# Patient Record
Sex: MALE | Race: WHITE | Employment: OTHER | URBAN - METROPOLITAN AREA
[De-identification: names, ages, dates, MRNs, and addresses within clinical notes are randomized per-mention and may not be internally consistent; named-entity substitution may affect disease eponyms.]

---

## 2017-05-26 ENCOUNTER — OFFICE VISIT (OUTPATIENT)
Dept: CARDIOLOGY CLINIC | Age: 77
End: 2017-05-26

## 2017-05-26 VITALS
HEART RATE: 68 BPM | DIASTOLIC BLOOD PRESSURE: 70 MMHG | BODY MASS INDEX: 28.35 KG/M2 | HEIGHT: 70 IN | WEIGHT: 198 LBS | OXYGEN SATURATION: 94 % | SYSTOLIC BLOOD PRESSURE: 142 MMHG

## 2017-05-26 DIAGNOSIS — I48.20 CHRONIC ATRIAL FIBRILLATION (HCC): Primary | ICD-10-CM

## 2017-05-26 DIAGNOSIS — I10 ESSENTIAL HYPERTENSION: ICD-10-CM

## 2017-05-26 DIAGNOSIS — Z95.2 S/P AVR: ICD-10-CM

## 2017-05-26 RX ORDER — LISINOPRIL 40 MG/1
20 TABLET ORAL
COMMUNITY
End: 2019-03-18 | Stop reason: DRUGHIGH

## 2017-05-26 RX ORDER — POLYVINYL ALCOHOL 14 MG/ML
1 SOLUTION/ DROPS OPHTHALMIC AS NEEDED
COMMUNITY

## 2017-05-26 NOTE — PROGRESS NOTES
Fatigue      Visit Vitals    /70 (BP 1 Location: Left arm, BP Patient Position: Sitting)    Pulse 68    Ht 5' 10\" (1.778 m)    Wt 198 lb (89.8 kg)    SpO2 94%    BMI 28.41 kg/m2     NO REFILLS

## 2017-05-26 NOTE — MR AVS SNAPSHOT
Visit Information Date & Time Provider Department Dept. Phone Encounter #  
 5/26/2017 10:40 AM Araceli Painting MD [facility]ks[facility] Serra [phone] [id number] Follow-up Instructions Return in about 6 months (around 11/26/2017). Your Appointments 6/19/2017  9:00 AM  
ECHO CARDIOGRAMS 2D with ECHO, Celestine Marinas [facility] ([facility] SCHEDULING) Appt Note: echo dr [address] [address]  
[phone]  
  
   
 [address] [address] [address] Upcoming Health Maintenance Date Due DTaP/Tdap/Td series (1 - Tdap) 9/14/1960 ZOSTER VACCINE AGE 60> 9/14/1999 GLAUCOMA SCREENING Q2Y 9/14/2004 Pneumococcal 65+ Low/Medium Risk (1 of 2 - PCV13) 9/14/2004 MEDICARE YEARLY EXAM 9/14/2004 INFLUENZA AGE 9 TO ADULT 8/1/2017 Allergies as of 5/26/2017  Review Complete On: 5/26/2017 By: Araceli Painting MD  
 No Known Allergies Current Immunizations  Reviewed on 12/28/2014 Name Date Influenza Vaccine 10/27/2014 Not reviewed this visit You Were Diagnosed With   
  
 Codes Comments Chronic atrial fibrillation (HCC)    -  Primary ICD-10-CM: P51.0 ICD-9-CM: 427.31 Essential hypertension     ICD-10-CM: I10 
ICD-9-CM: 401.9 S/P AVR     ICD-10-CM: Z95.2 ICD-9-CM: V43.3 Vitals BP Pulse Height(growth percentile) Weight(growth percentile) SpO2 BMI  
 142/70 (BP 1 Location: Left arm, BP Patient Position: Sitting) 68 5' 10\" (1.778 m) 198 lb (89.8 kg) 94% 28.41 kg/m2 Smoking Status Never Smoker Vitals History BMI and BSA Data Body Mass Index Body Surface Area  
 28.41 kg/m 2 2.11 m 2 Preferred Pharmacy Pharmacy Name Phone [facility] [address] [phone] Your Updated Medication List  
  
   
 This list is accurate as of: 5/26/17 11:37 AM.  Always use your most recent med list.  
  
  
  
  
 ARTIFICIAL TEARS (POLYVIN ALC) 1.4 % ophthalmic solution Generic drug:  polyvinyl alcohol Administer 1 Drop to both eyes as needed. cholecalciferol 1,000 unit tablet Commonly known as:  VITAMIN D3 Take 2,000 Units by mouth two (2) times a day. docusate sodium 100 mg capsule Commonly known as:  Zehra Scarce Take 100 mg by mouth two (2) times daily as needed. ferrous sulfate 325 mg (65 mg iron) Cper Take  by mouth. finasteride 5 mg tablet Commonly known as:  PROSCAR Take 5 mg by mouth nightly. glucosamine-chondroitin 750-600 mg Tab Take  by mouth. ipratropium 17 mcg/actuation inhaler Commonly known as:  ATROVENT HFA Take 2 Puffs by inhalation. lisinopril 40 mg tablet Commonly known as:  Bob Mash Take 20 mg by mouth daily. multivitamin tablet Commonly known as:  ONE A DAY Take 1 Tab by mouth daily. omega-3 fatty acids-vitamin e 1,000 mg Cap Take 1 Cap by mouth every other day. pravastatin 40 mg tablet Commonly known as:  PRAVACHOL Take 40 mg by mouth nightly. Pravastatin Na  
  
 senna 8.6 mg tablet Commonly known as:  Peter Joseel Brian Take 1 Tab by mouth daily. tamsulosin 0.4 mg capsule Commonly known as:  FLOMAX Take 0.4 mg by mouth nightly. TYLENOL 325 mg tablet Generic drug:  acetaminophen Take  by mouth every four (4) hours as needed for Pain.  
  
 warfarin 5 mg tablet Commonly known as:  COUMADIN Take 5 mg by mouth daily. We Performed the Following HEPATIC FUNCTION PANEL [36195 CPT(R)] LIPID PANEL [08140 CPT(R)] Follow-up Instructions Return in about 6 months (around 11/26/2017). To-Do List   
 05/26/2017 ECHO:  2D ECHO COMPLETE ADULT (TTE) W OR WO CONTR Introducing [facility]! Ricardo Jiang introduces [employer] patient portal. Now you can access parts of your medical record, email your doctor's office, and request medication refills online. 1. In your internet browser, go to [email or web address] 2. Click on the First Time User? Click Here link in the Sign In box. You will see the New Member Sign Up page. 3. Enter your [employer] Access Code exactly as it appears below. You will not need to use this code after youve completed the sign-up process. If you do not sign up before the expiration date, you must request a new code. · [employer] Access Code: [email or web address] Expires: 8/24/2017 11:37 AM 
 
4. Enter the last four digits of your Social Security Number (xxxx) and Date of Birth (mm/dd/yyyy) as indicated and click Submit. You will be taken to the next sign-up page. 5. Create a [employer] ID. This will be your [employer] login ID and cannot be changed, so think of one that is secure and easy to remember. 6. Create a [employer] password. You can change your password at any time. 7. Enter your Password Reset Question and Answer. This can be used at a later time if you forget your password. 8. Enter your e-mail address. You will receive e-mail notification when new information is available in [address] 9. Click Sign Up. You can now view and download portions of your medical record. 10. Click the Download Summary menu link to download a portable copy of your medical information. If you have questions, please visit the Frequently Asked Questions section of the [employer] website. Remember, [employer] is NOT to be used for urgent needs. For medical emergencies, dial 911. Now available from your iPhone and Android! Please provide this summary of care documentation to your next provider. Your primary care clinician is listed as NOT ON FILE. If you have any questions after today's visit, please call [phone].

## 2017-05-26 NOTE — PROGRESS NOTES
LAST OFFICE VISIT : 7/1/2016        ICD-10-CM ICD-9-CM   1. Chronic atrial fibrillation (HCC) I48.2 427.31   2. Essential hypertension I10 401.9   3. S/P AVR Z95.2 V43. [address] is a 67 y.o. male with  referred for      . I have personally obtained the history from the patient. HISTORY OF PRESENTING ILLNESS        Mr. Mya Soto is doing well without any interval issues. No recurrent AF spells. He remains active with daily walking (2 miles a day in 1 hour) without any exertional symptoms. He also lifts weights (65lbs, 10 reps) and uses the push mower. He has DEL and is intolerant of CPAP machine. He reports increased fatigue during the day. Wonders if this is from being too physically active with lack of CPAP therapy. No bleeding issues on Coumadin. The patient denies chest pain/ shortness of breath, orthopnea, PND, LE edema, palpitations, syncope or presyncope.          ACTIVE PROBLEM LIST     Patient Active Problem List    Diagnosis Date Noted    Hypertension 10/26/2015    Hyperlipemia 10/26/2015    Anemia due to acute blood loss 12/26/2014    Aortic stenosis 12/23/2014    S/P AVR (aortic valve replacement) 12/23/2014    S/P MVR (mitral valve repair) 12/23/2014    S/P Maze operation for atrial fibrillation 12/23/2014    Aortic insufficiency 12/08/2014    Mitral regurgitation 12/08/2014    A-fib ([facility] 06/17/2013    Arthritis of knee 07/09/2012           PAST MEDICAL HISTORY     Past Medical History:   Diagnosis Date    Arthritis     Atrial fibrillation ([address]     CAD (coronary artery disease)     Cancer ([address] 2012    melanoma head    Chronic pain     legs/knee    Contact dermatitis     Enlarged prostate     GERD (gastroesophageal reflux disease)     Hyperlipidemia     Hypertension     Insomnia     Unspecified sleep apnea     unable tolerated CPAP machine    Vitamin D deficiency            PAST SURGICAL HISTORY     Past Surgical History:   Procedure Laterality Date    HX AORTIC VALVE REPLACEMENT  2015    and mitral valve repair, left atrial cryo maze    HX HEENT      melanoma removed head    HX HERNIA REPAIR  2009    Right    HX HERNIA REPAIR      left inguinal hernia repair    HX HERNIA REPAIR Left 12/01/2016    lap left inguinal hernia repair with mesh    HX KNEE REPLACEMENT      Bilateral    HX ORTHOPAEDIC      BILATERAL KNEE REPLACEMENT    HX ORTHOPAEDIC      CARPEL TUNNEL REPAIR-RIGHT    HX OTHER SURGICAL  2015    closure of patent foramen ovale          ALLERGIES     No Known Allergies       FAMILY HISTORY     Family History   Problem Relation Age of Onset    Cancer Mother     Cancer Father      prostrate    Cancer Brother      prostrate ca    Anesth Problems Neg Hx     negative for cardiac disease       SOCIAL HISTORY     Social History     Social History    Marital status:      Spouse name: N/A    Number of children: N/A    Years of education: N/A     Social History Main Topics    Smoking status: Never Smoker    Smokeless tobacco: Never Used    Alcohol use 1.8 oz/week     3 Cans of beer per week    Drug use: No    Sexual activity: Not Asked     Other Topics Concern    None     Social History Narrative         MEDICATIONS     Current Outpatient Prescriptions   Medication Sig    lisinopril (PRINIVIL, ZESTRIL) 40 mg tablet Take 20 mg by mouth daily.  polyvinyl alcohol (ARTIFICIAL TEARS, POLYVIN ALC,) 1.4 % ophthalmic solution Administer 1 Drop to both eyes as needed.  ipratropium (ATROVENT HFA) 17 mcg/actuation inhaler Take 2 Puffs by inhalation.  ferrous sulfate 325 mg (65 mg iron) cpER Take  by mouth.  senna (SENOKOT) 8.6 mg tablet Take 1 Tab by mouth daily.  tamsulosin (FLOMAX) 0.4 mg capsule Take 0.4 mg by mouth nightly.  cholecalciferol (VITAMIN D3) 1,000 unit tablet Take 2,000 Units by mouth two (2) times a day.  GLUCOSAMINE HCL/CHONDR CHING A NA (GLUCOSAMINE-CHONDROITIN) 750-600 mg tab Take  by mouth.     omega-3 fatty acids-vitamin e 1,000 mg cap Take 1 Cap by mouth every other day.  acetaminophen (TYLENOL) 325 mg tablet Take  by mouth every four (4) hours as needed for Pain.  multivitamin (ONE A DAY) tablet Take 1 Tab by mouth daily.  warfarin (COUMADIN) 5 mg tablet Take 5 mg by mouth daily.  docusate sodium (COLACE) 100 mg capsule Take 100 mg by mouth two (2) times daily as needed.  finasteride (PROSCAR) 5 mg tablet Take 5 mg by mouth nightly.  pravastatin (PRAVACHOL) 40 mg tablet Take 40 mg by mouth nightly. Pravastatin Na     No current facility-administered medications for this visit. I have reviewed the nurses notes, vitals, problem list, allergy list, medical history, family, social history and medications. REVIEW OF SYMPTOMS      General: Pt denies excessive weight gain or loss. Pt is able to conduct ADL's. Positive for fatigue. HEENT: Denies blurred vision, headaches, hearing loss, epistaxis and difficulty swallowing. Respiratory: Denies cough, congestion, shortness of breath, ABDUL, wheezing or stridor. Cardiovascular: Denies precordial pain, palpitations, edema or PND  Gastrointestinal: Denies poor appetite, indigestion, abdominal pain or blood in stool  Genitourinary: Denies hematuria, dysuria, increased urinary frequency  Musculoskeletal: Denies joint pain or swelling from muscles or joints  Neurologic: Denies tremor, paresthesias, headache, or sensory motor disturbance  Psychiatric: Denies confusion, insomnia, depression  Integumentray: Denies rash, itching or ulcers. Hematologic: Denies easy bruising, bleeding     PHYSICAL EXAMINATION      Vitals:    05/26/17 1102   BP: 142/70   Pulse: 68   SpO2: 94%   Weight: 198 lb (89.8 kg)   Height: 5' 10\" (1.778 m)     General: Well developed, in no acute distress.   HEENT: No jaundice, oral mucosa moist, no oral ulcers  Neck: Supple, no stiffness, no lymphadenopathy, supple  Heart: Irregularly irregular rhythm   Respiratory: Clear bilaterally x 4, no wheezing or rales  Extremities: trace pedal edema   Musculoskeletal: No clubbing, no deformities  Neuro: A&Ox3, speech clear, gait stable, cooperative, no focal neurologic deficits  Skin: Skin color is normal. No rashes or lesions. Non diaphoretic, moist.  Vascular: 2+ pulses symmetric in all extremities       DIAGNOSTIC DATA       1.  Echocardiogram                                     Echo 9/5/14    SUMMARY:  Left ventricle: Systolic function was normal. Ejection fraction was  estimated in the range of 55 % to 60 %. There were no regional wall motion  abnormalities. Left atrium: The atrium was mildly dilated. 4/20/15- EF 48%, SHANTANU, atrial septum- poss PFO, MR mild, TR mild/mod, Pulm HTN mod, AV bioprosthesis normal function      2.  Myocardial perfusion study                      (9/5/14)  Normal EF ;Normal perfusion    3. Cholesterol profile                            (10/6/15): , HDL 35, ,           (4/19/16): , HDL 72, LDL 65.6,   (05/01/17)- , HDL 85, LDL 65 , TG 55  4. LE venous duplex                                           (10/10/14)  Right leg mod.  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 distally  No DVT     LABORATORY DATA            Lab Results   Component Value Date/Time    WBC 4.3 11/30/2016 04:13 PM    HGB 12.9 11/30/2016 04:13 PM    HCT 38.6 11/30/2016 04:13 PM    PLATELET 043 53/62/5019 04:13 PM    MCV 94.6 11/30/2016 04:13 PM      Lab Results   Component Value Date/Time    Sodium 141 11/30/2016 04:13 PM    Potassium 4.1 11/30/2016 04:13 PM    Chloride 104 11/30/2016 04:13 PM    CO2 30 11/30/2016 04:13 PM    Anion gap 7 11/30/2016 04:13 PM    Glucose 86 11/30/2016 04:13 PM    BUN 14 11/30/2016 04:13 PM    Creatinine 0.73 11/30/2016 04:13 PM    BUN/Creatinine ratio 19 11/30/2016 04:13 PM    GFR est AA >60 11/30/2016 04:13 PM    GFR est non-AA >60 11/30/2016 04:13 PM    Calcium 9.1 11/30/2016 04:13 PM    Bilirubin, total 0.7 10/13/2015 08:07 AM    AST (SGOT) 26 10/13/2015 08:07 AM    Alk. phosphatase 54 10/13/2015 08:07 AM    Protein, total 6.8 10/13/2015 08:07 AM    Albumin 4.3 10/13/2015 08:07 AM    Globulin 3.3 12/29/2014 04:46 AM    A-G Ratio 0.8 12/29/2014 04:46 AM    ALT (SGPT) 19 10/13/2015 08:07 AM           ASSESSMENT/RECOMMENDATIONS:.      1. AF  -No recurrent atrial fibrillation spells.   -No bleeding issues on Coumadin.   -Will obtain an echo in 6 months. 2. LDL  -LDL is at goal on current medical regimen. He leads a very active lifestyle. 3. Fatigue   -maybe related to the fact that he is not wearing his CPAP at home.   -I believe his PCP is following his TSH and hemoglobin. 4. Moderate PFO with left to right flow and CHRISTOPHER  -Currently anticoagulated. 5. Follow up in 6 months, sooner PRN. Orders Placed This Encounter    HEPATIC FUNCTION PANEL    LIPID PANEL    2D ECHO COMPLETE ADULT (TTE) W OR WO CONTR     Standing Status:   Future     Standing Expiration Date:   6/7/2017     Order Specific Question:   Contrast Enhancement (Bubble Study, Definity, Optison) may be used if criteria listed in established evidence-based protocol has been identified. Answer:   Yes     Order Specific Question:   Reason for Exam:     Answer:   chest pain,shortness of breath, AVR,MVR,AI, MR , AS    lisinopril (PRINIVIL, ZESTRIL) 40 mg tablet     Sig: Take 20 mg by mouth daily.  polyvinyl alcohol (ARTIFICIAL TEARS, POLYVIN ALC,) 1.4 % ophthalmic solution     Sig: Administer 1 Drop to both eyes as needed.  ipratropium (ATROVENT HFA) 17 mcg/actuation inhaler     Sig: Take 2 Puffs by inhalation. Follow-up Disposition:  Return in about 6 months (around 11/26/2017). I have discussed the diagnosis with  Jessie Gross and the intended plan as seen in the above orders. Questions were answered concerning future plans. I have discussed medication side effects and warnings with the patient as well.     Thank you, [facility] for involving me in the care of  Henny Meraz. Please do not hesitate to contact me for further questions/concerns. Written by Annabella Hoyt, as dictated by Sherine Rosas MD.    Eliseo Fine MD, [address]      [facility], [address] [place] [address] [facility]      [phone] / [phone] Fax

## 2017-06-19 ENCOUNTER — CLINICAL SUPPORT (OUTPATIENT)
Dept: CARDIOLOGY CLINIC | Age: 77
End: 2017-06-19

## 2017-06-19 DIAGNOSIS — Z95.2 S/P AVR (AORTIC VALVE REPLACEMENT): Primary | ICD-10-CM

## 2017-06-19 DIAGNOSIS — I10 ESSENTIAL HYPERTENSION: ICD-10-CM

## 2017-06-19 DIAGNOSIS — I48.20 CHRONIC ATRIAL FIBRILLATION (HCC): ICD-10-CM

## 2017-06-19 DIAGNOSIS — Z98.890 S/P MVR (MITRAL VALVE REPAIR): ICD-10-CM

## 2017-06-20 ENCOUNTER — DOCUMENTATION ONLY (OUTPATIENT)
Dept: CARDIOLOGY CLINIC | Age: 77
End: 2017-06-20

## 2018-01-22 ENCOUNTER — OFFICE VISIT (OUTPATIENT)
Dept: CARDIOLOGY CLINIC | Age: 78
End: 2018-01-22

## 2018-01-22 VITALS
BODY MASS INDEX: 28.49 KG/M2 | HEIGHT: 70 IN | HEART RATE: 72 BPM | SYSTOLIC BLOOD PRESSURE: 139 MMHG | DIASTOLIC BLOOD PRESSURE: 70 MMHG | WEIGHT: 199 LBS

## 2018-01-22 DIAGNOSIS — Z98.890 S/P MVR (MITRAL VALVE REPAIR): ICD-10-CM

## 2018-01-22 DIAGNOSIS — I48.91 ATRIAL FIBRILLATION, UNSPECIFIED TYPE (HCC): Primary | ICD-10-CM

## 2018-01-22 DIAGNOSIS — Z95.2 S/P AVR (AORTIC VALVE REPLACEMENT): ICD-10-CM

## 2018-01-22 NOTE — MR AVS SNAPSHOT
[address] [address] 
[phone] Patient: Nj Lara MRN: [id number] MRX:[id number] Visit Information Date & Time Provider Department Dept. Phone Encounter #  
 1/22/2018  9:00 AM Esther Schlatter, MD [facility] [phone] [id number] Follow-up Instructions Return in about 6 months (around 7/22/2018). Upcoming Health Maintenance Date Due DTaP/Tdap/Td series (1 - Tdap) 9/14/1960 ZOSTER VACCINE AGE 60> 7/14/1999 GLAUCOMA SCREENING Q2Y 9/14/2004 Pneumococcal 65+ Low/Medium Risk (1 of 2 - PCV13) 9/14/2004 MEDICARE YEARLY EXAM 9/14/2004 Influenza Age 5 to Adult 8/1/2017 Allergies as of 1/22/2018  Review Complete On: 1/22/2018 By: Esther Schlatter, MD  
 No Known Allergies Current Immunizations  Reviewed on 12/28/2014 Name Date Influenza Vaccine 10/27/2014 Not reviewed this visit You Were Diagnosed With   
  
 Codes Comments [facility] unspecified [facility]    -  Primary ICD-10-CM: I48.91 
ICD-9-CM: 427.31 S/P MVR (mitral valve repair)     ICD-10-CM: I42.396 ICD-9-CM: V45.89 S/P AVR (aortic valve replacement)     ICD-10-CM: L82.0 ICD-9-CM: V43.3 Vitals BP Pulse Height(growth percentile) Weight(growth percentile) BMI Smoking Status 139/70 72 5' 10\" (1.778 m) 199 lb (90.3 kg) 28.55 kg/m2 Never Smoker Vitals History BMI and BSA Data Body Mass Index Body Surface Area 28.55 kg/m 2 2.11 m 2 Preferred Pharmacy Pharmacy Name Phone [facility] [address] [phone] Your Updated Medication List  
  
   
This list is accurate as of: 1/22/18  9:43 AM.  Always use your most recent med list.  
  
  
  
  
 ARTIFICIAL TEARS (POLYVIN ALC) 1.4 % ophthalmic solution Generic drug:  polyvinyl alcohol Administer 1 Drop to both eyes as needed. cholecalciferol 1,000 unit tablet Commonly known as:  VITAMIN D3 Take 2,000 Units by mouth two (2) times a day. docusate sodium 100 mg capsule Commonly known as:  Brena Slay Take 100 mg by mouth two (2) times daily as needed. ferrous sulfate 325 mg (65 mg iron) Cper Take  by mouth. finasteride 5 mg tablet Commonly known as:  PROSCAR Take 5 mg by mouth nightly. glucosamine-chondroitin 750-600 mg Tab Take  by mouth. ipratropium 17 mcg/actuation inhaler Commonly known as:  ATROVENT HFA Take 2 Puffs by inhalation. lisinopril 40 mg tablet Commonly known as:  Rheta Jacek Take 20 mg by mouth daily. multivitamin tablet Commonly known as:  ONE A DAY Take 1 Tab by mouth daily. omega-3 fatty acids-vitamin e 1,000 mg Cap Take 1 Cap by mouth every other day. pravastatin 40 mg tablet Commonly known as:  PRAVACHOL Take 40 mg by mouth nightly. Pravastatin Na  
  
 senna 8.6 mg tablet Commonly known as:  Peter Kiewit Brian Take 1 Tab by mouth daily. tamsulosin 0.4 mg capsule Commonly known as:  FLOMAX Take 0.4 mg by mouth nightly. TYLENOL 325 mg tablet Generic drug:  acetaminophen Take  by mouth every four (4) hours as needed for Pain.  
  
 warfarin 5 mg tablet Commonly known as:  COUMADIN Take 5 mg by mouth daily. We Performed the Following AMB POC EKG ROUTINE W/ 12 LEADS, INTER & REP [38858 CPT(R)] Follow-up Instructions Return in about 6 months (around 7/22/2018). Introducing [facility]! Arnol Arana introduces [employer] patient portal. Now you can access parts of your medical record, email your doctor's office, and request medication refills online. 1. In your internet browser, go to [email or web address] 2. Click on the First Time User? Click Here link in the Sign In box. You will see the New Member Sign Up page. 3. Enter your [employer] Access Code exactly as it appears below. You will not need to use this code after youve completed the sign-up process. If you do not sign up before the expiration date, you must request a new code. · [employer] Access Code: [email or web address] Expires: 4/22/2018  9:17 AM 
 
4. Enter the last four digits of your Social Security Number (xxxx) and Date of Birth (mm/dd/yyyy) as indicated and click Submit. You will be taken to the next sign-up page. 5. Create a [employer] ID. This will be your [employer] login ID and cannot be changed, so think of one that is secure and easy to remember. 6. Create a [employer] password. You can change your password at any time. 7. Enter your Password Reset Question and Answer. This can be used at a later time if you forget your password. 8. Enter your e-mail address. You will receive e-mail notification when new information is available in [address] 9. Click Sign Up. You can now view and download portions of your medical record. 10. Click the Download Summary menu link to download a portable copy of your medical information. If you have questions, please visit the Frequently Asked Questions section of the [employer] website. Remember, [employer] is NOT to be used for urgent needs. For medical emergencies, dial 911. Now available from your iPhone and Android! Please provide this summary of care documentation to your next provider. Your primary care clinician is listed as NOT ON FILE. If you have any questions after today's visit, please call [phone].

## 2018-01-22 NOTE — PROGRESS NOTES
Visit Vitals    /70    Pulse 72    Ht 5' 10\" (1.778 m)    Wt 199 lb (90.3 kg)    BMI 28.55 kg/m2

## 2018-01-22 NOTE — PROGRESS NOTES
LAST OFFICE VISIT : 6/19/2017        ICD-10-CM ICD-9-CM   1. Atrial fibrillation, unspecified type ([facility] [address] I48.91 427.31   2. S/P MVR (mitral valve repair) Z98.890 V45.89   3. S/P AVR (aortic valve replacement) Z95.2 V43. [address]ro is a 65 y.o. male with hypertension, dyslipidemia and atrial fibrillation referred for 6 month follow up. Cardiac risk factors: dyslipidemia, male gender, hypertension  I have personally obtained the history from the patient. HISTORY OF PRESENTING ILLNESS     Overall the pt states he is doing well. The pt states that he is getting his INR checked regularly. He states that he is very fatigued. The pt is walking 2 miles on the treadmill regularly. He is not wearing his CPAP. The pt's wife states that she has noticed swelling in his ankles more in the last few months. The patient denies chest pain/ shortness of breath, orthopnea, PND, palpitations, syncope, presyncope or.          ACTIVE PROBLEM LIST     Patient Active Problem List    Diagnosis Date Noted    Hypertension 10/26/2015    Hyperlipemia 10/26/2015    Anemia due to acute blood loss 12/26/2014    Aortic stenosis 12/23/2014    S/P AVR (aortic valve replacement) 12/23/2014    S/P MVR (mitral valve repair) 12/23/2014    S/P Maze operation for atrial fibrillation 12/23/2014    Aortic insufficiency 12/08/2014    Mitral regurgitation 12/08/2014    A-fib ([facility] [address] 06/17/2013    Arthritis of knee 07/09/2012           PAST MEDICAL HISTORY     Past Medical History:   Diagnosis Date    Arthritis     Atrial fibrillation ([facility] [address]     CAD (coronary artery disease)     Cancer ([facility] [address] 2012    melanoma head    Chronic pain     legs/knee    Contact dermatitis     Enlarged prostate     GERD (gastroesophageal reflux disease)     Hyperlipidemia     Hypertension     Insomnia     Unspecified sleep apnea     unable tolerated CPAP machine    Vitamin D deficiency            PAST SURGICAL HISTORY     Past Surgical History:   Procedure Laterality Date    HX AORTIC VALVE REPLACEMENT  2015    and mitral valve repair, left atrial cryo maze    HX HEENT      melanoma removed head    HX HERNIA REPAIR  2009    Right    HX HERNIA REPAIR      left inguinal hernia repair    HX HERNIA REPAIR Left 12/01/2016    lap left inguinal hernia repair with mesh    HX KNEE REPLACEMENT      Bilateral    HX ORTHOPAEDIC      BILATERAL KNEE REPLACEMENT    HX ORTHOPAEDIC      CARPEL TUNNEL REPAIR-RIGHT    HX OTHER SURGICAL  2015    closure of patent foramen ovale          ALLERGIES     No Known Allergies       FAMILY HISTORY     Family History   Problem Relation Age of Onset    Cancer Mother     Cancer Father      prostrate    Cancer Brother      prostrate ca    Anesth Problems Neg Hx     negative for cardiac disease       SOCIAL HISTORY     Social History     Social History    Marital status:      Spouse name: N/A    Number of children: N/A    Years of education: N/A     Social History Main Topics    Smoking status: Never Smoker    Smokeless tobacco: Never Used    Alcohol use 1.8 oz/week     3 Cans of beer per week    Drug use: No    Sexual activity: Not Asked     Other Topics Concern    None     Social History Narrative         MEDICATIONS     Current Outpatient Prescriptions   Medication Sig    lisinopril (PRINIVIL, ZESTRIL) 40 mg tablet Take 20 mg by mouth daily.  polyvinyl alcohol (ARTIFICIAL TEARS, POLYVIN ALC,) 1.4 % ophthalmic solution Administer 1 Drop to both eyes as needed.  ipratropium (ATROVENT HFA) 17 mcg/actuation inhaler Take 2 Puffs by inhalation.  ferrous sulfate 325 mg (65 mg iron) cpER Take  by mouth.  senna (SENOKOT) 8.6 mg tablet Take 1 Tab by mouth daily.  tamsulosin (FLOMAX) 0.4 mg capsule Take 0.4 mg by mouth nightly.  cholecalciferol (VITAMIN D3) 1,000 unit tablet Take 2,000 Units by mouth two (2) times a day.     GLUCOSAMINE HCL/CHONDR CHING A NA (GLUCOSAMINE-CHONDROITIN) 750-600 mg tab Take  by mouth.  omega-3 fatty acids-vitamin e 1,000 mg cap Take 1 Cap by mouth every other day.  acetaminophen (TYLENOL) 325 mg tablet Take  by mouth every four (4) hours as needed for Pain.  multivitamin (ONE A DAY) tablet Take 1 Tab by mouth daily.  warfarin (COUMADIN) 5 mg tablet Take 5 mg by mouth daily.  docusate sodium (COLACE) 100 mg capsule Take 100 mg by mouth two (2) times daily as needed.  finasteride (PROSCAR) 5 mg tablet Take 5 mg by mouth nightly.  pravastatin (PRAVACHOL) 40 mg tablet Take 40 mg by mouth nightly. Pravastatin Na     No current facility-administered medications for this visit. I have reviewed the nurses notes, vitals, problem list, allergy list, medical history, family, social history and medications. REVIEW OF SYMPTOMS      General: Pt denies excessive weight gain or loss. Pt is able to conduct ADL's  HEENT: Denies blurred vision, headaches, hearing loss, epistaxis and difficulty swallowing. Respiratory: Denies cough, congestion, shortness of breath, ABDUL, wheezing or stridor. Cardiovascular: Denies precordial pain, palpitations, edema or PND  Gastrointestinal: Denies poor appetite, indigestion, abdominal pain or blood in stool  Genitourinary: Denies hematuria, dysuria, increased urinary frequency  Musculoskeletal: Denies joint pain or swelling from muscles or joints  Neurologic: Denies tremor, paresthesias, headache, or sensory motor disturbance  Psychiatric: Denies confusion, insomnia, depression  Integumentray: Denies rash, itching or ulcers. Hematologic: Denies easy bruising, bleeding     PHYSICAL EXAMINATION      Vitals:    01/22/18 0917   BP: 139/70   Pulse: 72   Weight: 199 lb (90.3 kg)   Height: 5' 10\" (1.778 m)     General: Well developed, in no acute distress.   HEENT: No jaundice, oral mucosa moist, no oral ulcers  Neck: Supple, no stiffness, no lymphadenopathy, supple  Heart: Irregularly irregular   Respiratory: Clear bilaterally x 4, no wheezing or rales  Extremities: Trace ankle edema  Musculoskeletal: No clubbing, no deformities  Neuro: A&Ox3, speech clear, gait stable, cooperative, no focal neurologic deficits  Skin: Skin color is normal. No rashes or lesions. Non diaphoretic, moist.      EKG: Atrial fibrillation      DIAGNOSTIC DATA     1.  Echocardiogram                                     Echo 9/5/14    SUMMARY:  Left ventricle: Systolic function was normal. Ejection fraction was  estimated in the range of 55 % to 60 %. There were no regional wall motion  abnormalities. Left atrium: The atrium was mildly dilated. 4/20/15- EF 48%, SHANTANU, atrial septum- poss PFO, MR mild, TR mild/mod, Pulm HTN mod, AV bioprosthesis normal function  6/19/17- EF 45-50%, RVE, SHANTANU, MR/TR mild/mod, AV bioprosthesis exhibits normal function, severe Pulm HTN, MN mild  Atrial septum: There was a secundum septal defect. Color Doppler  evaluation was performed. There was a mild left-to-right shunt. 2.  Myocardial perfusion study                      (9/5/14)  Normal EF ;Normal perfusion    3. Cholesterol profile                            (10/6/15): , HDL 35, ,           (4/19/16): , HDL 72, LDL 65.6,   (05/01/17)- , HDL 85, LDL 65 , TG 55    4. LE venous duplex                                           (10/10/14)  Right leg mod.  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 distally  No DVT         LABORATORY DATA            Lab Results   Component Value Date/Time    WBC 4.3 11/30/2016 04:13 PM    HGB 12.9 11/30/2016 04:13 PM    HCT 38.6 11/30/2016 04:13 PM    PLATELET 319 75/91/1610 04:13 PM    MCV 94.6 11/30/2016 04:13 PM      Lab Results   Component Value Date/Time    Sodium 141 11/30/2016 04:13 PM    Potassium 4.1 11/30/2016 04:13 PM    Chloride 104 11/30/2016 04:13 PM    CO2 30 11/30/2016 04:13 PM    Anion gap 7 11/30/2016 04:13 PM    Glucose 86 11/30/2016 04:13 PM    BUN 14 11/30/2016 04:13 PM Creatinine 0.73 11/30/2016 04:13 PM    BUN/Creatinine ratio 19 11/30/2016 04:13 PM    GFR est AA >60 11/30/2016 04:13 PM    GFR est non-AA >60 11/30/2016 04:13 PM    Calcium 9.1 11/30/2016 04:13 PM    Bilirubin, total 0.7 10/13/2015 08:07 AM    AST (SGOT) 26 10/13/2015 08:07 AM    Alk. phosphatase 54 10/13/2015 08:07 AM    Protein, total 6.8 10/13/2015 08:07 AM    Albumin 4.3 10/13/2015 08:07 AM    Globulin 3.3 12/29/2014 04:46 AM    A-G Ratio 0.8 12/29/2014 04:46 AM    ALT (SGPT) 19 10/13/2015 08:07 AM           ASSESSMENT/RECOMMENDATIONS:.      1. Atrial fibrillation  - Rate is under good control, continue anticoagulation. He has not had any episodes of bleeding on coumadin. 2. Hypertension  - Blood pressure is under good control on current medical regimen. I encouraged him to continue to exercise and eat a clean healthy diet. 3. Dyslipidemia  - Lipids are at goal based on last lipid panel, these are being followed at the [place]. 4. Moderate PFO with left to right flow and CHRISTOPHER  - This was apparently closed at time of surgery. 5. AS and MR s/p AVR and MVR on 1/8/15  - He had an aortic valve replacement with a #23 Deep santo flow and he had a mitral valve repair. He additionally had a cryo maze procedure done. 6. DEL  - The pt is not wearing his CPAP and I informed him this is what could be causing his fatigue. - I believe his LE edema could also be from him not wearing his CPAP but I will check LE venous duplex scans to be sure. 7. Return in 6 months or PRN. Orders Placed This Encounter    AMB POC EKG ROUTINE W/ 12 LEADS, INTER & REP     Order Specific Question:   Reason for Exam:     Answer:   afib        Follow-up Disposition:  Return in about 6 months (around 7/22/2018). I have discussed the diagnosis with  Mehul Martin and the intended plan as seen in the above orders. Questions were answered concerning future plans.   I have discussed medication side effects and warnings with the patient as well. Thank you,  Not On File [facility] for involving me in the care of  Nj Lara. Please do not hesitate to contact me for further questions/concerns. Written by Marlyn Bethea, as dictated by Esther Schlatter, MD.     Lloyd Fine MD, [facility] [address]      [facility], [address] [place] [address] [address]      [phone] / [phone] Fax

## 2018-01-24 ENCOUNTER — CLINICAL SUPPORT (OUTPATIENT)
Dept: CARDIOLOGY CLINIC | Age: 78
End: 2018-01-24

## 2018-01-24 DIAGNOSIS — R60.0 BILATERAL LEG EDEMA: Primary | ICD-10-CM

## 2018-01-24 NOTE — PROCEDURES
[facility]  *** FINAL REPORT ***    Name: Luis Garner  MRN: [id number]       Outpatient  : 14 Sep 1940  HIS Order #: [id number]  [address] [facility] Visit #: [id number]  Date: 2018    TYPE OF TEST: Peripheral Venous Testing    REASON FOR TEST  Limb swelling    Right Leg:-  Deep venous thrombosis:           No  Superficial venous thrombosis:    No  Deep venous insufficiency:        No  Superficial venous insufficiency: No    Left Leg:-  Deep venous thrombosis:           No  Superficial venous thrombosis:    No  Deep venous insufficiency:        Yes  Superficial venous insufficiency: No      INTERPRETATION/FINDINGS  PROCEDURE:  BILATERAL LE VENOUS DUPLEX. Evaluation of lower extremity veins with ultrasound (B-mode imaging,  pulsed Doppler, color Doppler). Includes the common femoral, deep  femoral, femoral, popliteal, posterior tibial, peroneal, and great  saphenous veins. FINDINGS:  Maebelle Osmani scale and color flow duplex images of the veins in  both lower extremities demonstrate normal compressibility, augmented  flow profiles, and absence of filling defects throughout the deep and  superficial veins in both lower extremities. Pulsatile flow is noted  bilaterally. As an incidental finding, there is evidence of valve incompetence  (reflux) involving the left popliteal vein. CONCLUSION:  Bilateral lower extremity venous duplex negative for deep   venous thrombosis or thrombophlebitis. Pulsatile venous flow is  observed, consistent with increased central venous pressure. ADDITIONAL COMMENTS    I have personally reviewed the data relevant to the interpretation of  this  study.     TECHNOLOGIST: Dimas Riedel, RVS  Signed: 2018 11:27 AM    PHYSICIAN: Ed Alston MD, [facility]  Signed: 2018 06:39 PM

## 2018-01-31 ENCOUNTER — TELEPHONE (OUTPATIENT)
Dept: CARDIOLOGY CLINIC | Age: 78
End: 2018-01-31

## 2018-01-31 NOTE — TELEPHONE ENCOUNTER
Pt called stating that he was returning your phone call. Please give him a call back @ [phone]. Thanks!   Faida Jefferson

## 2018-02-07 ENCOUNTER — DOCUMENTATION ONLY (OUTPATIENT)
Dept: CARDIOLOGY CLINIC | Age: 78
End: 2018-02-07

## 2018-02-07 DIAGNOSIS — I27.20 PULMONARY HTN (HCC): Primary | ICD-10-CM

## 2018-03-15 ENCOUNTER — TELEPHONE (OUTPATIENT)
Dept: CARDIOLOGY CLINIC | Age: 78
End: 2018-03-15

## 2018-03-16 NOTE — TELEPHONE ENCOUNTER
Patients wife is calling again to try and get in touch with you regarding the patients test   Phone: [phone]

## 2018-03-21 ENCOUNTER — TELEPHONE (OUTPATIENT)
Dept: CARDIOLOGY CLINIC | Age: 78
End: 2018-03-21

## 2018-03-21 NOTE — TELEPHONE ENCOUNTER
Pt's wife is calling to ask a question about a doctor that Dr. Americo Calvo suggested for this pt to have a procedure done by. Pt's wife asked that you call her today. She can be reached @ [phone]. Thanks!   Clara Perez

## 2018-03-22 NOTE — TELEPHONE ENCOUNTER
Patients wife is calling again she said they are a little confused and need clarification ASAP   Phone: [phone]

## 2018-06-29 ENCOUNTER — OFFICE VISIT (OUTPATIENT)
Dept: CARDIOLOGY CLINIC | Age: 78
End: 2018-06-29

## 2018-06-29 VITALS
HEIGHT: 70 IN | BODY MASS INDEX: 28.06 KG/M2 | SYSTOLIC BLOOD PRESSURE: 132 MMHG | HEART RATE: 68 BPM | WEIGHT: 196 LBS | OXYGEN SATURATION: 96 % | DIASTOLIC BLOOD PRESSURE: 90 MMHG

## 2018-06-29 DIAGNOSIS — I34.0 MITRAL VALVE INSUFFICIENCY, UNSPECIFIED ETIOLOGY: ICD-10-CM

## 2018-06-29 DIAGNOSIS — E78.5 HYPERLIPIDEMIA, UNSPECIFIED HYPERLIPIDEMIA TYPE: ICD-10-CM

## 2018-06-29 DIAGNOSIS — I06.0 RHEUMATIC AORTIC STENOSIS: ICD-10-CM

## 2018-06-29 DIAGNOSIS — I10 ESSENTIAL HYPERTENSION: Primary | ICD-10-CM

## 2018-06-29 RX ORDER — ASPIRIN 81 MG/1
81 TABLET ORAL 2 TIMES DAILY
COMMUNITY

## 2018-06-29 NOTE — MR AVS SNAPSHOT
[address] [address] [phone] Patient: Kenia Sky MRN: [id number] TKF:[id number] Visit Information Date & Time Provider Department Dept. Phone Encounter #  
 6/29/2018 11:00 AM Ayo Villagran MD [facility] [phone] [id number] Your Appointments 1/21/2019 10:00 AM  
ESTABLISHED PATIENT with Ayo Villagran MD  
[facility] ([facility] SCHEDULING) Appt Note: 6 mo fu appt [address] [id number] [phone]  
  
   
 [address] [address] [address] Upcoming Health Maintenance Date Due DTaP/Tdap/Td series (1 - Tdap) 9/14/1960 ZOSTER VACCINE AGE 60> 7/14/1999 GLAUCOMA SCREENING Q2Y 9/14/2004 Pneumococcal 65+ Low/Medium Risk (1 of 2 - PCV13) 9/14/2004 MEDICARE YEARLY EXAM 3/14/2018 Influenza Age 5 to Adult 8/1/2018 Allergies as of 6/29/2018  Review Complete On: 6/29/2018 By: Ayo Villagran MD  
 No Known Allergies Current Immunizations  Reviewed on 12/28/2014 Name Date Influenza Vaccine 10/27/2014 Not reviewed this visit You Were Diagnosed With   
  
 Codes Comments Essential hypertension    -  Primary ICD-10-CM: I10 
ICD-9-CM: 401.9 Hyperlipidemia, unspecified hyperlipidemia type     ICD-10-CM: E78.5 ICD-9-CM: 272.4 Rheumatic aortic stenosis     ICD-10-CM: I06.0 ICD-9-CM: 395.0 Mitral valve insufficiency, unspecified etiology     ICD-10-CM: I34.0 ICD-9-CM: 424.0 Vitals BP Pulse Height(growth percentile) Weight(growth percentile) SpO2 BMI  
 132/90 (BP 1 Location: Left arm, BP Patient Position: Sitting) 68 5' 10\" (1.778 m) 196 lb (88.9 kg) 96% 28.12 kg/m2 Smoking Status Never Smoker Vitals History BMI and BSA Data Body Mass Index Body Surface Area  
 28.12 kg/m 2 2.1 m 2 Preferred Pharmacy Pharmacy Name Phone [address] [phone] Your Updated Medication List  
  
   
This list is accurate as of 6/29/18 11:16 AM.  Always use your most recent med list.  
  
  
  
  
 ARTIFICIAL TEARS (POLYVIN ALC) 1.4 % ophthalmic solution Generic drug:  polyvinyl alcohol Administer 1 Drop to both eyes as needed. aspirin delayed-release 81 mg tablet Take  by mouth daily. cholecalciferol 1,000 unit tablet Commonly known as:  VITAMIN D3 Take 2,000 Units by mouth two (2) times a day. docusate sodium 100 mg capsule Commonly known as:  Adeline Silk Take 100 mg by mouth two (2) times daily as needed. ferrous sulfate 325 mg (65 mg iron) Cper Take  by mouth. finasteride 5 mg tablet Commonly known as:  PROSCAR Take 5 mg by mouth nightly. glucosamine-chondroitin 750-600 mg Tab Take  by mouth. ipratropium 17 mcg/actuation inhaler Commonly known as:  ATROVENT HFA Take 2 Puffs by inhalation. lisinopril 40 mg tablet Commonly known as:  Pecola Cypher Take 20 mg by mouth. TAKE 1 AND 1/2 TABLETS DAILY  
  
 multivitamin tablet Commonly known as:  ONE A DAY Take 1 Tab by mouth daily. omega-3 fatty acids-vitamin e 1,000 mg Cap Take 1 Cap by mouth every other day. pravastatin 40 mg tablet Commonly known as:  PRAVACHOL Take 40 mg by mouth nightly. Pravastatin Na  
  
 senna 8.6 mg tablet Commonly known as:  Peter Joseel Brian Take 1 Tab by mouth daily. tamsulosin 0.4 mg capsule Commonly known as:  FLOMAX Take 0.4 mg by mouth nightly. TYLENOL 325 mg tablet Generic drug:  acetaminophen Take  by mouth every four (4) hours as needed for Pain.  
  
 warfarin 5 mg tablet Commonly known as:  COUMADIN Take 5 mg by mouth daily. We Performed the Following HEPATIC FUNCTION PANEL [39229 CPT(R)] LIPID PANEL [32016 CPT(R)] Introducing [facility]! Edgaryinka Beatriz introduces [employer] patient portal. Now you can access parts of your medical record, email your doctor's office, and request medication refills online. 1. In your internet browser, go to [email or web address] 2. Click on the First Time User? Click Here link in the Sign In box. You will see the New Member Sign Up page. 3. Enter your [employer] Access Code exactly as it appears below. You will not need to use this code after youve completed the sign-up process. If you do not sign up before the expiration date, you must request a new code. · [employer] Access Code: [email or web address] Expires: 9/27/2018 11:16 AM 
 
4. Enter the last four digits of your Social Security Number (xxxx) and Date of Birth (mm/dd/yyyy) as indicated and click Submit. You will be taken to the next sign-up page. 5. Create a [employer] ID. This will be your [employer] login ID and cannot be changed, so think of one that is secure and easy to remember. 6. Create a [employer] password. You can change your password at any time. 7. Enter your Password Reset Question and Answer. This can be used at a later time if you forget your password. 8. Enter your e-mail address. You will receive e-mail notification when new information is available in [address] 9. Click Sign Up. You can now view and download portions of your medical record. 10. Click the Download Summary menu link to download a portable copy of your medical information. If you have questions, please visit the Frequently Asked Questions section of the [employer] website. Remember, [employer] is NOT to be used for urgent needs. For medical emergencies, dial 911. Now available from your iPhone and Android! Please provide this summary of care documentation to your next provider. Your primary care clinician is listed as NOT ON FILE. If you have any questions after today's visit, please call [phone].

## 2018-06-29 NOTE — PROGRESS NOTES
LAST OFFICE VISIT : 1/22/2018        ICD-10-CM ICD-9-CM   1. Essential hypertension I10 401.9   2. Hyperlipidemia, unspecified hyperlipidemia type E78.5 272.4   3. 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 Mitral valve insufficiency, unspecified etiology I34.0 424.0            Raj Bhakta is a 65 y.o. male with hypertension, dyslipidemia and atrial fibrillation referred for 6 month follow up. Cardiac risk factors: dyslipidemia, male gender, hypertension  I have personally obtained the history from the patient. HISTORY OF PRESENTING ILLNESS     Overall the pt states he is doing well. The pt reports that he is still trying to exercise and stay active. His wife reports that he has had a catheter in place since 6/18. The pt has not been wearing his CPAP for the last couple weeks. He has noticed a small amount of blood in his urine since the catheter was placed. The pt reports that most of this blood was in the beginning but his wife states he has had more blood in his urine lately. The patient denies chest pain/ shortness of breath, orthopnea, PND, LE edema, palpitations, syncope, presyncope or fatigue.          ACTIVE PROBLEM LIST     Patient Active Problem List    Diagnosis Date Noted    Hypertension 10/26/2015    Hyperlipemia 10/26/2015    Anemia due to acute blood loss 12/26/2014    Aortic stenosis 12/23/2014    S/P AVR (aortic valve replacement) 12/23/2014    S/P MVR (mitral valve repair) 12/23/2014    S/P Maze operation for atrial fibrillation 12/23/2014    Aortic insufficiency 12/08/2014    Mitral regurgitation 12/08/2014    A-fib ([facility] [address] 06/17/2013    Arthritis of knee 07/09/2012           PAST MEDICAL HISTORY     Past Medical History:   Diagnosis Date    Arthritis     Atrial fibrillation ([address]     CAD (coronary artery disease)     Cancer ([facility] [address] 2012    melanoma head    Chronic pain     legs/knee    Contact dermatitis     Enlarged prostate     GERD (gastroesophageal reflux disease)     Hyperlipidemia     Hypertension     Insomnia     Sleep apnea 03/2018    Unspecified sleep apnea     unable tolerated CPAP machine    Vitamin D deficiency            PAST SURGICAL HISTORY     Past Surgical History:   Procedure Laterality Date    HX AORTIC VALVE REPLACEMENT  2015    and mitral valve repair, left atrial cryo maze    HX HEENT      melanoma removed head    HX HERNIA REPAIR  2009    Right    HX HERNIA REPAIR      left inguinal hernia repair    HX HERNIA REPAIR Left 12/01/2016    lap left inguinal hernia repair with mesh    HX KNEE REPLACEMENT      Bilateral    HX ORTHOPAEDIC      BILATERAL KNEE REPLACEMENT    HX ORTHOPAEDIC      CARPEL TUNNEL REPAIR-RIGHT    HX OTHER SURGICAL  2015    closure of patent foramen ovale          ALLERGIES     No Known Allergies       FAMILY HISTORY     Family History   Problem Relation Age of Onset    Cancer Mother     Cancer Father      prostrate    Cancer Brother      prostrate ca    Anesth Problems Neg Hx     negative for cardiac disease       SOCIAL HISTORY     Social History     Social History    Marital status:      Spouse name: N/A    Number of children: N/A    Years of education: N/A     Social History Main Topics    Smoking status: Never Smoker    Smokeless tobacco: Never Used    Alcohol use 1.8 oz/week     3 Cans of beer per week    Drug use: No    Sexual activity: Not Asked     Other Topics Concern    None     Social History Narrative         MEDICATIONS     Current Outpatient Prescriptions   Medication Sig    aspirin delayed-release 81 mg tablet Take  by mouth daily.  lisinopril (PRINIVIL, ZESTRIL) 40 mg tablet Take 20 mg by mouth. TAKE 1 AND 1/2 TABLETS DAILY    polyvinyl alcohol (ARTIFICIAL TEARS, POLYVIN ALC,) 1.4 % ophthalmic solution Administer 1 Drop to both eyes as needed.  ferrous sulfate 325 mg (65 mg iron) cpER Take  by mouth.  senna (SENOKOT) 8.6 mg tablet Take 1 Tab by mouth daily.     tamsulosin (FLOMAX) 0.4 mg capsule Take 0.4 mg by mouth nightly.  cholecalciferol (VITAMIN D3) 1,000 unit tablet Take 2,000 Units by mouth two (2) times a day.  GLUCOSAMINE HCL/CHONDR CHING A NA (GLUCOSAMINE-CHONDROITIN) 750-600 mg tab Take  by mouth.  omega-3 fatty acids-vitamin e 1,000 mg cap Take 1 Cap by mouth every other day.  acetaminophen (TYLENOL) 325 mg tablet Take  by mouth every four (4) hours as needed for Pain.  multivitamin (ONE A DAY) tablet Take 1 Tab by mouth daily.  warfarin (COUMADIN) 5 mg tablet Take 5 mg by mouth daily.  docusate sodium (COLACE) 100 mg capsule Take 100 mg by mouth two (2) times daily as needed.  finasteride (PROSCAR) 5 mg tablet Take 5 mg by mouth nightly.  pravastatin (PRAVACHOL) 40 mg tablet Take 40 mg by mouth nightly. Pravastatin Na    ipratropium (ATROVENT HFA) 17 mcg/actuation inhaler Take 2 Puffs by inhalation. No current facility-administered medications for this visit. I have reviewed the nurses notes, vitals, problem list, allergy list, medical history, family, social history and medications. REVIEW OF SYMPTOMS      General: Pt denies excessive weight gain or loss. Pt is able to conduct ADL's  HEENT: Denies blurred vision, headaches, hearing loss, epistaxis and difficulty swallowing. Respiratory: Denies cough, congestion, shortness of breath, ABDUL, wheezing or stridor. Cardiovascular: Denies precordial pain, palpitations, edema or PND  Gastrointestinal: Denies poor appetite, indigestion, abdominal pain or blood in stool  Genitourinary: Denies hematuria, dysuria, increased urinary frequency  Musculoskeletal: Denies joint pain or swelling from muscles or joints  Neurologic: Denies tremor, paresthesias, headache, or sensory motor disturbance  Psychiatric: Denies confusion, insomnia, depression  Integumentray: Denies rash, itching or ulcers.   Hematologic: Denies easy bruising, bleeding     PHYSICAL EXAMINATION      Vitals: 06/29/18 1058   BP: 132/90   Pulse: 68   SpO2: 96%   Weight: 196 lb (88.9 kg)   Height: 5' 10\" (1.778 m)     General: Well developed, in no acute distress. HEENT: No jaundice, oral mucosa moist, no oral ulcers  Neck: Supple, no stiffness, no lymphadenopathy, supple  Heart: Irregularly irregular  Respiratory: Clear bilaterally x 4, no wheezing or rales  e.   Extremities: trace pedal edema  Musculoskeletal: No clubbing, no deformities  Neuro: A&Ox3, speech clear, gait stable, cooperative, no focal neurologic deficits  Skin: Skin color is normal. No rashes or lesions. Non diaphoretic, moist.       DIAGNOSTIC DATA     1.  Echocardiogram                                     Echo 9/5/14    SUMMARY:  Left ventricle: Systolic function was normal. Ejection fraction was  estimated in the range of 55 % to 60 %. There were no regional wall motion  abnormalities. Left atrium: The atrium was mildly dilated. 4/20/15- EF 48%, SHANTANU, atrial septum- poss PFO, MR mild, TR mild/mod, Pulm HTN mod, AV bioprosthesis normal function  6/19/17- EF 45-50%, RVE, SHANTANU, MR/TR mild/mod, AV bioprosthesis exhibits normal function, severe Pulm HTN, NJ mild  Atrial septum: There was a secundum septal defect. Color Doppler  evaluation was performed. There was a mild left-to-right shunt. 2.  Myocardial perfusion study                      (9/5/14)  Normal EF ;Normal perfusion    3. Cholesterol profile                            (10/6/15): , HDL 35, ,           (4/19/16): , HDL 72, LDL 65.6,   (05/01/17)- , HDL 85, LDL 65 , TG 55    4. LE venous duplex                                           (10/10/14)  Right leg mod. 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 distally  No DVT  1/24/18 - No DVT, As an incidental finding, there is evidence of valve incompetence  (reflux) involving the left popliteal vein.          LABORATORY DATA            Lab Results   Component Value Date/Time    WBC 4.3 11/30/2016 04:13 PM    HGB 12.9 11/30/2016 04:13 PM    HCT 38.6 11/30/2016 04:13 PM    PLATELET 252 39/25/1755 04:13 PM    MCV 94.6 11/30/2016 04:13 PM      Lab Results   Component Value Date/Time    Sodium 141 11/30/2016 04:13 PM    Potassium 4.1 11/30/2016 04:13 PM    Chloride 104 11/30/2016 04:13 PM    CO2 30 11/30/2016 04:13 PM    Anion gap 7 11/30/2016 04:13 PM    Glucose 86 11/30/2016 04:13 PM    BUN 14 11/30/2016 04:13 PM    Creatinine 0.73 11/30/2016 04:13 PM    BUN/Creatinine ratio 19 11/30/2016 04:13 PM    GFR est AA >60 11/30/2016 04:13 PM    GFR est non-AA >60 11/30/2016 04:13 PM    Calcium 9.1 11/30/2016 04:13 PM    Bilirubin, total 0.7 10/13/2015 08:07 AM    AST (SGOT) 26 10/13/2015 08:07 AM    Alk. phosphatase 54 10/13/2015 08:07 AM    Protein, total 6.8 10/13/2015 08:07 AM    Albumin 4.3 10/13/2015 08:07 AM    Globulin 3.3 12/29/2014 04:46 AM    A-G Ratio 0.8 (L) 12/29/2014 04:46 AM    ALT (SGPT) 19 10/13/2015 08:07 AM           ASSESSMENT/RECOMMENDATIONS:.      1. Atrial fibrillation  - Rate is under good control, no bleeding issues except for some hematuria after placement of his finnegan catheter, this has since resolved. 2. Hypertension  - Blood pressure is borderline elevated today, this may be because he is not wearing his CPAP. I would not make any adjustments in antihypertensives at this time. 3. Dyslipidemia   - [facility] will obtain most recent labs. 4. Moderate PFO with left to right flow and CHRISTOPHER  - will again order an echo to be done 6 months from now, he is not having any issues or shortness of breath. 5. AS and MR s/p AVR and MVR on 1/8/15  - Will check an echo in the next 6 months to look at his bioprosthetic valve. 6. DEL  - He is not wearing his CPAP machine. I discussed with him the risk of not wearing his CPAP such as worsening atrial fibrillation, edema and hypertension  7. Return in 6 months or PRN.      Orders Placed This Encounter    HEPATIC FUNCTION PANEL    LIPID PANEL    aspirin delayed-release 81 mg tablet     Sig: Take  by mouth daily. Follow-up Disposition: Not on File      I have discussed the diagnosis with  Merlinda Billings and the intended plan as seen in the above orders. Questions were answered concerning future plans. I have discussed medication side effects and warnings with the patient as well. Thank you,  [facility] for involving me in the care of  Merlinda Billings. Please do not hesitate to contact me for further questions/concerns. Written by Rachel Lopez, as dictated by Veronica Polo MD.     Christina Qureshi MD, [facility] - [place], [place]    Patient Care Team:  Not On File [facility] as PCP - Rafiq Farrell MD (Orthopedic Surgery)  Veronica Polo MD as Referring Provider (Cardiology)  Nate Holliday MD (General Surgery)    [facility] [address] [place] [address], [address] [facility]     [facility]      [phone] / [phone] Fax

## 2018-06-29 NOTE — PROGRESS NOTES
Patient has swelling in legs.        Visit Vitals    /90 (BP 1 Location: Left arm, BP Patient Position: Sitting)    Pulse 68    Ht 5' 10\" (1.778 m)    Wt 196 lb (88.9 kg)    SpO2 96%    BMI 28.12 kg/m2

## 2018-12-11 ENCOUNTER — OFFICE VISIT (OUTPATIENT)
Dept: CARDIOLOGY CLINIC | Age: 78
End: 2018-12-11

## 2018-12-11 ENCOUNTER — CLINICAL SUPPORT (OUTPATIENT)
Dept: CARDIOLOGY CLINIC | Age: 78
End: 2018-12-11

## 2018-12-11 VITALS
BODY MASS INDEX: 29.2 KG/M2 | DIASTOLIC BLOOD PRESSURE: 80 MMHG | OXYGEN SATURATION: 98 % | HEIGHT: 70 IN | WEIGHT: 204 LBS | SYSTOLIC BLOOD PRESSURE: 180 MMHG | RESPIRATION RATE: 24 BRPM | HEART RATE: 68 BPM

## 2018-12-11 DIAGNOSIS — Z95.2 S/P AVR (AORTIC VALVE REPLACEMENT): ICD-10-CM

## 2018-12-11 DIAGNOSIS — Z98.890 S/P MVR (MITRAL VALVE REPAIR): ICD-10-CM

## 2018-12-11 DIAGNOSIS — E78.5 DYSLIPIDEMIA: ICD-10-CM

## 2018-12-11 DIAGNOSIS — R60.0 LOWER EXTREMITY EDEMA: ICD-10-CM

## 2018-12-11 DIAGNOSIS — I48.20 CHRONIC ATRIAL FIBRILLATION (HCC): Primary | ICD-10-CM

## 2018-12-11 DIAGNOSIS — I10 ESSENTIAL HYPERTENSION: ICD-10-CM

## 2018-12-11 RX ORDER — CHLORTHALIDONE 25 MG/1
25 TABLET ORAL DAILY
Qty: 30 TAB | Refills: 0 | Status: SHIPPED | OUTPATIENT
Start: 2018-12-11 | End: 2018-12-18 | Stop reason: DRUGHIGH

## 2018-12-11 NOTE — PROGRESS NOTES
Visit Vitals  /80   Pulse 68   Resp 24   Ht 5' 10\" (1.778 m)   Wt 204 lb (92.5 kg)   SpO2 98%   BMI 29.27 kg/m²     Pt complains of swelling of legs in 2 weeks. Stopped exercising due to swelling. Denies CP,SOB or dizziness.     MRI for prostate

## 2018-12-11 NOTE — PATIENT INSTRUCTIONS
Start chlorthalidone 25mg daily    Have ultrasound of your heart completed today as well as labs on the 3rd floor. I will call you tomorrow with results.       Follow-up end of next week with MD or NP.

## 2018-12-11 NOTE — PROGRESS NOTES
Nadira Ortiz, [facility]  [address]  [place], [address] [facility]    Office [phone]  Fax [phone]        Rohith Daniel is a 77 y.o. male who is followed outpatient by Dr. Jacqui Maxwell and was last seen 6/2018. Patient is here today for routine follow-up. Assessment  Encounter Diagnoses   Name Primary?  Chronic atrial fibrillation (HCC) Yes    Essential hypertension     Dyslipidemia     S/P AVR (aortic valve replacement)     S/P MVR (mitral valve repair)     Lower extremity edema        Recommendations:  Lower Extremity Edema  - 2+ edema bilaterally x3 wks;  - denies any other symptoms of HF; breathing stable; LVEF 45-50% 6/2017  - check updated echo and labs including pBNP today; hx of AVR and MVR; moderate PFO  - vol removal with chlorthalidone 25mg daily; pending review of labs may consider up titration  - phone follow-up tmw with testing results  - low risk for DVT given anticoag with warfarin; INR therapeutic per pt. Hypertension  - Blood pressure has been consistently elevated. Likely multifactorial with vol overload and non-compliance with CPAP. [facility]. - add chlorthalidone 25mg daily    Atrial fibrillation  - Rate is under good control, no bleeding issues; pt is asymptomatic     Dyslipidemia   - [facility]ids [facility]; ordered last visit by Dr. Jacqui Maxwell.  Will obtain with today's labs. Moderate PFO with left to right flow and CHRISTOPHER  - echo today, he is not having any issues with shortness of breath  - exercises daily - walking 2-3 miles     AS and MR s/p AVR and MVR on 1/8/15  - echo per above to look at his bioprosthetic valve. DEL  - He is not wearing his CPAP machine d/t \"dryness\". He will schedule follow-up to have device adjusted. Follow-up Disposition:  Return in about 1 week (around 12/18/2018), or if symptoms worsen or fail to improve.      Subjective:  Pt today states he is doing poorly. Has significant lower extremity swelling x3 weeks. Denies changes in diet / fluid consumption. Denies associated symptoms including SOB, ABDUL, abd swelling, n/v, orthopnea, and PND. Denies CP, palpitations, racing heart beat, dizziness, and syncope. Has large bruise to left back leg. May have hit something but he is unsure. Admits to easy bruising. Denies leg pain. States recent INR 2.5; states has been at goal for at least the last month. Denies abnormal bleeding. Home pressures also high since onset of swelling. Denies headache or visual changes. Cardiac testing  1.  Echocardiogram                                     Echo 9/5/14    SUMMARY:  Left ventricle: Systolic function was normal. Ejection fraction was  estimated in the range of 55 % to 60 %. There were no regional wall motion  abnormalities. Left atrium: The atrium was mildly dilated. 4/20/15- EF 48%, SHANTANU, atrial septum- poss PFO, MR mild, TR mild/mod, Pulm HTN mod, AV bioprosthesis normal function  6/19/17- EF 45-50%, RVE, SHANTANU, MR/TR mild/mod, AV bioprosthesis exhibits normal function, severe Pulm HTN, IA mild  Atrial septum: There was a secundum septal defect. Color Doppler  evaluation was performed. There was a mild left-to-right shunt. 2.  Myocardial perfusion study                      (9/5/14)  Normal EF ;Normal perfusion    3. Cholesterol profile                            (10/6/15): , HDL 35, ,           (4/19/16): , HDL 72, LDL 65.6,   (05/01/17)- , HDL 85, LDL 65 , TG 55  11/2/17- , HDL 73, TG 75    4. LE venous duplex                                           (10/10/14)  Right leg mod. 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 distally  No DVT  1/24/18 - No DVT, As an incidental finding, there is evidence of valve incompetence  (reflux) involving the left popliteal vein.     Past Medical History:   Diagnosis Date    Arthritis     [facility]     CAD (coronary artery disease)     [facility] 2012    melanoma head    Chronic pain     legs/knee    Contact dermatitis     Enlarged prostate     GERD (gastroesophageal reflux disease)     Hyperlipidemia     Hypertension     Insomnia     Sleep apnea 03/2018    Unspecified sleep apnea     unable tolerated CPAP machine    Vitamin D deficiency         Current Outpatient Medications   Medication Sig Dispense Refill    chlorthalidone (HYGROTEN) 25 mg tablet Take 1 Tab by mouth daily. 30 Tab 0    aspirin delayed-release 81 mg tablet Take  by mouth daily.  lisinopril (PRINIVIL, ZESTRIL) 40 mg tablet Take 20 mg by mouth. TAKE 1 AND 1/2 TABLETS DAILY      polyvinyl alcohol (ARTIFICIAL TEARS, POLYVIN ALC,) 1.4 % ophthalmic solution Administer 1 Drop to both eyes as needed.  ipratropium (ATROVENT HFA) 17 mcg/actuation inhaler Take 2 Puffs by inhalation.  ferrous sulfate 325 mg (65 mg iron) cpER Take  by mouth.  senna (SENOKOT) 8.6 mg tablet Take 1 Tab by mouth daily.  tamsulosin (FLOMAX) 0.4 mg capsule Take 0.4 mg by mouth two (2) times a day.  cholecalciferol (VITAMIN D3) 1,000 unit tablet Take 2,000 Units by mouth two (2) times a day.  GLUCOSAMINE HCL/CHONDR CHING A NA (GLUCOSAMINE-CHONDROITIN) 750-600 mg tab Take  by mouth.  omega-3 fatty acids-vitamin e 1,000 mg cap Take 1 Cap by mouth every other day.  acetaminophen (TYLENOL) 325 mg tablet Take  by mouth every four (4) hours as needed for Pain.  multivitamin (ONE A DAY) tablet Take 1 Tab by mouth daily.  warfarin (COUMADIN) 5 mg tablet Take 5 mg by mouth daily.  docusate sodium (COLACE) 100 mg capsule Take 100 mg by mouth two (2) times daily as needed.  finasteride (PROSCAR) 5 mg tablet Take 5 mg by mouth nightly.  pravastatin (PRAVACHOL) 40 mg tablet Take 40 mg by mouth nightly.  Pravastatin Na         No Known Allergies     Review of Systems  General:  Pt is able to conduct ADL's  HEENT: Denies blurred vision, headaches, hearing loss, epistaxis and difficulty swallowing. Respiratory: Denies cough, congestion, shortness of breath, ABDUL, wheezing or stridor. Cardiovascular: Denies precordial pain, palpitations, or PND. +lower extremity edema  Gastrointestinal: Denies poor appetite, indigestion, abdominal pain or blood in stool  Genitourinary: Denies hematuria, dysuria, increased urinary frequency  Musculoskeletal: Denies joint pain or swelling from muscles or joints  Neurologic: Denies tremor, paresthesias, headache, or sensory motor disturbance  Psychiatric: Denies confusion, insomnia, depression  Integumentray: Denies rash, itching or ulcers. Hematologic: Denies abnormal bleeding. +bruising, hematoma left leg    Physical Exam    Visit Vitals  /80   Pulse 68   Resp 24   Ht 5' 10\" (1.778 m)   Wt 204 lb (92.5 kg)   SpO2 98%   BMI 29.27 kg/m²     Wt Readings from Last 3 Encounters:   12/11/18 204 lb (92.5 kg)   06/29/18 196 lb (88.9 kg)   01/22/18 199 lb (90.3 kg)      General: Well developed, in no acute distress. HEENT: No jaundice, oral mucosa moist, no oral ulcers  Neck: Supple, no stiffness, no lymphadenopathy, supple  Heart: Irregularly irregular, soft systolic murmer  Respiratory: Clear bilaterally x 4, no wheezing or rales  Extremities: 2+ lower extremity edema (R>L), warm, large hematoma left back leg behind the knee, soft  Musculoskeletal: No clubbing, no deformities  Neuro: A&Ox3, speech clear, gait stable, cooperative, no focal neurologic deficits  Skin: Skin color is normal. No rashes or lesions.  Non diaphoretic, moist.      Rai Decker, ANP

## 2018-12-12 LAB
ALBUMIN SERPL-MCNC: 4.1 G/DL (ref 3.5–4.8)
ALBUMIN/GLOB SERPL: 1.9 {RATIO} (ref 1.2–2.2)
ALP SERPL-CCNC: 69 IU/L (ref 39–117)
ALT SERPL-CCNC: 18 IU/L (ref 0–44)
AST SERPL-CCNC: 26 IU/L (ref 0–40)
BILIRUB SERPL-MCNC: 0.7 MG/DL (ref 0–1.2)
BUN SERPL-MCNC: 13 MG/DL (ref 8–27)
BUN/CREAT SERPL: 17 (ref 10–24)
CALCIUM SERPL-MCNC: 8.8 MG/DL (ref 8.6–10.2)
CHLORIDE SERPL-SCNC: 104 MMOL/L (ref 96–106)
CHOLEST SERPL-MCNC: 105 MG/DL (ref 100–199)
CO2 SERPL-SCNC: 24 MMOL/L (ref 20–29)
CREAT SERPL-MCNC: 0.77 MG/DL (ref 0.76–1.27)
GLOBULIN SER CALC-MCNC: 2.2 G/DL (ref 1.5–4.5)
GLUCOSE SERPL-MCNC: 93 MG/DL (ref 65–99)
HDLC SERPL-MCNC: 61 MG/DL
INTERPRETATION, 910389: NORMAL
LDLC SERPL CALC-MCNC: 32 MG/DL (ref 0–99)
MAGNESIUM SERPL-MCNC: 2.1 MG/DL (ref 1.6–2.3)
NT-PROBNP SERPL-MCNC: 1494 PG/ML (ref 0–486)
POTASSIUM SERPL-SCNC: 4.4 MMOL/L (ref 3.5–5.2)
PROT SERPL-MCNC: 6.3 G/DL (ref 6–8.5)
SODIUM SERPL-SCNC: 142 MMOL/L (ref 134–144)
TRIGL SERPL-MCNC: 60 MG/DL (ref 0–149)
VLDLC SERPL CALC-MCNC: 12 MG/DL (ref 5–40)

## 2018-12-12 NOTE — PROGRESS NOTES
Addendum:  12/11/18 TTE: LVEF 50%, no WMAs, wall thickness mod incr. RV mild-mod dilated, LA mod-sev dilated, RA mod dilated, mod MR, mod TR, mod pulm HTN, mild PV regurg. AV w/ bioprosthesis, no AS / no AR. Discussed echo as well as recent labs with pt/wife over the phone. Testing consistent with acute CHF. Pt reports good UOP with chlorthalidone. Will follow-up next week.

## 2018-12-18 ENCOUNTER — OFFICE VISIT (OUTPATIENT)
Dept: CARDIOLOGY CLINIC | Age: 78
End: 2018-12-18

## 2018-12-18 ENCOUNTER — HOSPITAL ENCOUNTER (OUTPATIENT)
Dept: LAB | Age: 78
Discharge: HOME | End: 2018-12-18
Payer: MEDICARE

## 2018-12-18 VITALS
SYSTOLIC BLOOD PRESSURE: 132 MMHG | OXYGEN SATURATION: 95 % | DIASTOLIC BLOOD PRESSURE: 72 MMHG | RESPIRATION RATE: 18 BRPM | WEIGHT: 192.8 LBS | BODY MASS INDEX: 27.6 KG/M2 | HEART RATE: 80 BPM | HEIGHT: 70 IN

## 2018-12-18 DIAGNOSIS — R60.0 LOWER EXTREMITY EDEMA: ICD-10-CM

## 2018-12-18 DIAGNOSIS — Z98.890 S/P MVR (MITRAL VALVE REPAIR): ICD-10-CM

## 2018-12-18 DIAGNOSIS — I10 ESSENTIAL HYPERTENSION: ICD-10-CM

## 2018-12-18 DIAGNOSIS — Z95.2 S/P AVR (AORTIC VALVE REPLACEMENT): ICD-10-CM

## 2018-12-18 DIAGNOSIS — E78.5 DYSLIPIDEMIA: ICD-10-CM

## 2018-12-18 DIAGNOSIS — Z01.810 PRE-OPERATIVE CARDIOVASCULAR EXAMINATION: Primary | ICD-10-CM

## 2018-12-18 PROCEDURE — 36415 COLL VENOUS BLD VENIPUNCTURE: CPT

## 2018-12-18 PROCEDURE — 80048 BASIC METABOLIC PNL TOTAL CA: CPT

## 2018-12-18 RX ORDER — CHLORTHALIDONE 25 MG/1
TABLET ORAL EVERY OTHER DAY
COMMUNITY
End: 2018-12-18 | Stop reason: SDUPTHER

## 2018-12-18 RX ORDER — CHLORTHALIDONE 25 MG/1
25 TABLET ORAL EVERY OTHER DAY
Qty: 90 TAB | Refills: 1 | Status: SHIPPED | OUTPATIENT
Start: 2018-12-18 | End: 2020-06-25 | Stop reason: SDUPTHER

## 2018-12-18 NOTE — PATIENT INSTRUCTIONS
1) Stop coumadin 5-7 days before the surgery  2) resume the coumadin day of the procedure  3) INR checked with [place] [facility] 3 days after restarting the coumadin

## 2018-12-18 NOTE — PROGRESS NOTES
LAST OFFICE VISIT : 12/11/2018        ICD-10-CM ICD-9-CM   1. Pre-operative cardiovascular examination Z01.810 V72.81            Tiffanie Cross is a 77 y.o. male with HTN, dyslipidemia and atrial fibrillation referred for follow up. Cardiac risk factors: dyslipidemia, male gender, hypertension  I have personally obtained the history from the patient. HISTORY OF PRESENTING ILLNESS     Overall the pt states he is doing well. Pt is accompanied by his wife. Pt's wife states that pt has lost 12 lbs of fluids. Pt does not use his CPAP regularly. Pt does not believe he had any further blood work since being on Chlorthalidone. The patient denies chest pain/ shortness of breath, orthopnea, PND, palpitations, syncope, presyncope or fatigue.          ACTIVE PROBLEM LIST     Patient Active Problem List    Diagnosis Date Noted    Hypertension 10/26/2015    Hyperlipemia 10/26/2015    Anemia due to acute blood loss 12/26/2014    Aortic stenosis 12/23/2014    S/P AVR (aortic valve replacement) 12/23/2014    S/P MVR (mitral valve repair) 12/23/2014    S/P Maze operation for atrial fibrillation 12/23/2014    Aortic insufficiency 12/08/2014    Mitral regurgitation 12/08/2014    A-fib ([address] 06/17/2013    Arthritis of knee 07/09/2012           PAST MEDICAL HISTORY     Past Medical History:   Diagnosis Date    Arthritis     Atrial fibrillation ([address]     CAD (coronary artery disease)     [facility] 2012    melanoma head    Chronic pain     legs/knee    Contact dermatitis     Enlarged prostate     GERD (gastroesophageal reflux disease)     Hyperlipidemia     Hypertension     Insomnia     Sleep apnea 03/2018    Unspecified sleep apnea     unable tolerated CPAP machine    Vitamin D deficiency            PAST SURGICAL HISTORY     Past Surgical History:   Procedure Laterality Date    HX AORTIC VALVE REPLACEMENT  2015    and mitral valve repair, left atrial cryo maze    HX HEENT      melanoma removed head    HX HERNIA REPAIR  2009    Right    HX HERNIA REPAIR      left inguinal hernia repair    HX HERNIA REPAIR Left 12/01/2016    lap left inguinal hernia repair with mesh    HX KNEE REPLACEMENT      Bilateral    HX ORTHOPAEDIC      BILATERAL KNEE REPLACEMENT    HX ORTHOPAEDIC      CARPEL TUNNEL REPAIR-RIGHT    HX OTHER SURGICAL  2015    closure of patent foramen ovale          ALLERGIES     No Known Allergies       FAMILY HISTORY     Family History   Problem Relation Age of Onset    Cancer Mother     Cancer Father         prostrate    Cancer Brother         prostrate ca    Anesth Problems Neg Hx     negative for cardiac disease       SOCIAL HISTORY     Social History     Socioeconomic History    Marital status:      Spouse name: Not on file    Number of children: Not on file    Years of education: Not on file    Highest education level: Not on file   Tobacco Use    Smoking status: Never Smoker    Smokeless tobacco: Never Used   Substance and Sexual Activity    Alcohol use: Yes     Alcohol/week: 1.8 oz     Types: 3 Cans of beer per week    Drug use: No         MEDICATIONS     Current Outpatient Medications   Medication Sig    aspirin delayed-release 81 mg tablet Take  by mouth daily.  lisinopril (PRINIVIL, ZESTRIL) 40 mg tablet Take 20 mg by mouth. TAKE 1 AND 1/2 TABLETS DAILY    polyvinyl alcohol (ARTIFICIAL TEARS, POLYVIN ALC,) 1.4 % ophthalmic solution Administer 1 Drop to both eyes as needed.  ipratropium (ATROVENT HFA) 17 mcg/actuation inhaler Take 2 Puffs by inhalation.  ferrous sulfate 325 mg (65 mg iron) cpER Take  by mouth.  senna (SENOKOT) 8.6 mg tablet Take 1 Tab by mouth daily.  tamsulosin (FLOMAX) 0.4 mg capsule Take 0.4 mg by mouth two (2) times a day.  cholecalciferol (VITAMIN D3) 1,000 unit tablet Take 2,000 Units by mouth two (2) times a day.  GLUCOSAMINE HCL/CHONDR CHING A NA (GLUCOSAMINE-CHONDROITIN) 750-600 mg tab Take  by mouth.     omega-3 fatty acids-vitamin e 1,000 mg cap Take 1 Cap by mouth every other day.  acetaminophen (TYLENOL) 325 mg tablet Take  by mouth every four (4) hours as needed for Pain.  multivitamin (ONE A DAY) tablet Take 1 Tab by mouth daily.  warfarin (COUMADIN) 5 mg tablet Take 5 mg by mouth daily.  docusate sodium (COLACE) 100 mg capsule Take 100 mg by mouth two (2) times daily as needed.  finasteride (PROSCAR) 5 mg tablet Take 5 mg by mouth nightly.  pravastatin (PRAVACHOL) 40 mg tablet Take 40 mg by mouth nightly.  chlorthalidone (HYGROTEN) 25 mg tablet Take 1 Tab by mouth every other day. And as needed     No current facility-administered medications for this visit. I have reviewed the nurses notes, vitals, problem list, allergy list, medical history, family, social history and medications. REVIEW OF SYMPTOMS      General: Pt denies excessive weight gain or loss. Pt is able to conduct ADL's  HEENT: Denies blurred vision, headaches, hearing loss, epistaxis and difficulty swallowing. Respiratory: Denies cough, congestion, shortness of breath, ABDUL, wheezing or stridor. Cardiovascular: Denies precordial pain, palpitations, edema or PND  Gastrointestinal: Denies poor appetite, indigestion, abdominal pain or blood in stool  Genitourinary: Denies hematuria, dysuria, increased urinary frequency  Musculoskeletal: Denies joint pain or swelling from muscles or joints  Neurologic: Denies tremor, paresthesias, headache, or sensory motor disturbance  Psychiatric: Denies confusion, insomnia, depression  Integumentray: Denies rash, itching or ulcers. Hematologic: Denies easy bruising, bleeding     PHYSICAL EXAMINATION      Vitals:    12/18/18 0954   BP: 132/72   Pulse: 80   Resp: 18   SpO2: 95%   Weight: 192 lb 12.8 oz (87.5 kg)   Height: 5' 10\" (1.778 m)     General: Well developed, in no acute distress.   HEENT: No jaundice, oral mucosa moist, no oral ulcers  Neck: Supple, no stiffness, no lymphadenopathy, supple  Heart:  IRIR 2/6 DANNIE. Respiratory: Clear bilaterally x 4, no wheezing or rales  Abdomen:   Soft, non-tender, bowel sounds are active.   Extremities: Trace edema on right leg. Musculoskeletal: No clubbing, no deformities  Neuro: A&Ox3, speech clear, gait stable, cooperative, no focal neurologic deficits  Skin: Skin color is normal. No rashes or lesions. Non diaphoretic, moist.  Vascular: 2+ pulses symmetric in all extremities        EKG: Afib     DIAGNOSTIC DATA     1.  Echocardiogram                                     Echo 9/5/14    SUMMARY:  Left ventricle: Systolic function was normal. Ejection fraction was  estimated in the range of 55 % to 60 %. There were no regional wall motion  abnormalities. Left atrium: The atrium was mildly dilated. 4/20/15- EF 48%, SHANTANU, atrial septum- poss PFO, MR mild, TR mild/mod, Pulm HTN mod, AV bioprosthesis normal function  6/19/17- EF 45-50%, RVE, SHANTANU, MR/TR mild/mod, AV bioprosthesis exhibits normal function, severe Pulm HTN, IN mild  Atrial septum: There was a secundum septal defect. Color Doppler  evaluation was performed. There was a mild left-to-right shunt. 12/11/18 TTE: LVEF 50%, no WMAs, wall thickness mod incr. RV mild-mod dilated, LA mod-sev dilated, RA mod dilated, mod MR, mod TR, mod pulm HTN, mild PV regurg. AV w/ bioprosthesis, no AS / no AR    2.  Myocardial perfusion study                      (9/5/14)  Normal EF ;Normal perfusion    3. Cholesterol profile                            (10/6/15): , HDL 35, ,           (4/19/16): , HDL 72, LDL 65.6,   (05/01/17)- , HDL 85, LDL 65 , TG 55  11/2/17- , HDL 73, TG 75  12/11/18- , HDL 61, LDL 32, TG 60    4. LE venous duplex                                           (10/10/14)  Right leg mod.  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 distally  No DVT  1/24/18 - No DVT, As an incidental finding, there is evidence of valve incompetence  (reflux) involving the left popliteal vein. LABORATORY DATA            Lab Results   Component Value Date/Time    WBC 4.3 11/30/2016 04:13 PM    HGB 12.9 11/30/2016 04:13 PM    HCT 38.6 11/30/2016 04:13 PM    PLATELET 144 56/59/8271 04:13 PM    MCV 94.6 11/30/2016 04:13 PM      Lab Results   Component Value Date/Time    Sodium 142 12/11/2018 11:03 AM    Potassium 4.4 12/11/2018 11:03 AM    Chloride 104 12/11/2018 11:03 AM    CO2 24 12/11/2018 11:03 AM    Anion gap 7 11/30/2016 04:13 PM    Glucose 93 12/11/2018 11:03 AM    BUN 13 12/11/2018 11:03 AM    Creatinine 0.77 12/11/2018 11:03 AM    BUN/Creatinine ratio 17 12/11/2018 11:03 AM    GFR est  12/11/2018 11:03 AM    GFR est non-AA 86 12/11/2018 11:03 AM    Calcium 8.8 12/11/2018 11:03 AM    Bilirubin, total 0.7 12/11/2018 11:03 AM    AST (SGOT) 26 12/11/2018 11:03 AM    Alk. phosphatase 69 12/11/2018 11:03 AM    Protein, total 6.3 12/11/2018 11:03 AM    Albumin 4.1 12/11/2018 11:03 AM    Globulin 3.3 12/29/2014 04:46 AM    A-G Ratio 1.9 12/11/2018 11:03 AM    ALT (SGPT) 18 12/11/2018 11:03 AM           ASSESSMENT/RECOMMENDATIONS:.      1. Afib  - Rate is under good control and he has no bleeding issues and is on Coumadin. 2. LE edema  - Currently taking chlorthalidone 25 mg daily and has been experiencing significant weight loss. EF is 50%. His ProBMP is only 1494. Will check on his blood work again to monitor his kidney function. I talked him to change his chlorthalidone to every other day and PRN. 3. Cardiac Pre-Op risk stratification  - I believe he's at low risk for Fusion biopsy. Will need to hold his anticoagulants 5 - 7 days prior to the operation and may resume the day of the surgery. Will check INR 3 days after the operation. Continue Aspirin for the time being. If he has any questions, he can call me. 4. HTN  - Blood pressure is under good control, no adjustments in antihypertensives.    5. Dyslipidemia  - Lipids are at goal on current medical regimen. Lipids followed at the [place]. 6. Moderate PFO with left to right flow and CHRISTOPHER  - Recent echo showed an EF of 50%. Stable. 7. AS and MR s/p AVR and MVR on 1/8/15  - Stable based on last echo. 8. DEL  - Pt is still not wearing CPAP regularly and I went over the potential risks of not wearing CPAP, including atrial fibrillation, worsening heart disease, and potential right sided heart failure. 9. Return in 6 months or PRN. Orders Placed This Encounter    METABOLIC PANEL, BASIC    AMB POC EKG ROUTINE W/ 12 LEADS, INTER & REP     Order Specific Question:   Reason for Exam:     Answer:   pre op        Follow-up Disposition:  Return in about 3 months (around 3/18/2019). I have discussed the diagnosis with  Mark Ward and the intended plan as seen in the above orders. Questions were answered concerning future plans. I have discussed medication side effects and warnings with the patient as well. Thank you,  Clari Alfred DO for involving me in the care of  Mark Ward. Please do not hesitate to contact me for further questions/concerns. Written by Saeed Masterson, as dictated by Jennifer Khoury MD.     Velton Lesches Beckie Kroner, MD, [facility] [place]    Patient Care Team:  Clari Alfred DO as PCP - General (Family Practice)  Arun Coyle MD (Orthopedic Surgery)  Christina Wood MD as Referring Provider (Cardiology)  Anaya Solis MD (General Surgery)    [place] [address], [address] [facility]     [place] [address] [facility] [place] [address]      [phone] / [phone] Fax

## 2018-12-18 NOTE — PROGRESS NOTES
Visit Vitals  /72 (BP 1 Location: Left arm)   Pulse 80   Resp 18   Ht 5' 10\" (1.778 m)   Wt 192 lb 12.8 oz (87.5 kg)   SpO2 95%   BMI 27.66 kg/m²       Patient is her for a one week follow up. Doing much better. He also needs pre-op clearance.

## 2018-12-19 LAB
BUN SERPL-MCNC: 17 MG/DL (ref 8–27)
BUN/CREAT SERPL: 24 (ref 10–24)
CALCIUM SERPL-MCNC: 9.6 MG/DL (ref 8.6–10.2)
CHLORIDE SERPL-SCNC: 100 MMOL/L (ref 96–106)
CO2 SERPL-SCNC: 29 MMOL/L (ref 20–29)
CREAT SERPL-MCNC: 0.71 MG/DL (ref 0.76–1.27)
GLUCOSE SERPL-MCNC: 92 MG/DL (ref 65–99)
POTASSIUM SERPL-SCNC: 4.2 MMOL/L (ref 3.5–5.2)
SODIUM SERPL-SCNC: 142 MMOL/L (ref 134–144)

## 2019-01-08 ENCOUNTER — TELEPHONE (OUTPATIENT)
Dept: CARDIOLOGY CLINIC | Age: 79
End: 2019-01-08

## 2019-01-08 NOTE — TELEPHONE ENCOUNTER
Isamar from [facility] called following up on a cardiac clearance she faxed dec 13th and then again this morning. [facility] is requesting this clearance be returned back today.   Fax # [phone]  Phone # [phone]  Thanks

## 2019-02-07 ENCOUNTER — HOSPITAL ENCOUNTER (OUTPATIENT)
Dept: CT IMAGING | Age: 79
Discharge: HOME | End: 2019-02-07
Attending: UROLOGY
Payer: MEDICARE

## 2019-02-07 ENCOUNTER — HOSPITAL ENCOUNTER (OUTPATIENT)
Dept: NUCLEAR MEDICINE | Age: 79
Discharge: HOME | End: 2019-02-07
Attending: UROLOGY
Payer: MEDICARE

## 2019-02-07 DIAGNOSIS — C61 PROSTATE CANCER (HCC): ICD-10-CM

## 2019-02-07 PROCEDURE — 74177 CT ABD & PELVIS W/CONTRAST: CPT

## 2019-02-07 PROCEDURE — 74011636320 HC RX REV CODE- 636/320: Performed by: RADIOLOGY

## 2019-02-07 PROCEDURE — 78306 BONE IMAGING WHOLE BODY: CPT

## 2019-02-07 RX ADMIN — IOPAMIDOL 99 ML: 755 INJECTION, SOLUTION INTRAVENOUS at 13:00

## 2019-02-11 ENCOUNTER — HOSPITAL ENCOUNTER (OUTPATIENT)
Dept: RADIATION THERAPY | Age: 79
Discharge: HOME | End: 2019-02-11

## 2019-03-11 ENCOUNTER — OFFICE VISIT (OUTPATIENT)
Dept: CARDIOLOGY CLINIC | Age: 79
End: 2019-03-11

## 2019-03-11 VITALS
SYSTOLIC BLOOD PRESSURE: 154 MMHG | DIASTOLIC BLOOD PRESSURE: 80 MMHG | WEIGHT: 203 LBS | HEART RATE: 80 BPM | RESPIRATION RATE: 16 BRPM | HEIGHT: 70 IN | BODY MASS INDEX: 29.06 KG/M2

## 2019-03-11 DIAGNOSIS — E78.5 HYPERLIPIDEMIA, UNSPECIFIED HYPERLIPIDEMIA TYPE: ICD-10-CM

## 2019-03-11 DIAGNOSIS — I10 ESSENTIAL HYPERTENSION: Primary | ICD-10-CM

## 2019-03-11 NOTE — PROGRESS NOTES
LAST OFFICE VISIT : 6/29/2018      No diagnosis found. Mario Zamora is a 77 y.o. male with HTN, dyslipidemia and afib referred for follow up. Cardiac risk factors: dyslipidemia, male gender, hypertension  I have personally obtained the history from the patient. HISTORY OF PRESENTING ILLNESS     Overall the pt states he is doing well. Pt was getting markers for his prostate CA screening. Pt had a cup of coffee and waffle prior to coming in. Pt takes his diuretic as needed and he still has some LE edema, but pt states that his edema has improved significantly. Pt is present with his wife. The patient denies chest pain/ shortness of breath, orthopnea, PND, palpitations, syncope, presyncope or fatigue.          ACTIVE PROBLEM LIST     Patient Active Problem List    Diagnosis Date Noted    Hypertension 10/26/2015    Hyperlipemia 10/26/2015    Anemia due to acute blood loss 12/26/2014    Aortic stenosis 12/23/2014    S/P AVR (aortic valve replacement) 12/23/2014    S/P MVR (mitral valve repair) 12/23/2014    S/P Maze operation for atrial fibrillation 12/23/2014    Aortic insufficiency 12/08/2014    Mitral regurgitation 12/08/2014    A-fib ([address] 06/17/2013    Arthritis of knee 07/09/2012           PAST MEDICAL HISTORY     Past Medical History:   Diagnosis Date    Arthritis     Atrial fibrillation ([address]     CAD (coronary artery disease)     Cancer ([address] 2012    melanoma head    Chronic pain     legs/knee    Contact dermatitis     Enlarged prostate     GERD (gastroesophageal reflux disease)     Hyperlipidemia     Hypertension     Insomnia     Sleep apnea 03/2018    Unspecified sleep apnea     unable tolerated CPAP machine    Vitamin D deficiency            PAST SURGICAL HISTORY     Past Surgical History:   Procedure Laterality Date    HX AORTIC VALVE REPLACEMENT  2015    and mitral valve repair, left atrial cryo maze    HX HEENT      melanoma removed head    HX HERNIA REPAIR  2009    Right    HX HERNIA REPAIR      left inguinal hernia repair    HX HERNIA REPAIR Left 12/01/2016    lap left inguinal hernia repair with mesh    HX KNEE REPLACEMENT      Bilateral    HX ORTHOPAEDIC      BILATERAL KNEE REPLACEMENT    HX ORTHOPAEDIC      CARPEL TUNNEL REPAIR-RIGHT    HX OTHER SURGICAL  2015    closure of patent foramen ovale          ALLERGIES     No Known Allergies       FAMILY HISTORY     Family History   Problem Relation Age of Onset    Cancer Mother     Cancer Father         prostrate    Cancer Brother         prostrate ca    Anesth Problems Neg Hx     negative for cardiac disease       SOCIAL HISTORY     Social History     Socioeconomic History    Marital status:      Spouse name: Not on file    Number of children: Not on file    Years of education: Not on file    Highest education level: Not on file   Tobacco Use    Smoking status: Never Smoker    Smokeless tobacco: Never Used   Substance and Sexual Activity    Alcohol use: Yes     Alcohol/week: 1.8 oz     Types: 3 Cans of beer per week    Drug use: No         MEDICATIONS     Current Outpatient Medications   Medication Sig    chlorthalidone (HYGROTEN) 25 mg tablet Take 1 Tab by mouth every other day. And as needed    aspirin delayed-release 81 mg tablet Take  by mouth daily.  lisinopril (PRINIVIL, ZESTRIL) 40 mg tablet Take 20 mg by mouth. TAKE 1 AND 1/2 TABLETS DAILY    polyvinyl alcohol (ARTIFICIAL TEARS, POLYVIN ALC,) 1.4 % ophthalmic solution Administer 1 Drop to both eyes as needed.  ipratropium (ATROVENT HFA) 17 mcg/actuation inhaler Take 2 Puffs by inhalation as needed.  ferrous sulfate 325 mg (65 mg iron) cpER Take  by mouth.  senna (SENOKOT) 8.6 mg tablet Take 1 Tab by mouth daily.  tamsulosin (FLOMAX) 0.4 mg capsule Take 0.4 mg by mouth two (2) times a day.  cholecalciferol (VITAMIN D3) 1,000 unit tablet Take 2,000 Units by mouth two (2) times a day.     GLUCOSAMINE HCL/CHONDR CHING A NA (GLUCOSAMINE-CHONDROITIN) 750-600 mg tab Take  by mouth.  omega-3 fatty acids-vitamin e 1,000 mg cap Take 1 Cap by mouth every other day.  acetaminophen (TYLENOL) 325 mg tablet Take  by mouth every four (4) hours as needed for Pain.  multivitamin (ONE A DAY) tablet Take 1 Tab by mouth daily.  warfarin (COUMADIN) 5 mg tablet Take 5 mg by mouth daily.  docusate sodium (COLACE) 100 mg capsule Take 100 mg by mouth two (2) times daily as needed.  finasteride (PROSCAR) 5 mg tablet Take 5 mg by mouth nightly.  pravastatin (PRAVACHOL) 40 mg tablet Take 40 mg by mouth nightly. No current facility-administered medications for this visit. I have reviewed the nurses notes, vitals, problem list, allergy list, medical history, family, social history and medications. REVIEW OF SYMPTOMS      General: Pt denies excessive weight gain or loss. Pt is able to conduct ADL's  HEENT: Denies blurred vision, headaches, hearing loss, epistaxis and difficulty swallowing. Respiratory: Denies cough, congestion, shortness of breath, ABDUL, wheezing or stridor. Cardiovascular: Denies precordial pain, palpitations, or PND. Positive for edema. Gastrointestinal: Denies poor appetite, indigestion, abdominal pain or blood in stool  Genitourinary: Denies hematuria, dysuria, increased urinary frequency  Musculoskeletal: Denies joint pain or swelling from muscles or joints  Neurologic: Denies tremor, paresthesias, headache, or sensory motor disturbance  Psychiatric: Denies confusion, insomnia, depression  Integumentray: Denies rash, itching or ulcers. Hematologic: Denies easy bruising, bleeding     PHYSICAL EXAMINATION      Vitals:    03/11/19 0934   BP: 154/80   Pulse: 80   Resp: 16   Weight: 203 lb (92.1 kg)   Height: 5' 10\" (1.778 m)   BP recheck: 170/82  General: Well developed, in no acute distress.   HEENT: No jaundice, oral mucosa moist, no oral ulcers  Neck: Supple, no stiffness, no lymphadenopathy, supple  Heart:  IRIR  Respiratory: Clear bilaterally x 4, no wheezing or rales  Abdomen:   Soft, non-tender, bowel sounds are active.   Extremities:  1+ pitting edema. Musculoskeletal: No clubbing, no deformities  Neuro: A&Ox3, speech clear, gait stable, cooperative, no focal neurologic deficits  Skin: Skin color is normal. No rashes or lesions. Non diaphoretic, moist.  Vascular: 2+ pulses symmetric in all extremities     DIAGNOSTIC DATA     1.  Echocardiogram                                      9/5/14 Left ventricle: Systolic function was normal. Ejection fraction was  estimated in the range of 55 % to 60 %. There were no regional wall motion  abnormalities. Left atrium: The atrium was mildly dilated. 4/20/15- EF 48%, SHANTANU, atrial septum- poss PFO, MR mild, TR mild/mod, Pulm HTN mod, AV bioprosthesis normal function  6/19/17- EF 45-50%, RVE, SHANTANU, MR/TR mild/mod, AV bioprosthesis exhibits normal function, severe Pulm HTN, TN mild  Atrial septum: There was a secundum septal defect. Color Doppler  evaluation was performed. There was a mild left-to-right shunt. 12/11/18 TTE: LVEF 50%, no WMAs, wall thickness mod incr. RV mild-mod dilated, LA mod-sev dilated, RA mod dilated, mod MR, mod TR, mod pulm HTN, mild PV regurg. AV w/ bioprosthesis, no AS / no AR    2.  Myocardial perfusion study                      (9/5/14)Normal EF ;Normal perfusion    3. Cholesterol profile                            (10/6/15): , HDL 35, ,           (4/19/16): , HDL 72, LDL 65.6,   (05/01/17)- , HDL 85, LDL 65 , TG 55  11/2/17- , HDL 73, TG 75  12/11/18- , HDL 61, LDL 32, TG 60    4. LE venous duplex                                           (10/10/14)  Right leg mod.  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 distally  No DVT  1/24/18 - No DVT, As an incidental finding, there is evidence of valve incompetence  (reflux) involving the left popliteal vein.         LABORATORY DATA            Lab Results   Component Value Date/Time    WBC 4.3 11/30/2016 04:13 PM    HGB 12.9 11/30/2016 04:13 PM    HCT 38.6 11/30/2016 04:13 PM    PLATELET 963 10/24/6389 04:13 PM    MCV 94.6 11/30/2016 04:13 PM      Lab Results   Component Value Date/Time    Sodium 142 12/18/2018 10:33 AM    Potassium 4.2 12/18/2018 10:33 AM    Chloride 100 12/18/2018 10:33 AM    CO2 29 12/18/2018 10:33 AM    Anion gap 7 11/30/2016 04:13 PM    Glucose 92 12/18/2018 10:33 AM    BUN 17 12/18/2018 10:33 AM    Creatinine 0.71 (L) 12/18/2018 10:33 AM    BUN/Creatinine ratio 24 12/18/2018 10:33 AM    GFR est  12/18/2018 10:33 AM    GFR est non-AA 89 12/18/2018 10:33 AM    Calcium 9.6 12/18/2018 10:33 AM    Bilirubin, total 0.7 12/11/2018 11:03 AM    AST (SGOT) 26 12/11/2018 11:03 AM    Alk. phosphatase 69 12/11/2018 11:03 AM    Protein, total 6.3 12/11/2018 11:03 AM    Albumin 4.1 12/11/2018 11:03 AM    Globulin 3.3 12/29/2014 04:46 AM    A-G Ratio 1.9 12/11/2018 11:03 AM    ALT (SGPT) 18 12/11/2018 11:03 AM           ASSESSMENT/RECOMMENDATIONS:.      1. Afib  - Rate is under good control and he's currently off Coumadin for his operation. 2. LE edema  - He is having some pitting edema today and he's not been taking his chlorthalidone so I've instructed him to take chlorthalidone one tab daily. 3. Cardiac pre-op risk stratification  - He's at low risk and may proceed his urology procedure. 4. HTN  - Blood pressure is elevated. Blood pressure recheck: 170/82  - I've instructed him to take his chlorthalidone daily and have him come back in 7 days for BP recheck. If BP remains elevated then, will increase his lisinopril to 40 mg.   5. Dyslipidemia  - Lipids are at goal on current medical regimen. Followed by Ramandeep Corbin DO. 6. Moderate PFO with left to right flow and CHRISTOPHER  - Recent echo showed an EF of 50%. Stable.     7. AS and MR s/p AVR and MVR on 1/8/15   - Stable based on last echo. 8. DEL  - Did not discuss this with him today. 9. Return in 6 months or PRN. No orders of the defined types were placed in this encounter. Follow-up Disposition: Not on File      I have discussed the diagnosis with  Gracy Pratt and the intended plan as seen in the above orders. Questions were answered concerning future plans. I have discussed medication side effects and warnings with the patient as well. Thank you,  Wendie Garcia DO for involving me in the care of  Gracy Pratt. Please do not hesitate to contact me for further questions/concerns. Written by Denver Shi, as dictated by Fercho Nugent MD.     Yannick Appiah MD, [facility] [place]    Patient Care Team:  Wendie Garcia DO as PCP - General (Family Practice)  Meghan Rosado MD (Orthopedic Surgery)  Echo Patel MD as Referring Provider (Cardiology)  Florencia Benson MD (General Surgery)    [address], [address] [place] [address]      [phone] / [phone] Fax

## 2019-03-11 NOTE — PROGRESS NOTES
Chief Complaint   Patient presents with    Cholesterol Problem    Hypertension    Irregular Heart Beat     AFIB    Other     Hx AVR/MVR     Visit Vitals  /80 (BP 1 Location: Right arm, BP Patient Position: Sitting)   Pulse 80   Resp 16   Ht 5' 10\" (1.778 m)   Wt 203 lb (92.1 kg)   BMI 29.13 kg/m²

## 2019-03-18 DIAGNOSIS — I10 ESSENTIAL HYPERTENSION: Primary | ICD-10-CM

## 2019-03-18 RX ORDER — LISINOPRIL 40 MG/1
40 TABLET ORAL DAILY
Status: ON HOLD | COMMUNITY
End: 2020-10-14 | Stop reason: SDUPTHER

## 2019-04-02 LAB
BUN SERPL-MCNC: 19 MG/DL (ref 8–27)
BUN/CREAT SERPL: 23 (ref 10–24)
CALCIUM SERPL-MCNC: 9.4 MG/DL (ref 8.6–10.2)
CHLORIDE SERPL-SCNC: 101 MMOL/L (ref 96–106)
CO2 SERPL-SCNC: 27 MMOL/L (ref 20–29)
CREAT SERPL-MCNC: 0.83 MG/DL (ref 0.76–1.27)
GLUCOSE SERPL-MCNC: 117 MG/DL (ref 65–99)
POTASSIUM SERPL-SCNC: 4.3 MMOL/L (ref 3.5–5.2)
SODIUM SERPL-SCNC: 144 MMOL/L (ref 134–144)

## 2019-09-09 ENCOUNTER — HOSPITAL ENCOUNTER (OUTPATIENT)
Dept: RADIATION THERAPY | Age: 79
Discharge: HOME | End: 2019-09-09

## 2019-09-30 ENCOUNTER — TELEPHONE (OUTPATIENT)
Dept: CARDIOLOGY CLINIC | Age: 79
End: 2019-09-30

## 2019-09-30 ENCOUNTER — OFFICE VISIT (OUTPATIENT)
Dept: CARDIOLOGY CLINIC | Age: 79
End: 2019-09-30

## 2019-09-30 VITALS
OXYGEN SATURATION: 97 % | WEIGHT: 202 LBS | BODY MASS INDEX: 28.92 KG/M2 | HEIGHT: 70 IN | DIASTOLIC BLOOD PRESSURE: 62 MMHG | HEART RATE: 78 BPM | RESPIRATION RATE: 18 BRPM | SYSTOLIC BLOOD PRESSURE: 108 MMHG

## 2019-09-30 DIAGNOSIS — I48.20 CHRONIC ATRIAL FIBRILLATION (HCC): Primary | ICD-10-CM

## 2019-09-30 DIAGNOSIS — I34.0 MITRAL VALVE INSUFFICIENCY, UNSPECIFIED ETIOLOGY: ICD-10-CM

## 2019-09-30 NOTE — PROGRESS NOTES
LAST OFFICE VISIT : Visit date not found      No diagnosis found. Michael Teixeira is a [de-identified] y.o. male with HTN, dyslipidemia, and AF referred for follow up. Cardiac risk factors: dyslipidemia, male gender, hypertension  I have personally obtained the history from the patient. HISTORY OF PRESENTING ILLNESS     Pt is present with his wife. From a heart standpoint, pt states he is doing well. Pt states his PCP changed his blood thinner from coumadin to Eliquis and asks whether he should be on it. Pt states that his PCP also put him on another blood pressure medication but that he cannot remember the name. The patient denies chest pain/ shortness of breath, orthopnea, PND, LE edema, palpitations, syncope, presyncope or fatigue.          ACTIVE PROBLEM LIST     Patient Active Problem List    Diagnosis Date Noted    Hypertension 10/26/2015    Hyperlipemia 10/26/2015    Anemia due to acute blood loss 12/26/2014    Aortic stenosis 12/23/2014    S/P AVR (aortic valve replacement) 12/23/2014    S/P MVR (mitral valve repair) 12/23/2014    S/P Maze operation for atrial fibrillation 12/23/2014    Aortic insufficiency 12/08/2014    Mitral regurgitation 12/08/2014    A-fib ([address] 06/17/2013    Arthritis of knee 07/09/2012           PAST MEDICAL HISTORY     Past Medical History:   Diagnosis Date    Arthritis     Atrial fibrillation ([address]     CAD (coronary artery disease)     Cancer ([address] 2012    melanoma head    Chronic pain     legs/knee    Contact dermatitis     Enlarged prostate     GERD (gastroesophageal reflux disease)     Hyperlipidemia     Hypertension     Insomnia     Sleep apnea 03/2018    Unspecified sleep apnea     unable tolerated CPAP machine    Vitamin D deficiency            PAST SURGICAL HISTORY     Past Surgical History:   Procedure Laterality Date    HX AORTIC VALVE REPLACEMENT  2015    and mitral valve repair, left atrial cryo maze    HX HEENT      melanoma removed head    HX HERNIA REPAIR  2009    Right    HX HERNIA REPAIR      left inguinal hernia repair    HX HERNIA REPAIR Left 12/01/2016    lap left inguinal hernia repair with mesh    HX KNEE REPLACEMENT      Bilateral    HX ORTHOPAEDIC      BILATERAL KNEE REPLACEMENT    HX ORTHOPAEDIC      CARPEL TUNNEL REPAIR-RIGHT    HX OTHER SURGICAL  2015    closure of patent foramen ovale          ALLERGIES     No Known Allergies       FAMILY HISTORY     Family History   Problem Relation Age of Onset    Cancer Mother     Cancer Father         prostrate    Cancer Brother         prostrate ca    Anesth Problems Neg Hx     negative for cardiac disease       SOCIAL HISTORY     Social History     Socioeconomic History    Marital status:      Spouse name: Not on file    Number of children: Not on file    Years of education: Not on file    Highest education level: Not on file   Tobacco Use    Smoking status: Never Smoker    Smokeless tobacco: Never Used   Substance and Sexual Activity    Alcohol use: Yes     Alcohol/week: 3.0 standard drinks     Types: 3 Cans of beer per week    Drug use: No         MEDICATIONS     Current Outpatient Medications   Medication Sig    apixaban (ELIQUIS) 5 mg tablet Take 5 mg by mouth two (2) times a day.  lisinopril (PRINIVIL, ZESTRIL) 40 mg tablet Take  by mouth two (2) times a day. Patient to use 20 mg in am and 40 mg in pm    chlorthalidone (HYGROTEN) 25 mg tablet Take 1 Tab by mouth every other day. And as needed    aspirin delayed-release 81 mg tablet Take  by mouth daily.  polyvinyl alcohol (ARTIFICIAL TEARS, POLYVIN ALC,) 1.4 % ophthalmic solution Administer 1 Drop to both eyes as needed.  ipratropium (ATROVENT HFA) 17 mcg/actuation inhaler Take 2 Puffs by inhalation as needed.  ferrous sulfate 325 mg (65 mg iron) cpER Take  by mouth.  senna (SENOKOT) 8.6 mg tablet Take 1 Tab by mouth daily.     tamsulosin (FLOMAX) 0.4 mg capsule Take 0.4 mg by mouth two (2) times a day.  cholecalciferol (VITAMIN D3) 1,000 unit tablet Take 2,000 Units by mouth two (2) times a day.  GLUCOSAMINE HCL/CHONDR CHING A NA (GLUCOSAMINE-CHONDROITIN) 750-600 mg tab Take  by mouth.  omega-3 fatty acids-vitamin e 1,000 mg cap Take 1 Cap by mouth every other day.  acetaminophen (TYLENOL) 325 mg tablet Take  by mouth every four (4) hours as needed for Pain.  multivitamin (ONE A DAY) tablet Take 1 Tab by mouth daily.  docusate sodium (COLACE) 100 mg capsule Take 100 mg by mouth two (2) times daily as needed.  finasteride (PROSCAR) 5 mg tablet Take 5 mg by mouth nightly.  pravastatin (PRAVACHOL) 40 mg tablet Take 40 mg by mouth nightly.  warfarin (COUMADIN) 5 mg tablet Take 5 mg by mouth daily. No current facility-administered medications for this visit. I have reviewed the nurses notes, vitals, problem list, allergy list, medical history, family, social history and medications. REVIEW OF SYMPTOMS      General: Pt denies excessive weight gain or loss. Pt is able to conduct ADL's  HEENT: Denies blurred vision, headaches, hearing loss, epistaxis and difficulty swallowing. Respiratory: Denies cough, congestion, shortness of breath, ABDUL, wheezing or stridor. Cardiovascular: Denies precordial pain, palpitations, edema or PND  Gastrointestinal: Denies poor appetite, indigestion, abdominal pain or blood in stool  Genitourinary: Denies hematuria, dysuria, increased urinary frequency  Musculoskeletal: Denies joint pain or swelling from muscles or joints  Neurologic: Denies tremor, paresthesias, headache, or sensory motor disturbance  Psychiatric: Denies confusion, insomnia, depression  Integumentray: Denies rash, itching or ulcers. Hematologic: Denies easy bruising, bleeding     PHYSICAL EXAMINATION      Vitals:    09/30/19 1004   BP: 140/82   Pulse: 78   Resp: 18   SpO2: 97%   Weight: 202 lb (91.6 kg)   Height: 5' 10\" (1.778 m)    Blood pressure recheck 1: 102/52.  BP recheck 2: 108/62. General: Well developed, in no acute distress. HEENT: No jaundice, oral mucosa moist, no oral ulcers  Neck: Supple, no stiffness, no lymphadenopathy, supple  Heart:  IRIR with a distinct S2  Respiratory: Clear bilaterally x 4, no wheezing or rales  Abdomen:   Soft, non-tender, bowel sounds are active. Extremities:  Bruising on his upper extremities. Musculoskeletal: No clubbing, no deformities  Neuro: A&Ox3, speech clear, gait stable, cooperative, no focal neurologic deficits  Skin: Skin color is normal. No rashes or lesions. Non diaphoretic, moist.  Vascular: 2+ pulses symmetric in all extremities         DIAGNOSTIC DATA     1.  Echocardiogram                                      9/5/14 Left ventricle: Systolic function was normal. Ejection fraction was  estimated in the range of 55 % to 60 %. There were no regional wall motion  abnormalities. Left atrium: The atrium was mildly dilated. 4/20/15- EF 48%, SHANTANU, atrial septum- poss PFO, MR mild, TR mild/mod, Pulm HTN mod, AV bioprosthesis normal function  6/19/17- EF 45-50%, RVE, SHANTANU, MR/TR mild/mod, AV bioprosthesis exhibits normal function, severe Pulm HTN, GA mild  Atrial septum: There was a secundum septal defect. Color Doppler  evaluation was performed. There was a mild left-to-right shunt. 12/11/18 TTE: LVEF 50%, no WMAs, wall thickness mod incr. RV mild-mod dilated, LA mod-sev dilated, RA mod dilated, mod MR, mod TR, mod pulm HTN, mild PV regurg. AV w/ bioprosthesis, no AS / no AR    2.  Myocardial perfusion study                      (9/5/14)Normal EF ;Normal perfusion    3. Cholesterol profile                            (10/6/15): , HDL 35, ,           (4/19/16): , HDL 72, LDL 65.6,   (05/01/17)- , HDL 85, LDL 65 , TG 55  11/2/17- , HDL 73, TG 75  12/11/18- , HDL 61, LDL 32, TG 60    4. LE venous duplex                                           (10/10/14)  Right leg mod.  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 distally  No DVT  1/24/18 - No DVT, As an incidental finding, there is evidence of valve incompetence  (reflux) involving the left popliteal vein. LABORATORY DATA            Lab Results   Component Value Date/Time    WBC 4.3 11/30/2016 04:13 PM    HGB 12.9 11/30/2016 04:13 PM    HCT 38.6 11/30/2016 04:13 PM    PLATELET 832 16/25/8607 04:13 PM    MCV 94.6 11/30/2016 04:13 PM      Lab Results   Component Value Date/Time    Sodium 144 04/01/2019 09:51 AM    Potassium 4.3 04/01/2019 09:51 AM    Chloride 101 04/01/2019 09:51 AM    CO2 27 04/01/2019 09:51 AM    Anion gap 7 11/30/2016 04:13 PM    Glucose 117 (H) 04/01/2019 09:51 AM    BUN 19 04/01/2019 09:51 AM    Creatinine 0.83 04/01/2019 09:51 AM    BUN/Creatinine ratio 23 04/01/2019 09:51 AM    GFR est AA 97 04/01/2019 09:51 AM    GFR est non-AA 84 04/01/2019 09:51 AM    Calcium 9.4 04/01/2019 09:51 AM    Bilirubin, total 0.7 12/11/2018 11:03 AM    AST (SGOT) 26 12/11/2018 11:03 AM    Alk. phosphatase 69 12/11/2018 11:03 AM    Protein, total 6.3 12/11/2018 11:03 AM    Albumin 4.1 12/11/2018 11:03 AM    Globulin 3.3 12/29/2014 04:46 AM    A-G Ratio 1.9 12/11/2018 11:03 AM    ALT (SGPT) 18 12/11/2018 11:03 AM           ASSESSMENT/RECOMMENDATIONS:.      1. AF  - Rate is under good control. He is now on Eliquis. 2. LE edema  - No pitting edema today on exam. On chlorthalidone. 3. HTN  - Blood pressure is elevated. Blood pressure recheck 1: 102/52. BP recheck 2: 108/62. He was placed on additional medicine, but is unclear what that medication is. 4. Dyslipidemia  - Being followed by Kin , DO   - Lipids are at goal on current medical regimen. 5. Moderate PFO with left to right flow and CHRISTOPHER  - Last echo showed an EF of 50%. Stable. 6. AS and MR s/p AVR and MVR on 1/8/15  - He has moderate MR and moderate TR and bioprosthetic valves. Will recheck an echo to look for any worsening regurgitant lesions. 7. DEL  8. Return in 6 months or PRN. Orders Placed This Encounter    apixaban (ELIQUIS) 5 mg tablet     Sig: Take 5 mg by mouth two (2) times a day. Follow-up and Dispositions  ·   Return in about 6 months (around 3/30/2020). I have discussed the diagnosis with  Silvestre Kanner and the intended plan as seen in the above orders. Questions were answered concerning future plans. I have discussed medication side effects and warnings with the patient as well. Thank you,  Justo Richmond DO for involving me in the care of  Silvestre Kanner. Please do not hesitate to contact me for further questions/concerns. Written by Mary Wisdom, as dictated by Aidan Sen MD.     [facility] Tricia uCmmings MD, [facility] [place]    Patient Care Team:  Justo Richmond DO as PCP - General (Family Practice)  Phyllis Gusman MD (Orthopedic Surgery)  Rosmery Mello MD as Referring Provider (Cardiology)  Bing Collier MD (General Surgery)    [address]      [phone] / [phone] Fax

## 2019-09-30 NOTE — PROGRESS NOTES
Ana Che is a [de-identified] y.o. male    Chief Complaint   Patient presents with    Follow-up     6 mo f/u    Hypertension    Irregular Heart Beat     Afib    Other     MVR, AVR       Visit Vitals  /82 (BP 1 Location: Left arm, BP Patient Position: Sitting)   Pulse 78   Resp 18   Ht 5' 10\" (1.778 m)   Wt 202 lb (91.6 kg)   SpO2 97%   BMI 28.98 kg/m²       1. Have you been to the ER, urgent care clinic since your last visit? Hospitalized since your last visit? NO    2. Have you seen or consulted any other health care providers outside of the [address] [place] [address] since your last visit? Include any pap smears or colon screening.   NO

## 2019-09-30 NOTE — TELEPHONE ENCOUNTER
Patient stated that you advised him to call you. He did not wish to provide me with any further details/info. He can be reached at [phone].  Thanks

## 2019-09-30 NOTE — LETTER
9/30/19 Patient: Robert Borrego YOB: 1940 Date of Visit: 9/30/2019 Caren Mercer DO 
[address] [place] [address] [address]una [facility] [address] VIA Facsimile: [phone] Dear Caren Mercer DO, Thank you for referring Mr. Flavio Quintero to [address] for evaluation. My notes for this consultation are attached. If you have questions, please do not hesitate to call me. I look forward to following your patient along with you.  
 
 
Sincerely, 
 
Sondra Jay MD

## 2019-12-04 ENCOUNTER — OFFICE VISIT (OUTPATIENT)
Dept: SURGERY | Age: 79
End: 2019-12-04

## 2019-12-04 VITALS
OXYGEN SATURATION: 95 % | WEIGHT: 204 LBS | HEIGHT: 70 IN | BODY MASS INDEX: 29.2 KG/M2 | DIASTOLIC BLOOD PRESSURE: 79 MMHG | TEMPERATURE: 98.3 F | RESPIRATION RATE: 18 BRPM | SYSTOLIC BLOOD PRESSURE: 162 MMHG | HEART RATE: 68 BPM

## 2019-12-04 DIAGNOSIS — M62.08 RECTUS DIASTASIS: Primary | ICD-10-CM

## 2019-12-04 NOTE — PROGRESS NOTES
1. Have you been to the ER, urgent care clinic since your last visit? Hospitalized since your last visit? No    2. Have you seen or consulted any other health care providers outside of the [address] [place] [address] since your last visit? Include any pap smears or colon screening.  No

## 2019-12-05 NOTE — PROGRESS NOTES
[facility] Surgery History and Physical    History of Present Illness:      Betzaida Ayon is a [de-identified] y.o. male who is s/p laparoscopic left inguinal hernia by me in 2016. He is doing well from that but now has a bulge in the upper midline abdomen. He only sees the bulge when he goes to lay down or get up. He does not have much pain from the bulge. He has been having normal BM and flatus. Past Medical History:   Diagnosis Date    Arthritis     Atrial fibrillation ([address]     CAD (coronary artery disease)     Cancer ([address] 2012    melanoma head    Chronic pain     legs/knee    Contact dermatitis     Coughing     Enlarged prostate     GERD (gastroesophageal reflux disease)     Hyperlipidemia     Hypertension     Insomnia     Sleep apnea 03/2018    Unspecified sleep apnea     unable tolerated CPAP machine    Vitamin D deficiency        Past Surgical History:   Procedure Laterality Date    HX AORTIC VALVE REPLACEMENT  2015    and mitral valve repair, left atrial cryo maze    HX HEENT      melanoma removed head    HX HERNIA REPAIR  2009    Right    HX HERNIA REPAIR      left inguinal hernia repair    HX HERNIA REPAIR Left 12/01/2016    lap left inguinal hernia repair with mesh    HX KNEE REPLACEMENT      Bilateral    HX ORTHOPAEDIC      BILATERAL KNEE REPLACEMENT    HX ORTHOPAEDIC      CARPEL TUNNEL REPAIR-RIGHT    HX OTHER SURGICAL  2015    closure of patent foramen ovale         Current Outpatient Medications:     apixaban (ELIQUIS) 5 mg tablet, Take 5 mg by mouth two (2) times a day., Disp: , Rfl:     lisinopril (PRINIVIL, ZESTRIL) 40 mg tablet, Take  by mouth two (2) times a day. Patient to use 20 mg in am and 40 mg in pm, Disp: , Rfl:     chlorthalidone (HYGROTEN) 25 mg tablet, Take 1 Tab by mouth every other day. And as needed, Disp: 90 Tab, Rfl: 1    aspirin delayed-release 81 mg tablet, Take  by mouth daily. , Disp: , Rfl:     ipratropium (ATROVENT HFA) 17 mcg/actuation inhaler, Take 2 Puffs by inhalation as needed. , Disp: , Rfl:     ferrous sulfate 325 mg (65 mg iron) cpER, Take  by mouth., Disp: , Rfl:     senna (SENOKOT) 8.6 mg tablet, Take 1 Tab by mouth daily. , Disp: , Rfl:     tamsulosin (FLOMAX) 0.4 mg capsule, Take 0.4 mg by mouth two (2) times a day., Disp: , Rfl:     cholecalciferol (VITAMIN D3) 1,000 unit tablet, Take 2,000 Units by mouth two (2) times a day., Disp: , Rfl:     GLUCOSAMINE HCL/CHONDR CHING A NA (GLUCOSAMINE-CHONDROITIN) 750-600 mg tab, Take  by mouth., Disp: , Rfl:     omega-3 fatty acids-vitamin e 1,000 mg cap, Take 1 Cap by mouth every other day., Disp: , Rfl:     acetaminophen (TYLENOL) 325 mg tablet, Take  by mouth every four (4) hours as needed for Pain., Disp: , Rfl:     multivitamin (ONE A DAY) tablet, Take 1 Tab by mouth daily. , Disp: , Rfl:     docusate sodium (COLACE) 100 mg capsule, Take 100 mg by mouth two (2) times daily as needed. , Disp: , Rfl:     finasteride (PROSCAR) 5 mg tablet, Take 5 mg by mouth nightly., Disp: , Rfl:     pravastatin (PRAVACHOL) 40 mg tablet, Take 40 mg by mouth nightly., Disp: , Rfl:     polyvinyl alcohol (ARTIFICIAL TEARS, POLYVIN ALC,) 1.4 % ophthalmic solution, Administer 1 Drop to both eyes as needed. , Disp: , Rfl:     warfarin (COUMADIN) 5 mg tablet, Take 5 mg by mouth daily. , Disp: , Rfl:     No Known Allergies    Social History     Socioeconomic History    Marital status:      Spouse name: Not on file    Number of children: Not on file    Years of education: Not on file    Highest education level: Not on file   Occupational History    Not on file   Social Needs    Financial resource strain: Not on file    Food insecurity:     Worry: Not on file     Inability: Not on file    Transportation needs:     Medical: Not on file     Non-medical: Not on file   Tobacco Use    Smoking status: Never Smoker    Smokeless tobacco: Never Used   Substance and Sexual Activity    Alcohol use:  Yes     Alcohol/week: 3.0 standard drinks     Types: 3 Cans of beer per week    Drug use: No    Sexual activity: Not Currently   Lifestyle    Physical activity:     Days per week: Not on file     Minutes per session: Not on file    Stress: Not on file   Relationships    Social connections:     Talks on phone: Not on file     Gets together: Not on file     Attends [employer] service: Not on file     Active member of club or organization: Not on file     Attends meetings of clubs or organizations: Not on file     Relationship status: Not on file    Intimate partner violence:     Fear of current or ex partner: Not on file     Emotionally abused: Not on file     Physically abused: Not on file     Forced sexual activity: Not on file   Other Topics Concern    Not on file   Social History Narrative    Not on file       Family History   Problem Relation Age of Onset    Cancer Mother     Cancer Father         prostrate    Cancer Brother         prostrate ca    Anesth Problems Neg Hx        ROS   Constitutional: negative  Ears, Nose, Mouth, Throat, and Face: negative  Respiratory: negative  Cardiovascular: negative  Gastrointestinal: negative  Genitourinary:negative  Integument/Breast: negative  Hematologic/Lymphatic: negative  Behavioral/Psychiatric: negative  Allergic/Immunologic: negative      Physical Exam:     Visit Vitals  /79 (BP 1 Location: Left arm, BP Patient Position: Sitting)   Pulse 68   Temp 98.3 °F (36.8 °C) (Oral)   Resp 18   Ht 5' 10\" (1.778 m)   Wt 204 lb (92.5 kg)   SpO2 95%   BMI 29.27 kg/m²       General - alert and oriented, no apparent distress  HEENT - no jaundice, no hearing imparement  Pulm - CTAB, no C/W/R  CV - RRR, no M/R/G  Abd - soft, ND, BS Present, rectus diastasis when he lays down is present, no hernia present  Ext - pulses intact in UE and LE bilaterally, no edema  Skin - supple, no rashes  Psychiatric - normal affect, good mood    Labs  none    Imaging  none  I have reviewed and agree with all of the pertinent images    Assessment:     Ceci Wood is a [de-identified] y.o. male with rectus diastasis    Recommendations:     1. He does not have a hernia but has a rectus diastasis which is a widening of the rectus muscles. It is not a true hernia but a bulge is present between the rectus muscles but the fascia is intact. He does not need any surgery for this. This is commen with age and central obesity. He will follow PRN. Yue Kline MD    Mr. Zak Moran has a reminder for a \"due or due soon\" health maintenance. I have asked that he contact his primary care provider for follow-up on this health maintenance.

## 2019-12-17 ENCOUNTER — HOSPITAL ENCOUNTER (OUTPATIENT)
Dept: RADIATION THERAPY | Age: 79
Discharge: HOME | End: 2019-12-17

## 2020-03-16 ENCOUNTER — OFFICE VISIT (OUTPATIENT)
Dept: CARDIOLOGY CLINIC | Age: 80
End: 2020-03-16

## 2020-03-16 VITALS
SYSTOLIC BLOOD PRESSURE: 142 MMHG | HEART RATE: 76 BPM | HEIGHT: 70 IN | DIASTOLIC BLOOD PRESSURE: 70 MMHG | BODY MASS INDEX: 29.2 KG/M2 | WEIGHT: 204 LBS

## 2020-03-16 DIAGNOSIS — I06.0 RHEUMATIC AORTIC STENOSIS: ICD-10-CM

## 2020-03-16 DIAGNOSIS — I10 ESSENTIAL HYPERTENSION: Primary | ICD-10-CM

## 2020-03-16 DIAGNOSIS — I34.0 NONRHEUMATIC MITRAL VALVE REGURGITATION: ICD-10-CM

## 2020-03-16 DIAGNOSIS — I35.1 NONRHEUMATIC AORTIC VALVE INSUFFICIENCY: ICD-10-CM

## 2020-03-16 NOTE — LETTER
3/18/20 Patient: Chad Mcdowell YOB: 1940 Date of Visit: 3/16/2020 Ned Ferrer DO 
[address] [place] [address] [address] [facility] [address] VIA Facsimile: [phone] Dear Ned Ferrer DO, Thank you for referring Mr. Carley Roe to [address]  for evaluation. My notes for this consultation are attached. If you have questions, please do not hesitate to call me. I look forward to following your patient along with you.  
 
 
Sincerely, 
 
Al Melo MD

## 2020-03-16 NOTE — PROGRESS NOTES
Visit Vitals  /70   Pulse 76   Ht 5' 10\" (1.778 m)   Wt 204 lb (92.5 kg)   BMI 29.27 kg/m²     Medication changes for this OV VO Dr Ariana Berry

## 2020-03-16 NOTE — PROGRESS NOTES
LAST OFFICE VISIT : Visit date not found        ICD-10-CM ICD-9-CM   1. Essential hypertension I10 401.9   2. 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 Nonrheumatic aortic valve insufficiency I35.1 424.1   4. Nonrheumatic mitral valve regurgitation I34.0 424.0            Janice Saxena is a [de-identified] y.o. male with HTN, dyslipidemia, and AF referred for follow up. Cardiac risk factors: dyslipidemia, male gender, hypertension  I have personally obtained the history from the patient. HISTORY OF PRESENTING ILLNESS     Pt is present with his family member. Overall the pt states he is doing well. Pt states that his LE edema has improved. He does note that he is not exercising as much as he used to. Pt is not wearing his CPAP. The patient denies chest pain/ shortness of breath, orthopnea, PND, LE edema, palpitations, syncope, presyncope or fatigue.          ACTIVE PROBLEM LIST     Patient Active Problem List    Diagnosis Date Noted    Hypertension 10/26/2015    Hyperlipemia 10/26/2015    Anemia due to acute blood loss 12/26/2014    Aortic stenosis 12/23/2014    S/P AVR (aortic valve replacement) 12/23/2014    S/P MVR (mitral valve repair) 12/23/2014    S/P Maze operation for atrial fibrillation 12/23/2014    Aortic insufficiency 12/08/2014    Mitral regurgitation 12/08/2014    A-fib ([address] 06/17/2013    Arthritis of knee 07/09/2012           PAST MEDICAL HISTORY     Past Medical History:   Diagnosis Date    Arthritis     Atrial fibrillation ([address]     CAD (coronary artery disease)     Cancer ([address] 2012    melanoma head    Chronic pain     legs/knee    Contact dermatitis     Coughing     Enlarged prostate     GERD (gastroesophageal reflux disease)     Hyperlipidemia     Hypertension     Insomnia     Sleep apnea 03/2018    Unspecified sleep apnea     unable tolerated CPAP machine    Vitamin D deficiency            PAST SURGICAL HISTORY     Past Surgical History:   Procedure Laterality Date  HX AORTIC VALVE REPLACEMENT  2015    and mitral valve repair, left atrial cryo maze    HX HEENT      melanoma removed head    HX HERNIA REPAIR  2009    Right    HX HERNIA REPAIR      left inguinal hernia repair    HX HERNIA REPAIR Left 12/01/2016    lap left inguinal hernia repair with mesh    HX KNEE REPLACEMENT      Bilateral    HX ORTHOPAEDIC      BILATERAL KNEE REPLACEMENT    HX ORTHOPAEDIC      CARPEL TUNNEL REPAIR-RIGHT    HX OTHER SURGICAL  2015    closure of patent foramen ovale          ALLERGIES     No Known Allergies       FAMILY HISTORY     Family History   Problem Relation Age of Onset    Cancer Mother     Cancer Father         prostrate    Cancer Brother         prostrate ca    Anesth Problems Neg Hx     negative for cardiac disease       SOCIAL HISTORY     Social History     Socioeconomic History    Marital status:      Spouse name: Not on file    Number of children: Not on file    Years of education: Not on file    Highest education level: Not on file   Tobacco Use    Smoking status: Never Smoker    Smokeless tobacco: Never Used   Substance and Sexual Activity    Alcohol use: Yes     Alcohol/week: 3.0 standard drinks     Types: 3 Cans of beer per week    Drug use: No    Sexual activity: Not Currently         MEDICATIONS     Current Outpatient Medications   Medication Sig    apixaban (Eliquis) 5 mg tablet Take 5 mg by mouth two (2) times a day.  apixaban (ELIQUIS) 5 mg tablet Take 5 mg by mouth two (2) times a day.  lisinopril (PRINIVIL, ZESTRIL) 40 mg tablet Take  by mouth two (2) times a day. Patient to use 20 mg in am and 40 mg in pm    chlorthalidone (HYGROTEN) 25 mg tablet Take 1 Tab by mouth every other day. And as needed    aspirin delayed-release 81 mg tablet Take  by mouth daily.  polyvinyl alcohol (ARTIFICIAL TEARS, POLYVIN ALC,) 1.4 % ophthalmic solution Administer 1 Drop to both eyes as needed.     ipratropium (ATROVENT HFA) 17 mcg/actuation inhaler Take 2 Puffs by inhalation as needed.  ferrous sulfate 325 mg (65 mg iron) cpER Take  by mouth.  senna (SENOKOT) 8.6 mg tablet Take 1 Tab by mouth daily.  tamsulosin (FLOMAX) 0.4 mg capsule Take 0.4 mg by mouth two (2) times a day.  cholecalciferol (VITAMIN D3) 1,000 unit tablet Take 2,000 Units by mouth two (2) times a day.  GLUCOSAMINE HCL/CHONDR CHING A NA (GLUCOSAMINE-CHONDROITIN) 750-600 mg tab Take  by mouth.  omega-3 fatty acids-vitamin e 1,000 mg cap Take 1 Cap by mouth every other day.  acetaminophen (TYLENOL) 325 mg tablet Take  by mouth every four (4) hours as needed for Pain.  multivitamin (ONE A DAY) tablet Take 1 Tab by mouth daily.  docusate sodium (COLACE) 100 mg capsule Take 100 mg by mouth two (2) times daily as needed.  finasteride (PROSCAR) 5 mg tablet Take 5 mg by mouth nightly.  pravastatin (PRAVACHOL) 40 mg tablet Take 40 mg by mouth nightly. No current facility-administered medications for this visit. I have reviewed the nurses notes, vitals, problem list, allergy list, medical history, family, social history and medications. REVIEW OF SYMPTOMS      General: Pt denies excessive weight gain or loss. Pt is able to conduct ADL's  HEENT: Denies blurred vision, headaches, hearing loss, epistaxis and difficulty swallowing. Respiratory: Denies cough, congestion, shortness of breath, ABDUL, wheezing or stridor. Cardiovascular: Denies precordial pain, palpitations, edema or PND  Gastrointestinal: Denies poor appetite, indigestion, abdominal pain or blood in stool  Genitourinary: Denies hematuria, dysuria, increased urinary frequency  Musculoskeletal: Denies joint pain or swelling from muscles or joints  Neurologic: Denies tremor, paresthesias, headache, or sensory motor disturbance  Psychiatric: Denies confusion, insomnia, depression  Integumentray: Denies rash, itching or ulcers.   Hematologic: Denies easy bruising, bleeding     PHYSICAL EXAMINATION      Vitals:    03/16/20 0957   BP: 142/70   Pulse: 76   Weight: 204 lb (92.5 kg)   Height: 5' 10\" (1.778 m)     General: Well developed, in no acute distress. HEENT: No jaundice, oral mucosa moist, no oral ulcers  Neck: bruit on the left  Heart:  IRIR with a 2/6 systolic murmur  Respiratory: Clear bilaterally x 4, no wheezing or rales  Abdomen:   Soft, non-tender, bowel sounds are active. Extremities:  No edema, normal cap refill, no cyanosis. Musculoskeletal: No clubbing, no deformities  Neuro: A&Ox3, speech clear, gait stable, cooperative, no focal neurologic deficits  Skin: Skin color is normal. No rashes or lesions. Non diaphoretic, moist.  Vascular: 2+ pulses symmetric in all extremities        EKG: AF, controlled rate     DIAGNOSTIC DATA     1.  Echocardiogram                                      9/5/14 Left ventricle: Systolic function was normal. Ejection fraction was  estimated in the range of 55 % to 60 %. There were no regional wall motion  abnormalities. Left atrium: The atrium was mildly dilated. 4/20/15- EF 48%, SHANTANU, atrial septum- poss PFO, MR mild, TR mild/mod, Pulm HTN mod, AV bioprosthesis normal function  6/19/17- EF 45-50%, RVE, SHANTANU, MR/TR mild/mod, AV bioprosthesis exhibits normal function, severe Pulm HTN, LA mild  Atrial septum: There was a secundum septal defect. Color Doppler  evaluation was performed. There was a mild left-to-right shunt. 12/11/18 TTE: LVEF 50%, no WMAs, wall thickness mod incr. RV mild-mod dilated, LA mod-sev dilated, RA mod dilated, mod MR, mod TR, mod pulm HTN, mild PV regurg. AV w/ bioprosthesis, no AS / no AR    2.  Myocardial perfusion study                      (9/5/14)Normal EF ;Normal perfusion    3.  Cholesterol profile                            (10/6/15): , HDL 35, ,           (4/19/16): , HDL 72, LDL 65.6,   (05/01/17)- , HDL 85, LDL 65 , TG 55  11/2/17- , HDL 73, TG 75  12/11/18- , HDL 61, LDL 32, TG 60    4. LE venous duplex                                           (10/10/14)  Right leg mod. 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 distally  No DVT  1/24/18 - No DVT, As an incidental finding, there is evidence of valve incompetence  (reflux) involving the left popliteal vein. LABORATORY DATA            Lab Results   Component Value Date/Time    WBC 4.3 11/30/2016 04:13 PM    HGB 12.9 11/30/2016 04:13 PM    HCT 38.6 11/30/2016 04:13 PM    PLATELET 315 92/02/5346 04:13 PM    MCV 94.6 11/30/2016 04:13 PM      Lab Results   Component Value Date/Time    Sodium 144 04/01/2019 09:51 AM    Potassium 4.3 04/01/2019 09:51 AM    Chloride 101 04/01/2019 09:51 AM    CO2 27 04/01/2019 09:51 AM    Anion gap 7 11/30/2016 04:13 PM    Glucose 117 (H) 04/01/2019 09:51 AM    BUN 19 04/01/2019 09:51 AM    Creatinine 0.83 04/01/2019 09:51 AM    BUN/Creatinine ratio 23 04/01/2019 09:51 AM    GFR est AA 97 04/01/2019 09:51 AM    GFR est non-AA 84 04/01/2019 09:51 AM    Calcium 9.4 04/01/2019 09:51 AM    Bilirubin, total 0.7 12/11/2018 11:03 AM    AST (SGOT) 26 12/11/2018 11:03 AM    Alk. phosphatase 69 12/11/2018 11:03 AM    Protein, total 6.3 12/11/2018 11:03 AM    Albumin 4.1 12/11/2018 11:03 AM    Globulin 3.3 12/29/2014 04:46 AM    A-G Ratio 1.9 12/11/2018 11:03 AM    ALT (SGPT) 18 12/11/2018 11:03 AM           ASSESSMENT/RECOMMENDATIONS:.      1. Permanent AF  - On Eliquis with no bleeding issues. 2. LE edema  -has improved. 3. HTN  - Blood pressure is borderline. Continue low sodium diet. 4. Dyslipidemia  - Followed by Bryce Edgar MD   - Last lipids in 2018 were at goal.   5. Moderate PFO with left to right flow and CHRISTOPHER  - Hx of PFO repaired in the past.   6. AS and MR s/p AVR and MVR  - He has had AVR with a #23 Deep mitroflow PRT bioprosthesis, MVR closure of left atrial appendage and closure of PFO all done on 2/23/14  7.  DEL  - Is not wearing his CPAP and he understands the consequences  8. Left carotid bruit  - Will check carotid ultrasound  9. Return in 6 months or PRN. ADDENDUM:  (5/5/20):   Mr. Anastasiia Mcdowell is a low operative risk for a low risk procedure and may proceed. He may hold his Xarelto for 2 days prior and begin the day of the procedure. Corie Edwards MD      Orders Placed This Encounter    AMB POC EKG ROUTINE W/ 12 LEADS, INTER & REP     Order Specific Question:   Reason for Exam:     Answer:   afib    apixaban (Eliquis) 5 mg tablet     Sig: Take 5 mg by mouth two (2) times a day. Follow-up and Dispositions  ·   Return in about 6 months (around 9/16/2020). I have discussed the diagnosis with  Colten Hernandes and the intended plan as seen in the above orders. Questions were answered concerning future plans. I have discussed medication side effects and warnings with the patient as well. Thank you,  Dejuan Suggs MD for involving me in the care of  Colten Hernandes. Please do not hesitate to contact me for further questions/concerns. Written by [place] Benja, as dictated by Corie Edwards MD.     Renee Thompson MD, [facility]    Patient Care Team:  Dejuan Suggs MD as PCP - General (Internal Medicine)  Angela Grigsby MD (Orthopedic Surgery)  Alma Rosa Magallanes MD as Referring Provider (Cardiology)  Miguel Payne MD (General Surgery)    [address], [address] [place] [address]     Brianne MIRIAMveroari [address]      [phone] / [phone] Fax

## 2020-05-08 ENCOUNTER — HOSPITAL ENCOUNTER (OUTPATIENT)
Age: 80
Setting detail: OUTPATIENT SURGERY
Discharge: HOME | End: 2020-05-08
Attending: INTERNAL MEDICINE | Admitting: INTERNAL MEDICINE
Payer: MEDICARE

## 2020-05-08 ENCOUNTER — ANESTHESIA EVENT (OUTPATIENT)
Dept: ENDOSCOPY | Age: 80
End: 2020-05-08
Payer: MEDICARE

## 2020-05-08 ENCOUNTER — ANESTHESIA (OUTPATIENT)
Dept: ENDOSCOPY | Age: 80
End: 2020-05-08
Payer: MEDICARE

## 2020-05-08 VITALS
RESPIRATION RATE: 17 BRPM | WEIGHT: 202.82 LBS | DIASTOLIC BLOOD PRESSURE: 73 MMHG | HEART RATE: 66 BPM | HEIGHT: 70 IN | BODY MASS INDEX: 29.04 KG/M2 | OXYGEN SATURATION: 97 % | SYSTOLIC BLOOD PRESSURE: 124 MMHG | TEMPERATURE: 97.8 F

## 2020-05-08 PROCEDURE — 74011250636 HC RX REV CODE- 250/636: Performed by: NURSE ANESTHETIST, CERTIFIED REGISTERED

## 2020-05-08 PROCEDURE — 76060000031 HC ANESTHESIA FIRST 0.5 HR: Performed by: INTERNAL MEDICINE

## 2020-05-08 PROCEDURE — 77030022875 HC PRB AR PLSM COAG ERBE -C: Performed by: INTERNAL MEDICINE

## 2020-05-08 PROCEDURE — 76040000019: Performed by: INTERNAL MEDICINE

## 2020-05-08 PROCEDURE — 74011250636 HC RX REV CODE- 250/636: Performed by: INTERNAL MEDICINE

## 2020-05-08 RX ORDER — SODIUM CHLORIDE 9 MG/ML
50 INJECTION, SOLUTION INTRAVENOUS CONTINUOUS
Status: DISCONTINUED | OUTPATIENT
Start: 2020-05-08 | End: 2020-05-08 | Stop reason: HOSPADM

## 2020-05-08 RX ORDER — DEXTROMETHORPHAN/PSEUDOEPHED 2.5-7.5/.8
1.2 DROPS ORAL
Status: DISCONTINUED | OUTPATIENT
Start: 2020-05-08 | End: 2020-05-08 | Stop reason: HOSPADM

## 2020-05-08 RX ORDER — PROPOFOL 10 MG/ML
INJECTION, EMULSION INTRAVENOUS
Status: DISCONTINUED | OUTPATIENT
Start: 2020-05-08 | End: 2020-05-08 | Stop reason: HOSPADM

## 2020-05-08 RX ORDER — ATROPINE SULFATE 0.1 MG/ML
0.4 INJECTION INTRAVENOUS
Status: DISCONTINUED | OUTPATIENT
Start: 2020-05-08 | End: 2020-05-08 | Stop reason: HOSPADM

## 2020-05-08 RX ORDER — NALOXONE HYDROCHLORIDE 0.4 MG/ML
0.4 INJECTION, SOLUTION INTRAMUSCULAR; INTRAVENOUS; SUBCUTANEOUS
Status: DISCONTINUED | OUTPATIENT
Start: 2020-05-08 | End: 2020-05-08 | Stop reason: HOSPADM

## 2020-05-08 RX ORDER — FLUMAZENIL 0.1 MG/ML
0.2 INJECTION INTRAVENOUS
Status: DISCONTINUED | OUTPATIENT
Start: 2020-05-08 | End: 2020-05-08 | Stop reason: HOSPADM

## 2020-05-08 RX ORDER — MIDAZOLAM HYDROCHLORIDE 1 MG/ML
.25-5 INJECTION, SOLUTION INTRAMUSCULAR; INTRAVENOUS
Status: DISCONTINUED | OUTPATIENT
Start: 2020-05-08 | End: 2020-05-08 | Stop reason: HOSPADM

## 2020-05-08 RX ORDER — PROPOFOL 10 MG/ML
INJECTION, EMULSION INTRAVENOUS AS NEEDED
Status: DISCONTINUED | OUTPATIENT
Start: 2020-05-08 | End: 2020-05-08 | Stop reason: HOSPADM

## 2020-05-08 RX ORDER — EPINEPHRINE 0.1 MG/ML
1 INJECTION INTRACARDIAC; INTRAVENOUS
Status: DISCONTINUED | OUTPATIENT
Start: 2020-05-08 | End: 2020-05-08 | Stop reason: HOSPADM

## 2020-05-08 RX ADMIN — PROPOFOL 40 MG: 10 INJECTION, EMULSION INTRAVENOUS at 10:09

## 2020-05-08 RX ADMIN — PROPOFOL 120 MCG/KG/MIN: 10 INJECTION, EMULSION INTRAVENOUS at 10:03

## 2020-05-08 RX ADMIN — SODIUM CHLORIDE 50 ML/HR: 900 INJECTION, SOLUTION INTRAVENOUS at 08:54

## 2020-05-08 RX ADMIN — PROPOFOL 100 MG: 10 INJECTION, EMULSION INTRAVENOUS at 10:04

## 2020-05-08 NOTE — ROUTINE PROCESS
Marsha Brunson  1940  [id number]    Situation:  Verbal report received from: Derek Rueda  Procedure: Procedure(s):  COLONOSCOPY  ENDOSCOPIC ARGON PLASMA COAGULATION    Background:    Preoperative diagnosis: BLOOD IN STOOL  Postoperative diagnosis: Colon- diverticulosis proctitis/rectal    :  Dr. Tatum Sloan  Assistant(s): Endoscopy Technician-1: Mirtha Romano  Endoscopy RN-1: Houston Chaudhari RN    Specimens: * No specimens in log *  H. Pylori  no    Assessment:  Intra-procedure medications       Anesthesia gave intra-procedure sedation and medications, see anesthesia flow sheet yes    Intravenous fluids: NS@ KVO     Vital signs stable     Abdominal assessment: round and soft     Recommendation:  Discharge patient per MD order.     Family or Friend   Permission to share finding with family or friend yes

## 2020-05-08 NOTE — H&P
The patient is a [de-identified]year old male who presents with a complaint of blood in stool. Pt presents via telemedicine phone call for blood in the stool.  This has been going on for several months. [place] states that he had started noticing this as blood on the toilet tissue but has progressed to blood streakes stool. [place] states this has been intermittent. [place] reports abdominal pain which improves after bowel movements.  He is anticoagulated with eliquis for A fib.  His last colonsocopy was 5 years ago and was notable for a adenomatous polyp and internal hemorrhoids.  He is due for a screening colonoscopy. Problem List/Past Medical (Jeana Horn; 5/1/2020 12:44 PM)  Prostate Cancer    Hypercholesterolemia    History of colonic polyps (V12.72  Z86.010)    Ischemic heart disease, chronic (414.9  I25.9)    Tubular adenoma (229.9  D36.9)    Fibrillation, atrial (427.31  I48.91)      Past Surgical History (Jeana Horn; 5/1/2020 12:44 PM)  Open Heart Surgery    Knee Replacement, Total   Bilateral.  Aortocoronary bypass status (V45.81  Z95.1)      Allergies (Cinthia Horn; 5/1/2020 12:14 PM)  Bandage Adhesive [place] Bel 3/4\" *MEDICAL DEVICES AND SUPPLIES*    No Known Allergies  [05/01/2020]: (Marked as Inactive)  No Known Drug Allergies  [05/01/2020]: (Marked as Inactive)    Medication History (Cinthia Horn; 5/1/2020 2:31 PM)  Adacel  (5-2-15. 5LF-MCG/0.5 Suspension, Intramuscular as directed) Active. Prevnar 13  (Intramuscular AS directed) Active. Acetaminophen  (325MG Capsule, Oral 1 PO QID PRN) Active. Glucosamine Chondroitin Complx  (Oral 1 PO QD) Active. Pravastatin Sodium  (40MG Tablet, Oral 1 PO QD) Active. Sennosides  (8.6MG Tablet, Oral 1 PO QD) Active. Terazosin HCl  (10MG Tablet, Oral 1 PO QD) Active. Warfarin Sodium  (5MG Tablet, Oral 1 PO QD AS DIRECTED) Active. Eliquis  (5MG Tablet, Oral bid) Active. Lisinopril  (40MG Tablet, Oral at bedtime) Active.  (20mg in am and 40mg bedtime)  Chlorthalidone  (25MG Tablet, Oral daily) Active. Aspirin  (81MG Tablet DR, Oral daily) Active. FerrouSul  (325 (65 Fe)MG Tablet, Oral daily) Active. Senna  (Oral daily) Specific strength unknown - Active. Flomax  (0.4MG Capsule, Oral two times daily) Active. Vitamin D  (1000UNIT Tablet, 2 Oral daily) Active. Multivitamin  (Oral daily) Active. Fish Oil + D3  (1000-1000MG-UNIT Capsule, Oral daily) Active. Proscar  (5MG Tablet, Oral at bedtime) Active. Medications Reconciled     Family History (Ileana Lizarraga; 5/1/2020 12:44 PM)  Colon Cancer   First Degree Relatives. Social History (Ileana Lizarraga; 5/1/2020 12:44 PM)  Blood Transfusion   Yes. Alcohol Use   Occasional alcohol use. Tobacco Use   Never smoker. Marital status   . Employment status   Retired. Diagnostic Studies History (Ileana Lizarraga; 5/1/2020 12:44 PM)  Colonoscopy      Health Maintenance History (Ileana Lizarraga; 5/1/2020 12:17 PM)  Flu Vaccine  [09/2015]: 8-2019  Pneumovax  [10/2015]: yes    Vitals (Ileana Lizarraga; 5/1/2020 12:12 PM)  5/1/2020 12:12 PM  Weight: 210 lb   Height: 69 in   Body Surface Area: 2.11 m²   Body Mass Index: 31.01 kg/m²                Assessment & Plan (Hussein Burton AGACN; 5/1/2020 2:59 PM)  Rectal bleeding (569.3  K62.5)  Story: Pt presents via phone call visit for complaints of rectal bleeding. He reports intermittend blood streaked stools. His is antociagulated with Eliquis for A Fib. He is due for a screening colonoscopy. Impression: Likely related to hemorrhoids. Recommended he start a fiber supplement. Will proceed with colonoscopy as he is due. Discussed he would need to hold eliquis x 2 ays prior to proceure and will also notiy Dr. Oconnor Camera of this. He will call if the bleeding increases in amount of frequency. The duration of our call was 11 minutes.   Current Plans  Colonoscopy (90135) (Discussed risks and benefits with the patient to include:; perforation, post polypectomy, or post biopsy bleeding, missed lesions, and sedation reactions.)  Restarted Suprep Bowel Prep Kit 17.5-3.13-1.6GM/177ML, 1 Milliliter Take as directed before Colonoscopy, 1 Milliliter, 1 day starting 05/01/2020, No Refill. Due to this being a TeleHealth encounter ([facility]), evaluation of the following organ systems was limited: Vitals/Constitutional/EENT/Resp/CV/GI//MS/Neuro/Skin/Heme-Lymph-Imm. Pursuant to the emergency declaration under the [address] [place] [address], [address] [place] [address] authority and the [employer] and [employer] Act, this Virtual Visit was conducted with patient's (and/or legal guardian's) consent, to reduce the risk of exposure to COVID-19 and provide necessary medical care. Services were provided through a video synchronous discussion virtually to substitute for in-person encounter.     Signed electronically by BRIJESH Westfall (5/1/2020 3:01 PM)

## 2020-05-08 NOTE — ANESTHESIA PREPROCEDURE EVALUATION
Anesthetic History   No history of anesthetic complications            Review of Systems / Medical History  Patient summary reviewed, nursing notes reviewed and pertinent labs reviewed    Pulmonary  Within defined limits                 Neuro/Psych   Within defined limits           Cardiovascular  Within defined limits  Hypertension        Dysrhythmias   CAD         GI/Hepatic/Renal  Within defined limits              Endo/Other  Within defined limits           Other Findings              Physical Exam    Airway  Mallampati: II  TM Distance: > 6 cm  Neck ROM: normal range of motion   Mouth opening: Normal     Cardiovascular  Regular rate and rhythm,  S1 and S2 normal,  no murmur, click, rub, or gallop             Dental  No notable dental hx       Pulmonary  Breath sounds clear to auscultation               Abdominal  GI exam deferred       Other Findings            Anesthetic Plan    ASA: 3  Anesthesia type: MAC          Induction: Intravenous  Anesthetic plan and risks discussed with: Patient

## 2020-05-08 NOTE — DISCHARGE INSTRUCTIONS
[facility]. Hannah Cosby M.D.  [phone]            COLON DISCHARGE INSTRUCTIONS       2020    Rafael Steen  :  1940  [facility] Medical Record Number:  [id number]      COLONOSCOPY FINDINGS:  Your colonoscopy showed mild diverticulosis and small internal hemorrhoids, bleeding most likely coming from mild radiation proctitis which is irritation to the rectum wall that can develop over time after you undergo radiation to the prostate. This was treated with cautery and complete ablation performed. DISCOMFORT:  Redness at IV site- apply warm compress to area; if redness or soreness persist- contact your physician  There may be a slight amount of blood passed from the rectum  Gaseous discomfort- walking, belching will help relieve any discomfort  You may not operate a vehicle for 12 hours  You may not engage in an occupation involving machinery or appliances for rest of today  You may not drink alcoholic beverages for at least 12 hours  Avoid making any critical decisions for at least 24 hour  DIET:   High fiber diet. - however -  remember your colon is empty and a heavy meal will produce gas. Avoid these foods:  vegetables, fried / greasy foods, carbonated drinks for today     ACTIVITY:  You may resume your normal daily activities it is recommended that you spend the remainder of the day resting -  avoid any strenuous activity. CALL M.D. ANY SIGN OF:   Increasing pain, nausea, vomiting  Abdominal distension (swelling)  New increased bleeding (oral or rectal)  Fever (chills)  Pain in chest area  Bloody discharge from nose or mouth   Shortness of breath    Follow-up Instructions:   Call Dr. Leland Hightower if any questions or problems. Telephone # [phone]  Continue taking stool softeners daily, resume Eliquis on Monday. Call us if bleeding is recurrent.

## 2020-05-08 NOTE — ANESTHESIA POSTPROCEDURE EVALUATION
Procedure(s):  COLONOSCOPY  ENDOSCOPIC ARGON PLASMA COAGULATION. MAC    Anesthesia Post Evaluation        Patient location during evaluation: bedside  Level of consciousness: awake  Pain management: satisfactory to patient  Airway patency: patent  Anesthetic complications: no  Cardiovascular status: acceptable  Respiratory status: acceptable  Hydration status: acceptable        Vitals Value Taken Time   /76 5/8/2020 10:55 AM   Temp 36.6 °C (97.8 °F) 5/8/2020 10:34 AM   Pulse 65 5/8/2020 10:59 AM   Resp 20 5/8/2020 10:58 AM   SpO2 99 % 5/8/2020 10:56 AM   Vitals shown include unvalidated device data.

## 2020-06-08 ENCOUNTER — APPOINTMENT (OUTPATIENT)
Dept: CT IMAGING | Age: 80
DRG: 394 | End: 2020-06-08
Attending: EMERGENCY MEDICINE
Payer: MEDICARE

## 2020-06-08 ENCOUNTER — HOSPITAL ENCOUNTER (INPATIENT)
Age: 80
LOS: 2 days | Discharge: HOME | DRG: 394 | End: 2020-06-10
Attending: EMERGENCY MEDICINE | Admitting: INTERNAL MEDICINE
Payer: MEDICARE

## 2020-06-08 DIAGNOSIS — K62.5 RECTAL BLEEDING: ICD-10-CM

## 2020-06-08 DIAGNOSIS — K92.2 ACUTE LOWER GI BLEEDING: Primary | ICD-10-CM

## 2020-06-08 LAB
ALBUMIN SERPL-MCNC: 3.9 G/DL (ref 3.5–5)
ALBUMIN/GLOB SERPL: 1 {RATIO} (ref 1.1–2.2)
ALP SERPL-CCNC: 65 U/L (ref 45–117)
ALT SERPL-CCNC: 16 U/L (ref 12–78)
ANION GAP SERPL CALC-SCNC: 6 MMOL/L (ref 5–15)
APPEARANCE UR: CLEAR
AST SERPL-CCNC: 18 U/L (ref 15–37)
BASOPHILS # BLD: 0 K/UL (ref 0–0.1)
BASOPHILS NFR BLD: 0 % (ref 0–1)
BILIRUB SERPL-MCNC: 0.5 MG/DL (ref 0.2–1)
BILIRUB UR QL: NEGATIVE
BUN SERPL-MCNC: 21 MG/DL (ref 6–20)
BUN/CREAT SERPL: 21 (ref 12–20)
CALCIUM SERPL-MCNC: 9.1 MG/DL (ref 8.5–10.1)
CHLORIDE SERPL-SCNC: 104 MMOL/L (ref 97–108)
CO2 SERPL-SCNC: 27 MMOL/L (ref 21–32)
COLOR UR: ABNORMAL
COMMENT, HOLDF: NORMAL
COMMENT, HOLDF: NORMAL
CREAT SERPL-MCNC: 1.01 MG/DL (ref 0.7–1.3)
DIFFERENTIAL METHOD BLD: ABNORMAL
EOSINOPHIL # BLD: 0.1 K/UL (ref 0–0.4)
EOSINOPHIL NFR BLD: 3 % (ref 0–7)
ERYTHROCYTE [DISTWIDTH] IN BLOOD BY AUTOMATED COUNT: 13.2 % (ref 11.5–14.5)
FERRITIN SERPL-MCNC: 222 NG/ML (ref 26–388)
FOLATE SERPL-MCNC: 41.9 NG/ML (ref 5–21)
GLOBULIN SER CALC-MCNC: 3.9 G/DL (ref 2–4)
GLUCOSE SERPL-MCNC: 103 MG/DL (ref 65–100)
GLUCOSE UR STRIP.AUTO-MCNC: NEGATIVE MG/DL
HAPTOGLOB SERPL-MCNC: 39 MG/DL (ref 30–200)
HCT VFR BLD AUTO: 32.6 % (ref 36.6–50.3)
HEMOCCULT STL QL: POSITIVE
HGB BLD-MCNC: 10.8 G/DL (ref 12.1–17)
HGB UR QL STRIP: NEGATIVE
IMM GRANULOCYTES # BLD AUTO: 0 K/UL
IMM GRANULOCYTES NFR BLD AUTO: 0 %
INR PPP: 1.1 (ref 0.9–1.1)
IRON SATN MFR SERPL: 14 % (ref 20–50)
IRON SERPL-MCNC: 41 UG/DL (ref 35–150)
KETONES UR QL STRIP.AUTO: 15 MG/DL
LDH SERPL L TO P-CCNC: 249 U/L (ref 85–241)
LEUKOCYTE ESTERASE UR QL STRIP.AUTO: NEGATIVE
LIPASE SERPL-CCNC: 79 U/L (ref 73–393)
LYMPHOCYTES # BLD: 1 K/UL (ref 0.8–3.5)
LYMPHOCYTES NFR BLD: 25 % (ref 12–49)
MCH RBC QN AUTO: 32.4 PG (ref 26–34)
MCHC RBC AUTO-ENTMCNC: 33.1 G/DL (ref 30–36.5)
MCV RBC AUTO: 97.9 FL (ref 80–99)
MONOCYTES # BLD: 0.5 K/UL (ref 0–1)
MONOCYTES NFR BLD: 14 % (ref 5–13)
MYELOCYTES NFR BLD MANUAL: 1 %
NEUTS SEG # BLD: 2.2 K/UL (ref 1.8–8)
NEUTS SEG NFR BLD: 57 % (ref 32–75)
NITRITE UR QL STRIP.AUTO: NEGATIVE
NRBC # BLD: 0 K/UL (ref 0–0.01)
NRBC BLD-RTO: 0 PER 100 WBC
PH UR STRIP: 6.5 [PH] (ref 5–8)
PLATELET # BLD AUTO: 161 K/UL (ref 150–400)
PMV BLD AUTO: 8.7 FL (ref 8.9–12.9)
POTASSIUM SERPL-SCNC: 3.7 MMOL/L (ref 3.5–5.1)
PROT SERPL-MCNC: 7.8 G/DL (ref 6.4–8.2)
PROT UR STRIP-MCNC: NEGATIVE MG/DL
PROTHROMBIN TIME: 11.6 SEC (ref 9–11.1)
RBC # BLD AUTO: 3.33 M/UL (ref 4.1–5.7)
RBC MORPH BLD: ABNORMAL
RETICS # AUTO: 0.06 M/UL (ref 0.03–0.1)
RETICS/RBC NFR AUTO: 2 % (ref 0.7–2.1)
SAMPLES BEING HELD,HOLD: NORMAL
SAMPLES BEING HELD,HOLD: NORMAL
SODIUM SERPL-SCNC: 137 MMOL/L (ref 136–145)
SP GR UR REFRACTOMETRY: 1.02 (ref 1–1.03)
TIBC SERPL-MCNC: 294 UG/DL (ref 250–450)
TROPONIN I SERPL-MCNC: <0.05 NG/ML
UROBILINOGEN UR QL STRIP.AUTO: 0.2 EU/DL (ref 0.2–1)
VIT B12 SERPL-MCNC: 565 PG/ML (ref 193–986)
WBC # BLD AUTO: 3.9 K/UL (ref 4.1–11.1)

## 2020-06-08 PROCEDURE — 83010 ASSAY OF HAPTOGLOBIN QUANT: CPT

## 2020-06-08 PROCEDURE — 96374 THER/PROPH/DIAG INJ IV PUSH: CPT

## 2020-06-08 PROCEDURE — 76040000007: Performed by: INTERNAL MEDICINE

## 2020-06-08 PROCEDURE — 36415 COLL VENOUS BLD VENIPUNCTURE: CPT

## 2020-06-08 PROCEDURE — G0500 MOD SEDAT ENDO SERVICE >5YRS: HCPCS

## 2020-06-08 PROCEDURE — 85610 PROTHROMBIN TIME: CPT

## 2020-06-08 PROCEDURE — 65660000000 HC RM CCU STEPDOWN

## 2020-06-08 PROCEDURE — C9113 INJ PANTOPRAZOLE SODIUM, VIA: HCPCS | Performed by: EMERGENCY MEDICINE

## 2020-06-08 PROCEDURE — 74178 CT ABD&PLV WO CNTR FLWD CNTR: CPT

## 2020-06-08 PROCEDURE — 82272 OCCULT BLD FECES 1-3 TESTS: CPT

## 2020-06-08 PROCEDURE — 85025 COMPLETE CBC W/AUTO DIFF WBC: CPT

## 2020-06-08 PROCEDURE — 99284 EMERGENCY DEPT VISIT MOD MDM: CPT

## 2020-06-08 PROCEDURE — 83615 LACTATE (LD) (LDH) ENZYME: CPT

## 2020-06-08 PROCEDURE — 82607 VITAMIN B-12: CPT

## 2020-06-08 PROCEDURE — 77030010936 HC CLP LIG BSC -C: Performed by: INTERNAL MEDICINE

## 2020-06-08 PROCEDURE — 84484 ASSAY OF TROPONIN QUANT: CPT

## 2020-06-08 PROCEDURE — 74011250636 HC RX REV CODE- 250/636: Performed by: EMERGENCY MEDICINE

## 2020-06-08 PROCEDURE — 74011636320 HC RX REV CODE- 636/320: Performed by: RADIOLOGY

## 2020-06-08 PROCEDURE — 83690 ASSAY OF LIPASE: CPT

## 2020-06-08 PROCEDURE — 80053 COMPREHEN METABOLIC PANEL: CPT

## 2020-06-08 PROCEDURE — 74011250636 HC RX REV CODE- 250/636: Performed by: INTERNAL MEDICINE

## 2020-06-08 PROCEDURE — 96375 TX/PRO/DX INJ NEW DRUG ADDON: CPT

## 2020-06-08 PROCEDURE — 85045 AUTOMATED RETICULOCYTE COUNT: CPT

## 2020-06-08 PROCEDURE — 81003 URINALYSIS AUTO W/O SCOPE: CPT

## 2020-06-08 PROCEDURE — 93005 ELECTROCARDIOGRAM TRACING: CPT

## 2020-06-08 PROCEDURE — 0DJD8ZZ INSPECTION OF LOWER INTESTINAL TRACT, VIA NATURAL OR ARTIFICIAL OPENING ENDOSCOPIC: ICD-10-PCS | Performed by: INTERNAL MEDICINE

## 2020-06-08 PROCEDURE — 82746 ASSAY OF FOLIC ACID SERUM: CPT

## 2020-06-08 PROCEDURE — 82728 ASSAY OF FERRITIN: CPT

## 2020-06-08 PROCEDURE — 83540 ASSAY OF IRON: CPT

## 2020-06-08 RX ORDER — DEXTROSE, SODIUM CHLORIDE, AND POTASSIUM CHLORIDE 5; .45; .15 G/100ML; G/100ML; G/100ML
150 INJECTION INTRAVENOUS CONTINUOUS
Status: DISCONTINUED | OUTPATIENT
Start: 2020-06-08 | End: 2020-06-09

## 2020-06-08 RX ORDER — EPINEPHRINE 0.1 MG/ML
1 INJECTION INTRACARDIAC; INTRAVENOUS
Status: COMPLETED | OUTPATIENT
Start: 2020-06-08 | End: 2020-06-08

## 2020-06-08 RX ORDER — MIDAZOLAM HYDROCHLORIDE 1 MG/ML
10 INJECTION, SOLUTION INTRAMUSCULAR; INTRAVENOUS ONCE
Status: COMPLETED | OUTPATIENT
Start: 2020-06-08 | End: 2020-06-08

## 2020-06-08 RX ORDER — HYDROCODONE BITARTRATE AND ACETAMINOPHEN 5; 325 MG/1; MG/1
1 TABLET ORAL
Status: DISCONTINUED | OUTPATIENT
Start: 2020-06-08 | End: 2020-06-10 | Stop reason: HOSPADM

## 2020-06-08 RX ORDER — SODIUM CHLORIDE 0.9 % (FLUSH) 0.9 %
5-40 SYRINGE (ML) INJECTION AS NEEDED
Status: DISCONTINUED | OUTPATIENT
Start: 2020-06-08 | End: 2020-06-10 | Stop reason: HOSPADM

## 2020-06-08 RX ORDER — DEXTROMETHORPHAN/PSEUDOEPHED 2.5-7.5/.8
1.2 DROPS ORAL
Status: DISCONTINUED | OUTPATIENT
Start: 2020-06-08 | End: 2020-06-09

## 2020-06-08 RX ORDER — TAMSULOSIN HYDROCHLORIDE 0.4 MG/1
0.4 CAPSULE ORAL 2 TIMES DAILY
Status: DISCONTINUED | OUTPATIENT
Start: 2020-06-08 | End: 2020-06-10 | Stop reason: HOSPADM

## 2020-06-08 RX ORDER — SODIUM CHLORIDE 0.9 % (FLUSH) 0.9 %
5-40 SYRINGE (ML) INJECTION EVERY 8 HOURS
Status: DISCONTINUED | OUTPATIENT
Start: 2020-06-08 | End: 2020-06-10 | Stop reason: HOSPADM

## 2020-06-08 RX ORDER — FLUMAZENIL 0.1 MG/ML
0.2 INJECTION INTRAVENOUS
Status: DISPENSED | OUTPATIENT
Start: 2020-06-08 | End: 2020-06-09

## 2020-06-08 RX ORDER — PANTOPRAZOLE SODIUM 40 MG/10ML
40 INJECTION, POWDER, LYOPHILIZED, FOR SOLUTION INTRAVENOUS
Status: COMPLETED | OUTPATIENT
Start: 2020-06-08 | End: 2020-06-08

## 2020-06-08 RX ORDER — DIPHENHYDRAMINE HCL 25 MG
25 CAPSULE ORAL
Status: DISCONTINUED | OUTPATIENT
Start: 2020-06-08 | End: 2020-06-10 | Stop reason: HOSPADM

## 2020-06-08 RX ORDER — FINASTERIDE 5 MG/1
5 TABLET, FILM COATED ORAL
Status: DISCONTINUED | OUTPATIENT
Start: 2020-06-08 | End: 2020-06-10 | Stop reason: HOSPADM

## 2020-06-08 RX ORDER — ACETAMINOPHEN 325 MG/1
650 TABLET ORAL
Status: DISCONTINUED | OUTPATIENT
Start: 2020-06-08 | End: 2020-06-10 | Stop reason: HOSPADM

## 2020-06-08 RX ORDER — FENTANYL CITRATE 50 UG/ML
200 INJECTION, SOLUTION INTRAMUSCULAR; INTRAVENOUS ONCE
Status: COMPLETED | OUTPATIENT
Start: 2020-06-08 | End: 2020-06-08

## 2020-06-08 RX ORDER — ATROPINE SULFATE 0.1 MG/ML
0.5 INJECTION INTRAVENOUS
Status: DISCONTINUED | OUTPATIENT
Start: 2020-06-08 | End: 2020-06-09

## 2020-06-08 RX ORDER — DOCUSATE SODIUM 100 MG/1
100 CAPSULE, LIQUID FILLED ORAL DAILY
Status: DISCONTINUED | OUTPATIENT
Start: 2020-06-09 | End: 2020-06-10 | Stop reason: HOSPADM

## 2020-06-08 RX ORDER — ONDANSETRON 2 MG/ML
4 INJECTION INTRAMUSCULAR; INTRAVENOUS
Status: DISCONTINUED | OUTPATIENT
Start: 2020-06-08 | End: 2020-06-10 | Stop reason: HOSPADM

## 2020-06-08 RX ORDER — NALOXONE HYDROCHLORIDE 0.4 MG/ML
0.4 INJECTION, SOLUTION INTRAMUSCULAR; INTRAVENOUS; SUBCUTANEOUS
Status: DISPENSED | OUTPATIENT
Start: 2020-06-08 | End: 2020-06-09

## 2020-06-08 RX ADMIN — SODIUM CHLORIDE 1000 ML: 900 INJECTION, SOLUTION INTRAVENOUS at 17:24

## 2020-06-08 RX ADMIN — IOPAMIDOL 100 ML: 755 INJECTION, SOLUTION INTRAVENOUS at 18:09

## 2020-06-08 RX ADMIN — FENTANYL CITRATE 25 MCG: 50 INJECTION, SOLUTION INTRAMUSCULAR; INTRAVENOUS at 22:30

## 2020-06-08 RX ADMIN — EPINEPHRINE 1 MG: 0.1 INJECTION INTRACARDIAC; INTRAVENOUS at 22:40

## 2020-06-08 RX ADMIN — MIDAZOLAM 2 MG: 1 INJECTION INTRAMUSCULAR; INTRAVENOUS at 22:30

## 2020-06-08 RX ADMIN — PANTOPRAZOLE SODIUM 40 MG: 40 INJECTION, POWDER, FOR SOLUTION INTRAVENOUS at 17:25

## 2020-06-08 NOTE — ED PROVIDER NOTES
79-year-old male with a history of atrial fibrillation, on Eliquis, CAD, prostate cancer, status post radiation with resultant prior radiation proctitis, HTN, HLD, presents to the emergency department after he noted the onset of bright red blood per rectum this morning. He states that his bleeding is not necessarily associated with stool but is also just agustin blood outside of bowel movements. He has also noted some dark maroonish colored stools as well. He denies any abdominal pain, dysuria, hematuria, nausea, vomiting, chest pain, shortness of breath, lightheadedness, syncope.              Past Medical History:   Diagnosis Date    Arthritis     Atrial fibrillation ([facility] [address] [facility]     CAD (coronary artery disease)     Cancer ([facility] [address] 2012    melanoma head    Cancer (HCC)     Prostate CA    Chronic pain     legs/knee    Contact dermatitis     Coughing     Enlarged prostate     GERD (gastroesophageal reflux disease)     Hyperlipidemia     Hypertension     Insomnia     Sleep apnea 03/2018    Unspecified sleep apnea     unable tolerated CPAP machine    Vitamin D deficiency        Past Surgical History:   Procedure Laterality Date    COLONOSCOPY N/A 5/8/2020    COLONOSCOPY performed by Christiana Troy MD at [address] [facility] HX AORTIC VALVE REPLACEMENT  2015    and mitral valve repair, left atrial cryo maze    HX HEENT      melanoma removed head    HX HERNIA REPAIR  2009    Right    HX HERNIA REPAIR      left inguinal hernia repair    HX HERNIA REPAIR Left 12/01/2016    lap left inguinal hernia repair with mesh    HX KNEE REPLACEMENT      Bilateral    HX ORTHOPAEDIC      BILATERAL KNEE REPLACEMENT    HX ORTHOPAEDIC      CARPEL TUNNEL REPAIR-RIGHT    HX OTHER SURGICAL  2015    closure of patent foramen ovale    HX PROSTATE SURGERY      42 radiation treatments         Family History:   Problem Relation Age of Onset    Cancer Mother     Cancer Father         prostrate    Cancer Brother         prostrate ca  Anesth Problems Neg Hx        Social History     Socioeconomic History    Marital status:      Spouse name: Not on file    Number of children: Not on file    Years of education: Not on file    Highest education level: Not on file   Occupational History    Not on file   Social Needs    Financial resource strain: Not on file    Food insecurity     Worry: Not on file     Inability: Not on file    Transportation needs     Medical: Not on file     Non-medical: Not on file   Tobacco Use    Smoking status: Never Smoker    Smokeless tobacco: Never Used   Substance and Sexual Activity    Alcohol use: Yes     Alcohol/week: 3.0 standard drinks     Types: 3 Cans of beer per week    Drug use: No    Sexual activity: Not Currently   Lifestyle    Physical activity     Days per week: Not on file     Minutes per session: Not on file    Stress: Not on file   Relationships    Social connections     Talks on phone: Not on file     Gets together: Not on file     Attends Taoism service: Not on file     Active member of club or organization: Not on file     Attends meetings of clubs or organizations: Not on file     Relationship status: Not on file    Intimate partner violence     Fear of current or ex partner: Not on file     Emotionally abused: Not on file     Physically abused: Not on file     Forced sexual activity: Not on file   Other Topics Concern    Not on file   Social History Narrative    Not on file         ALLERGIES: Patient has no known allergies. Review of Systems   Constitutional: Negative for activity change, appetite change, chills and fever. HENT: Negative for congestion, rhinorrhea, sinus pain, sneezing and sore throat. Eyes: Negative for photophobia and visual disturbance. Respiratory: Negative for cough and shortness of breath. Cardiovascular: Negative for chest pain. Gastrointestinal: Positive for anal bleeding and blood in stool.  Negative for abdominal pain, constipation, diarrhea, nausea, rectal pain and vomiting. Genitourinary: Negative for difficulty urinating, dysuria, flank pain, hematuria, penile pain and testicular pain. Musculoskeletal: Negative for arthralgias, back pain, myalgias and neck pain. Skin: Negative for rash and wound. Neurological: Negative for syncope, weakness, light-headedness, numbness and headaches. Psychiatric/Behavioral: Negative for self-injury and suicidal ideas. All other systems reviewed and are negative. Vitals:    06/08/20 1542   BP: 141/48   Pulse: 79   Resp: 18   Temp: 98.1 °F (36.7 °C)   SpO2: 96%   Weight: 95.3 kg (210 lb)   Height: 5' 10\" (1.778 m)            Physical Exam  Vitals signs and nursing note reviewed. Constitutional:       General: He is not in acute distress. Appearance: Normal appearance. He is well-developed. He is not diaphoretic. HENT:      Head: Normocephalic and atraumatic. Nose: Nose normal.   Eyes:      Extraocular Movements: Extraocular movements intact. Conjunctiva/sclera: Conjunctivae normal.      Pupils: Pupils are equal, round, and reactive to light. Neck:      Musculoskeletal: Neck supple. Cardiovascular:      Rate and Rhythm: Normal rate. Rhythm irregular. Heart sounds: Normal heart sounds. Pulmonary:      Effort: Pulmonary effort is normal.      Breath sounds: Normal breath sounds. Abdominal:      General: There is no distension. Palpations: Abdomen is soft. Tenderness: There is no abdominal tenderness. Genitourinary:     Rectum: Guaiac result positive Hannah Pain bright red blood per rectum). Comments: No noted hemorrhoids  Musculoskeletal:         General: No tenderness. Skin:     General: Skin is warm and dry. Neurological:      General: No focal deficit present. Mental Status: He is alert and oriented to person, place, and time. Cranial Nerves: No cranial nerve deficit. Sensory: No sensory deficit.       Motor: No weakness. Coordination: Coordination normal.          MDM   48-year-old male presents with likely lower GI bleed, on Eliquis. Patient is afebrile with vital signs stable in no acute distress. Benign abdomen. Procedures    1715 EKG shows atrial fibrillation with a rate of 75 bpm with nonspecific T wave flattening inferiorly with nonspecific mild ST depression inferiorly but no ST elevation     7 PM discussed case with Dr. Franny Moseley who agreed to see the patient at the bedside. UA negative    Labs returned showing Hg 10.8, WBC 3.9, normal PLT, normal INR, BUN 21, creatinine 1.01, negative troponin. CT abdomen pelvis was discussed with Dr. Marylen Foy, radiologist, showing active extrav in the rectum. He was given a dose of [place]    Gastroenterology was consulted, Dr. Franny Moseley, and saw the patient at the bedside. He will take him for scope tonight    Total critical care time spent exclusive of procedures:  50 minutes    Hospitalist Perfect Serve for Admission  7:26 PM    ED Room Number: ER09/09  Patient Name and age: Eh Day [de-identified] y.o.  male  Working Diagnosis: No diagnosis found. COVID-19 Suspicion:  no    Code Status:  Full Code  Readmission: no  Isolation Requirements:  no  Recommended Level of Care:  ICU  Department:[facility] ED - [phone]  Other: Active extrav from rectum. GI consulted and agreed to see the patient at the bedside. Currently hemodynamically stable, Hg 10. On Eliquis, given dose of [place]. Will likely go to endoscopy tonight.

## 2020-06-09 PROBLEM — K62.6 ULCER OF RECTUM: Status: ACTIVE | Noted: 2020-06-09

## 2020-06-09 LAB
ALBUMIN SERPL-MCNC: 3.2 G/DL (ref 3.5–5)
ALBUMIN/GLOB SERPL: 1 {RATIO} (ref 1.1–2.2)
ALP SERPL-CCNC: 54 U/L (ref 45–117)
ALT SERPL-CCNC: 15 U/L (ref 12–78)
ANION GAP SERPL CALC-SCNC: 4 MMOL/L (ref 5–15)
APPEARANCE UR: CLEAR
AST SERPL-CCNC: 15 U/L (ref 15–37)
ATRIAL RATE: 82 BPM
BACTERIA URNS QL MICRO: NEGATIVE /HPF
BILIRUB SERPL-MCNC: 0.5 MG/DL (ref 0.2–1)
BILIRUB UR QL: NEGATIVE
BUN SERPL-MCNC: 16 MG/DL (ref 6–20)
BUN/CREAT SERPL: 19 (ref 12–20)
CALCIUM SERPL-MCNC: 8.2 MG/DL (ref 8.5–10.1)
CALCULATED R AXIS, ECG10: 9 DEGREES
CALCULATED T AXIS, ECG11: 81 DEGREES
CHLORIDE SERPL-SCNC: 108 MMOL/L (ref 97–108)
CO2 SERPL-SCNC: 28 MMOL/L (ref 21–32)
COLOR UR: NORMAL
CREAT SERPL-MCNC: 0.86 MG/DL (ref 0.7–1.3)
DIAGNOSIS, 93000: NORMAL
EPITH CASTS URNS QL MICRO: NORMAL /LPF
ERYTHROCYTE [DISTWIDTH] IN BLOOD BY AUTOMATED COUNT: 13.2 % (ref 11.5–14.5)
GLOBULIN SER CALC-MCNC: 3.2 G/DL (ref 2–4)
GLUCOSE SERPL-MCNC: 122 MG/DL (ref 65–100)
GLUCOSE UR STRIP.AUTO-MCNC: NEGATIVE MG/DL
HCT VFR BLD AUTO: 25.9 % (ref 36.6–50.3)
HCT VFR BLD AUTO: 27.8 % (ref 36.6–50.3)
HGB BLD-MCNC: 8.4 G/DL (ref 12.1–17)
HGB BLD-MCNC: 9.3 G/DL (ref 12.1–17)
HGB UR QL STRIP: NEGATIVE
HYALINE CASTS URNS QL MICRO: NORMAL /LPF (ref 0–5)
KETONES UR QL STRIP.AUTO: NEGATIVE MG/DL
LEUKOCYTE ESTERASE UR QL STRIP.AUTO: NEGATIVE
MAGNESIUM SERPL-MCNC: 1.8 MG/DL (ref 1.6–2.4)
MCH RBC QN AUTO: 31.9 PG (ref 26–34)
MCHC RBC AUTO-ENTMCNC: 32.4 G/DL (ref 30–36.5)
MCV RBC AUTO: 98.5 FL (ref 80–99)
NITRITE UR QL STRIP.AUTO: NEGATIVE
NRBC # BLD: 0 K/UL (ref 0–0.01)
NRBC BLD-RTO: 0 PER 100 WBC
PH UR STRIP: 6 [PH] (ref 5–8)
PHOSPHATE SERPL-MCNC: 3.7 MG/DL (ref 2.6–4.7)
PLATELET # BLD AUTO: 132 K/UL (ref 150–400)
PMV BLD AUTO: 8.5 FL (ref 8.9–12.9)
POTASSIUM SERPL-SCNC: 3.7 MMOL/L (ref 3.5–5.1)
PROT SERPL-MCNC: 6.4 G/DL (ref 6.4–8.2)
PROT UR STRIP-MCNC: NEGATIVE MG/DL
Q-T INTERVAL, ECG07: 386 MS
QRS DURATION, ECG06: 98 MS
QTC CALCULATION (BEZET), ECG08: 431 MS
RBC # BLD AUTO: 2.63 M/UL (ref 4.1–5.7)
RBC #/AREA URNS HPF: NORMAL /HPF (ref 0–5)
SODIUM SERPL-SCNC: 140 MMOL/L (ref 136–145)
SP GR UR REFRACTOMETRY: 1.01 (ref 1–1.03)
UA: UC IF INDICATED,UAUC: NORMAL
UROBILINOGEN UR QL STRIP.AUTO: 0.2 EU/DL (ref 0.2–1)
VENTRICULAR RATE, ECG03: 75 BPM
WBC # BLD AUTO: 4.2 K/UL (ref 4.1–11.1)
WBC URNS QL MICRO: NORMAL /HPF (ref 0–4)

## 2020-06-09 PROCEDURE — 74011250636 HC RX REV CODE- 250/636: Performed by: INTERNAL MEDICINE

## 2020-06-09 PROCEDURE — 36415 COLL VENOUS BLD VENIPUNCTURE: CPT

## 2020-06-09 PROCEDURE — 80053 COMPREHEN METABOLIC PANEL: CPT

## 2020-06-09 PROCEDURE — 84100 ASSAY OF PHOSPHORUS: CPT

## 2020-06-09 PROCEDURE — 65660000000 HC RM CCU STEPDOWN

## 2020-06-09 PROCEDURE — 85018 HEMOGLOBIN: CPT

## 2020-06-09 PROCEDURE — 85027 COMPLETE CBC AUTOMATED: CPT

## 2020-06-09 PROCEDURE — 74011250637 HC RX REV CODE- 250/637: Performed by: INTERNAL MEDICINE

## 2020-06-09 PROCEDURE — 83735 ASSAY OF MAGNESIUM: CPT

## 2020-06-09 PROCEDURE — 81001 URINALYSIS AUTO W/SCOPE: CPT

## 2020-06-09 RX ADMIN — DEXTROSE MONOHYDRATE, SODIUM CHLORIDE, AND POTASSIUM CHLORIDE 150 ML/HR: 50; 4.5; 1.49 INJECTION, SOLUTION INTRAVENOUS at 09:11

## 2020-06-09 RX ADMIN — Medication 10 ML: at 22:04

## 2020-06-09 RX ADMIN — Medication 10 ML: at 13:32

## 2020-06-09 RX ADMIN — FINASTERIDE 5 MG: 5 TABLET, FILM COATED ORAL at 01:06

## 2020-06-09 RX ADMIN — DOCUSATE SODIUM 100 MG: 100 CAPSULE, LIQUID FILLED ORAL at 09:11

## 2020-06-09 RX ADMIN — FINASTERIDE 5 MG: 5 TABLET, FILM COATED ORAL at 22:04

## 2020-06-09 RX ADMIN — TAMSULOSIN HYDROCHLORIDE 0.4 MG: 0.4 CAPSULE ORAL at 09:11

## 2020-06-09 RX ADMIN — TAMSULOSIN HYDROCHLORIDE 0.4 MG: 0.4 CAPSULE ORAL at 22:04

## 2020-06-09 RX ADMIN — Medication 10 ML: at 01:08

## 2020-06-09 RX ADMIN — Medication 10 ML: at 05:06

## 2020-06-09 RX ADMIN — TAMSULOSIN HYDROCHLORIDE 0.4 MG: 0.4 CAPSULE ORAL at 01:05

## 2020-06-09 RX ADMIN — DEXTROSE MONOHYDRATE, SODIUM CHLORIDE, AND POTASSIUM CHLORIDE 150 ML/HR: 50; 4.5; 1.49 INJECTION, SOLUTION INTRAVENOUS at 01:03

## 2020-06-09 NOTE — PROGRESS NOTES
[facility]: MED RECONCILIATION    Comments/Recommendations:     Patient able to confirm name, , allergies and preferred pharmacy  Of note - pt claims he takes lisinopril 20 mg in the AM and 40 mg in the PM, I am skeptical this is a legitimate dose for this drug, unfortunately his pharmacy is closed, recommend follow-up with [facility] in AM Ph [phone]        Information obtained from: Patient    Allergies: Patient has no known allergies. Prior to Admission Medications:     Prior to Admission Medications   Prescriptions Last Dose Informant Patient Reported? Taking? GLUCOSAMINE HCL/CHONDR CHING A NA (GLUCOSAMINE-CHONDROITIN) 750-600 mg tab 2020 Self Yes Yes   Sig: Take 1 Tab by mouth daily. acetaminophen (TYLENOL) 325 mg tablet 2020 at Unknown time Self Yes Yes   Sig: Take  by mouth every four (4) hours as needed for Pain. apixaban (ELIQUIS) 5 mg tablet 2020 Self Yes Yes   Sig: Take 5 mg by mouth two (2) times a day. aspirin delayed-release 81 mg tablet 2020 Self Yes Yes   Sig: Take 81 mg by mouth daily. chlorthalidone (HYGROTEN) 25 mg tablet 2020 Self No Yes   Sig: Take 1 Tab by mouth every other day. And as needed   cholecalciferol (VITAMIN D3) 1,000 unit tablet 2020 Self Yes Yes   Sig: Take 2,000 Units by mouth two (2) times a day. docusate sodium (COLACE) 100 mg capsule 2020 Self Yes Yes   Sig: Take 100 mg by mouth two (2) times daily as needed. ferrous sulfate 325 mg (65 mg iron) cpER 2020 Self Yes Yes   Sig: Take 1 Tab by mouth every other day. finasteride (PROSCAR) 5 mg tablet 2020 Self Yes Yes   Sig: Take 5 mg by mouth nightly. ipratropium (ATROVENT HFA) 17 mcg/actuation inhaler 2020 Self Yes Yes   Sig: Take 2 Puffs by inhalation as needed. lisinopril (PRINIVIL, ZESTRIL) 40 mg tablet 2020 Self Yes Yes   Sig: Take  by mouth two (2) times a day.  Patient to use 20 mg in am and 40 mg in pm   multivitamin (ONE A DAY) tablet 2020 Self Yes Yes Sig: Take 1 Tab by mouth daily. omega-3 fatty acids-vitamin e 1,000 mg cap 6/7/2020 Self Yes Yes   Sig: Take 1 Cap by mouth every other day. polyvinyl alcohol (ARTIFICIAL TEARS, POLYVIN ALC,) 1.4 % ophthalmic solution 6/7/2020 Self Yes Yes   Sig: Administer 1 Drop to both eyes as needed. pravastatin (PRAVACHOL) 40 mg tablet 6/7/2020 Self Yes Yes   Sig: Take 40 mg by mouth nightly. tamsulosin (FLOMAX) 0.4 mg capsule 6/7/2020 Self Yes Yes   Sig: Take 0.4 mg by mouth two (2) times a day. Facility-Administered Medications: None           [employer]. HARSHA    Clinical Staff Pharmacist  [address] [place] [address]  [phone]

## 2020-06-09 NOTE — PROGRESS NOTES
Spiritual Care Assessment/Progress Note  [address]      NAME: Hayley Meza      MRN: [id number]  AGE: [de-identified] y.o. SEX: male  Druze Affiliation: Taoist   Language: English     6/9/2020     Total Time (in minutes): 5     Spiritual Assessment begun in [facility] 5M1 MED SURG 1 through conversation with:         [x]Patient        [] Family    [] Friend(s)        Reason for Consult: [facility]     Spiritual beliefs: (Please include comment if needed)     [x] Identifies with a anita tradition:  Taoist       [x] Supported by a anita community:            [] Claims no spiritual orientation:           [] Seeking spiritual identity:                [] Adheres to an individual form of spirituality:           [] Not able to assess:                           Identified resources for coping:      [x] Prayer                               [x] Music                  [] Guided Imagery     [x] Family/friends                 [] Pet visits     [] Devotional reading                         [] Unknown     [] Other:                                              Interventions offered during this visit: (See comments for more details)    Patient Interventions: Affirmation of anita, Communion (Taoist), Initial/Spiritual assessment, patient floor, Prayer (actual), Prayer (assurance of)           Plan of Care:     [x] Support spiritual and/or cultural needs    [] Support AMD and/or advance care planning process      [] Support grieving process   [] Coordinate Rites and/or Rituals    [] Coordination with community clergy   [] No spiritual needs identified at this time   [] Detailed Polan of Care below (See Comments)  [] Make referral to Music Therapy  [] Make referral to Pet Therapy     [] Make referral to Addiction services  [] Make referral to [facility]  [] Make referral to Spiritual Care Partner  [] No future visits requested        [] Follow up visits as needed     Comments: Mr. Saira Garibay declined communion today. He hopes to go home today or tomorrow.   Prayer offered.]    HELEN Haskins, RN, ACSW, LCSW   Page:  [phone]

## 2020-06-09 NOTE — CONSULTS
Gastroenterology Consultation Note      Admit Date: 6/8/2020  Consult Date: 6/8/2020   I greatly appreciate your asking me to see Saul Trivedi, thank you very much for the opportunity to participate in his care. 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 CTA here with bleeding in rectum. Small red blood and clot in diaper that he has been wearing since 1500 without massive ongoing bleeding. Suspect his bleeding from radiation proctitis versus ulcer related to prior thermal therapy. - type and screen, ensure adequate access (PIV x 2)  - trend CBC and transfuse to goal hgb > 7  - would hold antihypertensives and eliquis  - agree with reversal of coagulopathy (getting k centra now)  - will need flex sig for further evaluation and possible therapy. Given his stability at present we will reverse coagulopathy now and follow clinically with plans for procedure tomorrow unless he continues to have significant ongoing bleeding tonight despite reversal of coagulopathy that warrants urgent flex sig tonight    Has has quick reaccumulation of new blood in diaper since initial evaluation and discussed with patient and daughter and will proceed with flex sig tonight. GI will follow. Please call for any questions or concerns or changes in clinical status tonight. Subjective:     Chief Complaint: Hematochezia    History of Present Illness:     [de-identified] YOM with Afib on eliquis and prostate cancer with radiation therapy earlier this year who presents for hematochezia. Has been having hematochezia for 2-3 months and underwent colonoscopy 5/8/20 with Dr. Jairo Gurrola.  His colon was normal save for mild sigmoid diverticulosis with the exception of non-bleeding radiation proctitis that was treated with APC at that time. Was continuing to have some intermittent rectal bleed after the procedure but then had more significant bleeding today. Has had 2-3 BMs with blood and put a diaper on at 1500 and has some small red blood and clot in diaper currently. Denies abdominal pain, nausea, vomiting. He took his eliquis this AM.     PCP:  Yadira Kirk MD    Past Medical History:   Diagnosis Date    Arthritis     Atrial fibrillation [facility])     CAD (coronary artery disease)     Cancer ([facility] [address] 2012    melanoma head    Cancer ([facility] [address]     Prostate CA    Chronic pain     legs/knee    Contact dermatitis     Coughing     Enlarged prostate     GERD (gastroesophageal reflux disease)     Hyperlipidemia     Hypertension     Insomnia     Sleep apnea 03/2018    Unspecified sleep apnea     unable tolerated CPAP machine    Vitamin D deficiency         Past Surgical History:   Procedure Laterality Date    COLONOSCOPY N/A 5/8/2020    COLONOSCOPY performed by Bk Fontaine MD at [facility] HX AORTIC VALVE REPLACEMENT  2015    and mitral valve repair, left atrial cryo maze    HX HEENT      melanoma removed head    HX HERNIA REPAIR  2009    Right    HX HERNIA REPAIR      left inguinal hernia repair    HX HERNIA REPAIR Left 12/01/2016    lap left inguinal hernia repair with mesh    HX KNEE REPLACEMENT      Bilateral    HX ORTHOPAEDIC      BILATERAL KNEE REPLACEMENT    HX ORTHOPAEDIC      CARPEL TUNNEL REPAIR-RIGHT    HX OTHER SURGICAL  2015    closure of patent foramen ovale    HX PROSTATE SURGERY      42 radiation treatments       Social History     Tobacco Use    Smoking status: Never Smoker    Smokeless tobacco: Never Used   Substance Use Topics    Alcohol use:  Yes     Alcohol/week: 3.0 standard drinks     Types: 3 Cans of beer per week        Family History   Problem Relation Age of Onset    Cancer Mother     Cancer Father         prostrate    Cancer Brother         prostrate ca    Anesth Problems Neg Hx         No Known Allergies         Home Medications:  Prior to Admission Medications   Prescriptions Last Dose Informant Patient Reported? Taking? GLUCOSAMINE HCL/CHONDR CHING A NA (GLUCOSAMINE-CHONDROITIN) 750-600 mg tab   Yes No   Sig: Take  by mouth. acetaminophen (TYLENOL) 325 mg tablet   Yes No   Sig: Take  by mouth every four (4) hours as needed for Pain. apixaban (ELIQUIS) 5 mg tablet   Yes No   Sig: Take 5 mg by mouth two (2) times a day. aspirin delayed-release 81 mg tablet   Yes No   Sig: Take  by mouth daily. chlorthalidone (HYGROTEN) 25 mg tablet   No No   Sig: Take 1 Tab by mouth every other day. And as needed   cholecalciferol (VITAMIN D3) 1,000 unit tablet   Yes No   Sig: Take 2,000 Units by mouth two (2) times a day. docusate sodium (COLACE) 100 mg capsule   Yes No   Sig: Take 100 mg by mouth two (2) times daily as needed. ferrous sulfate 325 mg (65 mg iron) cpER   Yes No   Sig: Take  by mouth. finasteride (PROSCAR) 5 mg tablet   Yes No   Sig: Take 5 mg by mouth nightly. ipratropium (ATROVENT HFA) 17 mcg/actuation inhaler   Yes No   Sig: Take 2 Puffs by inhalation as needed. lisinopril (PRINIVIL, ZESTRIL) 40 mg tablet   Yes No   Sig: Take  by mouth two (2) times a day. Patient to use 20 mg in am and 40 mg in pm   multivitamin (ONE A DAY) tablet   Yes No   Sig: Take 1 Tab by mouth daily. omega-3 fatty acids-vitamin e 1,000 mg cap   Yes No   Sig: Take 1 Cap by mouth every other day. polyvinyl alcohol (ARTIFICIAL TEARS, POLYVIN ALC,) 1.4 % ophthalmic solution   Yes No   Sig: Administer 1 Drop to both eyes as needed. pravastatin (PRAVACHOL) 40 mg tablet   Yes No   Sig: Take 40 mg by mouth nightly. senna (SENOKOT) 8.6 mg tablet   Yes No   Sig: Take 1 Tab by mouth daily.    tamsulosin (FLOMAX) 0.4 mg capsule   Yes No   Sig: Take 0.4 mg by mouth two (2) times a day. Facility-Administered Medications: None       Hospital Medications:  No current facility-administered medications for this encounter. Current Outpatient Medications   Medication Sig    apixaban (ELIQUIS) 5 mg tablet Take 5 mg by mouth two (2) times a day.  lisinopril (PRINIVIL, ZESTRIL) 40 mg tablet Take  by mouth two (2) times a day. Patient to use 20 mg in am and 40 mg in pm    chlorthalidone (HYGROTEN) 25 mg tablet Take 1 Tab by mouth every other day. And as needed    aspirin delayed-release 81 mg tablet Take  by mouth daily.  polyvinyl alcohol (ARTIFICIAL TEARS, POLYVIN ALC,) 1.4 % ophthalmic solution Administer 1 Drop to both eyes as needed.  ipratropium (ATROVENT HFA) 17 mcg/actuation inhaler Take 2 Puffs by inhalation as needed.  ferrous sulfate 325 mg (65 mg iron) cpER Take  by mouth.  senna (SENOKOT) 8.6 mg tablet Take 1 Tab by mouth daily.  tamsulosin (FLOMAX) 0.4 mg capsule Take 0.4 mg by mouth two (2) times a day.  cholecalciferol (VITAMIN D3) 1,000 unit tablet Take 2,000 Units by mouth two (2) times a day.  GLUCOSAMINE HCL/CHONDR CHING A NA (GLUCOSAMINE-CHONDROITIN) 750-600 mg tab Take  by mouth.  omega-3 fatty acids-vitamin e 1,000 mg cap Take 1 Cap by mouth every other day.  acetaminophen (TYLENOL) 325 mg tablet Take  by mouth every four (4) hours as needed for Pain.  multivitamin (ONE A DAY) tablet Take 1 Tab by mouth daily.  docusate sodium (COLACE) 100 mg capsule Take 100 mg by mouth two (2) times daily as needed.  finasteride (PROSCAR) 5 mg tablet Take 5 mg by mouth nightly.  pravastatin (PRAVACHOL) 40 mg tablet Take 40 mg by mouth nightly. Review of Systems: Admission ROS by No admitting provider for patient encounter. from 6/8/2020 were reviewed with the patient and changes (other than per HPI) include: Negative except per HPI.     Objective:     Physical Exam:  Visit Vitals  /48 (BP 1 Location: Right arm, BP Patient Position: At rest)   Pulse 79   Temp 98.1 °F (36.7 °C)   Resp 18   Ht 5' 10\" (1.778 m)   Wt 95.3 kg (210 lb)   SpO2 96%   BMI 30.13 kg/m²     SpO2 Readings from Last 6 Encounters:   06/08/20 96%   05/08/20 97%   12/04/19 95%   09/30/19 97%   12/18/18 95%   12/11/18 98%        No intake or output data in the 24 hours ending 06/08/20 2003     General: no distress, comfortable  Skin:  No rash or palpable dermatologic mass lesions  HEENT: Pupils equal, sclera anicteric, oropharynx with no gross lesions  Cardiovascular: No abnormal audible heart sounds, well perfused, no edema  Respiratory:  No abnormal audible breath sounds, normal respiratory effort, no throacic deformity  GI:  Soft, obese, abdomen nondistended, nontender, no mass, no free fluid, no rebound or guarding. Rectal with diaper with small hematochezia and clot  Musculoskeletal:  No skeletal deformity nor acute arthritis noted. Neurological:  Motor and sensory function intact in upper extremeties  Psychiatric:  Normal affect, memory intact, appears to have insight into current illness  Lymphatic:  No cervical, supraclavicular, or periumbilic lymphadenopathy    Laboratory:    Recent Results (from the past 24 hour(s))   SAMPLES BEING HELD    Collection Time: 06/08/20  4:05 PM   Result Value Ref Range    SAMPLES BEING HELD 1RED,1SST     COMMENT        Add-on orders for these samples will be processed based on acceptable specimen integrity and analyte stability, which may vary by analyte.    CBC WITH AUTOMATED DIFF    Collection Time: 06/08/20  4:05 PM   Result Value Ref Range    WBC 3.9 (L) 4.1 - 11.1 K/uL    RBC 3.33 (L) 4.10 - 5.70 M/uL    HGB 10.8 (L) 12.1 - 17.0 g/dL    HCT 32.6 (L) 36.6 - 50.3 %    MCV 97.9 80.0 - 99.0 FL    MCH 32.4 26.0 - 34.0 PG    MCHC 33.1 30.0 - 36.5 g/dL    RDW 13.2 11.5 - 14.5 %    PLATELET 820 890 - 334 K/uL    MPV 8.7 (L) 8.9 - 12.9 FL    NRBC 0.0 0  WBC    ABSOLUTE NRBC 0.00 0.00 - 0.01 K/uL    NEUTROPHILS 57 32 - 75 % LYMPHOCYTES 25 12 - 49 %    MONOCYTES 14 (H) 5 - 13 %    EOSINOPHILS 3 0 - 7 %    BASOPHILS 0 0 - 1 %    MYELOCYTES 1 (H) 0 %    IMMATURE GRANULOCYTES 0 %    ABS. NEUTROPHILS 2.2 1.8 - 8.0 K/UL    ABS. LYMPHOCYTES 1.0 0.8 - 3.5 K/UL    ABS. MONOCYTES 0.5 0.0 - 1.0 K/UL    ABS. EOSINOPHILS 0.1 0.0 - 0.4 K/UL    ABS. BASOPHILS 0.0 0.0 - 0.1 K/UL    ABS. IMM. GRANS. 0.0 K/UL    DF MANUAL      RBC COMMENTS NORMOCYTIC, NORMOCHROMIC     METABOLIC PANEL, COMPREHENSIVE    Collection Time: 06/08/20  4:05 PM   Result Value Ref Range    Sodium 137 136 - 145 mmol/L    Potassium 3.7 3.5 - 5.1 mmol/L    Chloride 104 97 - 108 mmol/L    CO2 27 21 - 32 mmol/L    Anion gap 6 5 - 15 mmol/L    Glucose 103 (H) 65 - 100 mg/dL    BUN 21 (H) 6 - 20 MG/DL    Creatinine 1.01 0.70 - 1.30 MG/DL    BUN/Creatinine ratio 21 (H) 12 - 20      GFR est AA >60 >60 ml/min/1.73m2    GFR est non-AA >60 >60 ml/min/1.73m2    Calcium 9.1 8.5 - 10.1 MG/DL    Bilirubin, total 0.5 0.2 - 1.0 MG/DL    ALT (SGPT) 16 12 - 78 U/L    AST (SGOT) 18 15 - 37 U/L    Alk.  phosphatase 65 45 - 117 U/L    Protein, total 7.8 6.4 - 8.2 g/dL    Albumin 3.9 3.5 - 5.0 g/dL    Globulin 3.9 2.0 - 4.0 g/dL    A-G Ratio 1.0 (L) 1.1 - 2.2     PROTHROMBIN TIME + INR    Collection Time: 06/08/20  4:05 PM   Result Value Ref Range    INR 1.1 0.9 - 1.1      Prothrombin time 11.6 (H) 9.0 - 11.1 sec   TROPONIN I    Collection Time: 06/08/20  4:05 PM   Result Value Ref Range    Troponin-I, Qt. <0.05 <0.05 ng/mL   LIPASE    Collection Time: 06/08/20  4:05 PM   Result Value Ref Range    Lipase 79 73 - 393 U/L   OCCULT BLOOD, STOOL    Collection Time: 06/08/20  5:27 PM   Result Value Ref Range    Occult blood, stool Positive (A) NEG     URINALYSIS W/ RFLX MICROSCOPIC    Collection Time: 06/08/20  6:51 PM   Result Value Ref Range    Color YELLOW/STRAW      Appearance CLEAR CLEAR      Specific gravity 1.020 1.003 - 1.030      pH (UA) 6.5 5.0 - 8.0      Protein Negative NEG mg/dL    Glucose Negative NEG mg/dL    Ketone 15 (A) NEG mg/dL    Bilirubin Negative NEG      Blood Negative NEG      Urobilinogen 0.2 0.2 - 1.0 EU/dL    Nitrites Negative NEG      Leukocyte Esterase Negative NEG     SAMPLES BEING HELD    Collection Time: 06/08/20  6:51 PM   Result Value Ref Range    SAMPLES BEING HELD 1GREY TOP URINE     COMMENT        Add-on orders for these samples will be processed based on acceptable specimen integrity and analyte stability, which may vary by analyte. Assessment/Plan:     1. Hematochezia  2. Radiation proctitis     See above narrative for full detail.

## 2020-06-09 NOTE — PROGRESS NOTES
[address] NP  [phone]           GI PROGRESS NOTE        NAME: Kristine Lawson   :  1940   MRN:  [id number]       Subjective:   Complains of pain when urinating. Reports bowel movement with blood clots, reports amt of blood has decreased. Objective:   NAD      VITALS:   Last 24hrs VS reviewed since prior progress note. Most recent are:  Visit Vitals  /74 (BP 1 Location: Left arm, BP Patient Position: At rest)   Pulse 66   Temp 98 °F (36.7 °C)   Resp 18   Ht 5' 10\" (1.778 m)   Wt 95.3 kg (210 lb)   SpO2 97%   BMI 30.13 kg/m²       Intake/Output Summary (Last 24 hours) at 2020 1527  Last data filed at 2020 1504  Gross per 24 hour   Intake --   Output 1950 ml   Net -1950 ml       PHYSICAL EXAM:  General: Alert, in no acute distress    HEENT: Anicteric sclerae. Lungs:            CTA Bilaterally. Heart:  Regular  rhythm,    Abdomen: Soft, Non distended, Non tender.  (+)Bowel sounds, no HSM  Extremities: No c/c/e  Neurologic:  CN 2-12 gi, Alert and oriented X 3. No acute neurological distress   Psych:   Good insight. Not anxious nor agitated.     Lab Data Reviewed:   Recent Labs     20  1200 20  02220  1605   WBC  --  4.2 3.9*   HGB 9.3* 8.4* 10.8*   HCT 27.8* 25.9* 32.6*   PLT  --  132* 161     Recent Labs     20  0224 20  1605    137   K 3.7 3.7    104   CO2 28 27   BUN 16 21*   CREA 0.86 1.01   * 103*   PHOS 3.7  --    CA 8.2* 9.1     Recent Labs     20  0224 20  1605   AP 54 65   TP 6.4 7.8   ALB 3.2* 3.9   GLOB 3.2 3.9   LPSE  --  79       ________________________________________________________________________  Patient Active Problem List   Diagnosis Code    Arthritis of knee M17.10    A-fib (HCC) I48.91    Aortic insufficiency I35.1    Aortic stenosis I35.0    S/P AVR (aortic valve replacement) Z95.2    S/P MVR (mitral valve repair) Z98.890    S/P Maze operation for atrial fibrillation Z98.890, Z86.79    Anemia due to acute blood loss D62    Hypertension I10    Hyperlipemia E78.5    Radiation proctitis K62.7    DEL (obstructive sleep apnea) G47.33    Insomnia G47.00    Atrial fibrillation (HCC) I48.91    Hyperlipidemia E78.5    Hx of carcinoma in situ of prostate Z86.002    GERD (gastroesophageal reflux disease) K21.9    Chronic pain G89.29    CAD (coronary artery disease) I25.10    Arthritis M19.90    GI bleed K92.2    Ulcer of rectum K62.6         Assessment and Plan:  Rectal Bleeding:  Pt reports bowel movement with clots and less blood than before. His hemoglobin has been stable. - Monitor Hemoglobin  - Ok to resume ASA from GI perspective  - Continue to hold Xarelto  - GI lite diet. Will see again on request.  Please call with questions or concerns.         Signed By: Judith Muller NP     6/9/2020  3:27 PM

## 2020-06-09 NOTE — PROGRESS NOTES
2310  Bedside report given to [facility], pt alert and denies pain, belly rounded (no change, from prior to procedure) VSS.

## 2020-06-09 NOTE — H&P
[facility]    Hospitalist Admission Note      NAME:  Marsha Brunson   :   1940   MRN:  [id number]     PCP:  Casey Tejeda MD     Date/Time of service:  2020 8:15 PM          Subjective:     CHIEF COMPLAINT: GI bleed     HISTORY OF PRESENT ILLNESS:     Mr. Melissa Hernandez is a [de-identified] y.o.  male who presented to the Emergency Department complaining of GI bleed. This is recurrent. He just had endoscopy 1 month ago by Dr Tatum Sloan, with a major finding of radiation proctitis. He is on Eliquis for Afib, but his cardiologist doesn't know about the bleeding. He has mild anemia in ER. ER gave Kcentra. We will admit him for management. Past Medical History:   Diagnosis Date    Arthritis     Atrial fibrillation ([address]     CAD (coronary artery disease)     Chronic pain     legs/knee    GERD (gastroesophageal reflux disease)     Hx of carcinoma in situ of prostate     Hyperlipidemia     Hypertension     Insomnia     DEL (obstructive sleep apnea)     Radiation proctitis     Vitamin D deficiency         Past Surgical History:   Procedure Laterality Date    COLONOSCOPY N/A 2020    COLONOSCOPY performed by Kiya Sahni MD at [facility] ENDOSCOPY    HX AORTIC VALVE REPLACEMENT      and mitral valve repair, left atrial cryo maze    HX HEENT      melanoma removed head    HX HERNIA REPAIR  2009    Right    HX HERNIA REPAIR      left inguinal hernia repair    HX HERNIA REPAIR Left 2016    lap left inguinal hernia repair with mesh    HX KNEE REPLACEMENT      Bilateral    HX ORTHOPAEDIC      BILATERAL KNEE REPLACEMENT    HX ORTHOPAEDIC      CARPEL TUNNEL REPAIR-RIGHT    HX OTHER SURGICAL      closure of patent foramen ovale    HX PROSTATE SURGERY      42 radiation treatments       Social History     Tobacco Use    Smoking status: Never Smoker    Smokeless tobacco: Never Used   Substance Use Topics    Alcohol use:  Yes     Alcohol/week: 3.0 standard drinks     Types: 3 Cans of beer per week        Family History   Problem Relation Age of Onset    Cancer Mother     Cancer Father         prostrate    Cancer Brother         prostrate ca    Anesth Problems Neg Hx       No Known Allergies     Prior to Admission medications    Medication Sig Start Date End Date Taking? Authorizing Provider   apixaban (ELIQUIS) 5 mg tablet Take 5 mg by mouth two (2) times a day. Yes Provider, Historical   lisinopril (PRINIVIL, ZESTRIL) 40 mg tablet Take  by mouth two (2) times a day. Patient to use 20 mg in am and 40 mg in pm   Yes Provider, Historical   chlorthalidone (HYGROTEN) 25 mg tablet Take 1 Tab by mouth every other day. And as needed 12/18/18  Yes Steph Fine MD   aspirin delayed-release 81 mg tablet Take 81 mg by mouth daily. Yes Provider, Historical   polyvinyl alcohol (ARTIFICIAL TEARS, POLYVIN ALC,) 1.4 % ophthalmic solution Administer 1 Drop to both eyes as needed. Yes Provider, Historical   ipratropium (ATROVENT HFA) 17 mcg/actuation inhaler Take 2 Puffs by inhalation as needed. Yes Provider, Historical   ferrous sulfate 325 mg (65 mg iron) cpER Take 1 Tab by mouth every other day. Yes Provider, Historical   tamsulosin (FLOMAX) 0.4 mg capsule Take 0.4 mg by mouth two (2) times a day. Yes Provider, Historical   cholecalciferol (VITAMIN D3) 1,000 unit tablet Take 2,000 Units by mouth two (2) times a day. Yes Provider, Historical   GLUCOSAMINE HCL/CHONDR CHING A NA (GLUCOSAMINE-CHONDROITIN) 750-600 mg tab Take 1 Tab by mouth daily. Yes Provider, Historical   omega-3 fatty acids-vitamin e 1,000 mg cap Take 1 Cap by mouth every other day. Yes Provider, Historical   acetaminophen (TYLENOL) 325 mg tablet Take  by mouth every four (4) hours as needed for Pain. Yes Provider, Historical   multivitamin (ONE A DAY) tablet Take 1 Tab by mouth daily. Yes Provider, Historical   docusate sodium (COLACE) 100 mg capsule Take 100 mg by mouth two (2) times daily as needed.    Yes Provider, Historical   finasteride (PROSCAR) 5 mg tablet Take 5 mg by mouth nightly. Yes Provider, Historical   pravastatin (PRAVACHOL) 40 mg tablet Take 40 mg by mouth nightly.    Yes Provider, Historical       Review of Systems:  (bold if positive, if negative)    Gen:  Eyes:  ENT:  CVS:  Pulm:  GI:  melenaGU:  MS:  Skin:  Psych:  Endo:  Hem:  Renal:  Neuro:        Objective:      VITALS:    Vital signs reviewed; most recent are:    Visit Vitals  /48 (BP 1 Location: Right arm, BP Patient Position: At rest)   Pulse 79   Temp 98.1 °F (36.7 °C)   Resp 18   Ht 5' 10\" (1.778 m)   Wt 95.3 kg (210 lb)   SpO2 96%   BMI 30.13 kg/m²     SpO2 Readings from Last 6 Encounters:   06/08/20 96%   05/08/20 97%   12/04/19 95%   09/30/19 97%   12/18/18 95%   12/11/18 98%        No intake or output data in the 24 hours ending 06/08/20 2017     Exam:     Physical Exam:    Gen:  Obese, in no acute distress  HEENT:  Pink conjunctivae, PERRL, hearing intact to voice, moist mucous membranes  Neck:  Supple, without masses, thyroid non-tender  Resp:  No accessory muscle use, clear breath sounds without wheezes rales or rhonchi  Card:  No murmurs, normal S1, S2 without thrills, bruits or peripheral edema  Abd:  Soft, non-tender, non-distended, normoactive bowel sounds are present, no mass  Lymph:  No cervical or inguinal adenopathy  Musc:  No cyanosis or clubbing  Skin:  No rashes or ulcers, skin turgor is good  Neuro:  Cranial nerves are grossly intact, no focal motor weakness, follows commands   Psych:  Good insight, oriented to person, place and time, alert     Labs:    Recent Labs     06/08/20  1605   WBC 3.9*   HGB 10.8*   HCT 32.6*        Recent Labs     06/08/20  1605      K 3.7      CO2 27   *   BUN 21*   CREA 1.01   CA 9.1   ALB 3.9   TBILI 0.5   ALT 16     Lab Results   Component Value Date/Time    Glucose (POC) 113 (H) 12/27/2014 05:14 PM    Glucose (POC) 119 (H) 12/26/2014 04:41 PM     No results for input(s): PH, PCO2, PO2, HCO3, FIO2 in the last 72 hours. Recent Labs     06/08/20  1605   INR 1.1     All Micro Results     None          I have reviewed previous records       Assessment and Plan:      GI bleed / Radiation proctitis / GERD (gastroesophageal reflux disease) - POA, recurrent. Exacerbated by Eliquis and ASA. Already scoped by Dr Nilsa Piedra recently. NPO. GI consulted already,a dn plan urgent flex sig in ER. Anemia due to acute blood loss - POA and monitor. Usually on Fe. Check all serologies. Transfuse to keep Hb >7    Atrial fibrillation S/P Maze operation for atrial fibrillation - Monitor tele. Consult Cardiology. Hold Eliquis. High risk for continued persistent bleeding. CAD (coronary artery disease) / Hypertension - Appears stable. Consult cardiology. Hold lisinopril and chlorthalidone as he is NPO and mildly dehydrated. Hold ASA due to bleeding. Aortic insufficiency / Aortic stenosis S/P AVR (aortic valve replacement) 23mm Deep Mitroflow Bioprosthesis /S/P MVR (mitral valve repair) Medtronic 25mm Adjustable Ring         DEL (obstructive sleep apnea) - May use home CPAP if available. Hx of carcinoma in situ of prostate - continue flomax and proscar. Outpatient follow up. Arthritis of knee / Chronic pain - Tylenol prn. No NSAIDS    Hyperlipemia - Usually on Pravachol, recent LDL was 32. Not sure statin is useful in overall picture. Insomnia - Not on meds      Telemetry reviewed:   AFIB    Risk of deterioration: high      Total time spent with patient: 79 Minutes I personally reviewed chart, notes, data and current medications in the medical record. I have personally examined and treated the patient at bedside during this period.                  Care Plan discussed with: Patient, Nursing Staff and >50% of time spent in counseling and coordination of care    Discussed:  Care Plan       ___________________________________________________    Attending Physician: Mane Kurtz MD

## 2020-06-09 NOTE — PROGRESS NOTES
[facility] [address]  [address] [facility], [address] [place] [address]  [phone]      Medical Progress Note      NAME:         Michael Li   :        1940  MRM:        [id number]    Date of service: 2020      Subjective: Patient has been seen and examined as a follow up for multiple medical issues. Chart, labs, diagnostics reviewed. He says he feels well. Minimal aching discomfort. Still with some blood in BM. No fever or chills. No nausea or vomiting    Objective:    Vital Signs:    Visit Vitals  /69 (BP 1 Location: Left arm, BP Patient Position: At rest)   Pulse 81   Temp 97.9 °F (36.6 °C)   Resp 18   Ht 5' 10\" (1.778 m)   Wt 95.3 kg (210 lb)   SpO2 95%   BMI 30.13 kg/m²          Intake/Output Summary (Last 24 hours) at 2020 1026  Last data filed at 2020 0900  Gross per 24 hour   Intake --   Output 1200 ml   Net -1200 ml        Physical Examination:    General:   Weak and ill looking but not in any acute distress   Eyes:   pink conjunctivae, PERRLA with no discharge. ENT:   no ottorrhea or rhinorrhea with dry mucous membranes  Neck: no masses, thyroid non-tender and trachea central.  Pulm:  no accessory muscle use, decreased but clear breath sounds without crackles or wheezes  Card:  no JVD or murmurs, has atrial fibrillation, without thrills, bruits or peripheral edema  Abd:  Soft, non-tender, non-distended, normoactive bowel sounds with no palpable organomegaly  Musc:  No cyanosis, clubbing, atrophy or deformities. Skin:  No rashes, bruising or ulcers. Neuro: Awake and alert.  Generally a non focal exam. Follows commands appropriately  Psych:  Has a good insight and is oriented x 3    Current Facility-Administered Medications   Medication Dose Route Frequency    docusate sodium (COLACE) capsule 100 mg  100 mg Oral DAILY    finasteride (PROSCAR) tablet 5 mg  5 mg Oral QHS    tamsulosin (FLOMAX) capsule 0.4 mg  0.4 mg Oral BID    dextrose 5% - 0.45% NaCl with KCl 20 mEq/L infusion  150 mL/hr IntraVENous CONTINUOUS    acetaminophen (TYLENOL) tablet 650 mg  650 mg Oral Q4H PRN    HYDROcodone-acetaminophen (NORCO) 5-325 mg per tablet 1 Tab  1 Tab Oral Q4H PRN    diphenhydrAMINE (BENADRYL) capsule 25 mg  25 mg Oral Q4H PRN    ondansetron (ZOFRAN) injection 4 mg  4 mg IntraVENous Q4H PRN    sodium chloride (NS) flush 5-40 mL  5-40 mL IntraVENous Q8H    sodium chloride (NS) flush 5-40 mL  5-40 mL IntraVENous PRN        Laboratory data and review:    Recent Labs     06/09/20 0224 06/08/20  1605   WBC 4.2 3.9*   HGB 8.4* 10.8*   HCT 25.9* 32.6*   * 161     Recent Labs     06/09/20 0224 06/08/20  1605    137   K 3.7 3.7    104   CO2 28 27   * 103*   BUN 16 21*   CREA 0.86 1.01   CA 8.2* 9.1   MG 1.8  --    PHOS 3.7  --    ALB 3.2* 3.9   ALT 15 16   INR  --  1.1     No components found for: [place]    Diagnostics:    CT abdomen with contrast - reviewed    Assessment and Plan:     Acute GI bleed / Radiation proctitis /  Ulcer of rectum (6/9/2020) / GERD (gastroesophageal reflux disease) POA: stable although still with some bleeding. Exacerbated by Eliquis and ASA. Seen by GI and had a sigmoidoscopy 6/8 that showed bleeding rectal ulceration related to radiation proctitis and prior thermal therapy. Injected with epinephrine and clipped x 2. Continue to closely monitor for bleeding. Gi following. Anemia due to acute blood loss POA: due to Gi bleed above. Hgb stable and not needing transfusion at this time. Monitor Hgb as he is still has some blood loss. Atrial fibrillation S/P Maze operation for atrial fibrillation POA: rate controlled. Will continue hold Eliquis.  Awaiting a Cardiology consult and review.      CAD (coronary artery disease) / Aortic insufficiency / Aortic stenosis S/P AVR (aortic valve replacement) 23mm Deep Mitroflow Bioprosthesis /S/P MVR (mitral valve repair) Medtronic 25mm Adjustable Ring / Hypertension POA: he remains stable. Cardiology to see. Continue to hold home BP medications due to bleeding for now         DEL (obstructive sleep apnea): stable. Hx of carcinoma in situ of prostate POA: Continue Flomax and Proscar. Outpatient follow up. Arthritis of knee / Chronic pain POA: stable. Tylenol prn.   No NSAIDS    Total time spent for the patient's care: [address] discussed with: Patient and Nursing Staff    Discussed:  Care Plan and D/C Planning    Prophylaxis:  SCD's    Anticipated Disposition:  Home w/Family           ___________________________________________________    Attending Physician:   Sangeeta Stallworth MD

## 2020-06-09 NOTE — PROGRESS NOTES
Re: Med Rec (Clarification of Lisinopril dose)    [address]. Patient last filled lisinopril at [facility] in 2015. At that time the dose was 10 mg daily per pharmacist.   S/w patient and he actually gets the lisinopril filled at the [facility]. S/w Nina Vitale (patient's wife) at home who read label confirming that he is to take lisinopril 40 mg once daily  Will update PTA med list    Thank you for the consult,  Deejay Hernandez [facility]

## 2020-06-09 NOTE — PROCEDURES
[address] Clint Carrasco MD  [phone]      2020    Flexible Sigmoidoscopy Procedure Note  Mylene Martinez  :  1940  Mir Medical Record Number: [id number]    Indications:   Hematochezia, h/o radiation proctitis  PCP:  Charanjit Paris MD  Anesthesia/Sedation: Conscious Sedation/Moderate Sedation- 2 mg versed, 25 mcg fentanyl  Endoscopist:  Dr. Doe Mora  Complications:  None  Estimated Blood Loss:  None    Surgical assistant: None  Implants none unless otherwise specified. Permit:  The indications, risks, benefits and alternatives were reviewed with the patient or their decision maker who was provided an opportunity to ask questions and all questions were answered. The specific risks of flexible sigmoidoscopy with conscious sedation were reviewed, including but not limited to anesthetic complication, bleeding, adverse drug reaction, missed lesion, infection, IV site reactions, and intestinal perforation which would lead to the need for surgical repair. Alternatives to flexible sigmoidoscopy including radiographic imaging, observation without testing, or laboratory testing were reviewed including the limitations of those alternatives. After considering the options and having all their questions answered, the patient or their decision maker provided both verbal and written consent to proceed. Procedure in Detail:  After obtaining informed consent, positioning of the patient in the left lateral decubitus position, and conduction of a pre-procedure pause or \"time out\" the endoscope was introduced into the anus and advanced to the sigmoid colon. The quality of the colonic preparation was poor. A careful inspection was made as the colonoscope was withdrawn, findings and interventions are described below.     Findings:   Significant clot in the rectum and distal sigmoid colon. There was active bleeding from distal rectal ulceration that I suspect is related to prior radiation proctitis and thermal therapy. I injected 9 cc of epinephrine to slow active bleeding. I deployed two endoclips to the lesion for hemostasis. Specimens:    None    Complications:   None; patient tolerated the procedure well. Impression:  Bleeding rectal ulceration related to radiation proctitis and prior thermal therapy. Injected with epinephrine and clipped x 2. Recommendations:   No anticoagulants. Follow CBC and transfuse as needed to hgb > 7. Suspect will continue to pass some clot and old blood. Discussed with patient and daughter post-procedure. Thank you for entrusting me with this patient's care. Please do not hesitate to contact me with any questions or if I can be of assistance with any of your other patients' GI needs.     Signed By: Altagracia Romero MD                        June 8, 2020

## 2020-06-09 NOTE — ED NOTES
TRANSFER - OUT REPORT:    Verbal report given to Trinh(name) on Michael Li  being transferred to [facility](unit) for routine progression of care       Report consisted of patients Situation, Background, Assessment and   Recommendations(SBAR). Information from the following report(s) SBAR, ED Summary, Intake/Output, MAR and Recent Results was reviewed with the receiving nurse. Lines:   Peripheral IV 06/08/20 Left Antecubital (Active)   Site Assessment Clean, dry, & intact 6/8/2020  4:07 PM   Phlebitis Assessment 0 6/8/2020  4:07 PM   Infiltration Assessment 0 6/8/2020  4:07 PM   Dressing Status Clean, dry, & intact 6/8/2020  4:07 PM   Dressing Type Transparent;Tape 6/8/2020  4:07 PM   Hub Color/Line Status Pink;Patent; Flushed 6/8/2020  4:07 PM   Action Taken Blood drawn 6/8/2020  4:07 PM        Opportunity for questions and clarification was provided.       Patient transported with:   Monitor  Registered Nurse

## 2020-06-09 NOTE — PROGRESS NOTES
8891- received report    TRANSFER - IN REPORT:    Verbal report received from Sanjay Hurtado RN(name) on Nicolasa Peter  being received from Sanjay Hurtado RN(unit) for routine progression of care      Report consisted of patients Situation, Background, Assessment and   Recommendations(SBAR). Information from the following report(s) SBAR, Kardex, MAR, Recent Results and Cardiac Rhythm NSR was reviewed with the receiving nurse. Opportunity for questions and clarification was provided. Assessment completed upon patients arrival to unit and care assumed. 0256    Pt arrived to unit.

## 2020-06-09 NOTE — PROGRESS NOTES
0800 1 small bloody bowel movement since admission to the floor. Dark red, loose and small. 0900 1 more small bloody BM. 1740 One brown soft, pellet like stool. 3 small bloody BM's today and now 1 normal with no blood. 1900 Bedside shift change report given to Maddie Partida RN (oncoming nurse) by Maddie Partida RN (offgoing nurse). Report included the following information SBAR, Kardex, Procedure Summary, Intake/Output, MAR and Recent Results.

## 2020-06-09 NOTE — CONSULTS
Reason for Consult: GI bleeding, Chronic anticoagulation. HPI: Edward Sommer is a [de-identified] y.o. male with past medical history significant for atrial fibrillation status post maze operation, CAD, hypertension, dyslipidemia who has recent diagnosis of prostate cancer and is undergoing radiation therapy for prostate cancer is admitted with lower GI bleeding. He underwent a colonoscopy on May 8, 2020 by Dr. Teena Andrea and was found to have radiation proctitis and mild sigmoid diverticulosis. He is on chronic anticoagulation on Eliquis for atrial fibrillation. His Eliquis was stopped on admission and he was given Kcentra. He denies any symptoms of chest pain, shortness of breath, lightheadedness, dizziness, fever, chills, cough, abdominal pain, dysuria or diarrhea. Hemoglobin on presentation was 8.4. Platelet count 935,288. Creatinine 0.86. EKG demonstrated atrial fibrillation with nonspecific T wave changes with rate of 75 bpm.    Plan:    1.  GI bleeding: This is due to radiation proctitis and mild sigmoid diverticulosis. He is currently on aspirin and Eliquis. The Eliquis has been held. He was given Kcentra for reversal of Eliquis effect. He underwent colosnoscopy yesterday and was found to have bleeding rectal ulceration. Hold aspirin until cleared by GI. 2.  Atrial fibrillation: He is status post maze operation in the past.  Rate is intrinsically controlled. He is not on any rate control medications. Continue to monitor heart rate. Currently off Eliquis and aspirin. 3.  History of CAD: Holding aspirin due to GI bleeding. 4.  Bioprosthetic aortic valve and status post mitral ring repair: Stable. He has 23 mm Deep Mitroflow bioprosthesis for aortic valve and Medtronic 25 mm adjustable ring for the mitral valve. ATTENTION:   This medical record was transcribed using an electronic medical records/speech recognition system.   Although proofread, it may and can contain electronic, spelling and other errors. Corrections may be executed at a later time. Please feel free to contact us for any clarifications as needed.       Past Medical History:   Diagnosis Date    Arthritis     Atrial fibrillation ([address]     CAD (coronary artery disease)     Chronic pain     legs/knee    GERD (gastroesophageal reflux disease)     Hx of carcinoma in situ of prostate     Hyperlipidemia     Hypertension     Insomnia     DEL (obstructive sleep apnea)     Radiation proctitis     Vitamin D deficiency             Past Surgical History:   Procedure Laterality Date    COLONOSCOPY N/A 5/8/2020    COLONOSCOPY [facility] Valiente [facility]    COLONOSCOPY N/A 6/8/2020    COLONOSCOPY performed by Michael Ma MD at [facility] ENDOSCOPY    HX AORTIC VALVE REPLACEMENT  2015    and mitral valve repair, left atrial cryo maze    HX HEENT      melanoma removed head    HX HERNIA REPAIR  2009    Right    HX HERNIA REPAIR      left inguinal hernia repair    HX HERNIA REPAIR Left 12/01/2016    lap left inguinal hernia repair with mesh    HX KNEE REPLACEMENT      Bilateral    HX ORTHOPAEDIC      BILATERAL KNEE REPLACEMENT    HX ORTHOPAEDIC      CARPEL TUNNEL REPAIR-RIGHT    HX OTHER SURGICAL  2015    closure of patent foramen ovale    HX PROSTATE SURGERY      43 radiation treatments             Family History   Problem Relation Age of Onset    Cancer Mother     Cancer Father         prostrate    Cancer Brother         prostrate ca    Anesth Problems Neg Hx            Social History     Socioeconomic History    Marital status:      Spouse name: Not on file    Number of children: Not on file    Years of education: Not on file    Highest education level: Not on file   Occupational History    Not on file   Social Needs    Financial resource strain: Not on file    Food insecurity     Worry: Not on file     Inability: Not on file    Transportation needs     Medical: Not on file Non-medical: Not on file   Tobacco Use    Smoking status: Never Smoker    Smokeless tobacco: Never Used   Substance and Sexual Activity    Alcohol use:  Yes     Alcohol/week: 3.0 standard drinks     Types: 3 Cans of beer per week    Drug use: No    Sexual activity: Not Currently   Lifestyle    Physical activity     Days per week: Not on file     Minutes per session: Not on file    Stress: Not on file   Relationships    Social connections     Talks on phone: Not on file     Gets together: Not on file     Attends [employer] service: Not on file     Active member of club or organization: Not on file     Attends meetings of clubs or organizations: Not on file     Relationship status: Not on file    Intimate partner violence     Fear of current or ex partner: Not on file     Emotionally abused: Not on file     Physically abused: Not on file     Forced sexual activity: Not on file   Other Topics Concern    Not on file   Social History Narrative    Not on file         No Known Allergies         Current Facility-Administered Medications   Medication Dose Route Frequency Provider Last Rate Last Dose    docusate sodium (COLACE) capsule 100 mg  100 mg Oral DAILY Anita Galeas MD   100 mg at 06/09/20 0911    finasteride (PROSCAR) tablet 5 mg  5 mg Oral QHS Anita Galeas MD   5 mg at 06/09/20 0106    tamsulosin (FLOMAX) capsule 0.4 mg  0.4 mg Oral BID Aniat Galeas MD   0.4 mg at 06/09/20 0911    dextrose 5% - 0.45% NaCl with KCl 20 mEq/L infusion  150 mL/hr IntraVENous CONTINUOUS Anita Galeas  mL/hr at 06/09/20 0911 150 mL/hr at 06/09/20 0911    acetaminophen (TYLENOL) tablet 650 mg  650 mg Oral Q4H PRN Anita Galeas MD        HYDROcodone-acetaminophen [facility]) 5-325 mg per tablet 1 Tab  1 Tab Oral Q4H PRN Anita Galeas MD        diphenhydrAMINE (BENADRYL) capsule 25 mg  25 mg Oral Q4H PRN Anita Galeas MD        ondansetron [facility]) injection 4 mg  4 mg IntraVENous Q4H JIMI Roman MD        sodium chloride (NS) flush 5-40 mL  5-40 mL IntraVENous Q8H Nickie GARCIA MD   10 mL at 06/09/20 0506    sodium chloride (NS) flush 5-40 mL  5-40 mL IntraVENous JIMI Hutchison MD            ROS:  12 point review of systems was performed.  All negative except for HPI     Physical Exam:  Visit Vitals  /69 (BP 1 Location: Left arm, BP Patient Position: At rest)   Pulse 81   Temp 97.9 °F (36.6 °C)   Resp 18   Ht 5' 10\" (1.778 m)   Wt 210 lb (95.3 kg)   SpO2 95%   BMI 30.13 kg/m²       Gen:  Well-developed, well-nourished, in no acute distress  HEENT:  Pink conjunctivae, PERRL, hearing intact to voice, moist mucous membranes  Neck:  Supple, without masses, thyroid non-tender  Resp:  No accessory muscle use, clear breath sounds without wheezes rales or rhonchi  Card:  No murmurs, normal S1, S2 without thrills, bruits or peripheral edema  Abd:  Soft, non-tender, non-distended, normoactive bowel sounds are present, no palpable organomegaly and no detectable hernias  Lymph:  No cervical or inguinal adenopathy  Musc:  No cyanosis or clubbing  Skin:  No rashes or ulcers, skin turgor is good  Neuro:  Cranial nerves are grossly intact, no focal motor weakness, follows commands appropriately  Psych:  Good insight, oriented to person, place and time, alert     Labs:     Lab Results   Component Value Date/Time    WBC 4.2 06/09/2020 02:24 AM    HGB 8.4 (L) 06/09/2020 02:24 AM    HCT 25.9 (L) 06/09/2020 02:24 AM    PLATELET 173 (L) 62/14/8103 02:24 AM    MCV 98.5 06/09/2020 02:24 AM     Lab Results   Component Value Date/Time    Hemoglobin A1c 5.9 12/12/2014 12:00 PM    Glucose 122 (H) 06/09/2020 02:24 AM    Glucose (POC) 113 (H) 12/27/2014 05:14 PM    LDL, calculated 32 12/11/2018 11:03 AM    Creatinine 0.86 06/09/2020 02:24 AM      Lab Results   Component Value Date/Time    Cholesterol, total 105 12/11/2018 11:03 AM    HDL Cholesterol 61 12/11/2018 11:03 AM    LDL, calculated 32 12/11/2018 11:03 AM    Triglyceride 60 12/11/2018 11:03 AM     Lab Results   Component Value Date/Time    ALT (SGPT) 15 06/09/2020 02:24 AM    Alk.  phosphatase 54 06/09/2020 02:24 AM    Bilirubin, direct 0.18 10/13/2015 08:07 AM    Bilirubin, total 0.5 06/09/2020 02:24 AM    Albumin 3.2 (L) 06/09/2020 02:24 AM    Protein, total 6.4 06/09/2020 02:24 AM    INR 1.1 06/08/2020 04:05 PM    Prothrombin time 11.6 (H) 06/08/2020 04:05 PM    PLATELET 044 (L) 45/26/2388 02:24 AM     Lab Results   Component Value Date/Time    INR 1.1 06/08/2020 04:05 PM    INR 1.1 11/30/2016 04:13 PM    INR 2.0 (H) 01/20/2015 02:30 PM    Prothrombin time 11.6 (H) 06/08/2020 04:05 PM    Prothrombin time 11.5 (H) 11/30/2016 04:13 PM    Prothrombin time 20.7 (H) 01/20/2015 02:30 PM      Lab Results   Component Value Date/Time    GFR est non-AA >60 06/09/2020 02:24 AM    GFR est AA >60 06/09/2020 02:24 AM    Creatinine 0.86 06/09/2020 02:24 AM    BUN 16 06/09/2020 02:24 AM    Sodium 140 06/09/2020 02:24 AM    Potassium 3.7 06/09/2020 02:24 AM    Chloride 108 06/09/2020 02:24 AM    CO2 28 06/09/2020 02:24 AM    Magnesium 1.8 06/09/2020 02:24 AM    Phosphorus 3.7 06/09/2020 02:24 AM     No results found for: PSA, Naveed Dines, [id number], [id number], PSALT  Lab Results   Component Value Date/Time    TSH 1.56 12/12/2014 12:00 PM      Lab Results   Component Value Date/Time    Glucose 122 (H) 06/09/2020 02:24 AM    Glucose (POC) 113 (H) 12/27/2014 05:14 PM      Lab Results   Component Value Date/Time    Troponin-I, Qt. <0.05 06/08/2020 04:05 PM      Lab Results   Component Value Date/Time    PROBNP 1,494 (H) 12/11/2018 11:03 AM      Lab Results   Component Value Date/Time    Sodium 140 06/09/2020 02:24 AM    Potassium 3.7 06/09/2020 02:24 AM    Chloride 108 06/09/2020 02:24 AM    CO2 28 06/09/2020 02:24 AM    Anion gap 4 (L) 06/09/2020 02:24 AM    Glucose 122 (H) 06/09/2020 02:24 AM    BUN 16 06/09/2020 02:24 AM    Creatinine 0.86 06/09/2020 02:24 AM    BUN/Creatinine ratio 19 06/09/2020 02:24 AM    GFR est AA >60 06/09/2020 02:24 AM    GFR est non-AA >60 06/09/2020 02:24 AM    Calcium 8.2 (L) 06/09/2020 02:24 AM      Lab Results   Component Value Date/Time    Sodium 140 06/09/2020 02:24 AM    Potassium 3.7 06/09/2020 02:24 AM    Chloride 108 06/09/2020 02:24 AM    CO2 28 06/09/2020 02:24 AM    Anion gap 4 (L) 06/09/2020 02:24 AM    Glucose 122 (H) 06/09/2020 02:24 AM    BUN 16 06/09/2020 02:24 AM    Creatinine 0.86 06/09/2020 02:24 AM    BUN/Creatinine ratio 19 06/09/2020 02:24 AM    GFR est AA >60 06/09/2020 02:24 AM    GFR est non-AA >60 06/09/2020 02:24 AM    Calcium 8.2 (L) 06/09/2020 02:24 AM    Bilirubin, total 0.5 06/09/2020 02:24 AM    ALT (SGPT) 15 06/09/2020 02:24 AM    Alk.  phosphatase 54 06/09/2020 02:24 AM    Protein, total 6.4 06/09/2020 02:24 AM    Albumin 3.2 (L) 06/09/2020 02:24 AM    Globulin 3.2 06/09/2020 02:24 AM    A-G Ratio 1.0 (L) 06/09/2020 02:24 AM      Lab Results   Component Value Date/Time    Hemoglobin A1c 5.9 12/12/2014 12:00 PM         Recent Labs     06/08/20  1605   TROIQ <0.05           Problem List:     Problem List  Date Reviewed: 6/8/2020          Codes Class Noted    Radiation proctitis ICD-10-CM: K62.7  ICD-9-CM: 569.49  Unknown        DEL (obstructive sleep apnea) ICD-10-CM: G47.33  ICD-9-CM: 327.23  Unknown        Insomnia ICD-10-CM: G47.00  ICD-9-CM: 780.52  Unknown        Atrial fibrillation ([facility] [address] ICD-10-CM: I48.91  ICD-9-CM: 427.31  Unknown        Hyperlipidemia ICD-10-CM: E78.5  ICD-9-CM: 272.4  Unknown        Hx of carcinoma in situ of prostate ICD-10-CM: Z86.002  ICD-9-CM: V13.89  Unknown        GERD (gastroesophageal reflux disease) ICD-10-CM: K21.9  ICD-9-CM: 530.81  Unknown        Chronic pain ICD-10-CM: G89.29  ICD-9-CM: 338.29  Unknown    Overview Signed 6/8/2020  8:12 PM by Ivonne Ma MD     legs/knee             CAD (coronary artery disease) ICD-10-CM: I25.10  ICD-9-CM: 414.00  Unknown        Arthritis ICD-10-CM: M19.90  ICD-9-CM: 716.90  Unknown        * (Principal) GI bleed ICD-10-CM: K92.2  ICD-9-CM: 578.9  6/8/2020        Hypertension ICD-10-CM: I10  ICD-9-CM: 401.9  10/26/2015        Hyperlipemia ICD-10-CM: E78.5  ICD-9-CM: 272.4  10/26/2015        Anemia due to acute blood loss ICD-10-CM: D62  ICD-9-CM: 285.1  12/26/2014        Aortic stenosis ICD-10-CM: I35.0  ICD-9-CM: 424.1  12/23/2014        S/P AVR (aortic valve replacement) ICD-10-CM: Z95.2  ICD-9-CM: V43.3  12/23/2014    Overview Signed 12/23/2014 12:45 PM by Zahraa Metz PA-C     AORTIC VALVE REPLACEMENT 23mm Deep Mitroflow Bioprosthesis                          S/P MVR (mitral valve repair) ICD-10-CM: N29.366  ICD-9-CM: V45.89  12/23/2014    Overview Signed 12/23/2014 12:45 PM by Zahraa Metz PA-C     MITRAL VALVE REPAIR Medtronic 25mm Adjustable Ring               S/P Maze operation for atrial fibrillation ICD-10-CM: Z98.890, Z86.79  ICD-9-CM: V45.89  12/23/2014    Overview Signed 12/23/2014 12:46 PM by Zahraa Metz PA-C     Cryo Maze Left Atrial             Aortic insufficiency ICD-10-CM: I35.1  ICD-9-CM: 424.1  12/8/2014        A-fib ([address] ICD-10-CM: I48.91  ICD-9-CM: 427.31  6/17/2013        Arthritis of knee ICD-10-CM: M17.10  ICD-9-CM: 716.96  7/9/2012                Fernando Dent MD, [facility] [place]

## 2020-06-09 NOTE — PROGRESS NOTES
0745    Bedside and Verbal shift change report given to Suyapa Arreola RN (oncoming nurse) by Dorina Marley (offgoing nurse). Report included the following information SBAR, Kardex, MAR, Recent Results and Cardiac Rhythm Afib.

## 2020-06-10 ENCOUNTER — TELEPHONE (OUTPATIENT)
Dept: CARDIOLOGY CLINIC | Age: 80
End: 2020-06-10

## 2020-06-10 VITALS
WEIGHT: 210 LBS | RESPIRATION RATE: 18 BRPM | OXYGEN SATURATION: 94 % | SYSTOLIC BLOOD PRESSURE: 116 MMHG | HEART RATE: 63 BPM | DIASTOLIC BLOOD PRESSURE: 78 MMHG | BODY MASS INDEX: 30.06 KG/M2 | HEIGHT: 70 IN | TEMPERATURE: 98.7 F

## 2020-06-10 LAB
BASOPHILS # BLD: 0 K/UL (ref 0–0.1)
BASOPHILS NFR BLD: 1 % (ref 0–1)
DIFFERENTIAL METHOD BLD: ABNORMAL
EOSINOPHIL # BLD: 0.2 K/UL (ref 0–0.4)
EOSINOPHIL NFR BLD: 5 % (ref 0–7)
ERYTHROCYTE [DISTWIDTH] IN BLOOD BY AUTOMATED COUNT: 13 % (ref 11.5–14.5)
HCT VFR BLD AUTO: 27.3 % (ref 36.6–50.3)
HGB BLD-MCNC: 9 G/DL (ref 12.1–17)
IMM GRANULOCYTES # BLD AUTO: 0 K/UL (ref 0–0.04)
IMM GRANULOCYTES NFR BLD AUTO: 1 % (ref 0–0.5)
LYMPHOCYTES # BLD: 0.8 K/UL (ref 0.8–3.5)
LYMPHOCYTES NFR BLD: 19 % (ref 12–49)
MCH RBC QN AUTO: 32.3 PG (ref 26–34)
MCHC RBC AUTO-ENTMCNC: 33 G/DL (ref 30–36.5)
MCV RBC AUTO: 97.8 FL (ref 80–99)
MONOCYTES # BLD: 0.5 K/UL (ref 0–1)
MONOCYTES NFR BLD: 12 % (ref 5–13)
NEUTS SEG # BLD: 2.5 K/UL (ref 1.8–8)
NEUTS SEG NFR BLD: 62 % (ref 32–75)
NRBC # BLD: 0 K/UL (ref 0–0.01)
NRBC BLD-RTO: 0 PER 100 WBC
PLATELET # BLD AUTO: 143 K/UL (ref 150–400)
PMV BLD AUTO: 9 FL (ref 8.9–12.9)
RBC # BLD AUTO: 2.79 M/UL (ref 4.1–5.7)
RBC MORPH BLD: ABNORMAL
WBC # BLD AUTO: 4 K/UL (ref 4.1–11.1)
WBC MORPH BLD: ABNORMAL

## 2020-06-10 PROCEDURE — 36415 COLL VENOUS BLD VENIPUNCTURE: CPT

## 2020-06-10 PROCEDURE — 85025 COMPLETE CBC W/AUTO DIFF WBC: CPT

## 2020-06-10 PROCEDURE — 74011250637 HC RX REV CODE- 250/637: Performed by: INTERNAL MEDICINE

## 2020-06-10 PROCEDURE — 94760 N-INVAS EAR/PLS OXIMETRY 1: CPT

## 2020-06-10 RX ADMIN — DOCUSATE SODIUM 100 MG: 100 CAPSULE, LIQUID FILLED ORAL at 08:40

## 2020-06-10 RX ADMIN — TAMSULOSIN HYDROCHLORIDE 0.4 MG: 0.4 CAPSULE ORAL at 08:40

## 2020-06-10 NOTE — PROGRESS NOTES
Problem: Falls - Risk of  Goal: *Absence of Falls  Description: Document Saji Arango Fall Risk and appropriate interventions in the flowsheet. Outcome: Progressing Towards Goal  Note: Fall Risk Interventions:  Mobility Interventions: Bed/chair exit alarm, Patient to call before getting OOB         Medication Interventions: Bed/chair exit alarm, Patient to call before getting OOB, Teach patient to arise slowly         0719    Bedside and Verbal shift change report given to Hunter (oncoming nurse) by Cresencio Howe RN (offgoing nurse). Report included the following information SBAR, Kardex, MAR, Recent Results and Cardiac Rhythm Afib.

## 2020-06-10 NOTE — PROGRESS NOTES
Pharmacist Discharge Medication Reconciliation    Discharge Provider:  Dr. Aleksander Blackwell       Discharge Medications:      My Medications        CONTINUE taking these medications        Instructions Each Dose to Equal Morning Noon Evening Bedtime   Artificial Tears (polyvin alc) 1.4 % ophthalmic solution  Generic drug:  polyvinyl alcohol    Your last dose was: Your next dose is:          Administer 1 Drop to both eyes as needed. 1 Drop                 aspirin delayed-release 81 mg tablet    Your last dose was: Your next dose is: Take 81 mg by mouth daily. 81 mg                 chlorthalidone 25 mg tablet  Commonly known as:  HYGROTEN    Your last dose was: Your next dose is: Take 1 Tab by mouth every other day. And as needed   25 mg                 cholecalciferol (1000 Units /25 mcg) tablet  Commonly known as:  VITAMIN D3    Your last dose was: Your next dose is: Take 2,000 Units by mouth two (2) times a day. 2,000 Units                 docusate sodium 100 mg capsule  Commonly known as:  COLACE    Your last dose was: Your next dose is: Take 100 mg by mouth two (2) times daily as needed. 100 mg                 ferrous sulfate 325 mg (65 mg iron) Cper    Your last dose was: Your next dose is: Take 1 Tab by mouth every other day. 1 Tab                 finasteride 5 mg tablet  Commonly known as:  PROSCAR    Your last dose was: Your next dose is: Take 5 mg by mouth nightly. 5 mg                 glucosamine-chondroitin 750-600 mg Tab    Your last dose was: Your next dose is: Take 1 Tab by mouth daily. 1 Tab                 ipratropium 17 mcg/actuation inhaler  Commonly known as:  ATROVENT HFA    Your last dose was: Your next dose is: Take 2 Puffs by inhalation as needed. 2 Puff                 lisinopriL 40 mg tablet  Commonly known as:  Nadya Black    Your last dose was:       Your next dose is:          Take 40 mg by mouth daily. 40 mg                 multivitamin tablet  Commonly known as:  ONE A DAY    Your last dose was: Your next dose is: Take 1 Tab by mouth daily. 1 Tab                 omega-3 fatty acids-vitamin e 1,000 mg Cap    Your last dose was: Your next dose is: Take 1 Cap by mouth every other day. 1 Cap                 pravastatin 40 mg tablet  Commonly known as:  PRAVACHOL    Your last dose was: Your next dose is: Take 40 mg by mouth nightly. 40 mg                 tamsulosin 0.4 mg capsule  Commonly known as:  FLOMAX    Your last dose was: Your next dose is: Take 0.4 mg by mouth two (2) times a day. 0.4 mg                 TylenoL 325 mg tablet  Generic drug:  acetaminophen    Your last dose was: Your next dose is: Take  by mouth every four (4) hours as needed for Pain.                          STOP taking these medications      Eliquis 5 mg tablet  Generic drug:  apixaban               Continue to hold apixaban and resume ASA per cardiology  Plan is to follow up with cardiology in 2 weeks     The patient's chart, MAR, and AVS were reviewed by   EDWARDO Rodriguez,   Contact: [phone]

## 2020-06-10 NOTE — DISCHARGE INSTRUCTIONS
HOSPITALIST DISCHARGE INSTRUCTIONS    NAME: Hayley Meza   :  1940   MRN:  [id number]     Date:     6/10/2020    ADMIT DATE: 2020     DISCHARGE DATE: 6/10/2020     PRINCIPAL ADMITTING DIAGNOSIS:  GI bleed [K92.2]    DISCHARGE DIAGNOSES:  Principal Problem:    GI bleed (2020)    Radiation proctitis ()    Arthritis of knee (2012)    Aortic insufficiency (2014)    Aortic stenosis (2014)    Anemia due to acute blood loss (2014)    Hypertension (10/26/2015)    Hyperlipemia (10/26/2015)    DEL (obstructive sleep apnea) ()    Insomnia ()    Atrial fibrillation (HCC) ()    Hyperlipidemia ()    Hx of carcinoma in situ of prostate     GERD (gastroesophageal reflux disease)     Chronic pain: Overview: legs/knee    CAD (coronary artery disease)     Arthritis      MEDICATIONS:    · It is important that medications are taken exactly as they are prescribed on the discharge medication instructions and keep them your  in the bottles provided by the pharmacist.   · Keep a list of the medication names, dosages, and times to be taken at all times. · Do not take other medications without consulting your doctor.      Recommended diet:  Cardiac Diet    Recommended activity: Activity as tolerated    Post discharge care:    Notify follow up health care provider or return to the emergency department if you cannot get hold of your doctor if you feel worse or experience symptoms similar to those that brought you to hospital    Follow-up Information     Follow up With Specialties Details Why Contact Info    Sandra Parks NP Nurse Practitioner On 2020 11 am  [address] [place] [address]  Cyn Sanchez MD Internal Medicine, Hospitalist Call for the regular follow up Leah Jauregui [phone]  [phone]            Information obtained by :  I understand that if any problems occur once I am at home I am to contact my physician and I understand and acknowledge receipt of the instructions indicated above.                                                                                                                                            Physician's or R.N.'s Signature                                                                  Date/Time                                                                                                                                              Patient or Representative Signature                                                          Date/Time

## 2020-06-10 NOTE — DISCHARGE SUMMARY
[facility] [address]  [address]  Tel: [phone]    Physician Discharge Summary    Patient ID:    Zac Corona  Age:              [de-identified] y.o.    : 1940  MRN:             [id number]     PCP: Ana Lilia Aburto MD     Date of Admission: 2020    Date of Discharge:  6/10/2020    Principal admission Diagnosis:   GI bleed [K92.2]    Discharge Diagnoses:  Principal Problem:    Radiation proctitis     GI bleed (2020)    Atrial fibrillation ([facility] [address]     Arthritis of knee (2012)    Aortic insufficiency (2014)    Aortic stenosis (2014)    S/P AVR (aortic valve replacement) (2014)      Overview: AORTIC VALVE REPLACEMENT 23mm Deep Mitroflow Bioprosthesis                S/P MVR (mitral valve repair) (2014)      Overview: MITRAL VALVE REPAIR Medtronic 25mm Adjustable Ring          S/P Maze operation for atrial fibrillation (2014)      Overview: Cryo Maze Left Atrial      Anemia due to acute blood loss (2014)    Hypertension (10/26/2015)    Hyperlipemia (10/26/2015)    DEL (obstructive sleep apnea)     Insomnia     Hyperlipidemia     Hx of carcinoma in situ of prostate     GERD (gastroesophageal reflux disease)     Chronic pain: Overview: legs/knee    CAD (coronary artery disease)     Arthritis ()    Consults: Cardiology and GI    Hospital Course:     Mr. Maryuir Perez is a [de-identified] y.o. admitted to [facility] and treated for the following:    Acute GI bleed / Radiation proctitis / GERD (gastroesophageal reflux disease) POA: stable although still with some bleeding. Exacerbated by Eliquis and ASA. Seen by GI and had a sigmoidoscopy  that showed bleeding rectal ulceration related to radiation proctitis and prior thermal therapy. Injected with epinephrine and clipped x 2. Now his bleeding has stopped. Eliquis will be held.  Being discharged in stable condition       Anemia due to acute blood loss POA: due to Gi bleed above. Hgb stable and not needing transfusion at this time. Hgb at discharge was 9.       Atrial fibrillation S/P Maze operation for atrial fibrillation POA: rate controlled. Will continue hold Eliquis. Resume Asprin. Follow up with cardiology      CAD (coronary artery disease) / Aortic insufficiency / Aortic stenosis S/P AVR (aortic valve replacement) 23mm Deep Mitroflow Bioprosthesis /S/P MVR (mitral valve repair) Medtronic 25mm Adjustable Ring / Hypertension POA: he remains stable. Cardiology to follow up as an out patient          DEL (obstructive sleep apnea): stable.  Resume home CPAP     Hx of carcinoma in situ of prostate POA: Continue Flomax and Proscar.  Outpatient follow up.     Arthritis of knee / Chronic pain POA: stable. Tylenol prn.  No NSAIDS    Discharge Exam:    Visit Vitals  /78 (BP 1 Location: Left arm, BP Patient Position: At rest)   Pulse 63   Temp 98.7 °F (37.1 °C)   Resp 18   Ht 5' 10\" (1.778 m)   Wt 95.3 kg (210 lb)   SpO2 94%   BMI 30.13 kg/m²        Patient has been seen and examined. General: well looking and stable patient in no acute distress  Pulm: clear breath sounds without wheezes  Card: no murmurs, normal S1, S2 without thrills, bruits   Abd:    soft, non-tender, normoactive bowel sounds  Skin: no rashes or ulcers, skin turgor is good  Neuro: awake, alert and has a non focal     Activity: Activity as tolerated    Diet: Cardiac Diet    Current Discharge Medication List      CONTINUE these medications which have NOT CHANGED    Details   lisinopril (PRINIVIL, ZESTRIL) 40 mg tablet Take 40 mg by mouth daily. chlorthalidone (HYGROTEN) 25 mg tablet Take 1 Tab by mouth every other day. And as needed  Qty: 90 Tab, Refills: 1      aspirin delayed-release 81 mg tablet Take 81 mg by mouth daily. polyvinyl alcohol (ARTIFICIAL TEARS, POLYVIN ALC,) 1.4 % ophthalmic solution Administer 1 Drop to both eyes as needed.       ipratropium (ATROVENT HFA) 17 mcg/actuation inhaler Take 2 Puffs by inhalation as needed. ferrous sulfate 325 mg (65 mg iron) cpER Take 1 Tab by mouth every other day. tamsulosin (FLOMAX) 0.4 mg capsule Take 0.4 mg by mouth two (2) times a day. cholecalciferol (VITAMIN D3) 1,000 unit tablet Take 2,000 Units by mouth two (2) times a day. GLUCOSAMINE HCL/CHONDR CHING A NA (GLUCOSAMINE-CHONDROITIN) 750-600 mg tab Take 1 Tab by mouth daily. omega-3 fatty acids-vitamin e 1,000 mg cap Take 1 Cap by mouth every other day. acetaminophen (TYLENOL) 325 mg tablet Take  by mouth every four (4) hours as needed for Pain.      multivitamin (ONE A DAY) tablet Take 1 Tab by mouth daily. docusate sodium (COLACE) 100 mg capsule Take 100 mg by mouth two (2) times daily as needed. finasteride (PROSCAR) 5 mg tablet Take 5 mg by mouth nightly. pravastatin (PRAVACHOL) 40 mg tablet Take 40 mg by mouth nightly. STOP taking these medications       apixaban (ELIQUIS) 5 mg tablet Comments:   Reason for Stopping: Follow-up Information     Follow up With Specialties Details Why Contact Info    Lamar Gonzalez NP Nurse Practitioner On 6/25/2020 11 am  [address] [place] [address]  Humberto Plata MD Internal Medicine, Hospitalist Call for the regular follow up Leah Jauregui [phone]            Follow-up tests or labs: None    Discharge Condition: Stable    Disposition: home    Time taken to arrange discharge:  35 minutes.     Signed:  Faina Rivera MD     [facility]  6/10/2020   10:01 AM

## 2020-06-10 NOTE — PROGRESS NOTES
Discharge information provided to patient. IV removed. Patient dressed and ready to transport home. Cardiology informed to stop eliquis. Family will transport home. Time for questions and concerns allowed. RN will continue to monitor until discharge.

## 2020-06-11 ENCOUNTER — PATIENT OUTREACH (OUTPATIENT)
Dept: FAMILY MEDICINE CLINIC | Age: 80
End: 2020-06-11

## 2020-06-11 NOTE — PROGRESS NOTES
Patient was admitted to [facility] on  and discharged on 6/10 for GI bleed. Patient was contacted within 1 business days of discharge. Top Discharge Challenges to be reviewed by the provider   Additional needs identified to be addressed with provider yes  labs-sees pcp at [facility]. Has called and LM with Dr.Matthew Veda Harrison that he needs DEANNA. Waiting for call back to schedule appt. Discussed COVID-19 related testing which was not done at this time. Test results were not done. Patient informed of results, if available? Not done   Method of communication with provider : chart routing       Advance Care Planning:   Does patient have an Advance Directive:  not on file; education provided     Was this a readmission? no  Patient stated reason for the admission: rectal bleeding  Patients top risk factors for readmission: medical condition- h/o radiation proctitis/Eliquis for Afib, caused GIB. Interventions to address risk factors: f/u with pcp and specialists as recommended- f/u labs to monitor Hgb. Care Transition Nurse (CTN) contacted the patient by telephone to perform post hospital discharge assessment. Verified name and  with patient as identifiers. Provided introduction to self, and explanation of the CTN role. States he is a lot better. No blood in stools. Lives with wife and daughter-excellent support system. CTN reviewed discharge instructions, medical action plan and red flags with patient who verbalized understanding. Patient given an opportunity to ask questions and does not have any further questions or concerns at this time. The patient agrees to contact the PCP office for questions related to their healthcare. Medication reconciliation was performed with patient, who verbalizes understanding of administration of home medications. Advised obtaining a 90-day supply of all daily and as-needed medications.    Referral to Pharm D needed: no     Home Health/Outpatient orders at discharge: [address]: na  Date of initial visit: na    Durable Medical Equipment ordered at discharge: none  Efrain Foy received: na    Covid Risk Education    Patient has following risk factors of: immunocompromised. Education provided regarding infection prevention, and signs and symptoms of COVID-19 and when to seek medical attention with patient who verbalized understanding. Discussed exposure protocols and quarantine From CDC: Are you at higher risk for severe illness?  and given an opportunity for questions and concerns. The patient agrees to contact the COVID-19 hotline [phone] or PCP office for questions related to COVID-19. For more information on steps you can take to protect yourself, see CDC's How to Protect Yourself     Patient/family/caregiver given information for [employer] and agrees to enroll no  Patient's preferred e-mail: declines  Patient's preferred phone number: declines    Discussed follow-up appointments. If no appointment was previously scheduled, appointment scheduling offered: yes but declined. [facility] follow up appointment(s):   Future Appointments   Date Time Provider Vinay Reina   6/25/2020 11:00 AM SULEIMAN Cesar [address]   9/21/2020 10:20 AM Maddie Fine MD [address]     Non-[facility] follow up appointment(s): pcp at [facility]- has called and LM that he needs hospital f/u. Plan for follow-up call in 7-10 days based on severity of symptoms and risk factors. CTN provided contact information for future needs. Goals Addressed                 This Visit's Progress     Prevent complications post hospitalization. 6/11/2020 [facility] 6/8-6/10 GIB  · Called and reviewed discharge instructions with patient  · Called and reviewed meds- no new meds, reminded to stop the Eliquis.   · Reviewed red flags: blood in stool or urine, fever,nausea,vomiting,diarrhea,weakness,sob,chest pain. · Reminded to call Angelica Chakraborty at [facility], again to schedule DEANNA  · 6/25- cardio appt at 11am.   · Offered info on Deejay Sarah but he declined. · Given CTN contact number if any questions/concerns.   · Advised will f/u with him in about a week, sooner prn.bib

## 2020-06-17 ENCOUNTER — HOSPITAL ENCOUNTER (OUTPATIENT)
Dept: RADIATION THERAPY | Age: 80
Discharge: HOME | End: 2020-06-17

## 2020-06-17 VITALS — HEIGHT: 70 IN | BODY MASS INDEX: 29.06 KG/M2 | WEIGHT: 203 LBS

## 2020-06-25 ENCOUNTER — HOSPITAL ENCOUNTER (OUTPATIENT)
Dept: LAB | Age: 80
Discharge: HOME | End: 2020-06-25

## 2020-06-25 ENCOUNTER — OFFICE VISIT (OUTPATIENT)
Dept: CARDIOLOGY CLINIC | Age: 80
End: 2020-06-25

## 2020-06-25 VITALS
SYSTOLIC BLOOD PRESSURE: 124 MMHG | HEART RATE: 72 BPM | DIASTOLIC BLOOD PRESSURE: 62 MMHG | WEIGHT: 210 LBS | OXYGEN SATURATION: 96 % | HEIGHT: 70 IN | BODY MASS INDEX: 30.06 KG/M2 | RESPIRATION RATE: 16 BRPM

## 2020-06-25 DIAGNOSIS — Z98.890 S/P MVR (MITRAL VALVE REPAIR): ICD-10-CM

## 2020-06-25 DIAGNOSIS — I10 ESSENTIAL HYPERTENSION: ICD-10-CM

## 2020-06-25 DIAGNOSIS — R60.0 LOWER EXTREMITY EDEMA: ICD-10-CM

## 2020-06-25 DIAGNOSIS — Z95.2 S/P AVR (AORTIC VALVE REPLACEMENT): ICD-10-CM

## 2020-06-25 DIAGNOSIS — R42 DIZZINESS: ICD-10-CM

## 2020-06-25 DIAGNOSIS — Z87.19 HISTORY OF GI BLEED: ICD-10-CM

## 2020-06-25 DIAGNOSIS — I48.21 PERMANENT ATRIAL FIBRILLATION (HCC): Primary | ICD-10-CM

## 2020-06-25 LAB
ANION GAP SERPL CALC-SCNC: 7 MMOL/L (ref 5–15)
BUN SERPL-MCNC: 24 MG/DL (ref 6–20)
BUN/CREAT SERPL: 21 (ref 12–20)
CALCIUM SERPL-MCNC: 8.9 MG/DL (ref 8.5–10.1)
CHLORIDE SERPL-SCNC: 103 MMOL/L (ref 97–108)
CO2 SERPL-SCNC: 28 MMOL/L (ref 21–32)
CREAT SERPL-MCNC: 1.15 MG/DL (ref 0.7–1.3)
ERYTHROCYTE [DISTWIDTH] IN BLOOD BY AUTOMATED COUNT: 13.7 % (ref 11.5–14.5)
GLUCOSE SERPL-MCNC: 111 MG/DL (ref 65–100)
HCT VFR BLD AUTO: 29.5 % (ref 36.6–50.3)
HGB BLD-MCNC: 9.4 G/DL (ref 12.1–17)
MCH RBC QN AUTO: 32.1 PG (ref 26–34)
MCHC RBC AUTO-ENTMCNC: 31.9 G/DL (ref 30–36.5)
MCV RBC AUTO: 100.7 FL (ref 80–99)
NRBC # BLD: 0 K/UL (ref 0–0.01)
NRBC BLD-RTO: 0 PER 100 WBC
PLATELET # BLD AUTO: 189 K/UL (ref 150–400)
PMV BLD AUTO: 9.3 FL (ref 8.9–12.9)
POTASSIUM SERPL-SCNC: 4.3 MMOL/L (ref 3.5–5.1)
RBC # BLD AUTO: 2.93 M/UL (ref 4.1–5.7)
SODIUM SERPL-SCNC: 138 MMOL/L (ref 136–145)
WBC # BLD AUTO: 5.1 K/UL (ref 4.1–11.1)

## 2020-06-25 RX ORDER — CHLORTHALIDONE 25 MG/1
12.5 TABLET ORAL EVERY OTHER DAY
Qty: 45 TAB | Refills: 0 | Status: SHIPPED | OUTPATIENT
Start: 2020-06-25 | End: 2020-10-14

## 2020-06-25 RX ORDER — AMLODIPINE BESYLATE 10 MG/1
TABLET ORAL DAILY
Status: ON HOLD | COMMUNITY
End: 2020-10-14 | Stop reason: SDUPTHER

## 2020-06-25 NOTE — PROGRESS NOTES
Jorgito Easley, [facility]  [address], [address] [place], [address] [facility]    Office [phone]  Fax [phone]        ICD-10-CM ICD-9-CM   1. Permanent atrial fibrillation (HCC) I48.21 427.31   2. Dizziness R42 780.4   3. Essential hypertension I10 401.9   4. S/P AVR (aortic valve replacement) Z95.2 V43.3   5. S/P MVR (mitral valve repair) Z98.890 V45.89   6. Lower extremity edema R60.0 782.3   7. History of GI bleed Z87.19 V12.79        Eh Day is a [de-identified] y.o. male followed outpatient by Dr. Klever Tomas.  Patient is here for discharge follow-up; he was inpatient June 8 to [facility] for treatment of GI bleed. HISTORY OF PRESENTING ILLNESS     Eh Day is a [de-identified] y.o. male with past medical history significant for atrial fibrillation status post maze operation, CAD, hypertension, dyslipidemia who has recent diagnosis of prostate cancer and is undergoing radiation therapy for prostate cancer is admitted with lower GI bleeding. He is on chronic anticoagulation on Eliquis for atrial fibrillation. His Eliquis was stopped on admission and he was given Kcentra. He was inpatient June 8 to [facility] for treatment of GI bleed. Seen by GI and had a sigmoidoscopy 6/8 that showed bleeding rectal ulceration related to radiation proctitis and prior thermal therapy. Injected with epinephrine and clipped x 2. Eliquis was held at [facility]. Today pt is here to discuss restarting Eliquis. States he was told by GI to follow our instruction on when to restart this med or if it should be held long term. Pt is taking aspirin 81mg/d. He continues to have small amounts of bleeding with BMs which he thought was residual blood post procedure; however, last BM did have some bright red blood. Pt has not scheduled f/u with GI. States he get very dizzy with positional changes- more significant recently. Denies presyncope or syncope. Admits to little water intake.   Has LE swelling; he is unsure if this is baseline. He does not use compression hose. Patient denies any chest pain, changes in breathing, palpitations, syncope, orthopnea, or paroxysmal nocturnal dyspnea. He does not wear his CPAP as rx. Never uses. He is here today with his wife.       Atrial fibrillation- CHADSVASC2 score 3   [de-identified] y.o.                                        > 75        +2   male                                         Male     +0  CHF hx                                           No    + 0  HTN hx                                           Yes    +1  Stroke/TIA/Thromboembolism       No    +0  Vascular disease hx                       No    + 0  Diabetes Mellitus                            No    + 0      ACTIVE PROBLEM LIST     Patient Active Problem List    Diagnosis Date Noted    GI bleed 06/08/2020    Radiation proctitis     DEL (obstructive sleep apnea)     Insomnia     Atrial fibrillation (HCC)     Hyperlipidemia     Hx of carcinoma in situ of prostate     GERD (gastroesophageal reflux disease)     Chronic pain     CAD (coronary artery disease)     Arthritis     Hypertension 10/26/2015    Hyperlipemia 10/26/2015    Anemia due to acute blood loss 12/26/2014    Aortic stenosis 12/23/2014    S/P AVR (aortic valve replacement) 12/23/2014    S/P MVR (mitral valve repair) 12/23/2014    S/P Maze operation for atrial fibrillation 12/23/2014    Aortic insufficiency 12/08/2014    A-fib ([address] 06/17/2013    Arthritis of knee 07/09/2012           PAST MEDICAL HISTORY     Past Medical History:   Diagnosis Date    Arthritis     Atrial fibrillation ([address]     CAD (coronary artery disease)     Chronic pain     legs/knee    GERD (gastroesophageal reflux disease)     Hx of carcinoma in situ of prostate     Hyperlipidemia     Hypertension     Insomnia     DEL (obstructive sleep apnea)     Radiation proctitis     Vitamin D deficiency            PAST SURGICAL HISTORY     Past Surgical History:   Procedure Laterality Date    COLONOSCOPY N/A 5/8/2020    COLONOSCOPY performed by Natalie Hamm MD at [facility] COLONOSCOPY N/A 6/8/2020    COLONOSCOPY performed by David Patrick MD at [facility] HX AORTIC VALVE REPLACEMENT  2015    and mitral valve repair, left atrial cryo maze    HX HEENT      melanoma removed head    HX HERNIA REPAIR  2009    Right    HX HERNIA REPAIR      left inguinal hernia repair    HX HERNIA REPAIR Left 12/01/2016    lap left inguinal hernia repair with mesh    HX KNEE REPLACEMENT      Bilateral    HX ORTHOPAEDIC      BILATERAL KNEE REPLACEMENT    HX ORTHOPAEDIC      CARPEL TUNNEL REPAIR-RIGHT    HX OTHER SURGICAL  2015    closure of patent foramen ovale    HX PROSTATE SURGERY      42 radiation treatments          ALLERGIES     No Known Allergies       FAMILY HISTORY     Family History   Problem Relation Age of Onset    Cancer Mother     Cancer Father         prostrate    Cancer Brother         prostrate ca    Anesth Problems Neg Hx         SOCIAL HISTORY     Social History     Socioeconomic History    Marital status:      Spouse name: Not on file    Number of children: Not on file    Years of education: Not on file    Highest education level: Not on file   Tobacco Use    Smoking status: Never Smoker    Smokeless tobacco: Never Used   Substance and Sexual Activity    Alcohol use: Yes     Alcohol/week: 3.0 standard drinks     Types: 3 Cans of beer per week    Drug use: No    Sexual activity: Not Currently         MEDICATIONS     Current Outpatient Medications   Medication Sig    amLODIPine (NORVASC) 10 mg tablet Take  by mouth daily.  chlorthalidone (HYGROTEN) 25 mg tablet Take 0.5 Tabs by mouth every other day.  lisinopril (PRINIVIL, ZESTRIL) 40 mg tablet Take 40 mg by mouth daily.  aspirin delayed-release 81 mg tablet Take 81 mg by mouth daily.     polyvinyl alcohol (ARTIFICIAL TEARS, POLYVIN ALC,) 1.4 % ophthalmic solution Administer 1 Drop to both eyes as needed.  ipratropium (ATROVENT HFA) 17 mcg/actuation inhaler Take 2 Puffs by inhalation as needed.  ferrous sulfate 325 mg (65 mg iron) cpER Take 1 Tab by mouth every other day.  tamsulosin (FLOMAX) 0.4 mg capsule Take 0.8 mg by mouth nightly.  cholecalciferol (VITAMIN D3) 1,000 unit tablet Take 2,000 Units by mouth two (2) times a day.  GLUCOSAMINE HCL/CHONDR CHING A NA (GLUCOSAMINE-CHONDROITIN) 750-600 mg tab Take 1 Tab by mouth daily.  omega-3 fatty acids-vitamin e 1,000 mg cap Take 1 Cap by mouth every other day.  acetaminophen (TYLENOL) 325 mg tablet Take  by mouth every four (4) hours as needed for Pain.  multivitamin (ONE A DAY) tablet Take 1 Tab by mouth daily.  docusate sodium (COLACE) 100 mg capsule Take 100 mg by mouth two (2) times daily as needed.  finasteride (PROSCAR) 5 mg tablet Take 5 mg by mouth nightly.  pravastatin (PRAVACHOL) 40 mg tablet Take 40 mg by mouth nightly. No current facility-administered medications for this visit. I have reviewed the nurses notes, vitals, problem list, allergy list, medical history, family, social history and medications. REVIEW OF SYMPTOMS     Positive findings above. Constitutional: Negative for fever, chills, and diaphoresis. Respiratory: Negative for cough, hemoptysis, sputum production, shortness of breath and wheezing. Cardiovascular: Negative for chest pain, palpitations, orthopnea, and PND. Neurological: Negative for focal weakness, seizures, loss of consciousness,   Psychiatric/Behavioral: Negative for memory loss.         PHYSICAL EXAMINATION      Vitals:    06/25/20 1318   BP: 124/62   Pulse: 72   Resp: 16   SpO2: 96%   Weight: 210 lb (95.3 kg)   Height: 5' 10\" (1.778 m)     Extended / Orthostatic Vitals:  Patient Position 2: Supine  BP 2: 124/62  Pulse 2: 76  Patient Position 3: Sitting  BP 3: 118/60  Pulse 3: 80  BP 4: 108/60  Pulse 4: 100    General - well developed well nourished  Neck - no JVD, neck supple   Cardiac - normal S1, S2, irregularly irregular, with a 2/6 systolic murmur  Vascular - pedal pulses equal bilateral  Lungs - clear to auscultation bilaterals, no rales, wheezing or rhonchi  Abd - soft nontender, non-distended, +BS  Extremities - 1+ TYLER (L>R) - no pain, warm, well perfused  Neuro - nonfocal  Psych - normal mood and affect     DIAGNOSTIC DATA     1.  Echocardiogram                                      9/5/14 Left ventricle: Systolic function was normal. Ejection fraction was  estimated in the range of 55 % to 60 %. There were no regional wall motion  abnormalities. Left atrium: The atrium was mildly dilated. 4/20/15- EF 48%, SHANTANU, atrial septum- poss PFO, MR mild, TR mild/mod, Pulm HTN mod, AV bioprosthesis normal function  6/19/17- EF 45-50%, RVE, SHANTANU, MR/TR mild/mod, AV bioprosthesis exhibits normal function, severe Pulm HTN, SD mild  Atrial septum: There was a secundum septal defect. Color Doppler  evaluation was performed. There was a mild left-to-right shunt. 12/11/18 TTE: LVEF 50%, no WMAs, wall thickness mod incr. RV mild-mod dilated, LA mod-sev dilated, RA mod dilated, mod MR, mod TR, mod pulm HTN, mild PV regurg. AV w/ bioprosthesis, no AS / no AR    2.  Myocardial perfusion study                      (9/5/14)Normal EF ;Normal perfusion    3. Cholesterol profile                            (10/6/15): , HDL 35, ,           (4/19/16): , HDL 72, LDL 65.6,   (05/01/17)- , HDL 85, LDL 65 , TG 55  11/2/17- , HDL 73, TG 75  12/11/18- , HDL 61, LDL 32, TG 60    4. LE venous duplex                                           (10/10/14)  Right leg mod. 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 distally  No DVT  1/24/18 - No DVT, As an incidental finding, there is evidence of valve incompetence  (reflux) involving the left popliteal vein.      Carotid Duplex 5/11/20:   1)  1-49% stenosis of the right internal carotid artery (lower end of range). 2)  1-49% stenosis of the left internal carotid artery (lower end of range). 3)  Vertebral arteries are patent with antegrade flow. [facility] 10/2014:   CORONARY CIRCULATION: 1) LM very long and no disease    2) LAD -SCOOTER 2-3 flow. Moderate and wraps apex with slight luminal  irregularities  Three diagonals without disease    3) Circumflex- large and no disease  OM1 moderate OK  OM2 moderate Ok    4) RCA is large and dominant without disease  PL/PDA ok    03/16/20   ECHO ADULT COMPLETE 03/16/2020 3/16/2020    Narrative · Mildly dilated left ventricle. Mild concentric hypertrophy. Low normal   systolic function. Estimated left ventricular ejection fraction is 50 -   55%. Visually measured ejection fraction. · Severely dilated left atrium. · Dilated right ventricle. · Moderately dilated right atrium. · Mitral valve thickening and repaired with annuloplasty ring. Surgical   repair. Mild mitral valve regurgitation is present. · Prosthetic aortic valve. Aortic valve leaflet calcification present. Aortic valve mean gradient is 20.7 mmHg. Aortic valve area is 1.3 cm2. Mild aortic valve stenosis is present. There is a 26 mm bioprosthetic   aortic valve. · Mild tricuspid valve regurgitation is present.         Signed by: Amada Khan MD      LABORATORY DATA      Lab Results   Component Value Date/Time    WBC 5.1 06/25/2020 02:35 PM    HGB 9.4 (L) 06/25/2020 02:35 PM    HCT 29.5 (L) 06/25/2020 02:35 PM    PLATELET 956 71/19/3445 02:35 PM    .7 (H) 06/25/2020 02:35 PM      Lab Results   Component Value Date/Time    Sodium 138 06/25/2020 02:35 PM    Potassium 4.3 06/25/2020 02:35 PM    Chloride 103 06/25/2020 02:35 PM    CO2 28 06/25/2020 02:35 PM    Anion gap 7 06/25/2020 02:35 PM    Glucose 111 (H) 06/25/2020 02:35 PM    BUN 24 (H) 06/25/2020 02:35 PM    Creatinine 1.15 06/25/2020 02:35 PM    BUN/Creatinine ratio 21 (H) 06/25/2020 02:35 PM    GFR est AA >60 06/25/2020 02:35 PM    GFR est non-AA >60 06/25/2020 02:35 PM    Calcium 8.9 06/25/2020 02:35 PM    Bilirubin, total 0.5 06/09/2020 02:24 AM    Alk. phosphatase 54 06/09/2020 02:24 AM    Protein, total 6.4 06/09/2020 02:24 AM    Albumin 3.2 (L) 06/09/2020 02:24 AM    Globulin 3.2 06/09/2020 02:24 AM    A-G Ratio 1.0 (L) 06/09/2020 02:24 AM    ALT (SGPT) 15 06/09/2020 02:24 AM      Lab Results   Component Value Date/Time    Cholesterol, total 105 12/11/2018 11:03 AM    HDL Cholesterol 61 12/11/2018 11:03 AM    LDL, calculated 32 12/11/2018 11:03 AM    VLDL, calculated 12 12/11/2018 11:03 AM    Triglyceride 60 12/11/2018 11:03 AM     Lab Results   Component Value Date/Time    TSH 1.56 12/12/2014 12:00 PM     Lab Results   Component Value Date/Time    Hemoglobin A1c 5.9 12/12/2014 12:00 PM      ASSESSMENT/RECOMMENDATIONS:.      Permanent AF  - [id number] Score 3 - off eliquis 2/2 bleed  - still with abnormal bleeding - I would not re-trial eliquis at this time until bleeding subsides >1 week or GI approves (pt advised to f/u with GI)   - ok to cont aspirin for now  - may benefit from [address] in the future; will refer to Dr. Ana Rosa Weston   - rate OK not on AV thania blocking agents     HTN / borderline orthostatic   - appears dry  - given recent bleed, check updated CBC as well as BMP  - reduce chlorthalidone from 25mg every other day to 12.5mg every other day - discussed increased PO intake - pt agreed    LE swelling - chlorthalidone per above. Pt not interested in compression hose. Educated to elevate legs. Breathing stable; recent LVEF 50-55%    Dyslipidemia  - Followed by Mallory Delgadillo MD     Moderate PFO with left to right flow and CHRISTOPHER  - Hx of PFO repaired in the past.     AS and MR s/p AVR and MVR  - He has had AVR with a #23 Deep mitroflow PRT bioprosthesis, MVR closure of left atrial appendage and closure of PFO all done on 2/23/14  - Echo 3/2020:  S/p MVR with stable annuloplasty ring and bioprosthetic aortic valve.   Aortic valve mean gradient 20.7 mmHg. GE 1.3 cm2. Mild AS. - repeat echo annually     EDL  - Is not wearing his CPAP and he understands the consequences    Left carotid bruit  - Carotid duplex 5/2020: 1-49% stenosis of the AINSLEY and LIC bilaterally (lower end of range). Pt request MD review  F/u by VV in 2 weeks with Dr. Sourav Christian This Encounter    METABOLIC PANEL, BASIC     Standing Status:   Future     Number of Occurrences:   1     Standing Expiration Date:   6/25/2021    CBC W/O DIFF     Standing Status:   Future     Number of Occurrences:   1     Standing Expiration Date:   6/25/2021    amLODIPine (NORVASC) 10 mg tablet     Sig: Take  by mouth daily.  chlorthalidone (HYGROTEN) 25 mg tablet     Sig: Take 0.5 Tabs by mouth every other day. Dispense:  45 Tab     Refill:  0          Follow-up and Dispositions  ·   Return in about 2 weeks (around 7/9/2020), or if symptoms worsen or fail to improve, for by virtual visit. I have discussed the diagnosis with  Marlou Flow and the intended plan as seen in the above orders. Questions were answered concerning future plans. I have discussed medication side effects and warnings with the patient as well. Thank you,  Samaria Hunter MD for involving me in the care of  Audie Redd. Please do not hesitate to contact me for further questions/concerns. Nyasia Gilliam, SUZETTE  ____________________________________________________    Addendum. Labs 6/25 reviewed - Hgb stable 9.4. BUN/Cr mildly elevated from baseline; pt appears dry as suspected. Chlorthalidone dose already reduced.

## 2020-06-25 NOTE — PATIENT INSTRUCTIONS
Continue to hold eliquis for now. Please decrease your chlorthalidone to 12.5mg every other day. See Dr. Troy Garcia to discuss possible Watchman  See Dr. Keny Mccray again in 2 weeks by VV to discuss blood thinner.

## 2020-06-25 NOTE — PROGRESS NOTES
ROOM # 9  [facility] 6/8-6/10/20: GI bleed   Lightheadedness  Every 6 months hormone injections per Dr. Ryanne Srinivasan Urologist    Extended / Orthostatic Vitals:  Patient Position 2: Supine  BP 2: 124/62  Pulse 2: 76  Patient Position 3: Sitting  BP 3: 118/60  Pulse 3: 80  BP 4: 108/60  Pulse 4: 100

## 2020-06-25 NOTE — LETTER
6/26/20 Patient: Jaylan Ards YOB: 1940 Date of Visit: 6/25/2020 Asif Ag MD 
[address] [place] [id number] VIA In Basket Dear Asif Ag MD, Thank you for referring Mr. Catalina Hester to [address]  for evaluation. My notes for this consultation are attached. If you have questions, please do not hesitate to call me. I look forward to following your patient along with you.  
 
 
Sincerely, 
 
Navjot Frank NP

## 2020-06-26 ENCOUNTER — TELEPHONE (OUTPATIENT)
Dept: CARDIOLOGY CLINIC | Age: 80
End: 2020-06-26

## 2020-06-26 NOTE — TELEPHONE ENCOUNTER
Called pt; reviewed recent labs. Hgb stable. Appears slightly dry. Advised to increase fluid intake and decrease chlorthalidone as already advised. Pt still reports mild abnormal GI bleeding; advised to see GI in the near future. Hold eliquis for now; can re-trial eliquis if no bleeding for >1 week or if GI approves. Stop aspirin if restarting eliquis. Pt agrees.      Pt will keep VV with MD in 2 weeks to further eval.

## 2020-07-01 ENCOUNTER — PATIENT OUTREACH (OUTPATIENT)
Dept: INTERNAL MEDICINE CLINIC | Age: 80
End: 2020-07-01

## 2020-07-01 NOTE — PROGRESS NOTES
Called patient to follow up. LMTCB.    7.2.2020  Incoming call received from patient. ID x2    Goals      Prevent complications post hospitalization. 6/11/2020 [facility] 6/8-6/10 GIB  · Called and reviewed discharge instructions with patient  · Called and reviewed meds- no new meds, reminded to stop the Eliquis. · Reviewed red flags: blood in stool or urine, fever,nausea,vomiting,diarrhea,weakness,sob,chest pain. · Reminded to call Angelica Pizarro at [facility], again to schedule DEANNA  · 6/25- cardio appt at 11am.   · Offered info on Deejay Sarah but he declined. · Given CTN contact number if any questions/concerns.   · Advised will f/u with him in about a week, sooner [email or web address]    07/02/20  · Reports he is not feeling well right now, continues with fatigue  · Reports \"once in a while\" blood noted in stool; however, none today  · Reports good appetite  · Frequent BMs soft  · Patient will contact Dr. Myra Holguin office, GI to schedule follow up appt  · Confirmed fu appt with cardiologist 7/9  · Reports he attended [facility] appt with PCP last week        ;s

## 2020-07-09 ENCOUNTER — VIRTUAL VISIT (OUTPATIENT)
Dept: CARDIOLOGY CLINIC | Age: 80
End: 2020-07-09

## 2020-07-09 DIAGNOSIS — I10 ESSENTIAL HYPERTENSION: ICD-10-CM

## 2020-07-09 DIAGNOSIS — Z95.2 S/P AVR (AORTIC VALVE REPLACEMENT): ICD-10-CM

## 2020-07-09 DIAGNOSIS — R09.89 LEFT CAROTID BRUIT: ICD-10-CM

## 2020-07-09 DIAGNOSIS — I48.21 PERMANENT ATRIAL FIBRILLATION (HCC): Primary | ICD-10-CM

## 2020-07-09 DIAGNOSIS — E78.5 DYSLIPIDEMIA: ICD-10-CM

## 2020-07-09 DIAGNOSIS — Z87.19 HISTORY OF GI BLEED: ICD-10-CM

## 2020-07-09 DIAGNOSIS — Z98.890 S/P MVR (MITRAL VALVE REPAIR): ICD-10-CM

## 2020-07-09 NOTE — PROGRESS NOTES
VIRTUAL VISIT DOCUMENTATION     Pursuant to the emergency declaration under the [facility], [facility] waiver authority and the [employer] Act, this Virtual  Visit was conducted, with patient's consent, to reduce the patient's risk of exposure to COVID-19 and provide continuity of care for an established patient. Services were provided through a video synchronous discussion virtually to substitute for in-person clinic visit. CHIEF COMPLAINT      Love Childress is a [de-identified] y.o. male who was seen by synchronous (real-time) audio-video technology on 7/9/2020. Patient is being seen today for issues with eliquis from recent bleeding after RT therapy to colon. Recently had GI bleeding. ASSESSMENT        ICD-10-CM ICD-9-CM    1. Permanent atrial fibrillation (HCC)  I48.21 427.31    2. Essential hypertension  I10 401.9    3. S/P AVR (aortic valve replacement)  Z95.2 V43.3    4. S/P MVR (mitral valve repair)  Z98.890 V45.89    5. History of GI bleed  Z87.19 V12.79    6. Left carotid bruit  R09.89 785.9    7. Dyslipidemia  E78.5 272.4         PLAN     1. AF  - Rate is under good control. He is off  Eliquis since 8 th June.  -consider watchman device. He is using aspirin. -he recently had 2 clips in bowel wall where ulcers are from radiation treatment  -risk greater than benefit. -will place on eliquis 2.5 mg bid from 5 mg  -PFO places him at risk of CVA  -Daniel Ramirez MD    2. LE edema  - still with edema    3. HTN  - Blood pressure is good 138//     4. Dyslipidemia  - Being followed by Markell De La Vega MD   - Lipids are at goal on current medical regimen. 5. Moderate PFO with left to right flow and CHRISTOPHER  - Last echo showed an EF of 50%. Stable. 6. AS and MR s/p AVR and MVR on 1/8/15  - He has moderate MR and moderate TR and bioprosthetic valves.  Will recheck an echo to look for any worsening regurgitant lesions. 7. DEL  -he is not wearing CPAP  -      8. Return in 3 months or PRN. We discussed the expected course, resolution and complications of the diagnosis(es) in detail. Medication risks, benefits, costs, interactions, and alternatives were discussed as indicated. I advised him to contact the office if his condition worsens, changes or fails to improve as anticipated. He expressed understanding with the diagnosis(es) and plan    HISTORY OF PRESENTING ILLNESS      Tara Gorman is a [de-identified] y.o. male   with HTN, dyslipidemia, and AF referred for follow up. Cardiac risk factors: dyslipidemia, male gender, hypertension  I have personally obtained the history from the patient. Pt is present with his wife. From a heart standpoint, pt states he is doing well. Pt states his PCP changed his blood thinner from coumadin to Eliquis and asks whether he should be on it. Pt states that his PCP also put him on another blood pressure medication but that he cannot remember the name. ATTENTION:   This medical record was transcribed using an electronic medical records/speech recognition system. Although proofread, it may and can contain electronic, spelling and other errors. Corrections may be executed at a later time. Please feel free to contact us for any clarifications as needed.          ACTIVE PROBLEM LIST     Patient Active Problem List    Diagnosis Date Noted    GI bleed 06/08/2020    Radiation proctitis     DEL (obstructive sleep apnea)     Insomnia     Atrial fibrillation (HCC)     Hyperlipidemia     Hx of carcinoma in situ of prostate     GERD (gastroesophageal reflux disease)     Chronic pain     CAD (coronary artery disease)     Arthritis     Hypertension 10/26/2015    Hyperlipemia 10/26/2015    Anemia due to acute blood loss 12/26/2014    Aortic stenosis 12/23/2014    S/P AVR (aortic valve replacement) 12/23/2014    S/P MVR (mitral valve repair) 12/23/2014    S/P Maze operation for atrial fibrillation 12/23/2014    Aortic insufficiency 12/08/2014    A-fib ([facility] 06/17/2013    Arthritis of knee 07/09/2012           PAST MEDICAL HISTORY     Past Medical History:   Diagnosis Date    Arthritis     Atrial fibrillation ([facility] [address]     CAD (coronary artery disease)     Chronic pain     legs/knee    GERD (gastroesophageal reflux disease)     Hx of carcinoma in situ of prostate     Hyperlipidemia     Hypertension     Insomnia     DEL (obstructive sleep apnea)     Radiation proctitis     Vitamin D deficiency            PAST SURGICAL HISTORY     Past Surgical History:   Procedure Laterality Date    COLONOSCOPY N/A 5/8/2020    COLONOSCOPY performed by [facility]irag Simpson MD at [facility]OSCOPY    COLONOSCOPY N/A 6/8/2020    [facility]    HX AORTIC VALVE REPLACEMENT  2015    and mitral valve repair, left atrial cryo maze    HX HEENT      melanoma removed head    HX HERNIA REPAIR  2009    Right    HX HERNIA REPAIR      left inguinal hernia repair    HX HERNIA REPAIR Left 12/01/2016    lap left inguinal hernia repair with mesh    HX KNEE REPLACEMENT      Bilateral    HX ORTHOPAEDIC      BILATERAL KNEE REPLACEMENT    HX ORTHOPAEDIC      CARPEL TUNNEL REPAIR-RIGHT    HX OTHER SURGICAL  2015    closure of patent foramen ovale    HX PROSTATE SURGERY      42 radiation treatments          ALLERGIES     No Known Allergies       FAMILY HISTORY     Family History   Problem Relation Age of Onset    Cancer Mother     Cancer Father         prostrate    Cancer Brother         prostrate ca    Anesth Problems Neg Hx     negative for cardiac disease       SOCIAL HISTORY     Social History     Socioeconomic History    Marital status:      Spouse name: Not on file    Number of children: Not on file    Years of education: Not on file    Highest education level: Not on file   Tobacco Use    Smoking status: Never Smoker    Smokeless tobacco: Never Used   Substance and Sexual Activity    Alcohol use: Yes     Alcohol/week: 3.0 standard drinks     Types: 3 Cans of beer per week    Drug use: No    Sexual activity: Not Currently         MEDICATIONS     Current Outpatient Medications   Medication Sig    amLODIPine (NORVASC) 10 mg tablet Take  by mouth daily.  chlorthalidone (HYGROTEN) 25 mg tablet Take 0.5 Tabs by mouth every other day.  lisinopril (PRINIVIL, ZESTRIL) 40 mg tablet Take 40 mg by mouth daily.  aspirin delayed-release 81 mg tablet Take 81 mg by mouth daily.  polyvinyl alcohol (ARTIFICIAL TEARS, POLYVIN ALC,) 1.4 % ophthalmic solution Administer 1 Drop to both eyes as needed.  ipratropium (ATROVENT HFA) 17 mcg/actuation inhaler Take 2 Puffs by inhalation as needed.  ferrous sulfate 325 mg (65 mg iron) cpER Take 1 Tab by mouth every other day.  tamsulosin (FLOMAX) 0.4 mg capsule Take 0.8 mg by mouth two (2) times a day.  cholecalciferol (VITAMIN D3) 1,000 unit tablet Take 2,000 Units by mouth two (2) times a day.  GLUCOSAMINE HCL/CHONDR CHING A NA (GLUCOSAMINE-CHONDROITIN) 750-600 mg tab Take 1 Tab by mouth daily.  omega-3 fatty acids-vitamin e 1,000 mg cap Take 1 Cap by mouth every other day.  acetaminophen (TYLENOL) 325 mg tablet Take  by mouth every four (4) hours as needed for Pain.  multivitamin (ONE A DAY) tablet Take 1 Tab by mouth daily.  docusate sodium (COLACE) 100 mg capsule Take 100 mg by mouth two (2) times daily as needed.  finasteride (PROSCAR) 5 mg tablet Take 5 mg by mouth nightly.  pravastatin (PRAVACHOL) 40 mg tablet Take 40 mg by mouth nightly. No current facility-administered medications for this visit. I have reviewed the nurses notes, vitals, problem list, allergy list, medical history, family, social history and medications. REVIEW OF SYMPTOMS     Constitutional: Negative for fever, chills, malaise/fatigue and diaphoresis.    Respiratory: Negative for cough, hemoptysis, sputum production, shortness of breath and wheezing. Cardiovascular: Negative for chest pain, palpitations, orthopnea, claudication, leg swelling and PND. Gastrointestinal: Negative for heartburn, nausea, vomiting, blood in stool and melena. Genitourinary: Negative for dysuria and flank pain. Musculoskeletal: Negative for joint pain and back pain. Skin: Negative for rash. Neurological: Negative for focal weakness, seizures, loss of consciousness, weakness and headaches. Endo/Heme/Allergies: Negative for abnormal bleeding. Psychiatric/Behavioral: Negative for memory loss. PHYSICAL EXAMINATION      Due to this being a TeleHealth evaluation, many elements of the physical examination are unable to be assessed. General: Well developed, in no acute distress, cooperative and alert  HEENT: Pupils equal/round. No marked JVD visible on video. Respiratory: No audible wheezing, no signs of respiratory distress, lips non cyanotic  Extremities:  No edema  Neuro: A&Ox3, speech clear, no facial droop, answering questions appropriately  Skin: Skin color is normal. No rashes or lesions. Non diaphoretic on visible skin during exam       DIAGNOSTIC DATA      1.  Echocardiogram                                      9/5/14 Left ventricle: Systolic function was normal. Ejection fraction was  estimated in the range of 55 % to 60 %. There were no regional wall motion  abnormalities. Left atrium: The atrium was mildly dilated. 4/20/15- EF 48%, SHANTANU, atrial septum- poss PFO, MR mild, TR mild/mod, Pulm HTN mod, AV bioprosthesis normal function  6/19/17- EF 45-50%, RVE, SHANTANU, MR/TR mild/mod, AV bioprosthesis exhibits normal function, severe Pulm HTN, AZ mild  Atrial septum: There was a secundum septal defect. Color Doppler  evaluation was performed. There was a mild left-to-right shunt. 12/11/18 TTE: LVEF 50%, no WMAs, wall thickness mod incr.  RV mild-mod dilated, LA mod-sev dilated, RA mod dilated, mod MR, mod TR, mod pulm HTN, mild PV regurg. AV w/ bioprosthesis, no AS / no AR    2.  Myocardial perfusion study                      (9/5/14)Normal EF ;Normal perfusion    3. Cholesterol profile                            (10/6/15): , HDL 35, ,           (4/19/16): , HDL 72, LDL 65.6,   (05/01/17)- , HDL 85, LDL 65 , TG 55  11/2/17- , HDL 73, TG 75  12/11/18- , HDL 61, LDL 32, TG 60    4. LE venous duplex                                           (10/10/14)  Right leg mod. 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 distally  No DVT  1/24/18 - No DVT, As an incidental finding, there is evidence of valve incompetence  (reflux) involving the left popliteal vein. LABORATORY DATA      Lab Results   Component Value Date/Time    WBC 5.1 06/25/2020 02:35 PM    HGB 9.4 (L) 06/25/2020 02:35 PM    HCT 29.5 (L) 06/25/2020 02:35 PM    PLATELET 342 39/40/4639 02:35 PM    .7 (H) 06/25/2020 02:35 PM      Lab Results   Component Value Date/Time    Sodium 138 06/25/2020 02:35 PM    Potassium 4.3 06/25/2020 02:35 PM    Chloride 103 06/25/2020 02:35 PM    CO2 28 06/25/2020 02:35 PM    Anion gap 7 06/25/2020 02:35 PM    Glucose 111 (H) 06/25/2020 02:35 PM    BUN 24 (H) 06/25/2020 02:35 PM    Creatinine 1.15 06/25/2020 02:35 PM    BUN/Creatinine ratio 21 (H) 06/25/2020 02:35 PM    GFR est AA >60 06/25/2020 02:35 PM    GFR est non-AA >60 06/25/2020 02:35 PM    Calcium 8.9 06/25/2020 02:35 PM    Bilirubin, total 0.5 06/09/2020 02:24 AM    Alk. phosphatase 54 06/09/2020 02:24 AM    Protein, total 6.4 06/09/2020 02:24 AM    Albumin 3.2 (L) 06/09/2020 02:24 AM    Globulin 3.2 06/09/2020 02:24 AM    A-G Ratio 1.0 (L) 06/09/2020 02:24 AM    ALT (SGPT) 15 06/09/2020 02:24 AM             FOLLOW-UP            Patient was made aware and verbalized understanding that an appointment will be scheduled for them for a virtual visit and/or office visit within the above time frame. Patient understanding his/her responsibility to call and change time/date if he/she so chooses. Thank you, Dereck Vasques MD for allowing me to participate in the care of Laura Matos. Please do not hesitate to contact me for further questions/concerns. Greater than 20 minutes was spent in direct video patient care, planning and chart review. This visit was conducted using [employer] telemedicine services.        Nickolas Guzman MD    [address]  [facility]        [phone] / [phone] Fax       [facility]  [address] [place] [address], [address] [place] [address],[address]  Mike Mota  [phone] / [phone] Fax

## 2020-07-10 ENCOUNTER — PATIENT OUTREACH (OUTPATIENT)
Dept: CASE MANAGEMENT | Age: 80
End: 2020-07-10

## 2020-07-10 NOTE — PROGRESS NOTES
Patient has graduated from the Transitions of Care Coordination  program on 7/10/2020. Patient/family has the ability to self-manage at this time Care management goals have been completed. Patient was not referred to the [facility] [place] 2[facility] team for further management. Goals Addressed                 This Visit's Progress     COMPLETED: Prevent complications post hospitalization. 6/11/2020 [facility] 6/8-6/10 GIB  · Called and reviewed discharge instructions with patient  · Called and reviewed meds- no new meds, reminded to stop the Eliquis. · Reviewed red flags: blood in stool or urine, fever,nausea,vomiting,diarrhea,weakness,sob,chest pain. · Reminded to call Angelica Howard at [facility], again to schedule DEANNA  · 6/25- cardio appt at 11am.   · Offered info on Deejay Sarah but he declined. · Given CTN contact number if any questions/concerns. · Advised will f/u with him in about a week, sooner [email or web address]    07/02/20  · Reports he is not feeling well right now, continues with fatigue  · Reports \"once in a while\" blood noted in stool; however, none today  · Reports good appetite  · Frequent BMs soft  · Patient will contact Dr. Milagros Moore office, GI to schedule follow up appt  · Confirmed fu appt with cardiologist 7/9  · Reports he attended [facility] appt with PCP last week            Patient has Care Transition Nurse's contact information for any further questions, concerns, or needs.   Patients upcoming visits:    Future Appointments   Date Time Provider Vinay Reina   7/17/2020  1:00 PM Emmanuel Diaz MD [facility]   9/21/2020 10:20 AM Lois Fine MD [address]

## 2020-07-17 ENCOUNTER — OFFICE VISIT (OUTPATIENT)
Dept: CARDIOLOGY CLINIC | Age: 80
End: 2020-07-17

## 2020-07-17 VITALS
RESPIRATION RATE: 18 BRPM | BODY MASS INDEX: 29.99 KG/M2 | DIASTOLIC BLOOD PRESSURE: 66 MMHG | SYSTOLIC BLOOD PRESSURE: 130 MMHG | OXYGEN SATURATION: 96 % | WEIGHT: 209 LBS | HEART RATE: 60 BPM

## 2020-07-17 DIAGNOSIS — Z95.2 S/P AVR (AORTIC VALVE REPLACEMENT): ICD-10-CM

## 2020-07-17 DIAGNOSIS — Z98.890 S/P MVR (MITRAL VALVE REPAIR): ICD-10-CM

## 2020-07-17 DIAGNOSIS — I48.21 PERMANENT ATRIAL FIBRILLATION (HCC): Primary | ICD-10-CM

## 2020-07-17 DIAGNOSIS — E78.00 PURE HYPERCHOLESTEROLEMIA: ICD-10-CM

## 2020-07-17 DIAGNOSIS — Z87.19 HISTORY OF GI BLEED: ICD-10-CM

## 2020-07-17 DIAGNOSIS — R60.0 LOWER EXTREMITY EDEMA: ICD-10-CM

## 2020-07-17 DIAGNOSIS — I25.10 CORONARY ARTERY DISEASE INVOLVING NATIVE CORONARY ARTERY OF NATIVE HEART WITHOUT ANGINA PECTORIS: ICD-10-CM

## 2020-07-17 NOTE — PROGRESS NOTES
Visit Vitals  /66 (BP 1 Location: Left arm, BP Patient Position: Sitting)   Pulse 60   Resp 18   Wt 209 lb (94.8 kg)   SpO2 96%   BMI 29.99 kg/m²     Pt denies CP, SOB, palpitations, dizziness, syncope. Pt reports hx of edema ruthie lower legs/feet. Pt would like to discuss Watchman placement w/Dr. Ally Gallo. Pt has been off Eliquis 6/8/20 due to bleeding.

## 2020-07-17 NOTE — PROGRESS NOTES
HISTORY OF PRESENTING ILLNESS      Gabriela Ramírez is a [de-identified] y.o. male with atrial fibrillation, CAD, hypertension, AVR/MVR/TVR, dyslipidemia, prostate cancer/radiation proctitis and GI bleeding referred for discussion regarding LAAO. He is currently on eliquis and continues to have rectal bleeding.         PAST MEDICAL HISTORY     Past Medical History:   Diagnosis Date    Arthritis     Atrial fibrillation ([address]     CAD (coronary artery disease)     Chronic pain     legs/knee    GERD (gastroesophageal reflux disease)     Hx of carcinoma in situ of prostate     Hyperlipidemia     Hypertension     Insomnia     DEL (obstructive sleep apnea)     Radiation proctitis     Vitamin D deficiency            PAST SURGICAL HISTORY     Past Surgical History:   Procedure Laterality Date    COLONOSCOPY N/A 5/8/2020    [facility]    COLONOSCOPY N/A 6/8/2020    COLONOSCOPY performed by Charlie Giron MD at [facility]COPY    HX AORTIC VALVE REPLACEMENT  2015    and mitral valve repair, left atrial cryo maze    HX HEENT      melanoma removed head    HX HERNIA REPAIR  2009    Right    HX HERNIA REPAIR      left inguinal hernia repair    HX HERNIA REPAIR Left 12/01/2016    lap left inguinal hernia repair with mesh    HX KNEE REPLACEMENT      Bilateral    HX ORTHOPAEDIC      BILATERAL KNEE REPLACEMENT    HX ORTHOPAEDIC      CARPEL TUNNEL REPAIR-RIGHT    HX OTHER SURGICAL  2015    closure of patent foramen ovale    HX PROSTATE SURGERY      42 radiation treatments          ALLERGIES     No Known Allergies       FAMILY HISTORY     Family History   Problem Relation Age of Onset    Cancer Mother     Cancer Father         prostrate    Cancer Brother         prostrate ca    Anesth Problems Neg Hx     negative for cardiac disease       SOCIAL HISTORY     Social History     Socioeconomic History    Marital status:      Spouse name: Not on file    Number of children: Not on file    Years of education: Not on file    Highest education level: Not on file   Tobacco Use    Smoking status: Never Smoker    Smokeless tobacco: Never Used   Substance and Sexual Activity    Alcohol use: Yes     Alcohol/week: 3.0 standard drinks     Types: 3 Cans of beer per week    Drug use: No    Sexual activity: Not Currently         MEDICATIONS     Current Outpatient Medications   Medication Sig    amLODIPine (NORVASC) 10 mg tablet Take  by mouth daily.  chlorthalidone (HYGROTEN) 25 mg tablet Take 0.5 Tabs by mouth every other day.  lisinopril (PRINIVIL, ZESTRIL) 40 mg tablet Take 40 mg by mouth daily.  aspirin delayed-release 81 mg tablet Take 81 mg by mouth daily.  polyvinyl alcohol (ARTIFICIAL TEARS, POLYVIN ALC,) 1.4 % ophthalmic solution Administer 1 Drop to both eyes as needed.  ipratropium (ATROVENT HFA) 17 mcg/actuation inhaler Take 2 Puffs by inhalation as needed.  ferrous sulfate 325 mg (65 mg iron) cpER Take 1 Tab by mouth every other day.  tamsulosin (FLOMAX) 0.4 mg capsule Take 0.8 mg by mouth two (2) times a day.  cholecalciferol (VITAMIN D3) 1,000 unit tablet Take 2,000 Units by mouth two (2) times a day.  GLUCOSAMINE HCL/CHONDR CHING A NA (GLUCOSAMINE-CHONDROITIN) 750-600 mg tab Take 1 Tab by mouth daily.  omega-3 fatty acids-vitamin e 1,000 mg cap Take 1 Cap by mouth every other day.  acetaminophen (TYLENOL) 325 mg tablet Take  by mouth every four (4) hours as needed for Pain.  multivitamin (ONE A DAY) tablet Take 1 Tab by mouth daily.  docusate sodium (COLACE) 100 mg capsule Take 100 mg by mouth two (2) times daily as needed.  finasteride (PROSCAR) 5 mg tablet Take 5 mg by mouth nightly.  pravastatin (PRAVACHOL) 40 mg tablet Take 40 mg by mouth nightly. No current facility-administered medications for this visit.         I have reviewed the nurses notes, vitals, problem list, allergy list, medical history, family, social history and medications. REVIEW OF SYMPTOMS      General: Pt denies excessive weight gain or loss. Pt is able to conduct ADL's  HEENT: Denies blurred vision, headaches, hearing loss, epistaxis and difficulty swallowing. Respiratory: Denies cough, congestion, shortness of breath, ABDUL, wheezing or stridor. Cardiovascular: Denies precordial pain, palpitations, edema or PND  Gastrointestinal: Denies poor appetite, indigestion, abdominal pain or blood in stool  Genitourinary: Denies hematuria, dysuria, increased urinary frequency  Musculoskeletal: Denies joint pain or swelling from muscles or joints  Neurologic: Denies tremor, paresthesias, headache, or sensory motor disturbance  Psychiatric: Denies confusion, insomnia, depression  Integumentray: Denies rash, itching or ulcers. Hematologic: Denies easy bruising, bleeding       PHYSICAL EXAMINATION      Vitals: see vitals section  General: Well developed, in no acute distress. HEENT: No jaundice, oral mucosa moist, no oral ulcers  Neck: Supple, no stiffness, no lymphadenopathy, supple  Heart:  Normal S1/S2 negative S3 or S4. Regular, no murmur, gallop or rub, no jugular venous distention  Respiratory: Clear bilaterally x 4, no wheezing or rales  Abdomen:   Soft, non-tender, bowel sounds are active. Extremities:  No edema, normal cap refill, no cyanosis. Musculoskeletal: No clubbing, no deformities  Neuro: A&Ox3, speech clear, gait stable, cooperative, no focal neurologic deficits  Skin: Skin color is normal. No rashes or lesions.  Non diaphoretic, moist.  Vascular: 2+ pulses symmetric in all extremities       DIAGNOSTIC DATA      EKG:        LABORATORY DATA      Lab Results   Component Value Date/Time    WBC 5.1 06/25/2020 02:35 PM    HGB 9.4 (L) 06/25/2020 02:35 PM    HCT 29.5 (L) 06/25/2020 02:35 PM    PLATELET 851 28/89/6979 02:35 PM    .7 (H) 06/25/2020 02:35 PM      Lab Results   Component Value Date/Time    Sodium 138 06/25/2020 02:35 PM Potassium 4.3 06/25/2020 02:35 PM    Chloride 103 06/25/2020 02:35 PM    CO2 28 06/25/2020 02:35 PM    Anion gap 7 06/25/2020 02:35 PM    Glucose 111 (H) 06/25/2020 02:35 PM    BUN 24 (H) 06/25/2020 02:35 PM    Creatinine 1.15 06/25/2020 02:35 PM    BUN/Creatinine ratio 21 (H) 06/25/2020 02:35 PM    GFR est AA >60 06/25/2020 02:35 PM    GFR est non-AA >60 06/25/2020 02:35 PM    Calcium 8.9 06/25/2020 02:35 PM    Bilirubin, total 0.5 06/09/2020 02:24 AM    Alk. phosphatase 54 06/09/2020 02:24 AM    Protein, total 6.4 06/09/2020 02:24 AM    Albumin 3.2 (L) 06/09/2020 02:24 AM    Globulin 3.2 06/09/2020 02:24 AM    A-G Ratio 1.0 (L) 06/09/2020 02:24 AM    ALT (SGPT) 15 06/09/2020 02:24 AM           ASSESSMENT      1. Atrial fibrillation   A. CHADSVASC 4  2. Hypertension  3. Dyslipidemia  4. Patent foramen ovale  5. Aortic stenosis s/p AVR  6. Mitral regurgitation s/p MVR  7. Tricuspid regurgitation s/p prosthetic valve  8. DEL    A. Noncompliant with CPAP  9. Radiation proctitis  10. GI bleeding history  11. CAD, native       PLAN     The patient has a CHADSVASC/CHADS 2 score of 4. He/she should avoid long term anticoagulation past/current medical history of GI bleeding. The patient feels strongly that anticoagulation and documented stroke risk greatly impacts his/her quality of life. The patient is a candidate for Watchman, a left atrial appendage closure (LAAC) device to reduce risk of thromboembolism. The patient has need for anticoagulation and is considered a high risk for stroke, but has a relative contraindication for long term anticoagulation. The patient is suitable for short term warfarin but deemed unable to take long term oral anticoagulation following the conclusion of shared decision making interaction with the patient. I have discussed at length the risks/benefits and alternatives of this procedure with regards to stroke risk versus bleeding risk.  The patient understands that anticoagulation will be maintained in a variety of forms during the first year and agrees with plan. Risks include but not limited to: infection, bleeding, vessel injury, cardiac perforation at times requiring drainage or surgery, heart failure, migration of the device, emergency surgery, myocardial infarction, stroke and death. Plan for CTA to evaluate YIMI sizing/morphology. ICD-10-CM ICD-9-CM    1. Permanent atrial fibrillation (HCC)  I48.21 427.31 AMB POC EKG ROUTINE W/ 12 LEADS, INTER & REP   2. S/P AVR (aortic valve replacement)  Z95.2 V43.3    3. S/P MVR (mitral valve repair)  Z98.890 V45.89    4. History of GI bleed  Z87.19 V12.79    5. Lower extremity edema  R60.0 782.3    6. Coronary artery disease involving native coronary artery of native heart without angina pectoris  I25.10 414.01    7. Pure hypercholesterolemia  E78.00 272.0      Orders Placed This Encounter    AMB POC EKG ROUTINE W/ 12 LEADS, INTER & REP     Order Specific Question:   Reason for Exam:     Answer: Afib          FOLLOW-UP     Post procedure      Thank you, Travis Alamo MD and Dr. Darinel Riddle for allowing me to participate in the care of this extraordinarily pleasant male. Please do not hesitate to contact me for further questions/concerns.          Maida Manriquez MD  Cardiac Electrophysiology / Cardiology    [address]  [facility] [facility], [facility], [address] [place] [address], [address] [place] [address]  [phone] / [phone] Fax   [phone] / [phone] Fax

## 2020-08-04 ENCOUNTER — TELEPHONE (OUTPATIENT)
Dept: CARDIOLOGY CLINIC | Age: 80
End: 2020-08-04

## 2020-09-02 ENCOUNTER — DOCUMENTATION ONLY (OUTPATIENT)
Dept: CARDIOLOGY CLINIC | Age: 80
End: 2020-09-02

## 2020-09-02 NOTE — PROGRESS NOTES
Cardiology call    Patient states he does not know what is being down regarding the Watchman device. I told him I will have you guys call as to what the process is.      Marana Rolling

## 2020-09-08 ENCOUNTER — TELEPHONE (OUTPATIENT)
Dept: CARDIOLOGY CLINIC | Age: 80
End: 2020-09-08

## 2020-09-08 DIAGNOSIS — I48.91 ATRIAL FIBRILLATION, UNSPECIFIED TYPE (HCC): Primary | ICD-10-CM

## 2020-09-08 NOTE — TELEPHONE ENCOUNTER
Called patient to discuss Watchman procedure. He would like to proceed. Will plan for CTA of the chest.     Pending results, will plan for watchman procedure 10/13/2020 at noon.      Neva Lau NP

## 2020-09-14 ENCOUNTER — HOSPITAL ENCOUNTER (OUTPATIENT)
Dept: CT IMAGING | Age: 80
Discharge: HOME | End: 2020-09-14
Attending: NURSE PRACTITIONER
Payer: MEDICARE

## 2020-09-14 DIAGNOSIS — I48.91 ATRIAL FIBRILLATION, UNSPECIFIED TYPE (HCC): ICD-10-CM

## 2020-09-14 PROCEDURE — 71275 CT ANGIOGRAPHY CHEST: CPT

## 2020-09-14 PROCEDURE — 74011000636 HC RX REV CODE- 636: Performed by: RADIOLOGY

## 2020-09-14 RX ADMIN — IOPAMIDOL 100 ML: 755 INJECTION, SOLUTION INTRAVENOUS at 09:00

## 2020-09-21 DIAGNOSIS — I48.21 ATRIAL FIBRILLATION, PERMANENT (HCC): Primary | ICD-10-CM

## 2020-09-21 DIAGNOSIS — Z01.812 PRE-PROCEDURE LAB EXAM: ICD-10-CM

## 2020-09-21 DIAGNOSIS — I48.21 ATRIAL FIBRILLATION, PERMANENT (HCC): ICD-10-CM

## 2020-09-21 RX ORDER — SODIUM CHLORIDE 0.9 % (FLUSH) 0.9 %
5-40 SYRINGE (ML) INJECTION AS NEEDED
Status: CANCELLED | OUTPATIENT
Start: 2020-09-21

## 2020-09-21 RX ORDER — SODIUM CHLORIDE 0.9 % (FLUSH) 0.9 %
5-40 SYRINGE (ML) INJECTION EVERY 8 HOURS
Status: CANCELLED | OUTPATIENT
Start: 2020-09-21

## 2020-09-22 ENCOUNTER — TELEPHONE (OUTPATIENT)
Dept: CARDIOLOGY CLINIC | Age: 80
End: 2020-09-22

## 2020-09-22 NOTE — TELEPHONE ENCOUNTER
Patient's wife calling to know next steps with scheduling patient a watchman procedure. Patient's wife would also like information about stopping blood thinners for procedure. Please advise.

## 2020-09-22 NOTE — TELEPHONE ENCOUNTER
[facility] at [facility] would like to know if the patient needs to stop blood thinner prior to procedure with Dr Ally Gallo.      Phone: [phone]  Fax: [phone] (attnetion Dr Faythe Kayser)

## 2020-09-22 NOTE — TELEPHONE ENCOUNTER
Called patient's wife Rashawn Ayala patient on phone as well. Patient ID verified using two patient identifiers. Rashawn Ayala wants to make sure patient is still a candidate for Watchman post CTA results. And she also wants to know if patient needs to hold Eliquis prior to his Watchman Implant. Informed her that Eliquis is not on his medication list. She confirms that he has been taking 2.5 mg twice daily. Cierra Henry and patient that I would relay information to MARIANNA Givens NP for her recommendations. Rashawn Ayala states it is ok to leave detailed message on the answering machine.

## 2020-09-22 NOTE — TELEPHONE ENCOUNTER
Patients wife is calling to speak the nurse regarding the patients recent results.      Phone: [phone]

## 2020-09-24 NOTE — TELEPHONE ENCOUNTER
Lamin Shaw NP  You 2 days ago       Imaging is in review with Watchman specialist. We will let them know if there is a problem. Can hold Eliquis night before. Message text      Called patient's wife Uriel Lynn, patient ID and HIPAA verified. Informed her of above information per MARIANNA Johnston NP. She is aware that we will contact her prior to the Watchman implant  if the specialist deem that he is not a candidate for the procedure after reviewing CT results. She verbalizes understanding that patient is to hole his Eliquis the night before and morning of his procedure on 10/13/20. Faxed instructions for holding Eliquis to the [place] attention Dr. Daron Turcios @ fax # [phone]. Confirmation received.

## 2020-10-08 LAB
APPEARANCE UR: CLEAR
BACTERIA #/AREA URNS HPF: NORMAL /[HPF]
BILIRUB UR QL STRIP: NEGATIVE
CASTS URNS QL MICRO: NORMAL /LPF
COLOR UR: YELLOW
EPI CELLS #/AREA URNS HPF: NORMAL /HPF (ref 0–10)
GLUCOSE UR QL: NEGATIVE
HGB UR QL STRIP: NEGATIVE
KETONES UR QL STRIP: NEGATIVE
LEUKOCYTE ESTERASE UR QL STRIP: NEGATIVE
MICRO URNS: NORMAL
MICRO URNS: NORMAL
MUCOUS THREADS URNS QL MICRO: PRESENT
NITRITE UR QL STRIP: NEGATIVE
PH UR STRIP: 6.5 [PH] (ref 5–7.5)
PROT UR QL STRIP: NORMAL
RBC #/AREA URNS HPF: NORMAL /HPF (ref 0–2)
SP GR UR: 1.02 (ref 1–1.03)
URINALYSIS REFLEX , 377201: NORMAL
UROBILINOGEN UR STRIP-MCNC: 1 MG/DL (ref 0.2–1)
WBC #/AREA URNS HPF: NORMAL /HPF (ref 0–5)

## 2020-10-09 ENCOUNTER — HOSPITAL ENCOUNTER (OUTPATIENT)
Dept: LAB | Age: 80
Discharge: HOME | End: 2020-10-09
Payer: MEDICARE

## 2020-10-09 ENCOUNTER — TRANSCRIBE ORDER (OUTPATIENT)
Dept: REGISTRATION | Age: 80
End: 2020-10-09

## 2020-10-09 DIAGNOSIS — U07.1 COVID-19: ICD-10-CM

## 2020-10-09 DIAGNOSIS — U07.1 COVID-19: Primary | ICD-10-CM

## 2020-10-09 PROCEDURE — 87635 SARS-COV-2 COVID-19 AMP PRB: CPT

## 2020-10-10 LAB — SARS-COV-2, COV2NT: NOT DETECTED

## 2020-10-13 ENCOUNTER — APPOINTMENT (OUTPATIENT)
Dept: CARDIAC CATH/INVASIVE PROCEDURES | Age: 80
DRG: 274 | End: 2020-10-13
Attending: INTERNAL MEDICINE
Payer: MEDICARE

## 2020-10-13 ENCOUNTER — ANESTHESIA EVENT (OUTPATIENT)
Dept: CARDIAC CATH/INVASIVE PROCEDURES | Age: 80
DRG: 274 | End: 2020-10-13
Payer: MEDICARE

## 2020-10-13 ENCOUNTER — ANESTHESIA (OUTPATIENT)
Dept: CARDIAC CATH/INVASIVE PROCEDURES | Age: 80
DRG: 274 | End: 2020-10-13
Payer: MEDICARE

## 2020-10-13 ENCOUNTER — HOSPITAL ENCOUNTER (INPATIENT)
Age: 80
LOS: 1 days | Discharge: HOME | DRG: 274 | End: 2020-10-14
Attending: INTERNAL MEDICINE | Admitting: INTERNAL MEDICINE
Payer: MEDICARE

## 2020-10-13 DIAGNOSIS — I48.21 ATRIAL FIBRILLATION, PERMANENT (HCC): ICD-10-CM

## 2020-10-13 PROBLEM — Z95.818 PRESENCE OF WATCHMAN LEFT ATRIAL APPENDAGE CLOSURE DEVICE: Status: ACTIVE | Noted: 2020-10-13

## 2020-10-13 LAB
ABO + RH BLD: NORMAL
ACT BLD: 230 SECS (ref 79–138)
ALBUMIN SERPL-MCNC: 4.1 G/DL (ref 3.5–5)
ALBUMIN/GLOB SERPL: 1.1 {RATIO} (ref 1.1–2.2)
ALP SERPL-CCNC: 75 U/L (ref 45–117)
ALT SERPL-CCNC: 16 U/L (ref 12–78)
ANION GAP SERPL CALC-SCNC: 7 MMOL/L (ref 5–15)
AST SERPL-CCNC: 19 U/L (ref 15–37)
BILIRUB SERPL-MCNC: 0.7 MG/DL (ref 0.2–1)
BLOOD GROUP ANTIBODIES SERPL: NORMAL
BUN SERPL-MCNC: 19 MG/DL (ref 6–20)
BUN/CREAT SERPL: 19 (ref 12–20)
CALCIUM SERPL-MCNC: 9.3 MG/DL (ref 8.5–10.1)
CHLORIDE SERPL-SCNC: 103 MMOL/L (ref 97–108)
CO2 SERPL-SCNC: 30 MMOL/L (ref 21–32)
CREAT SERPL-MCNC: 0.99 MG/DL (ref 0.7–1.3)
ERYTHROCYTE [DISTWIDTH] IN BLOOD BY AUTOMATED COUNT: 14.2 % (ref 11.5–14.5)
GLOBULIN SER CALC-MCNC: 3.6 G/DL (ref 2–4)
GLUCOSE SERPL-MCNC: 106 MG/DL (ref 65–100)
HCT VFR BLD AUTO: 35.7 % (ref 36.6–50.3)
HGB BLD-MCNC: 11.8 G/DL (ref 12.1–17)
INR PPP: 1.1 (ref 0.9–1.1)
MCH RBC QN AUTO: 31.3 PG (ref 26–34)
MCHC RBC AUTO-ENTMCNC: 33.1 G/DL (ref 30–36.5)
MCV RBC AUTO: 94.7 FL (ref 80–99)
NRBC # BLD: 0 K/UL (ref 0–0.01)
NRBC BLD-RTO: 0 PER 100 WBC
PLATELET # BLD AUTO: 152 K/UL (ref 150–400)
PMV BLD AUTO: 9.1 FL (ref 8.9–12.9)
POTASSIUM SERPL-SCNC: 3.8 MMOL/L (ref 3.5–5.1)
PROT SERPL-MCNC: 7.7 G/DL (ref 6.4–8.2)
PROTHROMBIN TIME: 11.6 SEC (ref 9–11.1)
RBC # BLD AUTO: 3.77 M/UL (ref 4.1–5.7)
SODIUM SERPL-SCNC: 140 MMOL/L (ref 136–145)
SPECIMEN EXP DATE BLD: NORMAL
WBC # BLD AUTO: 4.8 K/UL (ref 4.1–11.1)

## 2020-10-13 PROCEDURE — 93312 ECHO TRANSESOPHAGEAL: CPT

## 2020-10-13 PROCEDURE — 77030008684 HC TU ET CUF COVD -B: Performed by: NURSE ANESTHETIST, CERTIFIED REGISTERED

## 2020-10-13 PROCEDURE — 86900 BLOOD TYPING SEROLOGIC ABO: CPT

## 2020-10-13 PROCEDURE — 85610 PROTHROMBIN TIME: CPT

## 2020-10-13 PROCEDURE — 36415 COLL VENOUS BLD VENIPUNCTURE: CPT

## 2020-10-13 PROCEDURE — 77030039046 HC PAD DEFIB RADIOTRNSPNT CNMD -B: Performed by: INTERNAL MEDICINE

## 2020-10-13 PROCEDURE — B246ZZ4 ULTRASONOGRAPHY OF RIGHT AND LEFT HEART, TRANSESOPHAGEAL: ICD-10-PCS | Performed by: INTERNAL MEDICINE

## 2020-10-13 PROCEDURE — 85027 COMPLETE CBC AUTOMATED: CPT

## 2020-10-13 PROCEDURE — C1893 INTRO/SHEATH, FIXED,NON-PEEL: HCPCS | Performed by: INTERNAL MEDICINE

## 2020-10-13 PROCEDURE — 80053 COMPREHEN METABOLIC PANEL: CPT

## 2020-10-13 PROCEDURE — 74011000250 HC RX REV CODE- 250: Performed by: NURSE ANESTHETIST, CERTIFIED REGISTERED

## 2020-10-13 PROCEDURE — 74011250637 HC RX REV CODE- 250/637: Performed by: SPECIALIST

## 2020-10-13 PROCEDURE — 02L73DK OCCLUSION OF LEFT ATRIAL APPENDAGE WITH INTRALUMINAL DEVICE, PERCUTANEOUS APPROACH: ICD-10-PCS | Performed by: INTERNAL MEDICINE

## 2020-10-13 PROCEDURE — C1894 INTRO/SHEATH, NON-LASER: HCPCS | Performed by: INTERNAL MEDICINE

## 2020-10-13 PROCEDURE — 77030038110 HC IMPL LAA WATCHMAN 24MM BSC -L: Performed by: INTERNAL MEDICINE

## 2020-10-13 PROCEDURE — 33340 PERQ CLSR TCAT L ATR APNDGE: CPT | Performed by: INTERNAL MEDICINE

## 2020-10-13 PROCEDURE — 74011250637 HC RX REV CODE- 250/637: Performed by: INTERNAL MEDICINE

## 2020-10-13 PROCEDURE — C1769 GUIDE WIRE: HCPCS | Performed by: INTERNAL MEDICINE

## 2020-10-13 PROCEDURE — 74011250636 HC RX REV CODE- 250/636: Performed by: NURSE ANESTHETIST, CERTIFIED REGISTERED

## 2020-10-13 PROCEDURE — 77030020506 HC NDL TRNSPTL NRG BAYL -F: Performed by: INTERNAL MEDICINE

## 2020-10-13 PROCEDURE — 65660000000 HC RM CCU STEPDOWN

## 2020-10-13 PROCEDURE — 85347 COAGULATION TIME ACTIVATED: CPT

## 2020-10-13 PROCEDURE — 76060000034 HC ANESTHESIA 1.5 TO 2 HR: Performed by: INTERNAL MEDICINE

## 2020-10-13 PROCEDURE — 77030004532 HC CATH ANGI DX IMP BSC -A: Performed by: INTERNAL MEDICINE

## 2020-10-13 DEVICE — LEFT ATRIAL APPENDAGE CLOSURE DEVICE WITH DELIVERY SYSTEM
Type: IMPLANTABLE DEVICE | Status: FUNCTIONAL
Brand: WATCHMAN®

## 2020-10-13 RX ORDER — PROPOFOL 10 MG/ML
INJECTION, EMULSION INTRAVENOUS AS NEEDED
Status: DISCONTINUED | OUTPATIENT
Start: 2020-10-13 | End: 2020-10-13 | Stop reason: HOSPADM

## 2020-10-13 RX ORDER — HYDROCODONE BITARTRATE AND ACETAMINOPHEN 5; 325 MG/1; MG/1
1 TABLET ORAL
Status: DISCONTINUED | OUTPATIENT
Start: 2020-10-13 | End: 2020-10-14 | Stop reason: HOSPADM

## 2020-10-13 RX ORDER — SUCCINYLCHOLINE CHLORIDE 20 MG/ML
INJECTION INTRAMUSCULAR; INTRAVENOUS AS NEEDED
Status: DISCONTINUED | OUTPATIENT
Start: 2020-10-13 | End: 2020-10-13 | Stop reason: HOSPADM

## 2020-10-13 RX ORDER — ROCURONIUM BROMIDE 10 MG/ML
INJECTION, SOLUTION INTRAVENOUS AS NEEDED
Status: DISCONTINUED | OUTPATIENT
Start: 2020-10-13 | End: 2020-10-13 | Stop reason: HOSPADM

## 2020-10-13 RX ORDER — SODIUM CHLORIDE 0.9 % (FLUSH) 0.9 %
5-40 SYRINGE (ML) INJECTION EVERY 8 HOURS
Status: DISCONTINUED | OUTPATIENT
Start: 2020-10-13 | End: 2020-10-14 | Stop reason: HOSPADM

## 2020-10-13 RX ORDER — ONDANSETRON 2 MG/ML
INJECTION INTRAMUSCULAR; INTRAVENOUS AS NEEDED
Status: DISCONTINUED | OUTPATIENT
Start: 2020-10-13 | End: 2020-10-13 | Stop reason: HOSPADM

## 2020-10-13 RX ORDER — HEPARIN SODIUM 1000 [USP'U]/ML
INJECTION, SOLUTION INTRAVENOUS; SUBCUTANEOUS AS NEEDED
Status: DISCONTINUED | OUTPATIENT
Start: 2020-10-13 | End: 2020-10-13 | Stop reason: HOSPADM

## 2020-10-13 RX ORDER — PRAVASTATIN SODIUM 40 MG/1
40 TABLET ORAL
Status: DISCONTINUED | OUTPATIENT
Start: 2020-10-13 | End: 2020-10-14 | Stop reason: HOSPADM

## 2020-10-13 RX ORDER — SODIUM CHLORIDE 0.9 % (FLUSH) 0.9 %
5-40 SYRINGE (ML) INJECTION AS NEEDED
Status: DISCONTINUED | OUTPATIENT
Start: 2020-10-13 | End: 2020-10-14 | Stop reason: HOSPADM

## 2020-10-13 RX ORDER — TAMSULOSIN HYDROCHLORIDE 0.4 MG/1
0.8 CAPSULE ORAL DAILY
Status: DISCONTINUED | OUTPATIENT
Start: 2020-10-13 | End: 2020-10-14 | Stop reason: HOSPADM

## 2020-10-13 RX ORDER — LANOLIN ALCOHOL/MO/W.PET/CERES
1 CREAM (GRAM) TOPICAL
Status: DISCONTINUED | OUTPATIENT
Start: 2020-10-14 | End: 2020-10-14 | Stop reason: HOSPADM

## 2020-10-13 RX ORDER — AMLODIPINE BESYLATE 5 MG/1
10 TABLET ORAL DAILY
Status: DISCONTINUED | OUTPATIENT
Start: 2020-10-14 | End: 2020-10-14 | Stop reason: HOSPADM

## 2020-10-13 RX ORDER — ACETAMINOPHEN 325 MG/1
650 TABLET ORAL
Status: DISCONTINUED | OUTPATIENT
Start: 2020-10-13 | End: 2020-10-14 | Stop reason: HOSPADM

## 2020-10-13 RX ORDER — PHENYLEPHRINE HCL IN 0.9% NACL 0.4MG/10ML
SYRINGE (ML) INTRAVENOUS AS NEEDED
Status: DISCONTINUED | OUTPATIENT
Start: 2020-10-13 | End: 2020-10-13 | Stop reason: HOSPADM

## 2020-10-13 RX ORDER — ONDANSETRON 2 MG/ML
4 INJECTION INTRAMUSCULAR; INTRAVENOUS
Status: DISCONTINUED | OUTPATIENT
Start: 2020-10-13 | End: 2020-10-14 | Stop reason: HOSPADM

## 2020-10-13 RX ORDER — LISINOPRIL 20 MG/1
40 TABLET ORAL DAILY
Status: DISCONTINUED | OUTPATIENT
Start: 2020-10-13 | End: 2020-10-14 | Stop reason: HOSPADM

## 2020-10-13 RX ORDER — FINASTERIDE 5 MG/1
5 TABLET, FILM COATED ORAL DAILY
Status: DISCONTINUED | OUTPATIENT
Start: 2020-10-13 | End: 2020-10-14 | Stop reason: HOSPADM

## 2020-10-13 RX ORDER — GUAIFENESIN 100 MG/5ML
81 LIQUID (ML) ORAL DAILY
Status: DISCONTINUED | OUTPATIENT
Start: 2020-10-13 | End: 2020-10-14 | Stop reason: HOSPADM

## 2020-10-13 RX ORDER — CEFAZOLIN SODIUM 1 G/3ML
INJECTION, POWDER, FOR SOLUTION INTRAMUSCULAR; INTRAVENOUS AS NEEDED
Status: DISCONTINUED | OUTPATIENT
Start: 2020-10-13 | End: 2020-10-13 | Stop reason: HOSPADM

## 2020-10-13 RX ORDER — PROTAMINE SULFATE 10 MG/ML
INJECTION, SOLUTION INTRAVENOUS AS NEEDED
Status: DISCONTINUED | OUTPATIENT
Start: 2020-10-13 | End: 2020-10-13 | Stop reason: HOSPADM

## 2020-10-13 RX ORDER — SODIUM CHLORIDE 9 MG/ML
INJECTION, SOLUTION INTRAVENOUS
Status: DISCONTINUED | OUTPATIENT
Start: 2020-10-13 | End: 2020-10-13 | Stop reason: HOSPADM

## 2020-10-13 RX ADMIN — HEPARIN SODIUM 9000 UNITS: 1000 INJECTION, SOLUTION INTRAVENOUS; SUBCUTANEOUS at 12:29

## 2020-10-13 RX ADMIN — FINASTERIDE 5 MG: 5 TABLET, FILM COATED ORAL at 21:12

## 2020-10-13 RX ADMIN — Medication 40 MCG: at 12:22

## 2020-10-13 RX ADMIN — PROTAMINE SULFATE 10 MG: 10 INJECTION, SOLUTION INTRAVENOUS at 13:13

## 2020-10-13 RX ADMIN — LISINOPRIL 40 MG: 20 TABLET ORAL at 21:12

## 2020-10-13 RX ADMIN — Medication 10 ML: at 21:11

## 2020-10-13 RX ADMIN — ROCURONIUM BROMIDE 20 MG: 10 SOLUTION INTRAVENOUS at 12:29

## 2020-10-13 RX ADMIN — ASPIRIN 81 MG: 81 TABLET, CHEWABLE ORAL at 21:12

## 2020-10-13 RX ADMIN — HEPARIN SODIUM 3000 UNITS: 1000 INJECTION, SOLUTION INTRAVENOUS; SUBCUTANEOUS at 12:44

## 2020-10-13 RX ADMIN — PRAVASTATIN SODIUM 40 MG: 40 TABLET ORAL at 21:12

## 2020-10-13 RX ADMIN — SODIUM CHLORIDE: 900 INJECTION, SOLUTION INTRAVENOUS at 11:49

## 2020-10-13 RX ADMIN — Medication 80 MCG: at 12:50

## 2020-10-13 RX ADMIN — PROTAMINE SULFATE 10 MG: 10 INJECTION, SOLUTION INTRAVENOUS at 13:07

## 2020-10-13 RX ADMIN — Medication 120 MCG: at 12:56

## 2020-10-13 RX ADMIN — APIXABAN 5 MG: 5 TABLET, FILM COATED ORAL at 21:12

## 2020-10-13 RX ADMIN — CEFAZOLIN 2 G: 330 INJECTION, POWDER, FOR SOLUTION INTRAMUSCULAR; INTRAVENOUS at 12:06

## 2020-10-13 RX ADMIN — SUCCINYLCHOLINE CHLORIDE 160 MG: 20 INJECTION, SOLUTION INTRAMUSCULAR; INTRAVENOUS at 11:55

## 2020-10-13 RX ADMIN — PROPOFOL 150 MG: 10 INJECTION, EMULSION INTRAVENOUS at 11:55

## 2020-10-13 RX ADMIN — ROCURONIUM BROMIDE 20 MG: 10 SOLUTION INTRAVENOUS at 12:05

## 2020-10-13 RX ADMIN — PROTAMINE SULFATE 5 MG: 10 INJECTION, SOLUTION INTRAVENOUS at 13:05

## 2020-10-13 RX ADMIN — ONDANSETRON HYDROCHLORIDE 4 MG: 2 INJECTION, SOLUTION INTRAMUSCULAR; INTRAVENOUS at 13:15

## 2020-10-13 RX ADMIN — TAMSULOSIN HYDROCHLORIDE 0.8 MG: 0.4 CAPSULE ORAL at 21:12

## 2020-10-13 RX ADMIN — Medication 40 MCG: at 11:55

## 2020-10-13 RX ADMIN — Medication 40 MCG: at 12:13

## 2020-10-13 RX ADMIN — PROTAMINE SULFATE 5 MG: 10 INJECTION, SOLUTION INTRAVENOUS at 13:15

## 2020-10-13 RX ADMIN — PROTAMINE SULFATE 10 MG: 10 INJECTION, SOLUTION INTRAVENOUS at 13:10

## 2020-10-13 RX ADMIN — SODIUM CHLORIDE: 900 INJECTION, SOLUTION INTRAVENOUS at 12:23

## 2020-10-13 RX ADMIN — Medication 80 MCG: at 12:45

## 2020-10-13 RX ADMIN — BENZOCAINE AND MENTHOL 1 LOZENGE: 15; 3.6 LOZENGE ORAL at 21:11

## 2020-10-13 RX ADMIN — ROCURONIUM BROMIDE 10 MG: 10 SOLUTION INTRAVENOUS at 11:55

## 2020-10-13 RX ADMIN — Medication 80 MCG: at 12:47

## 2020-10-13 NOTE — H&P
HISTORY OF PRESENTING ILLNESS      Sharon Decker is a [de-identified] y.o. male with atrial fibrillation, CAD, hypertension, AVR/MVR/TVR, dyslipidemia, prostate cancer/radiation proctitis and GI bleeding referred for discussion regarding LAAO.  He is currently on eliquis and continues to have rectal bleeding.          PAST MEDICAL HISTORY           Past Medical History:   Diagnosis Date    Arthritis      Atrial fibrillation (HCC)      CAD (coronary artery disease)      Chronic pain       legs/knee    GERD (gastroesophageal reflux disease)      Hx of carcinoma in situ of prostate      Hyperlipidemia      Hypertension      Insomnia      DEL (obstructive sleep apnea)      Radiation proctitis      Vitamin D deficiency               PAST SURGICAL HISTORY            Past Surgical History:   Procedure Laterality Date    COLONOSCOPY N/A 5/8/2020     COLONOSCOPY performed by [facility]ing Brady MD at [facility] [place] [facility]    COLONOSCOPY N/A 6/8/2020     COLONOSCOPY performed by Una Black MD at [facility] [place] ENDOSCOPY    HX AORTIC VALVE REPLACEMENT   2015     and mitral valve repair, left atrial cryo maze    HX HEENT         melanoma removed head    HX HERNIA REPAIR   2009     Right    HX HERNIA REPAIR         left inguinal hernia repair    HX HERNIA REPAIR Left 12/01/2016     lap left inguinal hernia repair with mesh    HX KNEE REPLACEMENT         Bilateral    HX ORTHOPAEDIC         BILATERAL KNEE REPLACEMENT    HX ORTHOPAEDIC         CARPEL TUNNEL REPAIR-RIGHT    HX OTHER SURGICAL   2015     closure of patent foramen ovale    HX PROSTATE SURGERY         42 radiation treatments             ALLERGIES      No Known Allergies         FAMILY HISTORY            Family History   Problem Relation Age of Onset    Cancer Mother      Cancer Father           prostrate    Cancer Brother           prostrate ca    Anesth Problems Neg Hx      negative for cardiac disease         SOCIAL HISTORY      Social History        Socioeconomic History    Marital status:        Spouse name: Not on file    Number of children: Not on file    Years of education: Not on file    Highest education level: Not on file   Tobacco Use    Smoking status: Never Smoker    Smokeless tobacco: Never Used   Substance and Sexual Activity    Alcohol use: Yes       Alcohol/week: 3.0 standard drinks       Types: 3 Cans of beer per week    Drug use: No    Sexual activity: Not Currently              MEDICATIONS           Current Outpatient Medications   Medication Sig    amLODIPine (NORVASC) 10 mg tablet Take  by mouth daily.  chlorthalidone (HYGROTEN) 25 mg tablet Take 0.5 Tabs by mouth every other day.  lisinopril (PRINIVIL, ZESTRIL) 40 mg tablet Take 40 mg by mouth daily.  aspirin delayed-release 81 mg tablet Take 81 mg by mouth daily.  polyvinyl alcohol (ARTIFICIAL TEARS, POLYVIN ALC,) 1.4 % ophthalmic solution Administer 1 Drop to both eyes as needed.  ipratropium (ATROVENT HFA) 17 mcg/actuation inhaler Take 2 Puffs by inhalation as needed.  ferrous sulfate 325 mg (65 mg iron) cpER Take 1 Tab by mouth every other day.  tamsulosin (FLOMAX) 0.4 mg capsule Take 0.8 mg by mouth two (2) times a day.  cholecalciferol (VITAMIN D3) 1,000 unit tablet Take 2,000 Units by mouth two (2) times a day.  GLUCOSAMINE HCL/CHONDR CHING A NA (GLUCOSAMINE-CHONDROITIN) 750-600 mg tab Take 1 Tab by mouth daily.  omega-3 fatty acids-vitamin e 1,000 mg cap Take 1 Cap by mouth every other day.  acetaminophen (TYLENOL) 325 mg tablet Take  by mouth every four (4) hours as needed for Pain.  multivitamin (ONE A DAY) tablet Take 1 Tab by mouth daily.  docusate sodium (COLACE) 100 mg capsule Take 100 mg by mouth two (2) times daily as needed.  finasteride (PROSCAR) 5 mg tablet Take 5 mg by mouth nightly.     pravastatin (PRAVACHOL) 40 mg tablet Take 40 mg by mouth nightly.      No current facility-administered medications for this visit.          I have reviewed the nurses notes, vitals, problem list, allergy list, medical history, family, social history and medications.         REVIEW OF SYMPTOMS      General: Pt denies excessive weight gain or loss. Pt is able to conduct ADL's  HEENT: Denies blurred vision, headaches, hearing loss, epistaxis and difficulty swallowing. Respiratory: Denies cough, congestion, shortness of breath, ABDUL, wheezing or stridor. Cardiovascular: Denies precordial pain, palpitations, edema or PND  Gastrointestinal: Denies poor appetite, indigestion, abdominal pain or blood in stool  Genitourinary: Denies hematuria, dysuria, increased urinary frequency  Musculoskeletal: Denies joint pain or swelling from muscles or joints  Neurologic: Denies tremor, paresthesias, headache, or sensory motor disturbance  Psychiatric: Denies confusion, insomnia, depression  Integumentray: Denies rash, itching or ulcers. Hematologic: Denies easy bruising, bleeding         PHYSICAL EXAMINATION      Vitals: see vitals section  General: Well developed, in no acute distress. HEENT: No jaundice, oral mucosa moist, no oral ulcers  Neck: Supple, no stiffness, no lymphadenopathy, supple  Heart:  Normal S1/S2 negative S3 or S4. Regular, no murmur, gallop or rub, no jugular venous distention  Respiratory: Clear bilaterally x 4, no wheezing or rales  Abdomen:   Soft, non-tender, bowel sounds are active.   Extremities:  No edema, normal cap refill, no cyanosis. Musculoskeletal: No clubbing, no deformities  Neuro: A&Ox3, speech clear, gait stable, cooperative, no focal neurologic deficits  Skin: Skin color is normal. No rashes or lesions.  Non diaphoretic, moist.  Vascular: 2+ pulses symmetric in all extremities         DIAGNOSTIC DATA      EKG:          LABORATORY DATA            Lab Results   Component Value Date/Time     WBC 5.1 06/25/2020 02:35 PM     HGB 9.4 (L) 06/25/2020 02:35 PM     HCT 29.5 (L) 06/25/2020 02:35 PM     PLATELET 406 06/25/2020 02:35 PM     .7 (H) 06/25/2020 02:35 PM            Lab Results   Component Value Date/Time     Sodium 138 06/25/2020 02:35 PM     Potassium 4.3 06/25/2020 02:35 PM     Chloride 103 06/25/2020 02:35 PM     CO2 28 06/25/2020 02:35 PM     Anion gap 7 06/25/2020 02:35 PM     Glucose 111 (H) 06/25/2020 02:35 PM     BUN 24 (H) 06/25/2020 02:35 PM     Creatinine 1.15 06/25/2020 02:35 PM     BUN/Creatinine ratio 21 (H) 06/25/2020 02:35 PM     GFR est AA >60 06/25/2020 02:35 PM     GFR est non-AA >60 06/25/2020 02:35 PM     Calcium 8.9 06/25/2020 02:35 PM     Bilirubin, total 0.5 06/09/2020 02:24 AM     Alk. phosphatase 54 06/09/2020 02:24 AM     Protein, total 6.4 06/09/2020 02:24 AM     Albumin 3.2 (L) 06/09/2020 02:24 AM     Globulin 3.2 06/09/2020 02:24 AM     A-G Ratio 1.0 (L) 06/09/2020 02:24 AM     ALT (SGPT) 15 06/09/2020 02:24 AM             ASSESSMENT      1. Atrial fibrillation              A. CHADSVASC 4  2. Hypertension  3. Dyslipidemia  4. Patent foramen ovale  5. Aortic stenosis s/p AVR  6. Mitral regurgitation s/p MVR  7. Tricuspid regurgitation s/p prosthetic valve  8. DEL               A. Noncompliant with CPAP  9. Radiation proctitis  10. GI bleeding history  11.  CAD, native         PLAN      WATCHMAN        Jared Santoro MD  Cardiac Electrophysiology / Cardiology     [address] [place] [address], [address] [address] [address], [address]  [address]  [phone] / [phone] Fax                                    [phone] / [phone] Fax

## 2020-10-13 NOTE — Clinical Note
TRANSFER - OUT REPORT:     Verbal report given to: BEDSIDE. Report consisted of patient's Situation, Background, Assessment and   Recommendations(SBAR). Opportunity for questions and clarification was provided. Patient transported to: [facility].

## 2020-10-13 NOTE — Clinical Note
Sheath #1: Sheath: inserted. Sheath inserted/placed in the right femoral  vein.  INSERTED VIA THE 16FR SHEATH

## 2020-10-13 NOTE — ANESTHESIA PROCEDURE NOTES
DEWEY        Procedure Details: probe placement, image aquisition & interpretation    Risks and benefits discussed with the patient and plans are to proceed    Procedure Note    Performed by: Nathaly Parker MD  Authorized by:  Nathaly Parker MD       Indications: assessment of surgical repair  Modalities: 2D, CF  Probe Type: multiplane  Insertion: atraumatic  Patient Status: intubated and sedated    Echocardiographic and Doppler Measurements   Aorta  Size  Diam(cm)  Dissection PlaqueThick(mm)  Plaque Mobile    Ascending normal  No  No    Arch normal  No  No    Descending normal  No  No          Valves  Annulus  Stenosis  Area/Grad  Regurg  Leaflet   Morph  Leaflet   Motion    Aortic bioprosthetic mild, moderate  0 thickened normal    Mitral bioprosthetic none moderate MR s/p mitral annuloplasty 2+ normal normal    Tricuspid normal none mild TR 1+ normal normal          Atria  Size  SEC (smoke)  Thrombus  Tumor  Device    Rt Atrium normal No No No No    Lt Atrium dilated No No No No     Interatrial Septum Morphology: atrial septal defect, shunt: left to right    Interventricular Septum Morphology: normal    Ventricle  Cavity Size  Cavity Dimension Hypertrophy  Thrombus  Gloal FXN  EF    RV dilated  No no normal     LV normal  Yes No mildly impaired 45-50       Regional Function  (1 = normal, 2 = mildly hypokinetic, 3 = severely hypokinetic, 4 = akinetic, 5 = dyskinetic) LAV - [place]   ME LAV = 0  ME LAV = 90  ME LAV = 130   Basal Sept:1 Basal Ant:1 Basal Post:1   Mid Sept:1 Mid Ant:1 Mid Post:1   Apical Sept:1 Apical Ant:1 Basal Ant Sept:1   Basal Lat:1 Basal Inf:1 Mid Ant Sept:1   Mid Lat:1 Mid Inf:1    Apical Lat:1 Apical Inf:1        Pericardium: normal    Post Intervention Follow-up Study  Ventricular Global Function: unchanged  Ventricular Regional Function: unchanged     Valve  Function  Regurgitation  Area    Aortic no change      Mitral no change      Tricuspid no change      Prosthetic Complications: None  Comments: 24 mm watchman device deployed in the Left atrial appendage- good compression, device in stable position

## 2020-10-13 NOTE — PROGRESS NOTES
Cardiac Cath Lab Recovery Arrival Note:      Mario Hilliard arrived to Cardiac Cath Lab, Recovery Area. Staff introduced to patient. Patient identifiers verified with NAME and DATE OF BIRTH. Procedure verified with patient. Consent forms reviewed and signed by patient or authorized representative and verified. Allergies verified. Patient and family oriented to department. Patient and family informed of procedure and plan of care. Questions answered with review. Patient prepped for procedure, per orders from physician, prior to arrival.    Patient on cardiac monitor, non-invasive blood pressure, SPO2 monitor. On room air. Patient is A&Ox 4. Patient reports no complaints. Patient in stretcher, in low position, with side rails up, call bell within reach, patient instructed to call if assistance as needed. Patient prep in: [address], [place] [address]. Patient family has pager # [phone]  Family in: wife in [address] [facility] [phone]  Pt had a Ua specimen, no labs drawn prior to procedure, Dr Yessy Decker made aware.    Prep by: Farhan Parikh RN  T&S completed / anesthesia

## 2020-10-13 NOTE — PROGRESS NOTES
TRANSFER - OUT REPORT:    Verbal report given to Inessa RATLIFF(name) on Renay Kumar  being transferred to CVSU(unit) for routine progression of care       Report consisted of patients Situation, Background, Assessment and   Recommendations(SBAR). Information from the following report(s) SBAR, Kardex, Procedure Summary, Intake/Output, MAR, Accordion, Recent Results, Med Rec Status and Cardiac Rhythm at fib rare PVC was reviewed with the receiving nurse. Lines:   Peripheral IV 10/13/20 Right Forearm (Active)      Oumou removed right radial  Tolerated liquids no solids  Opportunity for questions and clarification was provided.       Patient transported with:   Monitor  Registered Nurse

## 2020-10-13 NOTE — ANESTHESIA POSTPROCEDURE EVALUATION
Post-Anesthesia Evaluation and Assessment    Patient: Marybeth Silva MRN: [id number]  SSN: [id number]    YOB: 1940  Age: 79 y.o. Sex: male      I have evaluated the patient and they are stable and ready for discharge from the PACU. Cardiovascular Function/Vital Signs  Visit Vitals  BP (!) 124/52 (BP 1 Location: Left arm, BP Patient Position: At rest)   Pulse 62   Temp 36.6 °C (97.8 °F)   Resp 20   Ht 5' 10\" (1.778 m)   Wt 94.3 kg (208 lb)   SpO2 96%   BMI 29.84 kg/m²       Patient is status post General anesthesia for Procedure(s):  WATCHMAN YIMI CLOSURE DEVICE. Nausea/Vomiting: None    Postoperative hydration reviewed and adequate. Pain:  Pain Scale 1: Numeric (0 - 10) (10/13/20 1325)  Pain Intensity 1: 0 (10/13/20 1325)   Managed    Neurological Status: At baseline    Mental Status, Level of Consciousness: Alert and  oriented to person, place, and time    Pulmonary Status:   O2 Device: Room air (10/13/20 1345)   Adequate oxygenation and airway patent    Complications related to anesthesia: None    Post-anesthesia assessment completed. No concerns    Signed By: Alondra Carranza MD     October 13, 2020              Procedure(s):  WATCHMAN YIMI CLOSURE DEVICE. general    <BSHSIANPOST>    INITIAL Post-op Vital signs:   Vitals Value Taken Time   BP     Temp     Pulse 66 10/13/2020  2:59 PM   Resp     SpO2 97 % 10/13/2020  2:59 PM   Vitals shown include unvalidated device data.

## 2020-10-13 NOTE — PROGRESS NOTES
1630: TRANSFER - IN REPORT:    Verbal report received from Kristal (name) on Rose Mary Fall  being received from cath lab(unit) for routine progression of care      Report consisted of patients Situation, Background, Assessment and   Recommendations(SBAR). Information from the following report(s) SBAR and Kardex was reviewed with the receiving nurse. Opportunity for questions and clarification was provided. Assessment completed upon patients arrival to unit and care assumed. 1930: Bedside shift change report given to [place]ia Marmolejo (oncoming nurse) by Anjali Benson RN (offgoing nurse). Report included the following information SBAR and Kardex.          Problem: Patient Education: Go to Patient Education Activity  Goal: Patient/Family Education  Outcome: Progressing Towards Goal

## 2020-10-13 NOTE — Clinical Note
Transseptal Cath Performed check box under hemodynamic and Fluoro, utilizing a standard needle, [place] 98cm via a guiding sheath. Needle inserted.

## 2020-10-13 NOTE — ANESTHESIA PREPROCEDURE EVALUATION
Anesthetic History   No history of anesthetic complications            Review of Systems / Medical History  Patient summary reviewed, nursing notes reviewed and pertinent labs reviewed    Pulmonary        Sleep apnea           Neuro/Psych   Within defined limits           Cardiovascular  Within defined limits  Hypertension        Dysrhythmias : atrial fibrillation  CAD      Comments: S/p AVR    GI/Hepatic/Renal     GERD           Endo/Other        Arthritis     Other Findings              Physical Exam    Airway  Mallampati: II  TM Distance: > 6 cm  Neck ROM: normal range of motion   Mouth opening: Normal     Cardiovascular  Regular rate and rhythm,  S1 and S2 normal,  no murmur, click, rub, or gallop             Dental  No notable dental hx       Pulmonary  Breath sounds clear to auscultation               Abdominal  GI exam deferred       Other Findings            Anesthetic Plan    ASA: 3  Anesthesia type: general    Monitoring Plan: Arterial line      Induction: Intravenous  Anesthetic plan and risks discussed with: Patient

## 2020-10-13 NOTE — Clinical Note
Transseptal Cath Performed check box under hemodynamic and Fluoro, utilizing a standard needle, [place] 98cm via a guiding sheath. Needle removed.

## 2020-10-13 NOTE — ANESTHESIA PROCEDURE NOTES
Arterial Line Placement    Performed by: Abby Aviles MD  Authorized by:  Daniel Sterling MD     Pre-Procedure  Indications:  Arterial pressure monitoring  Preanesthetic Checklist: patient identified, risks and benefits discussed, site marked, patient being monitored, timeout performed and patient being monitored      Procedure:   Prep:  Chlorhexidine  Seldinger Technique?: Yes    Orientation:  Right  Location:  Radial artery  Catheter size:  20 G  Number of attempts:  1  Cont Cardiac Output Sensor: No      Assessment:   Post-procedure:  Line secured and sterile dressing applied  Patient Tolerance:  Patient tolerated the procedure well with no immediate complications

## 2020-10-13 NOTE — Clinical Note
sheath exchanged for SHEATH VASC ACCS SYS 14FR L75CM NIT PTFE DBL CRV W/ DIL FOR.  INSERTED VIA THE 16FR SHEATH OVER THE Spero Mejia

## 2020-10-13 NOTE — PROGRESS NOTES
TRANSFER - IN REPORT:    Verbal report received from [facility] and anesthesia dept on Luther Larios  being received from procedure for routine progression of care. Report consisted of patients Situation, Background, Assessment and Recommendations(SBAR). Information from the following report(s) Procedure Summary, Intake/Output, MAR, Accordion and Recent Results was reviewed with the receiving clinician. Opportunity for questions and clarification was provided. Assessment completed upon patients arrival to [address] [place] [address] and care assumed. Cardiac Cath Lab Recovery Arrival Note:    Luther Larios arrived to [facility] recovery area. Patient procedure= DEWEY/watchman insertion. Patient on cardiac monitor, non-invasive blood pressure, SPO2 monitor. On room air. IV  of nacl on pump at 25 ml/hr. Patient status doing well without problems. Patient is A&Ox 4. Patient reports no complaints. PROCEDURE SITE CHECK:    Procedure site:without any bleeding and or hematoma, \"mild pain in throat\" pain/discomfort reported at procedure site. No change in patient status. Continue to monitor patient and status.

## 2020-10-13 NOTE — PROCEDURES
Cardiac Electrophysiology Report      PATIENT INFORMATION        Patient Name: Luther Larios  MRN: [id number]           Study Date: 10/13/2020    YOB: 1940   Age: 79 y.o. Gender: male      Procedure:  Endocardial Left Atrial Appendage OcclusionRa    Referring Physician:  Jovan Atwood MD and Dr. Niko Nava Name   Electrophysiologist Mariam Mckinney MD   Monitor Anesthesia Service   Circulator Jefe Wayen RN; Marga Laws RN; RAN Johnston; RAN Conteh       PATIENT HISTORY     Nemo Boyce a [de-identified] y. o. male with atrial fibrillation, CAD, hypertension, AVR/MVR/TVR, dyslipidemia, prostate cancer/radiation proctitis and GI bleeding. He is currently on eliquis and continues to have rectal bleeding. He now presents for possible left atrial appendage occlusion. PROCEDURE     The patient was brought to the Cardiac Electrophysiology laboratory in a post-absorptive, fasting state. Informed consent was obtained. A peripheral IV was in place. Continuous electrocardiographic, blood pressure, O2 saturation and  CO2 monitoring was initiated. Self-adhesive cardioversion patches were positioned on the chest. 500 cc of normal saline was administered intravenously. General anesthesia was effectuated by the anesthesia service. Intraoperative transesophageal imaging was performed by the anesthesia service. There was no evidence of visualized thrombus in the left atrial appendage. Measurements of the YIMI ostium width and YIMI depth were performed. The patient was then prepped and draped in the usual sterile fashion. Both groins were infiltrated with a 50/50 mixture of Lidocaine (1%) and bupivicaine (0.5%). Vascular access was obtained and an SL1 sheath and an 8F sheath were placed in the right common femoral vein using the modified Seldinger technique.  Through the SL1 sheath, a 0.032, 145-cm [employer] wire was advanced to the level of the superior vena cava. After the guidewire was withdrawn, a Irvin transseptal needle was introduced into the sheath. The needle/sheath assembly was withdrawn under fluoroscopic and intraoperative DEWEY guidance until the tip of the sheath prolapsed into the fossa ovalis. Appropriate positioning was confirmed with multiple fluoroscopic views and DEWEY imaging. Transseptal access was obtained following delivery of RF energy with the [facility] needle. Systemic heparinization was initiated and the sheath was flushed continuously with heparinized saline. Anticoagulation status was monitored with frequent ACT measurements. Heparin was given in interrupted doses to maintain an ACT > 300 sec. The dilator was advanced over the wire, followed by the sheath. Mean left arterial pressure was measured and was found to be 22 mm Hg. The 0.032 wire was then exchanged for the [facility] wire which was positioned into the left superior pulmonary vein. The dilator was then removed over the wire. A [employer] double curve access sheath was then advanced over the wire into the left atrium. A 6F pigtail was then advanced over the wire and was then positioned into the left atrial appendage. Arteriogram of the left atrial appendage was performed. The pigtail was then removed. The WATCHMAN device delivery system was then advanced to the tip of the access sheath. The WATCHMAN device was then deployed. Device release criteria were met and the device was deployed. The access sheath/device delivery system was then withdrawn to the right atrium. There was no pericardial effusion noted at the conclusion of the procedure. Following a test dose, protamine 40 mg was administered and once the ACT was found to be < 180 seconds, all catheters and sheaths were removed and hemostasis obtained by utilizing suture mediated closure devices. The patient was extubated, hemodynamically stable, tolerated the procedure well and was transferred in stable condition. There were no immediate complications encountered during the procedure. There was minimal blood loss and no specimen were removed. WATCHMAN DEVICE DATA      Size   [employer] 24 mm       FINDINGS     1. The baseline ECG revealed sinus rhythm  2. Endocardial left atrial appendage occlusion was performed utilizing a 24 mm WATCHMAN device. 3. All pass criteria for position, anchor, size and seal were met (positive tug test, no evidence of para-device leak by color flow Doppler ultrasound imaging on DEWEY, no evidence of para-device flow leak on arteriogram on fluoroscopy and there was > 8% compression of the device on DEWEY). 4. No evidence of early pericardial effusion on DEWEY post-device deployment. CONCLUSIONS      1. Successful left atrial appendage occlusion utilizing WATCHMAN device. 2. Monitor on telemetry overnight. 3. Start eliquis  4. Limited echocardiogram tomorrow to evaluate for late pericardial effusion. 5. Resume all other home medications  6. Follow up in 2 weeks in EP clinic with Dr. Nina Jacobsen   7. Repeat DEWEY in 45 days. Thank you, Christina Souza MD and Dr. Дмитрий Medina for allowing me to participate in the care of this extraordinarily pleasant male.        Martha Liz MD  Cardiac Electrophysiology / Cardiology    [address]  [address] [facility], [address] [place] [address], [address]  [address]                Jordi Mota  [phone] / [phone] Fax   [phone] / [phone] Fax

## 2020-10-14 ENCOUNTER — APPOINTMENT (OUTPATIENT)
Dept: NON INVASIVE DIAGNOSTICS | Age: 80
DRG: 274 | End: 2020-10-14
Attending: INTERNAL MEDICINE
Payer: MEDICARE

## 2020-10-14 VITALS
WEIGHT: 198.41 LBS | HEIGHT: 70 IN | RESPIRATION RATE: 18 BRPM | DIASTOLIC BLOOD PRESSURE: 62 MMHG | OXYGEN SATURATION: 98 % | SYSTOLIC BLOOD PRESSURE: 108 MMHG | TEMPERATURE: 98.1 F | BODY MASS INDEX: 28.41 KG/M2 | HEART RATE: 67 BPM

## 2020-10-14 LAB
ECHO LV EDV A2C: 122.85 ML
ECHO LV EDV A4C: 148.03 ML
ECHO LV EDV BP: 137.08 ML (ref 67–155)
ECHO LV EDV INDEX A4C: 71.2 ML/M2
ECHO LV EDV INDEX BP: 65.9 ML/M2
ECHO LV EDV NDEX A2C: 59.1 ML/M2
ECHO LV EJECTION FRACTION A2C: 63 PERCENT
ECHO LV EJECTION FRACTION A4C: 59 PERCENT
ECHO LV EJECTION FRACTION BIPLANE: 61.7 PERCENT (ref 55–100)
ECHO LV ESV A2C: 46.04 ML
ECHO LV ESV A4C: 60.61 ML
ECHO LV ESV BP: 52.51 ML (ref 22–58)
ECHO LV ESV INDEX A2C: 22.1 ML/M2
ECHO LV ESV INDEX A4C: 29.1 ML/M2
ECHO LV ESV INDEX BP: 25.2 ML/M2
ERYTHROCYTE [DISTWIDTH] IN BLOOD BY AUTOMATED COUNT: 14.3 % (ref 11.5–14.5)
HCT VFR BLD AUTO: 26.4 % (ref 36.6–50.3)
HGB BLD-MCNC: 8.6 G/DL (ref 12.1–17)
MCH RBC QN AUTO: 31.9 PG (ref 26–34)
MCHC RBC AUTO-ENTMCNC: 32.6 G/DL (ref 30–36.5)
MCV RBC AUTO: 97.8 FL (ref 80–99)
NRBC # BLD: 0 K/UL (ref 0–0.01)
NRBC BLD-RTO: 0 PER 100 WBC
PLATELET # BLD AUTO: 89 K/UL (ref 150–400)
PMV BLD AUTO: 9.6 FL (ref 8.9–12.9)
RBC # BLD AUTO: 2.7 M/UL (ref 4.1–5.7)
WBC # BLD AUTO: 4.7 K/UL (ref 4.1–11.1)

## 2020-10-14 PROCEDURE — 74011250637 HC RX REV CODE- 250/637: Performed by: SPECIALIST

## 2020-10-14 PROCEDURE — C8923 2D TTE W OR W/O FOL W/CON,CO: HCPCS

## 2020-10-14 PROCEDURE — 85027 COMPLETE CBC AUTOMATED: CPT

## 2020-10-14 PROCEDURE — 36415 COLL VENOUS BLD VENIPUNCTURE: CPT

## 2020-10-14 PROCEDURE — 74011250636 HC RX REV CODE- 250/636: Performed by: INTERNAL MEDICINE

## 2020-10-14 PROCEDURE — 93308 TTE F-UP OR LMTD: CPT | Performed by: SPECIALIST

## 2020-10-14 RX ORDER — LISINOPRIL 20 MG/1
20 TABLET ORAL DAILY
Qty: 30 TAB | Refills: 0 | Status: SHIPPED | OUTPATIENT
Start: 2020-10-14 | End: 2022-07-12

## 2020-10-14 RX ORDER — DOCUSATE SODIUM 100 MG/1
100 CAPSULE, LIQUID FILLED ORAL 2 TIMES DAILY
Status: DISCONTINUED | OUTPATIENT
Start: 2020-10-14 | End: 2020-10-14 | Stop reason: HOSPADM

## 2020-10-14 RX ORDER — AMLODIPINE BESYLATE 5 MG/1
5 TABLET ORAL DAILY
Qty: 30 TAB | Refills: 0 | Status: SHIPPED | OUTPATIENT
Start: 2020-10-14 | End: 2020-12-08 | Stop reason: SDUPTHER

## 2020-10-14 RX ADMIN — ASPIRIN 81 MG: 81 TABLET, CHEWABLE ORAL at 09:23

## 2020-10-14 RX ADMIN — Medication 10 ML: at 06:14

## 2020-10-14 RX ADMIN — FERROUS SULFATE TAB 325 MG (65 MG ELEMENTAL FE) 325 MG: 325 (65 FE) TAB at 09:23

## 2020-10-14 RX ADMIN — PERFLUTREN 2 ML: 6.52 INJECTION, SUSPENSION INTRAVENOUS at 09:28

## 2020-10-14 RX ADMIN — APIXABAN 2.5 MG: 5 TABLET, FILM COATED ORAL at 09:23

## 2020-10-14 NOTE — DISCHARGE SUMMARY
Cardiology Discharge Summary     Patient ID:  Darby Noriega  [id number]  65 y.o.  1940    Admit Date: 10/13/2020    Discharge Date: 10/14/20    Admitting Physician: Kamille Blair MD     Discharge Physician: Harrison Adams Md    Admission Diagnoses:   Atrial fibrillation, permanent ([address] [I48.21]  Presence of Watchman left atrial appendage closure device [Z95.818]    Discharge Diagnoses: Active Problems:    Presence of Watchman left atrial appendage closure device (10/13/2020)    Anemia    Discharge Condition: Good    Cardiology Procedures this Admission:  Left atrial appendage occlusion/Watchman device. Consults: none    Hospital Course:    Vester Simple a [de-identified] y. o. male with atrial fibrillation, CAD, hypertension, AVR/MVR/TVR, dyslipidemia, prostate cancer/radiation proctitis and GI bleeding. He is currently on eliquis and continues to have rectal bleeding. Thus he underwent left atrial appendage occlusion with watchman device on 10/13/2020. He tolerated procedure well. He was monitored overnight a repeat echocardiogram done on day of discharge showed no pericardial effusion and normal EF. He was ambulatory without dizziness. His blood pressure did run low normal.  Thus his amlodipine and lisinopril dosages were reduced on discharge. A repeat hemoglobin was done. It was 8.6. This was reviewed with Dr. Ronal Boswell. He had no evidence of active bleeding. Groin site was without hematoma or active bleeding. He had no dizziness when he ambulated in the hallways. He will have follow-up with Dr. Kecia Forbes in 2 days and he was instructed to follow-up with his primary care as well. He was given hold parameters for his blood pressure medication. He was discharged on his home dose of Eliquis Eliquis (2.5 mg twice daily). All above reviewed with Dr. Cori Vyas. He was discharged home with his wife. Repeat DEWEY is planned in 45 days. He will follow-up with Dr. adams in 2 weeks.  He has follow up with  Rody in 2 days. Future Appointments   Date Time Provider Vinay Reina   10/16/2020 10:20 AM Loyda Fine MD CAVIR BS AMB           Physical Exam  Abdomen: soft, non-tender. Bowel sounds normal. No masses,  no organomegaly  Extremities: no LE edema, + PP bilaterally  Rt groin site without hematoma, swelling or bleeding. Heart: Irregularly irregular rate and rhythm. II/VI systolic murmur LUSB. Lungs: clear to auscultation bilaterally  Neck: supple, symmetrical,   Neurologic: Grossly normal  Pulses: 2+ and symmetrical    Labs:   Recent Labs     10/14/20  1103 10/13/20  1106   WBC 4.7 4.8   HGB 8.6* 11.8*   HCT 26.4* 35.7*   PLT 89* 152     Recent Labs     10/13/20  1106      K 3.8      CO2 30   *   BUN 19   CREA 0.99   CA 9.3   ALB 4.1   ALT 16   INR 1.1       No results for input(s): TROIQ, CPK, CKMB in the last 72 hours. EKG:   CXR:   Other Diagnostic Tests:  10/13/20   ECHO ADULT FOLLOW-UP OR LIMITED 10/14/2020 10/14/2020    Narrative · Contrast used: DEFINITY. · LV: Estimated LVEF is 55 - 60%. Visually measured ejection fraction. Normal cavity size and systolic function (ejection fraction normal). Wall   motion: normal.  · No evidence of pericardial effusion. Signed by: Julio César Borja MD         Disposition: Home with family    Patient Instructions:   Patient was instructed to continue to take the Eliquis 2.5 mg BID (he takes 1/2 tab of 5 mg tablet BID)  Discharge Medication List as of 10/14/2020 12:51 PM      CONTINUE these medications which have CHANGED    Details   amLODIPine (NORVASC) 5 mg tablet Take 1 Tab by mouth daily. , Print, Disp-30 Tab,R-0Hold for  or less      lisinopriL (PRINIVIL, ZESTRIL) 20 mg tablet Take 1 Tab by mouth daily. , Print, Disp-30 Tab,R-0Take in the evening. Hold if  or less. CONTINUE these medications which have NOT CHANGED    Details   apixaban (Eliquis) 5 mg tablet Take 5 mg by mouth two (2) times a day. Pt takes 1/2 tab twice daily, Historical Med      aspirin delayed-release 81 mg tablet Take 81 mg by mouth daily. , Historical Med      ferrous sulfate 325 mg (65 mg iron) cpER Take 1 Tab by mouth every other day., Historical Med      tamsulosin (FLOMAX) 0.4 mg capsule Take 0.8 mg by mouth two (2) times a day., Historical Med      cholecalciferol (VITAMIN D3) 1,000 unit tablet Take 2,000 Units by mouth two (2) times a day., Historical Med      GLUCOSAMINE HCL/CHONDR CHING A NA (GLUCOSAMINE-CHONDROITIN) 750-600 mg tab Take 1 Tab by mouth daily. , Historical Med      omega-3 fatty acids-vitamin e 1,000 mg cap Take 1 Cap by mouth every other day., Historical Med      acetaminophen (TYLENOL) 325 mg tablet Take  by mouth every four (4) hours as needed for Pain., Historical Med      multivitamin (ONE A DAY) tablet Take 1 Tab by mouth daily. , Historical Med      docusate sodium (COLACE) 100 mg capsule Take 100 mg by mouth two (2) times daily as needed., Historical Med      finasteride (PROSCAR) 5 mg tablet Take 5 mg by mouth nightly., Historical Med      pravastatin (PRAVACHOL) 40 mg tablet Take 40 mg by mouth nightly., Historical Med      polyvinyl alcohol (ARTIFICIAL TEARS, POLYVIN ALC,) 1.4 % ophthalmic solution Administer 1 Drop to both eyes as needed., Historical Med      ipratropium (ATROVENT HFA) 17 mcg/actuation inhaler Take 2 Puffs by inhalation as needed., Historical Med         STOP taking these medications       chlorthalidone (HYGROTEN) 25 mg tablet Comments:   Reason for Stopping:             Called pt at home to notify him of follow up appointment with Dr. Saleem Gardner scheduled on 10/16/20. Also instructed him to increase hold parameter to SBP <120. He reported he arrived home fine. Was feeling well. No dizziness or lightheadedness. Reference discharge instructions provided by nursing for diet and activity.     Follow-up with   Future Appointments   Date Time Provider Vinay Reina   10/16/2020 10:20 AM MD PRABHA Mohr AMB     Dr. Nina Jacobsen 2 weeks. Signed:  Selwyn Monet.  Nesha, NP

## 2020-10-14 NOTE — PROGRESS NOTES
Reason for Admission:  Patient is s/p Watchman procedure                    RUR Score:  12% risk of re-admission                    Plan for utilizing home health:  No home health needs identified at this time, the patient was not open to home health prior to admission         PCP: First and Last name:  Dr. Tomasa Stokes   Name of Practice: [facility]   Are you a current patient: Yes/No: Yes   Approximate date of last visit: N/A   Can you participate in a virtual visit with your PCP: N/A                    Current Advanced Directive/Advance Care Plan: Not on file, spouse is legal NOK                         Transition of Care Plan:   Home                The CM met with the patient and his wife at bedside in order to introduce the role of CM and assess for patient needs. The patient lives in [address] with his wife, verified demographics and insurance information. The patient goes to [facility]- gets prescriptions from the [place]. The patient has walker at home, typically independent with ADLs and mobility, has access to walker if needed. The patient and spouse denied any concerns for discharge home today. The CM will follow for transitions of care needs. Manuel Munoz MSLAURO    Medicare pt has received and reviewed 1st IM letter informing them of their right to appeal the discharge. Copy has been placed on pt bedside chart. Care Management Interventions  PCP Verified by CM: Yes(Patient verified PCP as Dr. Tomasa Stokes )  Mode of Transport at Discharge:  Other (see comment)(Spouse will transport home )  Transition of Care Consult (CM Consult): Discharge Planning  MyChart Signup: Yes  Physical Therapy Consult: No  Occupational Therapy Consult: No  Current Support Network: Own Home, Lives with Spouse  Confirm Follow Up Transport: Family  Discharge Location  Discharge Placement: Home

## 2020-10-14 NOTE — PROGRESS NOTES
0730: Bedside shift change report given to Debbie Paz (oncoming nurse) by Yanelis Mosley RN (offgoing nurse). Report included the following information SBAR, Kardex, and Cardiac Rhythm afib . 1000: patient ambulated in hallway with RN and walker. Tolerated well. Problem: Patient Education: Go to Patient Education Activity  Goal: Patient/Family Education  Outcome: Progressing Towards Goal       1330: I have reviewed discharge instructions with the patient. The patient verbalized understanding. Current Discharge Medication List      CONTINUE these medications which have CHANGED    Details   amLODIPine (NORVASC) 5 mg tablet Take 1 Tab by mouth daily. Qty: 30 Tab, Refills: 0    Comments: Hold for  or less      lisinopriL (PRINIVIL, ZESTRIL) 20 mg tablet Take 1 Tab by mouth daily. Qty: 30 Tab, Refills: 0    Comments: Take in the evening. Hold if  or less. CONTINUE these medications which have NOT CHANGED    Details   apixaban (Eliquis) 5 mg tablet Take 5 mg by mouth two (2) times a day. Pt takes 1/2 tab twice daily      aspirin delayed-release 81 mg tablet Take 81 mg by mouth daily. ferrous sulfate 325 mg (65 mg iron) cpER Take 1 Tab by mouth every other day. tamsulosin (FLOMAX) 0.4 mg capsule Take 0.8 mg by mouth two (2) times a day. cholecalciferol (VITAMIN D3) 1,000 unit tablet Take 2,000 Units by mouth two (2) times a day. GLUCOSAMINE HCL/CHONDR CHING A NA (GLUCOSAMINE-CHONDROITIN) 750-600 mg tab Take 1 Tab by mouth daily. omega-3 fatty acids-vitamin e 1,000 mg cap Take 1 Cap by mouth every other day. acetaminophen (TYLENOL) 325 mg tablet Take  by mouth every four (4) hours as needed for Pain.      multivitamin (ONE A DAY) tablet Take 1 Tab by mouth daily. docusate sodium (COLACE) 100 mg capsule Take 100 mg by mouth two (2) times daily as needed. finasteride (PROSCAR) 5 mg tablet Take 5 mg by mouth nightly.       pravastatin (PRAVACHOL) 40 mg tablet Take 40 mg by mouth nightly. polyvinyl alcohol (ARTIFICIAL TEARS, POLYVIN ALC,) 1.4 % ophthalmic solution Administer 1 Drop to both eyes as needed. ipratropium (ATROVENT HFA) 17 mcg/actuation inhaler Take 2 Puffs by inhalation as needed. STOP taking these medications       chlorthalidone (HYGROTEN) 25 mg tablet Comments:   Reason for Stopping:             Problem: Falls - Risk of  Goal: *Absence of Falls  Description: Document Sepideh Fall Risk and appropriate interventions in the flowsheet.   Outcome: Progressing Towards Goal  Note: Fall Risk Interventions:  Mobility Interventions: Communicate number of staff needed for ambulation/transfer         Medication Interventions: Evaluate medications/consider consulting pharmacy, Patient to call before getting OOB

## 2020-10-14 NOTE — DISCHARGE INSTRUCTIONS
Hold amlodipine (norvasc) and lisinopril if Systolic  or less. Notify PCP or Dr. Clara Cowart if SBP consistently  150 or greater until you establish pcp  If you feel dizzy, take your blood pressure, hold BP meds. Contact your PCP        Continue the Eliquis 2.5 mg (1/2 of the 5 mg tablet). 2 times daily. Left Atrial Appendage Occlusion Procedure  Discharge Instructions      You have just had your left atrial appendage occluded with a WATCHMAN device. There were catheters temporarily placed in your heart through a puncture in the Veins and/or Arteries in your groin. WHAT TO EXPECT      If you have had a WATCHMAN device procedure please be aware that you may experience mild chest pain that will resolve within 24 hours. If the Chest Pain begins radiating down your shoulders or arms, becomes severe or breathing becomes difficult, call 911 or go to the closest emergency room.  Mild to moderate, non-painful, bruising or swelling at the puncture site is not un-common, and will resolve in 7 - 10 days.  If a closure device was used to seal your artery or vein, a separate pamphlet will be given to you with these discharge instructions. It is very important that you review the information in the pamphlet for different restrictions and precautions.  You may have a small gauze dressing applied to the puncture site in your groin. You may remove this the following morning. MEDICATIONS      Take only the medications prescribed to you at discharge. ACTIVITY      A responsible adult must take you home. Do not drive a car for 72 hours from the date of your procedure.  Rest quietly for the remainder of the day.  Do not lift anything greater than 10 pounds for 3 days.  Limit bending at the puncture site and use of stairs for at least 3 days.  You may remove the bandage and shower the morning after the procedure. Do not take a bath for 3 days.       SYMPTOMS THAT NEED TO BE REPORTED IMMEDIATELY      Bleeding at the puncture site. If there is bleeding, lie down and hold firm direct pressure for at least 5 minutes. If the bleeding does not stop, go to the closest emergency room, or call 911.  Temperature more than 100.5 F.   Redness or warmth at the puncture site.  Increasing pain, numbness, coolness or blue discoloration of the extremity where the puncture is located.  Pulsating mass at the puncture site.  A new lump at the puncture site, or increasing swelling at the site. [place]ucos sized \"lumps\" or smaller are common.  Bruising at the puncture site that enlarges or becomes painful (some bruising at the site is common and will go away in 7 - 10 days).  Rapid heart rate or palpitations.  Dizziness, lightheadedness, fainting.  REMEMBER: If you feel something is an emergency or cannot be handled over the phone, call 911 or go to the closest emergency room.       FOLLOW UP CARE     Follow up in EP clinic in 2 weeks          May Rosenbaum MD  Cardiac Electrophysiology / Cardiology    [address]  [address] [place] [address], [facility], [address] [facility]  Ja Decker [address], [facility] [address] [facility]  [phone] / [phone] Fax            [phone] / [phone] Fax

## 2020-10-15 ENCOUNTER — TELEPHONE (OUTPATIENT)
Dept: CARDIOLOGY CLINIC | Age: 80
End: 2020-10-15

## 2020-10-15 NOTE — TELEPHONE ENCOUNTER
Patient is calling to [employer] a fax number as requested.    Fax: Jessi Mayorga at the [facility] [phone]    Phone; [phone]

## 2020-10-16 ENCOUNTER — PATIENT OUTREACH (OUTPATIENT)
Dept: CASE MANAGEMENT | Age: 80
End: 2020-10-16

## 2020-10-16 ENCOUNTER — OFFICE VISIT (OUTPATIENT)
Dept: CARDIOLOGY CLINIC | Age: 80
End: 2020-10-16
Payer: MEDICARE

## 2020-10-16 VITALS
WEIGHT: 202 LBS | BODY MASS INDEX: 28.92 KG/M2 | HEART RATE: 76 BPM | SYSTOLIC BLOOD PRESSURE: 100 MMHG | OXYGEN SATURATION: 96 % | DIASTOLIC BLOOD PRESSURE: 64 MMHG | HEIGHT: 70 IN

## 2020-10-16 DIAGNOSIS — I48.21 ATRIAL FIBRILLATION, PERMANENT (HCC): Primary | ICD-10-CM

## 2020-10-16 DIAGNOSIS — Z95.2 S/P AVR (AORTIC VALVE REPLACEMENT): ICD-10-CM

## 2020-10-16 DIAGNOSIS — R60.0 LOWER EXTREMITY EDEMA: ICD-10-CM

## 2020-10-16 DIAGNOSIS — Z98.890 S/P MVR (MITRAL VALVE REPAIR): ICD-10-CM

## 2020-10-16 DIAGNOSIS — E78.5 DYSLIPIDEMIA: ICD-10-CM

## 2020-10-16 DIAGNOSIS — I10 ESSENTIAL HYPERTENSION: ICD-10-CM

## 2020-10-16 PROCEDURE — G8752 SYS BP LESS 140: HCPCS | Performed by: SPECIALIST

## 2020-10-16 PROCEDURE — 1111F DSCHRG MED/CURRENT MED MERGE: CPT | Performed by: SPECIALIST

## 2020-10-16 PROCEDURE — G8536 NO DOC ELDER MAL SCRN: HCPCS | Performed by: SPECIALIST

## 2020-10-16 PROCEDURE — G8417 CALC BMI ABV UP PARAM F/U: HCPCS | Performed by: SPECIALIST

## 2020-10-16 PROCEDURE — 99214 OFFICE O/P EST MOD 30 MIN: CPT | Performed by: SPECIALIST

## 2020-10-16 PROCEDURE — G8432 DEP SCR NOT DOC, RNG: HCPCS | Performed by: SPECIALIST

## 2020-10-16 PROCEDURE — G0463 HOSPITAL OUTPT CLINIC VISIT: HCPCS | Performed by: SPECIALIST

## 2020-10-16 PROCEDURE — G8427 DOCREV CUR MEDS BY ELIG CLIN: HCPCS | Performed by: SPECIALIST

## 2020-10-16 PROCEDURE — 1101F PT FALLS ASSESS-DOCD LE1/YR: CPT | Performed by: SPECIALIST

## 2020-10-16 PROCEDURE — G8754 DIAS BP LESS 90: HCPCS | Performed by: SPECIALIST

## 2020-10-16 NOTE — PATIENT INSTRUCTIONS
1) stop the lisinopril for now. May need to resume at some point. 2) if bp is on Sunday 140/90 or greater take half off lisinopril which would be 10 mg    3) call me or text me with BP on Monday.  Give me name and date of birth on text7

## 2020-10-16 NOTE — PROGRESS NOTES
CARDIOLOGY OFFICE NOTE    Shayan Dinh MD, [address], [place], [address] [facility]  Phone [phone]; Fax [phone]  Mobile [phone]   Voice Mail [phone]    LAST OFFICE VISIT : Visit date not found  Claire Lincoln MD       ATTENTION:   This medical record was transcribed using an electronic medical records/speech recognition system. Although proofread, it may and can contain electronic, spelling and other errors. Corrections may be executed at a later time. Please feel free to contact us for any clarifications as needed. ICD-10-CM ICD-9-CM   1. Atrial fibrillation, permanent (HCC)  I48.21 427.31   2. S/P AVR (aortic valve replacement)  Z95.2 V43.3   3. S/P MVR (mitral valve repair)  Z98.890 V45.89   4. Lower extremity edema  R60.0 782.3   5. Essential hypertension  I10 401.9   6. Dyslipidemia  E78.5 272.4            Chet Diana is a 79 y.o. male with  referred for negativeHTN, dyslipidemia, and AF aortic valve replacement and mitral valve repair status post maze procedure     . The patient denies chest pain/ shortness of breath, orthopnea, PND, LE edema, palpitations, syncope, presyncope or fatigue. I have personally obtained the history from the patient. HISTORY OF PRESENTING ILLNESS      Chet Diana is a 79 y.o. male   with HTN, dyslipidemia, and AF aortic valve replacement and mitral valve repair status post maze procedure and now status post watchman device referred for follow up. He did well after the watchman device but his blood pressure has been low. His Norvasc was discontinued but is still been not taking his lisinopril 20 mg a day. When he first woke up today his systolic blood pressure was 120. He took 20 mg of lisinopril and his blood pressure fell to 100 when he got here. He is still having a little throat discomfort from being intubated for the procedure but this is all resolving.   He comes in with his wife today    Cardiac risk factors: dyslipidemia, male gender, hypertension  I have personally obtained the history from the patient. ATTENTION:   This medical record was transcribed using an electronic medical records/speech recognition system. Although proofread, it may and can contain electroni c, spelling and other errors. Corrections may be executed at a later time. Please feel free to contact us for any clarifications as needed.        ACTIVE PROBLEM LIST     Patient Active Problem List    Diagnosis Date Noted    Presence of Watchman left atrial appendage closure device 10/13/2020     Priority: 1 - One    GI bleed 06/08/2020    Radiation proctitis     DEL (obstructive sleep apnea)     Insomnia     Atrial fibrillation (HCC)     Hyperlipidemia     Hx of carcinoma in situ of prostate     GERD (gastroesophageal reflux disease)     Chronic pain     CAD (coronary artery disease)     Arthritis     Hypertension 10/26/2015    Hyperlipemia 10/26/2015    Anemia due to acute blood loss 12/26/2014    Aortic stenosis 12/23/2014    S/P AVR (aortic valve replacement) 12/23/2014    S/P MVR (mitral valve repair) 12/23/2014    S/P Maze operation for atrial fibrillation 12/23/2014    Aortic insufficiency 12/08/2014    A-fib ([address] 06/17/2013    Arthritis of knee 07/09/2012           PAST MEDICAL HISTORY     Past Medical History:   Diagnosis Date    Arthritis     Atrial fibrillation ([address]     CAD (coronary artery disease)     Chronic pain     legs/knee    GERD (gastroesophageal reflux disease)     Hx of carcinoma in situ of prostate     Hyperlipidemia     Hypertension     Insomnia     DEL (obstructive sleep apnea)     Radiation proctitis     Vitamin D deficiency            PAST SURGICAL HISTORY     Past Surgical History:   Procedure Laterality Date    COLONOSCOPY N/A 5/8/2020    COLONOSCOPY performed by Akila Garcia MD at [address] [place] [address] COLONOSCOPY N/A 6/8/2020    COLONOSCOPY performed by Ivone Odonnell MD at [facility] ENDOSCOPY    HX AORTIC VALVE REPLACEMENT  2015    and mitral valve repair, left atrial cryo maze    HX HEENT      melanoma removed head    HX HERNIA REPAIR  2009    Right    HX HERNIA REPAIR      left inguinal hernia repair    HX HERNIA REPAIR Left 12/01/2016    lap left inguinal hernia repair with mesh    HX KNEE REPLACEMENT      Bilateral    HX ORTHOPAEDIC      BILATERAL KNEE REPLACEMENT    HX ORTHOPAEDIC      CARPEL TUNNEL REPAIR-RIGHT    HX OTHER SURGICAL  2015    closure of patent foramen ovale    HX PROSTATE SURGERY      42 radiation treatments          ALLERGIES     No Known Allergies       FAMILY HISTORY     Family History   Problem Relation Age of Onset    Cancer Mother     Cancer Father         prostrate    Cancer Brother         prostrate ca    Anesth Problems Neg Hx     negative for cardiac disease       SOCIAL HISTORY     Social History     Socioeconomic History    Marital status:      Spouse name: Not on file    Number of children: Not on file    Years of education: Not on file    Highest education level: Not on file   Tobacco Use    Smoking status: Never Smoker    Smokeless tobacco: Never Used   Substance and Sexual Activity    Alcohol use: Yes     Alcohol/week: 3.0 standard drinks     Types: 3 Cans of beer per week    Drug use: No    Sexual activity: Not Currently         MEDICATIONS     Current Outpatient Medications   Medication Sig    lisinopriL (PRINIVIL, ZESTRIL) 20 mg tablet Take 1 Tab by mouth daily.  apixaban (Eliquis) 5 mg tablet Take 5 mg by mouth two (2) times a day. Pt takes 1/2 tab twice daily    aspirin delayed-release 81 mg tablet Take 81 mg by mouth daily.  polyvinyl alcohol (ARTIFICIAL TEARS, POLYVIN ALC,) 1.4 % ophthalmic solution Administer 1 Drop to both eyes as needed.  ipratropium (ATROVENT HFA) 17 mcg/actuation inhaler Take 2 Puffs by inhalation as needed.     ferrous sulfate 325 mg (65 mg iron) cpER Take 1 Tab by mouth every other day.  tamsulosin (FLOMAX) 0.4 mg capsule Take 0.8 mg by mouth two (2) times a day.  cholecalciferol (VITAMIN D3) 1,000 unit tablet Take 2,000 Units by mouth two (2) times a day.  GLUCOSAMINE HCL/CHONDR CHING A NA (GLUCOSAMINE-CHONDROITIN) 750-600 mg tab Take 1 Tab by mouth daily.  omega-3 fatty acids-vitamin e 1,000 mg cap Take 1 Cap by mouth every other day.  acetaminophen (TYLENOL) 325 mg tablet Take  by mouth every four (4) hours as needed for Pain.  multivitamin (ONE A DAY) tablet Take 1 Tab by mouth daily.  docusate sodium (COLACE) 100 mg capsule Take 100 mg by mouth two (2) times daily as needed.  finasteride (PROSCAR) 5 mg tablet Take 5 mg by mouth nightly.  pravastatin (PRAVACHOL) 40 mg tablet Take 40 mg by mouth nightly.  amLODIPine (NORVASC) 5 mg tablet Take 1 Tab by mouth daily. No current facility-administered medications for this visit. I have reviewed the nurses notes, vitals, problem list, allergy list, medical history, family, social history and medications. REVIEW OF SYMPTOMS      General: Pt denies excessive weight gain or loss. Pt is able to conduct ADL's  HEENT: Denies blurred vision, headaches, hearing loss, epistaxis and difficulty swallowing. Respiratory: Denies cough, congestion, shortness of breath, ABDUL, wheezing or stridor. Cardiovascular: Denies precordial pain, palpitations, edema or PND  Gastrointestinal: Denies poor appetite, indigestion, abdominal pain or blood in stool  Genitourinary: Denies hematuria, dysuria, increased urinary frequency  Musculoskeletal: Denies joint pain or swelling from muscles or joints  Neurologic: Denies tremor, paresthesias, headache, or sensory motor disturbance  Psychiatric: Denies confusion, insomnia, depression  Integumentray: Denies rash, itching or ulcers.   Hematologic: Denies easy bruising, bleeding     PHYSICAL EXAMINATION      Vitals:    10/16/20 1049   BP: 100/64 Pulse: 76   SpO2: 96%   Weight: 202 lb (91.6 kg)   Height: 5' 10\" (1.778 m)     General: Well developed, in no acute distress. HEENT: No jaundice, oral mucosa moist, no oral ulcers  Neck: Supple, no stiffness, no lymphadenopathy, supple  Heart:  Normal S1/S2 negative S3 or S4. Regular, no murmur, gallop or rub, no jugular venous distention  Respiratory: Clear bilaterally x 4, no wheezing or rales  Abdomen:   Soft, non-tender, bowel sounds are active. Extremities:  No edema, normal cap refill, no cyanosis. Musculoskeletal: No clubbing, no deformities  Neuro: A&Ox3, speech clear, gait stable, cooperative, no focal neurologic deficits  Skin: Skin color is normal. No rashes or lesions. Non diaphoretic, moist.  Vascular: 2+ pulses symmetric in all extremities           DIAGNOSTIC DATA     1.  Echocardiogram                                     9/5/14 Left ventricle: Systolic function was normal. Ejection fraction was  estimated in the range of 55 % to 60 %. There were no regional wall motion  abnormalities. Left atrium: The atrium was mildly dilated. 4/20/15- EF 48%, SHANTANU, atrial septum- poss PFO, MR mild, TR mild/mod, Pulm HTN mod, AV bioprosthesis normal function  6/19/17- EF 45-50%, RVE, SHANTANU, MR/TR mild/mod, AV bioprosthesis exhibits normal function, severe Pulm HTN, KY mild  Atrial septum: There was a secundum septal defect. Color Doppler  evaluation was performed. There was a mild left-to-right shunt. 12/11/18 TTE: LVEF 50%, no WMAs, wall thickness mod incr. RV mild-mod dilated, LA mod-sev dilated, RA mod dilated, mod MR, mod TR, mod pulm HTN, mild PV regurg. AV w/ bioprosthesis, no AS / no AR  3/16/20-EF 50-55%, BVE, Mild concentric hypertrophy, SHANTANU, Mitral valve thickening and repaired with annuloplasty ring. Surgical repair, mild MR/TR, AV leaflet calcification Aortic valve mean gradient is 20.7 mmHg. Aortic valve area is 1.3 cm2. Mild AS, 26 mm bioprosthetic aortic valve.   10/14/20- Limited-DEFINITY- EF 55-60%, No evidence of pericardial effusion. 2.  Myocardial perfusion study                      (9/5/14)Normal EF ;Normal perfusion    3. Cholesterol profile                            (10/6/15): , HDL 35, ,           (4/19/16): , HDL 72, LDL 65.6,   (05/01/17)- , HDL 85, LDL 65 , TG 55  11/2/17- , HDL 73, TG 75  12/11/18- , HDL 61, LDL 32, TG 60    4. LE venous duplex                                           (10/10/14)  Right leg mod. 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 distally  No DVT  1/24/18 - No DVT, As an incidental finding, there is evidence of valve incompetence  (reflux) involving the left popliteal vein. 5. Watchman 10/13/20    6. Carotid Doppler  5/11/20- 1-49% Kris         LABORATORY DATA            Lab Results   Component Value Date/Time    WBC 4.7 10/14/2020 11:03 AM    HGB 8.6 (L) 10/14/2020 11:03 AM    HCT 26.4 (L) 10/14/2020 11:03 AM    PLATELET 89 (L) 47/16/1243 11:03 AM    MCV 97.8 10/14/2020 11:03 AM      Lab Results   Component Value Date/Time    Sodium 140 10/13/2020 11:06 AM    Potassium 3.8 10/13/2020 11:06 AM    Chloride 103 10/13/2020 11:06 AM    CO2 30 10/13/2020 11:06 AM    Anion gap 7 10/13/2020 11:06 AM    Glucose 106 (H) 10/13/2020 11:06 AM    BUN 19 10/13/2020 11:06 AM    Creatinine 0.99 10/13/2020 11:06 AM    BUN/Creatinine ratio 19 10/13/2020 11:06 AM    GFR est AA >60 10/13/2020 11:06 AM    GFR est non-AA >60 10/13/2020 11:06 AM    Calcium 9.3 10/13/2020 11:06 AM    Bilirubin, total 0.7 10/13/2020 11:06 AM    Alk. phosphatase 75 10/13/2020 11:06 AM    Protein, total 7.7 10/13/2020 11:06 AM    Albumin 4.1 10/13/2020 11:06 AM    Globulin 3.6 10/13/2020 11:06 AM    A-G Ratio 1.1 10/13/2020 11:06 AM    ALT (SGPT) 16 10/13/2020 11:06 AM           ASSESSMENT/RECOMMENDATIONS:.      1. AF  - Rate is under good control. He will come off his Eliquis at some point when he sees Dr. adams  -consider watchman device. He is using aspirin. Still will need another transesophageal echo  -he recently had 2 clips in bowel wall where ulcers are from radiation treatment  -He is to continue taking aspirin a day secondary to the PFO  -PFO places him at risk of CVA  -Karen Fernando MD    2. LE edema  -Edema appears to be better he has changes of chronic venous insufficiency    3. HTN  - Blood pressure is low today he will continue to hold his Norvasc and I stopped his lisinopril for the time being we will continue to follow his blood pressure and I instructed him when to resume his lisinopril at 10 mg a day should his blood pressure go above 140/90. He will text me on Monday with his results     4. Dyslipidemia  - Being followed by Toni Castaneda MD   - Lipids are at goal on current medical regimen. 5. Moderate PFO with left to right flow and CHRISTOPHER  - Last echo showed an EF of 50%. 6. AS and MR s/p AVR and MVR on 1/8/15  - He has moderate MR and moderate TR and bioprosthetic valves. Last echo demonstrated mild left regurgitation mild tricuspid regurgitation aortic valve area 1.3 cm² with mild left ear. There is a 26 mm bioprosthetic aortic valve and a mitral valve repair. 7. DEL  -he is not wearing CPAP  -      8. Return in 3 months or PRN. No orders of the defined types were placed in this encounter. We discussed the expected course, resolution and complications of the diagnosis(es) in detail. Medication risks, benefits, costs, interactions, and alternatives were discussed as indicated. I advised him to contact the office if his condition worsens, changes or fails to improve as anticipated. He expressed understanding with the diagnosis(es) and plan          Follow-up and Dispositions  ·   Return in about 3 months (around 1/16/2021). I have discussed the diagnosis with  Samia Mohan and the intended plan as seen in the above orders. Questions were answered concerning future plans.   I have discussed medication side effects and warnings with the patient as well. Thank you,  Christina Souza MD for involving me in the care of  Mary Huitron. Please do not hesitate to contact me for further questions/concerns. Shayan Fine MD, [address]      [address]      [phone] / [phone] Fax

## 2020-10-16 NOTE — PROGRESS NOTES
Patient was admitted to [facility] on 10/13/2020 and discharged on 10/14/2020 for presence of Watchman left atrial appendage closure device  . Patient was contacted within 2 business days of discharge. Top Discharge Challenges to be reviewed by the provider   Additional needs identified to be addressed with provider no  medication  Discussed COVID-19 related testing which was available at this time. Test results were negative. Patient informed of results, if available? no   Method of communication with provider : none       Advance Care Planning:   Does patient have an Advance Directive:  currently not on file; education provided     Inpatient Readmission Risk score:   Was this a readmission? no   Patient stated reason for the admission: surgery  Patients top risk factors for readmission: medical condition  Interventions to address risk factors: schedule and attend follow up appointments, take medications as prescribed, monitor blood pressure    Care Transition Nurse (CTN) contacted the patient by telephone to perform post hospital discharge assessment. Verified name and  with patient as identifiers. Provided introduction to self, and explanation of the CTN role. CTN reviewed discharge instructions, medical action plan and red flags with patient who verbalized understanding. Patient given an opportunity to ask questions and does not have any further questions or concerns at this time. The patient agrees to contact the PCP office for questions related to their healthcare. Medication reconciliation was performed with patient, who verbalizes understanding of administration of home medications. Advised obtaining a 90-day supply of all daily and as-needed medications.    Referral to Pharm D needed: no     Home Health/Outpatient orders at discharge: [address]: n/a  Date of initial visit: [address] [facility] ordered at discharge: none  [address] [facility] [facility] [address]: n/a  Durable Medical Equipment received: n/a    Covid Risk Education    Patient has following risk factors of: no known risk factors. Education provided regarding infection prevention, and signs and symptoms of COVID-19 and when to seek medical attention with patient who verbalized understanding. Discussed exposure protocols and quarantine From CDC: Are you at higher risk for severe illness?  and given an opportunity for questions and concerns. The patient agrees to contact the COVID-19 hotline [phone] or PCP office for questions related to COVID-19. For more information on steps you can take to protect yourself, see CDC's How to Protect Yourself     Patient/family/caregiver given information for [employer] and agrees to enroll n/a  Patient's preferred e-mail: n/a  Patient's preferred phone number: n/a    Discussed follow-up appointments. If no appointment was previously scheduled, appointment scheduling offered: [facility] follow up appointment(s):   Future Appointments   Date Time Provider Vinay Reina   10/27/2020  9:40 AM SULEIMAN Cooper BS AMB   1/18/2021 10:20 AM Echo Fine MD CAVIR BS AMB     Non-[facility] follow up appointment(s):  None    Plan for follow-up call in 7-10 days based on severity of symptoms and risk factors. CTN provided contact information for future needs. Goals Addressed                 This Visit's Progress     Prevent complications post hospitalization. 10/16/2020 Patient reports attendance of cardio appointment and follow up for 10/27/2020. Denies chest pain, SOB, fever, N/V/D. Endorses sore throat. Patient will monitor BP and report at next week outreach.  SUN

## 2020-10-16 NOTE — TELEPHONE ENCOUNTER
Cam Holstein., NP  Anais Bañuelos NP 20 hours ago (12:54 PM)       Please forward  to patient's PCP at the [place] the procedure note/and maybe dc summary once Cecile signs per patient request. He called me with the fax number. Procedure note, Discharge summary, recent Echo and labs faxed to Dr. Nicole Davies at the [place]   @ Fax #  [phone]. Confirmation received.

## 2020-10-16 NOTE — PROGRESS NOTES
Jesus Hale is a 79 y.o. male    Visit Vitals  /64 (BP 1 Location: Left arm, BP Patient Position: Sitting)   Pulse 76   Ht 5' 10\" (1.778 m)   Wt 202 lb (91.6 kg)   SpO2 96%   BMI 28.98 kg/m²       Chief Complaint   Patient presents with    Hypertension    Irregular Heart Beat     AFIB    Other     AVR       Chest pain NO  SOB NO  Dizziness NO  Swelling NO  Recent hospital visit NO  Refills NO

## 2020-10-20 ENCOUNTER — TELEPHONE (OUTPATIENT)
Dept: CARDIOLOGY CLINIC | Age: 80
End: 2020-10-20

## 2020-10-20 NOTE — TELEPHONE ENCOUNTER
Called patient, ID verified using two patient identifiers. Offered to schedule patient for 45 day post Watchman DEWEY with Dr. Cori Vyas. Patient request that Dr. Kecia Forbes do the DEWEY. Advised patient that I would relay his request to Dr. Cori Vyas and call him back with Dr. Cori Vyas response. DEWEY needs to be done after 11/27/20.

## 2020-10-21 NOTE — TELEPHONE ENCOUNTER
Norma Bianchi NP  You 6 hours ago (8:04 AM)       Ok that's fine    Message text       Blaise Gaffney MD; Norma Bianchi NP 22 hours ago (3:32 PM)       Patient requesting that Dr. Latosha Garcia due 45 day post Watchman DEWEY. Please advise.

## 2020-10-23 NOTE — PROGRESS NOTES
HISTORY OF PRESENTING ILLNESS      Gunnar Carter is a 79 y.o. male with atrial fibrillation, CAD, hypertension, AVR/MVR/TVR, dyslipidemia, prostate cancer/radiation proctitis and GI bleeding referred for discussion regarding LAAO. He is currently on eliquis and continues to have rectal bleeding. He underwent successful left atrial appendage occlusion utilizing WATCHMAN device. Limited echocardiogram was negative for pericardial effusion. EKG today shows rate controlled AF. He reports fatigue which is unchanged from his baseline. Would like to start back with some exercise.       PAST MEDICAL HISTORY     Past Medical History:   Diagnosis Date    Arthritis     Atrial fibrillation ([address]     CAD (coronary artery disease)     Chronic pain     legs/knee    GERD (gastroesophageal reflux disease)     Hx of carcinoma in situ of prostate     Hyperlipidemia     Hypertension     Insomnia     DEL (obstructive sleep apnea)     Radiation proctitis     Vitamin D deficiency            PAST SURGICAL HISTORY     Past Surgical History:   Procedure Laterality Date    COLONOSCOPY N/A 5/8/2020    COLONOSCOPY performed by Margery Skiff, [facility] at [facility] END[facility]COPY    COLONOSCOPY N/A 6/8/2020    COLONOSCOPY performed by Jewels Parham MD at [facility] ENDOSCOPY    HX AORTIC VALVE REPLACEMENT  2015    and mitral valve repair, left atrial cryo maze    HX HEENT      melanoma removed head    HX HERNIA REPAIR  2009    Right    HX HERNIA REPAIR      left inguinal hernia repair    HX HERNIA REPAIR Left 12/01/2016    lap left inguinal hernia repair with mesh    HX KNEE REPLACEMENT      Bilateral    HX ORTHOPAEDIC      BILATERAL KNEE REPLACEMENT    HX ORTHOPAEDIC      CARPEL TUNNEL REPAIR-RIGHT    HX OTHER SURGICAL  2015    closure of patent foramen ovale    HX PROSTATE SURGERY      42 radiation treatments          ALLERGIES     No Known Allergies       FAMILY HISTORY     Family History   Problem Relation Age of Onset    Cancer Mother     Cancer Father         prostrate    Cancer Brother         prostrate ca    Anesth Problems Neg Hx     negative for cardiac disease       SOCIAL HISTORY     Social History     Socioeconomic History    Marital status:      Spouse name: Not on file    Number of children: Not on file    Years of education: Not on file    Highest education level: Not on file   Tobacco Use    Smoking status: Never Smoker    Smokeless tobacco: Never Used   Substance and Sexual Activity    Alcohol use: Yes     Alcohol/week: 3.0 standard drinks     Types: 3 Cans of beer per week    Drug use: No    Sexual activity: Not Currently         MEDICATIONS     Current Outpatient Medications   Medication Sig    amLODIPine (NORVASC) 5 mg tablet Take 1 Tab by mouth daily.  lisinopriL (PRINIVIL, ZESTRIL) 20 mg tablet Take 1 Tab by mouth daily.  apixaban (Eliquis) 5 mg tablet Take 2.5 mg by mouth two (2) times a day.  aspirin delayed-release 81 mg tablet Take 81 mg by mouth daily.  polyvinyl alcohol (ARTIFICIAL TEARS, POLYVIN ALC,) 1.4 % ophthalmic solution Administer 1 Drop to both eyes as needed.  ferrous sulfate 325 mg (65 mg iron) cpER Take 1 Tab by mouth every other day.  tamsulosin (FLOMAX) 0.4 mg capsule Take 0.8 mg by mouth two (2) times a day.  cholecalciferol (VITAMIN D3) 1,000 unit tablet Take 2,000 Units by mouth two (2) times a day.  GLUCOSAMINE HCL/CHONDR CHING A NA (GLUCOSAMINE-CHONDROITIN) 750-600 mg tab Take 1 Tab by mouth daily.  omega-3 fatty acids-vitamin e 1,000 mg cap Take 1 Cap by mouth every other day.  acetaminophen (TYLENOL) 325 mg tablet Take  by mouth every four (4) hours as needed for Pain.  multivitamin (ONE A DAY) tablet Take 1 Tab by mouth daily.  docusate sodium (COLACE) 100 mg capsule Take 100 mg by mouth two (2) times daily as needed.  finasteride (PROSCAR) 5 mg tablet Take 5 mg by mouth nightly.     pravastatin (PRAVACHOL) 40 mg tablet Take 40 mg by mouth nightly.  ipratropium (ATROVENT HFA) 17 mcg/actuation inhaler Take 2 Puffs by inhalation as needed. No current facility-administered medications for this visit. I have reviewed the nurses notes, vitals, problem list, allergy list, medical history, family, social history and medications. REVIEW OF SYMPTOMS      General: Pt denies excessive weight gain or loss. Pt is able to conduct ADL's  HEENT: Denies blurred vision, headaches, hearing loss, epistaxis and difficulty swallowing. Respiratory: Denies cough, congestion, shortness of breath, ABDUL, wheezing or stridor. Cardiovascular: Denies precordial pain, palpitations, edema or PND  Gastrointestinal: Denies poor appetite, indigestion, abdominal pain or blood in stool  Genitourinary: Denies hematuria, dysuria, increased urinary frequency  Musculoskeletal: Denies joint pain or swelling from muscles or joints  Neurologic: Denies tremor, paresthesias, headache, or sensory motor disturbance  Psychiatric: Denies confusion, insomnia, depression  Integumentray: Denies rash, itching or ulcers. Hematologic: Denies easy bruising, bleeding       PHYSICAL EXAMINATION      Vitals: see vitals section  General: Well developed, in no acute distress. HEENT: No jaundice, oral mucosa moist, no oral ulcers  Neck: Supple, no stiffness, no lymphadenopathy, supple  Heart:  +irr irr, Normal S1/S2 negative S3 or S4.  no murmur, gallop or rub, no jugular venous distention  Respiratory: Clear bilaterally x 4, no wheezing or rales  Abdomen:   Soft, non-tender, bowel sounds are active. Extremities:  No edema, normal cap refill, no cyanosis. Musculoskeletal: No clubbing, no deformities  Neuro: A&Ox3, speech clear, gait stable, cooperative, no focal neurologic deficits  Skin: Skin color is normal. No rashes or lesions.  Non diaphoretic, moist.  Vascular: 2+ pulses symmetric in all extremities       DIAGNOSTIC DATA      EKG:        LABORATORY DATA      Lab Results Component Value Date/Time    WBC 4.7 10/14/2020 11:03 AM    HGB 8.6 (L) 10/14/2020 11:03 AM    HCT 26.4 (L) 10/14/2020 11:03 AM    PLATELET 89 (L) 53/21/9920 11:03 AM    MCV 97.8 10/14/2020 11:03 AM      Lab Results   Component Value Date/Time    Sodium 140 10/13/2020 11:06 AM    Potassium 3.8 10/13/2020 11:06 AM    Chloride 103 10/13/2020 11:06 AM    CO2 30 10/13/2020 11:06 AM    Anion gap 7 10/13/2020 11:06 AM    Glucose 106 (H) 10/13/2020 11:06 AM    BUN 19 10/13/2020 11:06 AM    Creatinine 0.99 10/13/2020 11:06 AM    BUN/Creatinine ratio 19 10/13/2020 11:06 AM    GFR est AA >60 10/13/2020 11:06 AM    GFR est non-AA >60 10/13/2020 11:06 AM    Calcium 9.3 10/13/2020 11:06 AM    Bilirubin, total 0.7 10/13/2020 11:06 AM    Alk. phosphatase 75 10/13/2020 11:06 AM    Protein, total 7.7 10/13/2020 11:06 AM    Albumin 4.1 10/13/2020 11:06 AM    Globulin 3.6 10/13/2020 11:06 AM    A-G Ratio 1.1 10/13/2020 11:06 AM    ALT (SGPT) 16 10/13/2020 11:06 AM           ASSESSMENT      1. Atrial fibrillation              A. CHADSVASC 4  2. Hypertension  3. Dyslipidemia  4. Patent foramen ovale  5. Aortic stenosis s/p AVR  6. Mitral regurgitation s/p MVR  7. Tricuspid regurgitation s/p prosthetic valve  8. DEL               A. Noncompliant with CPAP  9. Radiation proctitis  10. GI bleeding history  11. CAD, native  15. Watchman device     PLAN     He would like to have his 45 day DEWEY done with Dr. Clara Cowart. Will arrange for 6 mo follow up post DEWEY. Continue Eliquis and will switch to ASA/Plavix with Dr. Clara Cowart at completion of his DEWEY.      ICD-10-CM ICD-9-CM    1. Atrial fibrillation, permanent (HCC)  I48.21 427.31 AMB POC EKG ROUTINE W/ 12 LEADS, INTER & REP   2. Presence of Watchman left atrial appendage closure device  Z95.818 V45.09    3. Essential hypertension  I10 401.9    4.  Gastrointestinal hemorrhage, unspecified gastrointestinal hemorrhage type  K92.2 578.9    5. DEL (obstructive sleep apnea)  G47.33 327.23 Orders Placed This Encounter    AMB POC EKG ROUTINE W/ 12 LEADS, INTER & REP     Order Specific Question:   Reason for Exam:     Answer: Afib          FOLLOW-UP   June    Thank you, Belgica Puentes MD and Dr. Saleem Gardner for allowing me to participate in the care of this extraordinarily pleasant male. Please do not hesitate to contact me for further questions/concerns.        [address]  [address], [address] [facility], [address]  [place] [address], [facility]  [phone] / [phone] Fax   [phone] / [phone] Fax

## 2020-10-27 ENCOUNTER — OFFICE VISIT (OUTPATIENT)
Dept: CARDIOLOGY CLINIC | Age: 80
End: 2020-10-27
Payer: MEDICARE

## 2020-10-27 VITALS
BODY MASS INDEX: 29.78 KG/M2 | DIASTOLIC BLOOD PRESSURE: 86 MMHG | HEIGHT: 70 IN | SYSTOLIC BLOOD PRESSURE: 128 MMHG | HEART RATE: 76 BPM | WEIGHT: 208 LBS

## 2020-10-27 DIAGNOSIS — G47.33 OSA (OBSTRUCTIVE SLEEP APNEA): ICD-10-CM

## 2020-10-27 DIAGNOSIS — I10 ESSENTIAL HYPERTENSION: ICD-10-CM

## 2020-10-27 DIAGNOSIS — K92.2 GASTROINTESTINAL HEMORRHAGE, UNSPECIFIED GASTROINTESTINAL HEMORRHAGE TYPE: ICD-10-CM

## 2020-10-27 DIAGNOSIS — I48.21 ATRIAL FIBRILLATION, PERMANENT (HCC): Primary | ICD-10-CM

## 2020-10-27 DIAGNOSIS — Z95.818 PRESENCE OF WATCHMAN LEFT ATRIAL APPENDAGE CLOSURE DEVICE: ICD-10-CM

## 2020-10-27 PROCEDURE — 1101F PT FALLS ASSESS-DOCD LE1/YR: CPT | Performed by: NURSE PRACTITIONER

## 2020-10-27 PROCEDURE — 93010 ELECTROCARDIOGRAM REPORT: CPT | Performed by: NURSE PRACTITIONER

## 2020-10-27 PROCEDURE — G8417 CALC BMI ABV UP PARAM F/U: HCPCS | Performed by: NURSE PRACTITIONER

## 2020-10-27 PROCEDURE — 1111F DSCHRG MED/CURRENT MED MERGE: CPT | Performed by: NURSE PRACTITIONER

## 2020-10-27 PROCEDURE — G8432 DEP SCR NOT DOC, RNG: HCPCS | Performed by: NURSE PRACTITIONER

## 2020-10-27 PROCEDURE — G8754 DIAS BP LESS 90: HCPCS | Performed by: NURSE PRACTITIONER

## 2020-10-27 PROCEDURE — G8536 NO DOC ELDER MAL SCRN: HCPCS | Performed by: NURSE PRACTITIONER

## 2020-10-27 PROCEDURE — G0463 HOSPITAL OUTPT CLINIC VISIT: HCPCS | Performed by: NURSE PRACTITIONER

## 2020-10-27 PROCEDURE — G8427 DOCREV CUR MEDS BY ELIG CLIN: HCPCS | Performed by: NURSE PRACTITIONER

## 2020-10-27 PROCEDURE — 99214 OFFICE O/P EST MOD 30 MIN: CPT | Performed by: NURSE PRACTITIONER

## 2020-10-27 PROCEDURE — G8752 SYS BP LESS 140: HCPCS | Performed by: NURSE PRACTITIONER

## 2020-10-27 PROCEDURE — 93005 ELECTROCARDIOGRAM TRACING: CPT | Performed by: NURSE PRACTITIONER

## 2020-10-27 NOTE — PROGRESS NOTES
Room 2    Visit Vitals  /86 (BP 1 Location: Left arm, BP Patient Position: Sitting)   Pulse 76   Ht 5' 10\" (1.778 m)   Wt 208 lb (94.3 kg)   BMI 29.84 kg/m²       10-13-20 Watchman    Chest pain: no  Shortness of breath: no  Edema: no  Palpitations, Skipped beats, Rapid heartbeat: no  Dizziness: no    New diagnosis/Surgeries: no    ER/Hospitalizations: 10-13-20 Watchman     Refills: no

## 2020-10-28 ENCOUNTER — PATIENT OUTREACH (OUTPATIENT)
Dept: CASE MANAGEMENT | Age: 80
End: 2020-10-28

## 2020-10-28 NOTE — PROGRESS NOTES
Goals      Prevent complications post hospitalization. 10/16/2020 Patient reports attendance of cardio appointment and follow up for 10/27/2020. Denies chest pain, SOB, fever, N/V/D. Endorses sore throat. Patient will monitor BP and report at next week outreach. SUN    10/28/2020 Patient report sore throat is gone, attendance of cardio appointment  with no change to plan of care. Denies SOB, chest pain, fever, N/V/D. Patient will continue to monitor BP and report at next outreach.  SUN

## 2020-11-17 ENCOUNTER — TELEPHONE (OUTPATIENT)
Dept: CARDIOLOGY CLINIC | Age: 80
End: 2020-11-17

## 2020-11-17 NOTE — TELEPHONE ENCOUNTER
Patient's wife calling to see if the pt can have an EKG and when will the watchman be checked    She can be reached at [phone]    [facility]

## 2020-11-18 ENCOUNTER — TRANSCRIBE ORDER (OUTPATIENT)
Dept: REGISTRATION | Age: 80
End: 2020-11-18

## 2020-11-18 ENCOUNTER — HOSPITAL ENCOUNTER (OUTPATIENT)
Dept: LAB | Age: 80
Discharge: HOME | End: 2020-11-18
Payer: MEDICARE

## 2020-11-18 DIAGNOSIS — Z01.812 PRE-PROCEDURAL LABORATORY EXAMINATIONS: ICD-10-CM

## 2020-11-18 DIAGNOSIS — Z01.812 PRE-PROCEDURAL LABORATORY EXAMINATIONS: Primary | ICD-10-CM

## 2020-11-18 PROCEDURE — 87635 SARS-COV-2 COVID-19 AMP PRB: CPT

## 2020-11-19 LAB — SARS-COV-2, COV2NT: NOT DETECTED

## 2020-11-19 NOTE — PERIOP NOTES
[address]  Endoscopy Preprocedure Instructions      1. On the day of your surgery, please report to registration located on the 2nd floor of the  [facility]. yes    2. You must have a responsible adult to drive you to the hospital, stay at the hospital during your procedure and drive you home. If they leave your procedure will not be started (no exceptions). yes    3. Do not have anything to eat or drink (including water, gum, mints, coffee, and juice) after midnight. This does not apply to the medications you were instructed to take by your physician. yesIf you are currently taking Plavix, Coumadin, Aspirin, or other blood-thinning agents, contact your physician for special instructions. yes,    4. If you are having a procedure that requires bowel prep: We recommend that you have only clear liquids the day before your procedure and begin your bowel prep by 5:00 pm.  You may continue to drink clear liquids until midnight. If for any reason you are not able to complete your prep please notify your physician immediately. yes    5. Have a list of all current medications, including vitamins, herbal supplements and any other over the counter medications. yes    6. If you wear glasses, contacts, dentures and/or hearing aids, they may be removed prior to procedure, please bring a case to store them in. yes    7. You should understand that if you do not follow these instructions your procedure may be cancelled. If your physical condition changes (I.e. fever, cold or flu) please contact your doctor as soon as possible. 8. It is important that you be on time.   If for any reason you are unable to keep your appointment please call [phone] the day of or your physicians office prior to your scheduled procedure

## 2020-11-23 ENCOUNTER — HOSPITAL ENCOUNTER (OUTPATIENT)
Age: 80
Setting detail: OUTPATIENT SURGERY
Discharge: HOME | End: 2020-11-23
Attending: INTERNAL MEDICINE | Admitting: INTERNAL MEDICINE
Payer: MEDICARE

## 2020-11-23 ENCOUNTER — ANESTHESIA EVENT (OUTPATIENT)
Dept: ENDOSCOPY | Age: 80
End: 2020-11-23
Payer: MEDICARE

## 2020-11-23 ENCOUNTER — ANESTHESIA (OUTPATIENT)
Dept: ENDOSCOPY | Age: 80
End: 2020-11-23
Payer: MEDICARE

## 2020-11-23 VITALS
HEART RATE: 65 BPM | DIASTOLIC BLOOD PRESSURE: 79 MMHG | HEIGHT: 70 IN | OXYGEN SATURATION: 95 % | WEIGHT: 214.95 LBS | TEMPERATURE: 98 F | SYSTOLIC BLOOD PRESSURE: 141 MMHG | BODY MASS INDEX: 30.77 KG/M2 | RESPIRATION RATE: 18 BRPM

## 2020-11-23 PROCEDURE — 77030010936 HC CLP LIG BSC -C: Performed by: INTERNAL MEDICINE

## 2020-11-23 PROCEDURE — 2709999900 HC NON-CHARGEABLE SUPPLY: Performed by: INTERNAL MEDICINE

## 2020-11-23 PROCEDURE — 76060000031 HC ANESTHESIA FIRST 0.5 HR: Performed by: INTERNAL MEDICINE

## 2020-11-23 PROCEDURE — 76040000019: Performed by: INTERNAL MEDICINE

## 2020-11-23 PROCEDURE — 74011250636 HC RX REV CODE- 250/636: Performed by: NURSE ANESTHETIST, CERTIFIED REGISTERED

## 2020-11-23 PROCEDURE — 77030022875 HC PRB AR PLSM COAG ERBE -C: Performed by: INTERNAL MEDICINE

## 2020-11-23 PROCEDURE — 74011000250 HC RX REV CODE- 250: Performed by: NURSE ANESTHETIST, CERTIFIED REGISTERED

## 2020-11-23 RX ORDER — DEXTROMETHORPHAN/PSEUDOEPHED 2.5-7.5/.8
1.2 DROPS ORAL
Status: DISCONTINUED | OUTPATIENT
Start: 2020-11-23 | End: 2020-11-23 | Stop reason: HOSPADM

## 2020-11-23 RX ORDER — SODIUM CHLORIDE 9 MG/ML
50 INJECTION, SOLUTION INTRAVENOUS CONTINUOUS
Status: DISCONTINUED | OUTPATIENT
Start: 2020-11-23 | End: 2020-11-23 | Stop reason: HOSPADM

## 2020-11-23 RX ORDER — EPINEPHRINE 0.1 MG/ML
1 INJECTION INTRACARDIAC; INTRAVENOUS
Status: DISCONTINUED | OUTPATIENT
Start: 2020-11-23 | End: 2020-11-23 | Stop reason: HOSPADM

## 2020-11-23 RX ORDER — MIDAZOLAM HYDROCHLORIDE 1 MG/ML
.25-5 INJECTION, SOLUTION INTRAMUSCULAR; INTRAVENOUS
Status: DISCONTINUED | OUTPATIENT
Start: 2020-11-23 | End: 2020-11-23 | Stop reason: HOSPADM

## 2020-11-23 RX ORDER — NALOXONE HYDROCHLORIDE 0.4 MG/ML
0.4 INJECTION, SOLUTION INTRAMUSCULAR; INTRAVENOUS; SUBCUTANEOUS
Status: DISCONTINUED | OUTPATIENT
Start: 2020-11-23 | End: 2020-11-23 | Stop reason: HOSPADM

## 2020-11-23 RX ORDER — LIDOCAINE HYDROCHLORIDE 20 MG/ML
INJECTION, SOLUTION EPIDURAL; INFILTRATION; INTRACAUDAL; PERINEURAL AS NEEDED
Status: DISCONTINUED | OUTPATIENT
Start: 2020-11-23 | End: 2020-11-23 | Stop reason: HOSPADM

## 2020-11-23 RX ORDER — PROPOFOL 10 MG/ML
INJECTION, EMULSION INTRAVENOUS
Status: DISCONTINUED | OUTPATIENT
Start: 2020-11-23 | End: 2020-11-23 | Stop reason: HOSPADM

## 2020-11-23 RX ORDER — PROPOFOL 10 MG/ML
INJECTION, EMULSION INTRAVENOUS AS NEEDED
Status: DISCONTINUED | OUTPATIENT
Start: 2020-11-23 | End: 2020-11-23 | Stop reason: HOSPADM

## 2020-11-23 RX ORDER — ATROPINE SULFATE 0.1 MG/ML
0.4 INJECTION INTRAVENOUS
Status: DISCONTINUED | OUTPATIENT
Start: 2020-11-23 | End: 2020-11-23 | Stop reason: HOSPADM

## 2020-11-23 RX ORDER — FLUMAZENIL 0.1 MG/ML
0.2 INJECTION INTRAVENOUS
Status: DISCONTINUED | OUTPATIENT
Start: 2020-11-23 | End: 2020-11-23 | Stop reason: HOSPADM

## 2020-11-23 RX ADMIN — PROPOFOL 30 MG: 10 INJECTION, EMULSION INTRAVENOUS at 16:36

## 2020-11-23 RX ADMIN — PROPOFOL 140 MCG/KG/MIN: 10 INJECTION, EMULSION INTRAVENOUS at 16:31

## 2020-11-23 RX ADMIN — PROPOFOL 10 MG: 10 INJECTION, EMULSION INTRAVENOUS at 16:33

## 2020-11-23 RX ADMIN — PROPOFOL 60 MG: 10 INJECTION, EMULSION INTRAVENOUS at 16:31

## 2020-11-23 RX ADMIN — LIDOCAINE HYDROCHLORIDE 40 MG: 20 INJECTION, SOLUTION INTRAVENOUS at 16:31

## 2020-11-23 NOTE — ROUTINE PROCESS
Jan Rubinamee 1940 
[id number] Situation: 
Verbal report received from: Eliazar Rogel 
Procedure: Procedure(s): FLEXIBLE SIGMOIDOSCOPY WITH APC 
ENDOSCOPIC ARGON PLASMA COAGULATION 
RESOLUTION CLIP Background: 
 
Preoperative diagnosis: RECTAL BLEEDING Postoperative diagnosis: 1.- diverticulosis 2.- hemorrhoids 3.- proctitis :  Dr. Chery Simmons Assistant(s): Endoscopy Technician-1: Tan Newman Endoscopy RN-1: Maya Chanel Endoscopy RN-2: Shana Jacob RN Specimens: * No specimens in log * H. Pylori  no Assessment: 
Intra-procedure medications Anesthesia gave intra-procedure sedation and medications, see anesthesia flow sheet yes Intravenous fluids: NS@ [facility] Vital signs stable Abdominal assessment: round and soft Recommendation: 
Discharge patient per MD order Family wife Permission to share finding with family or friend yes Endoscopy discharge instructions have been reviewed and given to patient. The patient verbalized understanding and acceptance of instructions. Dr. Chery Simmons discussed with patient procedure findings and next steps.

## 2020-11-23 NOTE — ANESTHESIA POSTPROCEDURE EVALUATION
Procedure(s): FLEXIBLE SIGMOIDOSCOPY WITH APC  ENDOSCOPIC ARGON PLASMA COAGULATION  RESOLUTION CLIP. MAC    Anesthesia Post Evaluation      Multimodal analgesia: multimodal analgesia not used between 6 hours prior to anesthesia start to PACU discharge  Patient location during evaluation: PACU  Patient participation: complete - patient participated  Level of consciousness: awake and alert  Pain score: 0  Pain management: satisfactory to patient  Airway patency: patent  Anesthetic complications: no  Cardiovascular status: acceptable  Respiratory status: acceptable  Hydration status: acceptable  Post anesthesia nausea and vomiting:  none  Final Post Anesthesia Temperature Assessment:  Normothermia (36.0-37.5 degrees C)      INITIAL Post-op Vital signs:   Vitals Value Taken Time   /82 11/23/2020  5:11 PM   Temp     Pulse 66 11/23/2020  5:15 PM   Resp 19 11/23/2020  5:15 PM   SpO2 91 % 11/23/2020  5:15 PM   Vitals shown include unvalidated device data.

## 2020-11-23 NOTE — DISCHARGE INSTRUCTIONS
[facility] [place]. Reji Booth M.D.  [phone]            COLON DISCHARGE INSTRUCTIONS       2020    Cresencio Rizvi  :  1940  Mir Medical Record Number:  [id number]      COLONOSCOPY FINDINGS:  Your sigmoidoscopy showed mild residual radiation proctitis that was treated with laser and one clip placed. There was evidence of diverticulosis and internal hemorrhoids. DISCOMFORT:  Redness at IV site- apply warm compress to area; if redness or soreness persist- contact your physician  There may be a slight amount of blood passed from the rectum  Gaseous discomfort- walking, belching will help relieve any discomfort  You may not operate a vehicle for 12 hours  You may not engage in an occupation involving machinery or appliances for rest of today  You may not drink alcoholic beverages for at least 12 hours  Avoid making any critical decisions for at least 24 hour  DIET:   High fiber diet. - however -  remember your colon is empty and a heavy meal will produce gas. Avoid these foods:  vegetables, fried / greasy foods, carbonated drinks for today     ACTIVITY:  You may resume your normal daily activities it is recommended that you spend the remainder of the day resting -  avoid any strenuous activity. CALL M.D. ANY SIGN OF:   Increasing pain, nausea, vomiting  Abdominal distension (swelling)  New increased bleeding (oral or rectal)  Fever (chills)  Pain in chest area  Bloody discharge from nose or mouth   Shortness of breath    Follow-up Instructions:   Call Dr. Nidia Manuel if any questions or problems. Telephone # [phone]  Continue taking stool softeners or Miralax to avoid any constipation or straining with bowel moverments.

## 2020-11-23 NOTE — ANESTHESIA PREPROCEDURE EVALUATION
Anesthetic History   No history of anesthetic complications            Review of Systems / Medical History  Patient summary reviewed, nursing notes reviewed and pertinent labs reviewed    Pulmonary  Within defined limits                 Neuro/Psych   Within defined limits           Cardiovascular  Within defined limits  Hypertension        Dysrhythmias   CAD         GI/Hepatic/Renal     GERD: well controlled           Endo/Other        Arthritis     Other Findings              Physical Exam    Airway  Mallampati: II  TM Distance: > 6 cm  Neck ROM: normal range of motion   Mouth opening: Normal     Cardiovascular  Regular rate and rhythm,  S1 and S2 normal,  no murmur, click, rub, or gallop             Dental  No notable dental hx       Pulmonary  Breath sounds clear to auscultation               Abdominal  GI exam deferred       Other Findings            Anesthetic Plan    ASA: 3  Anesthesia type: MAC          Induction: Intravenous  Anesthetic plan and risks discussed with: Patient

## 2020-11-23 NOTE — PROGRESS NOTES

## 2020-11-23 NOTE — H&P
Ajay Valera M.D.  [phone]            History and Physical       NAME:  Governor Bets   :   1940   MRN:   [id number]           Consult Date: 2020 3:51 PM    Chief Complaint:  Rectal bleeding    History of Present Illness:  Patient is a 79 y.o. who is seen for recurrent rectal bleeding, history of radiation proctitis. PMH:  Past Medical History:   Diagnosis Date    Arthritis     Atrial fibrillation ([address]     CAD (coronary artery disease)     Chronic pain     legs/knee    GERD (gastroesophageal reflux disease)     Hx of carcinoma in situ of prostate     Hyperlipidemia     Hypertension     Insomnia     DEL (obstructive sleep apnea)     Radiation proctitis     Vitamin D deficiency        PSH:  Past Surgical History:   Procedure Laterality Date    COLONOSCOPY N/A 2020    [facility] Parker Epley[facility] at [facility] COLONOSCOPY N/A 2020    COLON[facility] by Danilo Pond [facility] at [facility]    HX AORTIC VALVE REPLACEMENT      and mitral valve repair, left atrial cryo maze    HX HEENT      melanoma removed head    HX HERNIA REPAIR  2009    Right    HX HERNIA REPAIR      left inguinal hernia repair    HX HERNIA REPAIR Left 2016    lap left inguinal hernia repair with mesh    HX KNEE REPLACEMENT      Bilateral    HX ORTHOPAEDIC      BILATERAL KNEE REPLACEMENT    HX ORTHOPAEDIC      CARPEL TUNNEL REPAIR-RIGHT    HX OTHER SURGICAL      closure of patent foramen ovale    HX OTHER SURGICAL  10/2020    watchman implant for afib / Dr. Juan Carlos Pritchard      42 radiation treatments       Allergies:  No Known Allergies    Home Medications:  Prior to Admission Medications   Prescriptions Last Dose Informant Patient Reported? Taking? GLUCOSAMINE HCL/CHONDR CHING A NA (GLUCOSAMINE-CHONDROITIN) 750-600 mg tab 2020 at am Self Yes Yes   Sig: Take 1 Tab by mouth daily.    acetaminophen (TYLENOL) 325 mg tablet 11/21/2020 at am Self Yes Yes   Sig: Take  by mouth every four (4) hours as needed for Pain. amLODIPine (NORVASC) 5 mg tablet 11/21/2020 at am  No Yes   Sig: Take 1 Tab by mouth daily. apixaban (Eliquis) 5 mg tablet 11/21/2020 at am  Yes Yes   Sig: Take 2.5 mg by mouth two (2) times a day. aspirin delayed-release 81 mg tablet 11/21/2020 at am Self Yes Yes   Sig: Take 81 mg by mouth daily. cholecalciferol (VITAMIN D3) 1,000 unit tablet 11/21/2020 at am Self Yes Yes   Sig: Take 2,000 Units by mouth two (2) times a day. docusate sodium (COLACE) 100 mg capsule 11/21/2020 at am Self Yes Yes   Sig: Take 100 mg by mouth two (2) times daily as needed. ferrous sulfate 325 mg (65 mg iron) cpER 11/21/2020 at am Self Yes Yes   Sig: Take 1 Tab by mouth every other day. finasteride (PROSCAR) 5 mg tablet 11/21/2020 at am Self Yes Yes   Sig: Take 5 mg by mouth nightly. ipratropium (ATROVENT HFA) 17 mcg/actuation inhaler Not Taking at Unknown time Self Yes No   Sig: Take 2 Puffs by inhalation as needed. lisinopriL (PRINIVIL, ZESTRIL) 20 mg tablet 11/21/2020 at am  No Yes   Sig: Take 1 Tab by mouth daily. multivitamin (ONE A DAY) tablet 11/21/2020 at am Self Yes Yes   Sig: Take 1 Tab by mouth daily. omega-3 fatty acids-vitamin e 1,000 mg cap 11/21/2020 at am Self Yes Yes   Sig: Take 1 Cap by mouth every other day. polyvinyl alcohol (ARTIFICIAL TEARS, POLYVIN ALC,) 1.4 % ophthalmic solution 11/21/2020 at am Self Yes Yes   Sig: Administer 1 Drop to both eyes as needed. pravastatin (PRAVACHOL) 40 mg tablet 11/21/2020 at am Self Yes Yes   Sig: Take 40 mg by mouth nightly. tamsulosin (FLOMAX) 0.4 mg capsule 11/21/2020 at am Self Yes Yes   Sig: Take 0.8 mg by mouth two (2) times a day. Facility-Administered Medications: None       Hospital Medications:  No current facility-administered medications for this encounter.         Social History:  Social History     Tobacco Use    Smoking status: Never Smoker    Smokeless tobacco: Never Used   Substance Use Topics    Alcohol use: Yes     Alcohol/week: 3.0 standard drinks     Types: 3 Cans of beer per week       Family History:  Family History   Problem Relation Age of Onset    Cancer Mother     Cancer Father         prostrate    Cancer Brother         prostrate ca    Anesth Problems Neg Hx              Review of Systems:      Constitutional: negative fever, negative chills, negative weight loss  Eyes:   negative visual changes  ENT:   negative sore throat, tongue or lip swelling  Respiratory:  negative cough, negative dyspnea  Cards:  negative for chest pain, palpitations, lower extremity edema  GI:   See HPI  :  negative for frequency, dysuria  Integument:  negative for rash and pruritus  Heme:  negative for easy bruising and gum/nose bleeding  Musculoskel: negative for myalgias,  back pain and muscle weakness  Neuro: negative for headaches, dizziness, vertigo  Psych:  negative for feelings of anxiety, depression       Objective:     Patient Vitals for the past 8 hrs:   BP Temp Pulse Resp SpO2 Height Weight   11/23/20 1449 (!) 164/70 98.3 °F (36.8 °C) (!) 53 18 97 % 5' 10\" (1.778 m) 97.5 kg (214 lb 15.2 oz)     No intake/output data recorded. No intake/output data recorded. EXAM:     NEURO-a&o   HEENT-wnl   LUNGS-clear    COR-regular rate and rhythym     ABD-soft , no tenderness, no rebound, good bs     EXT-no edema     Data Review     No results for input(s): WBC, HGB, HCT, PLT, HGBEXT, HCTEXT, PLTEXT in the last 72 hours. No results for input(s): NA, K, CL, CO2, BUN, CREA, GLU, PHOS, CA in the last 72 hours. No results for input(s): AP, TBIL, TP, ALB, GLOB, GGT, AML, LPSE in the last 72 hours. No lab exists for component: SGOT, GPT, AMYP, HLPSE  No results for input(s): INR, PTP, APTT, INREXT in the last 72 hours.     Patient Active Problem List   Diagnosis Code    Arthritis of knee M17.10    A-fib ([facility] [address] I48.91    Aortic insufficiency I35.1    Aortic stenosis I35.0    S/P AVR (aortic valve replacement) Z95.2    S/P MVR (mitral valve repair) Z98.890    S/P Maze operation for atrial fibrillation Z98.890, Z86.79    Anemia due to acute blood loss D62    Hypertension I10    Hyperlipemia E78.5    Radiation proctitis K62.7    DEL (obstructive sleep apnea) G47.33    Insomnia G47.00    Atrial fibrillation (HCC) I48.91    Hyperlipidemia E78.5    Hx of carcinoma in situ of prostate Z86.002    GERD (gastroesophageal reflux disease) K21.9    Chronic pain G89.29    CAD (coronary artery disease) I25.10    Arthritis M19.90    GI bleed K92.2    Presence of Watchman left atrial appendage closure device Z95.818      Assessment:   · Rectal bleeding   Plan:   · Flexible sigmoidoscopy today.      Signed By: Priyanka Black MD     11/23/2020  3:51 PM

## 2020-11-26 ENCOUNTER — APPOINTMENT (OUTPATIENT)
Dept: GENERAL RADIOLOGY | Age: 80
End: 2020-11-26
Attending: EMERGENCY MEDICINE
Payer: MEDICARE

## 2020-11-26 ENCOUNTER — APPOINTMENT (OUTPATIENT)
Dept: CT IMAGING | Age: 80
End: 2020-11-26
Attending: EMERGENCY MEDICINE
Payer: MEDICARE

## 2020-11-26 ENCOUNTER — HOSPITAL ENCOUNTER (OUTPATIENT)
Age: 80
Setting detail: OBSERVATION
Discharge: HOME | End: 2020-11-28
Attending: EMERGENCY MEDICINE | Admitting: HOSPITALIST
Payer: MEDICARE

## 2020-11-26 ENCOUNTER — APPOINTMENT (OUTPATIENT)
Dept: ULTRASOUND IMAGING | Age: 80
End: 2020-11-26
Attending: EMERGENCY MEDICINE
Payer: MEDICARE

## 2020-11-26 DIAGNOSIS — I50.9 ACUTE ON CHRONIC CONGESTIVE HEART FAILURE, UNSPECIFIED HEART FAILURE TYPE (HCC): ICD-10-CM

## 2020-11-26 DIAGNOSIS — R06.09 DYSPNEA ON EXERTION: ICD-10-CM

## 2020-11-26 DIAGNOSIS — I48.11 LONGSTANDING PERSISTENT ATRIAL FIBRILLATION (HCC): Primary | ICD-10-CM

## 2020-11-26 DIAGNOSIS — R60.1 ANASARCA: ICD-10-CM

## 2020-11-26 LAB
ALBUMIN SERPL-MCNC: 3.6 G/DL (ref 3.5–5)
ALBUMIN/GLOB SERPL: 1.1 {RATIO} (ref 1.1–2.2)
ALP SERPL-CCNC: 75 U/L (ref 45–117)
ALT SERPL-CCNC: 30 U/L (ref 12–78)
ANION GAP SERPL CALC-SCNC: 3 MMOL/L (ref 5–15)
AST SERPL-CCNC: 27 U/L (ref 15–37)
BASOPHILS # BLD: 0 K/UL (ref 0–0.1)
BASOPHILS NFR BLD: 1 % (ref 0–1)
BILIRUB SERPL-MCNC: 0.5 MG/DL (ref 0.2–1)
BNP SERPL-MCNC: 1996 PG/ML
BUN SERPL-MCNC: 12 MG/DL (ref 6–20)
BUN/CREAT SERPL: 13 (ref 12–20)
CALCIUM SERPL-MCNC: 8.2 MG/DL (ref 8.5–10.1)
CHLORIDE SERPL-SCNC: 109 MMOL/L (ref 97–108)
CO2 SERPL-SCNC: 30 MMOL/L (ref 21–32)
COMMENT, HOLDF: NORMAL
CREAT SERPL-MCNC: 0.9 MG/DL (ref 0.7–1.3)
DIFFERENTIAL METHOD BLD: ABNORMAL
EOSINOPHIL # BLD: 0.1 K/UL (ref 0–0.4)
EOSINOPHIL NFR BLD: 3 % (ref 0–7)
ERYTHROCYTE [DISTWIDTH] IN BLOOD BY AUTOMATED COUNT: 15.8 % (ref 11.5–14.5)
GLOBULIN SER CALC-MCNC: 3.2 G/DL (ref 2–4)
GLUCOSE SERPL-MCNC: 120 MG/DL (ref 65–100)
HCT VFR BLD AUTO: 30.6 % (ref 36.6–50.3)
HGB BLD-MCNC: 9.6 G/DL (ref 12.1–17)
IMM GRANULOCYTES # BLD AUTO: 0 K/UL (ref 0–0.04)
IMM GRANULOCYTES NFR BLD AUTO: 0 % (ref 0–0.5)
LIPASE SERPL-CCNC: 62 U/L (ref 73–393)
LYMPHOCYTES # BLD: 0.4 K/UL (ref 0.8–3.5)
LYMPHOCYTES NFR BLD: 9 % (ref 12–49)
MAGNESIUM SERPL-MCNC: 2.3 MG/DL (ref 1.6–2.4)
MCH RBC QN AUTO: 31.9 PG (ref 26–34)
MCHC RBC AUTO-ENTMCNC: 31.4 G/DL (ref 30–36.5)
MCV RBC AUTO: 101.7 FL (ref 80–99)
MONOCYTES # BLD: 0.5 K/UL (ref 0–1)
MONOCYTES NFR BLD: 11 % (ref 5–13)
NEUTS SEG # BLD: 3.8 K/UL (ref 1.8–8)
NEUTS SEG NFR BLD: 76 % (ref 32–75)
NRBC # BLD: 0 K/UL (ref 0–0.01)
NRBC BLD-RTO: 0 PER 100 WBC
PLATELET # BLD AUTO: 155 K/UL (ref 150–400)
PMV BLD AUTO: 9.1 FL (ref 8.9–12.9)
POTASSIUM SERPL-SCNC: 4.1 MMOL/L (ref 3.5–5.1)
PROT SERPL-MCNC: 6.8 G/DL (ref 6.4–8.2)
RBC # BLD AUTO: 3.01 M/UL (ref 4.1–5.7)
RBC MORPH BLD: ABNORMAL
SAMPLES BEING HELD,HOLD: NORMAL
SODIUM SERPL-SCNC: 142 MMOL/L (ref 136–145)
TROPONIN I SERPL-MCNC: <0.05 NG/ML
WBC # BLD AUTO: 4.8 K/UL (ref 4.1–11.1)

## 2020-11-26 PROCEDURE — 83690 ASSAY OF LIPASE: CPT

## 2020-11-26 PROCEDURE — 74011250636 HC RX REV CODE- 250/636: Performed by: EMERGENCY MEDICINE

## 2020-11-26 PROCEDURE — 85025 COMPLETE CBC W/AUTO DIFF WBC: CPT

## 2020-11-26 PROCEDURE — 83735 ASSAY OF MAGNESIUM: CPT

## 2020-11-26 PROCEDURE — 84484 ASSAY OF TROPONIN QUANT: CPT

## 2020-11-26 PROCEDURE — 96374 THER/PROPH/DIAG INJ IV PUSH: CPT

## 2020-11-26 PROCEDURE — 80053 COMPREHEN METABOLIC PANEL: CPT

## 2020-11-26 PROCEDURE — 74176 CT ABD & PELVIS W/O CONTRAST: CPT

## 2020-11-26 PROCEDURE — 93005 ELECTROCARDIOGRAM TRACING: CPT

## 2020-11-26 PROCEDURE — 99285 EMERGENCY DEPT VISIT HI MDM: CPT

## 2020-11-26 PROCEDURE — 76705 ECHO EXAM OF ABDOMEN: CPT

## 2020-11-26 PROCEDURE — 71046 X-RAY EXAM CHEST 2 VIEWS: CPT

## 2020-11-26 PROCEDURE — 83880 ASSAY OF NATRIURETIC PEPTIDE: CPT

## 2020-11-26 PROCEDURE — 36415 COLL VENOUS BLD VENIPUNCTURE: CPT

## 2020-11-26 RX ORDER — FUROSEMIDE 10 MG/ML
60 INJECTION INTRAMUSCULAR; INTRAVENOUS ONCE
Status: COMPLETED | OUTPATIENT
Start: 2020-11-26 | End: 2020-11-26

## 2020-11-26 RX ADMIN — FUROSEMIDE 60 MG: 10 INJECTION, SOLUTION INTRAMUSCULAR; INTRAVENOUS at 23:33

## 2020-11-27 ENCOUNTER — APPOINTMENT (OUTPATIENT)
Dept: NON INVASIVE DIAGNOSTICS | Age: 80
End: 2020-11-27
Attending: NURSE PRACTITIONER
Payer: MEDICARE

## 2020-11-27 PROBLEM — R06.09 DOE (DYSPNEA ON EXERTION): Status: ACTIVE | Noted: 2020-11-27

## 2020-11-27 PROBLEM — R60.1 ANASARCA: Status: ACTIVE | Noted: 2020-11-27

## 2020-11-27 LAB
ECHO AR MAX VEL PISA: 490.59 CENTIMETER/SECOND
ECHO AV AREA PEAK VELOCITY: 0.8 CM2
ECHO AV AREA VTI: 0.83 CM2
ECHO AV AREA/BSA PEAK VELOCITY: 0.4 CM2/M2
ECHO AV AREA/BSA VTI: 0.4 CM2/M2
ECHO AV MEAN GRADIENT: 24.76 MMHG
ECHO AV PEAK GRADIENT: 48.32 MMHG
ECHO AV PEAK VELOCITY: 347.57 CM/S
ECHO AV REGURGITANT PHT: 791.25 MS
ECHO AV VTI: 73.12 CM
ECHO EST RA PRESSURE: 8 MMHG
ECHO LV E' LATERAL VELOCITY: 12.41 CENTIMETER/SECOND
ECHO LV E' SEPTAL VELOCITY: 8.41 CENTIMETER/SECOND
ECHO LVOT DIAM: 2 CM
ECHO LVOT PEAK GRADIENT: 5.24 MMHG
ECHO LVOT PEAK VELOCITY: 88.71 CM/S
ECHO LVOT SV: 60.5 ML
ECHO LVOT VTI: 19.26 CM
ECHO MV AREA VTI: 1.44 CM2
ECHO MV MAX VELOCITY: 141.48 CM/S
ECHO MV MEAN GRADIENT: 2.65 MMHG
ECHO MV PEAK GRADIENT: 8.01 MMHG
ECHO MV VTI: 41.89 CM
ECHO RIGHT VENTRICULAR SYSTOLIC PRESSURE (RVSP): 58.93 MMHG
ECHO TV REGURGITANT MAX VELOCITY: 356.81 CM/S
ECHO TV REGURGITANT PEAK GRADIENT: 50.93 MMHG
LVOT MG: 1.83 MMHG
TROPONIN I SERPL-MCNC: <0.05 NG/ML
TROPONIN I SERPL-MCNC: <0.05 NG/ML

## 2020-11-27 PROCEDURE — 97161 PT EVAL LOW COMPLEX 20 MIN: CPT

## 2020-11-27 PROCEDURE — 97116 GAIT TRAINING THERAPY: CPT

## 2020-11-27 PROCEDURE — 99215 OFFICE O/P EST HI 40 MIN: CPT | Performed by: NURSE PRACTITIONER

## 2020-11-27 PROCEDURE — 99218 HC RM OBSERVATION: CPT

## 2020-11-27 PROCEDURE — 93308 TTE F-UP OR LMTD: CPT

## 2020-11-27 PROCEDURE — 74011250636 HC RX REV CODE- 250/636: Performed by: NURSE PRACTITIONER

## 2020-11-27 PROCEDURE — 96376 TX/PRO/DX INJ SAME DRUG ADON: CPT

## 2020-11-27 PROCEDURE — 74011250637 HC RX REV CODE- 250/637: Performed by: INTERNAL MEDICINE

## 2020-11-27 PROCEDURE — 36415 COLL VENOUS BLD VENIPUNCTURE: CPT

## 2020-11-27 PROCEDURE — 94760 N-INVAS EAR/PLS OXIMETRY 1: CPT

## 2020-11-27 PROCEDURE — 84484 ASSAY OF TROPONIN QUANT: CPT

## 2020-11-27 PROCEDURE — 74011250637 HC RX REV CODE- 250/637: Performed by: HOSPITALIST

## 2020-11-27 PROCEDURE — 97165 OT EVAL LOW COMPLEX 30 MIN: CPT | Performed by: OCCUPATIONAL THERAPIST

## 2020-11-27 RX ORDER — POLYETHYLENE GLYCOL 3350 17 G/17G
17 POWDER, FOR SOLUTION ORAL DAILY PRN
Status: DISCONTINUED | OUTPATIENT
Start: 2020-11-27 | End: 2020-11-28 | Stop reason: HOSPADM

## 2020-11-27 RX ORDER — FINASTERIDE 5 MG/1
5 TABLET, FILM COATED ORAL
Status: DISCONTINUED | OUTPATIENT
Start: 2020-11-27 | End: 2020-11-28 | Stop reason: HOSPADM

## 2020-11-27 RX ORDER — ASPIRIN 81 MG/1
81 TABLET ORAL DAILY
Status: DISCONTINUED | OUTPATIENT
Start: 2020-11-27 | End: 2020-11-28 | Stop reason: HOSPADM

## 2020-11-27 RX ORDER — TAMSULOSIN HYDROCHLORIDE 0.4 MG/1
0.8 CAPSULE ORAL DAILY
Status: DISCONTINUED | OUTPATIENT
Start: 2020-11-27 | End: 2020-11-28 | Stop reason: HOSPADM

## 2020-11-27 RX ORDER — IPRATROPIUM BROMIDE 0.5 MG/2.5ML
0.5 SOLUTION RESPIRATORY (INHALATION)
Status: DISCONTINUED | OUTPATIENT
Start: 2020-11-27 | End: 2020-11-28 | Stop reason: HOSPADM

## 2020-11-27 RX ORDER — DOCUSATE SODIUM 100 MG/1
100 CAPSULE, LIQUID FILLED ORAL DAILY
Status: DISCONTINUED | OUTPATIENT
Start: 2020-11-27 | End: 2020-11-28 | Stop reason: HOSPADM

## 2020-11-27 RX ORDER — CHLORTHALIDONE 25 MG/1
25 TABLET ORAL DAILY
COMMUNITY
End: 2020-11-28

## 2020-11-27 RX ORDER — ONDANSETRON 2 MG/ML
4 INJECTION INTRAMUSCULAR; INTRAVENOUS
Status: DISCONTINUED | OUTPATIENT
Start: 2020-11-27 | End: 2020-11-28 | Stop reason: HOSPADM

## 2020-11-27 RX ORDER — AMLODIPINE BESYLATE 5 MG/1
5 TABLET ORAL DAILY
Status: DISCONTINUED | OUTPATIENT
Start: 2020-11-27 | End: 2020-11-28 | Stop reason: HOSPADM

## 2020-11-27 RX ORDER — CARVEDILOL 3.12 MG/1
3.12 TABLET ORAL 2 TIMES DAILY WITH MEALS
Status: DISCONTINUED | OUTPATIENT
Start: 2020-11-27 | End: 2020-11-28 | Stop reason: HOSPADM

## 2020-11-27 RX ORDER — TAMSULOSIN HYDROCHLORIDE 0.4 MG/1
0.8 CAPSULE ORAL 2 TIMES DAILY
Status: DISCONTINUED | OUTPATIENT
Start: 2020-11-27 | End: 2020-11-27

## 2020-11-27 RX ORDER — PRAVASTATIN SODIUM 20 MG/1
40 TABLET ORAL
Status: DISCONTINUED | OUTPATIENT
Start: 2020-11-27 | End: 2020-11-28 | Stop reason: HOSPADM

## 2020-11-27 RX ORDER — ACETAMINOPHEN 650 MG/1
650 SUPPOSITORY RECTAL
Status: DISCONTINUED | OUTPATIENT
Start: 2020-11-27 | End: 2020-11-28 | Stop reason: HOSPADM

## 2020-11-27 RX ORDER — FUROSEMIDE 10 MG/ML
60 INJECTION INTRAMUSCULAR; INTRAVENOUS 2 TIMES DAILY
Status: DISCONTINUED | OUTPATIENT
Start: 2020-11-27 | End: 2020-11-27

## 2020-11-27 RX ORDER — FUROSEMIDE 10 MG/ML
40 INJECTION INTRAMUSCULAR; INTRAVENOUS 2 TIMES DAILY
Status: DISCONTINUED | OUTPATIENT
Start: 2020-11-27 | End: 2020-11-27

## 2020-11-27 RX ORDER — LISINOPRIL 20 MG/1
20 TABLET ORAL DAILY
Status: DISCONTINUED | OUTPATIENT
Start: 2020-11-27 | End: 2020-11-28 | Stop reason: HOSPADM

## 2020-11-27 RX ORDER — ACETAMINOPHEN 325 MG/1
650 TABLET ORAL
Status: DISCONTINUED | OUTPATIENT
Start: 2020-11-27 | End: 2020-11-28 | Stop reason: HOSPADM

## 2020-11-27 RX ORDER — FUROSEMIDE 10 MG/ML
60 INJECTION INTRAMUSCULAR; INTRAVENOUS 2 TIMES DAILY
Status: DISCONTINUED | OUTPATIENT
Start: 2020-11-27 | End: 2020-11-28 | Stop reason: HOSPADM

## 2020-11-27 RX ADMIN — PRAVASTATIN SODIUM 40 MG: 20 TABLET ORAL at 21:29

## 2020-11-27 RX ADMIN — CARVEDILOL 3.12 MG: 3.12 TABLET, FILM COATED ORAL at 11:13

## 2020-11-27 RX ADMIN — DOCUSATE SODIUM 100 MG: 100 CAPSULE, LIQUID FILLED ORAL at 11:13

## 2020-11-27 RX ADMIN — LISINOPRIL 20 MG: 20 TABLET ORAL at 11:14

## 2020-11-27 RX ADMIN — FUROSEMIDE 60 MG: 10 INJECTION, SOLUTION INTRAMUSCULAR; INTRAVENOUS at 11:15

## 2020-11-27 RX ADMIN — TAMSULOSIN HYDROCHLORIDE 0.8 MG: 0.4 CAPSULE ORAL at 17:02

## 2020-11-27 RX ADMIN — ASPIRIN 81 MG: 81 TABLET, COATED ORAL at 11:13

## 2020-11-27 RX ADMIN — CARVEDILOL 3.12 MG: 3.12 TABLET, FILM COATED ORAL at 19:36

## 2020-11-27 RX ADMIN — FUROSEMIDE 60 MG: 10 INJECTION, SOLUTION INTRAMUSCULAR; INTRAVENOUS at 19:36

## 2020-11-27 RX ADMIN — FINASTERIDE 5 MG: 5 TABLET, FILM COATED ORAL at 21:29

## 2020-11-27 NOTE — ED TRIAGE NOTES
Pt reports SOB and increased leg swelling for the past few days. Hx of significant cardiac issues. S/P watchman surgery on 10/13 by Dr. Nemo Saucedo. Cardiologist is Dr. Vielka Rizvi. Advised to stop taking chlorthalidone on 11/13. Hx of afib as well. Denies chest pain.

## 2020-11-27 NOTE — ED PROVIDER NOTES
Patient is an 34-year-old male with past medical history significant for A. fib status post watchman implantation on 13 October previously on Eliquis who had to stop blood thinners for lower GI bleeding. Patient underwent a sigmoidoscopy on 23 November by Dr. Violet Elder who found evidence of bleeding from prior area of radiation proctitis and had to place a clip. Patient had been on a thiazide diuretic however was advised to stop taking this and since that time has had increasing peripheral edema as well as abdominal distention and dyspnea with minimal exertion.            Past Medical History:   Diagnosis Date    Arthritis     Atrial fibrillation ([address]     CAD (coronary artery disease)     Chronic pain     legs/knee    GERD (gastroesophageal reflux disease)     Hx of carcinoma in situ of prostate     Hyperlipidemia     Hypertension     Insomnia     DEL (obstructive sleep apnea)     Radiation proctitis     Vitamin D deficiency        Past Surgical History:   Procedure Laterality Date    COLONOSCOPY N/A 5/8/2020    COLONOSCOPY performed by Felecia Harman MD at [place] [address] COLONOSCOPY N/A 6/8/2020    COLONOSCOPY performed by Marleyn Diaz MD at [address] [place] [address] N/A 11/23/2020    FLEXIBLE SIGMOIDOSCOPY WITH APC performed by Felecia Harman MD at [place] [address] HX AORTIC VALVE REPLACEMENT  2015    and mitral valve repair, left atrial cryo maze    HX HEENT      melanoma removed head    HX HERNIA REPAIR  2009    Right    HX HERNIA REPAIR      left inguinal hernia repair    HX HERNIA REPAIR Left 12/01/2016    lap left inguinal hernia repair with mesh    HX KNEE REPLACEMENT      Bilateral    HX ORTHOPAEDIC      BILATERAL KNEE REPLACEMENT    HX ORTHOPAEDIC      CARPEL TUNNEL REPAIR-RIGHT    HX OTHER SURGICAL  2015    closure of patent foramen ovale    HX OTHER SURGICAL  10/2020    watchman implant for afib / Dr. Cris Morales      42 radiation treatments Family History:   Problem Relation Age of Onset   [place] Cancer Mother     Cancer Father         prostrate    Cancer Brother         prostrate ca    Anesth Problems Neg Hx        Social History     Socioeconomic History    Marital status:      Spouse name: Not on file    Number of children: Not on file    Years of education: Not on file    Highest education level: Not on file   Occupational History    Not on file   Social Needs    Financial resource strain: Not on file    Food insecurity     Worry: Not on file     Inability: Not on file   [employer] needs     Medical: Not on file     Non-medical: Not on file   Tobacco Use    Smoking status: Never Smoker    Smokeless tobacco: Never Used   Substance and Sexual Activity    Alcohol use: Yes     Alcohol/week: 3.0 standard drinks     Types: 3 Cans of beer per week    Drug use: No    Sexual activity: Not Currently   Lifestyle    Physical activity     Days per week: Not on file     Minutes per session: Not on file    Stress: Not on file   Relationships    Social connections     Talks on phone: Not on file     Gets together: Not on file     Attends Baptism service: Not on file     Active member of club or organization: Not on file     Attends meetings of clubs or organizations: Not on file     Relationship status: Not on file    Intimate partner violence     Fear of current or ex partner: Not on file     Emotionally abused: Not on file     Physically abused: Not on file     Forced sexual activity: Not on file   Other Topics Concern    Not on file   Social History Narrative    Not on file         ALLERGIES: Patient has no known allergies. Review of Systems   Constitutional: Negative for chills and fever. HENT: Negative for drooling and trouble swallowing. Eyes: Negative for photophobia. Respiratory: Positive for shortness of breath. Cardiovascular: Positive for leg swelling.    Gastrointestinal: Positive for abdominal distention, abdominal pain and blood in stool (In the past but none since Eliquis was stopped and patient had clipping of an area of active bleeding on sigmoidoscopy). Negative for nausea. Genitourinary: Negative for hematuria. Musculoskeletal: Negative for neck pain. Skin: Negative for rash and wound. Neurological: Negative for syncope and light-headedness. Hematological: Does not bruise/bleed easily. Psychiatric/Behavioral: Negative. Vitals:    11/26/20 2145 11/26/20 2215 11/26/20 2333 11/27/20 0000   BP:  (!) 150/93 (!) 144/93 (!) 126/91   Pulse:   73    Resp:       Temp:       SpO2: 96%   97%   Weight:       Height:                Physical Exam  Vitals signs and nursing note reviewed. Constitutional:       Appearance: He is ill-appearing. He is not toxic-appearing. Comments: Elderly, deconditioned   HENT:      Head: Normocephalic and atraumatic. Eyes:      Pupils: Pupils are equal, round, and reactive to light. Neck:      Trachea: No tracheal deviation. Cardiovascular:      Rate and Rhythm: Rhythm irregular. Pulses: Normal pulses. Heart sounds: Murmur present. Pulmonary:      Effort: Accessory muscle usage present. Breath sounds: Examination of the right-lower field reveals decreased breath sounds. Examination of the left-lower field reveals decreased breath sounds. Decreased breath sounds present. No wheezing or rhonchi. Chest:      Chest wall: No deformity, tenderness, crepitus or edema. Abdominal:      General: Abdomen is protuberant. Tenderness: There is abdominal tenderness (Mild) in the right upper quadrant and epigastric area. There is no guarding or rebound. Hernia: A hernia (Soft) is present. Hernia is present in the umbilical area. Comments: Abdomen firm   Musculoskeletal:      Right lower leg: Edema present. Left lower leg: Edema present. Skin:     General: Skin is warm and dry.    Neurological:      Mental Status: He is alert and oriented to person, place, and time. Psychiatric:         Mood and Affect: Mood normal.         Behavior: Behavior normal.          MDM  Number of Diagnoses or Management Options  Acute on chronic congestive heart failure, unspecified heart failure type [facility]): Anasarca:   Dyspnea on exertion:   Longstanding persistent atrial fibrillation [facility]):   Diagnosis management comments: Case discussed with on-call cardiologist to agrees with observation overnight for continued diuresis       Amount and/or Complexity of Data Reviewed  Clinical lab tests: reviewed and ordered  Tests in the radiology section of CPT®: reviewed and ordered  Decide to obtain previous medical records or to obtain history from someone other than the patient: yes  Discuss the patient with other providers: yes    Patient Progress  Patient progress: improved         Procedures    EKG obtained at 2040 showing atrial fibrillation, rate 79, nonspecific T wave abnormalities but not significantly changed compared to prior EKG. Perfect Serve Consult for Admission  12:51 AM    ED Room Number: ER10/10  Patient Name and age: Shahana Wang 79 y.o.  male  Working Diagnosis:   1. Longstanding persistent atrial fibrillation ([address]    2. Dyspnea on exertion    3. Anasarca        COVID-19 Suspicion:  no  Sepsis present:  no  Reassessment needed: no  Code Status:  Full Code  Readmission: no  Isolation Requirements:  no  Recommended Level of Care:  telemetry  Department:[facility] ED - [phone]  Other: Patient is an 79-year-old male with past medical history significant for A. fib status post watchman implantation on 13 October by Dr. Suyapa Perkins who was supposed to remain on Eliquis for at least 45 days but started having lower GI bleeding. Blood thinner was stopped and patient had a sigmoidoscopy by  on 23 November which showed evidence of bleeding in areas of prior radiation proctitis.   At some point patient also had been told to stop taking a thiazide diuretic that he had been on and has subsequently had increasing peripheral edema along with abdominal distention and worsening shortness of breath. No ascites noted on CT scan however patient seemed to have evidence of anasarca and had pronounced swelling in his lower extremities thighs and abdomen. Symptoms mildly improved after IV Lasix in the ER. Case discussed with Dr. Kat Heaton who advised observation overnight due to dyspnea for continued diuresis.

## 2020-11-27 NOTE — PROGRESS NOTES
PHYSICAL THERAPY EVALUATION/DISCHARGE  Patient: Cresencio Rizvi (50 y.o. male)  Date: 11/27/2020  Primary Diagnosis: ABDUL (dyspnea on exertion) [R06.00]  Anasarca [R60.1]  Atrial fibrillation ([facility] [I48.91]       Precautions:          ASSESSMENT  Based on the objective data described below, the patient presents with dyspnea and orthopenia that is improving. Patient able to perform all transfers and ambulation without loss of balance or instability. Patient vitals stable with sats on room air 94-96%. Patient is currently at his baseline for mobility. Documentation for home O2:     ROOM AIR    AT REST   O2 SATS  96% HR  58   ROOM AIR WITH ACTIVITY 02 SATS  94% HR  75   (    ) LITERS OF O2 WITH ACTIVITY O2 SATS   HR     ( 0   )LITERS OF 02 PATIENT LEFT COMFORTABLY  SITTING/SUPINE 02 SATS  95% HR  68         Functional Outcome Measure: The patient scored 100/100 on the barthel index outcome measure. Other factors to consider for discharge:      Further skilled acute physical therapy is not indicated at this time. PLAN :  Recommendation for discharge: (in order for the patient to meet his/her long term goals)  No skilled physical therapy/ follow up rehabilitation needs identified at this time.     This discharge recommendation:  A follow-up discussion with the attending provider and/or case management is planned    IF patient discharges home will need the following DME: none       SUBJECTIVE:   Patient stated .    OBJECTIVE DATA SUMMARY:   HISTORY:    Past Medical History:   Diagnosis Date    Arthritis     Atrial fibrillation ([facility]     CAD (coronary artery disease)     Chronic pain     legs/knee    GERD (gastroesophageal reflux disease)     Hx of carcinoma in situ of prostate     Hyperlipidemia     Hypertension     Insomnia     DEL (obstructive sleep apnea)     Radiation proctitis     Vitamin D deficiency      Past Surgical History:   Procedure Laterality Date    COLONOSCOPY N/A 5/8/2020 COLONOSCOPY performed by Len Cedeño MD at [place] [address] COLONOSCOPY N/A 2020    COLONOSCOPY performed by Luis Serna MD at [place] [address] N/A 2020    FLEXIBLE SIGMOIDOSCOPY WITH APC performed by Len Cedeño MD at [place] [address] HX AORTIC VALVE REPLACEMENT  2015    and mitral valve repair, left atrial cryo maze    HX HEENT      melanoma removed head    HX HERNIA REPAIR  2009    Right    HX HERNIA REPAIR      left inguinal hernia repair    HX HERNIA REPAIR Left 2016    lap left inguinal hernia repair with mesh    HX KNEE REPLACEMENT      Bilateral    HX ORTHOPAEDIC      BILATERAL KNEE REPLACEMENT    HX ORTHOPAEDIC      CARPEL TUNNEL REPAIR-RIGHT    HX OTHER SURGICAL      closure of patent foramen ovale    HX OTHER SURGICAL  10/2020    watchman implant for afib / Dr. Sean Troy      42 radiation treatments       Prior level of function: see above  Personal factors and/or comorbidities impacting plan of care: see below         EXAMINATION/PRESENTATION/DECISION MAKING:   Critical Behavior:              Hearing: Auditory  Auditory Impairment: None    Range Of Motion:  AROM: Within functional limits           PROM: Within functional limits           Strength:    Strength:  Within functional limits                    Tone & Sensation:                                  Coordination:  Coordination: Within functional limits  Vision:      Functional Mobility:  Bed Mobility:  Rolling: Independent  Supine to Sit: Independent  Sit to Supine: Independent     Transfers:  Sit to Stand: Independent  Stand to Sit: Independent        Bed to Chair: Independent              Balance:      Ambulation/Gait Training:  Distance (ft): 150 Feet (ft)  Assistive Device: Gait belt  Ambulation - Level of Assistance: Independent     Gait Description (WDL): Exceptions to WDL            Functional Measure:  Barthel Index:    Bathin  Bladder: 10  Bowels: 10  Groomin  Dressing: 10  Feeding: 10  Mobility: 15  Stairs: 10  Toilet Use: 10  Transfer (Bed to Chair and Back): 15  Total: 100/100       The Barthel ADL Index: Guidelines  1. The index should be used as a record of what a patient does, not as a record of what a patient could do. 2. The main aim is to establish degree of independence from any help, physical or verbal, however minor and for whatever reason. 3. The need for supervision renders the patient not independent. 4. A patient's performance should be established using the best available evidence. Asking the patient, friends/relatives and nurses are the usual sources, but direct observation and common sense are also important. However direct testing is not needed. 5. Usually the patient's performance over the preceding 24-48 hours is important, but occasionally longer periods will be relevant. 6. Middle categories imply that the patient supplies over 50 per cent of the effort. 7. Use of aids to be independent is allowed. Artis Samaniego., Barthel, D.W. (2416). Functional evaluation: the Barthel Index. [facility] (14)2. Chan Cam madhu LARISA Pope, Consuelo Guadalupe., [facility] Kaden., [facility], [address] [place] [address] (). Measuring the change indisability after inpatient rehabilitation; comparison of the responsiveness of the Barthel Index and Functional [place] Measure. Journal of Neurology, Neurosurgery, and Psychiatry, 66(4), 877-162. ESTELLA Spear.RADHA, LAURO Leblanc.J.EDGAR, & Aguila Colón M.A. (2004.) Assessment of post-stroke quality of life in cost-effectiveness studies: The usefulness of the Barthel Index and the EuroQoL-5D.  Quality of Life Research, 15, 326-84          Physical Therapy Evaluation Charge Determination   History Examination Presentation Decision-Making   MEDIUM  Complexity : 1-2 comorbidities / personal factors will impact the outcome/ POC  LOW Complexity : 1-2 Standardized tests and measures addressing body structure, function, activity limitation and / or participation in recreation  LOW Complexity : Stable, uncomplicated  Other outcome measures barthel index  LOW       Based on the above components, the patient evaluation is determined to be of the following complexity level: LOW     Pain Rating:  None reported    Activity Tolerance:   Good      After treatment patient left in no apparent distress:   Supine in bed, Call bell within reach and Bed / chair alarm activated    COMMUNICATION/EDUCATION:   The patients plan of care was discussed with: Registered nurse. Fall prevention education was provided and the patient/caregiver indicated understanding., Patient/family have participated as able in goal setting and plan of care. and Patient/family agree to work toward stated goals and plan of care.     Thank you for this referral.  Betzy Madsen, PT, DPT   Time Calculation: 13 mins

## 2020-11-27 NOTE — PROGRESS NOTES
[facility]: MED RECONCILIATION    Comments/Recommendations:    Pharmacist briefly visited patient in room 505.  Patient provided prior to admission medication list printed on 11/23/2020 after visit with Dr. Jaclyn Curry. Patient states list is accurate.  Patient took all of his medications yesterday prior to coming to the hospital.   Tamsulosin: dose seems high - usual daily max is 0.8 mg daily. Recommend reducing dose.  Apixaban 2.5 mg two times per day - per H&P, patient advised MD that Apixaban was stopped ~4 days ago due to GI bleed. Please resume if/when appropriate. Information obtained from: patient and after visit summary    Prior to Admission Medications   Prescriptions Last Dose Informant Patient Reported? Taking? GLUCOSAMINE HCL/CHONDR CHING A NA (GLUCOSAMINE-CHONDROITIN) 750-600 mg tab 11/26/2020 at Unknown time Other Yes Yes   Sig: Take 1 Tab by mouth daily. acetaminophen (TYLENOL) 325 mg tablet  Other Yes Yes   Sig: Take  by mouth every four (4) hours as needed for Pain. amLODIPine (NORVASC) 5 mg tablet 11/26/2020 at Unknown time Other No Yes   Sig: Take 1 Tab by mouth daily. aspirin delayed-release 81 mg tablet 11/26/2020 at Unknown time Other Yes Yes   Sig: Take 81 mg by mouth daily. cholecalciferol (VITAMIN D3) 1,000 unit tablet 11/26/2020 at Unknown time Other Yes Yes   Sig: Take 2,000 Units by mouth two (2) times a day. docusate sodium (COLACE) 100 mg capsule  Other Yes Yes   Sig: Take 100 mg by mouth two (2) times daily as needed. ferrous sulfate 325 mg (65 mg iron) cpER 11/26/2020 at Unknown time Other Yes Yes   Sig: Take 1 Tab by mouth every other day. finasteride (PROSCAR) 5 mg tablet 11/26/2020 at Unknown time Other Yes Yes   Sig: Take 5 mg by mouth nightly. ipratropium (ATROVENT HFA) 17 mcg/actuation inhaler  Other Yes Yes   Sig: Take 2 Puffs by inhalation as needed.    lisinopriL (PRINIVIL, ZESTRIL) 20 mg tablet 11/26/2020 at Unknown time Other No Yes   Sig: Take 1 Tab by mouth daily.   multivitamin (ONE A DAY) tablet 11/26/2020 at Unknown time Other Yes Yes   Sig: Take 1 Tab by mouth daily. omega-3 fatty acids-vitamin e 1,000 mg cap 11/26/2020 at Unknown time Other Yes Yes   Sig: Take 1 Cap by mouth every other day. polyvinyl alcohol (ARTIFICIAL TEARS, POLYVIN ALC,) 1.4 % ophthalmic solution  Other Yes Yes   Sig: Administer 1 Drop to both eyes as needed. pravastatin (PRAVACHOL) 40 mg tablet 11/26/2020 at Unknown time Other Yes Yes   Sig: Take 40 mg by mouth nightly. tamsulosin (FLOMAX) 0.4 mg capsule 11/26/2020 at Unknown time Other Yes Yes   Sig: Take 0.8 mg by mouth two (2) times a day.       Facility-Administered Medications: None     Yasmeen Vargas, Pharmacist

## 2020-11-27 NOTE — PROGRESS NOTES
OCCUPATIONAL THERAPY EVALUATION/DISCHARGE  Patient: Dallas Urena (8 y.o. male)  Date: 11/27/2020  Primary Diagnosis: ABDUL (dyspnea on exertion) [R06.00]  Anasarca [R60.1]  Atrial fibrillation (HCC) [I48.91]       Precautions: (standard)    ASSESSMENT  Based on the objective data described below, the patient presents up ad yadi in room with good safety awareness, no LOB and able to perform all ADLs with mod I.  Educated pt on energy conservation techniques to improve his safety and independence as pt admitted with ABDUL. He verbalized good understanding. No further skilled OT required at this time. Current Level of Function (ADLs/self-care): Independent    Functional Outcome Measure: The patient scored 100/100 on the Barthel Index outcome measure. Other factors to consider for discharge: see above     PLAN :  Recommend with staff: Recommend with nursing patient to complete as able in order to maintain strength, endurance and independence: ADLs with supervision/setup,  OOB to chair 3x/day and mobilizing to the bathroom for toileting without assist. Thank you for your assistance. Recommendation for discharge: (in order for the patient to meet his/her long term goals)  No skilled occupational therapy/ follow up rehabilitation needs identified at this time. This discharge recommendation:  Has not yet been discussed the attending provider and/or case management    IF patient discharges home will need the following DME: none       SUBJECTIVE:   Patient stated I am fine.     OBJECTIVE DATA SUMMARY:   HISTORY:   Past Medical History:   Diagnosis Date    Arthritis     Atrial fibrillation ([address]     CAD (coronary artery disease)     Chronic pain     legs/knee    GERD (gastroesophageal reflux disease)     Hx of carcinoma in situ of prostate     Hyperlipidemia     Hypertension     Insomnia     DEL (obstructive sleep apnea)     Radiation proctitis     Vitamin D deficiency      Past Surgical History: Procedure Laterality Date    COLONOSCOPY N/A 5/8/2020    COLONOSCOPY performed by Kamala Sparks MD at [facility] COLONOSCOPY N/A 6/8/2020    COLONOSCOPY performed by Julio Brunner MD at [address] [place] [address] N/A 11/23/2020    FLEXIBLE SIGMOIDOSCOPY WITH APC performed by Kamala Sparks MD at [facility] HX AORTIC VALVE REPLACEMENT  2015    and mitral valve repair, left atrial cryo maze    HX HEENT      melanoma removed head    HX HERNIA REPAIR  2009    Right    HX HERNIA REPAIR      left inguinal hernia repair    HX HERNIA REPAIR Left 12/01/2016    lap left inguinal hernia repair with mesh    HX KNEE REPLACEMENT      Bilateral    HX ORTHOPAEDIC      BILATERAL KNEE REPLACEMENT    HX ORTHOPAEDIC      CARPEL TUNNEL REPAIR-RIGHT    HX OTHER SURGICAL  2015    closure of patent foramen ovale    HX OTHER SURGICAL  10/2020    watchman implant for afib / Dr. Gaviota Negrete      42 radiation treatments       Prior Level of Function/Environment/Context: Independet  Expanded or extensive additional review of patient history:   Home Situation  Home Environment: Private residence  Living Alone: No  Support Systems: Spouse/Significant Other/Partner    Hand dominance: Right    EXAMINATION OF PERFORMANCE DEFICITS:  Cognitive/Behavioral Status:  Neurologic State: Alert; Appropriate for age  Orientation Level: Oriented X4  Cognition: Appropriate decision making; Appropriate for age attention/concentration; Appropriate safety awareness; Follows commands  Perception: Appears intact  Perseveration: No perseveration noted  Safety/Judgement: Awareness of environment; Fall prevention;Driving appropriateness; Insight into deficits    Hearing: Auditory  Auditory Impairment: None    Vision/Perceptual:    Acuity: Within Defined Limits         Range of Motion:  AROM: Within functional limits  PROM: Within functional limits                      Strength:  Strength:  Within functional limits                Coordination:  Coordination: Within functional limits  Fine Motor Skills-Upper: Left Intact; Right Intact    Gross Motor Skills-Upper: Left Intact; Right Intact    Balance:  Sitting: Intact  Standing: Intact    Functional Mobility and Transfers for ADLs:  Bed Mobility:  Rolling: Independent  Supine to Sit: Independent  Sit to Supine: Independent    Transfers:  Sit to Stand: Independent  Stand to Sit: Independent  Bed to Chair: Independent  Bathroom Mobility: Independent  Toilet Transfer : Independent  Assistive Device : Gait Belt    ADL Assessment:  Feeding: Independent    Oral Facial Hygiene/Grooming: Independent    Bathing: Independent    Upper Body Dressing: Independent    Lower Body Dressing: Independent    Toileting: Independent                ADL Intervention and task modifications:  Patient instructed and indicated understanding energy conservation techniques to increase independence and safety during ADLs. Cognitive Retraining  Safety/Judgement: Awareness of environment; Fall prevention;Driving appropriateness; Insight into deficits      Functional Measure:  Barthel Index:    Bathin  Bladder: 10  Bowels: 10  Groomin  Dressing: 10  Feeding: 10  Mobility: 15  Stairs: 10  Toilet Use: 10  Transfer (Bed to Chair and Back): 15  Total: 100/100        The Barthel ADL Index: Guidelines  1. The index should be used as a record of what a patient does, not as a record of what a patient could do. 2. The main aim is to establish degree of independence from any help, physical or verbal, however minor and for whatever reason. 3. The need for supervision renders the patient not independent. 4. A patient's performance should be established using the best available evidence. Asking the patient, friends/relatives and nurses are the usual sources, but direct observation and common sense are also important. However direct testing is not needed.   5. Usually the patient's performance over the preceding 24-48 hours is important, but occasionally longer periods will be relevant. 6. Middle categories imply that the patient supplies over 50 per cent of the effort. 7. Use of aids to be independent is allowed. Cathy Riggs., Barthel, D.W. (8752). Functional evaluation: the Barthel Index. [address] [facility] (14)2. LARISA Newman, Vielka Fernandez., Eric Rebollar., Naina Page, [address] (1999). Measuring the change indisability after inpatient rehabilitation; comparison of the responsiveness of the Barthel Index and Functional [place] Measure. Journal of Neurology, Neurosurgery, and Psychiatry, 66(4), 162-190. Kyle Pugh, N.J.A, SARAH Leblanc, & Pinky Kemp M.A. (2004.) Assessment of post-stroke quality of life in cost-effectiveness studies: The usefulness of the Barthel Index and the EuroQoL-5D. Quality of Life Research, 15, 169-85       Occupational Therapy Evaluation Charge Determination   History Examination Decision-Making   LOW Complexity : Brief history review  LOW Complexity : 1-3 performance deficits relating to physical, cognitive , or psychosocial skils that result in activity limitations and / or participation restrictions  LOW Complexity : No comorbidities that affect functional and no verbal or physical assistance needed to complete eval tasks       Based on the above components, the patient evaluation is determined to be of the following complexity level: LOW   Pain Rating:  No c/o pain    Activity Tolerance:   Good    After treatment patient left in no apparent distress:    up amb around room    COMMUNICATION/EDUCATION:   The patients plan of care was discussed with: Physical therapist and Registered nurse.      Thank you for this referral.  Joya Collier, PAULA  Time Calculation: 10 mins

## 2020-11-27 NOTE — ED NOTES
TRANSFER - OUT REPORT:    Verbal report given to Shanita Palmer RN (name) on Nadya Mauro  being transferred to bed 505 (unit) for routine progression of care       Report consisted of patients Situation, Background, Assessment and   Recommendations(SBAR). Information from the following report(s) SBAR, ED Summary, Intake/Output, MAR and Recent Results was reviewed with the receiving nurse. Lines:   Peripheral IV 11/26/20 Left Forearm (Active)   Site Assessment Clean, dry, & intact 11/26/20 2055   Phlebitis Assessment 0 11/26/20 2055   Infiltration Assessment 0 11/26/20 2055   Dressing Status Clean, dry, & intact 11/26/20 2055   Dressing Type Transparent;Tape 11/26/20 2055   Hub Color/Line Status Pink 11/26/20 2055   Action Taken Blood drawn 11/26/20 2055        Opportunity for questions and clarification was provided.       Patient transported with:   Libretto

## 2020-11-27 NOTE — PROGRESS NOTES
Rounded on [facility] patients and provided Anointing of the Sick at request of patient. Fr. Linus White.

## 2020-11-27 NOTE — CONSULTS
[facility] [address] [phone]    Cardiology Consult Note    Date of  Admission: 2020  8:59 PM  PCP- Prince, MD Slim      Other, MD Slim  :  1940   MRN:  [id number]     Reason for consult: burroughs/chf  Admission Diagnosis: BURROUGHS (dyspnea on exertion) [R06.00]  Anasarca [R60.1]  Atrial fibrillation [facility]) [I48.91]    Marimar Mckeon is a 79 y.o. male admitted for BURROUGHS (dyspnea on exertion) [R06.00]  Anasarca [R60.1]  Atrial fibrillation ([facility] [address] [I48.91]. Consult requested by Jaylen Torres MD       Subjective:   BURROUGHS (dyspnea on exertion) [R06.00]  Anasarca [R60.1]  Atrial fibrillation (HCC) [I48.91]        Impression Plan/Recommendation   1. HFrEF per echo last month  2. Permanent AF s/p MAZE-V rate controlled   3. S/p watchman device 10/13/2020  4. Hypertension   5. Anemia   6. S/p MVR/AVR  7. Mod PFO  8. Prostate CA /radiation   9. Rectal bleed s/p clip 2020  10. PVCs  11. DEL on cpap               · Will check limited echo now to make sure there has been no decline in LV and MVR/AVR  function   · Agree w IV diuretics will increase dose/watch creatinine- great response   ·  BP stable -BB/acei. Will hold norvasc w/ IV diuertics   · Reviewed case w Dr Misbah Ferreira / reviewed GI notes - would like ot avoid anticoagulation if possible s/p rectal clip . Okay to hold eliquis, cont ASA. · Will need a maintenance diuretic at discharge - recommend lasix 40 mg daily. Reviewed daily weights   · Notified office that 45 day s/p watchman DEWEY needs to be scheduled     Reviewed plan with the patient/RN and attending            Subjective: Marimar Mckeon is a 79 y.o. male I am seeing for CHF. Pmhx as above. The patient reports worsening SOB/BURROUGHS, orthopnea, fatigue and LE edema for about 1 month now. He has been sleeping in his recliner for 2-3 weeks unable to lay flat.  He recently had a rectal clip placed d/t bleeding 4 days ago and has been off the eliquis since then. No associated chest pain.                Past Medical History:   Diagnosis Date    Arthritis     Atrial fibrillation (HCC)     CAD (coronary artery disease)     Chronic pain     legs/knee    GERD (gastroesophageal reflux disease)     Hx of carcinoma in situ of prostate     Hyperlipidemia     Hypertension     Insomnia     DEL (obstructive sleep apnea)     Radiation proctitis     Vitamin D deficiency       Past Surgical History:   Procedure Laterality Date    COLONOSCOPY N/A 5/8/2020    COL[facility] Jose [facility]z [facility]OSCOPY    COLONOSCOPY N/A 6/8/2020    COLONOSCOPY performed by Rojelio Mcknight MD at [address] N/A 11/23/2020    FLEXIBLE SIGMOIDOSCOPY WITH APC performed by Jose Gómez MD at [facility] HX AORTIC VALVE REPLACEMENT  2015    and mitral valve repair, left atrial cryo maze    HX HEENT      melanoma removed head    HX HERNIA REPAIR  2009    Right    HX HERNIA REPAIR      left inguinal hernia repair    HX HERNIA REPAIR Left 12/01/2016    lap left inguinal hernia repair with mesh    HX KNEE REPLACEMENT      Bilateral    HX ORTHOPAEDIC      BILATERAL KNEE REPLACEMENT    HX ORTHOPAEDIC      CARPEL TUNNEL REPAIR-RIGHT    HX OTHER SURGICAL  2015    closure of patent foramen ovale    HX OTHER SURGICAL  10/2020    watchman implant for afib / Dr. Daniels Ards      42 radiation treatments       Hospital Medications:  Current Facility-Administered Medications   Medication Dose Route Frequency    amLODIPine (NORVASC) tablet 5 mg  5 mg Oral DAILY    aspirin delayed-release tablet 81 mg  81 mg Oral DAILY    docusate sodium (COLACE) capsule 100 mg  100 mg Oral DAILY    finasteride (PROSCAR) tablet 5 mg  5 mg Oral QHS    lisinopriL (PRINIVIL, ZESTRIL) tablet 20 mg  20 mg Oral DAILY    pravastatin (PRAVACHOL) tablet 40 mg  40 mg Oral QHS    tamsulosin (FLOMAX) capsule 0.8 mg  0.8 mg Oral BID    ipratropium (ATROVENT) 0.02 % nebulizer solution 0.5 mg  0.5 mg Nebulization Q6H PRN    carvediloL (COREG) tablet 3.125 mg  3.125 mg Oral BID WITH MEALS    furosemide (LASIX) injection 40 mg  40 mg IntraVENous BID    acetaminophen (TYLENOL) tablet 650 mg  650 mg Oral Q6H PRN    Or    acetaminophen (TYLENOL) suppository 650 mg  650 mg Rectal Q6H PRN    polyethylene glycol (MIRALAX) packet 17 g  17 g Oral DAILY PRN    ondansetron (ZOFRAN) injection 4 mg  4 mg IntraVENous Q6H PRN     No current facility-administered medications on file prior to encounter. Current Outpatient Medications on File Prior to Encounter   Medication Sig Dispense Refill    amLODIPine (NORVASC) 5 mg tablet Take 1 Tab by mouth daily. 30 Tab 0    lisinopriL (PRINIVIL, ZESTRIL) 20 mg tablet Take 1 Tab by mouth daily. 30 Tab 0    apixaban (Eliquis) 5 mg tablet Take 2.5 mg by mouth two (2) times a day.  aspirin delayed-release 81 mg tablet Take 81 mg by mouth daily.  polyvinyl alcohol (ARTIFICIAL TEARS, POLYVIN ALC,) 1.4 % ophthalmic solution Administer 1 Drop to both eyes as needed.  ipratropium (ATROVENT HFA) 17 mcg/actuation inhaler Take 2 Puffs by inhalation as needed.  ferrous sulfate 325 mg (65 mg iron) cpER Take 1 Tab by mouth every other day.  tamsulosin (FLOMAX) 0.4 mg capsule Take 0.8 mg by mouth two (2) times a day.  cholecalciferol (VITAMIN D3) 1,000 unit tablet Take 2,000 Units by mouth two (2) times a day.  GLUCOSAMINE HCL/CHONDR CHING A NA (GLUCOSAMINE-CHONDROITIN) 750-600 mg tab Take 1 Tab by mouth daily.  omega-3 fatty acids-vitamin e 1,000 mg cap Take 1 Cap by mouth every other day.  acetaminophen (TYLENOL) 325 mg tablet Take  by mouth every four (4) hours as needed for Pain.  multivitamin (ONE A DAY) tablet Take 1 Tab by mouth daily.       docusate sodium (COLACE) 100 mg capsule Take 100 mg by mouth two (2) times daily as needed.  finasteride (PROSCAR) 5 mg tablet Take 5 mg by mouth nightly.  pravastatin (PRAVACHOL) 40 mg tablet Take 40 mg by mouth nightly. No Known Allergies          Review of Symptoms:  Other systems reviewed and negative except as above. Physical Exam    Visit Vitals  /76 (BP 1 Location: Right arm, BP Patient Position: Sitting)   Pulse 69   Temp 97.9 °F (36.6 °C)   Resp 20   Ht 5' 10\" (1.778 m)   Wt 214 lb 15.2 oz (97.5 kg)   SpO2 96%   BMI 30.84 kg/m²     General Appearance:  Well developed, elderly, alert and oriented x 3, and individual in no acute distress. Ears/Nose/Mouth/Throat:   [place]. Normal oral mucosa,no scleral icterus     Neck: Supple + JVD    Chest:   Diminished bases ruthie   Cardiovascular:  irregular rate and rhythm, 2/6 Murmur at RUSB/LSUB. Abdomen:   Soft, non-tender, bowel sounds are active. Extremities: +2 pitting  edema bilaterally. Pulses detected, no varicosities   Skin: Warm and dry. Neuro  Moves all extermities and neurologically intact                                                       Cardiographics    Telemetry: AF v rate 70's w occ PVCs  ECG: AF v rate 79 bpm    Cardiac testing      Recent Labs     11/27/20  0813   TROIQ <0.05       Recent Labs     11/26/20  2045      K 4.1   CO2 30   BUN 12   CREA 0.90   *   MG 2.3   WBC 4.8   HGB 9.6*   HCT 30.6*          I have discussed the diagnosis with the patient and the intended plan as seen in the above orders. Questions were answered concerning future plans. I have discussed medication side effects and warnings with the patient as well. Nadya Mauro is in agreement to the plan listed above and wishes to proceed. he  was instructed not to smoke, eat heart healthy diet  and to exercise. Thank you for this consult.     Reyes Merry, NP

## 2020-11-27 NOTE — ROUTINE PROCESS
Bedside shift change report given to Naida Harry (oncoming nurse) by Scotty Cisneros RN (offgoing nurse). Report included the following information SBAR, Kardex and MAR.

## 2020-11-27 NOTE — H&P
[address]  [phone]     Hospitalist Admission Note      NAME: Felecia Estrada   :  1940   MRN:  [id number]     Date/Time:  2020 5:35 AM    Patient PCP: Iva Benitez MD    Emergency Contact:    Extended Emergency Contact Information  Primary Emergency Contact: Jack Posey  Address: [address] [place] [address] Phone: [phone]  Mobile Phone: [phone]  Relation: Spouse      Code: Prior    Isolation Precautions: There are currently no Active Isolations        Subjective:     CHIEF COMPLAINT: SOB     HISTORY OF PRESENT ILLNESS:     Mr. Jhonathan Mckeon is a 79 y.o. male with history of CHF, CAD, hypertension presents with a 3-week history of dyspnea on exertion which has been progressively getting worse to the point that is severe associated with orthopnea and pressure in his chest and fluid retention with significant lower extremity edema. Patient reports that he was taken off one of his fluid medications and not put her back on anything and he started to retain too much fluid as a result. He was discussed with cardiology who recommended admission or ABDUL and anasarca. Was started on diuresis which helped with dyspnea somewhat. No Known Allergies    Prior to Admission medications    Medication Sig Start Date End Date Taking? Authorizing Provider   amLODIPine (NORVASC) 5 mg tablet Take 1 Tab by mouth daily. 10/14/20   Shawna Jeffries, SULEIMAN   lisinopriL (PRINIVIL, ZESTRIL) 20 mg tablet Take 1 Tab by mouth daily. 10/14/20   Shawna Jeffries, NP   apixaban (Eliquis) 5 mg tablet Take 2.5 mg by mouth two (2) times a day. Provider, Historical   aspirin delayed-release 81 mg tablet Take 81 mg by mouth daily. Provider, Historical   polyvinyl alcohol (ARTIFICIAL TEARS, POLYVIN ALC,) 1.4 % ophthalmic solution Administer 1 Drop to both eyes as needed.     Provider, Historical ipratropium (ATROVENT HFA) 17 mcg/actuation inhaler Take 2 Puffs by inhalation as needed. Provider, Historical   ferrous sulfate 325 mg (65 mg iron) cpER Take 1 Tab by mouth every other day. Provider, Historical   tamsulosin (FLOMAX) 0.4 mg capsule Take 0.8 mg by mouth two (2) times a day. Provider, Historical   cholecalciferol (VITAMIN D3) 1,000 unit tablet Take 2,000 Units by mouth two (2) times a day. Provider, Historical   GLUCOSAMINE HCL/CHONDR CHING A NA (GLUCOSAMINE-CHONDROITIN) 750-600 mg tab Take 1 Tab by mouth daily. Provider, Historical   omega-3 fatty acids-vitamin e 1,000 mg cap Take 1 Cap by mouth every other day. Provider, Historical   acetaminophen (TYLENOL) 325 mg tablet Take  by mouth every four (4) hours as needed for Pain. Provider, Historical   multivitamin (ONE A DAY) tablet Take 1 Tab by mouth daily. Provider, Historical   docusate sodium (COLACE) 100 mg capsule Take 100 mg by mouth two (2) times daily as needed. Provider, Historical   finasteride (PROSCAR) 5 mg tablet Take 5 mg by mouth nightly. Provider, Historical   pravastatin (PRAVACHOL) 40 mg tablet Take 40 mg by mouth nightly.     Provider, Historical       Past Medical History:   Diagnosis Date    Arthritis     Atrial fibrillation ([address]     CAD (coronary artery disease)     Chronic pain     legs/knee    GERD (gastroesophageal reflux disease)     Hx of carcinoma in situ of prostate     Hyperlipidemia     Hypertension     Insomnia     DEL (obstructive sleep apnea)     Radiation proctitis     Vitamin D deficiency         Past Surgical History:   Procedure Laterality Date    COLONOSCOPY N/A 5/8/2020    COLONOSCOPY performed by Penelope Ervin MD at [facility] ENDOSCOPY    COLONOSCOPY N/A 6/8/2020    COLONOSCOPY performed by Leydi Atwood MD at [address] N/A 11/23/2020    FLEXIBLE SIGMOIDOSCOPY WITH APC performed by Penelope Ervin MD at [facility] HX AORTIC VALVE REPLACEMENT  2015    and mitral valve repair, left atrial cryo maze    HX HEENT      melanoma removed head    HX HERNIA REPAIR  2009    Right    HX HERNIA REPAIR      left inguinal hernia repair    HX HERNIA REPAIR Left 12/01/2016    lap left inguinal hernia repair with mesh    HX KNEE REPLACEMENT      Bilateral    HX ORTHOPAEDIC      BILATERAL KNEE REPLACEMENT    HX ORTHOPAEDIC      CARPEL TUNNEL REPAIR-RIGHT    HX OTHER SURGICAL  2015    closure of patent foramen ovale    HX OTHER SURGICAL  10/2020    watchman implant for afib / Dr. Anmol Tadeo      42 radiation treatments       Social History     Tobacco Use    Smoking status: Never Smoker    Smokeless tobacco: Never Used   Substance Use Topics    Alcohol use: Yes     Alcohol/week: 3.0 standard drinks     Types: 3 Cans of beer per week        Family History   Problem Relation Age of Onset    Cancer Mother     Cancer Father         prostrate    Cancer Brother         prostrate ca    Anesth Problems Neg Hx       PFMSH and medications were reviewed.     Review of Systems (14 point ROS):  (bold if positive, if negative)    Gen:   , , , , Eyes:  , , ENT:   , , , , CVS:   , , , , , , , Pulm: , , , , GI:       , , , , , , , :     , , , , MS:     , , , Skin:   , , , , Psy:    , , , , , , Endo: , , , Hem:  , , , Abraham:   , , , , Pauly:   , , , , , , ,         Objective:      VITALS:    Vital signs reviewed; most recent are:    Visit Vitals  BP (!) 134/91 (BP 1 Location: Right arm, BP Patient Position: Sitting;Post activity)   Pulse 82   Temp 98.1 °F (36.7 °C)   Resp 19   Ht 5' 10\" (1.778 m)   Wt 97.5 kg (214 lb 15.2 oz)   SpO2 94%   BMI 30.84 kg/m²     SpO2 Readings from Last 6 Encounters:   11/27/20 94%   11/23/20 95%   10/16/20 96%   10/14/20 98%   07/17/20 96%   06/25/20 96%            Intake/Output Summary (Last 24 hours) at 11/27/2020 0535  Last data filed at 11/27/2020 0227  Gross per 24 hour   Intake    Output 2475 ml   Net -2475 ml Exam:     Physical Exam:    General:  Alert, cooperative, but appears chronically sick. Very mild respiratory distress. Head: Normocephalic, atraumatic  Eyes: PERRL and EOMI sclera clear  ENT: Lips, mucosa, and tongue normal.   Neck: supple, no tenderness  Lungs: Bilateral decreased breath sounds with bibasilar crackles. Heart: R6-D2, RRR systolic murmur. Abd: Obese SNTBS(+), No HSM  Ext: no cyanosis, bilateral 4+ LE pitting edema    Pulses: 2+ and symmetric  Skin: Skin color, texture, turgor normal. No rashes or lesions  Neuro: Alert and oriented x 4. Cranial nerves II through XII grossly intact. Psych: Not anxious, cooperative, normal affect    Labs:    Recent Labs     11/26/20 2045   WBC 4.8   HGB 9.6*   HCT 30.6*        Recent Labs     11/26/20 2045      K 4.1   *   CO2 30   *   BUN 12   CREA 0.90   CA 8.2*   MG 2.3   ALB 3.6   TBILI 0.5   ALT 30     Lab Results   Component Value Date/Time    Glucose (POC) 113 (H) 12/27/2014 05:14 PM    Glucose (POC) 119 (H) 12/26/2014 04:41 PM     No results for input(s): PH, PCO2, PO2, HCO3, FIO2 in the last 72 hours. No results for input(s): INR, INREXT in the last 72 hours. Radiology and EKG reviewed:     Xr Chest Pa Lat    Result Date: 11/26/2020  IMPRESSION: No acute finding. Ct Abd Pelv Wo Cont    Result Date: 11/27/2020  IMPRESSION: 1. No acute intra-abdominal pathology. 2.  Possible clip or foreign body in the rectum. 3.  Cardiomegaly. Probable elevated right heart pressures. 4.  Other incidental findings as above    [facility]    Result Date: 11/26/2020  IMPRESSION: 1. Heterogeneous echotexture of the liver suggestive of underlying liver parenchymal disease. 2.  Cholelithiasis. There is suggestion of gallbladder wall thickening however the gallbladder is nondistended. I personally reviewed and interpreted chest x-ray and discussed it with patient.     **Old chart reviewed in [facility]**       Assessment/Plan: ABDUL (dyspnea on exertion) (11/27/2020) POA due to fluid overload: Patient admitted for diuresis. We will continue with Lasix IV. Cardiology to see patient. Recent echo so will not repeat at this time. Anasarca (11/27/2020): As above continue diuresis. Atrial fibrillation [facility]): Rate controlled. Currently not on Eliquis due to GI bleed recently and has been off of it for least 4 days. Hemoglobin is stable. Will defer to cardiology and when the patient should be started. S/P AVR (aortic valve replacement) (12/23/2014) Overview: AORTIC VALVE REPLACEMENT 23mm Deep Mitroflow Bioprosthesis: Stable                    S/P MVR (mitral valve repair) (12/23/2014) Overview: MITRAL VALVE REPAIR Medtronic 25mm Adjustable Ring: Stable          Hypertension (10/26/2015): Continue with home medications. Have ordered CCB, BB, and ACE inhibitor.       Body mass index is 30.84 kg/m².: 30.0 - 39.9 Obese        Risk of deterioration: high                   Care Plan discussed with: Patient, Nursing Staff, >50% of time spent in counseling and coordination of care and Er Physician    Discussed:  Code Status and Care Plan    Prophylaxis:  SCD's    Probable Disposition:   PT, OT, RN    Patient was seen:  After Midnight 11/27/2020                ___________________________________________________    Admitting Physician: Melanie Carvalho MD

## 2020-11-27 NOTE — PROGRESS NOTES
11/27/2020   CARE MANAGEMENT NOTE:  (cross coverage)    Reason for Admission:   Dyspnea, Afib, CHF, and suggestive liver disease                   RUR Score:   N/A; Low risk                  Plan for utilizing home health:    No      PCP: First and Last name:  None listed   Name of Practice:    Are you a current patient: Yes/No:    Approximate date of last visit:  Unknown at this writing; full assessment not complete as pt is low risk   Can you participate in a virtual visit with your PCP: Unknown                    Current Advanced Directive/Advance Care Plan: Full Code; Adv Care Plan on file                         Transition of Care Plan:  1. PT/OT evals pending; await recs  2. Outpt f/u  3. Pt to arrange his own transportation upon discharge    CM will continue to follow pt until discharged.   Yuriy

## 2020-11-28 VITALS
SYSTOLIC BLOOD PRESSURE: 103 MMHG | RESPIRATION RATE: 19 BRPM | TEMPERATURE: 98.5 F | BODY MASS INDEX: 30.64 KG/M2 | HEIGHT: 70 IN | OXYGEN SATURATION: 94 % | WEIGHT: 214 LBS | DIASTOLIC BLOOD PRESSURE: 64 MMHG | HEART RATE: 66 BPM

## 2020-11-28 PROBLEM — I50.33 DIASTOLIC CHF, ACUTE ON CHRONIC (HCC): Status: ACTIVE | Noted: 2020-11-28

## 2020-11-28 LAB
ALBUMIN SERPL-MCNC: 3.7 G/DL (ref 3.5–5)
ALBUMIN/GLOB SERPL: 1.1 {RATIO} (ref 1.1–2.2)
ALP SERPL-CCNC: 70 U/L (ref 45–117)
ALT SERPL-CCNC: 26 U/L (ref 12–78)
ANION GAP SERPL CALC-SCNC: 3 MMOL/L (ref 5–15)
AST SERPL-CCNC: 18 U/L (ref 15–37)
BASOPHILS # BLD: 0 K/UL (ref 0–0.1)
BASOPHILS NFR BLD: 1 % (ref 0–1)
BILIRUB SERPL-MCNC: 0.6 MG/DL (ref 0.2–1)
BUN SERPL-MCNC: 20 MG/DL (ref 6–20)
BUN/CREAT SERPL: 21 (ref 12–20)
CALCIUM SERPL-MCNC: 9.2 MG/DL (ref 8.5–10.1)
CHLORIDE SERPL-SCNC: 99 MMOL/L (ref 97–108)
CO2 SERPL-SCNC: 35 MMOL/L (ref 21–32)
CREAT SERPL-MCNC: 0.94 MG/DL (ref 0.7–1.3)
DIFFERENTIAL METHOD BLD: ABNORMAL
EOSINOPHIL # BLD: 0.3 K/UL (ref 0–0.4)
EOSINOPHIL NFR BLD: 6 % (ref 0–7)
ERYTHROCYTE [DISTWIDTH] IN BLOOD BY AUTOMATED COUNT: 15.1 % (ref 11.5–14.5)
GLOBULIN SER CALC-MCNC: 3.5 G/DL (ref 2–4)
GLUCOSE SERPL-MCNC: 105 MG/DL (ref 65–100)
HCT VFR BLD AUTO: 31.5 % (ref 36.6–50.3)
HGB BLD-MCNC: 10 G/DL (ref 12.1–17)
IMM GRANULOCYTES # BLD AUTO: 0 K/UL (ref 0–0.04)
IMM GRANULOCYTES NFR BLD AUTO: 0 % (ref 0–0.5)
LYMPHOCYTES # BLD: 0.7 K/UL (ref 0.8–3.5)
LYMPHOCYTES NFR BLD: 12 % (ref 12–49)
MAGNESIUM SERPL-MCNC: 2.3 MG/DL (ref 1.6–2.4)
MCH RBC QN AUTO: 31.9 PG (ref 26–34)
MCHC RBC AUTO-ENTMCNC: 31.7 G/DL (ref 30–36.5)
MCV RBC AUTO: 100.6 FL (ref 80–99)
MONOCYTES # BLD: 0.7 K/UL (ref 0–1)
MONOCYTES NFR BLD: 13 % (ref 5–13)
NEUTS SEG # BLD: 3.6 K/UL (ref 1.8–8)
NEUTS SEG NFR BLD: 68 % (ref 32–75)
NRBC # BLD: 0 K/UL (ref 0–0.01)
NRBC BLD-RTO: 0 PER 100 WBC
PHOSPHATE SERPL-MCNC: 5.1 MG/DL (ref 2.6–4.7)
PLATELET # BLD AUTO: 182 K/UL (ref 150–400)
PMV BLD AUTO: 9 FL (ref 8.9–12.9)
POTASSIUM SERPL-SCNC: 3.6 MMOL/L (ref 3.5–5.1)
PROT SERPL-MCNC: 7.2 G/DL (ref 6.4–8.2)
RBC # BLD AUTO: 3.13 M/UL (ref 4.1–5.7)
SODIUM SERPL-SCNC: 137 MMOL/L (ref 136–145)
WBC # BLD AUTO: 5.3 K/UL (ref 4.1–11.1)

## 2020-11-28 PROCEDURE — 80053 COMPREHEN METABOLIC PANEL: CPT

## 2020-11-28 PROCEDURE — 74011250637 HC RX REV CODE- 250/637: Performed by: HOSPITALIST

## 2020-11-28 PROCEDURE — 94760 N-INVAS EAR/PLS OXIMETRY 1: CPT

## 2020-11-28 PROCEDURE — 96376 TX/PRO/DX INJ SAME DRUG ADON: CPT

## 2020-11-28 PROCEDURE — 84100 ASSAY OF PHOSPHORUS: CPT

## 2020-11-28 PROCEDURE — 85025 COMPLETE CBC W/AUTO DIFF WBC: CPT

## 2020-11-28 PROCEDURE — 74011250637 HC RX REV CODE- 250/637: Performed by: INTERNAL MEDICINE

## 2020-11-28 PROCEDURE — 74011250636 HC RX REV CODE- 250/636: Performed by: NURSE PRACTITIONER

## 2020-11-28 PROCEDURE — 99218 HC RM OBSERVATION: CPT

## 2020-11-28 PROCEDURE — 36415 COLL VENOUS BLD VENIPUNCTURE: CPT

## 2020-11-28 PROCEDURE — 83735 ASSAY OF MAGNESIUM: CPT

## 2020-11-28 RX ORDER — FUROSEMIDE 40 MG/1
40 TABLET ORAL DAILY
Qty: 30 TAB | Refills: 0 | Status: SHIPPED | OUTPATIENT
Start: 2020-11-28 | End: 2020-12-08 | Stop reason: SDUPTHER

## 2020-11-28 RX ORDER — CARVEDILOL 3.12 MG/1
3.12 TABLET ORAL 2 TIMES DAILY WITH MEALS
Qty: 60 TAB | Refills: 0 | Status: SHIPPED | OUTPATIENT
Start: 2020-11-28 | End: 2020-12-08 | Stop reason: SDUPTHER

## 2020-11-28 RX ADMIN — FUROSEMIDE 60 MG: 10 INJECTION, SOLUTION INTRAMUSCULAR; INTRAVENOUS at 09:00

## 2020-11-28 RX ADMIN — LISINOPRIL 20 MG: 20 TABLET ORAL at 09:01

## 2020-11-28 RX ADMIN — TAMSULOSIN HYDROCHLORIDE 0.8 MG: 0.4 CAPSULE ORAL at 09:01

## 2020-11-28 RX ADMIN — DOCUSATE SODIUM 100 MG: 100 CAPSULE, LIQUID FILLED ORAL at 09:01

## 2020-11-28 RX ADMIN — ASPIRIN 81 MG: 81 TABLET, COATED ORAL at 09:01

## 2020-11-28 NOTE — PROGRESS NOTES
Occupational Therapy  Order received and chart reviewed, patient evaluated and discharged yesterday without further OT needs.    Alli Griffin, OTR/L

## 2020-11-28 NOTE — PROGRESS NOTES
Discharge medications reviewed with patient and appropriate educational materials and side effects teaching were provided. I have reviewed discharge instructions with the patient. The patient verbalized understanding. Leave smart education provided to patient. The patient verbalized understanding. AVS signed and given to patient. Spouse present during discharge instructions. Prescriptions given to patient. Aware of follow up Md appointments. HF education print out provided. Peripheral IV removed. Spouse to transport home.

## 2020-11-28 NOTE — DISCHARGE INSTRUCTIONS
HOSPITALIST DISCHARGE INSTRUCTIONS  NAME: Nadya Mauro   :  1940   MRN:  [id number]     Date/Time:  2020 1:18 PM    ADMIT DATE: 2020     DISCHARGE DATE: 2020     ADMITTING DIAGNOSIS:  Acute on chronic diastolic heart failure    DISCHARGE DIAGNOSIS:  same    MEDICATIONS:  See after visit summary       · It is important that you take the medication exactly as they are prescribed. · Keep your medication in the bottles provided by the pharmacist and keep a list of the medication names, dosages, and times to be taken in your wallet. · Do not take other medications without consulting your doctor     Pain Management: per above medications    What to do at Home    Recommended diet:  Cardiac Diet    Recommended activity: Activity as tolerated    1) Return to the hospital if you feel worse    2) If you experience any of the following symptoms then please call your primary care physician or return to the emergency room if you cannot get hold of your doctor:  Fever, chills, nausea, vomiting, diarrhea, change in mentation, falling, bleeding, shortness of breath, chest pain, severe headache, severe abdominal pain,     3) Take medications as directed    4) follow up with cardiology     Follow Up: Follow-up Information     Follow up With Specialties Details Why Contact Nader May MD Cardiology, Internal Medicine Schedule an appointment as soon as possible for a visit in 1 week  [address] [place] [address]  [phone]              Information obtained by :  I understand that if any problems occur once I am at home I am to contact my physician. I understand and acknowledge receipt of the instructions indicated above.                                                                                                                                            Physician's or R.N.'s Signature Date/Time                                                                                                                                              Patient or Representative Signature                                                          Date/Time     Avoiding Triggers With Heart Failure: Care Instructions  Your Care Instructions     Triggers are anything that make your heart failure flare up. A flare-up is also called \"sudden heart failure\" or \"acute heart failure. \" When you have a flare-up, fluid builds up in your lungs, and you have problems breathing. You might need to go to the hospital. By watching for changes in your condition and avoiding triggers, you can prevent heart failure flare-ups. Follow-up care is a key part of your treatment and safety. Be sure to make and go to all appointments, and call your doctor if you are having problems. It's also a good idea to know your test results and keep a list of the medicines you take. How can you care for yourself at home? Watch for changes in your weight and condition  · Weigh yourself without clothing at the same time each day. Record your weight. Call your doctor if you have sudden weight gain, such as more than 2 to 3 pounds in a day or 5 pounds in a week. (Your doctor may suggest a different range of weight gain.) A sudden weight gain may mean that your heart failure is getting worse. · Keep a daily record of your symptoms. Write down any changes in how you feel, such as new shortness of breath, cough, or problems eating. Also record if your ankles are more swollen than usual and if you feel more tired than usual. Note anything that you ate or did that could have triggered these changes. Limit sodium  Sodium causes your body to hold on to extra water. This may cause your heart failure symptoms to get worse. People get most of their sodium from processed foods. Fast food and restaurant meals also tend to be very high in sodium.   · Your doctor may suggest that you limit sodium. Your doctor can tell you how much sodium is right for you. This includes limiting sodium in cooked and packaged foods. · Read food labels on cans and food packages. They tell you how much sodium you get in one serving. Check the serving size. If you eat more than one serving, you are getting more sodium. · Be aware that sodium can come in forms other than salt, including monosodium glutamate (MSG), sodium citrate, and sodium bicarbonate (baking soda). MSG is often added to Asian food. You can sometimes ask for food without MSG or salt. · Slowly reducing salt will help you adjust to the taste. Take the salt shaker off the table. · Flavor your food with garlic, lemon juice, onion, vinegar, herbs, and spices instead of salt. Do not use soy sauce, steak sauce, onion salt, garlic salt, mustard, or ketchup on your food, unless it is labeled \"low-sodium\" or \"low-salt. \"  · Make your own salad dressings, sauces, and ketchup without adding salt. · Use fresh or frozen ingredients, instead of canned ones, whenever you can. Choose low-sodium canned goods. · Eat less processed food and food from restaurants, including fast food. Exercise as directed  Moderate, regular exercise is very good for your heart. It improves your blood flow and helps control your weight. But too much exercise can stress your heart and cause a heart failure flare-up. · Check with your doctor before you start an exercise program.  · Walking is an easy way to get exercise. Start out slowly. Gradually increase the length and pace of your walk. Swimming, riding a bike, and using a treadmill are also good forms of exercise. · When you exercise, watch for signs that your heart is working too hard. You are pushing yourself too hard if you cannot talk while you are exercising. If you become short of breath or dizzy or have chest pain, stop, sit down, and rest.  · Do not exercise when you do not feel well.   Take medicines correctly  · Take your medicines exactly as prescribed. Call your doctor if you think you are having a problem with your medicine. · Make a list of all the medicines you take. Include those prescribed to you by other doctors and any over-the-counter medicines, vitamins, or supplements you take. Take this list with you when you go to any doctor. · Take your medicines at the same time every day. It may help you to post a list of all the medicines you take every day and what time of day you take them. · Make taking your medicine as simple as you can. Plan times to take your medicines when you are doing other things, such as eating a meal or getting ready for bed. This will make it easier to remember to take your medicines. · Get organized. Use helpful tools, such as daily or weekly pill containers. When should you call for help? Call 911 if you have symptoms of sudden heart failure such as:    · You have severe trouble breathing.     · You cough up pink, foamy mucus.     · You have a new irregular or rapid heartbeat. Call your doctor now or seek immediate medical care if:    · You have new or increased shortness of breath.     · You are dizzy or lightheaded, or you feel like you may faint.     · You have sudden weight gain, such as more than 2 to 3 pounds in a day or 5 pounds in a week. (Your doctor may suggest a different range of weight gain.)     · You have increased swelling in your legs, ankles, or feet.     · You are suddenly so tired or weak that you cannot do your usual activities. Watch closely for changes in your health, and be sure to contact your doctor if you develop new symptoms. Where can you learn more? Go to http://www.gray.com/  Enter V089 in the search box to learn more about \"Avoiding Triggers With Heart Failure: Care Instructions. \"  Current as of: December 16, 2019               Content Version: 12.6  © 2140-7968 [employer].    Care instructions adapted under license by [employer] (which disclaims liability or warranty for this information). If you have questions about a medical condition or this instruction, always ask your healthcare professional. [address] any warranty or liability for your use of this information.

## 2020-11-28 NOTE — PROGRESS NOTES
Bedside and Verbal shift change report given to Herrera Decker 43  (oncoming nurse) by Kalpesh Kaiser RN (offgoing nurse). Report included the following information SBAR, Kardex, Intake/Output, MAR, Accordion and Recent Results.

## 2020-11-28 NOTE — DISCHARGE SUMMARY
Johnathan Washington nicole [address] [address]  [address] [place] [address] Soumya Dillard [place], [address] [facility]  [phone]    Physician Discharge Summary     Patient ID:  Curt Noguera  [id number]  44 y.o.  1940    Admit date: 11/26/2020    Discharge date and time: 11/28/2020 1:18 PM    Admission Diagnoses: ABDUL (dyspnea on exertion) [R06.00]  Anasarca [R60.1]  Atrial fibrillation ([address] [I48.91]    Discharge Diagnoses:  Principal Diagnosis Diastolic CHF, acute on chronic [facility])                                            Principal Problem:    Diastolic CHF, acute on chronic ([address] (11/28/2020)    Active Problems:    S/P AVR (aortic valve replacement) (12/23/2014)      Overview: AORTIC VALVE REPLACEMENT 23mm Deep Mitroflow Bioprosthesis                       S/P MVR (mitral valve repair) (12/23/2014)      Overview: MITRAL VALVE REPAIR Medtronic 25mm Adjustable Ring            Hypertension (10/26/2015)      Atrial fibrillation ([address] ()      Anasarca (11/27/2020)      ABDUL (dyspnea on exertion) (11/27/2020)           Hospital Course:     78 yo hx of HTN, afib s/p watchman, dCHF, prostate CA, recent GI bleed/radiation proctitis s/p rectal clip, presented w/ dyspnea, anasarca, CHF    1) Acute on chronic dCHF/dyspnea/anasarca: much improved with IV lasix. Echo with EF 55%. Cards was following, recommended outpatient f/u    2) Chronic afib: s/p watchman. Cards started low dose coreg. Off Eliquis due to GI bleed. F/u with Dr. Nettie Rich    3) Valvular heart dz: hx of bioprosthetic AVR, mitral valve ring. Stable on echo    4) HTN: BP stable. Cont norvasc, coreg, lisinopril, lasix. Stopped Hygroten     5) Recent GI bleed: due to radiation proctitis. S/p rectal clip.   Hold Eliquis until cleared by GI     PCP: Other, Phys, MD     Consults: Cardiology    Significant Diagnostic Studies: none    Discharge Exam:  Physical Exam:    Gen: Well-developed, well-nourished, obese, in no acute distress  HEENT:  Pink conjunctivae, PERRL, hearing intact to voice, moist mucous membranes  Neck: Supple, without masses, thyroid non-tender  Resp: No accessory muscle use, clear breath sounds without wheezes rales or rhonchi  Card: No murmurs, normal S1, S2 without thrills, 2+ edema  Abd:  Soft, non-tender, non-distended, normoactive bowel sounds are present  Lymph:  No cervical or inguinal adenopathy  Musc: No cyanosis or clubbing  Skin: No rashes   Neuro:  Cranial nerves are grossly intact, no focal motor weakness, follows commands appropriately  Psych:  fair insight, oriented to person, place and time, alert    Disposition: home  Discharge Condition: Stable    Patient Instructions:   Current Discharge Medication List      START taking these medications    Details   carvediloL (COREG) 3.125 mg tablet Take 1 Tab by mouth two (2) times daily (with meals). Qty: 60 Tab, Refills: 0      furosemide (Lasix) 40 mg tablet Take 1 Tab by mouth daily. Qty: 30 Tab, Refills: 0         CONTINUE these medications which have NOT CHANGED    Details   amLODIPine (NORVASC) 5 mg tablet Take 1 Tab by mouth daily. Qty: 30 Tab, Refills: 0    Comments: Hold for  or less      lisinopriL (PRINIVIL, ZESTRIL) 20 mg tablet Take 1 Tab by mouth daily. Qty: 30 Tab, Refills: 0    Comments: Take in the evening. Hold if  or less. aspirin delayed-release 81 mg tablet Take 81 mg by mouth daily. polyvinyl alcohol (ARTIFICIAL TEARS, POLYVIN ALC,) 1.4 % ophthalmic solution Administer 1 Drop to both eyes as needed. ipratropium (ATROVENT HFA) 17 mcg/actuation inhaler Take 2 Puffs by inhalation as needed. ferrous sulfate 325 mg (65 mg iron) cpER Take 1 Tab by mouth every other day. tamsulosin (FLOMAX) 0.4 mg capsule Take 0.8 mg by mouth two (2) times a day. cholecalciferol (VITAMIN D3) 1,000 unit tablet Take 2,000 Units by mouth two (2) times a day. GLUCOSAMINE HCL/CHONDR CHING A NA (GLUCOSAMINE-CHONDROITIN) 750-600 mg tab Take 1 Tab by mouth daily.       omega-3 fatty acids-vitamin e 1,000 mg cap Take 1 Cap by mouth every other day. acetaminophen (TYLENOL) 325 mg tablet Take  by mouth every four (4) hours as needed for Pain.      multivitamin (ONE A DAY) tablet Take 1 Tab by mouth daily. docusate sodium (COLACE) 100 mg capsule Take 100 mg by mouth two (2) times daily as needed. finasteride (PROSCAR) 5 mg tablet Take 5 mg by mouth nightly. pravastatin (PRAVACHOL) 40 mg tablet Take 40 mg by mouth nightly.          STOP taking these medications       chlorthalidone (HYGROTON) 25 mg tablet Comments:   Reason for Stopping:             Activity: Activity as tolerated  Diet: Cardiac Diet  Wound Care: As directed    Follow-up with  Follow-up Information     Follow up With Specialties Details Why Wendy Burns MD Cardiology, Internal Medicine Schedule an appointment as soon as possible for a visit in 1 week  [address] [place] [address]  [phone]            Follow-up tests/labs none    Signed:  Shawn Peralta MD  11/28/2020  1:18 PM  **I personally spent 34 min on discharge**

## 2020-11-28 NOTE — PROGRESS NOTES
Problem: Pressure Injury - Risk of  Goal: *Prevention of pressure injury  Description: Document Alberto Scale and appropriate interventions in the flowsheet. Outcome: Progressing Towards Goal  Note: Pressure Injury Interventions:             Activity Interventions: Assess need for specialty bed    Mobility Interventions: Assess need for specialty bed    Nutrition Interventions: Document food/fluid/supplement intake, Offer support with meals,snacks and hydration    Friction and Shear Interventions: HOB 30 degrees or less                Problem: Patient Education: Go to Patient Education Activity  Goal: Patient/Family Education  Outcome: Progressing Towards Goal

## 2020-11-28 NOTE — PROGRESS NOTES
Physical Therapy  New order noted, patient evaluated and discharged yesterday. Walking oxymetry in yesterday's note. He is at his baseline for mobility. We will complete the order.    Allegra Sierra PT,DPT,NCS

## 2020-11-29 LAB
ATRIAL RATE: 576 BPM
CALCULATED R AXIS, ECG10: 15 DEGREES
CALCULATED T AXIS, ECG11: 76 DEGREES
DIAGNOSIS, 93000: NORMAL
Q-T INTERVAL, ECG07: 384 MS
QRS DURATION, ECG06: 94 MS
QTC CALCULATION (BEZET), ECG08: 440 MS
VENTRICULAR RATE, ECG03: 79 BPM

## 2020-11-30 ENCOUNTER — PATIENT OUTREACH (OUTPATIENT)
Dept: CASE MANAGEMENT | Age: 80
End: 2020-11-30

## 2020-11-30 ENCOUNTER — TELEPHONE (OUTPATIENT)
Dept: CARDIOLOGY CLINIC | Age: 80
End: 2020-11-30

## 2020-11-30 NOTE — PROGRESS NOTES
Patient was admitted to [facility] on 2020 and discharged on 2020 for CHF. Outreach made within 2 business days of discharge: Yes    Top Discharge Challenges to be reviewed by the provider   Additional needs identified to be addressed with provider no  medications- coreg, lasix  Discussed COVID-19 related testing which was available at this time. Test results were negative. Patient informed of results, if available? no   Method of communication with provider : none       Advance Care Planning:   Does patient have an Advance Directive:  not on file    Inpatient Readmission Risk score:   Was this a readmission? yes   Patient stated reason for the admission: swelling   Patients top risk factors for readmission: medical condition  Interventions to address risk factors: schedule and attend follow up appointments, weigh daily, monitor CHF S&S, follow up labwork,     Care Transition Nurse (CTN) contacted the patient by telephone to perform post hospital discharge assessment. Verified name and  with patient as identifiers. Provided introduction to self, and explanation of the CTN role. CTN reviewed discharge instructions, medical action plan and red flags with patient who verbalized understanding. Patient given an opportunity to ask questions and does not have any further questions or concerns at this time. The patient agrees to contact the PCP office for questions related to their healthcare. Medication reconciliation was performed with patient, who verbalizes understanding of administration of home medications. Advised obtaining a 90-day supply of all daily and as-needed medications.    Referral to Pharm D needed: no     Home Health/Outpatient orders at discharge: [address]: n/a  Date of initial visit: [facility] ordered at discharge: none  [address]  received: n/a    Covid Risk Education    Patient has following risk factors of: heart failure. Education provided regarding infection prevention, and signs and symptoms of COVID-19 and when to seek medical attention with patient who verbalized understanding. Discussed exposure protocols and quarantine From CDC: Are you at higher risk for severe illness?  and given an opportunity for questions and concerns. The patient agrees to contact the COVID-19 hotline [phone] or PCP office for questions related to COVID-19. For more information on steps you can take to protect yourself, see CDC's How to Protect Yourself     Patient/family/caregiver given information for [employer] [facility] and agrees to enroll n/a  Patient's preferred e-mail: n/a  Patient's preferred phone number: n/a    Discussed follow-up appointments. If no appointment was previously scheduled, appointment scheduling offered: [facility] follow up appointment(s):   Future Appointments   Date Time Provider Vinay Reina   1/18/2021 10:20 AM Jean-Pierre Fine MD [facility]IR BS AMB   6/29/2021  9:20 AM SULEIMAN Carpenter BS AMB     Non-[facility] follow up appointment(s):     Plan for follow-up call in 5-7 days based on severity of symptoms and risk factors. CTN provided contact information for future needs. Goals Addressed                 This Visit's Progress     Prevent complications post hospitalization. 10/16/2020 Patient reports attendance of cardio appointment and follow up for 10/27/2020. Denies chest pain, SOB, fever, N/V/D. Endorses sore throat. Patient will monitor BP and report at next week outreach. [facility]    10/28/2020 Patient report sore throat is gone, attendance of cardio appointment  with no change to plan of care. Denies SOB, chest pain, fever, N/V/D. Patient will continue to monitor BP and report at next outreach. [facility]    11/30/2020 Patient reports weight is 203 today, taking all medications as prescribed, eating low Na diet. Denies chest pain, SOB,N/V/D, fever. Patient will call cardio office to schedule appointment, weigh daily, and will report appointment details at next outreach.  SUN

## 2020-11-30 NOTE — TELEPHONE ENCOUNTER
Patient's wife, Kwame Martin, is calling to discuss the patient's recent hospitalization and medications. Please advise.     Phone #: [phone]  Thanks

## 2020-12-04 ENCOUNTER — OFFICE VISIT (OUTPATIENT)
Dept: CARDIOLOGY CLINIC | Age: 80
End: 2020-12-04
Payer: MEDICARE

## 2020-12-04 VITALS
BODY MASS INDEX: 28.92 KG/M2 | HEIGHT: 70 IN | OXYGEN SATURATION: 93 % | SYSTOLIC BLOOD PRESSURE: 100 MMHG | WEIGHT: 202 LBS | HEART RATE: 60 BPM | DIASTOLIC BLOOD PRESSURE: 60 MMHG

## 2020-12-04 DIAGNOSIS — I48.19 PERSISTENT ATRIAL FIBRILLATION (HCC): ICD-10-CM

## 2020-12-04 DIAGNOSIS — E78.00 PURE HYPERCHOLESTEROLEMIA: ICD-10-CM

## 2020-12-04 DIAGNOSIS — I50.33 DIASTOLIC CHF, ACUTE ON CHRONIC (HCC): Primary | ICD-10-CM

## 2020-12-04 PROCEDURE — 1101F PT FALLS ASSESS-DOCD LE1/YR: CPT | Performed by: SPECIALIST

## 2020-12-04 PROCEDURE — G8536 NO DOC ELDER MAL SCRN: HCPCS | Performed by: SPECIALIST

## 2020-12-04 PROCEDURE — G8752 SYS BP LESS 140: HCPCS | Performed by: SPECIALIST

## 2020-12-04 PROCEDURE — G8432 DEP SCR NOT DOC, RNG: HCPCS | Performed by: SPECIALIST

## 2020-12-04 PROCEDURE — G8754 DIAS BP LESS 90: HCPCS | Performed by: SPECIALIST

## 2020-12-04 PROCEDURE — G8427 DOCREV CUR MEDS BY ELIG CLIN: HCPCS | Performed by: SPECIALIST

## 2020-12-04 PROCEDURE — G0463 HOSPITAL OUTPT CLINIC VISIT: HCPCS | Performed by: SPECIALIST

## 2020-12-04 PROCEDURE — G8417 CALC BMI ABV UP PARAM F/U: HCPCS | Performed by: SPECIALIST

## 2020-12-04 PROCEDURE — 99214 OFFICE O/P EST MOD 30 MIN: CPT | Performed by: SPECIALIST

## 2020-12-04 NOTE — PROGRESS NOTES
García Ewing is a 79 y.o. male    Visit Vitals  /60 (BP 1 Location: Left arm, BP Patient Position: Sitting)   Pulse 60   Ht 5' 10\" (1.778 m)   Wt 202 lb (91.6 kg)   SpO2 93%   BMI 28.98 kg/m²       Chief Complaint   Patient presents with    CHF       Chest pain NO  SOB NO  Dizziness NO  Swelling NO  Recent hospital visit NO  Refills NO

## 2020-12-04 NOTE — PATIENT INSTRUCTIONS
1) reduce the amlodipine to 2.5 mg from (cut in 1/2) and follow BP readings    If BP remains under 744 systolic then stop the norvasc    2) blood work at [address] next Wednesday    3) resume eliquis 2.5 mg twice a day    4) arrange DEWEY next week to access Watchman device.

## 2020-12-09 RX ORDER — AMLODIPINE BESYLATE 5 MG/1
5 TABLET ORAL DAILY
Qty: 30 TAB | Refills: 5 | Status: SHIPPED | OUTPATIENT
Start: 2020-12-09 | End: 2021-10-12

## 2020-12-09 RX ORDER — FUROSEMIDE 40 MG/1
40 TABLET ORAL DAILY
Qty: 30 TAB | Refills: 5 | Status: SHIPPED | OUTPATIENT
Start: 2020-12-09 | End: 2021-07-12

## 2020-12-09 RX ORDER — CARVEDILOL 3.12 MG/1
3.12 TABLET ORAL 2 TIMES DAILY WITH MEALS
Qty: 60 TAB | Refills: 5 | Status: SHIPPED | OUTPATIENT
Start: 2020-12-09 | End: 2020-12-17 | Stop reason: SDUPTHER

## 2020-12-10 NOTE — H&P (VIEW-ONLY)
CARDIOLOGY OFFICE NOTE Shayan Fine MD, [address] [facility] [address], [place], [address] [place] [address] N[place] Phone [phone]; Fax [phone] Mobile [phone]   Voice Mail [phone] LAST OFFICE VISIT : Visit date not found Other, MD Slim 
 
  
ATTENTION:  
This medical record was transcribed using an electronic medical records/speech recognition system. Although proofread, it may and can contain electronic, spelling and other errors. Corrections may be executed at a later time. Please feel free to contact us for any clarifications as needed. ICD-10-CM ICD-9-CM 1. Diastolic CHF, acute on chronic (HCC)  I50.33 428.33  
  428.0 2. Pure hypercholesterolemia  E78.00 272.0 3. Persistent atrial fibrillation (HCC)  I48.19 427.31 Dallas Urena is a 79 y.o. male with  referred for negativeHTN, dyslipidemia, and AF aortic valve replacement and mitral valve repair status post maze procedure. I have personally obtained the history from the patient. Cardiac risk factors: dyslipidemia, male gender, hypertension I have personally obtained the history from the patient. HISTORY OF PRESENTING ILLNESS Documentation from last office note: 
Dallas Urena is a 79 y.o. male   with HTN, dyslipidemia, and AF aortic valve replacement and mitral valve repair status post maze procedure and now status post watchman device referred for follow up. He did well after the watchman device but his blood pressure has been low. He returns now after being admitted to [facility] with lower extremity edema and heart failure. During that admission he had a proBNP that was 1000 his weight at the time of discharge was 214 and apparently had a significant diuresis during that time. His weight today is 202. His edema is better and he is attempting to wear his CPAP at least 2 to 3 hours a night where he was not wearing it before and I think this is partly what provoked some of his edema he does have mild to moderate mitral regurgitation on echo that is probably contributing to some of this as well his EF by echo was 55% likely has component of diastolic dysfunction with moderate LVH. By echo he has moderate pulmonary hypertension with a PA pressure of 59 mmHg He comes in with his wife today he is excellent historian ACTIVE PROBLEM LIST Patient Active Problem List  
 Diagnosis Date Noted  Presence of Watchman left atrial appendage closure device 10/13/2020 Priority: 1 - One  
 Diastolic CHF, acute on chronic ([address] 11/28/2020  Anasarca 11/27/2020  ABDUL (dyspnea on exertion) 11/27/2020  GI bleed 06/08/2020  Radiation proctitis  DEL (obstructive sleep apnea)  Insomnia  Atrial fibrillation ([address]  Hyperlipidemia  Hx of carcinoma in situ of prostate  GERD (gastroesophageal reflux disease)  Chronic pain  CAD (coronary artery disease)  Arthritis  Hypertension 10/26/2015  Hyperlipemia 10/26/2015  Anemia due to acute blood loss 12/26/2014  Aortic stenosis 12/23/2014  S/P AVR (aortic valve replacement) 12/23/2014  S/P MVR (mitral valve repair) 12/23/2014  S/P Maze operation for atrial fibrillation 12/23/2014  Aortic insufficiency 12/08/2014  A-fib ([address] 06/17/2013  Arthritis of knee 07/09/2012 PAST MEDICAL HISTORY Past Medical History:  
Diagnosis Date  Arthritis  Atrial fibrillation ([address]  CAD (coronary artery disease)  Chronic pain   
 legs/knee  GERD (gastroesophageal reflux disease)  Hx of carcinoma in situ of prostate  Hyperlipidemia  Hypertension  Insomnia  DEL (obstructive sleep apnea)  Radiation proctitis  Vitamin D deficiency PAST SURGICAL HISTORY Past Surgical History:  
Procedure Laterality Date  COLONOSCOPY N/A 5/8/2020 COLONOSCOPY performed by Penelope Ervin MD at [address]  COLONOSCOPY N/A 6/8/2020 COLONOSCOPY performed by Leydi Atwood MD at [facility] ENDOSCOPY  FLEXIBLE SIGMOIDOSCOPY N/A 11/23/2020 FLEXIBLE SIGMOIDOSCOPY WITH APC performed by Penelope Ervin MD at [address] HX AORTIC VALVE REPLACEMENT  2015  
 and mitral valve repair, left atrial cryo maze  HX HEENT    
 melanoma removed head  HX HERNIA REPAIR  2009 Right  HX HERNIA REPAIR    
 left inguinal hernia repair  HX HERNIA REPAIR Left 12/01/2016  
 lap left inguinal hernia repair with mesh  HX KNEE REPLACEMENT Bilateral  
 HX ORTHOPAEDIC    
 BILATERAL KNEE REPLACEMENT  
 HX ORTHOPAEDIC    
 CARPEL TUNNEL REPAIR-RIGHT  HX OTHER SURGICAL  2015  
 closure of patent foramen ovale  HX OTHER SURGICAL  10/2020  
 watchman implant for afib / Dr. Ama Marshall  HX PROSTATE SURGERY 42 radiation treatments ALLERGIES No Known Allergies FAMILY HISTORY Family History Problem Relation Age of Onset  Cancer Mother  Cancer Father   
     prostrate  Cancer Brother   
     prostrate ca  Anesth Problems Neg Hx   
 negative for cardiac disease SOCIAL HISTORY Social History Socioeconomic History  Marital status:  Spouse name: Not on file  Number of children: Not on file  Years of education: Not on file  Highest education level: Not on file Tobacco Use  Smoking status: Never Smoker  Smokeless tobacco: Never Used Substance and Sexual Activity  Alcohol use:  Yes  
 Alcohol/week: 3.0 standard drinks Types: 3 Cans of beer per week  Drug use: No  
 Sexual activity: Not Currently MEDICATIONS Current Outpatient Medications Medication Sig  
 lisinopriL (PRINIVIL, ZESTRIL) 20 mg tablet Take 1 Tab by mouth daily.  aspirin delayed-release 81 mg tablet Take 81 mg by mouth daily.  ipratropium (ATROVENT HFA) 17 mcg/actuation inhaler Take 2 Puffs by inhalation as needed.  ferrous sulfate 325 mg (65 mg iron) cpER Take 1 Tab by mouth every other day.  tamsulosin (FLOMAX) 0.4 mg capsule Take 0.8 mg by mouth two (2) times a day.  cholecalciferol (VITAMIN D3) 1,000 unit tablet Take 2,000 Units by mouth two (2) times a day.  GLUCOSAMINE HCL/CHONDR CHING A NA (GLUCOSAMINE-CHONDROITIN) 750-600 mg tab Take 1 Tab by mouth daily.  omega-3 fatty acids-vitamin e 1,000 mg cap Take 1 Cap by mouth every other day.  acetaminophen (TYLENOL) 325 mg tablet Take  by mouth every four (4) hours as needed for Pain.  multivitamin (ONE A DAY) tablet Take 1 Tab by mouth daily.  docusate sodium (COLACE) 100 mg capsule Take 100 mg by mouth two (2) times daily as needed.  finasteride (PROSCAR) 5 mg tablet Take 5 mg by mouth nightly.  pravastatin (PRAVACHOL) 40 mg tablet Take 40 mg by mouth nightly.  furosemide (Lasix) 40 mg tablet Take 1 Tab by mouth daily.  amLODIPine (NORVASC) 5 mg tablet Take 1 Tab by mouth daily.  carvediloL (COREG) 3.125 mg tablet Take 1 Tab by mouth two (2) times daily (with meals).  polyvinyl alcohol (ARTIFICIAL TEARS, POLYVIN ALC,) 1.4 % ophthalmic solution Administer 1 Drop to both eyes as needed. No current facility-administered medications for this visit. I have reviewed the nurses notes, vitals, problem list, allergy list, medical history, family, social history and medications. REVIEW OF SYMPTOMS General: Pt denies excessive weight gain or loss.  Pt is able to conduct ADL's 
 HEENT: Denies blurred vision, headaches, hearing loss, epistaxis and difficulty swallowing. Respiratory: Denies cough, congestion, shortness of breath, ABDUL, wheezing or stridor. Cardiovascular: Denies precordial pain, palpitations, edema or PND Gastrointestinal: Denies poor appetite, indigestion, abdominal pain or blood in stool Genitourinary: Denies hematuria, dysuria, increased urinary frequency Musculoskeletal: Denies joint pain or swelling from muscles or joints Neurologic: Denies tremor, paresthesias, headache, or sensory motor disturbance Psychiatric: Denies confusion, insomnia, depression Integumentray: Denies rash, itching or ulcers. Hematologic: Denies easy bruising, bleeding PHYSICAL EXAMINATION Vitals:  
 12/04/20 0932 BP: 100/60 Pulse: 60 SpO2: 93% Weight: 202 lb (91.6 kg) Height: 5' 10\" (1.778 m) General: Well developed, in no acute distress. HEENT: No jaundice, oral mucosa moist, no oral ulcers Neck: Supple, no stiffness, no lymphadenopathy, supple Heart:   Regular rate and rhythm with a 2/6 systolic murmur Respiratory: Clear bilaterally x 4, no wheezing or rales Extremities:  No edema, normal cap refill, no cyanosis. Musculoskeletal: No clubbing, no deformities Neuro: A&Ox3, speech clear, gait stable, cooperative, no focal neurologic deficits Skin: Skin color is normal. No rashes or lesions. Non diaphoretic, moist. 
 
 
 
 DIAGNOSTIC DATA 1.  Echocardiogram                                    
9/5/14 Left ventricle: Systolic function was normal. Ejection fraction was 
estimated in the range of 55 % to 60 %. There were no regional wall motion 
abnormalities. Left atrium: The atrium was mildly dilated. 4/20/15- EF 48%, SHANTANU, atrial septum- poss PFO, MR mild, TR mild/mod, Pulm HTN mod, AV bioprosthesis normal function 6/19/17- EF 45-50%, RVE, SHANTANU, MR/TR mild/mod, AV bioprosthesis exhibits normal function, severe Pulm HTN, CA mild Atrial septum: There was a secundum septal defect. Color Doppler 
evaluation was performed. There was a mild left-to-right shunt. 12/11/18 TTE: LVEF 50%, no WMAs, wall thickness mod incr. RV mild-mod dilated, LA mod-sev dilated, RA mod dilated, mod MR, mod TR, mod pulm HTN, mild PV regurg. AV w/ bioprosthesis, no AS / no AR 
3/16/20-EF 50-55%, BVE, Mild concentric hypertrophy, SHANTANU, Mitral valve thickening and repaired with annuloplasty ring. Surgical repair, mild MR/TR, AV leaflet calcification Aortic valve mean gradient is 20.7 mmHg. Aortic valve area is 1.3 cm2. Mild AS, 26 mm bioprosthetic aortic valve. 10/14/20- Limited-DEFINITY- EF 55-60%, No evidence of pericardial effusion. 11/27/20- EF 55%, Moderate concentric hypertrophy, RVE, Aortic valve mean gradient is 25 mmHg. - bioprosthetic aortic valve,  Mitral valve repaired with annuloplasty ring, mod MR/TR, mod pulm HTN, PAP 59 mmHg 2.  Myocardial perfusion study                     
(9/5/14)Normal EF ;Normal perfusion 3. Cholesterol profile                           
(10/6/15): , HDL 35, ,          
(4/19/16): , HDL 72, LDL 65.6,  
(05/01/17)- , HDL 85, LDL 65 , TG 55 
11/2/17- , HDL 73, TG 75 
12/11/18- , HDL 61, LDL 32, TG 60 
 
4. LE venous duplex                                          
(10/10/14) Right leg mod. 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 distally No DVT 
1/24/18 - No DVT, As an incidental finding, there is evidence of valve incompetence 
(reflux) involving the left popliteal vein. 5. Watchman 10/13/20 6. Carotid Doppler 5/11/20- 1-49% Kris LABORATORY DATA Lab Results Component Value Date/Time WBC 5.3 11/28/2020 04:41 AM  
 HGB 10.0 (L) 11/28/2020 04:41 AM  
 HCT 31.5 (L) 11/28/2020 04:41 AM  
 PLATELET 111 29/44/5041 04:41 AM  
 .6 (H) 11/28/2020 04:41 AM  
  
Lab Results Component Value Date/Time Sodium 137 11/28/2020 04:41 AM  
 Potassium 3.6 11/28/2020 04:41 AM  
 Chloride 99 11/28/2020 04:41 AM  
 CO2 35 (H) 11/28/2020 04:41 AM  
 Anion gap 3 (L) 11/28/2020 04:41 AM  
 Glucose 105 (H) 11/28/2020 04:41 AM  
 BUN 20 11/28/2020 04:41 AM  
 Creatinine 0.94 11/28/2020 04:41 AM  
 BUN/Creatinine ratio 21 (H) 11/28/2020 04:41 AM  
 GFR est AA >60 11/28/2020 04:41 AM  
 GFR est non-AA >60 11/28/2020 04:41 AM  
 Calcium 9.2 11/28/2020 04:41 AM  
 Bilirubin, total 0.6 11/28/2020 04:41 AM  
 Alk. phosphatase 70 11/28/2020 04:41 AM  
 Protein, total 7.2 11/28/2020 04:41 AM  
 Albumin 3.7 11/28/2020 04:41 AM  
 Globulin 3.5 11/28/2020 04:41 AM  
 A-G Ratio 1.1 11/28/2020 04:41 AM  
 ALT (SGPT) 26 11/28/2020 04:41 AM  
  
 
 
 ASSESSMENT/RECOMMENDATIONS:.  
  
1. AF 
-He is status post watchman device and lead was on Eliquis but apparently this was stopped for GI bleeding duration was per Dr. Yessy Decker protocol 2. LE edema 
- multifactorial not using CPAP machine but is now using it currently on Lasix 40 mg daily and edema improved 3. HTN 
-Blood pressure remains on the low side. If it remains low I would recommend that we stop his amlodipine which they state he is still taking and cut the lisinopril down in half 4. Dyslipidemia 
-I do not have a recent cholesterol profile on him last was in 2018 5. Moderate PFO with left to right flow and CHRISTOPHER 
- Last echo showed an EF of 55% and no PFO was documented on the echo on 11/27/2011 moderate MR and TR with elevated pulmonary pressures I will need a echo with a bubble study in 3 months and also to reassess his pulmonary hypertension MR 
 
6. AS and MR s/p AVR and MVR on 1/8/15 
- He has moderate MR and moderate TR and bioprosthetic valves. Last echo demonstrated mild left regurgitation mild tricuspid regurgitation aortic valve area 1.3 cm² with mild left ear. There is a 26 mm bioprosthetic aortic valve and a mitral valve repair. 7. DEL -Is now wearing 2 to 3 hours a night 8. Return in 3 months or PRN. Orders Placed This Encounter  METABOLIC PANEL, BASIC Standing Status:   Future Standing Expiration Date:   12/4/2021  MAGNESIUM Standing Status:   Future Standing Expiration Date:   12/4/2021 We discussed the expected course, resolution and complications of the diagnosis(es) in detail. Medication risks, benefits, costs, interactions, and alternatives were discussed as indicated. I advised him to contact the office if his condition worsens, changes or fails to improve as anticipated. He expressed understanding with the diagnosis(es) and plan Follow-up and Dispositions  · Return in about 6 weeks (around 1/15/2021). I have discussed the diagnosis with  Ceci Raza and the intended plan as seen in the above orders. Questions were answered concerning future plans. I have discussed medication side effects and warnings with the patient as well. Thank you,  Other, MD Slim for involving me in the care of  Ceci Raza. Please do not hesitate to contact me for further questions/concerns. Shayan Sousa MD, [facility] - [address], [address]     
[phone] / [phone] Fax

## 2020-12-10 NOTE — PROGRESS NOTES
CARDIOLOGY OFFICE NOTE    Shayan Malone MD, [address], [place], [address] [facility]  Phone [phone]; Fax [phone]  Mobile [phone]   Voice Mail [phone]    LAST OFFICE VISIT : Visit date not found  Other, MD Slim       ATTENTION:   This medical record was transcribed using an electronic medical records/speech recognition system. Although proofread, it may and can contain electronic, spelling and other errors. Corrections may be executed at a later time. Please feel free to contact us for any clarifications as needed. ICD-10-CM [id number]   1. Diastolic CHF, acute on chronic (HCC)  I50.33 428.33     428.0   2. Pure hypercholesterolemia  E78.00 272.0   3. Persistent atrial fibrillation [facility])  I48.19 427.31            Valeria Ramsey is a 79 y.o. male with  referred for negativeHTN, dyslipidemia, and AF aortic valve replacement and mitral valve repair status post maze procedure. I have personally obtained the history from the patient. Cardiac risk factors: dyslipidemia, male gender, hypertension  I have personally obtained the history from the patient. HISTORY OF PRESENTING ILLNESS   Documentation from last office note:  Valeria Ramsey is a 79 y.o. male   with HTN, dyslipidemia, and AF aortic valve replacement and mitral valve repair status post maze procedure and now status post watchman device referred for follow up. He did well after the watchman device but his blood pressure has been low. He returns now after being admitted to [address] [place] [address] with lower extremity edema and heart failure. During that admission he had a proBNP that was 1000 his weight at the time of discharge was 214 and apparently had a significant diuresis during that time. His weight today is 202.   His edema is better and he is attempting to wear his CPAP at least 2 to 3 hours a night where he was not wearing it before and I think this is partly what provoked some of his edema he does have mild to moderate mitral regurgitation on echo that is probably contributing to some of this as well his EF by echo was 55% likely has component of diastolic dysfunction with moderate LVH.   By echo he has moderate pulmonary hypertension with a PA pressure of 59 mmHg      He comes in with his wife today he is excellent historian     ACTIVE PROBLEM LIST     Patient Active Problem List    Diagnosis Date Noted    Presence of Watchman left atrial appendage closure device 10/13/2020     Priority: 1 - One    Diastolic CHF, acute on chronic ([address] 11/28/2020    Anasarca 11/27/2020    ABDUL (dyspnea on exertion) 11/27/2020    GI bleed 06/08/2020    Radiation proctitis     DEL (obstructive sleep apnea)     Insomnia     Atrial fibrillation (HCC)     Hyperlipidemia     Hx of carcinoma in situ of prostate     GERD (gastroesophageal reflux disease)     Chronic pain     CAD (coronary artery disease)     Arthritis     Hypertension 10/26/2015    Hyperlipemia 10/26/2015    Anemia due to acute blood loss 12/26/2014    Aortic stenosis 12/23/2014    S/P AVR (aortic valve replacement) 12/23/2014    S/P MVR (mitral valve repair) 12/23/2014    S/P Maze operation for atrial fibrillation 12/23/2014    Aortic insufficiency 12/08/2014    A-fib ([address] 06/17/2013    Arthritis of knee 07/09/2012           PAST MEDICAL HISTORY     Past Medical History:   Diagnosis Date    Arthritis     Atrial fibrillation ([address]     CAD (coronary artery disease)     Chronic pain     legs/knee    GERD (gastroesophageal reflux disease)     Hx of carcinoma in situ of prostate     Hyperlipidemia     Hypertension     Insomnia     DEL (obstructive sleep apnea)     Radiation proctitis     Vitamin D deficiency            PAST SURGICAL HISTORY     Past Surgical History:   Procedure Laterality Date    COLONOSCOPY N/A 5/8/2020    COLONOSCOPY performed by Aileen Augustin MD at [address] [place] [address] COLONOSCOPY N/A 6/8/2020 COLONOSCOPY performed by Robel Manuel MD at [address] N/A 11/23/2020    FLEXIBLE SIGMOIDOSCOPY WITH APC performed by Shanthi Dunn MD at [facility] HX AORTIC VALVE REPLACEMENT  2015    and mitral valve repair, left atrial cryo maze    HX HEENT      melanoma removed head    HX HERNIA REPAIR  2009    Right    HX HERNIA REPAIR      left inguinal hernia repair    HX HERNIA REPAIR Left 12/01/2016    lap left inguinal hernia repair with mesh    HX KNEE REPLACEMENT      Bilateral    HX ORTHOPAEDIC      BILATERAL KNEE REPLACEMENT    HX ORTHOPAEDIC      CARPEL TUNNEL REPAIR-RIGHT    HX OTHER SURGICAL  2015    closure of patent foramen ovale    HX OTHER SURGICAL  10/2020    watchman implant for afib / Dr. Magda Horvath      42 radiation treatments          ALLERGIES     No Known Allergies       FAMILY HISTORY     Family History   Problem Relation Age of Onset    Cancer Mother     Cancer Father         prostrate    Cancer Brother         prostrate ca    Anesth Problems Neg Hx     negative for cardiac disease       SOCIAL HISTORY     Social History     Socioeconomic History    Marital status:      Spouse name: Not on file    Number of children: Not on file    Years of education: Not on file    Highest education level: Not on file   Tobacco Use    Smoking status: Never Smoker    Smokeless tobacco: Never Used   Substance and Sexual Activity    Alcohol use: Yes     Alcohol/week: 3.0 standard drinks     Types: 3 Cans of beer per week    Drug use: No    Sexual activity: Not Currently         MEDICATIONS     Current Outpatient Medications   Medication Sig    lisinopriL (PRINIVIL, ZESTRIL) 20 mg tablet Take 1 Tab by mouth daily.  aspirin delayed-release 81 mg tablet Take 81 mg by mouth daily.  ipratropium (ATROVENT HFA) 17 mcg/actuation inhaler Take 2 Puffs by inhalation as needed.     ferrous sulfate 325 mg (65 mg iron) cpER Take 1 Tab by mouth every other day.  tamsulosin (FLOMAX) 0.4 mg capsule Take 0.8 mg by mouth two (2) times a day.  cholecalciferol (VITAMIN D3) 1,000 unit tablet Take 2,000 Units by mouth two (2) times a day.  GLUCOSAMINE HCL/CHONDR CHING A NA (GLUCOSAMINE-CHONDROITIN) 750-600 mg tab Take 1 Tab by mouth daily.  omega-3 fatty acids-vitamin e 1,000 mg cap Take 1 Cap by mouth every other day.  acetaminophen (TYLENOL) 325 mg tablet Take  by mouth every four (4) hours as needed for Pain.  multivitamin (ONE A DAY) tablet Take 1 Tab by mouth daily.  docusate sodium (COLACE) 100 mg capsule Take 100 mg by mouth two (2) times daily as needed.  finasteride (PROSCAR) 5 mg tablet Take 5 mg by mouth nightly.  pravastatin (PRAVACHOL) 40 mg tablet Take 40 mg by mouth nightly.  furosemide (Lasix) 40 mg tablet Take 1 Tab by mouth daily.  amLODIPine (NORVASC) 5 mg tablet Take 1 Tab by mouth daily.  carvediloL (COREG) 3.125 mg tablet Take 1 Tab by mouth two (2) times daily (with meals).  polyvinyl alcohol (ARTIFICIAL TEARS, POLYVIN ALC,) 1.4 % ophthalmic solution Administer 1 Drop to both eyes as needed. No current facility-administered medications for this visit. I have reviewed the nurses notes, vitals, problem list, allergy list, medical history, family, social history and medications. REVIEW OF SYMPTOMS      General: Pt denies excessive weight gain or loss. Pt is able to conduct ADL's  HEENT: Denies blurred vision, headaches, hearing loss, epistaxis and difficulty swallowing. Respiratory: Denies cough, congestion, shortness of breath, ABDUL, wheezing or stridor.   Cardiovascular: Denies precordial pain, palpitations, edema or PND  Gastrointestinal: Denies poor appetite, indigestion, abdominal pain or blood in stool  Genitourinary: Denies hematuria, dysuria, increased urinary frequency  Musculoskeletal: Denies joint pain or swelling from muscles or joints  Neurologic: Denies tremor, paresthesias, headache, or sensory motor disturbance  Psychiatric: Denies confusion, insomnia, depression  Integumentray: Denies rash, itching or ulcers. Hematologic: Denies easy bruising, bleeding     PHYSICAL EXAMINATION      Vitals:    12/04/20 0932   BP: 100/60   Pulse: 60   SpO2: 93%   Weight: 202 lb (91.6 kg)   Height: 5' 10\" (1.778 m)     General: Well developed, in no acute distress. HEENT: No jaundice, oral mucosa moist, no oral ulcers  Neck: Supple, no stiffness, no lymphadenopathy, supple  Heart:   Regular rate and rhythm with a 2/6 systolic murmur  Respiratory: Clear bilaterally x 4, no wheezing or rales  Extremities:  No edema, normal cap refill, no cyanosis. Musculoskeletal: No clubbing, no deformities  Neuro: A&Ox3, speech clear, gait stable, cooperative, no focal neurologic deficits  Skin: Skin color is normal. No rashes or lesions. Non diaphoretic, moist.         DIAGNOSTIC DATA     1.  Echocardiogram                                     9/5/14 Left ventricle: Systolic function was normal. Ejection fraction was  estimated in the range of 55 % to 60 %. There were no regional wall motion  abnormalities. Left atrium: The atrium was mildly dilated. 4/20/15- EF 48%, SHANTANU, atrial septum- poss PFO, MR mild, TR mild/mod, Pulm HTN mod, AV bioprosthesis normal function  6/19/17- EF 45-50%, RVE, SHANTANU, MR/TR mild/mod, AV bioprosthesis exhibits normal function, severe Pulm HTN, NJ mild  Atrial septum: There was a secundum septal defect. Color Doppler  evaluation was performed. There was a mild left-to-right shunt. 12/11/18 TTE: LVEF 50%, no WMAs, wall thickness mod incr. RV mild-mod dilated, LA mod-sev dilated, RA mod dilated, mod MR, mod TR, mod pulm HTN, mild PV regurg. AV w/ bioprosthesis, no AS / no AR  3/16/20-EF 50-55%, BVE, Mild concentric hypertrophy, SHANTANU, Mitral valve thickening and repaired with annuloplasty ring.  Surgical repair, mild MR/TR, AV leaflet calcification Aortic valve mean gradient is 20.7 mmHg. Aortic valve area is 1.3 cm2. Mild AS, 26 mm bioprosthetic aortic valve. 10/14/20- Limited-DEFINITY- EF 55-60%, No evidence of pericardial effusion. 11/27/20- EF 55%, Moderate concentric hypertrophy, RVE, Aortic valve mean gradient is 25 mmHg. - bioprosthetic aortic valve,  Mitral valve repaired with annuloplasty ring, mod MR/TR, mod pulm HTN, PAP 59 mmHg    2.  Myocardial perfusion study                      (9/5/14)Normal EF ;Normal perfusion    3. Cholesterol profile                            (10/6/15): , HDL 35, ,           (4/19/16): , HDL 72, LDL 65.6,   (05/01/17)- , HDL 85, LDL 65 , TG 55  11/2/17- , HDL 73, TG 75  12/11/18- , HDL 61, LDL 32, TG 60    4. LE venous duplex                                           (10/10/14)  Right leg mod. 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 distally  No DVT  1/24/18 - No DVT, As an incidental finding, there is evidence of valve incompetence  (reflux) involving the left popliteal vein. 5. Watchman 10/13/20    6. Carotid Doppler  5/11/20- 1-49% Kris         LABORATORY DATA            Lab Results   Component Value Date/Time    WBC 5.3 11/28/2020 04:41 AM    HGB 10.0 (L) 11/28/2020 04:41 AM    HCT 31.5 (L) 11/28/2020 04:41 AM    PLATELET 482 09/55/8613 04:41 AM    .6 (H) 11/28/2020 04:41 AM      Lab Results   Component Value Date/Time    Sodium 137 11/28/2020 04:41 AM    Potassium 3.6 11/28/2020 04:41 AM    Chloride 99 11/28/2020 04:41 AM    CO2 35 (H) 11/28/2020 04:41 AM    Anion gap 3 (L) 11/28/2020 04:41 AM    Glucose 105 (H) 11/28/2020 04:41 AM    BUN 20 11/28/2020 04:41 AM    Creatinine 0.94 11/28/2020 04:41 AM    BUN/Creatinine ratio 21 (H) 11/28/2020 04:41 AM    GFR est AA >60 11/28/2020 04:41 AM    GFR est non-AA >60 11/28/2020 04:41 AM    Calcium 9.2 11/28/2020 04:41 AM    Bilirubin, total 0.6 11/28/2020 04:41 AM    Alk.  phosphatase 70 11/28/2020 04:41 AM    Protein, total 7.2 11/28/2020 04:41 AM    Albumin 3.7 11/28/2020 04:41 AM    Globulin 3.5 11/28/2020 04:41 AM    A-G Ratio 1.1 11/28/2020 04:41 AM    ALT (SGPT) 26 11/28/2020 04:41 AM           ASSESSMENT/RECOMMENDATIONS:.      1. AF  -He is status post watchman device and lead was on Eliquis but apparently this was stopped for GI bleeding duration was per Dr. Carlos Gomez protocol    2. LE edema  - multifactorial not using CPAP machine but is now using it currently on Lasix 40 mg daily and edema improved    3. HTN  -Blood pressure remains on the low side. If it remains low I would recommend that we stop his amlodipine which they state he is still taking and cut the lisinopril down in half     4. Dyslipidemia  -I do not have a recent cholesterol profile on him last was in 2018    5. Moderate PFO with left to right flow and CHRISTOPHER  - Last echo showed an EF of 55% and no PFO was documented on the echo on 11/27/2011 moderate MR and TR with elevated pulmonary pressures I will need a echo with a bubble study in 3 months and also to reassess his pulmonary hypertension MR    6. AS and MR s/p AVR and MVR on 1/8/15  - He has moderate MR and moderate TR and bioprosthetic valves. Last echo demonstrated mild left regurgitation mild tricuspid regurgitation aortic valve area 1.3 cm² with mild left ear. There is a 26 mm bioprosthetic aortic valve and a mitral valve repair. 7. DEL  -Is now wearing 2 to 3 hours a night      8. Return in 3 months or PRN. Orders Placed This Encounter    METABOLIC PANEL, BASIC     Standing Status:   Future     Standing Expiration Date:   12/4/2021    MAGNESIUM     Standing Status:   Future     Standing Expiration Date:   12/4/2021       We discussed the expected course, resolution and complications of the diagnosis(es) in detail. Medication risks, benefits, costs, interactions, and alternatives were discussed as indicated.   I advised him to contact the office if his condition worsens, changes or fails to improve as anticipated. He expressed understanding with the diagnosis(es) and plan          Follow-up and Dispositions  ·   Return in about 6 weeks (around 1/15/2021). I have discussed the diagnosis with  Delfina Bergman and the intended plan as seen in the above orders. Questions were answered concerning future plans. I have discussed medication side effects and warnings with the patient as well. Thank you,  Other, MD Slim for involving me in the care of  Delfina Bergman. Please do not hesitate to contact me for further questions/concerns. Shayan Fine MD, [address] [place] [address] Rd[address]      [facility], [address] [place] [address] [facility]      [phone] / [phone] Fax

## 2020-12-17 ENCOUNTER — TELEPHONE (OUTPATIENT)
Dept: CARDIOLOGY CLINIC | Age: 80
End: 2020-12-17

## 2020-12-17 DIAGNOSIS — I48.91 ATRIAL FIBRILLATION, UNSPECIFIED TYPE (HCC): Primary | ICD-10-CM

## 2020-12-17 RX ORDER — CARVEDILOL 3.12 MG/1
3.12 TABLET ORAL 2 TIMES DAILY WITH MEALS
Qty: 60 TAB | Refills: 5 | Status: SHIPPED | OUTPATIENT
Start: 2020-12-17 | End: 2021-08-17

## 2020-12-17 NOTE — TELEPHONE ENCOUNTER
Spoke with pt concerning DEWEY procedure. Instructions given to pt per VO Dr. Yesenia Ross. Informed patient to remain NPO after midnight; Hold the Lasix the day of the procedure. Have someone available to drive pt to and from procedure; pack a bag in case an overnight stay is warranted. DEWEY scheduled for 10:00 am on 12/29/20. Patient advised to arrive at 8:30 am procedure for prep. Patient verbalized understanding. Will go for COVID testing on 12/24/20 here at [facility] between the hours of 8:00 am to 11:00 am.     Opportunities for questions, clarifications, and concerns provided.

## 2020-12-24 ENCOUNTER — TRANSCRIBE ORDER (OUTPATIENT)
Dept: REGISTRATION | Age: 80
End: 2020-12-24

## 2020-12-24 ENCOUNTER — HOSPITAL ENCOUNTER (OUTPATIENT)
Dept: LAB | Age: 80
Discharge: HOME | End: 2020-12-24
Payer: MEDICARE

## 2020-12-24 DIAGNOSIS — Z01.812 PRE-PROCEDURAL LABORATORY EXAMINATIONS: ICD-10-CM

## 2020-12-24 DIAGNOSIS — Z01.812 PRE-PROCEDURAL LABORATORY EXAMINATIONS: Primary | ICD-10-CM

## 2020-12-24 PROCEDURE — 87635 SARS-COV-2 COVID-19 AMP PRB: CPT

## 2020-12-25 LAB — SARS-COV-2, COV2NT: NOT DETECTED

## 2020-12-28 DIAGNOSIS — I48.21 ATRIAL FIBRILLATION, PERMANENT (HCC): Primary | ICD-10-CM

## 2020-12-28 DIAGNOSIS — Z95.818 PRESENCE OF WATCHMAN LEFT ATRIAL APPENDAGE CLOSURE DEVICE: ICD-10-CM

## 2020-12-28 RX ORDER — SODIUM CHLORIDE 0.9 % (FLUSH) 0.9 %
5-40 SYRINGE (ML) INJECTION AS NEEDED
Status: CANCELLED | OUTPATIENT
Start: 2020-12-29

## 2020-12-28 RX ORDER — SODIUM CHLORIDE 9 MG/ML
100 INJECTION, SOLUTION INTRAVENOUS CONTINUOUS
Status: CANCELLED | OUTPATIENT
Start: 2020-12-29

## 2020-12-28 RX ORDER — SODIUM CHLORIDE 0.9 % (FLUSH) 0.9 %
5-40 SYRINGE (ML) INJECTION EVERY 8 HOURS
Status: CANCELLED | OUTPATIENT
Start: 2020-12-29

## 2020-12-29 ENCOUNTER — HOSPITAL ENCOUNTER (OUTPATIENT)
Dept: NON INVASIVE DIAGNOSTICS | Age: 80
Discharge: HOME | End: 2020-12-29
Attending: STUDENT IN AN ORGANIZED HEALTH CARE EDUCATION/TRAINING PROGRAM | Admitting: STUDENT IN AN ORGANIZED HEALTH CARE EDUCATION/TRAINING PROGRAM
Payer: MEDICARE

## 2020-12-29 VITALS
HEIGHT: 70 IN | HEART RATE: 56 BPM | BODY MASS INDEX: 28.92 KG/M2 | SYSTOLIC BLOOD PRESSURE: 142 MMHG | OXYGEN SATURATION: 95 % | DIASTOLIC BLOOD PRESSURE: 77 MMHG | WEIGHT: 202 LBS | RESPIRATION RATE: 19 BRPM

## 2020-12-29 DIAGNOSIS — Z95.818 PRESENCE OF WATCHMAN LEFT ATRIAL APPENDAGE CLOSURE DEVICE: ICD-10-CM

## 2020-12-29 DIAGNOSIS — I48.21 ATRIAL FIBRILLATION, PERMANENT (HCC): ICD-10-CM

## 2020-12-29 DIAGNOSIS — I48.91 ATRIAL FIBRILLATION, UNSPECIFIED TYPE (HCC): ICD-10-CM

## 2020-12-29 PROCEDURE — 99153 MOD SED SAME PHYS/QHP EA: CPT

## 2020-12-29 PROCEDURE — 99152 MOD SED SAME PHYS/QHP 5/>YRS: CPT | Performed by: STUDENT IN AN ORGANIZED HEALTH CARE EDUCATION/TRAINING PROGRAM

## 2020-12-29 PROCEDURE — 99153 MOD SED SAME PHYS/QHP EA: CPT | Performed by: STUDENT IN AN ORGANIZED HEALTH CARE EDUCATION/TRAINING PROGRAM

## 2020-12-29 PROCEDURE — 96374 THER/PROPH/DIAG INJ IV PUSH: CPT

## 2020-12-29 PROCEDURE — 74011250636 HC RX REV CODE- 250/636: Performed by: STUDENT IN AN ORGANIZED HEALTH CARE EDUCATION/TRAINING PROGRAM

## 2020-12-29 PROCEDURE — 93325 DOPPLER ECHO COLOR FLOW MAPG: CPT | Performed by: STUDENT IN AN ORGANIZED HEALTH CARE EDUCATION/TRAINING PROGRAM

## 2020-12-29 PROCEDURE — 93320 DOPPLER ECHO COMPLETE: CPT | Performed by: STUDENT IN AN ORGANIZED HEALTH CARE EDUCATION/TRAINING PROGRAM

## 2020-12-29 PROCEDURE — 99152 MOD SED SAME PHYS/QHP 5/>YRS: CPT

## 2020-12-29 PROCEDURE — 93312 ECHO TRANSESOPHAGEAL: CPT | Performed by: STUDENT IN AN ORGANIZED HEALTH CARE EDUCATION/TRAINING PROGRAM

## 2020-12-29 PROCEDURE — 74011000250 HC RX REV CODE- 250: Performed by: STUDENT IN AN ORGANIZED HEALTH CARE EDUCATION/TRAINING PROGRAM

## 2020-12-29 RX ORDER — SODIUM CHLORIDE 9 MG/ML
100 INJECTION, SOLUTION INTRAVENOUS CONTINUOUS
Status: DISCONTINUED | OUTPATIENT
Start: 2020-12-29 | End: 2020-12-29 | Stop reason: HOSPADM

## 2020-12-29 RX ORDER — SODIUM CHLORIDE 9 MG/ML
10 INJECTION INTRAMUSCULAR; INTRAVENOUS; SUBCUTANEOUS
Status: DISCONTINUED | OUTPATIENT
Start: 2020-12-29 | End: 2020-12-29 | Stop reason: HOSPADM

## 2020-12-29 RX ORDER — LIDOCAINE HYDROCHLORIDE 20 MG/ML
JELLY TOPICAL
Status: COMPLETED | OUTPATIENT
Start: 2020-12-29 | End: 2020-12-29

## 2020-12-29 RX ORDER — MIDAZOLAM HYDROCHLORIDE 1 MG/ML
.5-1 INJECTION, SOLUTION INTRAMUSCULAR; INTRAVENOUS
Status: DISCONTINUED | OUTPATIENT
Start: 2020-12-29 | End: 2020-12-29 | Stop reason: HOSPADM

## 2020-12-29 RX ORDER — SODIUM CHLORIDE 0.9 % (FLUSH) 0.9 %
5-40 SYRINGE (ML) INJECTION EVERY 8 HOURS
Status: DISCONTINUED | OUTPATIENT
Start: 2020-12-29 | End: 2020-12-29 | Stop reason: HOSPADM

## 2020-12-29 RX ORDER — LIDOCAINE 50 MG/G
OINTMENT TOPICAL
Status: DISCONTINUED | OUTPATIENT
Start: 2020-12-29 | End: 2020-12-29 | Stop reason: HOSPADM

## 2020-12-29 RX ORDER — LIDOCAINE HYDROCHLORIDE 20 MG/ML
15 SOLUTION OROPHARYNGEAL
Status: DISCONTINUED | OUTPATIENT
Start: 2020-12-29 | End: 2020-12-29 | Stop reason: HOSPADM

## 2020-12-29 RX ORDER — CLOPIDOGREL BISULFATE 75 MG/1
75 TABLET ORAL DAILY
Qty: 90 TAB | Refills: 1 | Status: SHIPPED | OUTPATIENT
Start: 2020-12-29 | End: 2021-06-29 | Stop reason: ALTCHOICE

## 2020-12-29 RX ORDER — SODIUM CHLORIDE 0.9 % (FLUSH) 0.9 %
5-40 SYRINGE (ML) INJECTION AS NEEDED
Status: DISCONTINUED | OUTPATIENT
Start: 2020-12-29 | End: 2020-12-29 | Stop reason: HOSPADM

## 2020-12-29 RX ORDER — FENTANYL CITRATE 50 UG/ML
25-200 INJECTION, SOLUTION INTRAMUSCULAR; INTRAVENOUS
Status: DISCONTINUED | OUTPATIENT
Start: 2020-12-29 | End: 2020-12-29 | Stop reason: HOSPADM

## 2020-12-29 RX ADMIN — FENTANYL CITRATE 50 MCG: 50 INJECTION, SOLUTION INTRAMUSCULAR; INTRAVENOUS at 11:05

## 2020-12-29 RX ADMIN — LIDOCAINE HYDROCHLORIDE 15 ML: 20 SOLUTION ORAL; TOPICAL at 10:37

## 2020-12-29 RX ADMIN — LIDOCAINE HYDROCHLORIDE: 20 JELLY TOPICAL at 11:17

## 2020-12-29 RX ADMIN — MIDAZOLAM HYDROCHLORIDE 1 MG: 1 INJECTION, SOLUTION INTRAMUSCULAR; INTRAVENOUS at 11:08

## 2020-12-29 RX ADMIN — MIDAZOLAM HYDROCHLORIDE 1 MG: 1 INJECTION, SOLUTION INTRAMUSCULAR; INTRAVENOUS at 11:05

## 2020-12-29 RX ADMIN — MIDAZOLAM HYDROCHLORIDE 1 MG: 1 INJECTION, SOLUTION INTRAMUSCULAR; INTRAVENOUS at 11:11

## 2020-12-29 RX ADMIN — SODIUM CHLORIDE 10 ML: 9 INJECTION INTRAMUSCULAR; INTRAVENOUS; SUBCUTANEOUS at 11:32

## 2020-12-29 RX ADMIN — LIDOCAINE: 50 OINTMENT TOPICAL at 10:45

## 2020-12-29 RX ADMIN — FENTANYL CITRATE 50 MCG: 50 INJECTION, SOLUTION INTRAMUSCULAR; INTRAVENOUS at 11:11

## 2020-12-29 NOTE — PROGRESS NOTES
Patient arrived to Non-Invasive Cardiology Lab for Out Patient DEWEY Procedure. Staff introduced to patient. Patient identifiers verified with Name and Date of Birth. Procedure verified with patient. Consent forms reviewed and signed by patient or authorized representative and verified. Allergies verified. Patient informed of procedure and plan of care. Questions answered with review. Patient on cardiac monitor, non-invasive blood pressure, SPO2 monitor. Patient is A&Ox3. Patient reports no complaints. Patient belongings and valuables to remain in the room with patient. Patient on stretcher, in low position, with side rails up and brakes locked. Patient instructed to call for assistance as needed. Family in waiting room.

## 2020-12-29 NOTE — PROCEDURES
BRIEF PROCEDURE NOTE    Date of Procedure: 12/29/2020   Preoperative Diagnosis: post-watchman  Postoperative Diagnosis: same    Procedure:DEWEY  Cardiologist: Nelly Ferraro DO  Anesthesia: local (benzocaine spray, Viscous lidocaine gargle) + IV sedation   I administered moderate sedation throughout this procedure. An independent trained observer pushed medications at my direction, and monitored the patients level of consciousness and physiological status throughout. Estimated Blood Loss: Minimal    Informed consent obtained prior to procedure. Appropriate time out performed. 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 Septum- still trivial puncture site          Complications:    None, no oropharyngeal trauma or bleeding, vital signs stable at end of procedure.

## 2020-12-29 NOTE — INTERVAL H&P NOTE
Update History & Physical 
 
DEEWY post-watchman. Asa 3, airway 3 Electronically signed by Bucky Charles DO on 12/29/2020 at 10:42 AM

## 2021-01-15 ENCOUNTER — OFFICE VISIT (OUTPATIENT)
Dept: CARDIOLOGY CLINIC | Age: 81
End: 2021-01-15
Payer: MEDICARE

## 2021-01-15 VITALS
WEIGHT: 214 LBS | HEIGHT: 70 IN | BODY MASS INDEX: 30.64 KG/M2 | SYSTOLIC BLOOD PRESSURE: 140 MMHG | DIASTOLIC BLOOD PRESSURE: 72 MMHG | HEART RATE: 64 BPM

## 2021-01-15 DIAGNOSIS — E78.00 PURE HYPERCHOLESTEROLEMIA: ICD-10-CM

## 2021-01-15 DIAGNOSIS — I48.91 ATRIAL FIBRILLATION, UNSPECIFIED TYPE (HCC): Primary | ICD-10-CM

## 2021-01-15 DIAGNOSIS — I10 ESSENTIAL HYPERTENSION: ICD-10-CM

## 2021-01-15 DIAGNOSIS — I25.10 CORONARY ARTERY DISEASE INVOLVING NATIVE CORONARY ARTERY OF NATIVE HEART WITHOUT ANGINA PECTORIS: ICD-10-CM

## 2021-01-15 PROCEDURE — G0463 HOSPITAL OUTPT CLINIC VISIT: HCPCS | Performed by: SPECIALIST

## 2021-01-15 PROCEDURE — G8754 DIAS BP LESS 90: HCPCS | Performed by: SPECIALIST

## 2021-01-15 PROCEDURE — G8427 DOCREV CUR MEDS BY ELIG CLIN: HCPCS | Performed by: SPECIALIST

## 2021-01-15 PROCEDURE — G8417 CALC BMI ABV UP PARAM F/U: HCPCS | Performed by: SPECIALIST

## 2021-01-15 PROCEDURE — G8753 SYS BP > OR = 140: HCPCS | Performed by: SPECIALIST

## 2021-01-15 PROCEDURE — 1101F PT FALLS ASSESS-DOCD LE1/YR: CPT | Performed by: SPECIALIST

## 2021-01-15 PROCEDURE — G8536 NO DOC ELDER MAL SCRN: HCPCS | Performed by: SPECIALIST

## 2021-01-15 PROCEDURE — 99214 OFFICE O/P EST MOD 30 MIN: CPT | Performed by: SPECIALIST

## 2021-01-15 PROCEDURE — G8432 DEP SCR NOT DOC, RNG: HCPCS | Performed by: SPECIALIST

## 2021-01-15 NOTE — PROGRESS NOTES
CARDIOLOGY OFFICE NOTE    Shayan Ramos MD, [address], [place], [address] [facility]  Phone [phone]; Fax [phone]  Mobile [phone]   Voice Mail [phone]    LAST OFFICE VISIT : Visit date not found  Other, MD Slim       ATTENTION:   This medical record was transcribed using an electronic medical records/speech recognition system. Although proofread, it may and can contain electronic, spelling and other errors. Corrections may be executed at a later time. Please feel free to contact us for any clarifications as needed. ICD-10-CM ICD-9-CM   1. Atrial fibrillation, unspecified type ([address]  I48.91 427.31   2. Coronary artery disease involving native coronary artery of native heart without angina pectoris  I25.10 414.01   3. Pure hypercholesterolemia  E78.00 272.0   4. Essential hypertension  I10 401.9            Lurdes Torres is a 79 y.o. male with  referred for negativeHTN, dyslipidemia, and AF aortic valve replacement and mitral valve repair status post maze procedure. I have personally obtained the history from the patient. Cardiac risk factors: dyslipidemia, male gender, hypertension  I have personally obtained the history from the patient. HISTORY OF PRESENTING ILLNESS     Lurdes Torres is a 79 y.o. male   with HTN, dyslipidemia, and AF aortic valve replacement and mitral valve repair status post maze procedure and now status post watchman device. He has endothelialization of the watchman device. And he is on Plavix 75 mg a day. He has normal EF and 5 has some diastolic dysfunction with moderate LVH. He has pulmonary hypertension with a PA pressure of 59. He is not wearing his CPAP more regularly he states    He does like to taking the diuretic because it makes him pee but does help with his edema and he definitely has a less edema when he wakes up in the morning. His wife is not present today.      ACTIVE PROBLEM LIST Patient Active Problem List    Diagnosis Date Noted    Presence of Watchman left atrial appendage closure device 10/13/2020     Priority: 1 - One    Diastolic CHF, acute on chronic ([address] 11/28/2020    Anasarca 11/27/2020    ABDUL (dyspnea on exertion) 11/27/2020    GI bleed 06/08/2020    Radiation proctitis     DEL (obstructive sleep apnea)     Insomnia     Atrial fibrillation (HCC)     Hyperlipidemia     Hx of carcinoma in situ of prostate     GERD (gastroesophageal reflux disease)     Chronic pain     CAD (coronary artery disease)     Arthritis     Hypertension 10/26/2015    Hyperlipemia 10/26/2015    Anemia due to acute blood loss 12/26/2014    Aortic stenosis 12/23/2014    S/P AVR (aortic valve replacement) 12/23/2014    S/P MVR (mitral valve repair) 12/23/2014    S/P Maze operation for atrial fibrillation 12/23/2014    Aortic insufficiency 12/08/2014    A-fib ([address] 06/17/2013    Arthritis of knee 07/09/2012           PAST MEDICAL HISTORY     Past Medical History:   Diagnosis Date    Arthritis     Atrial fibrillation ([address]     CAD (coronary artery disease)     Chronic pain     legs/knee    GERD (gastroesophageal reflux disease)     Hx of carcinoma in situ of prostate     Hyperlipidemia     Hypertension     Insomnia     DEL (obstructive sleep apnea)     Radiation proctitis     Vitamin D deficiency            PAST SURGICAL HISTORY     Past Surgical History:   Procedure Laterality Date    COLONOSCOPY N/A 5/8/2020    COLONOSCOPY performed by Vesna Arthur MD at [place] [address] COLONOSCOPY N/A 6/8/2020    COLONOSCOPY performed by Maxwell Turpin MD at [address] [place] [address] N/A 11/23/2020    FLEXIBLE SIGMOIDOSCOPY WITH APC performed by Vesna Arthur MD at [place] [address] HX AORTIC VALVE REPLACEMENT  2015    and mitral valve repair, left atrial cryo maze    HX HEENT      melanoma removed head    HX HERNIA REPAIR  2009    Right    HX HERNIA REPAIR left inguinal hernia repair    HX HERNIA REPAIR Left 12/01/2016    lap left inguinal hernia repair with mesh    HX KNEE REPLACEMENT      Bilateral    HX ORTHOPAEDIC      BILATERAL KNEE REPLACEMENT    HX ORTHOPAEDIC      CARPEL TUNNEL REPAIR-RIGHT    HX OTHER SURGICAL  2015    closure of patent foramen ovale    HX OTHER SURGICAL  10/2020    watchman implant for afib / Dr. Leann Keen      42 radiation treatments          ALLERGIES     No Known Allergies       FAMILY HISTORY     Family History   Problem Relation Age of Onset    Cancer Mother     Cancer Father         prostrate    Cancer Brother         prostrate ca    Anesth Problems Neg Hx     negative for cardiac disease       SOCIAL HISTORY     Social History     Socioeconomic History    Marital status:      Spouse name: Not on file    Number of children: Not on file    Years of education: Not on file    Highest education level: Not on file   Tobacco Use    Smoking status: Never Smoker    Smokeless tobacco: Never Used   Substance and Sexual Activity    Alcohol use: Yes     Alcohol/week: 3.0 standard drinks     Types: 3 Cans of beer per week    Drug use: No    Sexual activity: Not Currently         MEDICATIONS     Current Outpatient Medications   Medication Sig    clopidogreL (Plavix) 75 mg tab Take 1 Tab by mouth daily.  carvediloL (COREG) 3.125 mg tablet Take 1 Tab by mouth two (2) times daily (with meals).  furosemide (Lasix) 40 mg tablet Take 1 Tab by mouth daily.  amLODIPine (NORVASC) 5 mg tablet Take 1 Tab by mouth daily.  lisinopriL (PRINIVIL, ZESTRIL) 20 mg tablet Take 1 Tab by mouth daily.  aspirin delayed-release 81 mg tablet Take 81 mg by mouth daily.  polyvinyl alcohol (ARTIFICIAL TEARS, POLYVIN ALC,) 1.4 % ophthalmic solution Administer 1 Drop to both eyes as needed.  ipratropium (ATROVENT HFA) 17 mcg/actuation inhaler Take 2 Puffs by inhalation as needed.     ferrous sulfate 325 mg (65 mg iron) cpER Take 1 Tab by mouth every other day.  tamsulosin (FLOMAX) 0.4 mg capsule Take 0.8 mg by mouth two (2) times a day.  cholecalciferol (VITAMIN D3) 1,000 unit tablet Take 2,000 Units by mouth two (2) times a day.  GLUCOSAMINE HCL/CHONDR CHING A NA (GLUCOSAMINE-CHONDROITIN) 750-600 mg tab Take 1 Tab by mouth daily.  omega-3 fatty acids-vitamin e 1,000 mg cap Take 1 Cap by mouth every other day.  acetaminophen (TYLENOL) 325 mg tablet Take  by mouth every four (4) hours as needed for Pain.  multivitamin (ONE A DAY) tablet Take 1 Tab by mouth daily.  docusate sodium (COLACE) 100 mg capsule Take 100 mg by mouth two (2) times daily as needed.  finasteride (PROSCAR) 5 mg tablet Take 5 mg by mouth nightly.  pravastatin (PRAVACHOL) 40 mg tablet Take 40 mg by mouth nightly. No current facility-administered medications for this visit. I have reviewed the nurses notes, vitals, problem list, allergy list, medical history, family, social history and medications. REVIEW OF SYMPTOMS      General: Pt denies excessive weight gain or loss. Pt is able to conduct ADL's  HEENT: Denies blurred vision, headaches, hearing loss, epistaxis and difficulty swallowing. Respiratory: Denies cough, congestion, shortness of breath, ABDUL, wheezing or stridor. Cardiovascular: Denies precordial pain, palpitations, edema or PND  Gastrointestinal: Denies poor appetite, indigestion, abdominal pain or blood in stool  Genitourinary: Denies hematuria, dysuria, increased urinary frequency  Musculoskeletal: Denies joint pain or swelling from muscles or joints  Neurologic: Denies tremor, paresthesias, headache, or sensory motor disturbance  Psychiatric: Denies confusion, insomnia, depression  Integumentray: Denies rash, itching or ulcers.   Hematologic: Denies easy bruising, bleeding     PHYSICAL EXAMINATION      Vitals:    01/15/21 1332   BP: (!) 140/72   Pulse: 64   Weight: 214 lb (97.1 kg)   Height: 5' 10\" (1.778 m)     General: Well developed, in no acute distress. HEENT: No jaundice, oral mucosa moist, no oral ulcers  Neck: Supple, no stiffness, no lymphadenopathy, supple  Heart:   Irregular  Respiratory: Clear bilaterally x 4, no wheezing or rales  Extremities:  + edema, normal cap refill, no cyanosis. Musculoskeletal: No clubbing, no deformities  Neuro: A&Ox3, speech clear, gait stable, cooperative, no focal neurologic deficits  Skin: Skin color is normal. No rashes or lesions. Non diaphoretic, moist.         DIAGNOSTIC DATA     1.  Echocardiogram                                     9/5/14 Left ventricle: Systolic function was normal. Ejection fraction was   estimated in the range of 55 % to 60 %. There were no regional wall motion   abnormalities. Left atrium: The atrium was mildly dilated. 4/20/15- EF 48%, SHANTANU, atrial septum- poss PFO, MR mild, TR mild/mod, Pulm HTN mod, AV bioprosthesis normal function   6/19/17- EF 45-50%, RVE, SHANTANU, MR/TR mild/mod, AV bioprosthesis exhibits normal function, severe Pulm HTN, VA mild   Atrial septum: There was a secundum septal defect. Color Doppler   evaluation was performed. There was a mild left-to-right shunt. 12/11/18 TTE: LVEF 50%, no WMAs, wall thickness mod incr. RV mild-mod dilated, LA mod-sev dilated, RA mod dilated, mod MR, mod TR, mod pulm HTN, mild PV regurg. AV w/ bioprosthesis, no AS / no AR   3/16/20-EF 50-55%, BVE, Mild concentric hypertrophy, SHANTANU, Mitral valve thickening and repaired with annuloplasty ring. Surgical repair, mild MR/TR, AV leaflet calcification Aortic valve mean gradient is 20.7 mmHg. Aortic valve area is 1.3 cm2. Mild AS, 26 mm bioprosthetic aortic valve. 10/14/20- Limited-DEFINITY- EF 55-60%, No evidence of pericardial effusion. 11/27/20- EF 55%, Moderate concentric hypertrophy, RVE, Aortic valve mean gradient is 25 mmHg.  - bioprosthetic aortic valve,  Mitral valve repaired with annuloplasty ring, mod MR/TR, mod pulm HTN, PAP 59 mmHg DEWEY-12/29/20-·Watchman device within YIMI shows excellent endothelialization without evidence of braden-device leak, EF 55 - 60%, SHANTANU,  bioprosthetic aortic valve- insufficiency is present -mild centralized leaking, mild AI, Mitral valve repaired with annuloplasty ring, mod/severe MR    2.  Myocardial perfusion study                      (9/5/14)Normal EF ;Normal perfusion     3. Cholesterol profile                            (10/6/15): , HDL 35, ,           (4/19/16): , HDL 72, LDL 65.6,    (05/01/17)- , HDL 85, LDL 65 , TG 55   11/2/17- , HDL 73, TG 75   12/11/18- , HDL 61, LDL 32, TG 60     4. LE venous duplex                                           (10/10/14)   Right leg mod. 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 5. Watchman 10/13/20     6. Carotid Doppler   5/11/20- 1-49% Kris         LABORATORY DATA            Lab Results   Component Value Date/Time    WBC 5.3 11/28/2020 04:41 AM    HGB 10.0 (L) 11/28/2020 04:41 AM    HCT 31.5 (L) 11/28/2020 04:41 AM    PLATELET 738 75/17/0515 04:41 AM    .6 (H) 11/28/2020 04:41 AM      Lab Results   Component Value Date/Time    Sodium 137 11/28/2020 04:41 AM    Potassium 3.6 11/28/2020 04:41 AM    Chloride 99 11/28/2020 04:41 AM    CO2 35 (H) 11/28/2020 04:41 AM    Anion gap 3 (L) 11/28/2020 04:41 AM    Glucose 105 (H) 11/28/2020 04:41 AM    BUN 20 11/28/2020 04:41 AM    Creatinine 0.94 11/28/2020 04:41 AM    BUN/Creatinine ratio 21 (H) 11/28/2020 04:41 AM    GFR est AA >60 11/28/2020 04:41 AM    GFR est non-AA >60 11/28/2020 04:41 AM    Calcium 9.2 11/28/2020 04:41 AM    Bilirubin, total 0.6 11/28/2020 04:41 AM    Alk.  phosphatase 70 11/28/2020 04:41 AM    Protein, total 7.2 11/28/2020 04:41 AM    Albumin 3.7 11/28/2020 04:41 AM    Globulin 3.5 11/28/2020 04:41 AM    A-G Ratio 1.1 11/28/2020 04:41 AM    ALT (SGPT) 26 11/28/2020 04:41 AM           ASSESSMENT/RECOMMENDATIONS:.      1. AF  -He is status post watchman device now just on Plavix and 5 mg a day  -Rate is under good control    2. LE edema  -as we talked in the past this is probably multifactorial related to sleep apnea and inactivity  -Component is primary related diastolic dysfunction he had been in his studies in the past are unremarkable  -We will asked that he be seen in the lymphedema clinic      3. HTN  -Blood pressure  is better on current medical regimen     4. Dyslipidemia  -He still has not gotten his cholesterol performed    5. Moderate PFO with left to right flow and CHRISTOPHER  -We will reecho him and do a limited echo in 6 months with a bubble study    6. AS and MR s/p AVR and MVR on 1/8/15  - He has moderate MR and moderate TR and bioprosthetic valves. Last echo demonstrated mild left regurgitation mild tricuspid regurgitation aortic valve area 1.3 cm2  There is a 26 mm bioprosthetic aortic valve and a mitral valve repair. 7. DEL  -States he is compliant      8. Return in 6 months or PRN. No orders of the defined types were placed in this encounter. We discussed the expected course, resolution and complications of the diagnosis(es) in detail. Medication risks, benefits, costs, interactions, and alternatives were discussed as indicated. I advised him to contact the office if his condition worsens, changes or fails to improve as anticipated. He expressed understanding with the diagnosis(es) and plan          Follow-up and Dispositions  ·   Return in about 6 months (around 7/15/2021). I have discussed the diagnosis with  Jaylan Escobar and the intended plan as seen in the above orders. Questions were answered concerning future plans. I have discussed medication side effects and warnings with the patient as well.     Thank you,  Other, MD Slim for involving me in the care of  Kvng Azar. Please do not hesitate to contact me for further questions/concerns. Shayan Fine MD, [address]      [facility], [address] [place] [address] Drive      [phone] / [phone] Fax

## 2021-01-15 NOTE — PROGRESS NOTES
Visit Vitals  BP (!) 140/72   Pulse 64   Ht 5' 10\" (1.778 m)   Wt 214 lb (97.1 kg)   BMI 30.71 kg/m²

## 2021-01-15 NOTE — PROGRESS NOTES
Livier Alvarez is a 79 y.o. male    There were no vitals taken for this visit.     Chief Complaint   Patient presents with    Irregular Heart Beat     AFIB    CHF    Cholesterol Problem       Chest pain NO  SOB NO  Dizziness NO  Swelling NO  Recent hospital visit NO  Refills NO

## 2021-01-19 ENCOUNTER — TELEPHONE (OUTPATIENT)
Dept: CARDIOLOGY CLINIC | Age: 81
End: 2021-01-19

## 2021-01-19 NOTE — TELEPHONE ENCOUNTER
----- Message from Yolanda Mcnair MD sent at 1/15/2021  2:09 PM EST -----  Echo in 6 mo liimted with bubbles to look for PFO

## 2021-01-20 ENCOUNTER — HOSPITAL ENCOUNTER (OUTPATIENT)
Dept: RADIATION THERAPY | Age: 81
Discharge: HOME | End: 2021-01-20

## 2021-01-20 VITALS — HEIGHT: 69 IN | BODY MASS INDEX: 30.73 KG/M2 | WEIGHT: 207.5 LBS

## 2021-01-27 ENCOUNTER — TELEPHONE (OUTPATIENT)
Dept: CARDIOLOGY CLINIC | Age: 81
End: 2021-01-27

## 2021-01-27 DIAGNOSIS — I48.91 ATRIAL FIBRILLATION, UNSPECIFIED TYPE (HCC): ICD-10-CM

## 2021-01-27 DIAGNOSIS — R60.9 EDEMA, UNSPECIFIED TYPE: Primary | ICD-10-CM

## 2021-01-28 ENCOUNTER — TELEPHONE (OUTPATIENT)
Dept: CARDIOLOGY CLINIC | Age: 81
End: 2021-01-28

## 2021-01-28 NOTE — TELEPHONE ENCOUNTER
----- Message from Elizabeth Javier NP sent at 1/22/2021  1:45 PM EST -----  Yes he needs plavix x6 months post 45 day DEWEY for watchman prior to transitioning to ASA alone.  ----- Message -----  From: Jameel Castillo LPN  Sent: 9/69/6424   8:37 AM EST  To: Cricket Maddox MD      ----- Message -----  From: Karl Hinds MD  Sent: 1/15/2021   1:59 PM EST  To: CLINT Mancilla,    Do want him on plavix still since there has ariane endothelialization of the watchman device? ??      Jackie Hahn

## 2021-03-15 ENCOUNTER — HOSPITAL ENCOUNTER (OUTPATIENT)
Dept: PHYSICAL THERAPY | Age: 81
Discharge: HOME | End: 2021-03-15
Payer: MEDICARE

## 2021-03-15 PROCEDURE — 97162 PT EVAL MOD COMPLEX 30 MIN: CPT

## 2021-03-15 PROCEDURE — 97535 SELF CARE MNGMENT TRAINING: CPT

## 2021-03-15 NOTE — PROGRESS NOTES
[address] [address]      INITIAL EVALUATION    NAME: Edward Sommer AGE: 79 y.o. GENDER: male  DATE: 3/15/2021  REFERRING PHYSICIAN:Shayan Fine MD  HISTORY AND BACKGROUND:  Patient referred to clinic by cardiologist for evaluation and treatment of bilateral LE edema. Per cardiology note, patient has A-fib with Watchman device. EF 55-60% 12/29/2020, HTN, dyslipidemia, s/p AVR/MVR 1/8/2015, and DEL with patient compliant with home management. See further medical/surgical history below. Patient reports onset of LE swelling s/p XRT for treatment of prostate cancer. Cancer history per Rudy Garcia note as follows:  Low volume, high risk prostate cancer, cT1c. CT abdomen/pelvis with contrast negative for metastatic disease 2/7/2019. Patient completed XRT to prostate +pelvic LN 5/29/2019. ADT two years. Surveillance every 6 months. 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 Patient reports feelings of frustration, lack of understanding regarding lymphedema management, negative impact of LE swelling home routine activities, leisure activities, fit of shoes/clothing, negative impact on sleep.   Lymphedema Life Impact Scale: 26/68; 38% impaired  Tinetti: 23/28, moderate risk  For falls. Past Medical History:   Past Medical History:   Diagnosis Date    Arthritis     Atrial fibrillation ([address]     CAD (coronary artery disease)     Chronic pain     legs/knee    GERD (gastroesophageal reflux disease)     Hx of carcinoma in situ of prostate     Hyperlipidemia     Hypertension     Insomnia     DEL (obstructive sleep apnea)     Radiation proctitis     Vitamin D deficiency      Past Surgical History:   Procedure Laterality Date    COLONOSCOPY N/A 5/8/2020    COLONOSCOPY performed by Vesna Arthur MD at [place] [address] COLONOSCOPY N/A 6/8/2020    COLONOSCOPY performed by Maxwell Turpin MD at [address] [place] [address] N/A 11/23/2020    FLEXIBLE SIGMOIDOSCOPY WITH APC performed by Vesna Arthur MD at [place] [address] HX AORTIC VALVE REPLACEMENT  2015    and mitral valve repair, left atrial cryo maze    HX HEENT      melanoma removed head    HX HERNIA REPAIR  2009    Right    HX HERNIA REPAIR      left inguinal hernia repair    HX HERNIA REPAIR Left 12/01/2016    lap left inguinal hernia repair with mesh    HX KNEE REPLACEMENT      Bilateral    HX ORTHOPAEDIC      BILATERAL KNEE REPLACEMENT    HX ORTHOPAEDIC      CARPEL TUNNEL REPAIR-RIGHT    HX OTHER SURGICAL  2015    closure of patent foramen ovale    HX OTHER SURGICAL  10/2020    watchman implant for afib / Dr. Sree Alicea      42 radiation treatments     Current Medications:    Current Outpatient Medications   Medication Sig    clopidogreL (Plavix) 75 mg tab Take 1 Tab by mouth daily.  carvediloL (COREG) 3.125 mg tablet Take 1 Tab by mouth two (2) times daily (with meals).  furosemide (Lasix) 40 mg tablet Take 1 Tab by mouth daily.  amLODIPine (NORVASC) 5 mg tablet Take 1 Tab by mouth daily.  lisinopriL (PRINIVIL, ZESTRIL) 20 mg tablet Take 1 Tab by mouth daily.  aspirin delayed-release 81 mg tablet Take 81 mg by mouth daily.     polyvinyl alcohol (ARTIFICIAL TEARS, POLYVIN ALC,) 1.4 % ophthalmic solution Administer 1 Drop to both eyes as needed.  ipratropium (ATROVENT HFA) 17 mcg/actuation inhaler Take 2 Puffs by inhalation as needed.  ferrous sulfate 325 mg (65 mg iron) cpER Take 1 Tab by mouth every other day.  tamsulosin (FLOMAX) 0.4 mg capsule Take 0.8 mg by mouth two (2) times a day.  cholecalciferol (VITAMIN D3) 1,000 unit tablet Take 2,000 Units by mouth two (2) times a day.  GLUCOSAMINE HCL/CHONDR CHING A NA (GLUCOSAMINE-CHONDROITIN) 750-600 mg tab Take 1 Tab by mouth daily.  omega-3 fatty acids-vitamin e 1,000 mg cap Take 1 Cap by mouth every other day.  acetaminophen (TYLENOL) 325 mg tablet Take  by mouth every four (4) hours as needed for Pain.  multivitamin (ONE A DAY) tablet Take 1 Tab by mouth daily.  docusate sodium (COLACE) 100 mg capsule Take 100 mg by mouth two (2) times daily as needed.  finasteride (PROSCAR) 5 mg tablet Take 5 mg by mouth nightly.  pravastatin (PRAVACHOL) 40 mg tablet Take 40 mg by mouth nightly. No current facility-administered medications for this encounter. Allergies: No Known Allergies   Prior Level of Function/Social/Work History/Personal factors and/or comorbidities impacting plan of care: Patient reports no LE swelling prior to XRT 2019 to treat prostate cancer. Retired. Lives with spouse. Living Situation: private residence. Trainable Caregiver?: spouse as needed. Self-care/ADLs: indep     Mobility: indep   Sleeping Arrangement:  bed  Previous Therapy:  Attempted wear of OTC compression stockings without successful management of LE edema. Compression/Lymphedema Equipment:  OTC knee high stockings purchased at [facility], did not bring to clinic. SUBJECTIVE:   \"My legs have been swollen since I had radiation. \" Patient reports history of prostate cancer 2019.   Patient states he is taking a \"fluid pill\", which results in frequent urination, stating it has helped to reduce LE swelling, however, continues to note swelling feet/lower legs. Reports noting intermittent genital and abdominal edema. Patients goals for therapy: reduce swelling both legs. OBJECTIVE DATA SUMMARY:   EXAMINATION/PRESENTATION/DECISION MAKING:   Pain: 3/10, LE heaviness, tightness, pain. Skin and Tissue Assessment:  Dermal Status:  [x]   Intact []  Dry   []  Tenuous [] Flaky   []  Wound/lesion present [x]  Scars: anterior knees, bilateral knee replacement. []  Dermatitis    Texture/Consistency:  [x]  Boggy [x]  Pitting Edema: +3 dorsal foot, medial/lateral malleolar region R/L; +2 distal lower leg. []  Brawny []  Combination   [x]  Fibrotic/Woody: mid posterior calf, R/L    Pigmentation/Color Change:  []  Normal []  Hemosiderin   []  Red []  Erythematous   [x]  Hyperpigmented: lower legs/dorsal foot, pinkish in color. []  Hyperlipodermatosclerosis   Anomalies: na  []  Lymphorrhea []  Vesicles   []  Petechiae []  Warty Vercusis   []  Bullae []  Papilloma   Circulatory:  []  JOSE MIGUEL []  Varicosities:   [x]  Pulses: dorsal pedal/posterior tibialis located with doppler. []  Vascular studies ruled out DVT in past   []  DVT History    Nails:  []   Normal  [x]   Fungus  Stemmers Sign: positive, bilateral LE  Height:   5'8\"  Weight:   209.2 lbs   BMI:   31.8  (36 or greater: adversely affecting lymphedema)  Volumetric Measurements:   Right:  7553.05 mL Left:  7656.78 mL   % Difference: 1.37%    (See scanned graph)  Range of Motion: L ankle DF -5; R ankle DF neutral.  Remaining LE ROM WNL. Strength: bilateral LE grossly 5/5 with MMT. Sensation: Intact to light touch  Mobility:  Bed/Chair Mobility:  Independent  Transfers:  Independent    Sitting Balance:  good Standing Balance:  Good-   Gait:  Independent; wide base of support Wheelchair Mobility:  na   Endurance:  Intact for community distances Stairs:  (Other) not assessed. Patient reports negotiating 9 stairs using hand rail at home. Safety:  Patient is alert and oriented: x 4   Safety awareness: patient would benefit from AD for ambulation to reduce risk of falls. Fall Risk?:  Moderate, Tinetti score of 23/28. Patient given written fall prevention handout: Yes   Precautions:  Standard lymphedema precautions to include avoiding blood pressure readings, injections and IVs or other procedures/acts that could lead to broken skin on affected area, and avoiding excessive heat, resistive activity or altitude without compression garment    Functional Measure:   LLIS      Physical Therapy Evaluation Charge Determination   History Examination Presentation Decision-Making   MEDIUM  Complexity : 1-2 comorbidities / personal factors will impact the outcome/ POC  MEDIUM Complexity : 3 Standardized tests and measures addressing body structure, function, activity limitation and / or participation in recreation  MEDIUM Complexity : Evolving with changing characteristics  Other outcome measures LLIS  MEDIUM      Based on the above components, the patient evaluation is determined to be of the following complexity level: MEDIUM    Evaluation Time: 12:40-1:05 pm    TREATMENT PROVIDED:   1. Treatment description: The patient was educated regarding the role and function of the lymphatic system, pathophysiology of lymphedema, and instructed in the lymphedema management protocol of complete decongestive therapy (CDT). This includes skin care to prevent breakdown or infection, lymphedema exercises, custom compression therapy options (bandaging, compression garments, compression pump, Patricia Sings, JoViPak, [employer], etc. as needed), and decongestion with manual lymphatic drainage as indicated. We discussed the need for consistent compression system for lymphedema management. Skin care education was performed,applying low pH lotion to extremity using upward strokes to stimulate lymphatic vessels.   Patient advised of options for treatment, including fitting velcro compression wrap garment fit vs receiving compression bandaging in the clinic 2x/week, with MLD in clinic to be performed. Patient reports he would prefer to have treatment in clinic 2x/week including compression bandaging application. Pt was given information regarding obtaining bandaging supplies. Treatment time: 1:05 pm-1:50 pm  Minutes: 39             Self care 3      ASSESSMENT:   Michael Li is a 79 y.o. male who presents with stage II LE lymphedema, with onset of swelling occurring s/p XRT treatment of prostate cancer 5/29/2019. Patient reports mild benefit of furosemide use, reporting frequent urination after taking medication, however, continues with concern regarding LE edema. Patient arrives wearing crew length socks with band cut by patient to accommodate LE swelling. Patient will benefit from complete decongestive therapy (CDT) including manual lymphatic drainage (MLD) technique, short-stretch textile bandages/compression system to decongest limb, and kinesiotaping as appropriate. Patient will receive instruction in proper skin care to recognize signs/symptoms of and prevent infection, therapeutic exercise, and self-MLD for independent home program and restorative lymphatic performance. Patient reports treatment in clinic using compression bandaging vs fit of compression wrap garments for home management of lymphedema at this time. This care is medically necessary due to the infection risk with lymphedema, and to improve functional activities. CDT is necessary to resolve swelling to allow patient to return to wearing normal clothes/footwear, and prevent worsening of symptoms, such as venous stasis ulcerations, infections, or hospitalizations. Patient will be independent with home program strategies to allow improved ADL ability and mobility and to allow patient to return to greatest functional independence. Rehabilitation potential is considered to be Good.   Factors which may influence rehabilitation potential include difficulty understanding instructions regarding lymphedema management today, with patient to bring spouse to next scheduled appointment. Patient will benefit from 12-18 physical therapy visits over 12 weeks to optimize improvement in these areas. PLAN OF CARE:   Recommendations and Planned Interventions:  Manual lymph drainage/complete decongestive therapy  Multi-layer compression bandaging (short-stretch)  Compression garment fitting/provision  Lymphedema therapeutic exercise  Self-care training  Functional mobility training  Education in skin care and lymphedema precautions  Self-MLD education per home program  Self-bandaging education per home program  Caregiver education as needed     GOALS  Short term goals  Time frame:4-6 weeks  1. Instruct the patient to be independent with proper skin care to prevent future skin breakdown and decrease the potential risk for infections that are associated with Lymphedema. 2. Patient will be independent with a personal lymphedema exercise program to assist with the lymphatic flow and reduce limb volume by 200-300 mL R/L LE.  3. Patient will understand the signs and symptoms of acute infection. Long term goals  Time frame: 6-12 weeks  1. Patient will have knowledge of the compression options and acquire a safe and  appropriate daytime and night time compression system to prevent  re-accumulation of fluid. 2.  Patient or family will be able to don/doff garments independently. Garment  system effectiveness will be evaluated prior to discharge for better long-term  management and outcomes. 3.  Improvement in functional outcomes measure by 5-10% to allow for overall improvement in mobility with reduced pain. 4.   To transition patient to independent restorative phase of CDT. Patient has participated in goal setting and agrees to work toward plan of care.   Patient was instructed to call if questions or concerns arise.    Thank you for this referral.  Yenny Marroquin PT, CLRADHA-JOON   Time Calculation: 70 mins    TREATMENT PLAN EFFECTIVE DATES:   3/15/2021 TO 6/10/2021  I have read the above plan of care for Rafael Mom. I certify the above prescribed services are required by this patient and are medically necessary.   The above plan of care has been developed in conjunction with the lymphedema/physical therapist.       Physician Signature: ____________________________________Date:______________

## 2021-04-06 ENCOUNTER — HOSPITAL ENCOUNTER (OUTPATIENT)
Dept: PHYSICAL THERAPY | Age: 81
Discharge: HOME | End: 2021-04-06
Payer: MEDICARE

## 2021-04-06 PROCEDURE — 97530 THERAPEUTIC ACTIVITIES: CPT

## 2021-04-06 PROCEDURE — 97110 THERAPEUTIC EXERCISES: CPT

## 2021-04-06 PROCEDURE — 97140 MANUAL THERAPY 1/> REGIONS: CPT

## 2021-04-06 NOTE — PROGRESS NOTES
[facility]  Lymphedema Clinic and Cancer Rehabilitation  [address] [place] [address]  [address]  Anuj Decker        LYmphedema Therapy  Visit: 2    [x]                  Daily note               []                 30 day/10th visit progress note    NAME: Aimee Ahumada  DATE: 4/6/2021    GOALS  Short term goals  Time frame:4-6 weeks  1. Instruct the patient to be independent with proper skin care to prevent future skin breakdown and decrease the potential risk for infections that are associated with Lymphedema. 2. Patient will be independent with a personal lymphedema exercise program to assist with the lymphatic flow and reduce limb volume by 200-300 mL R/L LE.  3. Patient will understand the signs and symptoms of acute infection. Long term goals  Time frame: 6-12 weeks  1. Patient will have knowledge of the compression options and acquire a safe and       appropriate daytime and night time compression system to prevent             re-accumulation of fluid. 2.  Patient or family will be able to don/doff garments independently. Garment   system effectiveness will be evaluated prior to discharge for better long-term  management and outcomes. 3.  Improvement in functional outcomes measure by 5-10% to allow for overall improvement in mobility with reduced pain. 4.   To transition patient to independent restorative phase of CDT. SUBJECTIVE REPORT:  Patient arrives to clinic with daughter, who is in town for a visit. Daughter reports that she is returning home to [place] tomorrow. Agreeable to assist patient in obtaining what he needs to for treatment. Patient reports bringing larger tennis shoe and post-op shoe. Patient agreeable to proceeding with treatment today, bringing bandaging supplies. Pain: 3/10, skin tightness, limb heaviness.                                 Gait:  Patient will require assistive device for safe ambulation during the bandaging phase of treatment. []  Independent gait without any assistive device for community distances  ADLs:  indep  Treatment Response:    [x]   Patient reviewed packet received at evaluation  [x]   Patient completed home program as prescribed  []   Patient not fully compliant with home program to date  []   Patient waiting on compression garment arrival  Function:   []   Patient requiring less assistance with completion of home program  []   ADLs are requiring less assistance   []   Patient able to return to work/leisure activities  Lymphedema Life Impact Scale: scores 26/68 with 38% impairment  TREATMENT AND OBJECTIVE DATA SUMMARY:   Patient/Family Education:      Educated in skin care: [x]   Skin care products  [x]   Hygiene  [x]  Prevention of cellulitis  []   Wound care     Educated in exercise: []   Walking program  []   Marily ball routine  []   Stick routine  [x]   ROM routine     Instructed in self MLD:   Written sequence given and reviewed with patient as well as demonstration and instruction during MLD portion of the session. Instructed in don/doff of compression system:   [x]   Multi layer bandage (MLB) donning principles and wear precautions  []   Day garments  []   Night garments       Therapeutic Exercise/Procedure: 10:05-10:17 am 12 minutes             Free exercises/ROM: Performs the following with assist of therapist: diaphragmatic breathing, toe curls, toe abd/add, ankle pumps/circuduction both directions x 10 reps, 3x/day. Home program: Patient to perform daily to BID:  [x]   Skin care  [x]   Deep abdominal breathing  [x]   Exercise routine  [x]   Walking program:advised to get out of chair every hour.   [x]   Rest in supine   [x]   Compression bandage  []   Compression garments  []   Vasopneumatic device  []   Wound care  []   Self MLD  [x]   Bring supplies to each therapy visit  [x]   Purchase necessary supplies  []   Weight loss program  []   Follow up with       Rationale: Exercise will increase the lymph angiomotoricity and tissue pressure of the skin and thus decrease swelling. Manual Therapy: 9:05-9:36 am  Therapeutic activity: 9:36-10:05 am 31 minutes  29 minutes     Area to decongest:    [] UE          []  Right     []  Left      [] Trunk      [x] LE             [x]  Right     [x]  Left      [] Trunk         Sequence used and effectiveness: [] Secondary/Primary sequence for upper extremity with / without trunk involvement    [x] Secondary sequence for right lower extremity with runk involvement. Patient tolerated treatment well, able to talk/breath normally. [] Modifications were made to manual lymph drainage sequence to exclude cervical techniques secondary to patient's age    [] Self MLD sequence/techniques/hand strokes   Skin/wound care/debridement: Applied Eucerin/anti-itch lotion to lower legs using upward strokes. Upper/Lower extremity compression: Applied multi-layer compression bandaging to: Below knee [x]  Thigh high  []   Upper Extremity []    Right []   Left []    Bilateral [x]    The following multi-layer bandages were applied:  []  Tricofix            []  Silver Stockinette             []  Tg soft  [x]  Protouch    []  Transelast     Padding layer:  []  Cellona         [x]  Fleece: foot/ankle    Foam:  [] 10cm roll  [x] 12cm roll  [] 15cm roll  [] Gray foam  [] Komprex  [] Komprex 2      Short stretch bandages:   [] 4cm  [x] 6cm  [x] 8cm  [x] 10cm  [] 12cm  Educated patient in multi-layer bandaging donning principles and precautions. Compression bandaging instructions:  1. Compression bandaging will be applied twice a week by therapist in clinic, with adjustments to be made at home as indicated if bandaging becomes loose or uncomfortable.     2. If tolerated, remain bandaged between appointments with therapist, removing under the following conditionsDO NOT WAIT FOR A RETURN PHONE CALL FROM CLINIC:    -Numbness/tingling in extremity different from what you have experienced without the bandages in place.  -Compromise in circulation, monitoring blood refill into toes after applying gentle pressure to toes.  -Onset of pain in extremity that is sudden and severe in nature. -Redness, warmth in limb, and/or fever, flu-like symptoms, which may indicate infection. If this occurs, call your doctor right away. If you note a sudden increase in swelling in extremity, with or without redness/warmth, go to the emergency room as soon as possible to have blood clot ruled out. 3. Compression bandaging supplies that can be laundered are the brown compression wraps and any foam applied for padding. Launder in washer/dryer with NO fabric softener, bleach, woolite. Dry on a low heat. 4. Once compression garments are ordered, compression bandaging will be continued until garments arrive for fitting. This process can take several weeks. Begin using walker for all ambulation during bandaging phase of treatment to reduce fall risk. Attempt to find larger inexpensive tennis shoe/full foot slipper to allow for more normal gait pattern. Kinesiotaping: na   Girth/Volume measurement: Reviewed results of volumetric measurements taken on evaluation. TOTAL TREATMENT 72 mins     ASSESSMENT:   Treatment effectiveness and tolerance: Patient tolerated treatment well. Daughter agreeable to assist patient in obtaining larger size tennis shoe, as post-op shoes fit, however, are long and reduce gait safety. Daughter states she will also assess patient's driving prior to leaving town. Patient provided with written home management instructions and exercise program.   Progress toward goals: ongoing     PLAN OF CARE:   Changes to the plan of care: 1. Assess response to bilateral knee high compression bandaging/unilateral MLD. 2.  Proceed with bilateral LE MLD as tolerated. 3.  Assess gait with AD recommended for use during bandaging phase of treatment.   4.  Assess footwear to determine safety and fit.    Frequency: [x]  2 times a week  []  Weekly  []  Biweekly  []  Monthly         Chelsi Lara PT, VANE

## 2021-04-09 ENCOUNTER — HOSPITAL ENCOUNTER (OUTPATIENT)
Dept: PHYSICAL THERAPY | Age: 81
Discharge: HOME | End: 2021-04-09
Payer: MEDICARE

## 2021-04-09 PROCEDURE — 97140 MANUAL THERAPY 1/> REGIONS: CPT

## 2021-04-09 NOTE — PROGRESS NOTES
_                                    [facility]  [address] [place] [address] [address]        LYmphedema Therapy  Functional Outcomes measure: 4/6/2021  Visit: 3    [x]                  Daily note               []                 30 day/10th visit progress note    NAME: Rosemarie Prasad  DATE: 4/9/2021    GOALS  Short term goals  Time frame:4-6 weeks  1. Instruct the patient to be independent with proper skin care to prevent future skin breakdown and decrease the potential risk for infections that are associated with Lymphedema. 2. Patient will be independent with a personal lymphedema exercise program to assist with the lymphatic flow and reduce limb volume by 200-300 mL R/L LE.  3. Patient will understand the signs and symptoms of acute infection. Long term goals  Time frame: 6-12 weeks  1. Patient will have knowledge of the compression options and acquire a safe and       appropriate daytime and night time compression system to prevent             re-accumulation of fluid. 2.  Patient or family will be able to don/doff garments independently. Garment   system effectiveness will be evaluated prior to discharge for better long-term  management and outcomes. 3.  Improvement in functional outcomes measure by 5-10% to allow for overall improvement in mobility with reduced pain. 4.   To transition patient to independent restorative phase of CDT. SUBJECTIVE REPORT:  Patient arrives to clinic with compression bandaging in place bilateral lower legs. States he tolerated bilateral LE MLB, however, does report some pain bottom of feet. Agreeable to proceeding with treatment today. States his wife has some questions for therapist and asks if he can call her during treatment. Patient reports bringing arrives with rolling walker, and wearing larger tennis shoes. Agreeable to proceeding with treatment today.    Pain: 3/10, skin tightness, limb heaviness. Gait:  Patient will require assistive device for safe ambulation during the bandaging phase of treatment. []  Independent gait without any assistive device for community distances  ADLs:  indep  Treatment Response:    [x]   Patient reviewed packet received at evaluation  [x]   Patient completed home program as prescribed  []   Patient not fully compliant with home program to date  []   Patient waiting on compression garment arrival  Function:   []   Patient requiring less assistance with completion of home program  []   ADLs are requiring less assistance   []   Patient able to return to work/leisure activities  Lymphedema Life Impact Scale: scores 26/68 with 38% impairment 4/6/2021  TREATMENT AND OBJECTIVE DATA SUMMARY:   Patient/Family Education:      Educated in skin care: [x]   Skin care products  [x]   Hygiene  [x]  Prevention of cellulitis  []   Wound care     Educated in exercise: []   Walking program  []   Marily ball routine  []   Stick routine  [x]   ROM routine     Instructed in self MLD:   Written sequence given and reviewed with patient as well as demonstration and instruction during MLD portion of the session. Instructed in don/doff of compression system:   [x]   Multi layer bandage (MLB) donning principles and wear precautions  []   Day garments  []   Night garments       Therapeutic Exercise/Procedure:               Free exercises/ROM: Patient to continue the following at home: diaphragmatic breathing, toe curls, toe abd/add, ankle pumps/circuduction both directions x 10 reps, 3x/day. Home program: Patient to perform daily to BID:  [x]   Skin care  [x]   Deep abdominal breathing  [x]   Exercise routine  [x]   Walking program:advised to get out of chair every hour.   [x]   Rest in supine   [x]   Compression bandage  []   Compression garments  []   Vasopneumatic device  []   Wound care  []   Self MLD  [x]   Bring supplies to each therapy visit  [x]   Purchase necessary supplies  []   Weight loss program  []   Follow up with       Rationale: Exercise will increase the lymph angiomotoricity and tissue pressure of the skin and thus decrease swelling. Manual Therapy: 9:40-10:44 am 64 minutes     Area to decongest:    [] UE          []  Right     []  Left      [] Trunk      [x] LE             [x]  Right     [x]  Left      [] Trunk         Sequence used and effectiveness: [] Secondary/Primary sequence for upper extremity with / without trunk involvement    [x] Secondary sequence for bilateral lower extremity with trunk involvement. Patient tolerated treatment well, able to talk/breath normally. [] Modifications were made to manual lymph drainage sequence to exclude cervical techniques secondary to patient's age    [] Self MLD sequence/techniques/hand strokes   Skin/wound care/debridement: Applied Eucerin/anti-itch lotion to lower legs using upward strokes. Upper/Lower extremity compression: Applied multi-layer compression bandaging to: Below knee [x]  Thigh high  []   Upper Extremity []    Right []   Left []    Bilateral [x]    The following multi-layer bandages were applied:  []  Tricofix            []  Silver Stockinette             []  Tg soft  [x]  Protouch    []  Transelast     Padding layer:  []  Cellona         [x]  Fleece: foot/ankle    Foam:  [] 10cm roll  [x] 12cm roll  [] 15cm roll  [x] Gray foam: added arch area to address foot pain reported by patient.  [] Komprex  [] Komprex 2      Short stretch bandages:   [] 4cm  [x] 6cm  [x] 8cm  [x] 10cm  [] 12cm  Educated patient in multi-layer bandaging donning principles and precautions. Compression bandaging instructions:  1. Compression bandaging will be applied twice a week by therapist in clinic, with adjustments to be made at home as indicated if bandaging becomes loose or uncomfortable.     2. If tolerated, remain bandaged between appointments with therapist, removing under the following conditionsDO NOT WAIT FOR A RETURN PHONE CALL FROM CLINIC:    -Numbness/tingling in extremity different from what you have experienced without the bandages in place.  -Compromise in circulation, monitoring blood refill into toes after applying gentle pressure to toes.  -Onset of pain in extremity that is sudden and severe in nature. -Redness, warmth in limb, and/or fever, flu-like symptoms, which may indicate infection. If this occurs, call your doctor right away. If you note a sudden increase in swelling in extremity, with or without redness/warmth, go to the emergency room as soon as possible to have blood clot ruled out. 3. Compression bandaging supplies that can be laundered are the brown compression wraps and any foam applied for padding. Launder in washer/dryer with NO fabric softener, bleach, woolite. Dry on a low heat. 4. Once compression garments are ordered, compression bandaging will be continued until garments arrive for fitting. This process can take several weeks. Walker adjusted to appropriate height, with patient advised to use walker for all ambulation during bandaging phase of treatment, noting gait pattern improved with use of rolling walker. Therapist addressed spouse questions regarding home management of lymphedema. Note good fit of larger tennis shoe with bandaging in place. Kinesiotaping: na   Girth/Volume measurement: Reviewed results of volumetric measurements taken on evaluation. Affected Side: bilateral LE   Left (cm) Right (cm)   5th Tuberosity 23.9    24.7      Ankle 25.2    24.5      Calf 38.4    38.3      Mid Knee             Thigh             Groin             Length               TOTAL TREATMENT 64 mins     ASSESSMENT:   Treatment effectiveness and tolerance: Patient tolerated treatment well. Good fit of shoes obtained by patient for wear during bandaging phase of treatment.   Therapist addressed questions of spouse via speaker on patient's cell phone. Reviewed laundering bandaging supplies, with written instructions provided previous visit. Progress toward goals: ongoing     PLAN OF CARE:   Changes to the plan of care: 1. Assess response to bilateral knee high compression bandaging/unilateral MLD. 2.  Continue bilateral LE MLD as tolerated. 3.  Reassess gait with AD recommended for use during bandaging phase of treatment. 4.  Monitor footwear to determine safety and fit.    Frequency: [x]  2 times a week  []  Weekly  []  Biweekly  []  Monthly         Nakita Laird PT, CLRADHA-JOON

## 2021-04-13 ENCOUNTER — HOSPITAL ENCOUNTER (OUTPATIENT)
Dept: PHYSICAL THERAPY | Age: 81
Discharge: HOME | End: 2021-04-13
Payer: MEDICARE

## 2021-04-13 PROCEDURE — 97140 MANUAL THERAPY 1/> REGIONS: CPT

## 2021-04-13 NOTE — PROGRESS NOTES
_                                    [facility]  [address] [place] [address].  [address] [address] [address]        LYmphedema Therapy  Functional Outcomes measure: 4/6/2021  Visit: 4    [x]                  Daily note               []                 30 day/10th visit progress note    NAME: Laura Pod  DATE: 4/13/2021    GOALS  Short term goals  Time frame:4-6 weeks  1. Instruct the patient to be independent with proper skin care to prevent future skin breakdown and decrease the potential risk for infections that are associated with Lymphedema. 2. Patient will be independent with a personal lymphedema exercise program to assist with the lymphatic flow and reduce limb volume by 200-300 mL R/L LE.  3. Patient will understand the signs and symptoms of acute infection. Long term goals  Time frame: 6-12 weeks  1. Patient will have knowledge of the compression options and acquire a safe and       appropriate daytime and night time compression system to prevent             re-accumulation of fluid. 2.  Patient or family will be able to don/doff garments independently. Garment   system effectiveness will be evaluated prior to discharge for better long-term  management and outcomes. 3.  Improvement in functional outcomes measure by 5-10% to allow for overall improvement in mobility with reduced pain. 4.   To transition patient to independent restorative phase of CDT. SUBJECTIVE REPORT:  Patient arrives to clinic with compression bandaging in place bilateral lower legs. States foot pain improved with use of gray foam added to arch bilateral, however, states some discomfort in MTP region bottom of feet. States he left his walker in the truck, agreeable to bring cane to next appointment for gait assessment. Agreeable to proceeding with treatment today. Pain: 3/10, skin tightness, limb heaviness.                                 Gait:  Patient will require assistive device for safe ambulation during the bandaging phase of treatment. To bring cane to next appointment. []  Independent gait without any assistive device for community distances  ADLs:  indep  Treatment Response:    [x]   Patient reviewed packet received at evaluation  [x]   Patient completed home program as prescribed  []   Patient not fully compliant with home program to date  []   Patient waiting on compression garment arrival  Function:   []   Patient requiring less assistance with completion of home program  []   ADLs are requiring less assistance   []   Patient able to return to work/leisure activities  Lymphedema Life Impact Scale: scores 26/68 with 38% impairment 4/6/2021  TREATMENT AND OBJECTIVE DATA SUMMARY:   Patient/Family Education:      Educated in skin care: [x]   Skin care products  [x]   Hygiene  [x]  Prevention of cellulitis  []   Wound care     Educated in exercise: []   Walking program  []   Marily ball routine  []   Stick routine  [x]   ROM routine     Instructed in self MLD:   Written sequence given and reviewed with patient as well as demonstration and instruction during MLD portion of the session. Instructed in don/doff of compression system:   [x]   Multi layer bandage (MLB) donning principles and wear precautions  []   Day garments  []   Night garments       Therapeutic Exercise/Procedure:               Free exercises/ROM: Patient to continue the following at home: diaphragmatic breathing, toe curls, toe abd/add, ankle pumps/circuduction both directions x 10 reps, 3x/day. Home program: Patient to perform daily to BID:  [x]   Skin care  [x]   Deep abdominal breathing  [x]   Exercise routine  [x]   Walking program:advised to get out of chair every hour.   [x]   Rest in supine   [x]   Compression bandage  []   Compression garments  []   Vasopneumatic device  []   Wound care  []   Self MLD  [x]   Bring supplies to each therapy visit  [x]   Purchase necessary supplies  [] Weight loss program  []   Follow up with       Rationale: Exercise will increase the lymph angiomotoricity and tissue pressure of the skin and thus decrease swelling. Manual Therapy: 9:58-10:55 am 57 minutes     Area to decongest:    [] UE          []  Right     []  Left      [] Trunk      [x] LE             [x]  Right     [x]  Left      [] Trunk         Sequence used and effectiveness: [] Secondary/Primary sequence for upper extremity with / without trunk involvement    [x] Secondary sequence for bilateral lower extremity with trunk involvement. Patient tolerated treatment well, able to talk/breath normally throughout treatment. .      [] Modifications were made to manual lymph drainage sequence to exclude cervical techniques secondary to patient's age    [] Self MLD sequence/techniques/hand strokes   Skin/wound care/debridement: Applied Eucerin/anti-itch lotion to lower legs using upward strokes. Upper/Lower extremity compression: Applied multi-layer compression bandaging to: Below knee [x]  Thigh high  []   Upper Extremity []    Right []   Left []    Bilateral [x]    The following multi-layer bandages were applied:  []  Tricofix            []  Silver Stockinette             []  Tg soft  [x]  Protouch    []  Transelast     Padding layer:  []  Cellona         [x]  Fleece: foot/ankle    Foam:  [] 10cm roll  [x] 12cm roll  [] 15cm roll  [x] Gray foam: added arch area to address foot pain reported by patient. [x] Velfoam: bilateral MTP region to address discomfort reported by patient.  [] Komprex 2      Short stretch bandages:   [] 4cm  [x] 6cm  [x] 8cm  [x] 10cm  [] 12cm  Educated patient in multi-layer bandaging donning principles and precautions. Compression bandaging instructions:  1. Compression bandaging will be applied twice a week by therapist in clinic, with adjustments to be made at home as indicated if bandaging becomes loose or uncomfortable.     2. If tolerated, remain bandaged between appointments with therapist, removing under the following conditionsDO NOT WAIT FOR A RETURN PHONE CALL FROM CLINIC:    -Numbness/tingling in extremity different from what you have experienced without the bandages in place.  -Compromise in circulation, monitoring blood refill into toes after applying gentle pressure to toes.  -Onset of pain in extremity that is sudden and severe in nature. -Redness, warmth in limb, and/or fever, flu-like symptoms, which may indicate infection. If this occurs, call your doctor right away. If you note a sudden increase in swelling in extremity, with or without redness/warmth, go to the emergency room as soon as possible to have blood clot ruled out. 3. Compression bandaging supplies that can be laundered are the brown compression wraps and any foam applied for padding. Launder in washer/dryer with NO fabric softener, bleach, woolite. Dry on a low heat. 4. Once compression garments are ordered, compression bandaging will be continued until garments arrive for fitting. This process can take several weeks. Patient failed to bring walker to appointment, stating he left it in his vehicle. Patient advised to bring his cane to next appointment to allow therapist to assess gait with cane vs walker to promote compliance. Kinesiotaping: na   Girth/Volume measurement: Reviewed results of volumetric measurements taken on evaluation. Affected Side: bilateral LE   Left (cm) Right (cm)   5th Tuberosity 25.3    25.1      Ankle 25.4    24.1      Calf 38    38.1      Mid Knee             Thigh             Groin             Length               TOTAL TREATMENT 57 mins     ASSESSMENT:   Treatment effectiveness and tolerance: Patient tolerated treatment well. Good fit of shoes obtained by patient for wear during bandaging phase of treatment. Therapist advised patient that use of AD for gait indicated to reduce fall risk during bandaging phase of treatment.   Due to patient resistant to use or walker at this time, patient agreed to bring straight cane to next scheduled appointment for gait assessment. Progress toward goals: ongoing     PLAN OF CARE:   Changes to the plan of care: 1. Assess response to bilateral knee high compression bandaging/bilateral MLD. 2.  Continue bilateral LE MLD as tolerated. 3.  Reassess gait with AD recommended for use during bandaging phase of treatment. 4.  Monitor footwear to determine safety and fit.    Frequency: [x]  2 times a week  []  Weekly  []  Biweekly  []  Monthly         Conception Laughter, PT, CLT-JOON

## 2021-04-16 ENCOUNTER — HOSPITAL ENCOUNTER (OUTPATIENT)
Dept: PHYSICAL THERAPY | Age: 81
Discharge: HOME | End: 2021-04-16
Payer: MEDICARE

## 2021-04-16 PROCEDURE — 97140 MANUAL THERAPY 1/> REGIONS: CPT

## 2021-04-16 PROCEDURE — 97530 THERAPEUTIC ACTIVITIES: CPT

## 2021-04-16 NOTE — PROGRESS NOTES
_                                    [facility] [address] [facility]  [address] [facility].  [address]  [place]        LYmphedema Therapy  Functional Outcomes measure: 4/6/2021  Visit: 5    [x]                  Daily note               []                 30 day/10th visit progress note    NAME: Rafa Solis  DATE: 4/16/2021    GOALS  Short term goals  Time frame:4-6 weeks  1. Instruct the patient to be independent with proper skin care to prevent future skin breakdown and decrease the potential risk for infections that are associated with Lymphedema. 2. Patient will be independent with a personal lymphedema exercise program to assist with the lymphatic flow and reduce limb volume by 200-300 mL R/L LE.  3. Patient will understand the signs and symptoms of acute infection. Long term goals  Time frame: 6-12 weeks  1. Patient will have knowledge of the compression options and acquire a safe and       appropriate daytime and night time compression system to prevent             re-accumulation of fluid. 4/16/2021 completed measurements for custom flat knit knee high Juzo 3021 compression stockings. 2.  Patient or family will be able to don/doff garments independently. Garment   system effectiveness will be evaluated prior to discharge for better long-term  management and outcomes. 3.  Improvement in functional outcomes measure by 5-10% to allow for overall improvement in mobility with reduced pain. 4.   To transition patient to independent restorative phase of CDT. SUBJECTIVE REPORT:  Patient arrives to clinic with compression bandaging in place bilateral lower legs. States foot pain improved with use of gray/velfoam, requesting foam in R arch be repositioned today. Patient arrives early to shower in clinic prior to treatment. Agreeable to proceeding with treatment today. Pain: 3/10, skin tightness, limb heaviness. Gait:  Patient will require assistive device for safe ambulation during the bandaging phase of treatment. To bring cane to next appointment. []  Independent gait without any assistive device for community distances  ADLs:  indep  Treatment Response:    [x]   Patient reviewed packet received at evaluation  [x]   Patient completed home program as prescribed  []   Patient not fully compliant with home program to date  []   Patient waiting on compression garment arrival  Function:   []   Patient requiring less assistance with completion of home program  []   ADLs are requiring less assistance   []   Patient able to return to work/leisure activities  Lymphedema Life Impact Scale: scores 26/68 with 38% impairment 4/6/2021  TREATMENT AND OBJECTIVE DATA SUMMARY:   Patient/Family Education:      Educated in skin care: [x]   Skin care products  [x]   Hygiene  [x]  Prevention of cellulitis  []   Wound care     Educated in exercise: []   Walking program  []   Marily ball routine  []   Stick routine  [x]   ROM routine     Instructed in self MLD:   Written sequence given and reviewed with patient as well as demonstration and instruction during MLD portion of the session. Instructed in don/doff of compression system:   [x]   Multi layer bandage (MLB) donning principles and wear precautions  []   Day garments  []   Night garments       Therapeutic Exercise/Procedure:               Free exercises/ROM: Patient to continue the following at home: diaphragmatic breathing, toe curls, toe abd/add, ankle pumps/circuduction both directions x 10 reps, 3x/day. Home program: Patient to perform daily to BID:  [x]   Skin care  [x]   Deep abdominal breathing  [x]   Exercise routine  [x]   Walking program:advised to get out of chair every hour.   [x]   Rest in supine   [x]   Compression bandage  []   Compression garments  []   Vasopneumatic device  []   Wound care  []   Self MLD  [x]   Bring supplies to each therapy visit  [x]   Purchase necessary supplies  []   Weight loss program  []   Follow up with       Rationale: Exercise will increase the lymph angiomotoricity and tissue pressure of the skin and thus decrease swelling. Manual Therapy: 9:40-10:10 am  Therapeutic activity: 10:10-10:50 am 30 minutes  40 minutes     Area to decongest:    [] UE          []  Right     []  Left      [] Trunk      [x] LE             [x]  Right     [x]  Left      [] Trunk         Sequence used and effectiveness:  Manual therapy [] Secondary/Primary sequence for upper extremity with / without trunk involvement    [x] Secondary sequence for bilateral lower extremity with trunk involvement. Patient tolerated treatment well, able to talk/breath normally throughout treatment. .      [] Modifications were made to manual lymph drainage sequence to exclude cervical techniques secondary to patient's age    [] Self MLD sequence/techniques/hand strokes   Skin/wound care/debridement: Applied Eucerin/anti-itch lotion to lower legs using upward strokes. Lower extremity compression:  Therapeutic activity Patient advised regarding compression garment options upon transition to home management phase of lymphedema. Patient shown velcro compression garment foot/calf pieces, custom flat knit compression stockings vs RM medical grade compression stockings. Trial of donning RM compression stockings in clinic difficult, with patient stating he would prefer to have a stocking vs velcro compression wrap garments. Measurements completed for custom flat knit Juzo 3021 knee high compression stockings, CT, SB, beige. Decision made to order CCL 1 vs CCL 2 for easier donning for patient. Order sent to [facility]. Patient advised he will be contacted by [facility] for payment, garments will be sent directly to patient, to be fit in clinic. Applied multi-layer compression bandaging to:     Below knee [x]  Thigh high  []   Upper Extremity []    Right []   Left []    Bilateral [x]    The following multi-layer bandages were applied:  []  Tricofix            []  Silver Stockinette             []  Tg soft  [x]  Protouch    []  Transelast     Padding layer:  []  Cellona         [x]  Fleece: foot/ankle    Foam:  [] 10cm roll  [x] 12cm roll  [] 15cm roll  [x] Gray foam: added arch area to address foot pain reported by patient. Repositioned per patient request.  [x] Velfoam: bilateral MTP region to address discomfort reported by patient.  [] Komprex 2      Short stretch bandages:   [] 4cm  [x] 6cm  [x] 8cm  [x] 10cm  [] 12cm  Educated patient in multi-layer bandaging donning principles and precautions. Compression bandaging instructions:  1. Compression bandaging will be applied twice a week by therapist in clinic, with adjustments to be made at home as indicated if bandaging becomes loose or uncomfortable. 2. If tolerated, remain bandaged between appointments with therapist, removing under the following conditionsDO NOT WAIT FOR A RETURN PHONE [address]:    -Numbness/tingling in extremity different from what you have experienced without the bandages in place.  -Compromise in circulation, monitoring blood refill into toes after applying gentle pressure to toes.  -Onset of pain in extremity that is sudden and severe in nature. -Redness, warmth in limb, and/or fever, flu-like symptoms, which may indicate infection. If this occurs, call your doctor right away. If you note a sudden increase in swelling in extremity, with or without redness/warmth, go to the emergency room as soon as possible to have blood clot ruled out. 3. Compression bandaging supplies that can be laundered are the brown compression wraps and any foam applied for padding. Launder in washer/dryer with NO fabric softener, bleach, woolite. Dry on a low heat. 4. Once compression garments are ordered, compression bandaging will be continued until garments arrive for fitting.   This process can take several weeks. Patient failed to bring walker to appointment. Kinesiotaping: na   Girth/Volume measurement: Reviewed results of volumetric measurements taken on evaluation. Affected Side: bilateral LE   Left (cm) Right (cm)   5th Tuberosity 23.8    23.7      Ankle 24.7    23.6      Calf 38.1    36.3      Mid Knee             Thigh             Groin             Length               TOTAL TREATMENT 70 mins     ASSESSMENT:   Treatment effectiveness and tolerance: Patient tolerated treatment well. Good fit of shoes obtained by patient for wear during bandaging phase of treatment. Therapist advised patient that use of AD for gait indicated to reduce fall risk during bandaging phase of treatment, patient declining at this time. Progress toward goals: ongoing     PLAN OF CARE:   Changes to the plan of care: 1. Assess response to bilateral knee high compression bandaging/bilateral MLD. 2.  Continue bilateral LE MLD as tolerated. 3.  Reassess gait with AD recommended for use during bandaging phase of treatment. 4.  Monitor footwear to determine safety and fit. 5. Fit compression garments upon receiving, working with patient to don/doff stocking effectively.    Frequency: [x]  2 times a week  []  Weekly  []  Biweekly  []  Monthly         Sage Jaime PT, VANE

## 2021-04-20 ENCOUNTER — HOSPITAL ENCOUNTER (OUTPATIENT)
Dept: PHYSICAL THERAPY | Age: 81
Discharge: HOME | End: 2021-04-20
Payer: MEDICARE

## 2021-04-20 PROCEDURE — 97140 MANUAL THERAPY 1/> REGIONS: CPT

## 2021-04-20 NOTE — PROGRESS NOTES
_                                    [facility] [address] [facility]  [address] [facility] [address]        LYmphedema Therapy  Functional Outcomes measure: 4/6/2021  Visit: 6    [x]                  Daily note               []                 30 day/10th visit progress note    NAME: Amilcar Hogan  DATE: 4/20/2021    GOALS  Short term goals  Time frame:4-6 weeks  1. Instruct the patient to be independent with proper skin care to prevent future skin breakdown and decrease the potential risk for infections that are associated with Lymphedema. 2. Patient will be independent with a personal lymphedema exercise program to assist with the lymphatic flow and reduce limb volume by 200-300 mL R/L LE.  3. Patient will understand the signs and symptoms of acute infection. Long term goals  Time frame: 6-12 weeks  1. Patient will have knowledge of the compression options and acquire a safe and       appropriate daytime and night time compression system to prevent             re-accumulation of fluid. 4/16/2021 completed measurements for custom flat knit knee high Juzo 3021 compression stockings. 2.  Patient or family will be able to don/doff garments independently. Garment   system effectiveness will be evaluated prior to discharge for better long-term  management and outcomes. 3.  Improvement in functional outcomes measure by 5-10% to allow for overall improvement in mobility with reduced pain. 4.   To transition patient to independent restorative phase of CDT. SUBJECTIVE REPORT:  Patient arrives to clinic with compression bandaging in place bilateral lower legs. States foot pain improved with use of gray/velfoam. Limb heaviness reduced. Patient arrives early to shower in clinic prior to treatment. Patient reports making payment for compression stockings. Agreeable to proceeding with treatment today.    Pain: 1/10, skin tightness, limb heaviness. Gait:  Patient will require assistive device for safe ambulation during the bandaging phase of treatment. To bring cane to next appointment. []  Independent gait without any assistive device for community distances  ADLs:  indep  Treatment Response:    [x]   Patient reviewed packet received at evaluation  [x]   Patient completed home program as prescribed  []   Patient not fully compliant with home program to date  []   Patient waiting on compression garment arrival  Function:   []   Patient requiring less assistance with completion of home program  []   ADLs are requiring less assistance   []   Patient able to return to work/leisure activities  Lymphedema Life Impact Scale: scores 26/68 with 38% impairment 4/6/2021  TREATMENT AND OBJECTIVE DATA SUMMARY:   Patient/Family Education:      Educated in skin care: [x]   Skin care products  [x]   Hygiene  [x]  Prevention of cellulitis  []   Wound care     Educated in exercise: []   Walking program  []   Marily ball routine  []   Stick routine  [x]   ROM routine     Instructed in self MLD:   Written sequence given and reviewed with patient as well as demonstration and instruction during MLD portion of the session. Instructed in don/doff of compression system:   [x]   Multi layer bandage (MLB) donning principles and wear precautions  []   Day garments  []   Night garments       Therapeutic Exercise/Procedure:               Free exercises/ROM: Patient to continue the following at home: diaphragmatic breathing, toe curls, toe abd/add, ankle pumps/circuduction both directions x 10 reps, 3x/day. Added long arc quad/marching, performed in sitting. Written HEP provided, patient to perform 10 reps, 2x/day. Home program: Patient to perform daily to BID:  [x]   Skin care  [x]   Deep abdominal breathing  [x]   Exercise routine  [x]   Walking program:advised to get out of chair every hour.   [x]   Rest in supine   [x]   Compression bandage  []   Compression garments  []   Vasopneumatic device  []   Wound care  []   Self MLD  [x]   Bring supplies to each therapy visit  [x]   Purchase necessary supplies  []   Weight loss program  []   Follow up with       Rationale: Exercise will increase the lymph angiomotoricity and tissue pressure of the skin and thus decrease swelling. Manual Therapy: 9:48-10:45 am   57 minutes       Area to decongest:    [] UE          []  Right     []  Left      [] Trunk      [x] LE             [x]  Right     [x]  Left      [] Trunk         Sequence used and effectiveness:  Manual therapy [] Secondary/Primary sequence for upper extremity with / without trunk involvement    [x] Secondary sequence for bilateral lower extremity with trunk involvement. Patient tolerated treatment well, able to talk/breath normally throughout treatment. .      [] Modifications were made to manual lymph drainage sequence to exclude cervical techniques secondary to patient's age    [] Self MLD sequence/techniques/hand strokes   Skin/wound care/debridement: Applied Eucerin/anti-itch lotion to lower legs using upward strokes. Lower extremity compression:   Fit compression garments upon receiving. Applied multi-layer compression bandaging to: Below knee [x]  Thigh high  []   Upper Extremity []    Right []   Left []    Bilateral [x]    The following multi-layer bandages were applied:  []  Tricofix            []  Silver Stockinette             []  Tg soft  [x]  Protouch    []  Transelast     Padding layer:  []  Cellona         [x]  Fleece: foot/ankle    Foam:  [] 10cm roll  [x] 12cm roll  [] 15cm roll  [x] Gray foam: added arch area to address foot pain reported by patient.   Repositioned per patient request.  [x] Velfoam: bilateral MTP region to address discomfort reported by patient.  [] Komprex 2      Short stretch bandages:   [] 4cm  [x] 6cm  [x] 8cm  [x] 10cm  [] 12cm  Educated patient in multi-layer bandaging donning principles and precautions. Compression bandaging instructions:  1. Compression bandaging will be applied twice a week by therapist in clinic, with adjustments to be made at home as indicated if bandaging becomes loose or uncomfortable. 2. If tolerated, remain bandaged between appointments with therapist, removing under the following conditionsDO NOT WAIT FOR A RETURN PHONE Vikash Ambriz:    -Numbness/tingling in extremity different from what you have experienced without the bandages in place.  -Compromise in circulation, monitoring blood refill into toes after applying gentle pressure to toes.  -Onset of pain in extremity that is sudden and severe in nature. -Redness, warmth in limb, and/or fever, flu-like symptoms, which may indicate infection. If this occurs, call your doctor right away. If you note a sudden increase in swelling in extremity, with or without redness/warmth, go to the emergency room as soon as possible to have blood clot ruled out. 3. Compression bandaging supplies that can be laundered are the brown compression wraps and any foam applied for padding. Launder in washer/dryer with NO fabric softener, bleach, woolite. Dry on a low heat. 4. Once compression garments are ordered, compression bandaging will be continued until garments arrive for fitting. This process can take several weeks. Patient failed to bring walker to appointment. Kinesiotaping: na   Girth/Volume measurement: Reviewed results of volumetric measurements taken on evaluation. Affected Side: bilateral LE   Left (cm) Right (cm)   5th Tuberosity 22.5    22.3      Ankle 23.5    23.3      Calf 38.2    37.7      Mid Knee             Thigh             Groin             Length               TOTAL TREATMENT 57 mins     ASSESSMENT:   Treatment effectiveness and tolerance: Patient tolerated treatment well. Good fit of shoes obtained by patient for wear during bandaging phase of treatment.   Therapist advised patient that use of AD for gait indicated to reduce fall risk during bandaging phase of treatment, patient declining at this time. Patient made payment for custom flat knit compression stockings. Progress toward goals: ongoing     PLAN OF CARE:   Changes to the plan of care: 1. Assess response to bilateral knee high compression bandaging/bilateral MLD. 2.  Continue bilateral LE MLD as tolerated. 3.  Reassess gait with AD recommended for use during bandaging phase of treatment. 4.  Monitor footwear to determine safety and fit. 5. Fit compression garments upon receiving, working with patient to don/doff stocking effectively.    Frequency: [x]  2 times a week  []  Weekly  []  Biweekly  []  Monthly         Corey Bence, PT, VANE

## 2021-04-23 ENCOUNTER — HOSPITAL ENCOUNTER (OUTPATIENT)
Dept: PHYSICAL THERAPY | Age: 81
Discharge: HOME | End: 2021-04-23
Payer: MEDICARE

## 2021-04-23 PROCEDURE — 97140 MANUAL THERAPY 1/> REGIONS: CPT

## 2021-04-23 PROCEDURE — 97760 ORTHOTIC MGMT&TRAING 1ST ENC: CPT

## 2021-04-23 NOTE — PROGRESS NOTES
_                                    [facility] [address] [facility]  [address] [facility].  [address]  [place]        LYmphedema Therapy  Functional Outcomes measure: 4/6/2021  Visit: 7    [x]                  Daily note               []                 30 day/10th visit progress note    NAME: Ruba Dougherty  DATE: 4/23/2021    GOALS  Short term goals  Time frame:4-6 weeks  1. Instruct the patient to be independent with proper skin care to prevent future skin breakdown and decrease the potential risk for infections that are associated with Lymphedema. 4/23/2021 ongoing  2. Patient will be independent with a personal lymphedema exercise program to assist with the lymphatic flow and reduce limb volume by 200-300 mL R/L LE. 4/23/2021 R LE reduced by 589.0 mL; L LE reduced by 357.66 mL. Reassess next visit. 3. Patient will understand the signs and symptoms of acute infection. 4/23/2021 verbal and written instructions provided, including s/s of infection as noted below. Long term goals  Time frame: 6-12 weeks  1. Patient will have knowledge of the compression options and acquire a safe and       appropriate daytime and night time compression system to prevent             re-accumulation of fluid. 4/16/2021 completed measurements for custom flat knit knee high Juzo 3021 compression stockings. 2.  Patient or family will be able to don/doff garments independently. Garment   system effectiveness will be evaluated prior to discharge for better long-term  management and outcomes. 4/23/2021 Patient able to don stockings indep, doff mod I using Medi  x 2 in clinic. 3.  Improvement in functional outcomes measure by 5-10% to allow for overall improvement in mobility with reduced pain. 4.   To transition patient to independent restorative phase of CDT.         SUBJECTIVE REPORT:  Patient arrives to clinic with compression bandaging in place bilateral lower legs.  States foot pain improved with use of gray/velfoam. Limb heaviness reduced. Patient arrives early to shower in clinic prior to treatment. Arrives with custom flat knit stockings for fit today. Agreeable to proceeding with treatment today. Pain: 1/10, skin tightness, limb heaviness. Gait:  Patient will require assistive device for safe ambulation during the bandaging phase of treatment. To bring cane to next appointment. []  Independent gait without any assistive device for community distances  ADLs:  indep  Treatment Response:    [x]   Patient reviewed packet received at evaluation  [x]   Patient completed home program as prescribed  []   Patient not fully compliant with home program to date  []   Patient waiting on compression garment arrival  Function:   []   Patient requiring less assistance with completion of home program  []   ADLs are requiring less assistance   []   Patient able to return to work/leisure activities  Lymphedema Life Impact Scale: scores 26/68 with 38% impairment 4/6/2021  TREATMENT AND OBJECTIVE DATA SUMMARY:   Patient/Family Education:      Educated in skin care: [x]   Skin care products  [x]   Hygiene  [x]  Prevention of cellulitis  []   Wound care     Educated in exercise: []   Walking program  []   Marily ball routine  []   Stick routine  [x]   ROM routine     Instructed in self MLD:   Written sequence given and reviewed with patient as well as demonstration and instruction during MLD portion of the session. Instructed in don/doff of compression system:   [x]   Multi layer bandage (MLB) donning principles and wear precautions  []   Day garments  []   Night garments       Therapeutic Exercise/Procedure:               Free exercises/ROM: Patient to continue the following at home: diaphragmatic breathing, toe curls, toe abd/add, ankle pumps/circuduction both directions x 10 reps, 3x/day. Added long arc quad/marching, performed in sitting.   Written HEP provided, patient to perform 10 reps, 2x/day. Home program: Patient to perform daily to BID:  [x]   Skin care  [x]   Deep abdominal breathing  [x]   Exercise routine  [x]   Walking program:advised to get out of chair every hour. [x]   Rest in supine   [x]   Compression bandage  []   Compression garments  []   Vasopneumatic device  []   Wound care  []   Self MLD  [x]   Bring supplies to each therapy visit  [x]   Purchase necessary supplies  []   Weight loss program  []   Follow up with       Rationale: Exercise will increase the lymph angiomotoricity and tissue pressure of the skin and thus decrease swelling. Manual Therapy: 9:38-9:56 am  Orthotic management: 9:56-10:35 am   18 minutes  39 minutes       Area to decongest:    [] UE          []  Right     []  Left      [] Trunk      [x] LE             [x]  Right     [x]  Left      [] Trunk         Sequence used and effectiveness:  Manual therapy [] Secondary/Primary sequence for upper extremity with / without trunk involvement    [] Secondary sequence for bilateral lower extremity with trunk involvement. Patient tolerated treatment well, able to talk/breath normally throughout treatment. .      [] Modifications were made to manual lymph drainage sequence to exclude cervical techniques secondary to patient's age    [x] Self MLD sequence/techniques/hand strokes. Patient instructed to inspect skin daily, apply low pH lotion nightly using upward strokes, avoiding applying lotion between toes. Patient provided with written instructions as noted below. Limb volume measurements repeated with results as follows:                                  Right                           Left  4/23/2021           7553.05 mL                4656.78 mL  3/15/2021           7195.39 mL                7067. 78 mL    R LE reduced by 357.66 mL, L LE reduced by 589 mL.        Lower extremity compression:   Therapist fit compression stockings in clinic, Sabrina [phone], with good fit noted. Patient instructed in don/doff of stockings, with patient able to doff stockings using Medi , with additional time. Patient able to don stockings indep in clinic. Patient tends to over stretch fabric, instructed in distributing fabric on leg. Patient demonstrates good understanding, stating he will purchase gardening gloves to assist with task. Patient provided with verbal/written instructions regarding home management as follows:    Compression garment routine:  1. Apply daytime compression stocking to clean, dry extremity first thing in the morning, EVERY MORNING. 2. Wear compression garments until bedtime, adjusting throughout the day as needed should garment wrinkle or crease. Problem areas can be at joints, and top of garment, ensuring proximal aspect of garment positioned appropriately on extremity. 3. Launder garment nightly either by hand or washing machine in a garment bag or pillowcase. Use mild detergent with NO FABRIC SOFTENER, NO BLEACH, NO WOOLITE. Wash in cool/warm water. 4. Dry garment in dryer on low heat right side out in garment bag or pillowcase. 5. Perform skin care to extremity, cleansing skin daily with soap and water, and using low pH lotion, upward strokes to stimulate lymphatic vessels. 6. Daytime compression grments are NOT safe to sleep in, so remove at bedtime. 7. Perform exercise program with compression garments on. Signs/Symptoms of infection include:  Fever, flu-like symptoms, pain/redness/warmthin extremity, sometimes with increase in swelling present. Stop wearing your compression garment if this occurs. Call your doctor immediately or go to the Emergency room to address potential infection. Remove compression with changes in circulation, numbness in extremity different from what you may experience when not wearing compression garment, pain, unexplained shortness of breath.     Notify clinic if swelling increase noted prior to next scheduled appt.  Night time compression may be needed for optimal management of lymphedema. Return in 2 weeks for follow up appt. Kinesiotaping: na           TOTAL TREATMENT 56 mins     ASSESSMENT:   Treatment effectiveness and tolerance: Patient tolerated treatment well, with good fit of compression stockings, able to don/doff in clinic today. Patient to borrow Medi  for use at home, plan to order as needed next week, with patient preferring to trial at home prior to purchasing. Progress toward goals: ongoing     PLAN OF CARE:   Changes to the plan of care: 1. Assess response to bilateral knee high compression daytime stocking wear. 2.  Monitor footwear to determine safety and fit.    Frequency: [x]  2 times a week  []  Weekly  []  Biweekly  []  Monthly         Christianne Delgado PT, OLAMIDE-JOON

## 2021-04-27 ENCOUNTER — HOSPITAL ENCOUNTER (OUTPATIENT)
Dept: PHYSICAL THERAPY | Age: 81
Discharge: HOME | End: 2021-04-27
Payer: MEDICARE

## 2021-04-27 PROCEDURE — 97140 MANUAL THERAPY 1/> REGIONS: CPT

## 2021-04-27 PROCEDURE — 97763 ORTHC/PROSTC MGMT SBSQ ENC: CPT

## 2021-04-27 NOTE — PROGRESS NOTES
[address] [address]  [place]        LYmphedema Therapy  Functional Outcomes measure: 4/6/2021  Visit: 8    [x]                  Daily note               []                 30 day/10th visit progress note    NAME: Noni Rosario  DATE: 4/27/2021    GOALS  Short term goals  Time frame:4-6 weeks  1. Instruct the patient to be independent with proper skin care to prevent future skin breakdown and decrease the potential risk for infections that are associated with Lymphedema. 4/23/2021 ongoing  2. Patient will be independent with a personal lymphedema exercise program to assist with the lymphatic flow and reduce limb volume by 200-300 mL R/L LE. 4/23/2021 R LE reduced by 589.0 mL; L LE reduced by 357.66 mL. Reassess next visit. 3. Patient will understand the signs and symptoms of acute infection. 4/23/2021 verbal and written instructions provided, including s/s of infection as noted below. Long term goals  Time frame: 6-12 weeks  1. Patient will have knowledge of the compression options and acquire a safe and       appropriate daytime and night time compression system to prevent             re-accumulation of fluid. 4/16/2021 completed measurements for custom flat knit knee high Juzo 3021 compression stockings. 2.  Patient or family will be able to don/doff garments independently. Garment   system effectiveness will be evaluated prior to discharge for better long-term  management and outcomes. 4/23/2021 Patient able to don stockings indep, doff mod I using Medi  x 2 in clinic. 3.  Improvement in functional outcomes measure by 5-10% to allow for overall improvement in mobility with reduced pain. 4.   To transition patient to independent restorative phase of CDT.         SUBJECTIVE REPORT:  Patient arrives to clinic with compression stockings donned, stating he is donning stockings indep at home, spouse assisting him with doffing stockings as needed, however, states he has used Medi  to remove stockings also. Limb heaviness reduced. States he has not been drying compression stockings in dryer as instructed, hanging to dry overnight. Agreeable to proceeding with treatment today. Pain: 1/10, skin tightness, limb heaviness. Gait:  Patient will require assistive device for safe ambulation during the bandaging phase of treatment. To bring cane to next appointment. []  Independent gait without any assistive device for community distances  ADLs:  indep  Treatment Response:    [x]   Patient reviewed packet received at evaluation  [x]   Patient completed home program as prescribed  []   Patient not fully compliant with home program to date  []   Patient waiting on compression garment arrival  Function:   []   Patient requiring less assistance with completion of home program  []   ADLs are requiring less assistance   []   Patient able to return to work/leisure activities  Lymphedema Life Impact Scale: scores 26/68 with 38% impairment 4/6/2021  TREATMENT AND OBJECTIVE DATA SUMMARY:   Patient/Family Education:      Educated in skin care: [x]   Skin care products  [x]   Hygiene  [x]  Prevention of cellulitis  []   Wound care     Educated in exercise: []   Walking program  []   Marily ball routine  []   Stick routine  [x]   ROM routine     Instructed in self MLD:   Written sequence given and reviewed with patient as well as demonstration and instruction during MLD portion of the session.      Instructed in don/doff of compression system:   [x]   Multi layer bandage (MLB) donning principles and wear precautions  []   Day garments  []   Night garments       Therapeutic Exercise/Procedure:               Free exercises/ROM: Patient to continue the following at home: diaphragmatic breathing, toe curls, toe abd/add, ankle pumps/circuduction both directions x 10 reps, 3x/day. Added long arc quad/marching, performed in sitting. Written HEP provided, patient to perform 10 reps, 2x/day. Home program: Patient to perform daily to BID:  [x]   Skin care  [x]   Deep abdominal breathing  [x]   Exercise routine  [x]   Walking program:advised to get out of chair every hour. [x]   Rest in supine   [x]   Compression bandage  []   Compression garments  []   Vasopneumatic device  []   Wound care  []   Self MLD  [x]   Bring supplies to each therapy visit  [x]   Purchase necessary supplies  []   Weight loss program  []   Follow up with       Rationale: Exercise will increase the lymph angiomotoricity and tissue pressure of the skin and thus decrease swelling. Manual Therapy: 9:42-10:25 am  Orthotic management, subsequent: 10:27-10:40 am   43 minutes  13 minutes       Area to decongest:    [] UE          []  Right     []  Left      [] Trunk      [x] LE             [x]  Right     [x]  Left      [] Trunk         Sequence used and effectiveness:  Manual therapy [] Secondary/Primary sequence for upper extremity with / without trunk involvement    [x] Secondary sequence for bilateral lower extremity, focus on lower legs/ankle/foot region. Patient tolerated treatment well, able to talk/breath normally throughout treatment. .      [] Modifications were made to manual lymph drainage sequence to exclude cervical techniques secondary to patient's age    [x] Self MLD sequence/techniques/hand strokes. Patient instructed to inspect skin daily, apply low pH lotion nightly using upward strokes, avoiding applying lotion between toes. Patient provided with written instructions as noted below. Limb volume measurements repeated with results as follows:                                  Right                           Left  4/27/2021           6954.72 mL                6911.03 mL  4/23/2021           7553.05 mL                7067. 78 mL  3/15/2021           7195.39 mL                7656.78 mL    R LE reduced by 598. 33 mL, L LE reduced by 745.75 mL. Lower extremity compression:   Therapist fit Sabrina 3021 custom flat knit knee high compression stockings in clinic 4/23/2021, assessing fit and effectiveness today. Note stockings easier to don today, with patient reporting he has been hanging stockings to dry vs placing stockings in dryer after washing. Patient advised that drying stockings in dryer necessary to promote optimal effectiveness of compression stockings when donned the following morning. Patient reports doffing stockings between 5-6 pm, however does not go to bed until 10 pm.  After further education today, patient agreeable to doffing stockings at 8 pm, allowing time to launder garments prior to bedtime. Patient is agreeable to ordering Medi rhett to allow him to doff garments without assistance when needed. Patient able to don stockings indep in clinic. Patient tends to over stretch fabric, instructed in distributing fabric on leg. Patient demonstrates good understanding, stating he did purchase gardening gloves for don/doff of stockings. Patient provided with verbal again today /written instructions previous visit regarding home management as follows:    Compression garment routine:  1. Apply daytime compression stocking to clean, dry extremity first thing in the morning, EVERY MORNING. 2. Wear compression garments until bedtime, adjusting throughout the day as needed should garment wrinkle or crease. Problem areas can be at joints, and top of garment, ensuring proximal aspect of garment positioned appropriately on extremity. 3. Launder garment nightly either by hand or washing machine in a garment bag or pillowcase. Use mild detergent with NO FABRIC SOFTENER, NO BLEACH, NO WOOLITE. Wash in cool/warm water. 4. Dry garment in dryer on low heat right side out in garment bag or pillowcase.   5. Perform skin care to extremity, cleansing skin daily with soap and water, and using low pH lotion, upward strokes to stimulate lymphatic vessels. 6. Daytime compression grments are NOT safe to sleep in, so remove at bedtime. 7. Perform exercise program with compression garments on. Signs/Symptoms of infection include:  Fever, flu-like symptoms, pain/redness/warmthin extremity, sometimes with increase in swelling present. Stop wearing your compression garment if this occurs. Call your doctor immediately or go to the Emergency room to address potential infection. Remove compression with changes in circulation, numbness in extremity different from what you may experience when not wearing compression garment, pain, unexplained shortness of breath. Notify clinic if swelling increase noted prior to next scheduled appt. Night time compression may be needed for optimal management of lymphedema. Return in 2 weeks for follow up appt. Kinesiotaping: na           TOTAL TREATMENT 58 mins     ASSESSMENT:   Treatment effectiveness and tolerance: Patient tolerated treatment well, with good fit of compression stockings, able to don/doff in clinic today, using Medi  to doff stockings. Patient to [facility] for use at home, plan to order from [facility], with pricing provided to patient. Progress toward goals: ongoing     PLAN OF CARE:   Changes to the plan of care: 1. Assess response to bilateral knee high compression daytime stocking wear. 2.  Monitor footwear to determine safety and fit. 3. Continue MLD as indicated.    Frequency: [x]  2 times a week  []  Weekly  []  Biweekly  []  Monthly         Katlin Butcher, PT, CLRADHA-JOON

## 2021-04-30 ENCOUNTER — HOSPITAL ENCOUNTER (OUTPATIENT)
Dept: PHYSICAL THERAPY | Age: 81
Discharge: HOME | End: 2021-04-30
Payer: MEDICARE

## 2021-04-30 PROCEDURE — 97016 VASOPNEUMATIC DEVICE THERAPY: CPT

## 2021-04-30 PROCEDURE — 97140 MANUAL THERAPY 1/> REGIONS: CPT

## 2021-04-30 NOTE — PROGRESS NOTES
[address] 87  [facility]  [address] [facility]  [address]  Anuj Decker        LYmphedema Therapy  Functional Outcomes measure: 4/30/2021  Visit: 8    [x]                  Daily note               []                 30 day/10th visit progress note    NAME: Liz Ask  DATE: 4/30/2021    GOALS  Short term goals  Time frame:4-6 weeks  1. Instruct the patient to be independent with proper skin care to prevent future skin breakdown and decrease the potential risk for infections that are associated with Lymphedema. 4/23/2021 ongoing  2. Patient will be independent with a personal lymphedema exercise program to assist with the lymphatic flow and reduce limb volume by 200-300 mL R/L LE. 4/23/2021 R LE reduced by 589.0 mL; L LE reduced by 357.66 mL. Reassess next visit. 3. Patient will understand the signs and symptoms of acute infection. 4/23/2021 verbal and written instructions provided, including s/s of infection as noted below. Long term goals  Time frame: 6-12 weeks  1. Patient will have knowledge of the compression options and acquire a safe and       appropriate daytime and night time compression system to prevent             re-accumulation of fluid. 4/16/2021 completed measurements for custom flat knit knee high Juzo 3021 compression stockings. 2.  Patient or family will be able to don/doff garments independently. Garment   system effectiveness will be evaluated prior to discharge for better long-term  management and outcomes. 4/23/2021 Patient able to don stockings indep, doff mod I using Medi  x 2 in clinic. 3.  Improvement in functional outcomes measure by 5-10% to allow for overall improvement in mobility with reduced pain. 4.   To transition patient to independent restorative phase of CDT.         SUBJECTIVE REPORT:  Patient arrives to clinic with compression stockings donned, stating he is donning stockings indep at home, spouse assisting him with doffing stockings as needed. States he ordered [employer]. . Limb heaviness reduced. States he is drying stockings in dryer as instructed, stating that is going well. Agreeable to proceeding with treatment today. Pain: 0/10                                Gait:  [x]  Independent gait without any assistive device for community distances  ADLs:  indep  Treatment Response:    [x]   Patient reviewed packet received at evaluation  [x]   Patient completed home program as prescribed  []   Patient not fully compliant with home program to date  []   Patient waiting on compression garment arrival  Function:   [x]   Patient requiring less assistance with completion of home program  [x]   ADLs are requiring less assistance   []   Patient able to return to work/leisure activities  Lymphedema Life Impact Scale: scores 6/68; impairment  TREATMENT AND OBJECTIVE DATA SUMMARY:   Patient/Family Education:      Educated in skin care: [x]   Skin care products  [x]   Hygiene  [x]  Prevention of cellulitis  []   Wound care     Educated in exercise: []   Walking program  []   Marily ball routine  []   Stick routine  [x]   ROM routine     Instructed in self MLD:   Written sequence given and reviewed with patient as well as demonstration and instruction during MLD portion of the session. Instructed in don/doff of compression system:   [x]   Multi layer bandage (MLB) donning principles and wear precautions  []   Day garments  []   Night garments       Therapeutic Exercise/Procedure:               Free exercises/ROM: Patient to continue the following at home: diaphragmatic breathing, toe curls, toe abd/add, ankle pumps/circuduction both directions x 10 reps, 3x/day. Added long arc quad/marching, performed in sitting. Written HEP provided, patient to perform 10 reps, 2x/day.    Home program: Patient to perform daily to BID:  [x]   Skin care  [x]   Deep abdominal breathing  [x] Exercise routine  [x]   Walking program:advised to get out of chair every hour. [x]   Rest in supine   [x]   Compression bandage  []   Compression garments  []   Vasopneumatic device  []   Wound care  []   Self MLD  [x]   Bring supplies to each therapy visit  [x]   Purchase necessary supplies  []   Weight loss program  []   Follow up with       Rationale: Exercise will increase the lymph angiomotoricity and tissue pressure of the skin and thus decrease swelling. Manual Therapy: 9:48-10:14 am  Vasopneumatic pump: 10:14-10:44 am   26 minutes  30 minutes       Area to decongest:    [] UE          []  Right     []  Left      [] Trunk      [x] LE             [x]  Right     [x]  Left      [] Trunk         Sequence used and effectiveness: [] Secondary/Primary sequence for upper extremity with / without trunk involvement    [] Secondary sequence for bilateral lower extremity, focus on lower legs/ankle/foot region. Patient tolerated treatment well, able to talk/breath normally throughout treatment. .      [] Modifications were made to manual lymph drainage sequence to exclude cervical techniques secondary to patient's age    [x] Self MLD sequence/techniques/hand strokes. Patient instructed to inspect skin daily, apply low pH lotion nightly using upward strokes, avoiding applying lotion between toes. Patient provided with written instructions as noted below. Limb volume measurements repeated with results as follows:                                  Right                           Left  4/30/2021         6974.74mL                6909.01 mL  4/27/2021           6954.72 mL                6911.03 mL  4/23/2021           7553.05 mL                7067. 78 mL  3/15/2021           7195.39 mL                7656.78 mL    Bilateral LE limb volume measurements stable since last scheduled appointment. Abdominal girth 127 cm.        Lower extremity compression:   Therapist reviewed s/s of infection, as noted below, with patient instructed to seek medical attention from physician with presentation of cellulitis as noted below. Patient advised of importance of daily wear of compression stockings during daytime hours only, laundering and drying nightly. Home management as follows:    Compression garment routine:  1. Apply daytime compression stocking to clean, dry extremity first thing in the morning, EVERY MORNING. 2. Wear compression garments until bedtime, adjusting throughout the day as needed should garment wrinkle or crease. Problem areas can be at joints, and top of garment, ensuring proximal aspect of garment positioned appropriately on extremity. 3. Launder garment nightly either by hand or washing machine in a garment bag or pillowcase. Use mild detergent with NO FABRIC SOFTENER, NO BLEACH, NO WOOLITE. Wash in cool/warm water. 4. Dry garment in dryer on low heat right side out in garment bag or pillowcase. 5. Perform skin care to extremity, cleansing skin daily with soap and water, and using low pH lotion, upward strokes to stimulate lymphatic vessels. 6. Daytime compression grments are NOT safe to sleep in, so remove at bedtime. 7. Perform exercise program with compression garments on. Signs/Symptoms of infection include:  Fever, flu-like symptoms, pain/redness/warmthin extremity, sometimes with increase in swelling present. Stop wearing your compression garment if this occurs. Call your doctor immediately or go to the Emergency room to address potential infection. Remove compression with changes in circulation, numbness in extremity different from what you may experience when not wearing compression garment, pain, unexplained shortness of breath. Notify clinic if swelling increase noted prior to next scheduled appt. Night time compression may be needed for optimal management of lymphedema. Return in 2 weeks for follow up appt.    Kinesiotaping: na   Vasopneumatic pump: 10:14-10:44 am 30 minutes  [employer]  pump sequence to L LE. Patient tolerated treatment well. Note improvement in skin turgor and tissue texture L LE after treatment, with patient verbalizing noting an improvement in L LE lymphedema after treatment. Patient states he prefers to defer pursuing obtaining pump for home use at this time due to pump \"being too much trouble. \"  Defer pump for home use at this time. Reassess at 4-6 month follow up appointment. TOTAL TREATMENT 56 mins     ASSESSMENT:   Treatment effectiveness and tolerance: Patient tolerated treatment well, with good fit of compression stockings, able to don/doff in clinic today, using Medi  to doff stockings previous visit. .  Patient declining obtaining vasopneumatic pump for home use at this time. Progress toward goals: ongoing     PLAN OF CARE:   Changes to the plan of care: Discharge patient at this time. Patient to notify clinic of any questions or concerns prior to 4-6 month follow up appointment. Frequency: Discharge.          Nakita Laird, PT, CLRADHA-JOON

## 2021-05-04 ENCOUNTER — APPOINTMENT (OUTPATIENT)
Dept: PHYSICAL THERAPY | Age: 81
End: 2021-05-04

## 2021-05-07 ENCOUNTER — APPOINTMENT (OUTPATIENT)
Dept: PHYSICAL THERAPY | Age: 81
End: 2021-05-07

## 2021-05-11 ENCOUNTER — APPOINTMENT (OUTPATIENT)
Dept: PHYSICAL THERAPY | Age: 81
End: 2021-05-11

## 2021-05-14 ENCOUNTER — APPOINTMENT (OUTPATIENT)
Dept: PHYSICAL THERAPY | Age: 81
End: 2021-05-14

## 2021-05-18 ENCOUNTER — APPOINTMENT (OUTPATIENT)
Dept: PHYSICAL THERAPY | Age: 81
End: 2021-05-18

## 2021-05-21 ENCOUNTER — APPOINTMENT (OUTPATIENT)
Dept: PHYSICAL THERAPY | Age: 81
End: 2021-05-21

## 2021-05-25 ENCOUNTER — APPOINTMENT (OUTPATIENT)
Dept: PHYSICAL THERAPY | Age: 81
End: 2021-05-25

## 2021-05-28 ENCOUNTER — APPOINTMENT (OUTPATIENT)
Dept: PHYSICAL THERAPY | Age: 81
End: 2021-05-28

## 2021-06-28 NOTE — PROGRESS NOTES
HISTORY OF PRESENT ILLNESS      Fallon Cornelius is a 79 y.o. male with atrial fibrillation, CAD, hypertension, AVR/MVR/TVR, dyslipidemia, prostate cancer/radiation proctitis and GI bleeding referred for discussion regarding LAAO. He is currently on eliquis and continues to have rectal bleeding. He underwent successful left atrial appendage occlusion utilizing WATCHMAN device. Limited echocardiogram was negative for pericardial effusion. EKG today shows rate controlled AF. He reports fatigue which is unchanged from his baseline. Would like to start back with some exercise.           PAST MEDICAL HISTORY     Past Medical History:   Diagnosis Date    Arthritis     Atrial fibrillation ([address]     CAD (coronary artery disease)     Chronic pain     legs/knee    GERD (gastroesophageal reflux disease)     Hx of carcinoma in situ of prostate     Hyperlipidemia     Hypertension     Insomnia     DEL (obstructive sleep apnea)     Radiation proctitis     Vitamin D deficiency            PAST SURGICAL HISTORY     Past Surgical History:   Procedure Laterality Date    COLONOSCOPY N/A 5/8/2020    COLONOSCOPY performed by Arturo Eubanks MD at [address] [facility] COLONOSCOPY N/A 6/8/2020    COLONOSCOPY performed by Joanne Oakley MD at [address] [place] [address] N/A 11/23/2020    FLEXIBLE SIGMOIDOSCOPY WITH APC performed by Arturo Eubanks MD at [address] [facility] HX AORTIC VALVE REPLACEMENT  2015    and mitral valve repair, left atrial cryo maze    HX HEENT      melanoma removed head    HX HERNIA REPAIR  2009    Right    HX HERNIA REPAIR      left inguinal hernia repair    HX HERNIA REPAIR Left 12/01/2016    lap left inguinal hernia repair with mesh    HX KNEE REPLACEMENT      Bilateral    HX ORTHOPAEDIC      BILATERAL KNEE REPLACEMENT    HX ORTHOPAEDIC      CARPEL TUNNEL REPAIR-RIGHT    HX OTHER SURGICAL  2015    closure of patent foramen ovale    HX OTHER SURGICAL  10/2020    [place] / Dr. Apolonia Montez      42 radiation treatments          ALLERGIES     No Known Allergies       FAMILY HISTORY     Family History   Problem Relation Age of Onset    Cancer Mother     Cancer Father         prostrate    Cancer Brother         prostrate ca    Anesth Problems Neg Hx     negative for cardiac disease       SOCIAL HISTORY     Social History     Socioeconomic History    Marital status:      Spouse name: Not on file    Number of children: Not on file    Years of education: Not on file    Highest education level: Not on file   Tobacco Use    Smoking status: Never Smoker    Smokeless tobacco: Never Used   Substance and Sexual Activity    Alcohol use: Yes     Alcohol/week: 3.0 standard drinks     Types: 3 Cans of beer per week    Drug use: No    Sexual activity: Not Currently     Social Determinants of Health     Financial Resource Strain:     Difficulty of Paying Living Expenses:    Food Insecurity:     Worried About Running Out of Food in the Last Year:     Ran Out of Food in the Last Year:    Transportation Needs:     Lack of Transportation (Medical):  Lack of Transportation (Non-Medical):    Physical Activity:     Days of Exercise per Week:     Minutes of Exercise per Session:    Stress:     Feeling of Stress :    Social Connections:     Frequency of Communication with Friends and Family:     Frequency of Social Gatherings with Friends and Family:     Attends Caodaism Services:     Active Member of Clubs or Organizations:     Attends Club or Organization Meetings:     Marital Status:          MEDICATIONS     Current Outpatient Medications   Medication Sig    clopidogreL (Plavix) 75 mg tab Take 1 Tab by mouth daily.  carvediloL (COREG) 3.125 mg tablet Take 1 Tab by mouth two (2) times daily (with meals).  furosemide (Lasix) 40 mg tablet Take 1 Tab by mouth daily.  amLODIPine (NORVASC) 5 mg tablet Take 1 Tab by mouth daily.     lisinopriL (PRINIVIL, ZESTRIL) 20 mg tablet Take 1 Tab by mouth daily.  aspirin delayed-release 81 mg tablet Take 81 mg by mouth daily.  polyvinyl alcohol (ARTIFICIAL TEARS, POLYVIN ALC,) 1.4 % ophthalmic solution Administer 1 Drop to both eyes as needed.  ipratropium (ATROVENT HFA) 17 mcg/actuation inhaler Take 2 Puffs by inhalation as needed.  ferrous sulfate 325 mg (65 mg iron) cpER Take 1 Tab by mouth every other day.  tamsulosin (FLOMAX) 0.4 mg capsule Take 0.8 mg by mouth two (2) times a day.  cholecalciferol (VITAMIN D3) 1,000 unit tablet Take 2,000 Units by mouth two (2) times a day.  GLUCOSAMINE HCL/CHONDR CHING A NA (GLUCOSAMINE-CHONDROITIN) 750-600 mg tab Take 1 Tab by mouth daily.  omega-3 fatty acids-vitamin e 1,000 mg cap Take 1 Cap by mouth every other day.  acetaminophen (TYLENOL) 325 mg tablet Take  by mouth every four (4) hours as needed for Pain.  multivitamin (ONE A DAY) tablet Take 1 Tab by mouth daily.  docusate sodium (COLACE) 100 mg capsule Take 100 mg by mouth two (2) times daily as needed.  finasteride (PROSCAR) 5 mg tablet Take 5 mg by mouth nightly.  pravastatin (PRAVACHOL) 40 mg tablet Take 40 mg by mouth nightly. No current facility-administered medications for this visit. I have reviewed the nurses notes, vitals, problem list, allergy list, medical history, family, social history and medications. REVIEW OF SYMPTOMS      General: Pt denies excessive weight gain or loss. Pt is able to conduct ADL's  HEENT: Denies blurred vision, headaches, hearing loss, epistaxis and difficulty swallowing. Respiratory: Denies cough, congestion, shortness of breath, ABDUL, wheezing or stridor.   Cardiovascular: Denies precordial pain, palpitations, edema or PND  Gastrointestinal: Denies poor appetite, indigestion, abdominal pain or blood in stool  Genitourinary: Denies hematuria, dysuria, increased urinary frequency  Musculoskeletal: Denies joint pain or swelling from muscles or joints  Neurologic: Denies tremor, paresthesias, headache, or sensory motor disturbance  Psychiatric: Denies confusion, insomnia, depression  Integumentray: Denies rash, itching or ulcers. Hematologic: Denies easy bruising, bleeding       PHYSICAL EXAMINATION      Vitals: see vitals section  General: Well developed, in no acute distress. HEENT: No jaundice, oral mucosa moist, no oral ulcers  Neck: Supple, no stiffness, no lymphadenopathy, supple  Heart:  Normal S1/S2 negative S3 or S4. Regular, no murmur, gallop or rub, no jugular venous distention  Respiratory: Clear bilaterally x 4, no wheezing or rales  Abdomen:   Soft, non-tender, bowel sounds are active. Extremities:  No edema, normal cap refill, no cyanosis. Musculoskeletal: No clubbing, no deformities  Neuro: A&Ox3, speech clear, gait stable, cooperative, no focal neurologic deficits  Skin: Skin color is normal. No rashes or lesions. Non diaphoretic, moist.  Vascular: 2+ pulses symmetric in all extremities       DIAGNOSTIC DATA      EKG:     There were no vitals taken for this visit. LABORATORY DATA      Lab Results   Component Value Date/Time    WBC 5.3 11/28/2020 04:41 AM    HGB 10.0 (L) 11/28/2020 04:41 AM    HCT 31.5 (L) 11/28/2020 04:41 AM    PLATELET 619 69/73/9227 04:41 AM    .6 (H) 11/28/2020 04:41 AM      Lab Results   Component Value Date/Time    Sodium 137 11/28/2020 04:41 AM    Potassium 3.6 11/28/2020 04:41 AM    Chloride 99 11/28/2020 04:41 AM    CO2 35 (H) 11/28/2020 04:41 AM    Anion gap 3 (L) 11/28/2020 04:41 AM    Glucose 105 (H) 11/28/2020 04:41 AM    BUN 20 11/28/2020 04:41 AM    Creatinine 0.94 11/28/2020 04:41 AM    BUN/Creatinine ratio 21 (H) 11/28/2020 04:41 AM    GFR est AA >60 11/28/2020 04:41 AM    GFR est non-AA >60 11/28/2020 04:41 AM    Calcium 9.2 11/28/2020 04:41 AM    Bilirubin, total 0.6 11/28/2020 04:41 AM    Alk.  phosphatase 70 11/28/2020 04:41 AM    Protein, total 7.2 11/28/2020 04:41 AM    Albumin 3.7 11/28/2020 04:41 AM    Globulin 3.5 11/28/2020 04:41 AM    A-G Ratio 1.1 11/28/2020 04:41 AM    ALT (SGPT) 26 11/28/2020 04:41 AM         ASSESSMENT/PLAN      1. Atrial fibrillation              A. CHADSVASC 4   B. Watchman  2. Hypertension  3. Dyslipidemia  4. Patent foramen ovale  5. Aortic stenosis s/p AVR  6. Mitral regurgitation s/p MVR  7. Tricuspid regurgitation s/p prosthetic valve  8. DEL               A. Noncompliant with CPAP  9. Radiation proctitis  10. GI bleeding history  11. CAD, native    He can stop Plavix today, continue ASA 81mg daily. Monitor for s/s of bleeding issues in the future. Continue to monitor AF for rate control/recurrence of RVR or symptomatic AF for which we would discuss increased medical therapy versus procedural options. He denies complaints related to AF. His wife has concerned regarding his compression stockings use, lack of exercise, and PRN lasix use (although prescribed as daily). We discussed taking his lasix daily, elevating his feet during times of sitting, increasing walking daily as tolerating. He has follow up with Dr. Erica Clinton next month for further discussion/medication adjustment. He will continue to monitor BP and daily weights at home, bring that information to his follow up. FOLLOW UP        1 year    Thank you, Dr. Erica Clinton for allowing me to participate in the care of this extraordinarily pleasant male. Please do not hesitate to contact me for further questions/concerns.        Russell Penny, NP    [facility]  [address] [place] [address]  [phone] / [phone] Fax   [phone] / [phone] Fax

## 2021-06-29 ENCOUNTER — OFFICE VISIT (OUTPATIENT)
Dept: CARDIOLOGY CLINIC | Age: 81
End: 2021-06-29
Payer: MEDICARE

## 2021-06-29 VITALS
HEIGHT: 69 IN | RESPIRATION RATE: 20 BRPM | DIASTOLIC BLOOD PRESSURE: 56 MMHG | WEIGHT: 202.6 LBS | OXYGEN SATURATION: 97 % | HEART RATE: 66 BPM | BODY MASS INDEX: 30.01 KG/M2 | SYSTOLIC BLOOD PRESSURE: 128 MMHG

## 2021-06-29 DIAGNOSIS — I48.91 ATRIAL FIBRILLATION, UNSPECIFIED TYPE (HCC): Primary | ICD-10-CM

## 2021-06-29 PROCEDURE — G0463 HOSPITAL OUTPT CLINIC VISIT: HCPCS | Performed by: NURSE PRACTITIONER

## 2021-06-29 PROCEDURE — G8536 NO DOC ELDER MAL SCRN: HCPCS | Performed by: NURSE PRACTITIONER

## 2021-06-29 PROCEDURE — G8417 CALC BMI ABV UP PARAM F/U: HCPCS | Performed by: NURSE PRACTITIONER

## 2021-06-29 PROCEDURE — G8752 SYS BP LESS 140: HCPCS | Performed by: NURSE PRACTITIONER

## 2021-06-29 PROCEDURE — G8432 DEP SCR NOT DOC, RNG: HCPCS | Performed by: NURSE PRACTITIONER

## 2021-06-29 PROCEDURE — G8754 DIAS BP LESS 90: HCPCS | Performed by: NURSE PRACTITIONER

## 2021-06-29 PROCEDURE — 1101F PT FALLS ASSESS-DOCD LE1/YR: CPT | Performed by: NURSE PRACTITIONER

## 2021-06-29 PROCEDURE — G8427 DOCREV CUR MEDS BY ELIG CLIN: HCPCS | Performed by: NURSE PRACTITIONER

## 2021-06-29 PROCEDURE — 99214 OFFICE O/P EST MOD 30 MIN: CPT | Performed by: NURSE PRACTITIONER

## 2021-06-29 NOTE — PROGRESS NOTES
Room #: 2    Wife says he hasn't been exercising like he should, hasn't been taking the Furosemide every day. Visit Vitals  BP (!) 128/56 (BP 1 Location: Left upper arm, BP Patient Position: Sitting, BP Cuff Size: Adult)   Pulse 66   Resp 20   Ht 5' 9\" (1.753 m)   Wt 202 lb 9.6 oz (91.9 kg)   SpO2 97%   BMI 29.92 kg/m²         Chest pain:  NO  Shortness of breath:  NO  Edema: NO  Palpitations, skipped beats, rapid heartbeat:  NO  Dizziness:  NO    1. Have you been to the ER, urgent care clinic since your last visit? Hospitalized since your last visit? No    2. Have you seen or consulted any other health care providers outside of the [address] [place] [address] since your last visit? Include any pap smears or colon screening.  No      Refills:  NO

## 2021-07-12 RX ORDER — FUROSEMIDE 40 MG/1
TABLET ORAL
Qty: 30 TABLET | Refills: 5 | Status: SHIPPED | OUTPATIENT
Start: 2021-07-12 | End: 2022-05-09 | Stop reason: DRUGHIGH

## 2021-07-23 ENCOUNTER — OFFICE VISIT (OUTPATIENT)
Dept: CARDIOLOGY CLINIC | Age: 81
End: 2021-07-23
Payer: MEDICARE

## 2021-07-23 ENCOUNTER — DOCUMENTATION ONLY (OUTPATIENT)
Dept: CARDIOLOGY CLINIC | Age: 81
End: 2021-07-23

## 2021-07-23 VITALS
HEART RATE: 51 BPM | SYSTOLIC BLOOD PRESSURE: 134 MMHG | WEIGHT: 208 LBS | BODY MASS INDEX: 30.81 KG/M2 | HEIGHT: 69 IN | DIASTOLIC BLOOD PRESSURE: 80 MMHG | OXYGEN SATURATION: 96 %

## 2021-07-23 DIAGNOSIS — E78.00 PURE HYPERCHOLESTEROLEMIA: ICD-10-CM

## 2021-07-23 DIAGNOSIS — I48.91 ATRIAL FIBRILLATION, UNSPECIFIED TYPE (HCC): Primary | ICD-10-CM

## 2021-07-23 DIAGNOSIS — I25.10 CORONARY ARTERY DISEASE INVOLVING NATIVE CORONARY ARTERY OF NATIVE HEART WITHOUT ANGINA PECTORIS: ICD-10-CM

## 2021-07-23 DIAGNOSIS — Z95.818 PRESENCE OF WATCHMAN LEFT ATRIAL APPENDAGE CLOSURE DEVICE: ICD-10-CM

## 2021-07-23 DIAGNOSIS — I50.33 DIASTOLIC CHF, ACUTE ON CHRONIC (HCC): ICD-10-CM

## 2021-07-23 PROCEDURE — G8417 CALC BMI ABV UP PARAM F/U: HCPCS | Performed by: SPECIALIST

## 2021-07-23 PROCEDURE — G8432 DEP SCR NOT DOC, RNG: HCPCS | Performed by: SPECIALIST

## 2021-07-23 PROCEDURE — 93010 ELECTROCARDIOGRAM REPORT: CPT | Performed by: SPECIALIST

## 2021-07-23 PROCEDURE — G8752 SYS BP LESS 140: HCPCS | Performed by: SPECIALIST

## 2021-07-23 PROCEDURE — 99214 OFFICE O/P EST MOD 30 MIN: CPT | Performed by: SPECIALIST

## 2021-07-23 PROCEDURE — G0463 HOSPITAL OUTPT CLINIC VISIT: HCPCS | Performed by: SPECIALIST

## 2021-07-23 PROCEDURE — 1101F PT FALLS ASSESS-DOCD LE1/YR: CPT | Performed by: SPECIALIST

## 2021-07-23 PROCEDURE — G8427 DOCREV CUR MEDS BY ELIG CLIN: HCPCS | Performed by: SPECIALIST

## 2021-07-23 PROCEDURE — G8536 NO DOC ELDER MAL SCRN: HCPCS | Performed by: SPECIALIST

## 2021-07-23 PROCEDURE — 93005 ELECTROCARDIOGRAM TRACING: CPT | Performed by: SPECIALIST

## 2021-07-23 PROCEDURE — G8754 DIAS BP LESS 90: HCPCS | Performed by: SPECIALIST

## 2021-07-23 NOTE — PROGRESS NOTES
Jose Favorite is a 79 y.o. male    Visit Vitals  /80 (BP 1 Location: Left upper arm, BP Patient Position: Sitting, BP Cuff Size: Adult)   Pulse (!) 51   Ht 5' 9\" (1.753 m)   Wt 208 lb (94.3 kg)   SpO2 96%   BMI 30.72 kg/m²       Chief Complaint   Patient presents with    Coronary Artery Disease    Cholesterol Problem    Hypertension    Irregular Heart Beat     AFIB       Chest pain NO  SOB NO  Dizziness NO  Swelling NO  Recent hospital visit NO  Refills NO

## 2021-07-23 NOTE — PATIENT INSTRUCTIONS
CARDIOLOGY OFFICE NOTE    Shayan Nieves MD, [address], [place], [address] [facility]  Phone [phone]; Fax [phone]  Mobile [phone]   Voice Mail [phone]    LAST OFFICE VISIT : Visit date not found  Other, MD Slim       ATTENTION:   This medical record was transcribed using an electronic medical records/speech recognition system. Although proofread, it may and can contain electronic, spelling and other errors. Corrections may be executed at a later time. Please feel free to contact us for any clarifications as needed. ICD-10-CM ICD-9-CM   1. Atrial fibrillation, unspecified type ([facility] [address]  I48.91 427.31   2. Coronary artery disease involving native coronary artery of native heart without angina pectoris  I25.10 414.01   3. Pure hypercholesterolemia  E78.00 272.0   4. Diastolic CHF, acute on chronic (HCC)  I50.33 428.33     428.0   5. Presence of Watchman left atrial appendage closure device  Z95.818 V45.09            Tad Jiménez is a 79 y.o. male with  referred for negativeHTN, dyslipidemia, and AF aortic valve replacement and mitral valve repair status post maze procedure. I have personally obtained the history from the patient. Cardiac risk factors: dyslipidemia, male gender, hypertension  I have personally obtained the history from the patient. HISTORY OF PRESENTING ILLNESS     Tad Jiménez is a 79 y.o. male   with HTN, dyslipidemia, and AF aortic valve replacement and mitral valve repair status post maze procedure and now status post watchman device. He has endothelialization of the watchman device. And he is on Plavix 75 mg a day. He has normal EF and 5 has some diastolic dysfunction with moderate LVH. He has pulmonary hypertension with a PA pressure of 59.   He is not wearing his CPAP more regularly he states    He does like to taking the diuretic because it makes him pee but does help with his edema and he definitely has a less edema when he wakes up in the morning. His wife is not present today.      ACTIVE PROBLEM LIST     Patient Active Problem List    Diagnosis Date Noted    Presence of Watchman left atrial appendage closure device 29/05/7158    Diastolic CHF, acute on chronic ([facility] [address] 11/28/2020    Anasarca 11/27/2020    ABDUL (dyspnea on exertion) 11/27/2020    GI bleed 06/08/2020    Radiation proctitis     DEL (obstructive sleep apnea)     Insomnia     Atrial fibrillation (HCC)     Hyperlipidemia     Hx of carcinoma in situ of prostate     GERD (gastroesophageal reflux disease)     Chronic pain     CAD (coronary artery disease)     Arthritis     Hypertension 10/26/2015    Hyperlipemia 10/26/2015    Anemia due to acute blood loss 12/26/2014    Aortic stenosis 12/23/2014    S/P AVR (aortic valve replacement) 12/23/2014    S/P MVR (mitral valve repair) 12/23/2014    S/P Maze operation for atrial fibrillation 12/23/2014    Aortic insufficiency 12/08/2014    A-fib ([address] 06/17/2013    Arthritis of knee 07/09/2012           PAST MEDICAL HISTORY     Past Medical History:   Diagnosis Date    Arthritis     Atrial fibrillation ([address]     CAD (coronary artery disease)     Chronic pain     legs/knee    GERD (gastroesophageal reflux disease)     Hx of carcinoma in situ of prostate     Hyperlipidemia     Hypertension     Insomnia     DEL (obstructive sleep apnea)     Radiation proctitis     Vitamin D deficiency            PAST SURGICAL HISTORY     Past Surgical History:   Procedure Laterality Date    COLONOSCOPY N/A 5/8/2020    COLONOSCOPY performed by Diana Rushing MD at [address] [place] [address] COLONOSCOPY N/A 6/8/2020    COLONOSCOPY performed by Natna Yuan MD at [address] [place] [address] N/A 11/23/2020    FLEXIBLE SIGMOIDOSCOPY WITH APC performed by Diana Rushing MD at [address] [facility] HX AORTIC VALVE REPLACEMENT  2015    and mitral valve repair, left atrial cryo maze    HX HEENT melanoma removed head    HX HERNIA REPAIR  2009    Right    HX HERNIA REPAIR      left inguinal hernia repair    HX HERNIA REPAIR Left 12/01/2016    lap left inguinal hernia repair with mesh    HX KNEE REPLACEMENT      Bilateral    HX ORTHOPAEDIC      BILATERAL KNEE REPLACEMENT    HX ORTHOPAEDIC      CARPEL TUNNEL REPAIR-RIGHT    HX OTHER SURGICAL  2015    closure of patent foramen ovale    HX OTHER SURGICAL  10/2020    watchman implant for afib / Dr. Aide Goldstein      42 radiation treatments          ALLERGIES     No Known Allergies       FAMILY HISTORY     Family History   Problem Relation Age of Onset    Cancer Mother     Cancer Father         prostrate    Cancer Brother         prostrate ca    Anesth Problems Neg Hx     negative for cardiac disease       SOCIAL HISTORY     Social History     Socioeconomic History    Marital status:      Spouse name: Not on file    Number of children: Not on file    Years of education: Not on file    Highest education level: Not on file   Tobacco Use    Smoking status: Never Smoker    Smokeless tobacco: Never Used   Substance and Sexual Activity    Alcohol use: Yes     Alcohol/week: 3.0 standard drinks     Types: 3 Cans of beer per week    Drug use: No    Sexual activity: Not Currently     Social Determinants of Health     Financial Resource Strain:     Difficulty of Paying Living Expenses:    Food Insecurity:     Worried About Running Out of Food in the Last Year:     Ran Out of Food in the Last Year:    Transportation Needs:     Lack of Transportation (Medical):      Lack of Transportation (Non-Medical):    Physical Activity:     Days of Exercise per Week:     Minutes of Exercise per Session:    Stress:     Feeling of Stress :    Social Connections:     Frequency of Communication with Friends and Family:     Frequency of Social Gatherings with Friends and Family:     Attends [employer] Services:     Active Member of [employer] or Organizations:     Attends Club or Organization Meetings:     Marital Status:          MEDICATIONS     Current Outpatient Medications   Medication Sig    furosemide (LASIX) 40 mg tablet take 1 tablet by mouth once daily    carvediloL (COREG) 3.125 mg tablet Take 1 Tab by mouth two (2) times daily (with meals).  amLODIPine (NORVASC) 5 mg tablet Take 1 Tab by mouth daily.  lisinopriL (PRINIVIL, ZESTRIL) 20 mg tablet Take 1 Tab by mouth daily.  aspirin delayed-release 81 mg tablet Take 81 mg by mouth daily.  polyvinyl alcohol (ARTIFICIAL TEARS, POLYVIN ALC,) 1.4 % ophthalmic solution Administer 1 Drop to both eyes as needed.  ipratropium (ATROVENT HFA) 17 mcg/actuation inhaler Take 2 Puffs by inhalation as needed.  ferrous sulfate 325 mg (65 mg iron) cpER Take 1 Tab by mouth every other day.  tamsulosin (FLOMAX) 0.4 mg capsule Take 0.8 mg by mouth two (2) times a day.  cholecalciferol (VITAMIN D3) 1,000 unit tablet Take 2,000 Units by mouth two (2) times a day.  GLUCOSAMINE HCL/CHONDR CHING A NA (GLUCOSAMINE-CHONDROITIN) 750-600 mg tab Take 1 Tab by mouth daily.  omega-3 fatty acids-vitamin e 1,000 mg cap Take 1 Cap by mouth every other day.  acetaminophen (TYLENOL) 325 mg tablet Take  by mouth every four (4) hours as needed for Pain.  multivitamin (ONE A DAY) tablet Take 1 Tab by mouth daily.  docusate sodium (COLACE) 100 mg capsule Take 100 mg by mouth two (2) times daily as needed.  finasteride (PROSCAR) 5 mg tablet Take 5 mg by mouth nightly.  pravastatin (PRAVACHOL) 40 mg tablet Take 40 mg by mouth nightly. No current facility-administered medications for this visit. I have reviewed the nurses notes, vitals, problem list, allergy list, medical history, family, social history and medications. REVIEW OF SYMPTOMS      General: Pt denies excessive weight gain or loss.  Pt is able to conduct ADL's  HEENT: Denies blurred vision, headaches, hearing loss, epistaxis and difficulty swallowing. Respiratory: Denies cough, congestion, shortness of breath, ABDUL, wheezing or stridor. Cardiovascular: Denies precordial pain, palpitations, edema or PND  Gastrointestinal: Denies poor appetite, indigestion, abdominal pain or blood in stool  Genitourinary: Denies hematuria, dysuria, increased urinary frequency  Musculoskeletal: Denies joint pain or swelling from muscles or joints  Neurologic: Denies tremor, paresthesias, headache, or sensory motor disturbance  Psychiatric: Denies confusion, insomnia, depression  Integumentray: Denies rash, itching or ulcers. Hematologic: Denies easy bruising, bleeding     PHYSICAL EXAMINATION      There were no vitals filed for this visit. General: Well developed, in no acute distress. HEENT: No jaundice, oral mucosa moist, no oral ulcers  Neck: Supple, no stiffness, no lymphadenopathy, supple  Heart:   Irregular  Respiratory: Clear bilaterally x 4, no wheezing or rales  Extremities:  + edema, normal cap refill, no cyanosis. Musculoskeletal: No clubbing, no deformities  Neuro: A&Ox3, speech clear, gait stable, cooperative, no focal neurologic deficits  Skin: Skin color is normal. No rashes or lesions. Non diaphoretic, moist.         DIAGNOSTIC DATA     1.  Echocardiogram                                     9/5/14 Left ventricle: Systolic function was normal. Ejection fraction was   estimated in the range of 55 % to 60 %. There were no regional wall motion   abnormalities. Left atrium: The atrium was mildly dilated. 4/20/15- EF 48%, SHANTANU, atrial septum- poss PFO, MR mild, TR mild/mod, Pulm HTN mod, AV bioprosthesis normal function   6/19/17- EF 45-50%, RVE, SHANTANU, MR/TR mild/mod, AV bioprosthesis exhibits normal function, severe Pulm HTN, PA mild   Atrial septum: There was a secundum septal defect. Color Doppler   evaluation was performed. There was a mild left-to-right shunt. 12/11/18 TTE: LVEF 50%, no WMAs, wall thickness mod incr.  RV mild-mod dilated, LA mod-sev dilated, RA mod dilated, mod MR, mod TR, mod pulm HTN, mild PV regurg. AV w/ bioprosthesis, no AS / no AR   3/16/20-EF 50-55%, BVE, Mild concentric hypertrophy, SHANTANU, Mitral valve thickening and repaired with annuloplasty ring. Surgical repair, mild MR/TR, AV leaflet calcification Aortic valve mean gradient is 20.7 mmHg. Aortic valve area is 1.3 cm2. Mild AS, 26 mm bioprosthetic aortic valve. 10/14/20- Limited-DEFINITY- EF 55-60%, No evidence of pericardial effusion. 11/27/20- EF 55%, Moderate concentric hypertrophy, RVE, Aortic valve mean gradient is 25 mmHg. - bioprosthetic aortic valve,  Mitral valve repaired with annuloplasty ring, mod MR/TR, mod pulm HTN, PAP 59 mmHg   DEWEY-12/29/20-·Watchman device within YIMI shows excellent endothelialization without evidence of braden-device leak, EF 55 - 60%, SHANTANU,  bioprosthetic aortic valve- insufficiency is present -mild centralized leaking, mild AI, Mitral valve repaired with annuloplasty ring, mod/severe MR    2.  Myocardial perfusion study                      (9/5/14)Normal EF ;Normal perfusion     3. Cholesterol profile                            (10/6/15): , HDL 35, ,           (4/19/16): , HDL 72, LDL 65.6,    (05/01/17)- , HDL 85, LDL 65 , TG 55   11/2/17- , HDL 73, TG 75   12/11/18- , HDL 61, LDL 32, TG 60     4. LE venous duplex                                           (10/10/14)   Right leg mod. 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 5. Watchman 10/13/20     6.  Carotid Doppler   5/11/20- 1-49% Kris         LABORATORY DATA            Lab Results   Component Value Date/Time    WBC 5.3 11/28/2020 04:41 AM    HGB 10.0 (L) 11/28/2020 04:41 AM    HCT 31.5 (L) 11/28/2020 04:41 AM    PLATELET 335 04/92/4710 04:41 AM    MCV 100.6 (H) 11/28/2020 04:41 AM      Lab Results   Component Value Date/Time    Sodium 137 11/28/2020 04:41 AM    Potassium 3.6 11/28/2020 04:41 AM    Chloride 99 11/28/2020 04:41 AM    CO2 35 (H) 11/28/2020 04:41 AM    Anion gap 3 (L) 11/28/2020 04:41 AM    Glucose 105 (H) 11/28/2020 04:41 AM    BUN 20 11/28/2020 04:41 AM    Creatinine 0.94 11/28/2020 04:41 AM    BUN/Creatinine ratio 21 (H) 11/28/2020 04:41 AM    GFR est AA >60 11/28/2020 04:41 AM    GFR est non-AA >60 11/28/2020 04:41 AM    Calcium 9.2 11/28/2020 04:41 AM    Bilirubin, total 0.6 11/28/2020 04:41 AM    Alk. phosphatase 70 11/28/2020 04:41 AM    Protein, total 7.2 11/28/2020 04:41 AM    Albumin 3.7 11/28/2020 04:41 AM    Globulin 3.5 11/28/2020 04:41 AM    A-G Ratio 1.1 11/28/2020 04:41 AM    ALT (SGPT) 26 11/28/2020 04:41 AM           ASSESSMENT/RECOMMENDATIONS:.      1. AF  -He is status post watchman device now just on Plavix and 5 mg a day  -Rate is under good control    2. LE edema  -as we talked in the past this is probably multifactorial related to sleep apnea and inactivity  -Component is primary related diastolic dysfunction he had been in his studies in the past are unremarkable  -We will asked that he be seen in the lymphedema clinic      3. HTN  -Blood pressure  is better on current medical regimen     4. Dyslipidemia  -He still has not gotten his cholesterol performed    5. Moderate PFO with left to right flow and CHRISTOPHER  -We will reecho him and do a limited echo in 6 months with a bubble study    6. AS and MR s/p AVR and MVR on 1/8/15  - He has moderate MR and moderate TR and bioprosthetic valves. Last echo demonstrated mild left regurgitation mild tricuspid regurgitation aortic valve area 1.3 cm2  There is a 26 mm bioprosthetic aortic valve and a mitral valve repair. 7. DEL  -States he is compliant      8. Return in 6 months or PRN. No orders of the defined types were placed in this encounter.       We discussed the expected course, resolution and complications of the diagnosis(es) in detail. Medication risks, benefits, costs, interactions, and alternatives were discussed as indicated. I advised him to contact the office if his condition worsens, changes or fails to improve as anticipated. He expressed understanding with the diagnosis(es) and plan          Follow-up and Dispositions  ·   Return in about 6 months (around 7/15/2021). I have discussed the diagnosis with  John Burgos and the intended plan as seen in the above orders. Questions were answered concerning future plans. I have discussed medication side effects and warnings with the patient as well. Thank you,  Other, MD Slim for involving me in the care of  John Burgos. Please do not hesitate to contact me for further questions/concerns. Shayan Fine MD, [facility] [address]      [facility], [address] [place] [address] [address]      [phone] / [phone] Fax

## 2021-07-23 NOTE — PROGRESS NOTES
CARDIOLOGY OFFICE NOTE    Shayan Lovell MD, [address], [place], [address] [facility]  Phone [phone]; Fax [phone]  Mobile [phone]   Voice Mail [phone]    LAST OFFICE VISIT : Visit date not found  Sridhar Rojas MD       ATTENTION:   This medical record was transcribed using an electronic medical records/speech recognition system. Although proofread, it may and can contain electronic, spelling and other errors. Corrections may be executed at a later time. Please feel free to contact us for any clarifications as needed. ICD-10-CM ICD-9-CM   1. Atrial fibrillation, unspecified type ([address]  I48.91 427.31   2. Coronary artery disease involving native coronary artery of native heart without angina pectoris  I25.10 414.01   3. Pure hypercholesterolemia  E78.00 272.0   4. Diastolic CHF, acute on chronic (HCC)  I50.33 428.33     428.0   5. Presence of Watchman left atrial appendage closure device  Z95.818 V45.09            Jayant Romero is a 79 y.o. male with  referred for negativeHTN, dyslipidemia, and AF aortic valve replacement and mitral valve repair status post maze procedure. I have personally obtained the history from the patient. Cardiac risk factors: dyslipidemia, male gender, hypertension  I have personally obtained the history from the patient. HISTORY OF PRESENTING ILLNESS     Jayant Romero is a 79 y.o. male   with HTN, dyslipidemia, and AF aortic valve replacement and mitral valve repair status post maze procedure and now status post watchman device. He is doing well from cardiac standpoint. He does state that he is taking a baby aspirin 81 mg.   I would like ED 62 mg  His wife is present today     ACTIVE PROBLEM LIST     Patient Active Problem List    Diagnosis Date Noted    Presence of Watchman left atrial appendage closure device 41/27/4760    Diastolic CHF, acute on chronic ([address] 11/28/2020    Anasarca 11/27/2020    ABDUL (dyspnea on exertion) 11/27/2020    GI bleed 06/08/2020    Radiation proctitis     DLE (obstructive sleep apnea)     Insomnia     Atrial fibrillation (HCC)     Hyperlipidemia     Hx of carcinoma in situ of prostate     GERD (gastroesophageal reflux disease)     Chronic pain     CAD (coronary artery disease)     Arthritis     Hypertension 10/26/2015    Hyperlipemia 10/26/2015    Anemia due to acute blood loss 12/26/2014    Aortic stenosis 12/23/2014    S/P AVR (aortic valve replacement) 12/23/2014    S/P MVR (mitral valve repair) 12/23/2014    S/P Maze operation for atrial fibrillation 12/23/2014    Aortic insufficiency 12/08/2014    A-fib ([address] 06/17/2013    Arthritis of knee 07/09/2012           PAST MEDICAL HISTORY     Past Medical History:   Diagnosis Date    Arthritis     Atrial fibrillation ([address]     CAD (coronary artery disease)     Chronic pain     legs/knee    GERD (gastroesophageal reflux disease)     Hx of carcinoma in situ of prostate     Hyperlipidemia     Hypertension     Insomnia     DEL (obstructive sleep apnea)     Radiation proctitis     Vitamin D deficiency            PAST SURGICAL HISTORY     Past Surgical History:   Procedure Laterality Date    COLONOSCOPY N/A 5/8/2020    COLONOSCOPY performed by Henrik Gardner MD at [address] [facility] COLONOSCOPY N/A 6/8/2020    COLONOSCOPY performed by Gustavo Hamm MD at [address] [place] [address] N/A 11/23/2020    FLEXIBLE SIGMOIDOSCOPY WITH APC performed by Henrik Gardner MD at [address] [facility] HX AORTIC VALVE REPLACEMENT  2015    and mitral valve repair, left atrial cryo maze    HX HEENT      melanoma removed head    HX HERNIA REPAIR  2009    Right    HX HERNIA REPAIR      left inguinal hernia repair    HX HERNIA REPAIR Left 12/01/2016    lap left inguinal hernia repair with mesh    HX KNEE REPLACEMENT      Bilateral    HX ORTHOPAEDIC      BILATERAL KNEE REPLACEMENT    HX ORTHOPAEDIC      CARPEL TUNNEL REPAIR-RIGHT    HX OTHER SURGICAL  2015    closure of patent foramen ovale    HX OTHER SURGICAL  10/2020    watchman implant for afib / Dr. Hiral Posada      42 radiation treatments          ALLERGIES     No Known Allergies       FAMILY HISTORY     Family History   Problem Relation Age of Onset    Cancer Mother     Cancer Father         prostrate    Cancer Brother         prostrate ca    Anesth Problems Neg Hx     negative for cardiac disease       SOCIAL HISTORY     Social History     Socioeconomic History    Marital status:      Spouse name: Not on file    Number of children: Not on file    Years of education: Not on file    Highest education level: Not on file   Tobacco Use    Smoking status: Never Smoker    Smokeless tobacco: Never Used   Substance and Sexual Activity    Alcohol use: Yes     Alcohol/week: 3.0 standard drinks     Types: 3 Cans of beer per week    Drug use: No    Sexual activity: Not Currently     Social Determinants of Health     Financial Resource Strain:     Difficulty of Paying Living Expenses:    Food Insecurity:     Worried About Running Out of Food in the Last Year:     Ran Out of Food in the Last Year:    Transportation Needs:     Lack of Transportation (Medical):      Lack of Transportation (Non-Medical):    Physical Activity:     Days of Exercise per Week:     Minutes of Exercise per Session:    Stress:     Feeling of Stress :    Social Connections:     Frequency of Communication with Friends and Family:     Frequency of Social Gatherings with Friends and Family:     Attends Orthodox Services:     Active Member of Clubs or Organizations:     Attends Club or Organization Meetings:     Marital Status:          MEDICATIONS     Current Outpatient Medications   Medication Sig    furosemide (LASIX) 40 mg tablet take 1 tablet by mouth once daily    carvediloL (COREG) 3.125 mg tablet Take 1 Tab by mouth two (2) times daily (with meals).  amLODIPine (NORVASC) 5 mg tablet Take 1 Tab by mouth daily.  lisinopriL (PRINIVIL, ZESTRIL) 20 mg tablet Take 1 Tab by mouth daily.  aspirin delayed-release 81 mg tablet Take 81 mg by mouth daily.  polyvinyl alcohol (ARTIFICIAL TEARS, POLYVIN ALC,) 1.4 % ophthalmic solution Administer 1 Drop to both eyes as needed.  ipratropium (ATROVENT HFA) 17 mcg/actuation inhaler Take 2 Puffs by inhalation as needed.  ferrous sulfate 325 mg (65 mg iron) cpER Take 1 Tab by mouth every other day.  tamsulosin (FLOMAX) 0.4 mg capsule Take 0.8 mg by mouth two (2) times a day.  cholecalciferol (VITAMIN D3) 1,000 unit tablet Take 2,000 Units by mouth two (2) times a day.  GLUCOSAMINE HCL/CHONDR CHING A NA (GLUCOSAMINE-CHONDROITIN) 750-600 mg tab Take 1 Tab by mouth daily.  omega-3 fatty acids-vitamin e 1,000 mg cap Take 1 Cap by mouth every other day.  acetaminophen (TYLENOL) 325 mg tablet Take  by mouth every four (4) hours as needed for Pain.  multivitamin (ONE A DAY) tablet Take 1 Tab by mouth daily.  docusate sodium (COLACE) 100 mg capsule Take 100 mg by mouth two (2) times daily as needed.  finasteride (PROSCAR) 5 mg tablet Take 5 mg by mouth nightly.  pravastatin (PRAVACHOL) 40 mg tablet Take 40 mg by mouth nightly. No current facility-administered medications for this visit. I have reviewed the nurses notes, vitals, problem list, allergy list, medical history, family, social history and medications. REVIEW OF SYMPTOMS   Pertinent positive per HPI  General: Pt denies excessive weight gain or loss. Pt is able to conduct ADL's  HEENT: Denies blurred vision, headaches, hearing loss, epistaxis and difficulty swallowing. Respiratory: Denies cough, congestion, shortness of breath, ABDUL, wheezing or stridor.   Cardiovascular: Denies precordial pain, palpitations, edema or PND  Gastrointestinal: Denies poor appetite, indigestion, abdominal pain or blood in stool  Genitourinary: Denies hematuria, dysuria, increased urinary frequency  Musculoskeletal: Denies joint pain or swelling from muscles or joints  Neurologic: Denies tremor, paresthesias, headache, or sensory motor disturbance  Psychiatric: Denies confusion, insomnia, depression  Integumentray: Denies rash, itching or ulcers. Hematologic: Denies easy bruising, bleeding     PHYSICAL EXAMINATION      Vitals:    07/23/21 0905   Weight: 208 lb (94.3 kg)   Height: 5' 9\" (1.753 m)     General: Well developed, in no acute distress. HEENT: No jaundice, oral mucosa moist, no oral ulcers  Neck: Supple, no stiffness, no lymphadenopathy, supple  Heart:   Irregular  Respiratory: Clear bilaterally x 4, no wheezing or rales  Extremities:  + edema, normal cap refill, no cyanosis. Musculoskeletal: No clubbing, no deformities  Neuro: A&Ox3, speech clear, gait stable, cooperative, no focal neurologic deficits  Skin: Skin color is normal. No rashes or lesions. Non diaphoretic, moist.         DIAGNOSTIC DATA     1.  Echocardiogram                                     9/5/14 Left ventricle: Systolic function was normal. Ejection fraction was   estimated in the range of 55 % to 60 %. There were no regional wall motion   abnormalities. Left atrium: The atrium was mildly dilated. 4/20/15- EF 48%, SHANTANU, atrial septum- poss PFO, MR mild, TR mild/mod, Pulm HTN mod, AV bioprosthesis normal function   6/19/17- EF 45-50%, RVE, SHANTANU, MR/TR mild/mod, AV bioprosthesis exhibits normal function, severe Pulm HTN, NH mild   Atrial septum: There was a secundum septal defect. Color Doppler   evaluation was performed. There was a mild left-to-right shunt. 12/11/18 TTE: LVEF 50%, no WMAs, wall thickness mod incr. RV mild-mod dilated, LA mod-sev dilated, RA mod dilated, mod MR, mod TR, mod pulm HTN, mild PV regurg.  AV w/ bioprosthesis, no AS / no AR   3/16/20-EF 50-55%, BVE, Mild concentric hypertrophy, SHANTANU, Mitral valve thickening and repaired with annuloplasty ring. Surgical repair, mild MR/TR, AV leaflet calcification Aortic valve mean gradient is 20.7 mmHg. Aortic valve area is 1.3 cm2. Mild AS, 26 mm bioprosthetic aortic valve. 10/14/20- Limited-DEFINITY- EF 55-60%, No evidence of pericardial effusion. 11/27/20- EF 55%, Moderate concentric hypertrophy, RVE, Aortic valve mean gradient is 25 mmHg. - bioprosthetic aortic valve,  Mitral valve repaired with annuloplasty ring, mod MR/TR, mod pulm HTN, PAP 59 mmHg   DEWEY-12/29/20-·Watchman device within YIMI shows excellent endothelialization without evidence of braden-device leak, EF 55 - 60%, SHANTANU,  bioprosthetic aortic valve- insufficiency is present -mild centralized leaking, mild AI, Mitral valve repaired with annuloplasty ring, mod/severe MR    2.  Myocardial perfusion study                      (9/5/14)Normal EF ;Normal perfusion     3. Cholesterol profile                            (10/6/15): , HDL 35, ,           (4/19/16): , HDL 72, LDL 65.6,    (05/01/17)- , HDL 85, LDL 65 , TG 55   11/2/17- , HDL 73, TG 75   12/11/18- , HDL 61, LDL 32, TG 60     4. LE venous duplex                                           (10/10/14)   Right leg mod. 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 5. Watchman 10/13/20     6.  Carotid Doppler   5/11/20- 1-49% Kris         LABORATORY DATA            Lab Results   Component Value Date/Time    WBC 5.3 11/28/2020 04:41 AM    HGB 10.0 (L) 11/28/2020 04:41 AM    HCT 31.5 (L) 11/28/2020 04:41 AM    PLATELET 519 49/44/2616 04:41 AM    .6 (H) 11/28/2020 04:41 AM      Lab Results   Component Value Date/Time    Sodium 137 11/28/2020 04:41 AM    Potassium 3.6 11/28/2020 04:41 AM    Chloride 99 11/28/2020 04:41 AM    CO2 35 (H) 11/28/2020 04:41 AM    Anion gap 3 (L) 11/28/2020 04:41 AM    Glucose 105 (H) 11/28/2020 04:41 AM    BUN 20 11/28/2020 04:41 AM    Creatinine 0.94 11/28/2020 04:41 AM    BUN/Creatinine ratio 21 (H) 11/28/2020 04:41 AM    GFR est AA >60 11/28/2020 04:41 AM    GFR est non-AA >60 11/28/2020 04:41 AM    Calcium 9.2 11/28/2020 04:41 AM    Bilirubin, total 0.6 11/28/2020 04:41 AM    Alk. phosphatase 70 11/28/2020 04:41 AM    Protein, total 7.2 11/28/2020 04:41 AM    Albumin 3.7 11/28/2020 04:41 AM    Globulin 3.5 11/28/2020 04:41 AM    A-G Ratio 1.1 11/28/2020 04:41 AM    ALT (SGPT) 26 11/28/2020 04:41 AM           ASSESSMENT/RECOMMENDATIONS:.      1. AF  -He is status post watchman device now just on Plavix and 5 mg a day  -Heart rate seems slightly low today so I am checking the EKG and if it demonstrates bradycardia I may place a event loop monitor on him    2. LE edema  -Was seen in lymphedema clinic and his legs are significantly improved and he actually feels better    3. HTN  -Blood pressure is reasonably good on 3 agents including Coreg only at 3.125, Norvasc at 5 mg and Prinivil at 20 mg a day     4. Dyslipidemia  -I will provide him with a lab slip to get his cholesterol done soon. 5. Moderate PFO with left to right flow and CHRISTOPHER  -Aspirin 62 mg a day    6. AS and MR s/p AVR and MVR on 1/8/15  - He has moderate MR and moderate TR and bioprosthetic valves. -We will have echo next week    7. DEL  -States he is compliant    8. Bradycardia  He is in chronic atrial fibrillation but he is having some slowing of his rhythm usually in his 70s now 5040s asymptomatic but I think we will place a loop monitor on him just to ensure that there is no evidence of pauses. He did have a TAVR recently. 8. Return in 6 months or PRN. No orders of the defined types were placed in this encounter. We discussed the expected course, resolution and complications of the diagnosis(es) in detail.   Medication risks, benefits, costs, interactions, and alternatives were discussed as indicated. I advised him to contact the office if his condition worsens, changes or fails to improve as anticipated. He expressed understanding with the diagnosis(es) and plan          Follow-up and Dispositions  ·   Return in about 6 months (around 1/23/2022). I have discussed the diagnosis with  Monico Betancourt and the intended plan as seen in the above orders. Questions were answered concerning future plans. I have discussed medication side effects and warnings with the patient as well. Thank you,  Stuart Zeng MD for involving me in the care of  Monico Betancourt. Please do not hesitate to contact me for further questions/concerns. Shayan Fine MD, [facility] [address]      [facility], [address] [place] [address] [address]      [phone] / [phone] Fax

## 2021-07-25 LAB
ALBUMIN SERPL-MCNC: 4.3 G/DL (ref 3.6–4.6)
ALP SERPL-CCNC: 66 IU/L (ref 48–121)
ALT SERPL-CCNC: 10 IU/L (ref 0–44)
AST SERPL-CCNC: 17 IU/L (ref 0–40)
BILIRUB DIRECT SERPL-MCNC: 0.15 MG/DL (ref 0–0.4)
BILIRUB SERPL-MCNC: 0.5 MG/DL (ref 0–1.2)
CHOLEST SERPL-MCNC: 126 MG/DL (ref 100–199)
HDLC SERPL-MCNC: 52 MG/DL
LDLC SERPL CALC-MCNC: 60 MG/DL (ref 0–99)
PROT SERPL-MCNC: 6.8 G/DL (ref 6–8.5)
SPECIMEN STATUS REPORT, ROLRST: NORMAL
TRIGL SERPL-MCNC: 66 MG/DL (ref 0–149)
VLDLC SERPL CALC-MCNC: 14 MG/DL (ref 5–40)

## 2021-07-26 ENCOUNTER — ANCILLARY PROCEDURE (OUTPATIENT)
Dept: CARDIOLOGY CLINIC | Age: 81
End: 2021-07-26
Payer: MEDICARE

## 2021-07-26 VITALS
DIASTOLIC BLOOD PRESSURE: 80 MMHG | BODY MASS INDEX: 30.81 KG/M2 | HEIGHT: 69 IN | SYSTOLIC BLOOD PRESSURE: 130 MMHG | WEIGHT: 208 LBS

## 2021-07-26 DIAGNOSIS — Z95.2 S/P AVR (AORTIC VALVE REPLACEMENT): ICD-10-CM

## 2021-07-26 DIAGNOSIS — Z98.890 H/O MITRAL VALVE REPAIR: ICD-10-CM

## 2021-07-26 PROCEDURE — 93306 TTE W/DOPPLER COMPLETE: CPT | Performed by: SPECIALIST

## 2021-07-27 LAB
ECHO AO ASC DIAM: 3.5 CM
ECHO AO ROOT DIAM: 3.46 CM
ECHO AV MEAN GRADIENT: 30.3 MMHG
ECHO AV PEAK GRADIENT: 54 MMHG
ECHO AV PEAK VELOCITY: 366 CM/S
ECHO AV VTI: 78 CM
ECHO EST RA PRESSURE: 3 MMHG
ECHO LA AREA 4C: 35.86 CM2
ECHO LA MAJOR AXIS: 5.94 CM
ECHO LA MINOR AXIS: 2.83 CM
ECHO LA VOL 2C: 119.53 ML (ref 18–58)
ECHO LA VOL 4C: 128.63 ML (ref 18–58)
ECHO LA VOL BP: 133.96 ML (ref 18–58)
ECHO LA VOL/BSA BIPLANE: 63.79 ML/M2 (ref 16–28)
ECHO LA VOLUME INDEX A2C: 56.92 ML/M2 (ref 16–28)
ECHO LA VOLUME INDEX A4C: 61.25 ML/M2 (ref 16–28)
ECHO LV E' LATERAL VELOCITY: 11.83 CM/S
ECHO LV E' SEPTAL VELOCITY: 6.42 CM/S
ECHO LV INTERNAL DIMENSION DIASTOLIC: 6.19 CM (ref 4.2–5.9)
ECHO LV INTERNAL DIMENSION SYSTOLIC: 4.34 CM
ECHO LV IVSD: 1.21 CM (ref 0.6–1)
ECHO LV MASS 2D: 358.7 G (ref 88–224)
ECHO LV MASS INDEX 2D: 170.8 G/M2 (ref 49–115)
ECHO LV POSTERIOR WALL DIASTOLIC: 1.34 CM (ref 0.6–1)
ECHO LVOT PEAK GRADIENT: 3.36 MMHG
ECHO LVOT PEAK VELOCITY: 91.62 CM/S
ECHO LVOT VTI: 18.87 CM
ECHO RA AREA 4C: 24.55 CM2
ECHO RIGHT VENTRICULAR SYSTOLIC PRESSURE (RVSP): 37.79 MMHG
ECHO RV INTERNAL DIMENSION: 4.4 CM
ECHO RV TAPSE: 1.98 CM (ref 1.5–2)
ECHO TV REGURGITANT MAX VELOCITY: 294.9 CM/S
ECHO TV REGURGITANT PEAK GRADIENT: 34.79 MMHG

## 2021-08-04 DIAGNOSIS — I25.10 CORONARY ARTERY DISEASE INVOLVING NATIVE CORONARY ARTERY OF NATIVE HEART WITHOUT ANGINA PECTORIS: ICD-10-CM

## 2021-08-04 DIAGNOSIS — E78.00 PURE HYPERCHOLESTEROLEMIA: Primary | ICD-10-CM

## 2021-08-12 DIAGNOSIS — R00.1 BRADYCARDIA: Primary | ICD-10-CM

## 2021-08-16 ENCOUNTER — HOSPITAL ENCOUNTER (OUTPATIENT)
Dept: NON INVASIVE DIAGNOSTICS | Age: 81
Discharge: HOME | End: 2021-08-16
Attending: SPECIALIST
Payer: MEDICARE

## 2021-08-16 DIAGNOSIS — R00.1 BRADYCARDIA: ICD-10-CM

## 2021-08-16 PROCEDURE — 93271 ECG/MONITORING AND ANALYSIS: CPT

## 2021-08-16 PROCEDURE — 93272 ECG/REVIEW INTERPRET ONLY: CPT | Performed by: SPECIALIST

## 2021-08-17 ENCOUNTER — TELEPHONE (OUTPATIENT)
Dept: CARDIOLOGY CLINIC | Age: 81
End: 2021-08-17

## 2021-08-17 NOTE — TELEPHONE ENCOUNTER
Pt wearing Loop - started yesterday. Has has several pauses -longest 3.7. pt to stop Coreg and needs appt with Dr Janell Yu next week please.

## 2021-08-18 NOTE — PROGRESS NOTES
Received [facility] URGENT REPORT from [employer]. Report reading HR 45 Atrial Fib. With 3.3 second pause. Second report HR 44 Atrial Fib, with PVC's and multiple pauses noted longest pause is 3.1 seconds. I scanned a copy of report in .

## 2021-08-23 ENCOUNTER — TELEPHONE (OUTPATIENT)
Dept: CARDIOLOGY CLINIC | Age: 81
End: 2021-08-23

## 2021-08-23 NOTE — TELEPHONE ENCOUNTER
Okay as long as he is following up with Dr. Inocencio Frey and has no symptoms of dizziness or lightheadedness

## 2021-08-23 NOTE — TELEPHONE ENCOUNTER
Pt wearing Loop - [employer] reports episode of afib with 3.2 sec pause. Pt scheduled to see Dr Alex Newell 8/27/21 Dx pauses. He does have hx of afib and has a watchman.

## 2021-08-23 NOTE — TELEPHONE ENCOUNTER
Pt stopped Coreg over the weekend as instructed. He does not feel any different today - no energy - no dizziness/syncope. He will keep appt Friday 8/27/21 with Dr Ai Garrido- pt will call back if any concerning symptoms or go to ER.

## 2021-08-26 NOTE — PROGRESS NOTES
Urgent report 8/26/2021 @ 06:11 Afib with PVC and multiple pauses, longest pause 3.0 seconds. Vanna from [facility] called 09:45 to report urgent report. Please look in  copy of report.

## 2021-08-27 ENCOUNTER — OFFICE VISIT (OUTPATIENT)
Dept: CARDIOLOGY CLINIC | Age: 81
End: 2021-08-27
Payer: MEDICARE

## 2021-08-27 VITALS
WEIGHT: 208.2 LBS | HEIGHT: 69 IN | HEART RATE: 74 BPM | SYSTOLIC BLOOD PRESSURE: 142 MMHG | DIASTOLIC BLOOD PRESSURE: 70 MMHG | BODY MASS INDEX: 30.84 KG/M2 | OXYGEN SATURATION: 97 % | RESPIRATION RATE: 22 BRPM

## 2021-08-27 DIAGNOSIS — Z01.812 PRE-PROCEDURE LAB EXAM: ICD-10-CM

## 2021-08-27 DIAGNOSIS — R00.1 BRADYCARDIA: Primary | ICD-10-CM

## 2021-08-27 PROCEDURE — 1101F PT FALLS ASSESS-DOCD LE1/YR: CPT | Performed by: INTERNAL MEDICINE

## 2021-08-27 PROCEDURE — G8510 SCR DEP NEG, NO PLAN REQD: HCPCS | Performed by: INTERNAL MEDICINE

## 2021-08-27 PROCEDURE — G8417 CALC BMI ABV UP PARAM F/U: HCPCS | Performed by: INTERNAL MEDICINE

## 2021-08-27 PROCEDURE — G8427 DOCREV CUR MEDS BY ELIG CLIN: HCPCS | Performed by: INTERNAL MEDICINE

## 2021-08-27 PROCEDURE — 99215 OFFICE O/P EST HI 40 MIN: CPT | Performed by: INTERNAL MEDICINE

## 2021-08-27 PROCEDURE — G8754 DIAS BP LESS 90: HCPCS | Performed by: INTERNAL MEDICINE

## 2021-08-27 PROCEDURE — G8536 NO DOC ELDER MAL SCRN: HCPCS | Performed by: INTERNAL MEDICINE

## 2021-08-27 PROCEDURE — G0463 HOSPITAL OUTPT CLINIC VISIT: HCPCS | Performed by: INTERNAL MEDICINE

## 2021-08-27 PROCEDURE — G8753 SYS BP > OR = 140: HCPCS | Performed by: INTERNAL MEDICINE

## 2021-08-27 NOTE — PROGRESS NOTES
HISTORY OF PRESENT ILLNESS      Pricilla Monroe is a 79 y.o. male with atrial fibrillation, CAD, hypertension, AVR/MVR/TVR, dyslipidemia, prostate cancer/radiation proctitis and GI bleeding referred for discussion regarding LAAO. He is currently on eliquis and continues to have rectal bleeding. He underwent successful left atrial appendage occlusion utilizing WATCHMAN device and is on ASA 81mg daily. Last visit, we encouraged increase mobility and exercise. He recently wore a loop monitor for Dr. Annel Santana which demonstrated an episode of AF with a 3.2 second pause. He continues to have recurrent prolonged pauses on monitoring.        PAST MEDICAL HISTORY     Past Medical History:   Diagnosis Date    Arthritis     Atrial fibrillation ([address]     CAD (coronary artery disease)     Chronic pain     legs/knee    GERD (gastroesophageal reflux disease)     Hx of carcinoma in situ of prostate     Hyperlipidemia     Hypertension     Insomnia     DEL (obstructive sleep apnea)     Radiation proctitis     Vitamin D deficiency            PAST SURGICAL HISTORY     Past Surgical History:   Procedure Laterality Date    COLONOSCOPY N/A 5/8/2020    COLONOSCOPY performed by Isidro Oneil MD at [facility] COLONOSCOPY N/A 6/8/2020    COLONOSCOPY performed by Mirna Wall MD at [address] N/A 11/23/2020    FLEXIBLE SIGMOIDOSCOPY WITH APC performed by Isidro Oneil MD at [facility] HX AORTIC VALVE REPLACEMENT  2015    and mitral valve repair, left atrial cryo maze    HX HEENT      melanoma removed head    HX HERNIA REPAIR  2009    Right    HX HERNIA REPAIR      left inguinal hernia repair    HX HERNIA REPAIR Left 12/01/2016    lap left inguinal hernia repair with mesh    HX KNEE REPLACEMENT      Bilateral    HX ORTHOPAEDIC      BILATERAL KNEE REPLACEMENT    HX ORTHOPAEDIC      CARPEL TUNNEL REPAIR-RIGHT    HX OTHER SURGICAL  2015    closure of patent foramen ovale    HX OTHER SURGICAL  10/2020    watchman implant for afib / Dr. Delano Glass      42 radiation treatments          ALLERGIES     No Known Allergies       FAMILY HISTORY     Family History   Problem Relation Age of Onset    Cancer Mother     Cancer Father         prostrate    Cancer Brother         prostrate ca    Anesth Problems Neg Hx     negative for cardiac disease       SOCIAL HISTORY     Social History     Socioeconomic History    Marital status:      Spouse name: Not on file    Number of children: Not on file    Years of education: Not on file    Highest education level: Not on file   Tobacco Use    Smoking status: Never Smoker    Smokeless tobacco: Never Used   Substance and Sexual Activity    Alcohol use: Yes     Alcohol/week: 3.0 standard drinks     Types: 3 Cans of beer per week    Drug use: No    Sexual activity: Not Currently     Social Determinants of Health     Financial Resource Strain:     Difficulty of Paying Living Expenses:    Food Insecurity:     Worried About Running Out of Food in the Last Year:     Ran Out of Food in the Last Year:    Transportation Needs:     Lack of Transportation (Medical):  Lack of Transportation (Non-Medical):    Physical Activity:     Days of Exercise per Week:     Minutes of Exercise per Session:    Stress:     Feeling of Stress :    Social Connections:     Frequency of Communication with Friends and Family:     Frequency of Social Gatherings with Friends and Family:     Attends Rastafari Services:     Active Member of Clubs or Organizations:     Attends Club or Organization Meetings:     Marital Status:          MEDICATIONS     Current Outpatient Medications   Medication Sig    furosemide (LASIX) 40 mg tablet take 1 tablet by mouth once daily    amLODIPine (NORVASC) 5 mg tablet Take 1 Tab by mouth daily.  lisinopriL (PRINIVIL, ZESTRIL) 20 mg tablet Take 1 Tab by mouth daily.     aspirin delayed-release 81 mg tablet Take 81 mg by mouth daily.  polyvinyl alcohol (ARTIFICIAL TEARS, POLYVIN ALC,) 1.4 % ophthalmic solution Administer 1 Drop to both eyes as needed.  ipratropium (ATROVENT HFA) 17 mcg/actuation inhaler Take 2 Puffs by inhalation as needed.  ferrous sulfate 325 mg (65 mg iron) cpER Take 1 Tab by mouth every other day.  tamsulosin (FLOMAX) 0.4 mg capsule Take 0.8 mg by mouth two (2) times a day.  cholecalciferol (VITAMIN D3) 1,000 unit tablet Take 2,000 Units by mouth two (2) times a day.  GLUCOSAMINE HCL/CHONDR CHING A NA (GLUCOSAMINE-CHONDROITIN) 750-600 mg tab Take 1 Tab by mouth daily.  omega-3 fatty acids-vitamin e 1,000 mg cap Take 1 Cap by mouth every other day.  multivitamin (ONE A DAY) tablet Take 1 Tab by mouth daily.  docusate sodium (COLACE) 100 mg capsule Take 100 mg by mouth two (2) times daily as needed.  finasteride (PROSCAR) 5 mg tablet Take 5 mg by mouth nightly.  pravastatin (PRAVACHOL) 40 mg tablet Take 40 mg by mouth nightly.  acetaminophen (TYLENOL) 325 mg tablet Take  by mouth every four (4) hours as needed for Pain. No current facility-administered medications for this visit. I have reviewed the nurses notes, vitals, problem list, allergy list, medical history, family, social history and medications. REVIEW OF SYMPTOMS      General: Pt denies excessive weight gain or loss. Pt is able to conduct ADL's  HEENT: Denies blurred vision, headaches, hearing loss, epistaxis and difficulty swallowing. Respiratory: Denies cough, congestion, shortness of breath, ABDUL, wheezing or stridor.   Cardiovascular: Denies precordial pain, palpitations, edema or PND  Gastrointestinal: Denies poor appetite, indigestion, abdominal pain or blood in stool  Genitourinary: Denies hematuria, dysuria, increased urinary frequency  Musculoskeletal: Denies joint pain or swelling from muscles or joints  Neurologic: Denies tremor, paresthesias, headache, or sensory motor disturbance  Psychiatric: Denies confusion, insomnia, depression  Integumentray: Denies rash, itching or ulcers. Hematologic: Denies easy bruising, bleeding       PHYSICAL EXAMINATION      Vitals: see vitals section  General: Well developed, in no acute distress. HEENT: No jaundice, oral mucosa moist, no oral ulcers  Neck: Supple, no stiffness, no lymphadenopathy, supple  Heart:  irreg irreg, no murmur, gallop or rub, no jugular venous distention  Respiratory: Clear bilaterally x 4, no wheezing or rales  Abdomen:   Soft, non-tender, bowel sounds are active. Extremities:  No edema, normal cap refill, no cyanosis. Musculoskeletal: No clubbing, no deformities  Neuro: A&Ox3, speech clear, gait stable, cooperative, no focal neurologic deficits  Skin: Skin color is normal. No rashes or lesions. Non diaphoretic, moist.  Vascular: 2+ pulses symmetric in all extremities       DIAGNOSTIC DATA      EKG:     Visit Vitals  BP (!) 142/70 (BP 1 Location: Left upper arm, BP Patient Position: Sitting, BP Cuff Size: Adult)   Pulse 74   Resp 22   Ht 5' 9\" (1.753 m)   Wt 208 lb 3.2 oz (94.4 kg)   SpO2 97%   BMI 30.75 kg/m²          LABORATORY DATA      Lab Results   Component Value Date/Time    WBC 5.3 11/28/2020 04:41 AM    HGB 10.0 (L) 11/28/2020 04:41 AM    HCT 31.5 (L) 11/28/2020 04:41 AM    PLATELET 549 46/47/6893 04:41 AM    .6 (H) 11/28/2020 04:41 AM      Lab Results   Component Value Date/Time    Sodium 137 11/28/2020 04:41 AM    Potassium 3.6 11/28/2020 04:41 AM    Chloride 99 11/28/2020 04:41 AM    CO2 35 (H) 11/28/2020 04:41 AM    Anion gap 3 (L) 11/28/2020 04:41 AM    Glucose 105 (H) 11/28/2020 04:41 AM    BUN 20 11/28/2020 04:41 AM    Creatinine 0.94 11/28/2020 04:41 AM    BUN/Creatinine ratio 21 (H) 11/28/2020 04:41 AM    GFR est AA >60 11/28/2020 04:41 AM    GFR est non-AA >60 11/28/2020 04:41 AM    Calcium 9.2 11/28/2020 04:41 AM    Bilirubin, total 0.5 07/24/2021 07:45 AM    Alk. phosphatase 66 07/24/2021 07:45 AM    Protein, total 6.8 07/24/2021 07:45 AM    Albumin 4.3 07/24/2021 07:45 AM    Globulin 3.5 11/28/2020 04:41 AM    A-G Ratio 1.1 11/28/2020 04:41 AM    ALT (SGPT) 10 07/24/2021 07:45 AM         ASSESSMENT/PLAN      1. Atrial fibrillation              A. CHADSVASC 4   B. Watchman  2. Hypertension  3. Dyslipidemia  4. Patent foramen ovale  5. Aortic stenosis s/p AVR  6. Mitral regurgitation s/p MVR  7. Tricuspid regurgitation s/p prosthetic valve  8. DEL               A. Noncompliant with CPAP  9. Radiation proctitis  10. GI bleeding history  11. CAD, native  15. Bradycardia    Plan for pacemaker implantation (single chamber [facility]) using conscious sedation. Thank you, Dr. Phillips Felty for allowing me to participate in the care of this extraordinarily pleasant male. Please do not hesitate to contact me for further questions/concerns.          [address]  [address] [place] [address]  [phone] / [phone] Fax   [phone] / [phone] Fax

## 2021-08-27 NOTE — PROGRESS NOTES
Urgent report from [address]. 08/27/2021@ 04:09 Please look in , copy of report. A-Fib with IVCD PVC and 3.3 second pause HR 30. Looking in notes pt. Will see cardiologist today.  I will follow up today

## 2021-08-27 NOTE — PROGRESS NOTES
Room #: 2    C/o of fatigue. Still wearing heart monitor. Visit Vitals  BP (!) 142/70 (BP 1 Location: Left upper arm, BP Patient Position: Sitting, BP Cuff Size: Adult)   Pulse 74   Resp 22   Ht 5' 9\" (1.753 m)   Wt 208 lb 3.2 oz (94.4 kg)   SpO2 97%   BMI 30.75 kg/m²         Chest pain:  NO  Shortness of breath:  NO  Edema: NO  Palpitations, skipped beats, rapid heartbeat:  NO  Dizziness:  NO    1. Have you been to the ER, urgent care clinic since your last visit? Hospitalized since your last visit? No    2. Have you seen or consulted any other health care providers outside of the [address] [place] [address] since your last visit? Include any pap smears or colon screening.  No      Refills:  NO

## 2021-08-27 NOTE — PATIENT INSTRUCTIONS
You are scheduled for the following procedure with Dr. Saritha Forrest:  Pacemaker placement at [facility] [address] [facility], [place], [address] [facility]      PLEASE be aware that your procedure date/time is tentative and subject to change due to emergency cases. Procedure date/time:    September 3, 2021  Time: 9:00 am - please arrive by 8:00 am      ARRIVAL time:  (You will need  to be discharged home with.)     o [facility] procedures: please arrive to check in on 2nd floor two hours prior to your procedure the day of your procedure.    o [facility] procedures: arrive to check in on 1st floor near [facility] entrance two hours prior to your procedure. Pre-procedure Labs/Imaging:    o PRE-PROCEDURE LABS NEEDED: YES   o Forms for labwork may be given at appointment. If not, they will be mailed to you. Labs should be completed within 5-7 days of procedure. These can be done at any [facility] or [facility] lab (one is located in [address] [place] [address]. No appointment needed). YES A COVID swab may be needed 4 days prior to your procedure. o YOU MAY NEED PRE-PROCEDURE IMAGING, CHEST CTA OR CARDIAC MRI.       []  Chest CTA     []  Cardiac MRI    ([facility] scheduling to call you)  -  [phone]        Medication Instructions:     Do not stop the following blood thinner prior to procedure: Aspirin         If you take any of the following Diabetic medications, please use as follows:    []  Glucophage/Metformin  -  Hold morning dose on day of procedure.    []  Insulin  -  Hold morning dose on day of procedure.    []  Lantus  -  Reduce dose by ½ evening before procedure. Nothing to eat or drink after midnight before your procedure. You may take all other medications as directed the morning of your procedure with a sip of water unless otherwise indicated. Please contact the office [phone] and ask for a member of Dr. Saritha Forrest' team for procedure questions.  There is a physician on call for the office after hours for immediate needs. FOLLOW UP:   Your appointments will be made for you post procedure based off of discharge instructions. You may have driving restrictions for a short time after your procedure (usually 2-3 days). Pacemaker/ICD patients will be unable to have an MRI until 6 weeks after implant, NO dental work for 8 weeks after your implant. Pacemaker  Discharge Instructions    Please make sure you have received your Temporary Pacemaker identification card with your discharge instructions      MEDICATIONS         Take only the medications prescribed to you at discharge.  You are prescribed an antibiotic to take for 5-7 days. Please do not miss doses of this prescription. ACTIVITY         Return to your normal activity, except as noted below. o Do not lift anything heavier than 10 pounds for 4 weeks with the affected arm. This is how long it takes the muscles to heal, and the leads inside your heart to stabilize their position. o Do not reach above your head with the affected arm for 4 weeks, doing so increases the risk of lead dislodgement.    o It is, however, important to move the affected arm to prevent shoulder stiffness and locking. o Avoid tight clothes or unnecessary pressure over your incision (such as bra straps or seat belts). If it is tender or sensitive to clothing, cover the incision with a soft dressing or pad.  o Avoid driving for at least 72 hours.   o You may resume all other activities after 4 weeks (including exercise, driving, sex)      SHOWERING         Leave the bandage over your incision until your clinic follow up in 10-14 days after the Pacemaker implant. You bandage will be removed in clinic during that appointment.  It is important to keep the bandaged area clean and dry. You may shower around the site until the bandage is removed in clinic.  Thereafter, you may shower after the bandage is removed, washing it gently with soap and water. Do not apply any lotions, powders, or perfumes to the incision line.  Avoid submerging your incision in water (tub baths, hot tubs, or swimming) for four weeks.  Underneath the dressing.  o You will most likely have a Zipline dressing over the incision. This is made with plastic zip ties to hold the incision together and promote better healing. You may note dried blood around this dressing which is normal. Do not attempt to pull this off.   o If you have white steri-strips over your incision (underneath the gauze dressing), they will curl up at the end and fall off, usually within 10 days. Do not pull them off.  - OR -   o You may have a different type of closure for the incision including a dermabond adhesive which will slowly peel and come off on its own once you are able to shower. DISCHARGE PRECAUTIONS         Record your temperature every day, at the same time, until your 10-14 day follow up. A temperature of 100.5 F, or higher, can be the first sign of infection. This should be reported to your Doctor immediately.  You can have an MRI after 6 weeks. You must be aware that any strong magnet or magnetic field can affect your Pacemaker. In general, be careful of metal detectors, heavy machinery, and any area where arc-welding is performed. When approaching a security checkpoint show your Pacemaker ID Card to security personnel.  Always tell your doctor or dentist that you have a Pacemaker. In some cases, antibiotics may be prescribed before certain procedures.  Your temporary identification will be given to you with these instructions. Keep your Pacemaker card in your wallet or on your person at all times. You should receive your permanent card, although this may take up to 8-12 weeks. If you do not receive your permanent card, please call the office at [phone] or the phone number provided on your temporary card for the pacemaker company. TAKING YOUR PULSE         Take your pulse the same time every day, preferably in the morning, until your follow up.  Sit down and rest for 5 minutes prior to taking your pulse.  Take your pulse for 1 full minute, use a clock or stop watch with a second hand.  To feel your pulse, use the first two fingers of one hand; place them on the thumb side of the wrist of the opposite hand. The pulse will be steady, regular and throbbing.  Call the physician if your pulse is less than 40 beats per minute. SYMPTOMS THAT NEED TO BE REPORTED IMMEDIATELY         Temperature more than 100.4 F     Redness or warmth at the incision site, or pain for longer than the first 5 days after the implant.  Drainage from the incision site.  Swelling around the incision site.  Shortness of breath.  Rapid heart rate or palpitations.  Dizziness, lightheadedness, fainting.  Slow pulse below 40 beats per minute.  REMEMBER: If you feel something is an emergency or cannot be handled over the phone, call 911 or go to the closest emergency room. Kirstie Parker MD  Cardiac Electrophysiology / Cardiology    [address] [place] [address], [address], [address] [place] [address]  [phone] / [phone] Fax       [phone] / [phone] Fax          Restrictions for Pacemakers and ICDs   Follow up will be scheduled for 10-14 days post implant with our device clinic and Oc Brennan NP and then 3 months post implant with Dr. Santo Tavera. This is subject to change based off of discharge orders placed by MD or SULEIMAN.    Restrictions:    NO raising affected arm above shoulder height until: 1 month (or 4 weeks) post implant   NO lifting (with affected arm) heavier than 10 pounds until:  1 month (or 4 weeks) post implant, slowly work back into regular activity after   NO fast, swinging motions (raking, golfing/swimming/power walking) until:  1 month (or 4 weeks) post implant, slowly work back into regular activity after   NO arc welding or chainsaw use: ICD: NOT allowed  Pacemaker: 1 month (or 4 weeks) post implant. Refer to patient number on device card to reference certain equipment. NO soaking in water (bathtub, hot tub, pool, river, ocean, etc.): 1 month (or 4 weeks) post implant or until completely healed   Allowed to shower: Ok to get bandage wet, OK to shower after bandage removed. Do not let shower beat on incision site. Pat dry. NO dental work for 8 weeks after your implant. (antibiotics only needed with certain procedures)   MRI compatible devices AND leads:  Must wait until 6 weeks after implant. DRIVING: No driving for 3 days, or longer depending on MD's recommendations. You must wear seatbelt per [place]. If this is uncomfortable, use a pad or towel over the site or avoid driving until discomfort lessens. **Remote monitoring is STRONGLY recommended for our device clinic and instruction will be given based off of your device and device company. ATTENTION:  Patients of Dr. Davion Franks or Ozzie Augustin NP at [facility]    If you will require a work release form or letter, FMLA paperwork, DMV paperwork, other disability paperwork, pre-procedure clearance or any additional/alternate forms to be filled out for release of activity or clearance purposes, including pre or post procedure, please note the following guidelines:    1. You must submit request to the office in person, by mail or fax to the following address:    [address] [place] [address], [facility] [address] [phone]  Fax: [phone]    2. If you send request in My Chart, please include the requested information. Requests without proper information included, may not be met.   3. Please include the following information if you are requesting a work release or clearance letter without additional forms.   - Patient name and date of birth  - If it is a work release form, a description of your job and what functions you are required to perform, including the use of heavy machinery as well as lifting/pushing/pulling (including weights). - Address, fax number or contact information where you would like it to be sent once it has been filled out. - Forms without information including contact information for return will be mailed to the address in your chart. - EXACT detail regarding your request or timeline that is not previously mentioned. 4. Please refer to your pre/post procedure instructions for a list of restrictions required after procedures performed by Dr. Mike Mckeon so that you have information needed to provide proper requests for documentation of work release. 5. Please allow up to 7-10 business days for return of FMLA, work clearance, DMV, or disability paperwork to be completed and returned. This includes any forms submitted during appointments. If letters/forms are submitted with missing information not included by patient and/or family members with initial request, it may take an additional 7-10 business days to be corrected and re-submitted. 6. Requests for pre-procedure clearance must be made no less than 48 hours prior to upcoming procedure. (If you have not been seen in over a year, you may be required to have an appointment prior to release). 7. Please note that we will address requests made that are specific to the condition or illnesses that we are treating AND/OR surrounding procedures performed by Dr. Mike Mckeon. ALL other requests for release or clearance must be sent to your cardiologist or primary care provider. Examples may include: pacemaker issues, ICD issues, and arrhythmias.  Clearances needed for additional/alternate issues (i.e. heart failure, coronary disease, etc.) must be submitted to your cardiologist and/or primary care). We appreciate your sincere efforts to support our determination to streamline workflow and create an efficient, responsive environment amidst our busy procedure and clinic days. We strive for quality care and are grateful for the opportunity to serve you.

## 2021-08-30 ENCOUNTER — HOSPITAL ENCOUNTER (OUTPATIENT)
Dept: LAB | Age: 81
Discharge: HOME | End: 2021-08-30
Payer: MEDICARE

## 2021-08-30 ENCOUNTER — TRANSCRIBE ORDER (OUTPATIENT)
Dept: REGISTRATION | Age: 81
End: 2021-08-30

## 2021-08-30 DIAGNOSIS — U07.1 COVID-19: Primary | ICD-10-CM

## 2021-08-30 DIAGNOSIS — U07.1 COVID-19: ICD-10-CM

## 2021-08-30 LAB
APPEARANCE UR: CLEAR
BILIRUB UR QL STRIP: NEGATIVE
BUN SERPL-MCNC: 14 MG/DL (ref 8–27)
BUN/CREAT SERPL: 19 (ref 10–24)
CALCIUM SERPL-MCNC: 8.7 MG/DL (ref 8.6–10.2)
CHLORIDE SERPL-SCNC: 104 MMOL/L (ref 96–106)
CO2 SERPL-SCNC: 27 MMOL/L (ref 20–29)
COLOR UR: YELLOW
CREAT SERPL-MCNC: 0.75 MG/DL (ref 0.76–1.27)
ERYTHROCYTE [DISTWIDTH] IN BLOOD BY AUTOMATED COUNT: 13.8 % (ref 11.6–15.4)
GLUCOSE SERPL-MCNC: 109 MG/DL (ref 65–99)
GLUCOSE UR QL: NEGATIVE
HCT VFR BLD AUTO: 33.7 % (ref 37.5–51)
HGB BLD-MCNC: 11.2 G/DL (ref 13–17.7)
HGB UR QL STRIP: NEGATIVE
KETONES UR QL STRIP: NEGATIVE
LEUKOCYTE ESTERASE UR QL STRIP: NEGATIVE
MCH RBC QN AUTO: 31.7 PG (ref 26.6–33)
MCHC RBC AUTO-ENTMCNC: 33.2 G/DL (ref 31.5–35.7)
MCV RBC AUTO: 96 FL (ref 79–97)
MICRO URNS: NORMAL
NITRITE UR QL STRIP: NEGATIVE
PH UR STRIP: 6.5 [PH] (ref 5–7.5)
PLATELET # BLD AUTO: 156 X10E3/UL (ref 150–450)
POTASSIUM SERPL-SCNC: 4.2 MMOL/L (ref 3.5–5.2)
PROT UR QL STRIP: NEGATIVE
RBC # BLD AUTO: 3.53 X10E6/UL (ref 4.14–5.8)
SARS-COV-2, XPLCVT: NOT DETECTED
SODIUM SERPL-SCNC: 142 MMOL/L (ref 134–144)
SOURCE, COVRS: NORMAL
SP GR UR: 1.01 (ref 1–1.03)
SPECIMEN STATUS REPORT, ROLRST: NORMAL
UROBILINOGEN UR STRIP-MCNC: 0.2 MG/DL (ref 0.2–1)
WBC # BLD AUTO: 4.3 X10E3/UL (ref 3.4–10.8)

## 2021-08-30 PROCEDURE — U0005 INFEC AGEN DETEC AMPLI PROBE: HCPCS

## 2021-09-01 ENCOUNTER — TELEPHONE (OUTPATIENT)
Dept: CARDIOLOGY CLINIC | Age: 81
End: 2021-09-01

## 2021-09-01 NOTE — TELEPHONE ENCOUNTER
Patient's daughter calling to ask about a agency company to the patient's house to assist with ADL's after procedure on 9/03/21, please advise      Alicja Nieves  [phone]

## 2021-09-02 ENCOUNTER — PREP FOR PROCEDURE (OUTPATIENT)
Dept: CARDIOLOGY CLINIC | Age: 81
End: 2021-09-02

## 2021-09-02 RX ORDER — SODIUM CHLORIDE 0.9 % (FLUSH) 0.9 %
5-40 SYRINGE (ML) INJECTION EVERY 8 HOURS
Status: CANCELLED | OUTPATIENT
Start: 2021-09-02

## 2021-09-02 RX ORDER — SODIUM CHLORIDE 0.9 % (FLUSH) 0.9 %
5-40 SYRINGE (ML) INJECTION AS NEEDED
Status: CANCELLED | OUTPATIENT
Start: 2021-09-02

## 2021-09-02 NOTE — TELEPHONE ENCOUNTER
Tito Goodie, NP Arzella Lefort, RN  Caller: Unspecified (Yesterday,  3:05 PM)  He usually does as part of the discharge if needed for physical therapy if needed. .. I can try to put an order in ahead of time and see if it works? ? Sounds like she might need to just help him out for the weekend ? Returned patient call, ID verified using two patient identifiers. Spoke with patient's daughter Jarrett Irene. She states her mother is currently in the hospital and they were concerned that the patient might need some assistance bathing after his procedure. Advised her that typically if PT/OT is needed then Dr. Mamta Manning will order that at discharge. Discussed that her father will still be able to use his arm after the procedure he will just not be able to lift it above his head. She states her father is normally very independent and she thinks he will be fine. Advised her to discuss this with Dr. Mamta Manning tomorrow at his procedure if she still has any questions or concerns. Patient's daughter verbalized understanding and will call with any other questions.

## 2021-09-03 ENCOUNTER — APPOINTMENT (OUTPATIENT)
Dept: GENERAL RADIOLOGY | Age: 81
End: 2021-09-03
Attending: INTERNAL MEDICINE
Payer: MEDICARE

## 2021-09-03 ENCOUNTER — HOSPITAL ENCOUNTER (OUTPATIENT)
Age: 81
Setting detail: OUTPATIENT SURGERY
Discharge: HOME | End: 2021-09-03
Attending: INTERNAL MEDICINE | Admitting: INTERNAL MEDICINE
Payer: MEDICARE

## 2021-09-03 VITALS
DIASTOLIC BLOOD PRESSURE: 74 MMHG | RESPIRATION RATE: 16 BRPM | HEIGHT: 69 IN | OXYGEN SATURATION: 96 % | SYSTOLIC BLOOD PRESSURE: 126 MMHG | WEIGHT: 203.7 LBS | TEMPERATURE: 97.2 F | HEART RATE: 73 BPM | BODY MASS INDEX: 30.17 KG/M2

## 2021-09-03 DIAGNOSIS — R00.1 SEVERE SINUS BRADYCARDIA: ICD-10-CM

## 2021-09-03 PROCEDURE — 33207 INSERT HEART PM VENTRICULAR: CPT | Performed by: INTERNAL MEDICINE

## 2021-09-03 PROCEDURE — 77030033138 HC SUT PGA STRATFX J&J -B: Performed by: INTERNAL MEDICINE

## 2021-09-03 PROCEDURE — 77030037713 HC CLOSR DEV INCIS ZIP STRY -B: Performed by: INTERNAL MEDICINE

## 2021-09-03 PROCEDURE — C1898 LEAD, PMKR, OTHER THAN TRANS: HCPCS | Performed by: INTERNAL MEDICINE

## 2021-09-03 PROCEDURE — 77030018547 HC SUT ETHBND1 J&J -B: Performed by: INTERNAL MEDICINE

## 2021-09-03 PROCEDURE — 77030038613 HC SUT PDS STRATA SPIRL J&J -B: Performed by: INTERNAL MEDICINE

## 2021-09-03 PROCEDURE — 74011250636 HC RX REV CODE- 250/636: Performed by: INTERNAL MEDICINE

## 2021-09-03 PROCEDURE — 77030041279 HC DRSG PRMSL AG MDII -B: Performed by: INTERNAL MEDICINE

## 2021-09-03 PROCEDURE — A4565 SLINGS: HCPCS

## 2021-09-03 PROCEDURE — C1892 INTRO/SHEATH,FIXED,PEEL-AWAY: HCPCS | Performed by: INTERNAL MEDICINE

## 2021-09-03 PROCEDURE — 99152 MOD SED SAME PHYS/QHP 5/>YRS: CPT | Performed by: INTERNAL MEDICINE

## 2021-09-03 PROCEDURE — 2709999900 HC NON-CHARGEABLE SUPPLY: Performed by: INTERNAL MEDICINE

## 2021-09-03 PROCEDURE — 99153 MOD SED SAME PHYS/QHP EA: CPT | Performed by: INTERNAL MEDICINE

## 2021-09-03 PROCEDURE — 71045 X-RAY EXAM CHEST 1 VIEW: CPT

## 2021-09-03 DEVICE — PACE/SENSE LEAD
Type: IMPLANTABLE DEVICE | Status: FUNCTIONAL
Brand: INGEVITY™+

## 2021-09-03 DEVICE — PACEMAKER
Type: IMPLANTABLE DEVICE | Status: FUNCTIONAL
Brand: ACCOLADE™ MRI SR

## 2021-09-03 RX ORDER — SODIUM CHLORIDE 0.9 % (FLUSH) 0.9 %
5-40 SYRINGE (ML) INJECTION EVERY 8 HOURS
Status: DISCONTINUED | OUTPATIENT
Start: 2021-09-03 | End: 2021-09-03 | Stop reason: HOSPADM

## 2021-09-03 RX ORDER — CEFAZOLIN SODIUM 1 G/3ML
INJECTION, POWDER, FOR SOLUTION INTRAMUSCULAR; INTRAVENOUS AS NEEDED
Status: DISCONTINUED | OUTPATIENT
Start: 2021-09-03 | End: 2021-09-03 | Stop reason: HOSPADM

## 2021-09-03 RX ORDER — CEPHALEXIN 500 MG/1
500 CAPSULE ORAL 3 TIMES DAILY
Qty: 15 CAPSULE | Refills: 0 | Status: SHIPPED | OUTPATIENT
Start: 2021-09-03 | End: 2021-09-08

## 2021-09-03 RX ORDER — MIDAZOLAM HYDROCHLORIDE 1 MG/ML
INJECTION, SOLUTION INTRAMUSCULAR; INTRAVENOUS AS NEEDED
Status: DISCONTINUED | OUTPATIENT
Start: 2021-09-03 | End: 2021-09-03 | Stop reason: HOSPADM

## 2021-09-03 RX ORDER — DIPHENHYDRAMINE HYDROCHLORIDE 50 MG/ML
INJECTION, SOLUTION INTRAMUSCULAR; INTRAVENOUS AS NEEDED
Status: DISCONTINUED | OUTPATIENT
Start: 2021-09-03 | End: 2021-09-03 | Stop reason: HOSPADM

## 2021-09-03 RX ORDER — GENTAMICIN SULFATE 80 MG/100ML
80 INJECTION, SOLUTION INTRAVENOUS ONCE
Status: COMPLETED | OUTPATIENT
Start: 2021-09-03 | End: 2021-09-03

## 2021-09-03 RX ORDER — HYDROCODONE BITARTRATE AND ACETAMINOPHEN 5; 325 MG/1; MG/1
1 TABLET ORAL
Status: DISCONTINUED | OUTPATIENT
Start: 2021-09-03 | End: 2021-09-03 | Stop reason: HOSPADM

## 2021-09-03 RX ORDER — FENTANYL CITRATE 50 UG/ML
INJECTION, SOLUTION INTRAMUSCULAR; INTRAVENOUS AS NEEDED
Status: DISCONTINUED | OUTPATIENT
Start: 2021-09-03 | End: 2021-09-03 | Stop reason: HOSPADM

## 2021-09-03 RX ORDER — ONDANSETRON 2 MG/ML
4 INJECTION INTRAMUSCULAR; INTRAVENOUS
Status: DISCONTINUED | OUTPATIENT
Start: 2021-09-03 | End: 2021-09-03 | Stop reason: HOSPADM

## 2021-09-03 RX ORDER — SODIUM CHLORIDE 0.9 % (FLUSH) 0.9 %
5-40 SYRINGE (ML) INJECTION AS NEEDED
Status: DISCONTINUED | OUTPATIENT
Start: 2021-09-03 | End: 2021-09-03 | Stop reason: HOSPADM

## 2021-09-03 RX ORDER — ACETAMINOPHEN 325 MG/1
650 TABLET ORAL
Status: DISCONTINUED | OUTPATIENT
Start: 2021-09-03 | End: 2021-09-03 | Stop reason: HOSPADM

## 2021-09-03 RX ADMIN — GENTAMICIN SULFATE 80 MG: 80 INJECTION, SOLUTION INTRAVENOUS at 10:00

## 2021-09-03 NOTE — PROGRESS NOTES
TRANSFER - OUT REPORT:    Verbal report given to Sheila De La Cruz (name) on Maria Elena Pastor  being transferred to EP lab(unit) for ordered procedure       Report consisted of patients Situation, Background, Assessment and   Recommendations(SBAR). Information from the following report(s) SBAR was reviewed with the receiving nurse. Lines:   Peripheral IV 09/03/21 Left Antecubital (Active)   Site Assessment Clean, dry, & intact 09/03/21 0825   Phlebitis Assessment 0 09/03/21 0825   Infiltration Assessment 0 09/03/21 0825   Dressing Status Clean, dry, & intact 09/03/21 0825   Dressing Type Transparent 09/03/21 0825   Hub Color/Line Status Pink;Flushed; Infusing 09/03/21 0825        Opportunity for questions and clarification was provided.       Patient transported with:   Registered Nurse  Tech

## 2021-09-03 NOTE — DISCHARGE INSTRUCTIONS
Pacemaker  Discharge Instructions    Please make sure you have received your Temporary Pacemaker identification card with your discharge instructions      MEDICATIONS         Take only the medications prescribed to you at discharge.  You are prescribed an antibiotic to take for 5-7 days. Please do not miss doses of this prescription. ACTIVITY         Return to your normal activity, except as noted below. o Do not lift anything heavier than 10 pounds for 4 weeks with the affected arm. This is how long it takes the muscles to heal, and the leads inside your heart to stabilize their position. o Do not reach above your head with the affected arm for 4 weeks, doing so increases the risk of lead dislodgement.    o It is, however, important to move the affected arm to prevent shoulder stiffness and locking. o Avoid tight clothes or unnecessary pressure over your incision (such as bra straps or seat belts). If it is tender or sensitive to clothing, cover the incision with a soft dressing or pad.  o Avoid driving for at least 72 hours.   o You may resume all other activities after 4 weeks (including exercise, driving, sex)      SHOWERING         Leave the bandage over your incision until your clinic follow up in 10-14 days after the Pacemaker implant. You bandage will be removed in clinic during that appointment.  It is important to keep the bandaged area clean and dry. You may shower around the site until the bandage is removed in clinic. Thereafter, you may shower after the bandage is removed, washing it gently with soap and water. Do not apply any lotions, powders, or perfumes to the incision line.  Avoid submerging your incision in water (tub baths, hot tubs, or swimming) for four weeks.  Underneath the dressing.  o You will most likely have a Zipline dressing over the incision. This is made with plastic zip ties to hold the incision together and promote better healing.  You may note dried blood around this dressing which is normal. Do not attempt to pull this off.   o If you have white steri-strips over your incision (underneath the gauze dressing), they will curl up at the end and fall off, usually within 10 days. Do not pull them off.  - OR -   o You may have a different type of closure for the incision including a dermabond adhesive which will slowly peel and come off on its own once you are able to shower. DISCHARGE PRECAUTIONS         Record your temperature every day, at the same time, until your 10-14 day follow up. A temperature of 100.5 F, or higher, can be the first sign of infection. This should be reported to your Doctor immediately.  You can have an MRI after 6 weeks. You must be aware that any strong magnet or magnetic field can affect your Pacemaker. In general, be careful of metal detectors, heavy machinery, and any area where arc-welding is performed. When approaching a security checkpoint show your Pacemaker ID Card to security personnel.  Always tell your doctor or dentist that you have a Pacemaker. In some cases, antibiotics may be prescribed before certain procedures.  Your temporary identification will be given to you with these instructions. Keep your Pacemaker card in your wallet or on your person at all times. You should receive your permanent card, although this may take up to 8-12 weeks. If you do not receive your permanent card, please call the office at [phone] or the phone number provided on your temporary card for the pacemaker company. TAKING YOUR PULSE         Take your pulse the same time every day, preferably in the morning, until your follow up.  Sit down and rest for 5 minutes prior to taking your pulse.  Take your pulse for 1 full minute, use a clock or stop watch with a second hand.      To feel your pulse, use the first two fingers of one hand; place them on the thumb side of the wrist of the opposite hand.  The pulse will be steady, regular and throbbing.  Call the physician if your pulse is less than 40 beats per minute. SYMPTOMS THAT NEED TO BE REPORTED IMMEDIATELY         Temperature more than 100.4 F     Redness or warmth at the incision site, or pain for longer than the first 5 days after the implant.  Drainage from the incision site.  Swelling around the incision site.  Shortness of breath.  Rapid heart rate or palpitations.  Dizziness, lightheadedness, fainting.  Slow pulse below 40 beats per minute.  REMEMBER: If you feel something is an emergency or cannot be handled over the phone, call 911 or go to the closest emergency room.       Iona Elizabeth MD  Cardiac Electrophysiology / Cardiology    [place] [address].  [address], [address] [facility], [address]  [place], [facility] [address].         [facility], [facility]  [phone] / [phone] Fax       [phone] / [phone] Fax

## 2021-09-03 NOTE — Clinical Note
Bilateral chest clipped prepped with ChloraPrep and draped. Wet prep solution applied at: 945. Wet prep solution dried at: 948. Wet prep elapsed drying time: 3 mins.

## 2021-09-03 NOTE — H&P
HISTORY OF PRESENT ILLNESS      Analisa Read is a 79 y.o. male with atrial fibrillation, CAD, hypertension, AVR/MVR/TVR, dyslipidemia, prostate cancer/radiation proctitis and GI bleeding referred for discussion regarding LAAO. He is currently on eliquis and continues to have rectal bleeding. He underwent successful left atrial appendage occlusion utilizing WATCHMAN device and is on ASA 81mg daily. Last visit, we encouraged increase mobility and exercise. He recently wore a loop monitor for Dr. Sharyn Reed which demonstrated an episode of AF with a 3.2 second pause. He continues to have recurrent prolonged pauses on monitoring.        PAST MEDICAL HISTORY     Past Medical History:   Diagnosis Date    Arthritis     Atrial fibrillation ([address]     CAD (coronary artery disease)     Chronic pain     legs/knee    GERD (gastroesophageal reflux disease)     Hx of carcinoma in situ of prostate     Hyperlipidemia     Hypertension     Insomnia     DEL (obstructive sleep apnea)     Radiation proctitis     Vitamin D deficiency            PAST SURGICAL HISTORY     Past Surgical History:   Procedure Laterality Date    COLONOSCOPY N/A 5/8/2020    COLONOSCOPY performed by Teja Good MD at [address] [place] [address] COLONOSCOPY N/A 6/8/2020    COLONOSCOPY performed by Emily Rollins MD at [place] [address] N/A 11/23/2020    FLEXIBLE SIGMOIDOSCOPY WITH APC performed by Teja Good MD at [address] [facility] HX AORTIC VALVE REPLACEMENT  2015    and mitral valve repair, left atrial cryo maze    HX HEENT      melanoma removed head    HX HERNIA REPAIR  2009    Right    HX HERNIA REPAIR      left inguinal hernia repair    HX HERNIA REPAIR Left 12/01/2016    lap left inguinal hernia repair with mesh    HX KNEE REPLACEMENT      Bilateral    HX ORTHOPAEDIC      BILATERAL KNEE REPLACEMENT    HX ORTHOPAEDIC      CARPEL TUNNEL REPAIR-RIGHT    HX OTHER SURGICAL  2015    closure of patent foramen ovale    HX OTHER SURGICAL  10/2020    watchman implant for afib / Dr. Delano Glass      42 radiation treatments          ALLERGIES     No Known Allergies       FAMILY HISTORY     Family History   Problem Relation Age of Onset    Cancer Mother     Cancer Father         prostrate    Cancer Brother         prostrate ca    Anesth Problems Neg Hx     negative for cardiac disease       SOCIAL HISTORY     Social History     Socioeconomic History    Marital status:      Spouse name: Not on file    Number of children: Not on file    Years of education: Not on file    Highest education level: Not on file   Tobacco Use    Smoking status: Never Smoker    Smokeless tobacco: Never Used   Substance and Sexual Activity    Alcohol use: Yes     Alcohol/week: 3.0 standard drinks     Types: 3 Cans of beer per week    Drug use: No    Sexual activity: Not Currently     Social Determinants of Health     Financial Resource Strain:     Difficulty of Paying Living Expenses:    Food Insecurity:     Worried About Running Out of Food in the Last Year:     Ran Out of Food in the Last Year:    Transportation Needs:     Lack of Transportation (Medical):  Lack of Transportation (Non-Medical):    Physical Activity:     Days of Exercise per Week:     Minutes of Exercise per Session:    Stress:     Feeling of Stress :    Social Connections:     Frequency of Communication with Friends and Family:     Frequency of Social Gatherings with Friends and Family:     Attends Latter day Services:     Active Member of Clubs or Organizations:     Attends Club or Organization Meetings:     Marital Status:          MEDICATIONS     No current facility-administered medications for this encounter. I have reviewed the nurses notes, vitals, problem list, allergy list, medical history, family, social history and medications.        REVIEW OF SYMPTOMS      General: Pt denies excessive weight gain or loss. Pt is able to conduct ADL's  HEENT: Denies blurred vision, headaches, hearing loss, epistaxis and difficulty swallowing. Respiratory: Denies cough, congestion, shortness of breath, ABDUL, wheezing or stridor. Cardiovascular: Denies precordial pain, palpitations, edema or PND  Gastrointestinal: Denies poor appetite, indigestion, abdominal pain or blood in stool  Genitourinary: Denies hematuria, dysuria, increased urinary frequency  Musculoskeletal: Denies joint pain or swelling from muscles or joints  Neurologic: Denies tremor, paresthesias, headache, or sensory motor disturbance  Psychiatric: Denies confusion, insomnia, depression  Integumentray: Denies rash, itching or ulcers. Hematologic: Denies easy bruising, bleeding       PHYSICAL EXAMINATION      Vitals: see vitals section  General: Well developed, in no acute distress. HEENT: No jaundice, oral mucosa moist, no oral ulcers  Neck: Supple, no stiffness, no lymphadenopathy, supple  Heart:  irreg irreg, no murmur, gallop or rub, no jugular venous distention  Respiratory: Clear bilaterally x 4, no wheezing or rales  Abdomen:   Soft, non-tender, bowel sounds are active. Extremities:  No edema, normal cap refill, no cyanosis. Musculoskeletal: No clubbing, no deformities  Neuro: A&Ox3, speech clear, gait stable, cooperative, no focal neurologic deficits  Skin: Skin color is normal. No rashes or lesions.  Non diaphoretic, moist.  Vascular: 2+ pulses symmetric in all extremities       DIAGNOSTIC DATA      EKG:     Visit Vitals  /75 (BP 1 Location: Left upper arm, BP Patient Position: At rest)   Pulse 65   Temp 97.2 °F (36.2 °C)   Resp 17   Ht 5' 9\" (1.753 m)   Wt 203 lb 11.2 oz (92.4 kg)   SpO2 96%   BMI 30.08 kg/m²          LABORATORY DATA      Lab Results   Component Value Date/Time    WBC 4.3 08/27/2021 12:00 AM    HGB 11.2 (L) 08/27/2021 12:00 AM    HCT 33.7 (L) 08/27/2021 12:00 AM    PLATELET 587 47/11/6476 12:00 AM    MCV 96 08/27/2021 12:00 AM Lab Results   Component Value Date/Time    Sodium 142 08/27/2021 12:00 AM    Potassium 4.2 08/27/2021 12:00 AM    Chloride 104 08/27/2021 12:00 AM    CO2 27 08/27/2021 12:00 AM    Anion gap 3 (L) 11/28/2020 04:41 AM    Glucose 109 (H) 08/27/2021 12:00 AM    BUN 14 08/27/2021 12:00 AM    Creatinine 0.75 (L) 08/27/2021 12:00 AM    BUN/Creatinine ratio 19 08/27/2021 12:00 AM    GFR est AA 99 08/27/2021 12:00 AM    GFR est non-AA 86 08/27/2021 12:00 AM    Calcium 8.7 08/27/2021 12:00 AM    Bilirubin, total 0.5 07/24/2021 07:45 AM    Alk. phosphatase 66 07/24/2021 07:45 AM    Protein, total 6.8 07/24/2021 07:45 AM    Albumin 4.3 07/24/2021 07:45 AM    Globulin 3.5 11/28/2020 04:41 AM    A-G Ratio 1.1 11/28/2020 04:41 AM    ALT (SGPT) 10 07/24/2021 07:45 AM         ASSESSMENT/PLAN      1. Atrial fibrillation              A. CHADSVASC 4   B. Watchman  2. Hypertension  3. Dyslipidemia  4. Patent foramen ovale  5. Aortic stenosis s/p AVR  6. Mitral regurgitation s/p MVR  7. Tricuspid regurgitation s/p prosthetic valve  8. DEL               A. Noncompliant with CPAP  9. Radiation proctitis  10. GI bleeding history  11. CAD, native  15. Bradycardia    Plan for pacemaker implantation (single chamber [facility]) using conscious sedation. Thank you, Dr. Calvin Arrington for allowing me to participate in the care of this extraordinarily pleasant male. Please do not hesitate to contact me for further questions/concerns.          [address]  [address] [place] [address], [address], [facility]    [address] [facility]  [phone] / [phone] Fax   [phone] / [phone] Fax

## 2021-09-03 NOTE — PROGRESS NOTES
TRANSFER - IN REPORT:    Verbal report received from Andra Jordan RN(name) on Sharmin Fire  being received from EP lab(unit) for routine progression of care      Report consisted of patients Situation, Background, Assessment and   Recommendations(SBAR). Information from the following report(s) Procedure Summary was reviewed with the receiving nurse. Opportunity for questions and clarification was provided. Assessment completed upon patients arrival to unit and care assumed. 1035: Patient received from EP lab. Patient with aquacel to left upper chest. Site clean, dry and intact. No hematoma. VS stable. Patient with no complaints at this time. Ice pack applied. 1100: Xray at bedside    1141: Patient dangled on the side of the bed. Patient with aquacel to left upper chest. Site clean, dry and intact. No hematoma. VS stable. Patient with no complaints at this time. Sling applied. 1145: Discharge instructions and prescriptions reviewed with patient and his daughter. Opportunity provided for questions. Patient and daughter verbalized understanding. Signed copy of discharge placed in the front of patient's chart. 1150: Patient ambulated in the hallway. Gait steady. No complaints at this time. 1155:  IV and tele removed. 1215: Patient escorted via wheelchair to [facility] to visit his wife. Patient daughter driving. Patient discharged into care of daughter.

## 2021-09-03 NOTE — Clinical Note
TRANSFER - IN REPORT:     Verbal report received from: Steven. Report consisted of patient's Situation, Background, Assessment and   Recommendations(SBAR). Opportunity for questions and clarification was provided. Assessment completed upon patient's arrival to unit and care assumed. Patient transported with a Registered Nurse.

## 2021-09-03 NOTE — PROCEDURES
Successful implantation of a single chamber pacemaker using conscious sedation    Timmy Mireles MD No

## 2021-09-16 ENCOUNTER — OFFICE VISIT (OUTPATIENT)
Dept: CARDIOLOGY CLINIC | Age: 81
End: 2021-09-16
Payer: MEDICARE

## 2021-09-16 DIAGNOSIS — Z48.89 ENCOUNTER FOR POSTOPERATIVE WOUND CARE: ICD-10-CM

## 2021-09-16 DIAGNOSIS — Z95.0 CARDIAC PACEMAKER IN SITU: Primary | ICD-10-CM

## 2021-09-16 PROCEDURE — 93279 PRGRMG DEV EVAL PM/LDLS PM: CPT | Performed by: NURSE PRACTITIONER

## 2021-09-16 PROCEDURE — 93279 PRGRMG DEV EVAL PM/LDLS PM: CPT | Performed by: INTERNAL MEDICINE

## 2021-09-16 NOTE — PROGRESS NOTES
chargeable 2 wk post implant pacer ck/thresholds & wound care    Normal device function with no recorded episodes. 10 years remaining battery longevity. Patient presents for wound check post-device implantation. The dressing was removed and the site was inspected. The site appeared to be well-healing without drainage or unusual swelling or redness. Tender to touch with some red areas of probable tape burn. Old bruising noted - yellow/green & dissipating. Denies fevers. Future Appointments   Date Time Provider Vinay Latosha   9/21/2021  9:30 AM Meme Mela [address]   12/8/2021  1:00 PM PACEMAKER, STFREDDY LUNDBERG BS AMB   12/8/2021  1:20 PM Adriel Hansen MD CAVSF BS AMB   1/21/2022 10:20 AM Ed Fine MD CAVIR BS AMB   3/29/2022  9:45 AM [place] [address], [facility] CAVSF BS AMB         Continue follow up in device clinic as planned. See scanned document for details.

## 2021-09-21 ENCOUNTER — HOSPITAL ENCOUNTER (OUTPATIENT)
Dept: PHYSICAL THERAPY | Age: 81
Discharge: HOME | End: 2021-09-21
Payer: MEDICARE

## 2021-09-21 VITALS
HEIGHT: 68 IN | DIASTOLIC BLOOD PRESSURE: 72 MMHG | SYSTOLIC BLOOD PRESSURE: 140 MMHG | OXYGEN SATURATION: 96 % | WEIGHT: 205 LBS | HEART RATE: 73 BPM | BODY MASS INDEX: 31.07 KG/M2

## 2021-09-21 PROCEDURE — 97110 THERAPEUTIC EXERCISES: CPT

## 2021-09-21 PROCEDURE — 97161 PT EVAL LOW COMPLEX 20 MIN: CPT

## 2021-09-21 PROCEDURE — 97763 ORTHC/PROSTC MGMT SBSQ ENC: CPT

## 2021-09-21 NOTE — PROGRESS NOTES
[address] [facility]  [address] [place] [address]      EVALUATION    NAME: Ivone Burrows AGE: 79 y.o. GENDER: male  DATE: 9/21/2021  REFERRING PHYSICIAN:Shayan Fine MD  HISTORY AND BACKGROUND:  Patient returns to clinic for evaluation of bilateral LE lymphedema. Arrives with custom flat knit stockings donned at home. Pacemaker placement 9/3/2021. Patient initially referred to clinic by cardiologist for evaluation and treatment of bilateral LE edema. Per cardiology note, patient has A-fib with Watchman device. EF 55-60% 12/29/2020, HTN, dyslipidemia, s/p AVR/MVR 1/8/2015, and DEL with patient compliant with home management. See further medical/surgical history below. Patient reports onset of LE swelling s/p XRT for treatment of prostate cancer. Cancer history per Raffi Garcia note as follows:  Low volume, high risk prostate cancer, cT1c. CT abdomen/pelvis with contrast negative for metastatic disease 2/7/2019. Patient completed XRT to prostate +pelvic LN 5/29/2019. ADT two years. Surveillance every 6 months. 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   Patient reports feelings of frustration, lack of understanding regarding lymphedema management, negative impact of LE swelling home routine activities, leisure activities, fit of shoes/clothing, negative impact on sleep. Lymphedema Life Impact Scale: 4/68; 6% impairment. Tinetti: 23/28, moderate risk  For falls. Past Medical History:   Past Medical History:   Diagnosis Date    Arthritis     Atrial fibrillation ([address]     CAD (coronary artery disease)     Chronic pain     legs/knee    GERD (gastroesophageal reflux disease)     Hx of carcinoma in situ of prostate     Hyperlipidemia     Hypertension     Insomnia     DEL (obstructive sleep apnea)     Radiation proctitis     Vitamin D deficiency      Past Surgical History:   Procedure Laterality Date    COLONOSCOPY N/A 5/8/2020    COLONOSCOPY performed by Edwar Miles MD at [address] [facility] COLONOSCOPY N/A 6/8/2020    COLONOSCOPY performed by Kayli Zheng MD at [address] [place] [address] N/A 11/23/2020    FLEXIBLE SIGMOIDOSCOPY WITH APC performed by Edwar Miles MD at [address] [place] [address] HX AORTIC VALVE REPLACEMENT  2015    and mitral valve repair, left atrial cryo maze    HX HEENT      melanoma removed head    HX HERNIA REPAIR  2009    Right    HX HERNIA REPAIR      left inguinal hernia repair    HX HERNIA REPAIR Left 12/01/2016    lap left inguinal hernia repair with mesh    HX KNEE REPLACEMENT      Bilateral    HX ORTHOPAEDIC      BILATERAL KNEE REPLACEMENT    HX ORTHOPAEDIC      CARPEL TUNNEL REPAIR-RIGHT    HX OTHER SURGICAL  2015    closure of patent foramen ovale    HX OTHER SURGICAL  10/2020    watchman implant for afib / Dr. Richie Saunders      42 radiation treatments     Current Medications:    Current Outpatient Medications   Medication Sig    furosemide (LASIX) 40 mg tablet take 1 tablet by mouth once daily    amLODIPine (NORVASC) 5 mg tablet Take 1 Tab by mouth daily.  lisinopriL (PRINIVIL, ZESTRIL) 20 mg tablet Take 1 Tab by mouth daily.  aspirin delayed-release 81 mg tablet Take 81 mg by mouth daily.     polyvinyl alcohol (ARTIFICIAL TEARS, POLYVIN ALC,) 1.4 % ophthalmic solution Administer 1 Drop to both eyes as needed.  ipratropium (ATROVENT HFA) 17 mcg/actuation inhaler Take 2 Puffs by inhalation as needed. (Patient not taking: Reported on 9/3/2021)    ferrous sulfate 325 mg (65 mg iron) cpER Take 1 Tab by mouth every other day.  tamsulosin (FLOMAX) 0.4 mg capsule Take 0.8 mg by mouth two (2) times a day.  cholecalciferol (VITAMIN D3) 1,000 unit tablet Take 2,000 Units by mouth two (2) times a day.  GLUCOSAMINE HCL/CHONDR CHING A NA (GLUCOSAMINE-CHONDROITIN) 750-600 mg tab Take 1 Tab by mouth daily.  omega-3 fatty acids-vitamin e 1,000 mg cap Take 1 Cap by mouth every other day.  acetaminophen (TYLENOL) 325 mg tablet Take  by mouth every four (4) hours as needed for Pain.  multivitamin (ONE A DAY) tablet Take 1 Tab by mouth daily.  docusate sodium (COLACE) 100 mg capsule Take 100 mg by mouth two (2) times daily as needed.  finasteride (PROSCAR) 5 mg tablet Take 5 mg by mouth nightly.  pravastatin (PRAVACHOL) 40 mg tablet Take 40 mg by mouth nightly. No current facility-administered medications for this encounter. Allergies: No Known Allergies   Prior Level of Function/Social/Work History/Personal factors and/or comorbidities impacting plan of care: Patient reports no LE swelling prior to XRT 2019 to treat prostate cancer. Retired. Lives with spouse. Recent pacemaker placement. Living Situation: private residence. Trainable Caregiver?: spouse as needed. Daughter assists with donning/doffing garments daily. Self-care/ADLs: indep     Mobility: indep   Sleeping Arrangement:  bed  Previous Therapy:  Attempted wear of OTC compression stockings without successful management of LE edema. Phase I CDT 3/15/2021-4/30/2021, fit with custom flat knit [employer] 3021 knee high compression stockings.   Compression/Lymphedema Equipment:  [employer] 3021 knee high compression stockings, able to don in clinic indep, doff with Medi . SUBJECTIVE:   Patient reports daughter is assisting him with don/doff of custom flat knit knee highs, however, is able to don garment indep, doff garments mod I using Medi . States he is wearing custom stockings every other day, OTC stockings on off days. States OTC stockings tight, difficult to don. Agreeable to proceeding with assessment and treatment today. Patients goals for therapy: \" have my legs checked. \"    OBJECTIVE DATA SUMMARY:   EXAMINATION/PRESENTATION/DECISION MAKING:   Pain: 3/10, LE heaviness, tightness, pain. Pain Scale 1: Numeric (0 - 10)  Pain Intensity 1: 0       Skin and Tissue Assessment:  Dermal Status:  [x]   Intact [x]  Dry: bilateral lower legs   []  Tenuous [x] Flaky: bilateral lower legs   []  Wound/lesion present [x]  Scars: anterior knees, bilateral knee replacement. []  Dermatitis    Texture/Consistency:  [x]  Boggy: mild calf, bilateral. []  Pitting Edema:    []  Brawny []  Combination   []  Fibrotic/Woody: mid posterior calf, R/L    Pigmentation/Color Change:  []  Normal []  Hemosiderin   []  Red []  Erythematous   [x]  Hyperpigmented: lower legs []  Hyperlipodermatosclerosis   Anomalies: na  []  Lymphorrhea []  Vesicles   []  Petechiae []  Warty Vercusis   []  Bullae []  Papilloma   Circulatory:  []  JOSE MIGUEL []  Varicosities:   [x]  Pulses:  []  Vascular studies ruled out DVT in past   []  DVT History    Nails:  []   Normal  [x]   Fungus  Stemmers Sign: positive, bilateral LE  Height:  Height: 5' 8\" (172.7 cm)  Weight:  Weight: 93 kg (205 lb)  BMI:  BMI (calculated): 31.2  (36 or greater: adversely affecting lymphedema)  Volumetric Measurements:   Right:  6627.82 mL Left:  6973.04 mL   % Difference: 5.21%    (See scanned graph)  Range of Motion: Bilateral LE grossly WFL AROM. Strength: bilateral LE grossly 5/5 with MMT.   Sensation: Intact to light touch  Mobility:  Bed/Chair Mobility:  Independent  Transfers:  Independent    Sitting Balance:  good Standing Balance:  Good-   Gait:  Independent; wide base of support Wheelchair Mobility:  na   Endurance:  Intact for community distances Stairs:  (Other) not assessed. Patient reports negotiating 9 stairs using hand rail at home. Safety:  Patient is alert and oriented: x 4   Safety awareness: patient would benefit from AD for ambulation to reduce risk of falls. Fall Risk?:  Moderate, Tinetti score of 22/28. Patient given written fall prevention handout: Yes   Precautions:  Standard lymphedema precautions to include avoiding blood pressure readings, injections and IVs or other procedures/acts that could lead to broken skin on affected area, and avoiding excessive heat, resistive activity or altitude without compression garment    In compliance with CMSs Claims Based Outcome Reporting, the following G-code set was chosen for this patient based on their primary functional limitation being treated: The outcome measure chosen to determine the severity of the functional limitation was the LLIS with a score of 22/28 which was correlated with the impairment scale. ?  Other PT/OT Primary Functional Limitations:     - CURRENT STATUS: CI - 1%-19% impaired, limited or restricted    - GOAL STATUS: CI - 1%-19% impaired, limited or restricted    - D/C STATUS:  CI - 1%-19% impaired, limited or restricted     Physical Therapy Evaluation Charge Determination   History Examination Presentation Decision-Making   MEDIUM  Complexity : 1-2 comorbidities / personal factors will impact the outcome/ POC  MEDIUM Complexity : 3 Standardized tests and measures addressing body structure, function, activity limitation and / or participation in recreation  LOW Complexity : Stable, uncomplicated  Other outcome measures LLIS  LOW       Based on the above components, the patient evaluation is determined to be of the following complexity level: LOW     Evaluation Time: 9:50-10:07 am        17 minutes    TREATMENT PROVIDED:   1. Treatment description:  Patient advised that lymphedema is a chronic, progressive condition. Advised of continued need for consistent compression system for lymphedema management bilateral lower legs. Recommend replacement of custom flat knit stockings, with patient deferring new stockings at this time. Patient advised of benefit of daily wear of custom flat knit stockings vs alternating wear of OTC stockings due to patient reporting OTC stockings tight and difficult to don. Measurements completed for replacement custom stockings, with patient stating he will notify clinic if he chooses to replace compression stockings prior to 6 month follow up appointment. Fit of current custom stockings is adequate, with every other day wear potentially extending effectiveness, to be assessed next visit. Patient Skin care education was performed, with patient advised to apply low pH lotion to extremity using upward strokes to stimulate lymphatic vessels nightly. Treatment time: 10:07-10:20 am Minutes:  13            Orthotic management, sub 1    Compression garment routine:  1. Apply daytime compression stocking to clean, dry extremity first thing in the morning, EVERY MORNING. 2. Wear compression garments until bedtime, adjusting throughout the day as needed should garment wrinkle or crease. Problem areas can be at joints, and top of garment, ensuring proximal aspect of garment positioned appropriately on extremity. 3. Launder garment nightly either by hand or washing machine in a garment bag or pillowcase. Use mild detergent with NO FABRIC SOFTENER, NO BLEACH, NO WOOLITE. Wash in cool/warm water. 4. Dry garment in dryer on low heat right side out in garment bag or pillowcase. 5. Perform skin care to extremity, cleansing skin daily with soap and water, and using low pH lotion, upward strokes to stimulate lymphatic vessels.    6. Daytime compression grments are NOT safe to sleep in, so remove at bedtime. 7. Perform exercise program with compression garments on. Signs/Symptoms of infection include:  Fever, flu-like symptoms, pain/redness/warmthin extremity, sometimes with increase in swelling present. Stop wearing your compression garment if this occurs. Call your doctor immediately or go to the Emergency room to address potential infection. Remove compression with changes in circulation, numbness in extremity different from what you may experience when not wearing compression garment, pain, unexplained shortness of breath. Notify clinic if swelling increase noted prior to next scheduled appt. Night time compression may be needed for optimal management of lymphedema. Return in 6 weeks for follow up appt. 2. Treatment description:  Patient instructed in the following exercises, 10 reps. To continue daily with compression stockings donned. Written home exercise program provided. Heel slides-supine  Glut sets-supine  Long arc quad-sitting  Marching-sitting  Hip adductor sets-sitting  Hip add-sitting    Treatment time: 10:20-10:34 am   Minutes: 14   Therapeutic activity 1      ASSESSMENT:   Nelson Calderón is a 79 y.o. male who presents with stage II LE lymphedema, with onset of swelling occurring s/p XRT treatment of prostate cancer 5/29/2019. Patient reports  Benefit daily use of furosemide use, reporting frequent urination after taking medication. Patient arrives [facility] 3021 custom flat knit compression stockings fit 4/2021. Limb volumes slightly elevated L LE by 64.03 mL, R LE reduced by 346.58 mL. Patient able to manage compression garments at home, using Medi  to don stockings, however, daughter does assist his with don/doff of garments daily. Patient advised of benefit of wearing custom flat knit stockings, with good fit established, and patient c/o OTC stockings being \"tight and difficult to get on. \" Patient did not bring OTC stockings to clinic for fit assessment today. Patient deferring purchasing additional stockings at this time, however, measurements were completed, with patient reporting he will call clinic to send updated measurements if he decides to purchase additional stockings. Patient received instruction in proper skin care to recognize signs/symptoms of and prevent infection, therapeutic exercise, and abbreviated self-MLD with lotion application for independent home program and restorative lymphatic performance. Patient advised to return in 6 months for follow up or prior to that time with LE lymphedema concerns, with patient in agreement with plan. This care is medically necessary due to the infection risk with lymphedema, and to improve functional activities. CDT is necessary to resolve swelling to allow patient to return to wearing normal clothes/footwear, and prevent worsening of symptoms, such as venous stasis ulcerations, infections, or hospitalizations. Patient will be independent with home program strategies to allow improved ADL ability and mobility and to allow patient to return to greatest functional independence. Rehabilitation potential is considered to be Good, with effective home management of bilateral LE lymphedema. Patient would benefit from additional custom stockings, with patient to consider prior to return in 6 months for follow up. PLAN OF CARE: Patient to continue home management of LE lymphedema and return in 6 months. Patient has participated in goal setting and agrees to work toward plan of care. Patient was instructed to call if questions or concerns arise. Thank you for this referral.  Marilu Hunt PT, CLRADHA-JOON   Time Calculation: 44 mins    TREATMENT PLAN EFFECTIVE DATES:   9/21/2021   I have read the above plan of care for Cecil Bolton. I certify the above prescribed services are required by this patient and are medically necessary.   The above plan of care has been developed in conjunction with the lymphedema/physical therapist.       Physician Signature: ____________________________________Date:______________

## 2021-09-21 NOTE — PROGRESS NOTES
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 []   I87.2 Venous Insufficiency   [x]   I89.0 Lymphedema, other secondary  []   I83.019 Venous Stasis Ulcer LE, Right   []   I89.9 Unspecified Lymphatic Disorder []   I83.029 Venous Stasis Ulcer LE, Left   [x]   R60.9 Swelling not relieved by elevation []   Q82.0 Hereditary/ Congenital Lymphedema   []   C50.211 Malignant neoplasm of breast, Right []   G89.3  Cancer associated pain   []   C50.212 Malignant neoplasm of breast, Left []   L03.115 LE Cellulitis, Right   [x]  C61 Malignant prostate cancer []   L03.116 LE Cellulitis, Left     Recommended Product for Diagnosis as noted above:  Units Upper Extremity Rt Lt Units Lower Extremity Rt Lt    Compression Multi-Layered Bandages    Compression Multi-Layered Bandages      Circ-Aid, Ready Wrap, Sigvaris Arm    Inelastic binders (Circ-Aid, Farrow)  []Foot   []Below Knee   []Knee   []Thigh      Circ-Aid Ready Wrap, Sigvaris hand    Oscar Chris, Leg, night use      Tribute Arm, night use    Tribute Leg, night use      Oscar Chris Arm, night use    Jed Sleeve Leg/ Foot, night use      Vasopneumatic Compression Pump  []Arm []Arm w/Shoulder  []Arm w/Vest    Vasopneumatic Compression Pump  []Leg         []Trunk       []Pant      Gradiant Compression Sleeves & Gloves  Custom/ RM Arm:    []CCL1 []CCL2 []CCL3  Custom/ RM Glove: []CCL1 []CCL2 []CCL3     2 Gradient Compression Stockings  Custom/ RM Lower Extremity:   [x]CCL1       []CCL2         []CCL3   []Knee      []Thigh        []Full Length      Jed sleeve arm w/ hand, night use    Tribute Wrap, night use      Compression Bra    Compression Vest          Compression Multi-Layered Bandages     The above patient was referred for treatment of Lymphedema due to the diagnosis indicated above.  Lymphedema is a progressive and chronic condition. It may cause acute infections, muscle atrophy, discomfort, and connective tissue fibrosis with irreversible tissue damage, decreased ROM in the affected limb and diminished functional mobility possibly interfering with independence and ability to work. Recurrent infections and wound complications that commonly occur with Lymphedema often require hospitalization and extensive wound care, thus increasing medical costs. The patient has received complete decongestive therapy which includes manual lymphatic drainage, lymphedema specific exercises, compression bandaging, and hygiene/skin care. Goals of therapy are to reduce the edema and prevent re-accumulation of fluid with its complications. It is medically necessary for this patient to have daytime garments to control this condition. They will need 2 sets (one set to wear and one set to wash for adequate skin care and wearing time). Garments must be replaced every 4-6 months for effectiveness. There are no substitutes available that offer acceptable compression treatment for this Lymphedema patient. If further information is requested, please contact our certified lymphedema therapists at [phone].     [] Shawn Mccracken, PT, DPT, OLAMIDE[facility]JOON  [x] Ibrahima Quick PT, [facility]  [] Que Mayer, PT, DPT, [facility]      Printed MD Name  MD Signature  Date

## 2021-10-01 ENCOUNTER — TELEPHONE (OUTPATIENT)
Dept: CARDIOLOGY CLINIC | Age: 81
End: 2021-10-01

## 2021-10-01 NOTE — TELEPHONE ENCOUNTER
Returned patient's call, ID verified using two patient identifiers   Advised patient to call [phone] to get tracking information on home monitor, provided tech support phone number. Questions answered. To send [employer] message with any further questions.

## 2021-10-01 NOTE — TELEPHONE ENCOUNTER
Patient is calling to follow up on the device, states he has not received it, inquiring about how much longer.           UJNVZ:[phone]

## 2021-10-12 RX ORDER — AMLODIPINE BESYLATE 5 MG/1
TABLET ORAL
Qty: 30 TABLET | Refills: 5 | Status: SHIPPED | OUTPATIENT
Start: 2021-10-12 | End: 2022-03-14

## 2021-12-08 ENCOUNTER — OFFICE VISIT (OUTPATIENT)
Dept: CARDIOLOGY CLINIC | Age: 81
End: 2021-12-08
Payer: MEDICARE

## 2021-12-08 VITALS
HEIGHT: 68 IN | OXYGEN SATURATION: 97 % | SYSTOLIC BLOOD PRESSURE: 110 MMHG | HEART RATE: 72 BPM | BODY MASS INDEX: 30.89 KG/M2 | DIASTOLIC BLOOD PRESSURE: 72 MMHG | RESPIRATION RATE: 16 BRPM | WEIGHT: 203.8 LBS

## 2021-12-08 DIAGNOSIS — Z95.0 CARDIAC PACEMAKER IN SITU: Primary | ICD-10-CM

## 2021-12-08 PROCEDURE — G8536 NO DOC ELDER MAL SCRN: HCPCS | Performed by: INTERNAL MEDICINE

## 2021-12-08 PROCEDURE — G8427 DOCREV CUR MEDS BY ELIG CLIN: HCPCS | Performed by: INTERNAL MEDICINE

## 2021-12-08 PROCEDURE — 93279 PRGRMG DEV EVAL PM/LDLS PM: CPT | Performed by: NURSE PRACTITIONER

## 2021-12-08 PROCEDURE — 99215 OFFICE O/P EST HI 40 MIN: CPT | Performed by: INTERNAL MEDICINE

## 2021-12-08 PROCEDURE — G8752 SYS BP LESS 140: HCPCS | Performed by: INTERNAL MEDICINE

## 2021-12-08 PROCEDURE — G8754 DIAS BP LESS 90: HCPCS | Performed by: INTERNAL MEDICINE

## 2021-12-08 PROCEDURE — 93279 PRGRMG DEV EVAL PM/LDLS PM: CPT | Performed by: INTERNAL MEDICINE

## 2021-12-08 PROCEDURE — G8510 SCR DEP NEG, NO PLAN REQD: HCPCS | Performed by: INTERNAL MEDICINE

## 2021-12-08 PROCEDURE — G0463 HOSPITAL OUTPT CLINIC VISIT: HCPCS | Performed by: INTERNAL MEDICINE

## 2021-12-08 PROCEDURE — 1101F PT FALLS ASSESS-DOCD LE1/YR: CPT | Performed by: INTERNAL MEDICINE

## 2021-12-08 PROCEDURE — G8417 CALC BMI ABV UP PARAM F/U: HCPCS | Performed by: INTERNAL MEDICINE

## 2021-12-08 NOTE — PROGRESS NOTES
Room EP 3  Visit Vitals  /72 (BP 1 Location: Left upper arm, BP Patient Position: Sitting)   Pulse 72   Resp 16   Ht 5' 8\" (1.727 m)   Wt 203 lb 12.8 oz (92.4 kg)   SpO2 97%   BMI 30.99 kg/m²       Chest pain: no  Shortness of breath: when he bends over  Edema: no  Palpitations, Skipped beats, Rapid heartbeat: no  Dizziness: no  Fatigue:tires easily    New diagnosis/Surgeries: no    ER/Hospitalizations: no    Refills:no

## 2022-01-12 ENCOUNTER — TELEPHONE (OUTPATIENT)
Dept: CARDIOLOGY CLINIC | Age: 82
End: 2022-01-12

## 2022-02-01 ENCOUNTER — HOSPITAL ENCOUNTER (OUTPATIENT)
Dept: RADIATION THERAPY | Age: 82
Discharge: HOME | End: 2022-02-01

## 2022-02-01 VITALS — WEIGHT: 197 LBS | HEIGHT: 69 IN | BODY MASS INDEX: 29.18 KG/M2

## 2022-03-14 ENCOUNTER — OFFICE VISIT (OUTPATIENT)
Dept: CARDIOLOGY CLINIC | Age: 82
End: 2022-03-14
Payer: MEDICARE

## 2022-03-14 VITALS
HEART RATE: 70 BPM | SYSTOLIC BLOOD PRESSURE: 96 MMHG | WEIGHT: 207 LBS | BODY MASS INDEX: 31.37 KG/M2 | DIASTOLIC BLOOD PRESSURE: 60 MMHG | OXYGEN SATURATION: 97 % | HEIGHT: 68 IN

## 2022-03-14 DIAGNOSIS — E78.00 PURE HYPERCHOLESTEROLEMIA: ICD-10-CM

## 2022-03-14 DIAGNOSIS — Z95.0 CARDIAC PACEMAKER IN SITU: Primary | ICD-10-CM

## 2022-03-14 DIAGNOSIS — R00.1 BRADYCARDIA: ICD-10-CM

## 2022-03-14 DIAGNOSIS — I50.33 DIASTOLIC CHF, ACUTE ON CHRONIC (HCC): ICD-10-CM

## 2022-03-14 DIAGNOSIS — Z98.890 H/O MITRAL VALVE REPAIR: ICD-10-CM

## 2022-03-14 DIAGNOSIS — Z95.2 S/P AVR (AORTIC VALVE REPLACEMENT): ICD-10-CM

## 2022-03-14 DIAGNOSIS — I25.10 CORONARY ARTERY DISEASE INVOLVING NATIVE CORONARY ARTERY OF NATIVE HEART WITHOUT ANGINA PECTORIS: ICD-10-CM

## 2022-03-14 DIAGNOSIS — I48.91 ATRIAL FIBRILLATION, UNSPECIFIED TYPE (HCC): ICD-10-CM

## 2022-03-14 PROCEDURE — 1101F PT FALLS ASSESS-DOCD LE1/YR: CPT | Performed by: SPECIALIST

## 2022-03-14 PROCEDURE — 99214 OFFICE O/P EST MOD 30 MIN: CPT | Performed by: SPECIALIST

## 2022-03-14 PROCEDURE — G8427 DOCREV CUR MEDS BY ELIG CLIN: HCPCS | Performed by: SPECIALIST

## 2022-03-14 PROCEDURE — G8754 DIAS BP LESS 90: HCPCS | Performed by: SPECIALIST

## 2022-03-14 PROCEDURE — G8417 CALC BMI ABV UP PARAM F/U: HCPCS | Performed by: SPECIALIST

## 2022-03-14 PROCEDURE — G8432 DEP SCR NOT DOC, RNG: HCPCS | Performed by: SPECIALIST

## 2022-03-14 PROCEDURE — G0463 HOSPITAL OUTPT CLINIC VISIT: HCPCS | Performed by: SPECIALIST

## 2022-03-14 PROCEDURE — G8536 NO DOC ELDER MAL SCRN: HCPCS | Performed by: SPECIALIST

## 2022-03-14 PROCEDURE — G8752 SYS BP LESS 140: HCPCS | Performed by: SPECIALIST

## 2022-03-14 NOTE — PROGRESS NOTES
CARDIOLOGY OFFICE NOTE    Shayan Ansari MD, [address], [place], [address] [employer] [place]  Phone [phone]; Fax [phone]  Mobile [phone]   Voice Mail [phone]    LAST OFFICE VISIT : Visit date not found  Josef Brown MD       ATTENTION:   This medical record was transcribed using an electronic medical records/speech recognition system. Although proofread, it may and can contain electronic, spelling and other errors. Corrections may be executed at a later time. Please feel free to contact us for any clarifications as needed. ICD-10-CM ICD-9-CM   1. Cardiac pacemaker in situ  Z95.0 V45.01   2. Bradycardia  R00.1 427.89   3. Atrial fibrillation, unspecified type ([address]  I48.91 427.31   4. H/O mitral valve repair  Z98.890 V15.1   5. S/P AVR (aortic valve replacement)  Z95.2 V43.3   6. Coronary artery disease involving native coronary artery of native heart without angina pectoris  I25.10 414.01   7. Pure hypercholesterolemia  E78.00 272.0   8. Diastolic CHF, acute on chronic [facility])  I50.33 428.33     428.0            Kayce Gomez is a 79 y.o. male with  referred for negativeHTN, dyslipidemia, and AF aortic valve replacement and mitral valve repair status post maze procedure. I have personally obtained the history from the patient. Cardiac risk factors: dyslipidemia, male gender, hypertension  I have personally obtained the history from the patient. HISTORY OF PRESENTING ILLNESS     Kayce Gomez is a 79 y.o. male   with HTN, dyslipidemia, and AF aortic valve replacement and mitral valve repair status post maze procedure and now status post watchman device. Doing well with no interval cardiac issues. Blood pressure has been low at home. But has been asymptomatic. Is asking when his next pacemaker check is.      ACTIVE PROBLEM LIST     Patient Active Problem List    Diagnosis Date Noted    Presence of Watchman left atrial appendage closure device 86/74/7834    Diastolic CHF, acute on chronic ([facility] 11/28/2020    Anasarca 11/27/2020    ABDUL (dyspnea on exertion) 11/27/2020    GI bleed 06/08/2020    Radiation proctitis     DEL (obstructive sleep apnea)     Insomnia     Atrial fibrillation (HCC)     Hyperlipidemia     Hx of carcinoma in situ of prostate     GERD (gastroesophageal reflux disease)     Chronic pain     CAD (coronary artery disease)     Arthritis     Hypertension 10/26/2015    Anemia due to acute blood loss 12/26/2014    Aortic stenosis 12/23/2014    S/P AVR (aortic valve replacement) 12/23/2014    S/P MVR (mitral valve repair) 12/23/2014    S/P Maze operation for atrial fibrillation 12/23/2014    Aortic insufficiency 12/08/2014    Arthritis of knee 07/09/2012           PAST MEDICAL HISTORY     Past Medical History:   Diagnosis Date    Arthritis     Atrial fibrillation ([facility]     CAD (coronary artery disease)     Chronic pain     legs/knee    GERD (gastroesophageal reflux disease)     Hx of carcinoma in situ of prostate     Hyperlipidemia     Hypertension     Insomnia     DEL (obstructive sleep apnea)     Radiation proctitis     Vitamin D deficiency            PAST SURGICAL HISTORY     Past Surgical History:   Procedure Laterality Date    COLONOSCOPY N/A 5/8/2020    COLONOSCOPY performed by Bob Torres MD at [facility] COLONOSCOPY N/A 6/8/2020    COLONOSCOPY performed by Jael Richardson MD at [address] [place] [address] N/A 11/23/2020    FLEXIBLE SIGMOIDOSCOPY WITH APC performed by Bob Torres MD at [facility] 112 HX AORTIC VALVE REPLACEMENT  2015    and mitral valve repair, left atrial cryo maze    HX HEENT      melanoma removed head    HX HERNIA REPAIR  2009    Right    HX HERNIA REPAIR      left inguinal hernia repair    HX HERNIA REPAIR Left 12/01/2016    lap left inguinal hernia repair with mesh    HX KNEE REPLACEMENT      Bilateral    HX ORTHOPAEDIC BILATERAL KNEE REPLACEMENT    HX ORTHOPAEDIC      CARPEL TUNNEL REPAIR-RIGHT    HX OTHER SURGICAL  2015    closure of patent foramen ovale    HX OTHER SURGICAL  10/2020    watchman implant for afib / Dr. Paul Carranza      42 radiation treatments          ALLERGIES     No Known Allergies       FAMILY HISTORY     Family History   Problem Relation Age of Onset    Cancer Mother     Cancer Father         prostrate    Cancer Brother         prostrate ca    Anesth Problems Neg Hx     negative for cardiac disease       SOCIAL HISTORY     Social History     Socioeconomic History    Marital status:    Tobacco Use    Smoking status: Never Smoker    Smokeless tobacco: Never Used   Substance and Sexual Activity    Alcohol use: Yes     Alcohol/week: 3.0 standard drinks     Types: 3 Cans of beer per week    Drug use: No    Sexual activity: Not Currently         MEDICATIONS     Current Outpatient Medications   Medication Sig    furosemide (LASIX) 40 mg tablet take 1 tablet by mouth once daily (Patient taking differently: 20 mg. Patient decreased dose)    lisinopriL (PRINIVIL, ZESTRIL) 20 mg tablet Take 1 Tab by mouth daily.  aspirin delayed-release 81 mg tablet Take 81 mg by mouth daily. TAKES ONE IN MORNING AND ONE IN EVENING    polyvinyl alcohol (ARTIFICIAL TEARS, POLYVIN ALC,) 1.4 % ophthalmic solution Administer 1 Drop to both eyes as needed.  ipratropium (ATROVENT HFA) 17 mcg/actuation inhaler Take 2 Puffs by inhalation as needed.  ferrous sulfate 325 mg (65 mg iron) cpER Take 1 Tab by mouth every other day.  tamsulosin (FLOMAX) 0.4 mg capsule Take 0.8 mg by mouth two (2) times a day.  cholecalciferol (VITAMIN D3) 1,000 unit tablet Take 2,000 Units by mouth two (2) times a day.  GLUCOSAMINE HCL/CHONDR CHING A NA (GLUCOSAMINE-CHONDROITIN) 750-600 mg tab Take 1 Tab by mouth daily.  omega-3 fatty acids-vitamin e 1,000 mg cap Take 1 Cap by mouth every other day.     acetaminophen (TYLENOL) 325 mg tablet Take  by mouth every four (4) hours as needed for Pain.  multivitamin (ONE A DAY) tablet Take 1 Tab by mouth daily.  docusate sodium (COLACE) 100 mg capsule Take 100 mg by mouth two (2) times daily as needed.  finasteride (PROSCAR) 5 mg tablet Take 5 mg by mouth nightly.  pravastatin (PRAVACHOL) 40 mg tablet Take 40 mg by mouth nightly.  amLODIPine (NORVASC) 5 mg tablet take 1 tablet by mouth once daily --HOLD FOR SBP  OR LESS (Patient not taking: Reported on 3/14/2022)     No current facility-administered medications for this visit. I have reviewed the nurses notes, vitals, problem list, allergy list, medical history, family, social history and medications. REVIEW OF SYMPTOMS   Pertinent positive per HPI  General: Pt denies excessive weight gain or loss. Pt is able to conduct ADL's  HEENT: Denies blurred vision, headaches, hearing loss, epistaxis and difficulty swallowing. Respiratory: Denies cough, congestion, shortness of breath, ABDUL, wheezing or stridor. Cardiovascular: Denies precordial pain, palpitations, edema or PND  Gastrointestinal: Denies poor appetite, indigestion, abdominal pain or blood in stool  Genitourinary: Denies hematuria, dysuria, increased urinary frequency  Musculoskeletal: Denies joint pain or swelling from muscles or joints  Neurologic: Denies tremor, paresthesias, headache, or sensory motor disturbance  Psychiatric: Denies confusion, insomnia, depression  Integumentray: Denies rash, itching or ulcers. Hematologic: Denies easy bruising, bleeding     PHYSICAL EXAMINATION      Vitals:    03/14/22 0952   BP: 96/60   Pulse: 70   SpO2: 97%   Weight: 207 lb (93.9 kg)   Height: 5' 8\" (1.727 m)     General: Well developed, in no acute distress.   HEENT: No jaundice, oral mucosa moist, no oral ulcers  Neck: Supple, no stiffness, no lymphadenopathy, supple  Heart:   Irregular  Respiratory: Clear bilaterally x 4, no wheezing or rales  Extremities:  + edema, normal cap refill, no cyanosis. Musculoskeletal: No clubbing, no deformities  Neuro: A&Ox3, speech clear, gait stable, cooperative, no focal neurologic deficits  Skin: Skin color is normal. No rashes or lesions. Non diaphoretic, moist.         DIAGNOSTIC DATA     1.  Echocardiogram                                     9/5/14 Left ventricle: Systolic function was normal. Ejection fraction was   estimated in the range of 55 % to 60 %. There were no regional wall motion   abnormalities. Left atrium: The atrium was mildly dilated. 4/20/15- EF 48%, SHANTANU, atrial septum- poss PFO, MR mild, TR mild/mod, Pulm HTN mod, AV bioprosthesis normal function   6/19/17- EF 45-50%, RVE, SHANTANU, MR/TR mild/mod, AV bioprosthesis exhibits normal function, severe Pulm HTN, TX mild   Atrial septum: There was a secundum septal defect. Color Doppler   evaluation was performed. There was a mild left-to-right shunt. 12/11/18 TTE: LVEF 50%, no WMAs, wall thickness mod incr. RV mild-mod dilated, LA mod-sev dilated, RA mod dilated, mod MR, mod TR, mod pulm HTN, mild PV regurg. AV w/ bioprosthesis, no AS / no AR   3/16/20-EF 50-55%, BVE, Mild concentric hypertrophy, SHANTANU, Mitral valve thickening and repaired with annuloplasty ring. Surgical repair, mild MR/TR, AV leaflet calcification Aortic valve mean gradient is 20.7 mmHg. Aortic valve area is 1.3 cm2. Mild AS, 26 mm bioprosthetic aortic valve. 10/14/20- Limited-DEFINITY- EF 55-60%, No evidence of pericardial effusion. 11/27/20- EF 55%, Moderate concentric hypertrophy, RVE, Aortic valve mean gradient is 25 mmHg.  - bioprosthetic aortic valve,  Mitral valve repaired with annuloplasty ring, mod MR/TR, mod pulm HTN, PAP 59 mmHg   DEWEY-12/29/20-·Watchman device within YIMI shows excellent endothelialization without evidence of braden-device leak, EF 55 - 60%, SHANTANU, bioprosthetic aortic valve- insufficiency is present -mild centralized leaking, mild AI, Mitral valve repaired with annuloplasty ring, mod/severe MR  7/26/21-EF 55 - 60%, Mild concentric hypertrophy, LAE, MV thickening and repaired with annuloplasty ring, mild MR, mild/mod TR, RVSP 38 mmHg,Pulm HTN mild, mild PI, Prosthetic aortic valve. Aortic valve mean gradient is 30.3 mmHg. Mild to moderate AV stenosis, There is a bioprosthetic aortic valve,mild AI    2.  Myocardial perfusion study                      (9/5/14)Normal EF ;Normal perfusion     3. Cholesterol profile                            (10/6/15): , HDL 35, ,           (4/19/16): , HDL 72, LDL 65.6,    (05/01/17)- , HDL 85, LDL 65 , TG 55   11/2/17- , HDL 73, TG 75   12/11/18- , HDL 61, LDL 32, TG 60   7/24/21- , HDL 52, LDL 60, TG 66    4. LE venous duplex                                           (10/10/14)   Right leg mod. 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 5. Watchman 10/13/20     6. Carotid Doppler   5/11/20- 1-49% Kris    7.9/3/21:implantation of a [employer] single chamber pacemaker per Dr. Claudia Padgett    8. Holter  8/16/21-AF occurred 305 time(s) with HR range of 24 - 123;  Total AF [place] 96%  Pauses >2 seconds occurred 40 time(s) with longest pause 3.9 seconds         LABORATORY DATA            Lab Results   Component Value Date/Time    WBC 4.3 08/27/2021 12:00 AM    HGB 11.2 (L) 08/27/2021 12:00 AM    HCT 33.7 (L) 08/27/2021 12:00 AM    PLATELET 775 49/75/7688 12:00 AM    MCV 96 08/27/2021 12:00 AM      Lab Results   Component Value Date/Time    Sodium 142 08/27/2021 12:00 AM    Potassium 4.2 08/27/2021 12:00 AM    Chloride 104 08/27/2021 12:00 AM    CO2 27 08/27/2021 12:00 AM    Anion gap 3 (L) 11/28/2020 04:41 AM    Glucose 109 (H) 08/27/2021 12:00 AM    BUN 14 08/27/2021 12:00 AM    Creatinine 0.75 (L) 08/27/2021 12:00 AM BUN/Creatinine ratio 19 08/27/2021 12:00 AM    GFR est AA 99 08/27/2021 12:00 AM    GFR est non-AA 86 08/27/2021 12:00 AM    Calcium 8.7 08/27/2021 12:00 AM    Bilirubin, total 0.5 07/24/2021 07:45 AM    Alk. phosphatase 66 07/24/2021 07:45 AM    Protein, total 6.8 07/24/2021 07:45 AM    Albumin 4.3 07/24/2021 07:45 AM    Globulin 3.5 11/28/2020 04:41 AM    A-G Ratio 1.1 11/28/2020 04:41 AM    ALT (SGPT) 10 07/24/2021 07:45 AM           ASSESSMENT/RECOMMENDATIONS:.      1. AF  -He is status post watchman device now just on Plavix and 5 mg a day  -Interval issues atrial fibrillation    2. LE edema  -I believe he is still followed in lymphedema clinic    3. HTN  -Blood pressure is low and will start by reducing his diuretic which she is currently taking Lasix 20 mg daily to 20 mg every other day  -Instructed him to follow his blood pressures at home and follow-up with me in 8 weeks for blood pressure check and if it remains low we will reduce his amlodipine  -Taking aspirin 81 mg in the morning and at nighttime check CBC and basic metabolic profile     4. Dyslipidemia  -Given a requisition to have his cholesterol checked cholesterol is at goal    5. Moderate PFO with left to right flow and CHRISTOPHER  -Aspirin 62 mg a day    6. AS and MR s/p AVR and MVR on 1/8/15  - He has moderate MR and moderate TR and bioprosthetic valves. -Echo that he had demonstrated normal heart function 3 5 to 60% with mild concentric hypertrophy and repair annuloplasty ring finger only with impaired mild MR post aortic valve 30 with mild to moderate AAS but then he had TAVR performed.  -We will need echo on return visit. 7. DEL  -States he is compliant    8. Bradycardia  He is in chronic atrial fibrillation but he is having some slowing of his rhythm usually in his 70s now 5040s asymptomatic but I think we will place a loop monitor on him just to ensure that there is no evidence of pauses. He did have a TAVR recently.       8. Return in 6 months or PRN. No orders of the defined types were placed in this encounter. We discussed the expected course, resolution and complications of the diagnosis(es) in detail. Medication risks, benefits, costs, interactions, and alternatives were discussed as indicated. I advised him to contact the office if his condition worsens, changes or fails to improve as anticipated. He expressed understanding with the diagnosis(es) and plan          Follow-up and Dispositions  ·   Return in about 6 months (around 9/14/2022). I have discussed the diagnosis with  Jose Masterson and the intended plan as seen in the above orders. Questions were answered concerning future plans. I have discussed medication side effects and warnings with the patient as well. Thank you,  Stan Liao MD for involving me in the care of  Jose Masterson. Please do not hesitate to contact me for further questions/concerns. Shayan Fine MD, [address] [place] [address] Rd[address]      [facility], [address] [place] [address], [facility]. Laura Sarah.      [phone] / [phone] Fax

## 2022-03-14 NOTE — PROGRESS NOTES
Richar Baugh is a 79 y.o. male    Visit Vitals  BP 96/60 (BP 1 Location: Left upper arm, BP Patient Position: Sitting, BP Cuff Size: Adult)   Pulse 70   Ht 5' 8\" (1.727 m)   Wt 207 lb (93.9 kg)   SpO2 97%   BMI 31.47 kg/m²       Chief Complaint   Patient presents with    Other     AVR    Coronary Artery Disease    Hypertension    Irregular Heart Beat     AFIB       Chest pain NO  SOB NO  Dizziness NO  Swelling NO  Recent hospital visit NO  Refills NO  COVID VACCINE STATUS YES  HAD COVID? NO    PT UNSURE OF DOSAGE OF LISINOPRIL, 40MG OR 20MG.

## 2022-03-14 NOTE — PATIENT INSTRUCTIONS
1) because your blood pressure is low I think we should reduce furosemide also known as Lasix from half a tablet you are taking currently a day to half a tablet every other day. 2) I would like for you to get a CBC and a basic metabolic profile done at [facility] at your convenience    3) I would get your cholesterol checked at your convenience in the next 4 weeks      4) I need for you to return in 8 weeks for me to recheck your blood pressure. It is  my pleasure to have the opportunity to be involved in taking care of you and to provide you the best cardiac care. At the end of every visit I  encourage you to eat healthy and clean and reduce your red meat intake as well as exercise 30 minutes 5 days a week to ensure a healthy heart. If you are a smoker , it will be essential that you stop smoking to reduce your risk of heart disease. Part of providing you the best heart care possible IS AN ACCURATE KNOWLEDGE OF YOUR MEDICINE. It  will be  essential at each office visit that you provide me with an accurate list of your medicines. When you come into the office you should have that list or another option is lining up your pill bottles  and taking a snapshot with your cell phone of all the medicines you are taking currently and show it to the nurse in the examining room. Inaccurate reporting of your medication to the nurse may lead to adverse events and medical errors. Thank you again for giving me the opportunity to be part of your heart care and it is my pleasure. All the best,    Shayan Coleman MD

## 2022-03-14 NOTE — LETTER
3/14/2022    Patient: Jose Masterson   YOB: 1940   Date of Visit: 3/14/2022     Tommy Betancourt MD  [facility] [phone]  Via Fax: [phone]    Dear Tommy Betancourt MD,      Thank you for referring Mr. James Cabral to [address]  for evaluation. My notes for this consultation are attached. If you have questions, please do not hesitate to call me. I look forward to following your patient along with you.       Sincerely,    Phill Ruggiero MD

## 2022-03-17 LAB
ALBUMIN SERPL-MCNC: 4.3 G/DL (ref 3.6–4.6)
ALP SERPL-CCNC: 55 IU/L (ref 44–121)
ALT SERPL-CCNC: 13 IU/L (ref 0–44)
AST SERPL-CCNC: 22 IU/L (ref 0–40)
BILIRUB DIRECT SERPL-MCNC: 0.15 MG/DL (ref 0–0.4)
BILIRUB SERPL-MCNC: 0.4 MG/DL (ref 0–1.2)
BUN SERPL-MCNC: 16 MG/DL (ref 8–27)
BUN/CREAT SERPL: 18 (ref 10–24)
CALCIUM SERPL-MCNC: 8.8 MG/DL (ref 8.6–10.2)
CHLORIDE SERPL-SCNC: 104 MMOL/L (ref 96–106)
CHOLEST SERPL-MCNC: 117 MG/DL (ref 100–199)
CO2 SERPL-SCNC: 24 MMOL/L (ref 20–29)
CREAT SERPL-MCNC: 0.9 MG/DL (ref 0.76–1.27)
EGFR: 85 ML/MIN/1.73
ERYTHROCYTE [DISTWIDTH] IN BLOOD BY AUTOMATED COUNT: 12.7 % (ref 11.6–15.4)
GLUCOSE SERPL-MCNC: 103 MG/DL (ref 65–99)
HCT VFR BLD AUTO: 31.8 % (ref 37.5–51)
HDLC SERPL-MCNC: 59 MG/DL
HGB BLD-MCNC: 10.6 G/DL (ref 13–17.7)
LDLC SERPL CALC-MCNC: 45 MG/DL (ref 0–99)
MCH RBC QN AUTO: 32.8 PG (ref 26.6–33)
MCHC RBC AUTO-ENTMCNC: 33.3 G/DL (ref 31.5–35.7)
MCV RBC AUTO: 99 FL (ref 79–97)
PLATELET # BLD AUTO: 141 X10E3/UL (ref 150–450)
POTASSIUM SERPL-SCNC: 4.4 MMOL/L (ref 3.5–5.2)
PROT SERPL-MCNC: 6.6 G/DL (ref 6–8.5)
RBC # BLD AUTO: 3.23 X10E6/UL (ref 4.14–5.8)
SODIUM SERPL-SCNC: 144 MMOL/L (ref 134–144)
TRIGL SERPL-MCNC: 62 MG/DL (ref 0–149)
VLDLC SERPL CALC-MCNC: 13 MG/DL (ref 5–40)
WBC # BLD AUTO: 4.5 X10E3/UL (ref 3.4–10.8)

## 2022-03-17 NOTE — PROGRESS NOTES
Lipids are at goal. No action needed. I would like the cholesterol checked again in 6 mo. Continue to eat healthy and clean. Your blood counts are stable. You always have some underlying anemia last hemoglobin was 10.6 which is pretty much normal for you. Take care and stay healthy.

## 2022-03-19 PROBLEM — Z95.818 PRESENCE OF WATCHMAN LEFT ATRIAL APPENDAGE CLOSURE DEVICE: Status: ACTIVE | Noted: 2020-10-13

## 2022-03-19 PROBLEM — K92.2 GI BLEED: Status: ACTIVE | Noted: 2020-06-08

## 2022-03-19 PROBLEM — R06.09 DOE (DYSPNEA ON EXERTION): Status: ACTIVE | Noted: 2020-11-27

## 2022-03-20 PROBLEM — I50.33 DIASTOLIC CHF, ACUTE ON CHRONIC (HCC): Status: ACTIVE | Noted: 2020-11-28

## 2022-03-20 PROBLEM — R60.1 ANASARCA: Status: ACTIVE | Noted: 2020-11-27

## 2022-03-21 ENCOUNTER — HOSPITAL ENCOUNTER (OUTPATIENT)
Dept: PHYSICAL THERAPY | Age: 82
Discharge: HOME | End: 2022-03-21
Payer: MEDICARE

## 2022-03-21 VITALS — WEIGHT: 205.2 LBS | BODY MASS INDEX: 31.1 KG/M2 | HEIGHT: 68 IN

## 2022-03-21 PROCEDURE — 97530 THERAPEUTIC ACTIVITIES: CPT

## 2022-03-21 PROCEDURE — 97161 PT EVAL LOW COMPLEX 20 MIN: CPT

## 2022-03-21 NOTE — PROGRESS NOTES
Statement of Medical Necessity  Page 1 of 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 []   I87.2 Venous Insufficiency   [x]   I89.0 Lymphedema, other secondary  []   I83.019 Venous Stasis Ulcer LE, Right   []   I89.9 Unspecified Lymphatic Disorder []   I83.029 Venous Stasis Ulcer LE, Left   [x]   R60.9 Swelling not relieved by elevation []   Q82.0 Hereditary/ Congenital Lymphedema   []   C50.211 Malignant neoplasm of breast, Right []   G89.3  Cancer associated pain   []   C50.212 Malignant neoplasm of breast, Left []   L03.115 LE Cellulitis, Right     [x]   C61 Prostate cancer                                                           Jalen Hernandez    1940  Page 2 of 2    Physician Order for DME for Diagnosis of I89.01, bilateral LE secondary lymphedema, as Listed on Statement of Medical Necessity, Page 1        Recommended Product:  Units Upper Extremity Rt Lt Units Lower Extremity Rt Lt    Circ-Aid, Ready Wrap, Sigvaris Arm    Inelastic binders (Circ-Aid, Farrow)  []Foot   []Below Knee   []Knee   []Thigh      Circ-Aid Ready Wrap, Sigvaris hand    Oscar Chris, night use []Full Leg  []Lower Leg      Tribute Arm, night use    Tribute, night use  []Full Leg  []Lower Leg      Oscar Chris Arm, night use    Jed Sleeve Leg/ Foot, night use      Gradiant Compression Sleeves & Gloves  []Custom [] RM Arm:  []CCL1 []CCL2 []CCL3  []Custom [] RM Glove: []CCL1 []CCL2 []CCL3     4 Gradient Compression Stockings   [x]Custom  [x]RM Lower Extremity:   [x]CCL1       [x]CCL2         []CCL3   [x]Knee       []Thigh        []Waist Length xx xx    Jed sleeve arm w/ hand, night use    Tribute Wrap, night use      Compression Bra          Compression Vest         The above patient was referred for treatment of Lymphedema due to the diagnosis indicated above.  Lymphedema is a progressive and chronic condition. It may cause acute infections, muscle atrophy, discomfort, and connective tissue fibrosis with irreversible tissue damage, decreased ROM in the affected limb and diminished functional mobility possibly interfering with independence and ability to work. Recurrent infections and wound complications that commonly occur with Lymphedema often require hospitalization and extensive wound care, thus increasing medical costs. The patient has received complete decongestive therapy which includes manual lymphatic drainage, lymphedema specific exercises, compression bandaging, and hygiene/skin care. Goals of therapy are to reduce the edema and prevent re-accumulation of fluid with its complications. It is medically necessary for this patient to have daytime garments to control this condition. They will need 2 sets (one set to wear and one set to wash for adequate skin care and wearing time). Garments must be replaced every 4-6 months for effectiveness. There are no substitutes available that offer acceptable compression treatment for this Lymphedema patient. If further information is requested, please contact our certified lymphedema therapists at [phone].     [] Mallory Arndt, PT, DPT, [facility]  [x] Genell Duverney, PT, [facility]  [] Sarah Beth Davis, PT, DPT, [facility]      Printed  Provider Name Gal Gomez MD Provider Signature  Date    Provider NPI [id number]

## 2022-03-21 NOTE — PROGRESS NOTES
[facility]  [address] [place], [place] [address] [address]      EVALUATION    NAME: Clarisse Crane AGE: 79 y.o. GENDER: male  DATE: 3/21/2022  REFERRING PHYSICIAN:Shayan Fine MD  HISTORY AND BACKGROUND:  Patient returns to clinic for evaluation of bilateral LE lymphedema. Arrives with no compression stockings in place. States he has not worn custom flat knit stockings for 3 months. Patient does report spouse fell, and he is currently taking care of her. Pacemaker placement 9/3/2021. Patient initially referred to clinic by cardiologist for evaluation and treatment of bilateral LE edema. Per cardiology note, patient has A-fib with Watchman device. EF 55-60% 12/29/2020, HTN, dyslipidemia, s/p AVR/MVR 1/8/2015, and DEL with patient compliant with home management. See further medical/surgical history below. Patient reports onset of LE swelling s/p XRT for treatment of prostate cancer. Cancer history per Elba Garcia note as follows:  Low volume, high risk prostate cancer, cT1c. CT abdomen/pelvis with contrast negative for metastatic disease 2/7/2019. Patient completed XRT to prostate +pelvic LN 5/29/2019. ADT two years. Surveillance every 6 months. 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 Lymphedema Life Impact Scale: 0/68; 0% impairment.   Past Medical History: Past Medical History:   Diagnosis Date    Arthritis     Atrial fibrillation ([address]     CAD (coronary artery disease)     Chronic pain     legs/knee    GERD (gastroesophageal reflux disease)     Hx of carcinoma in situ of prostate     Hyperlipidemia     Hypertension     Insomnia     DEL (obstructive sleep apnea)     Radiation proctitis     Vitamin D deficiency      Past Surgical History:   Procedure Laterality Date    COLONOSCOPY N/A 5/8/2020    COLONOSCOPY performed by Ghazal Saravia MD at [address] COLONOSCOPY N/A 6/8/2020    COLONOSCOPY performed by Kirk Garcia MD at [address] [place] [address] N/A 11/23/2020    FLEXIBLE SIGMOIDOSCOPY WITH APC performed by Ghazal Saravia MD at [address] HX AORTIC VALVE REPLACEMENT  2015    and mitral valve repair, left atrial cryo maze    HX HEENT      melanoma removed head    HX HERNIA REPAIR  2009    Right    HX HERNIA REPAIR      left inguinal hernia repair    HX HERNIA REPAIR Left 12/01/2016    lap left inguinal hernia repair with mesh    HX KNEE REPLACEMENT      Bilateral    HX ORTHOPAEDIC      BILATERAL KNEE REPLACEMENT    HX ORTHOPAEDIC      CARPEL TUNNEL REPAIR-RIGHT    HX OTHER SURGICAL  2015    closure of patent foramen ovale    HX OTHER SURGICAL  10/2020    watchman implant for afib / Dr. Nohemy Quick      42 radiation treatments     Current Medications:    Current Outpatient Medications   Medication Sig    furosemide (LASIX) 40 mg tablet take 1 tablet by mouth once daily (Patient taking differently: 20 mg. Patient decreased dose)    lisinopriL (PRINIVIL, ZESTRIL) 20 mg tablet Take 1 Tab by mouth daily.  aspirin delayed-release 81 mg tablet Take 81 mg by mouth daily. TAKES ONE IN MORNING AND ONE IN EVENING    polyvinyl alcohol (ARTIFICIAL TEARS, POLYVIN ALC,) 1.4 % ophthalmic solution Administer 1 Drop to both eyes as needed.     ipratropium (ATROVENT HFA) 17 mcg/actuation inhaler Take 2 Puffs by inhalation as needed.  ferrous sulfate 325 mg (65 mg iron) cpER Take 1 Tab by mouth every other day.  tamsulosin (FLOMAX) 0.4 mg capsule Take 0.8 mg by mouth two (2) times a day.  cholecalciferol (VITAMIN D3) 1,000 unit tablet Take 2,000 Units by mouth two (2) times a day.  GLUCOSAMINE HCL/CHONDR CHING A NA (GLUCOSAMINE-CHONDROITIN) 750-600 mg tab Take 1 Tab by mouth daily.  omega-3 fatty acids-vitamin e 1,000 mg cap Take 1 Cap by mouth every other day.  acetaminophen (TYLENOL) 325 mg tablet Take  by mouth every four (4) hours as needed for Pain.  multivitamin (ONE A DAY) tablet Take 1 Tab by mouth daily.  docusate sodium (COLACE) 100 mg capsule Take 100 mg by mouth two (2) times daily as needed.  finasteride (PROSCAR) 5 mg tablet Take 5 mg by mouth nightly.  pravastatin (PRAVACHOL) 40 mg tablet Take 40 mg by mouth nightly. No current facility-administered medications for this encounter. Allergies: No Known Allergies   Prior Level of Function/Social/Work History/Personal factors and/or comorbidities impacting plan of care: Patient reports no LE swelling prior to XRT 2019 to treat prostate cancer. Retired. Lives with spouse, who suffered recent fall. Patient reports assisting with spouse's care at home currently. Pacemaker placement 9/2021. Living Situation: private residence. Trainable Caregiver?: Daughter as needed   Self-care/ADLs: indep     Mobility: indep   Sleeping Arrangement:  bed  Previous Therapy:  Attempted wear of OTC compression stockings without successful management of LE edema. Phase I CDT 3/15/2021-4/30/2021, fit with custom flat knit Juzo 3021 knee high compression stockings. Most recently seen 9/21/2022 with lymphedema stable. Compression/Lymphedema Equipment:  Juzo 3021 knee high compression stockings, patient discontinued wear 3 months ago. SUBJECTIVE:   Patient reports daughter who lives in [address] here with him today.   States he has been caring for his wife after recent fall. States he stopped wearing compression stockings 3 months ago, reporting he does not need to wear them. Agreeable to proceeding with assessment and treatment today. Patients goals for therapy: \" have my legs checked. \"    OBJECTIVE DATA SUMMARY:   EXAMINATION/PRESENTATION/DECISION MAKING:   Pain: 0/10             Skin and Tissue Assessment:  Dermal Status:  [x]   Intact [x]  Dry: bilateral lower legs   []  Tenuous [x] Flaky: bilateral lower legs   []  Wound/lesion present [x]  Scars: anterior knees, bilateral knee replacement. []  Dermatitis    Texture/Consistency:  [x]  Boggy: mild calf, bilateral lower leg. Fullness malleolar region R/L. []  Pitting Edema:    []  Brawny []  Combination   []  Fibrotic/Woody:    Pigmentation/Color Change:  []  Normal []  Hemosiderin   []  Red []  Erythematous   [x]  Hyperpigmented: lower legs []  Hyperlipodermatosclerosis   Anomalies: na  []  Lymphorrhea []  Vesicles   []  Petechiae []  Warty Vercusis   []  Bullae []  Papilloma   Circulatory:  []  JOSE MIGUEL []  Varicosities:   []  Pulses:  []  Vascular studies ruled out DVT in past   []  DVT History    Nails:  []   Normal  [x]   Fungus  Stemmers Sign: positive, bilateral LE  Height:  Height: 5' 8\" (172.7 cm)  Weight:  Weight: 93.1 kg (205 lb 3.2 oz)  BMI:  BMI (calculated): 31.2  (36 or greater: adversely affecting lymphedema)  Volumetric Measurements: 3/21/2022  Right:  7110.90 mL Left:  7268. 18 mL   % Difference: 2.21%      Volumetric Measurements: 9/21/2021  Right:  6627.82 mL Left:  6973.04 mL   % Difference: 5.21%    (See scanned graph)  Range of Motion: Bilateral LE grossly WFL AROM. Strength: bilateral LE grossly WFL.   Sensation: Intact to light touch  Mobility:  Bed/Chair Mobility:  Independent  Transfers:  Independent    Sitting Balance:  good Standing Balance:  Good-   Gait:  Independent; wide base of support Wheelchair Mobility:  na   Endurance:  Intact for community distances Stairs:  (Other) not assessed. Patient reports negotiating 9 stairs using hand rail at home. Safety:  Patient is alert and oriented: x 4   Safety awareness: patient would benefit from AD for ambulation to reduce risk of falls. Fall Risk?:  Moderate, Tinetti score of 22/28. Patient given written fall prevention handout: Yes   Precautions:  Standard lymphedema precautions to include avoiding blood pressure readings, injections and IVs or other procedures/acts that could lead to broken skin on affected area, and avoiding excessive heat, resistive activity or altitude without compression garment    In compliance with CMSs Claims Based Outcome Reporting, the following G-code set was chosen for this patient based on their primary functional limitation being treated: The outcome measure chosen to determine the severity of the functional limitation was the LLIS with a score of 0/68 which was correlated with the impairment scale. ? Other PT/OT Primary Functional Limitations:     - CURRENT STATUS: CH - 0% impaired, limited or restricted    - GOAL STATUS: CH - 0% impaired, limited or restricted    - D/C STATUS:  CH - 0% impaired, limited or restricted     Physical Therapy Evaluation Charge Determination   History Examination Presentation Decision-Making   MEDIUM  Complexity : 1-2 comorbidities / personal factors will impact the outcome/ POC  MEDIUM Complexity : 3 Standardized tests and measures addressing body structure, function, activity limitation and / or participation in recreation  MEDIUM Complexity : Evolving with changing characteristics  Other outcome measures LLIS  LOW       Based on the above components, the patient evaluation is determined to be of the following complexity level: LOW     Evaluation Time: 9:48-10:15 am        17 minutes    TREATMENT PROVIDED:   1. Treatment description:  Patient advised that lymphedema is a chronic, progressive condition.  Advised of continued need for consistent compression system for lymphedema management bilateral lower legs, with elevation in limb volumes noted when compared with most recent visit as follows: R .08 mL, L .14 mL. Recommend patient return to daily wear of custom flat knit stockings, to return in 3-4 weeks for reassessment of lymphedema. Patient advised we could consider transitioning to OTC stockings due to cost of custom stockings, however, patient has found OTC stockings tight and difficult to don. Measurements completed previous visit for replacement custom stockings, with patient stating at that time he would notify clinic if he chooses to replace compression stockings prior to 6 month follow up appointment. Patient reports today that he does not want to \"spend any more money\" on compression stockings, arriving without compression stockings today. Patient advised of risks of not managing lymphedema, including reaccumulation of protein rich fluid in tissue spaces, placing patient at higher risk of developing cellulitis. Patient agreeable to return to wear of current compression stockings daily at this time. Patient Skin care education was performed, with patient advised to apply low pH lotion to extremity using upward strokes to stimulate lymphatic vessels nightly. Treatment time: 10:15-10:25 am Minutes:  10            Therapeutic activity 1    Compression garment routine:  1. Apply daytime compression stocking to clean, dry extremity first thing in the morning, EVERY MORNING. 2. Wear compression garments until bedtime, adjusting throughout the day as needed should garment wrinkle or crease. Problem areas can be at joints, and top of garment, ensuring proximal aspect of garment positioned appropriately on extremity. 3. Launder garment nightly either by hand or washing machine in a garment bag or pillowcase. Use mild detergent with NO FABRIC SOFTENER, NO BLEACH, NO WOOLITE. Wash in cool/warm water.     4. Dry garment in dryer on low heat right side out in garment bag or pillowcase. 5. Perform skin care to extremity, cleansing skin daily with soap and water, and using low pH lotion, upward strokes to stimulate lymphatic vessels. 6. Daytime compression grments are NOT safe to sleep in, so remove at bedtime. 7. Perform exercise program with compression garments on. Signs/Symptoms of infection include:  Fever, flu-like symptoms, pain/redness/warmthin extremity, sometimes with increase in swelling present. Stop wearing your compression garment if this occurs. Call your doctor immediately or go to the Emergency room to address potential infection. Remove compression with changes in circulation, numbness in extremity different from what you may experience when not wearing compression garment, pain, unexplained shortness of breath. Return in 3-4 weeks for follow up appt. ASSESSMENT:   Maia Noel is a 79 y.o. male who presents with stage II LE lymphedema, with onset of swelling occurring s/p XRT treatment of prostate cancer 5/29/2019. Patient arrives without Juzo 3021 custom flat knit compression stockings today, which were fit 4/2021. Limb volumes elevated L LE by 295.14 mL, R LE elevated by 483.08 mL. Patient able to manage compression garments at home, using Medi  to don stockings, however, reports he has not worn compression stockings for 3 months. Patient advised of benefit of wearing custom flat knit stockings daily, with patient in agreement with returning to wear of custom flat knit compression stockings daily, returning to clinic in 3-4 weeks for reassessment of bilateral LE lymphedema. Patient did not bring OTC stockings to clinic for fit assessment today. Patient deferring purchasing additional stockings at this time.  Patient received instruction in proper skin care to recognize signs/symptoms of and prevent infection, therapeutic exercise, and abbreviated self-MLD with lotion application for independent home program and restorative lymphatic performance. Patient advised to return in 6 months for follow up or prior to that time with LE lymphedema concerns, with patient in agreement with plan. This care is medically necessary due to the infection risk with lymphedema, and to improve functional activities. CDT is necessary to resolve swelling to allow patient to return to wearing normal clothes/footwear, and prevent worsening of symptoms, such as venous stasis ulcerations, infections, or hospitalizations. Patient will be independent with home program strategies to allow improved ADL ability and mobility and to allow patient to return to greatest functional independence. Rehabilitation potential is considered to be Good, with effective home management of bilateral LE lymphedema. Patient would benefit from additional custom stockings, with patient to consider prior to return in 6 months for follow up. PLAN OF CARE:  Manual lymphatic drainage  Compression garment fit  Therapeutic activity  Therapeutic exercise  Vasopneumatic pump for home use       Patient has participated in goal setting and agrees to work toward plan of care. Patient was instructed to call if questions or concerns arise. Thank you for this referral.  Anuradha Ibrahim PT, RADHA-JOON   Time Calculation: 37 mins    TREATMENT PLAN EFFECTIVE DATES:   3/21/2022 to 6/17/2022  I have read the above plan of care for Dakota Christian. I certify the above prescribed services are required by this patient and are medically necessary.   The above plan of care has been developed in conjunction with the lymphedema/physical therapist.       Physician Signature: ____________________________________Date:______________

## 2022-03-29 ENCOUNTER — PATIENT MESSAGE (OUTPATIENT)
Dept: CARDIOLOGY CLINIC | Age: 82
End: 2022-03-29

## 2022-03-29 ENCOUNTER — OFFICE VISIT (OUTPATIENT)
Dept: CARDIOLOGY CLINIC | Age: 82
End: 2022-03-29

## 2022-03-29 DIAGNOSIS — Z95.0 CARDIAC PACEMAKER IN SITU: Primary | ICD-10-CM

## 2022-03-29 PROCEDURE — 93296 REM INTERROG EVL PM/IDS: CPT | Performed by: INTERNAL MEDICINE

## 2022-03-29 NOTE — LETTER
3/29/2022 2:11 PM    Mr. [place] [address]  [address] [address]      Dear Mr. Arya Almonte,    We have received your recent remote monitor check of your implanted device on 3/29/22. Your remaining estimated battery life is 9.5 years and your device is working normally & appropriately. Your next remote monitor check is scheduled for 7/5/22. This is NOT an in-clinic appointment. This transmission is sent from your home monitor. Please make sure your home monitor is plugged into power and within 10 feet of where you sleep. If you have difficulty sending a transmission, please do NOT call our office. Instead, call tech support for your device as they are better able to assist.    [facility] [address]) [phone]    Your next clinic/office check is scheduled for Wednesday, 12/14/22 at 1:40 pm.  You will have your device checked then see the provider. Please bring a complete list of your medications with strengths and dosages to this appointment. If you have any questions, please call the Pacemaker/ICD clinic at the [facility] location at [phone]. We appreciate you staying remotely connected!     Sincerely,    Brooklynn Scott RN, BSN  Device Coordinator  [phone]

## 2022-04-26 ENCOUNTER — HOSPITAL ENCOUNTER (OUTPATIENT)
Dept: PHYSICAL THERAPY | Age: 82
Discharge: HOME | End: 2022-04-26
Payer: MEDICARE

## 2022-04-26 VITALS — BODY MASS INDEX: 31.37 KG/M2 | HEIGHT: 68 IN | WEIGHT: 207 LBS

## 2022-04-26 PROCEDURE — 97140 MANUAL THERAPY 1/> REGIONS: CPT

## 2022-04-26 PROCEDURE — 97760 ORTHOTIC MGMT&TRAING 1ST ENC: CPT

## 2022-04-26 NOTE — PROGRESS NOTES
LYMPHEDEMA PT DAILY TREATMENT NOTE - [facility] 2-15    Patient Name: Bella Sheldon  Date:2022  : 1940  [x]  Patient  Verified  Payor: Beto Hendricks / Plan: [place] MED[place] PART [place] & B / Product Type: Medicare /    In time: 8:55 am Out time: 9:26 am  Total Treatment Time (min): 31  Total Timed Codes (min): 31  1:1 Treatment Time ( only): 31   Visit #: 2   Treatment Area: Lymphedema, not elsewhere classified [I89.0]    SUBJECTIVE  Pain Level (0-10 scale): 2/10, LE/foot pain  Any medication changes, allergies to medications, adverse drug reactions, diagnosis change, or new procedure performed?: [x] No    [] Yes (see summary sheet for update)  LLIS: , onset of LE discomfort  Subjective functional status/changes:   [] No changes reported  Patient decided to return to wear of custom flat knit stockings previously fit in clinic, stating he is wearing them every other day. Returns for reassessment of lymphedema. OBJECTIVE    16  15 Min  min Manual Therapy  Orthotic management    Manual Lymphatic Drainage (MLD):  Area to decongest: LE: bilateral    Completed limb volume measurements bilateral LE, noting R LE improved, however, L LE further elevated by 268.9 mL. Patient advised L LE volume close to level present when treatment initiated. Skin/wound care/debridement: Reviewed skin care principles:   Patient to continue nightly skin care, application low pH lotion using upward strokes. Reviewed s/s of infection, seeking medical attention asap with presentation of symptoms. Lower Extremity Compression: Measured for the following compression products:    LE: bilateral bilateral lower extremity: Knee high    Style: Circular knit    Brand: [facility]    Type: Non-Custom: Size: Power Comfort sock, size large    Vendor: [employer]    Education: Educated patient in compression garment donning and doffing. Daily wear schedule. Daily laundering. Garment lifespan.   Return and reordering process (by bringing prior garments into the clinic). Educated patient to monitor for redness or pressure points on the skin. If new pain occurs they should contact their therapist.    Patient/family demonstrated donning and doffing with indep with assistance of daughter. Home management to continue as follows:    Compression garment routine:  1. Apply daytime compression stocking to clean, dry extremity first thing in the morning, EVERY MORNING. 2. Wear compression garments until bedtime, adjusting throughout the day as needed should garment wrinkle or crease. Problem areas can be at joints, and top of garment, ensuring proximal aspect of garment positioned appropriately on extremity. 3. Launder garment nightly either by hand or washing machine in a garment bag or pillowcase. Use mild detergent with NO FABRIC SOFTENER, NO BLEACH, NO WOOLITE. Wash in cool/warm water. 4. Dry garment in dryer on low heat right side out in garment bag or pillowcase. 5. Perform skin care to extremity, cleansing skin daily with soap and water, and using low pH lotion, upward strokes to stimulate lymphatic vessels. 6. Daytime compression grments are NOT safe to sleep in, so remove at bedtime. 7. Perform exercise program with compression garments on. Signs/Symptoms of infection include:  Fever, flu-like symptoms, pain/redness/warmthin extremity, sometimes with increase in swelling present. Stop wearing your compression garment if this occurs. Call your doctor immediately or go to the Emergency room to address potential infection. Remove compression with changes in circulation, numbness in extremity different from what you may experience when not wearing compression garment, pain, unexplained shortness of breath. Notify clinic if swelling increase noted prior to next scheduled appt. Night time compression may be needed for optimal management of lymphedema. Return in 2 weeks for follow up appt.      Rationale: decrease pain, increase tissue extensibility and decrease edema  to improve the patients ability to perform routine/leisure activities with 50% less pain. With   [] TE   [] TA   [x] MT   [] SC   [x] other: OA Patient Education: [x] Review HEP    [x] MLD Patient Education Continued education in self MLD technique with bathing and skin care. [] Progressed/Changed HEP based on:   [] positioning   [] Kinesiotape   [] Skin care   [] wound care   [] other:   [x] Advised daily wear of custom flat knit compression stockings, laundering nightly. Patient / caregiver re-demonstrated bandaging. [] Yes  [x] No  Compression bandaging/garment precautions reviewed: [x] Yes  [] No     Other Objective/Functional Measures:   Height:  Height: 5' 8\" (172.7 cm)  Weight:  Weight: 93.9 kg (207 lb)   BMI:  BMI (calculated): 31.5  (36 or greater: adversely affecting lymphedema)    right lower 6987.45 ml today compared to 7110.45 ml on 3/21/2022. left lower  7537.08 ml today compared to 7268.18 ml on 3/21/2022. Percent difference today is 7.87% as compared to 2.21% on evaluation. Pain Level (0-10 scale) post treatment: 2/10      ASSESSMENT/Changes in Function:   Patient with c/o LE discomfort, foot pain. Reports he returned to wear of custom flat knit stockings every other day, with current stockings over 6 months old and in need of replacement. Patient resistant to purchasing additional custom stockings, with patient offered the option of transitioning to compression sock, CCL 1, which are more cost effective, potentially easier to don that traditional RM compression stockings. Patient resistant to further appointments for fit of compression socks, however, he did decide to proceed with ordering compression sock and scheduling a follow up appointment for fit of compression garments.  Patient advised replacing custom flat knit compression stockings indicated if effective lymphedema management not attained with wear of RM compression garments. Patient will continue to benefit from skilled PT services to  address swelling, analyze compression product fit and use, instruct in home lymphedema management program and measure for compression products to attain remaining goals. [x]  See Plan of Care  []  See progress note/recertification  []  See Discharge Summary         Progress towards goals / Updated goals: Added Goals 4/26/2022:  1. Establish good fit of RM Power Comfort socks, class 1 socks, large. 2. Reduction in L LE volume by 100 mL to reduce risk of infection, improve skin turgor.     PLAN  []  Upgrade activities as tolerated     [x]  Continue plan of care  []  Update interventions per flow sheet       []  Discharge due to:_  []  Other:_      Jan Meza PT, [facility] 4/26/2022

## 2022-05-06 DIAGNOSIS — I25.10 CORONARY ARTERY DISEASE INVOLVING NATIVE CORONARY ARTERY OF NATIVE HEART WITHOUT ANGINA PECTORIS: Primary | ICD-10-CM

## 2022-05-07 NOTE — PROGRESS NOTES
CARDIOLOGY OFFICE NOTE    Shayan Turpin MD, [address], [place], [address] [facility]  Phone [phone]; Fax [phone]  Mobile [phone]   Voice Mail [phone]    LAST OFFICE VISIT : Visit date not found  Coni Clay MD       ATTENTION:   This medical record was transcribed using an electronic medical records/speech recognition system. Although proofread, it may and can contain electronic, spelling and other errors. Corrections may be executed at a later time. Please feel free to contact us for any clarifications as needed. ICD-10-CM ICD-9-CM   1. Atrial fibrillation, unspecified type ([address]  I48.91 427.31   2. H/O mitral valve repair  Z98.890 V15.1   3. S/P AVR (aortic valve replacement)  Z95.2 V43.3   4. Coronary artery disease involving native coronary artery of native heart without angina pectoris  I25.10 414.01   5. Pure hypercholesterolemia  E78.00 272.0   6. Cardiac pacemaker in situ  Z95.0 V45.01   7. Presence of Watchman left atrial appendage closure device  Z95.818 V45.09            Veronica Villagran is a 79 y.o. male with  referred for negativeHTN, dyslipidemia, and AF aortic valve replacement and mitral valve repair status post maze procedure. I have personally obtained the history from the patient. Cardiac risk factors: dyslipidemia, male gender, hypertension  I have personally obtained the history from the patient. HISTORY OF PRESENTING ILLNESS     Veronica Villagran is a 79 y.o. male   with HTN, dyslipidemia, and AF aortic valve replacement and mitral valve repair status post maze procedure and now status post watchman device. He has noticed SOB worse for last 3 mo. He wears CPAP but when he takes off feels SOB. He will notice SOB during the day. He is able to walk 2 blocks and will walk through it. He has no chest pain. No orthopnea or PND.   He does state that he has had \"leakage problem \"and he reduced his Lasix to 20 mg every other day.   He says he does not have any lower extremity edema but clearly he does by physical exam.       ACTIVE PROBLEM LIST     Patient Active Problem List    Diagnosis Date Noted    Presence of Watchman left atrial appendage closure device 41/90/6236    Diastolic CHF, acute on chronic ([facility] [address] 11/28/2020    Anasarca 11/27/2020    ABDUL (dyspnea on exertion) 11/27/2020    GI bleed 06/08/2020    Radiation proctitis     DEL (obstructive sleep apnea)     Insomnia     Atrial fibrillation (HCC)     Hyperlipidemia     Hx of carcinoma in situ of prostate     GERD (gastroesophageal reflux disease)     Chronic pain     CAD (coronary artery disease)     Arthritis     Hypertension 10/26/2015    Anemia due to acute blood loss 12/26/2014    Aortic stenosis 12/23/2014    S/P AVR (aortic valve replacement) 12/23/2014    S/P MVR (mitral valve repair) 12/23/2014    S/P Maze operation for atrial fibrillation 12/23/2014    Aortic insufficiency 12/08/2014    Arthritis of knee 07/09/2012           PAST MEDICAL HISTORY     Past Medical History:   Diagnosis Date    Arthritis     Atrial fibrillation ([facility] [address]     CAD (coronary artery disease)     Chronic pain     legs/knee    GERD (gastroesophageal reflux disease)     Hx of carcinoma in situ of prostate     Hyperlipidemia     Hypertension     Insomnia     DEL (obstructive sleep apnea)     Radiation proctitis     Vitamin D deficiency            PAST SURGICAL HISTORY     Past Surgical History:   Procedure Laterality Date    COLONOSCOPY N/A 5/8/2020    COLONOSCOPY performed by Diana Rushing MD at [address] [facility] COLONOSCOPY N/A 6/8/2020    COLONOSCOPY performed by Natan Yuan MD at [address] [place] [address] N/A 11/23/2020    FLEXIBLE SIGMOIDOSCOPY WITH APC performed by Diana Rushing MD at [address] HX AORTIC VALVE REPLACEMENT  2015    and mitral valve repair, left atrial cryo maze    HX HEENT      melanoma removed head    HX HERNIA REPAIR  2009    Right    HX HERNIA REPAIR      left inguinal hernia repair    HX HERNIA REPAIR Left 12/01/2016    lap left inguinal hernia repair with mesh    HX KNEE REPLACEMENT      Bilateral    HX ORTHOPAEDIC      BILATERAL KNEE REPLACEMENT    HX ORTHOPAEDIC      CARPEL TUNNEL REPAIR-RIGHT    HX OTHER SURGICAL  2015    closure of patent foramen ovale    HX OTHER SURGICAL  10/2020    watchman implant for afib / Dr. Bryant Corpus      42 radiation treatments          ALLERGIES     No Known Allergies       FAMILY HISTORY     Family History   Problem Relation Age of Onset    Cancer Mother     Cancer Father         prostrate    Cancer Brother         prostrate ca    Anesth Problems Neg Hx     negative for cardiac disease       SOCIAL HISTORY     Social History     Socioeconomic History    Marital status:    Tobacco Use    Smoking status: Never Smoker    Smokeless tobacco: Never Used   Substance and Sexual Activity    Alcohol use: Yes     Alcohol/week: 3.0 standard drinks     Types: 3 Cans of beer per week    Drug use: No    Sexual activity: Not Currently         MEDICATIONS     Current Outpatient Medications   Medication Sig    furosemide (Lasix) 40 mg tablet Take 20 mg by mouth daily.  lisinopriL (PRINIVIL, ZESTRIL) 20 mg tablet Take 1 Tab by mouth daily.  aspirin delayed-release 81 mg tablet Take 81 mg by mouth daily. TAKES ONE IN MORNING AND ONE IN EVENING    polyvinyl alcohol (ARTIFICIAL TEARS, POLYVIN ALC,) 1.4 % ophthalmic solution Administer 1 Drop to both eyes as needed.  ipratropium (ATROVENT HFA) 17 mcg/actuation inhaler Take 2 Puffs by inhalation as needed.  ferrous sulfate 325 mg (65 mg iron) cpER Take 1 Tab by mouth every other day.  tamsulosin (FLOMAX) 0.4 mg capsule Take 0.8 mg by mouth two (2) times a day.  cholecalciferol (VITAMIN D3) 1,000 unit tablet Take 2,000 Units by mouth two (2) times a day.     GLUCOSAMINE HCL/CHONDR CHING A NA (GLUCOSAMINE-CHONDROITIN) 750-600 mg tab Take 1 Tab by mouth daily.  omega-3 fatty acids-vitamin e 1,000 mg cap Take 1 Cap by mouth every other day.  acetaminophen (TYLENOL) 325 mg tablet Take  by mouth every four (4) hours as needed for Pain.  multivitamin (ONE A DAY) tablet Take 1 Tab by mouth daily.  docusate sodium (COLACE) 100 mg capsule Take 100 mg by mouth two (2) times daily as needed.  finasteride (PROSCAR) 5 mg tablet Take 5 mg by mouth nightly.  pravastatin (PRAVACHOL) 40 mg tablet Take 40 mg by mouth nightly. No current facility-administered medications for this visit. I have reviewed the nurses notes, vitals, problem list, allergy list, medical history, family, social history and medications. REVIEW OF SYMPTOMS   Pertinent positive per HPI  General: Pt denies excessive weight gain or loss. Pt is able to conduct ADL's  HEENT: Denies blurred vision, headaches, hearing loss, epistaxis and difficulty swallowing. Respiratory: Denies cough, congestion, shortness of breath, ABDUL, wheezing or stridor. Cardiovascular: Denies precordial pain, palpitations, edema or PND  Gastrointestinal: Denies poor appetite, indigestion, abdominal pain or blood in stool  Genitourinary: Denies hematuria, dysuria, increased urinary frequency  Musculoskeletal: Denies joint pain or swelling from muscles or joints  Neurologic: Denies tremor, paresthesias, headache, or sensory motor disturbance  Psychiatric: Denies confusion, insomnia, depression  Integumentray: Denies rash, itching or ulcers. Hematologic: Denies easy bruising, bleeding     PHYSICAL EXAMINATION      Vitals:    05/09/22 1123   BP: (!) 150/62   Pulse: 76   Weight: 213 lb 12.8 oz (97 kg)   Height: 5' 8\" (1.727 m)     General: Well developed, in no acute distress.   HEENT: No jaundice, oral mucosa moist, no oral ulcers  Neck: Supple, no stiffness, no lymphadenopathy, supple  Heart:   Irregular  Respiratory: Clear bilaterally x 4, no wheezing or rales  Extremities:  + edema, normal cap refill, no cyanosis. Musculoskeletal: No clubbing, no deformities  Neuro: A&Ox3, speech clear, gait stable, cooperative, no focal neurologic deficits  Skin: Skin color is normal. No rashes or lesions. Non diaphoretic, moist.         DIAGNOSTIC DATA     1.  Echocardiogram                                     9/5/14 Left ventricle: Systolic function was normal. Ejection fraction was   estimated in the range of 55 % to 60 %. There were no regional wall motion   abnormalities. Left atrium: The atrium was mildly dilated. 4/20/15- EF 48%, SHANTANU, atrial septum- poss PFO, MR mild, TR mild/mod, Pulm HTN mod, AV bioprosthesis normal function   6/19/17- EF 45-50%, RVE, SHANTANU, MR/TR mild/mod, AV bioprosthesis exhibits normal function, severe Pulm HTN, NC mild   Atrial septum: There was a secundum septal defect. Color Doppler   evaluation was performed. There was a mild left-to-right shunt. 12/11/18 TTE: LVEF 50%, no WMAs, wall thickness mod incr. RV mild-mod dilated, LA mod-sev dilated, RA mod dilated, mod MR, mod TR, mod pulm HTN, mild PV regurg. AV w/ bioprosthesis, no AS / no AR   3/16/20-EF 50-55%, BVE, Mild concentric hypertrophy, SHANTANU, Mitral valve thickening and repaired with annuloplasty ring. Surgical repair, mild MR/TR, AV leaflet calcification Aortic valve mean gradient is 20.7 mmHg. Aortic valve area is 1.3 cm2. Mild AS, 26 mm bioprosthetic aortic valve. 10/14/20- Limited-DEFINITY- EF 55-60%, No evidence of pericardial effusion. 11/27/20- EF 55%, Moderate concentric hypertrophy, RVE, Aortic valve mean gradient is 25 mmHg.  - bioprosthetic aortic valve,  Mitral valve repaired with annuloplasty ring, mod MR/TR, mod pulm HTN, PAP 59 mmHg   DEWEY-12/29/20-·Watchman device within YIMI shows excellent endothelialization without evidence of braden-device leak, EF 55 - 60%, SHANTANU, bioprosthetic aortic valve- insufficiency is present -mild centralized leaking, mild AI, Mitral valve repaired with annuloplasty ring, mod/severe MR  7/26/21-EF 55 - 60%, Mild concentric hypertrophy, LAE, MV thickening and repaired with annuloplasty ring, mild MR, mild/mod TR, RVSP 38 mmHg,Pulm HTN mild, mild PI, Prosthetic aortic valve. Aortic valve mean gradient is 30.3 mmHg. Mild to moderate AV stenosis, There is a bioprosthetic aortic valve,mild AI    2.  Myocardial perfusion study                      (9/5/14)Normal EF ;Normal perfusion     3. Cholesterol profile                            (10/6/15): , HDL 35, ,           (4/19/16): , HDL 72, LDL 65.6,    (05/01/17)- , HDL 85, LDL 65 , TG 55   11/2/17- , HDL 73, TG 75   12/11/18- , HDL 61, LDL 32, TG 60   7/24/21- , HDL 52, LDL 60, TG 66    4. LE venous duplex                                           (10/10/14)   Right leg mod. 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 5. Watchman 10/13/20     6. Carotid Doppler   5/11/20- 1-49% Kris    7.9/3/21:implantation of a [employer] single chamber pacemaker per Dr. Jigna Teresa    8. Holter  8/16/21-AF occurred 305 time(s) with HR range of 24 - 123;  Total AF Barry 96%  Pauses >2 seconds occurred 40 time(s) with longest pause 3.9 seconds         LABORATORY DATA            Lab Results   Component Value Date/Time    WBC 4.5 03/16/2022 08:00 AM    HGB 10.6 (L) 03/16/2022 08:00 AM    HCT 31.8 (L) 03/16/2022 08:00 AM    PLATELET 179 (L) 24/49/1570 08:00 AM    MCV 99 (H) 03/16/2022 08:00 AM      Lab Results   Component Value Date/Time    Sodium 144 03/16/2022 08:00 AM    Potassium 4.4 03/16/2022 08:00 AM    Chloride 104 03/16/2022 08:00 AM    CO2 24 03/16/2022 08:00 AM    Anion gap 3 (L) 11/28/2020 04:41 AM    Glucose 103 (H) 03/16/2022 08:00 AM    BUN 16 03/16/2022 08:00 AM    Creatinine 0.90 03/16/2022 08:00 AM    BUN/Creatinine ratio 18 03/16/2022 08:00 AM    GFR est AA 99 08/27/2021 12:00 AM    GFR est non-AA 86 08/27/2021 12:00 AM    Calcium 8.8 03/16/2022 08:00 AM    Bilirubin, total 0.4 03/16/2022 08:00 AM    Alk. phosphatase 55 03/16/2022 08:00 AM    Protein, total 6.6 03/16/2022 08:00 AM    Albumin 4.3 03/16/2022 08:00 AM    Globulin 3.5 11/28/2020 04:41 AM    A-G Ratio 1.1 11/28/2020 04:41 AM    ALT (SGPT) 13 03/16/2022 08:00 AM           ASSESSMENT/RECOMMENDATIONS:.      1. AF  -He is status post watchman device   -As a watchman device    2. LE edema  -In the past was followed in lymphedema clinic  -Unclear if he is currently  -He did reduce his Lasix and I think his edema is gotten worse.  -His proBNP is always around low 1000 range. 3. HTN  -Blood pressure is slightly elevated today  -Recheck at the time of his echo     4. Dyslipidemia  -LDL is at goal    5. Moderate PFO with left to right flow and CHRISTOPHER  -Aspirin 62 mg a day    6. AS and MR s/p AVR and MVR on 1/8/15  - He has moderate MR and moderate TR and bioprosthetic valves. -Repeat echo for shortness of breath he did have some evidence of mild to moderate aortic valve stenosis it is a bioprosthetic valve. The last aortic valve mean gradient was 30    7. DEL  -States he is compliant      8. Return in 6 months or PRN. Orders Placed This Encounter    furosemide (Lasix) 40 mg tablet     Sig: Take 20 mg by mouth daily. We discussed the expected course, resolution and complications of the diagnosis(es) in detail. Medication risks, benefits, costs, interactions, and alternatives were discussed as indicated. I advised him to contact the office if his condition worsens, changes or fails to improve as anticipated. He expressed understanding with the diagnosis(es) and plan          Follow-up and Dispositions  ·   Return in about 6 months (around 11/9/2022).            I have discussed the diagnosis with  Yogi Bucio and the intended plan as seen in the above orders. Questions were answered concerning future plans. I have discussed medication side effects and warnings with the patient as well. Thank you,  Coni Clay MD for involving me in the care of  Veronica Villagran. Please do not hesitate to contact me for further questions/concerns. Shayan Fine MD, [address] [place] [address] Rd[address]      [facility], [address] [place] [address]      [phone] / [phone] Fax

## 2022-05-07 NOTE — PATIENT INSTRUCTIONS

## 2022-05-09 ENCOUNTER — OFFICE VISIT (OUTPATIENT)
Dept: CARDIOLOGY CLINIC | Age: 82
End: 2022-05-09
Payer: MEDICARE

## 2022-05-09 VITALS
HEART RATE: 76 BPM | DIASTOLIC BLOOD PRESSURE: 62 MMHG | WEIGHT: 213.8 LBS | BODY MASS INDEX: 32.4 KG/M2 | SYSTOLIC BLOOD PRESSURE: 150 MMHG | HEIGHT: 68 IN

## 2022-05-09 DIAGNOSIS — I48.91 ATRIAL FIBRILLATION, UNSPECIFIED TYPE (HCC): Primary | ICD-10-CM

## 2022-05-09 DIAGNOSIS — E78.00 PURE HYPERCHOLESTEROLEMIA: ICD-10-CM

## 2022-05-09 DIAGNOSIS — Z95.2 S/P AVR (AORTIC VALVE REPLACEMENT): ICD-10-CM

## 2022-05-09 DIAGNOSIS — Z95.818 PRESENCE OF WATCHMAN LEFT ATRIAL APPENDAGE CLOSURE DEVICE: ICD-10-CM

## 2022-05-09 DIAGNOSIS — Z98.890 H/O MITRAL VALVE REPAIR: ICD-10-CM

## 2022-05-09 DIAGNOSIS — Z95.0 CARDIAC PACEMAKER IN SITU: ICD-10-CM

## 2022-05-09 DIAGNOSIS — I25.10 CORONARY ARTERY DISEASE INVOLVING NATIVE CORONARY ARTERY OF NATIVE HEART WITHOUT ANGINA PECTORIS: ICD-10-CM

## 2022-05-09 PROCEDURE — G8427 DOCREV CUR MEDS BY ELIG CLIN: HCPCS | Performed by: SPECIALIST

## 2022-05-09 PROCEDURE — G8536 NO DOC ELDER MAL SCRN: HCPCS | Performed by: SPECIALIST

## 2022-05-09 PROCEDURE — G8753 SYS BP > OR = 140: HCPCS | Performed by: SPECIALIST

## 2022-05-09 PROCEDURE — G8417 CALC BMI ABV UP PARAM F/U: HCPCS | Performed by: SPECIALIST

## 2022-05-09 PROCEDURE — 1101F PT FALLS ASSESS-DOCD LE1/YR: CPT | Performed by: SPECIALIST

## 2022-05-09 PROCEDURE — G8432 DEP SCR NOT DOC, RNG: HCPCS | Performed by: SPECIALIST

## 2022-05-09 PROCEDURE — G0463 HOSPITAL OUTPT CLINIC VISIT: HCPCS | Performed by: SPECIALIST

## 2022-05-09 PROCEDURE — G8754 DIAS BP LESS 90: HCPCS | Performed by: SPECIALIST

## 2022-05-09 PROCEDURE — 99214 OFFICE O/P EST MOD 30 MIN: CPT | Performed by: SPECIALIST

## 2022-05-09 RX ORDER — FUROSEMIDE 40 MG/1
20 TABLET ORAL DAILY
COMMUNITY
End: 2022-06-13 | Stop reason: SDUPTHER

## 2022-05-09 NOTE — PROGRESS NOTES
Visit Vitals  BP (!) 150/62   Pulse 76   Ht 5' 8\" (1.727 m)   Wt 213 lb 12.8 oz (97 kg)   BMI 32.51 kg/m²

## 2022-05-09 NOTE — LETTER
5/9/2022    Patient: Bella Sheldon   YOB: 1940   Date of Visit: 5/9/2022     Joaquin Pop MD  [facility] [address]  Via Fax: [phone]    Dear Joaquin Pop MD,      Thank you for referring Mr. Chandni Chao to [address] [place], [place] [address]  for evaluation. My notes for this consultation are attached. If you have questions, please do not hesitate to call me. I look forward to following your patient along with you.       Sincerely,    Ned Eden MD

## 2022-05-20 ENCOUNTER — HOSPITAL ENCOUNTER (OUTPATIENT)
Dept: PHYSICAL THERAPY | Age: 82
Discharge: HOME | End: 2022-05-20
Payer: MEDICARE

## 2022-05-20 VITALS — WEIGHT: 212 LBS | HEIGHT: 68 IN | BODY MASS INDEX: 32.13 KG/M2

## 2022-05-20 PROCEDURE — 97761 PROSTHETIC TRAING 1ST ENC: CPT

## 2022-05-20 PROCEDURE — 97763 ORTHC/PROSTC MGMT SBSQ ENC: CPT

## 2022-05-20 PROCEDURE — 97140 MANUAL THERAPY 1/> REGIONS: CPT

## 2022-05-20 NOTE — PROGRESS NOTES
LYMPHEDEMA PT DAILY TREATMENT NOTE - [facility] -15    Patient Name: Edgardo Market  Date:2022  : 1940  [x]  Patient  Verified  Payor: Maria Esther Gilliam / Plan: [place] MEDICARE PART A & B / Product Type: Medicare /    In time: 8:55 am Out time: 9:26 am  Total Treatment Time (min): 31  Total Timed Codes (min): 31  1:1 Treatment Time ( only): 31   Visit #: 2   Treatment Area: Lymphedema, not elsewhere classified [I89.0]    SUBJECTIVE  Pain Level (0-10 scale): 2/10, LE/foot pain  Any medication changes, allergies to medications, adverse drug reactions, diagnosis change, or new procedure performed?: [x] No    [] Yes (see summary sheet for update)  LLIS: , 7% impairment  Subjective functional status/changes:   [] No changes reported  Patient arrives with custom flat knit compression stockings donned. States he is alternating wear of custom stockings/new Juzo Power Comfort knee high socks. States new socks feel tight. States he is able to don/doff stockings with some effort. Agreeable to proceeding with treatment. OBJECTIVE    15  28 Min  min Manual Therapy  Orthotic management    Manual Lymphatic Drainage (MLD):  Area to decongest: LE: bilateral    Completed limb volume measurements bilateral LE, noting R LE improved, however, L LE further elevated by 268.9 mL. Patient advised L LE volume close to level present when treatment initiated. Skin/wound care/debridement: Reviewed skin care principles:   Patient to continue nightly skin care, application low pH lotion using upward strokes. Reviewed s/s of infection, seeking medical attention asap with presentation of symptoms. Lower Extremity Compression: Fit the following compression products:    LE: bilateral bilateral lower extremity: Knee high    Style: Circular knit    Brand: [employer]    Type: Non-Custom: Size: Power Comfort sock, size large    Vendor: [employer]    Education: Educated patient in compression garment donning and doffing.   Daily wear schedule. Daily laundering. Garment lifespan. Return and reordering process (by bringing prior garments into the clinic). Educated patient to monitor for redness or pressure points on the skin. If new pain occurs they should contact their therapist.    Patient/family demonstrated donning and doffing with indep with assistance of daughter. Home management to continue as follows:    Compression garment routine:  1. Apply daytime compression stocking to clean, dry extremity first thing in the morning, EVERY MORNING. 2. Wear compression garments until bedtime, adjusting throughout the day as needed should garment wrinkle or crease. Problem areas can be at joints, and top of garment, ensuring proximal aspect of garment positioned appropriately on extremity. Alternate custom/RM stockings as tolerated. 3. Launder garment nightly either by hand or washing machine in a garment bag or pillowcase. Use mild detergent with NO FABRIC SOFTENER, NO BLEACH, NO WOOLITE. Wash in cool/warm water. 4. Dry garment in dryer on low heat right side out in garment bag or pillowcase. 5. Perform skin care to extremity, cleansing skin daily with soap and water, and using low pH lotion, upward strokes to stimulate lymphatic vessels. 6. Daytime compression grments are NOT safe to sleep in, so remove at bedtime. 7. Perform exercise program with compression garments on. Signs/Symptoms of infection include:  Fever, flu-like symptoms, pain/redness/warmthin extremity, sometimes with increase in swelling present. Stop wearing your compression garment if this occurs. Call your doctor immediately or go to the Emergency room to address potential infection. Remove compression with changes in circulation, numbness in extremity different from what you may experience when not wearing compression garment, pain, unexplained shortness of breath. Notify clinic if swelling increase noted prior to next scheduled appt.   Night time compression may be needed for optimal management of lymphedema. Patient advised to continue wear of stockings, alternating fashion as tolerated. To return to wear of custom flat knit knee highs only if Power Comfort socks uncomfortable. Vasopneumatic pump medically necessary due to failure of conservative treatment, volume measurements as noted below. Patient in agreement with proceeding with benefits check with [employer].   Rationale: decrease pain, increase tissue extensibility and decrease edema  to improve the patients ability to perform routine/leisure activities with 50% less pain. With   [] TE   [] TA   [x] MT   [] SC   [x] other: OA Patient Education: [x] Review HEP    [x] MLD Patient Education Continued education in self MLD technique with bathing and skin care. [] Progressed/Changed HEP based on:   [] positioning   [] Kinesiotape   [] Skin care   [] wound care   [] other:   [x] Advised daily wear of custom flat knit compression stockings, laundering nightly. See instructions above. Patient / caregiver re-demonstrated bandaging. [] Yes  [x] No  Compression bandaging/garment precautions reviewed: [x] Yes  [] No     Other Objective/Functional Measures:   Height:  Height: 5' 8\" (172.7 cm)  Weight:  Weight: 96.2 kg (212 lb)  BMI:  BMI (calculated): 32.2  (36 or greater: adversely affecting lymphedema)      Volumetric Measurements:      Date:  Right Left % difference    5/20/2022 7756.63 7925.48 2.18   4/26/2022 6987.45 7537.08 7.87   3/21/2022 7110.90 7268.18 2.21       Pain Level (0-10 scale) post treatment: 2/10      ASSESSMENT/Changes in Function:   Patient with c/o LE discomfort, foot pain.   Patient arrives with Juzo CCL 1 custom stockings donned, CCL 1 socks for fit assessment, which were ordered secondary to patient resistant to purchasing additional custom stocking due to cost. Fit of compression socks adequate, however, patient advised swelling into socks may be occurring due to less containment of lymphedema provided than with new custom flat knit knee high stockings as recommended. Note +2 pitting edema dorsal foot today, and elevated limb volume R LE by 769.18 mL, R LE by 388.4 mL. Vasopneumatic pump medically necessary at this time due to failure of conservative treatment. At conclusion of treatment today, patient reports he would like to order new custom flat knit compression stockings next visit. Patient advised to speak with his physician regarding dosage of diuretic with fluctuations in weight/LE limb volume measurements. Patient will continue to benefit from skilled PT services to  address swelling, analyze compression product fit and use, instruct in home lymphedema management program and measure for compression products to attain remaining goals. [x]  See Plan of Care  []  See progress note/recertification  []  See Discharge Summary         Progress towards goals / Updated goals: Added Goals 4/26/2022:  1. Establish good fit of RM Power Comfort socks, class 1 socks, large. 5/20/2022 ongoing, reassess next visit. 2. Reduction in L LE volume by 100 mL to reduce risk of infection, improve skin turgor. 5/20/2022 ongoing.     PLAN  []  Upgrade activities as tolerated     [x]  Continue plan of care  []  Update interventions per flow sheet       []  Discharge due to:_  []  Other:_      Saloni De León, PT, CLT-JOON 5/20/2022

## 2022-06-13 ENCOUNTER — ANCILLARY PROCEDURE (OUTPATIENT)
Dept: CARDIOLOGY CLINIC | Age: 82
End: 2022-06-13
Payer: MEDICARE

## 2022-06-13 VITALS
BODY MASS INDEX: 32.13 KG/M2 | WEIGHT: 212 LBS | DIASTOLIC BLOOD PRESSURE: 70 MMHG | SYSTOLIC BLOOD PRESSURE: 140 MMHG | HEIGHT: 68 IN

## 2022-06-13 DIAGNOSIS — I25.10 CORONARY ARTERY DISEASE INVOLVING NATIVE CORONARY ARTERY OF NATIVE HEART WITHOUT ANGINA PECTORIS: ICD-10-CM

## 2022-06-13 DIAGNOSIS — Z98.890 H/O MITRAL VALVE REPAIR: ICD-10-CM

## 2022-06-13 DIAGNOSIS — I48.91 ATRIAL FIBRILLATION, UNSPECIFIED TYPE (HCC): ICD-10-CM

## 2022-06-13 DIAGNOSIS — E78.00 PURE HYPERCHOLESTEROLEMIA: ICD-10-CM

## 2022-06-13 DIAGNOSIS — Z95.818 PRESENCE OF WATCHMAN LEFT ATRIAL APPENDAGE CLOSURE DEVICE: ICD-10-CM

## 2022-06-13 DIAGNOSIS — Z95.0 CARDIAC PACEMAKER IN SITU: ICD-10-CM

## 2022-06-13 DIAGNOSIS — Z95.2 S/P AVR (AORTIC VALVE REPLACEMENT): ICD-10-CM

## 2022-06-13 PROCEDURE — 93306 TTE W/DOPPLER COMPLETE: CPT | Performed by: SPECIALIST

## 2022-06-13 RX ORDER — FUROSEMIDE 20 MG/1
20 TABLET ORAL DAILY
Qty: 90 TABLET | Refills: 3 | Status: ON HOLD | OUTPATIENT
Start: 2022-06-13 | End: 2022-07-12 | Stop reason: SDUPTHER

## 2022-06-13 NOTE — TELEPHONE ENCOUNTER
Refill per VO of DR. FINE:    Last appt:  Visit date not found    Future Appointments   Date Time Provider Vinay Latosha   7/11/2022  3:15 PM REMOTE1, [facility] MARLENESF BS AMB   11/14/2022 11:00 AM Loyd Fine MD CAVIR BS AMB   12/14/2022  1:40 PM PACEMAKER, STFRROCHELLE LUNDBERG BS AMB   12/14/2022  2:00 PM Danette Marr NP CAVSF BS AMB       Requested Prescriptions     Pending Prescriptions Disp Refills    furosemide (Lasix) 40 mg tablet       Sig: Take 0.5 Tablets by mouth daily.

## 2022-06-21 LAB
ECHO AO ROOT DIAM: 3.5 CM
ECHO AO ROOT INDEX: 1.67 CM/M2
ECHO AV MEAN GRADIENT: 38 MMHG
ECHO AV MEAN VELOCITY: 2.9 M/S
ECHO AV PEAK GRADIENT: 57 MMHG
ECHO AV PEAK VELOCITY: 3.8 M/S
ECHO AV VELOCITY RATIO: 0.16
ECHO AV VTI: 81.2 CM
ECHO EST RA PRESSURE: 15 MMHG
ECHO LA DIAMETER INDEX: 2.9 CM/M2
ECHO LA DIAMETER: 6.1 CM
ECHO LA TO AORTIC ROOT RATIO: 1.74
ECHO LA VOL 2C: 126 ML (ref 18–58)
ECHO LA VOL 4C: 151 ML (ref 18–58)
ECHO LA VOL BP: 143 ML (ref 18–58)
ECHO LA VOL/BSA BIPLANE: 68 ML/M2 (ref 16–34)
ECHO LA VOLUME AREA LENGTH: 152 ML
ECHO LA VOLUME INDEX A2C: 60 ML/M2 (ref 16–34)
ECHO LA VOLUME INDEX A4C: 72 ML/M2 (ref 16–34)
ECHO LA VOLUME INDEX AREA LENGTH: 72 ML/M2 (ref 16–34)
ECHO LV E' LATERAL VELOCITY: 12 CM/S
ECHO LV E' SEPTAL VELOCITY: 8 CM/S
ECHO LV FRACTIONAL SHORTENING: 26 % (ref 28–44)
ECHO LV INTERNAL DIMENSION DIASTOLE INDEX: 3.14 CM/M2
ECHO LV INTERNAL DIMENSION DIASTOLIC: 6.6 CM (ref 4.2–5.9)
ECHO LV INTERNAL DIMENSION SYSTOLIC INDEX: 2.33 CM/M2
ECHO LV INTERNAL DIMENSION SYSTOLIC: 4.9 CM
ECHO LV IVSD: 1.2 CM (ref 0.6–1)
ECHO LV MASS 2D: 367.9 G (ref 88–224)
ECHO LV MASS INDEX 2D: 175.2 G/M2 (ref 49–115)
ECHO LV POSTERIOR WALL DIASTOLIC: 1.2 CM (ref 0.6–1)
ECHO LV RELATIVE WALL THICKNESS RATIO: 0.36
ECHO LVOT AV VTI INDEX: 0.15
ECHO LVOT MEAN GRADIENT: 1 MMHG
ECHO LVOT PEAK GRADIENT: 1 MMHG
ECHO LVOT PEAK VELOCITY: 0.6 M/S
ECHO LVOT VTI: 12.5 CM
ECHO MV LVOT VTI INDEX: 2.4
ECHO MV MAX VELOCITY: 1.5 M/S
ECHO MV MEAN GRADIENT: 3 MMHG
ECHO MV MEAN VELOCITY: 0.9 M/S
ECHO MV PEAK GRADIENT: 9 MMHG
ECHO MV VTI: 30 CM
ECHO RIGHT VENTRICULAR SYSTOLIC PRESSURE (RVSP): 40 MMHG
ECHO RV FREE WALL PEAK S': 10 CM/S
ECHO RV INTERNAL DIMENSION: 4.8 CM
ECHO TV REGURGITANT MAX VELOCITY: 2.52 M/S
ECHO TV REGURGITANT PEAK GRADIENT: 25 MMHG

## 2022-06-21 PROCEDURE — 93306 TTE W/DOPPLER COMPLETE: CPT | Performed by: SPECIALIST

## 2022-06-29 ENCOUNTER — TELEPHONE (OUTPATIENT)
Dept: CARDIOLOGY CLINIC | Age: 82
End: 2022-06-29

## 2022-06-29 NOTE — TELEPHONE ENCOUNTER
Pts wife calling to say pt is going on a cruise this Saturday 7/2 and he has been having sob for the past month or so. They want to know if he can be fit in to be seen or what he should do to be comfortable on the cruise. Please advise.      [phone] (pt)

## 2022-06-30 ENCOUNTER — TELEPHONE (OUTPATIENT)
Dept: CARDIOLOGY CLINIC | Age: 82
End: 2022-06-30

## 2022-06-30 NOTE — TELEPHONE ENCOUNTER
chilo velez at [facility] for appt. Informed pt of MD response. Informed pt that if he decided to go to pay attention to sodium intake.

## 2022-06-30 NOTE — TELEPHONE ENCOUNTER
Pt going of a cruise on Saturday - You had referred him to the valve clinic. Wife wanted to know if you are OK with him to go on trip.

## 2022-06-30 NOTE — TELEPHONE ENCOUNTER
Ethelyn Klinefelter from Dr. Merry Yates would like a call back [phone] wanted to get this pt a NP consult Valve appt.

## 2022-06-30 NOTE — TELEPHONE ENCOUNTER
Called Marlene Gonzalez back from [employer] office. Patient is going on a cruise leaving Saturday 7/2/22 and returning 7/9/22. I let her know the week starting 7/11/22 we should be able to get him on the schedule. Do you have a preference which day and which MD?  He has a prosthetic valve and has AS, very SOB for a couple of months now. I can call patient with date and time.  Eirk Hector RN

## 2022-06-30 NOTE — TELEPHONE ENCOUNTER
Go ahead and schedule him next Thursday 7/7 with Fanny Kaplan and Franco at [employer] [facility]

## 2022-07-01 NOTE — TELEPHONE ENCOUNTER
Patient is scheduled for 7/12/22 at 1pm with Dangelo Rodriguez for AS valve clinic. Called patient with scheduled appt time. Reviewed low sodium intake while on cruise, suggested checking in with Medical onboard ship to ensure oxygen available (patient stated he gets SOB), also to moderate activity that is tolerable to breathing adequately. But, overall enjoy the trip. Patient verbalized understanding and thanked me for the call.  Jorge Richards RN

## 2022-07-10 ENCOUNTER — APPOINTMENT (OUTPATIENT)
Dept: GENERAL RADIOLOGY | Age: 82
DRG: 291 | End: 2022-07-10
Attending: EMERGENCY MEDICINE
Payer: MEDICARE

## 2022-07-10 ENCOUNTER — HOSPITAL ENCOUNTER (INPATIENT)
Age: 82
LOS: 1 days | Discharge: HOME | DRG: 291 | End: 2022-07-12
Attending: EMERGENCY MEDICINE | Admitting: HOSPITALIST
Payer: MEDICARE

## 2022-07-10 DIAGNOSIS — I48.20 CHRONIC ATRIAL FIBRILLATION (HCC): ICD-10-CM

## 2022-07-10 DIAGNOSIS — I50.9 ACUTE ON CHRONIC CONGESTIVE HEART FAILURE, UNSPECIFIED HEART FAILURE TYPE (HCC): ICD-10-CM

## 2022-07-10 DIAGNOSIS — R60.9 PERIPHERAL EDEMA: Primary | ICD-10-CM

## 2022-07-10 DIAGNOSIS — I50.33 ACUTE ON CHRONIC DIASTOLIC CONGESTIVE HEART FAILURE (HCC): ICD-10-CM

## 2022-07-10 PROBLEM — U07.1 COVID-19: Status: ACTIVE | Noted: 2022-07-10

## 2022-07-10 LAB
ALBUMIN SERPL-MCNC: 3.2 G/DL (ref 3.5–5)
ALBUMIN/GLOB SERPL: 0.8 {RATIO} (ref 1.1–2.2)
ALP SERPL-CCNC: 112 U/L (ref 45–117)
ALT SERPL-CCNC: 17 U/L (ref 12–78)
ANION GAP SERPL CALC-SCNC: 6 MMOL/L (ref 5–15)
AST SERPL-CCNC: 22 U/L (ref 15–37)
ATRIAL RATE: 92 BPM
BASOPHILS # BLD: 0.1 K/UL (ref 0–0.1)
BASOPHILS NFR BLD: 1 % (ref 0–1)
BILIRUB SERPL-MCNC: 1.1 MG/DL (ref 0.2–1)
BNP SERPL-MCNC: 2820 PG/ML
BUN SERPL-MCNC: 18 MG/DL (ref 6–20)
BUN/CREAT SERPL: 21 (ref 12–20)
CALCIUM SERPL-MCNC: 8.6 MG/DL (ref 8.5–10.1)
CALCULATED R AXIS, ECG10: 125 DEGREES
CALCULATED T AXIS, ECG11: 178 DEGREES
CHLORIDE SERPL-SCNC: 108 MMOL/L (ref 97–108)
CO2 SERPL-SCNC: 27 MMOL/L (ref 21–32)
COMMENT, HOLDF: NORMAL
COVID-19 RAPID TEST, COVR: DETECTED
CREAT SERPL-MCNC: 0.85 MG/DL (ref 0.7–1.3)
D DIMER PPP FEU-MCNC: 0.56 MG/L FEU (ref 0–0.65)
DIAGNOSIS, 93000: NORMAL
DIFFERENTIAL METHOD BLD: ABNORMAL
EOSINOPHIL # BLD: 0.1 K/UL (ref 0–0.4)
EOSINOPHIL NFR BLD: 1 % (ref 0–7)
ERYTHROCYTE [DISTWIDTH] IN BLOOD BY AUTOMATED COUNT: 15.6 % (ref 11.5–14.5)
GLOBULIN SER CALC-MCNC: 3.8 G/DL (ref 2–4)
GLUCOSE SERPL-MCNC: 108 MG/DL (ref 65–100)
HCT VFR BLD AUTO: 33.7 % (ref 36.6–50.3)
HGB BLD-MCNC: 10.5 G/DL (ref 12.1–17)
IMM GRANULOCYTES # BLD AUTO: 0 K/UL (ref 0–0.04)
IMM GRANULOCYTES NFR BLD AUTO: 0 % (ref 0–0.5)
LYMPHOCYTES # BLD: 0.4 K/UL (ref 0.8–3.5)
LYMPHOCYTES NFR BLD: 8 % (ref 12–49)
MCH RBC QN AUTO: 31.3 PG (ref 26–34)
MCHC RBC AUTO-ENTMCNC: 31.2 G/DL (ref 30–36.5)
MCV RBC AUTO: 100.3 FL (ref 80–99)
MONOCYTES # BLD: 0.5 K/UL (ref 0–1)
MONOCYTES NFR BLD: 10 % (ref 5–13)
NEUTS SEG # BLD: 4.2 K/UL (ref 1.8–8)
NEUTS SEG NFR BLD: 80 % (ref 32–75)
NRBC # BLD: 0 K/UL (ref 0–0.01)
NRBC BLD-RTO: 0 PER 100 WBC
PLATELET # BLD AUTO: 98 K/UL (ref 150–400)
PMV BLD AUTO: 10.2 FL (ref 8.9–12.9)
POTASSIUM SERPL-SCNC: 3.8 MMOL/L (ref 3.5–5.1)
PROT SERPL-MCNC: 7 G/DL (ref 6.4–8.2)
Q-T INTERVAL, ECG07: 380 MS
QRS DURATION, ECG06: 102 MS
QTC CALCULATION (BEZET), ECG08: 462 MS
RBC # BLD AUTO: 3.36 M/UL (ref 4.1–5.7)
RBC MORPH BLD: ABNORMAL
RBC MORPH BLD: ABNORMAL
SAMPLES BEING HELD,HOLD: NORMAL
SODIUM SERPL-SCNC: 141 MMOL/L (ref 136–145)
SOURCE, COVRS: ABNORMAL
TROPONIN-HIGH SENSITIVITY: 50 NG/L (ref 0–76)
VENTRICULAR RATE, ECG03: 89 BPM
WBC # BLD AUTO: 5.3 K/UL (ref 4.1–11.1)

## 2022-07-10 PROCEDURE — G0378 HOSPITAL OBSERVATION PER HR: HCPCS

## 2022-07-10 PROCEDURE — 93005 ELECTROCARDIOGRAM TRACING: CPT

## 2022-07-10 PROCEDURE — 80053 COMPREHEN METABOLIC PANEL: CPT

## 2022-07-10 PROCEDURE — 87635 SARS-COV-2 COVID-19 AMP PRB: CPT

## 2022-07-10 PROCEDURE — 84484 ASSAY OF TROPONIN QUANT: CPT

## 2022-07-10 PROCEDURE — 74011250636 HC RX REV CODE- 250/636: Performed by: EMERGENCY MEDICINE

## 2022-07-10 PROCEDURE — 71045 X-RAY EXAM CHEST 1 VIEW: CPT

## 2022-07-10 PROCEDURE — 74011250636 HC RX REV CODE- 250/636: Performed by: HOSPITALIST

## 2022-07-10 PROCEDURE — 83880 ASSAY OF NATRIURETIC PEPTIDE: CPT

## 2022-07-10 PROCEDURE — 85379 FIBRIN DEGRADATION QUANT: CPT

## 2022-07-10 PROCEDURE — 74011250637 HC RX REV CODE- 250/637: Performed by: EMERGENCY MEDICINE

## 2022-07-10 PROCEDURE — 36415 COLL VENOUS BLD VENIPUNCTURE: CPT

## 2022-07-10 PROCEDURE — 85025 COMPLETE CBC W/AUTO DIFF WBC: CPT

## 2022-07-10 PROCEDURE — 74011250637 HC RX REV CODE- 250/637: Performed by: HOSPITALIST

## 2022-07-10 PROCEDURE — 96372 THER/PROPH/DIAG INJ SC/IM: CPT

## 2022-07-10 PROCEDURE — 96374 THER/PROPH/DIAG INJ IV PUSH: CPT

## 2022-07-10 PROCEDURE — 99285 EMERGENCY DEPT VISIT HI MDM: CPT

## 2022-07-10 RX ORDER — FINASTERIDE 5 MG/1
5 TABLET, FILM COATED ORAL
Status: DISCONTINUED | OUTPATIENT
Start: 2022-07-10 | End: 2022-07-12 | Stop reason: HOSPADM

## 2022-07-10 RX ORDER — CHLORTHALIDONE 25 MG/1
25 TABLET ORAL DAILY
COMMUNITY
End: 2022-07-12

## 2022-07-10 RX ORDER — FUROSEMIDE 10 MG/ML
40 INJECTION INTRAMUSCULAR; INTRAVENOUS
Status: COMPLETED | OUTPATIENT
Start: 2022-07-10 | End: 2022-07-10

## 2022-07-10 RX ORDER — LISINOPRIL 20 MG/1
20 TABLET ORAL DAILY
Status: DISCONTINUED | OUTPATIENT
Start: 2022-07-10 | End: 2022-07-12 | Stop reason: HOSPADM

## 2022-07-10 RX ORDER — GUAIFENESIN 100 MG/5ML
324 LIQUID (ML) ORAL
Status: COMPLETED | OUTPATIENT
Start: 2022-07-10 | End: 2022-07-10

## 2022-07-10 RX ORDER — ASPIRIN 81 MG/1
81 TABLET ORAL DAILY
Status: DISCONTINUED | OUTPATIENT
Start: 2022-07-11 | End: 2022-07-12 | Stop reason: HOSPADM

## 2022-07-10 RX ORDER — TAMSULOSIN HYDROCHLORIDE 0.4 MG/1
0.4 CAPSULE ORAL 2 TIMES DAILY
Status: DISCONTINUED | OUTPATIENT
Start: 2022-07-10 | End: 2022-07-12 | Stop reason: HOSPADM

## 2022-07-10 RX ORDER — FUROSEMIDE 10 MG/ML
40 INJECTION INTRAMUSCULAR; INTRAVENOUS DAILY
Status: DISCONTINUED | OUTPATIENT
Start: 2022-07-11 | End: 2022-07-10

## 2022-07-10 RX ORDER — FUROSEMIDE 10 MG/ML
40 INJECTION INTRAMUSCULAR; INTRAVENOUS DAILY
Status: DISCONTINUED | OUTPATIENT
Start: 2022-07-11 | End: 2022-07-12 | Stop reason: HOSPADM

## 2022-07-10 RX ORDER — HEPARIN SODIUM 5000 [USP'U]/ML
5000 INJECTION, SOLUTION INTRAVENOUS; SUBCUTANEOUS EVERY 12 HOURS
Status: DISCONTINUED | OUTPATIENT
Start: 2022-07-10 | End: 2022-07-12 | Stop reason: HOSPADM

## 2022-07-10 RX ORDER — PRAVASTATIN SODIUM 40 MG/1
40 TABLET ORAL
Status: DISCONTINUED | OUTPATIENT
Start: 2022-07-10 | End: 2022-07-12 | Stop reason: HOSPADM

## 2022-07-10 RX ADMIN — PRAVASTATIN SODIUM 40 MG: 40 TABLET ORAL at 21:50

## 2022-07-10 RX ADMIN — TAMSULOSIN HYDROCHLORIDE 0.4 MG: 0.4 CAPSULE ORAL at 21:50

## 2022-07-10 RX ADMIN — ASPIRIN 81 MG CHEWABLE TABLET 324 MG: 81 TABLET CHEWABLE at 15:14

## 2022-07-10 RX ADMIN — FINASTERIDE 5 MG: 5 TABLET, FILM COATED ORAL at 21:50

## 2022-07-10 RX ADMIN — HEPARIN SODIUM 5000 UNITS: 5000 INJECTION INTRAVENOUS; SUBCUTANEOUS at 17:50

## 2022-07-10 RX ADMIN — FUROSEMIDE 40 MG: 10 INJECTION, SOLUTION INTRAVENOUS at 15:13

## 2022-07-10 NOTE — ED NOTES
Pt provided with urinal and call bell placed in lap with instructions to use and bed rails up. This RN walking past room sees pt out of bed standing in room with urinal, no call light used. Pt instructed again to ask for assistance before getting out of bed.

## 2022-07-10 NOTE — H&P
[facility] Adult  Hospitalist Group  History and Physical    Date of Service:  7/11/2022  Primary Care Provider: Jose Short MD  Source of information: The patient and Chart review    Chief Complaint: Leg Swelling and Shortness of Breath      History of Presenting Illness:   Rafael Steen is a 79 y.o. male with PMH of diastolic heart failure,atrial fib,HTN,CAD and other comorbidities came to the hospital with cc of lower extremity edema. Patient has some hearing impairment, history obtained from patient and chart review. He states that he noticed some LE edema  In the last few days. [facility] and came back yesterday. He noticed weight gain as well. He denied any chest pain,fever,cough,abdominal pain,diarrhea. He went to [place] ER,he received IV lasix, left AMA as he does not have his doctors over there    He states that he has an appointment tomorrow here to see some doctors       REVIEW OF SYSTEMS:  A comprehensive review of systems was negative except for that written in the History of Present Illness.      Past Medical History:   Diagnosis Date    Arthritis     Atrial fibrillation ([address]     CAD (coronary artery disease)     Chronic pain     legs/knee    GERD (gastroesophageal reflux disease)     Hx of carcinoma in situ of prostate     Hyperlipidemia     Hypertension     Insomnia     DEL (obstructive sleep apnea)     Radiation proctitis     Vitamin D deficiency       Past Surgical History:   Procedure Laterality Date    COLONOSCOPY N/A 5/8/2020    COLONOSCOPY performed by Laura Jacob MD at [facility] ENDOSCOPY    COLONOSCOPY N/A 6/8/2020    COLONOSCOPY performed by Abbey Arciniega MD at [address] N/A 11/23/2020    FLEXIBLE SIGMOIDOSCOPY WITH APC performed by Laura Jacob MD at [facility] HX AORTIC VALVE REPLACEMENT  2015    and mitral valve repair, left atrial cryo maze    HX HEENT      melanoma removed head    HX HERNIA REPAIR  2009    Right    HX HERNIA REPAIR      left inguinal hernia repair    HX HERNIA REPAIR Left 12/01/2016    lap left inguinal hernia repair with mesh    HX KNEE REPLACEMENT      Bilateral    HX ORTHOPAEDIC      BILATERAL KNEE REPLACEMENT    HX ORTHOPAEDIC      CARPEL TUNNEL REPAIR-RIGHT    HX OTHER SURGICAL  2015    closure of patent foramen ovale    HX OTHER SURGICAL  10/2020    watchman implant for afib / Dr. Emmett Betancourt radiation treatments     Prior to Admission medications    Medication Sig Start Date End Date Taking? Authorizing Provider   chlorthalidone (HYGROTON) 25 mg tablet Take 25 mg by mouth daily. Yes Provider, Historical   furosemide (Lasix) 20 mg tablet Take 1 Tablet by mouth daily. Patient taking differently: Take 40 mg by mouth daily. 6/13/22  Yes Rody [facility], MD   lisinopriL (PRINIVIL, ZESTRIL) 20 mg tablet Take 1 Tab by mouth daily. Patient taking differently: Take 40 mg by mouth daily. 10/14/20  Yes Jeevan Jauregui, SULEIMAN   aspirin delayed-release 81 mg tablet Take 81 mg by mouth daily. TAKES ONE IN MORNING AND ONE IN EVENING   Yes Provider, Historical   ipratropium (ATROVENT HFA) 17 mcg/actuation inhaler Take 2 Puffs by inhalation as needed. Yes Provider, Historical   ferrous sulfate 325 mg (65 mg iron) cpER Take 1 Tab by mouth every other day. Yes Provider, Historical   tamsulosin (FLOMAX) 0.4 mg capsule Take 0.8 mg by mouth two (2) times a day. Yes Provider, Historical   cholecalciferol (VITAMIN D3) 1,000 unit tablet Take 2,000 Units by mouth two (2) times a day. Yes Provider, Historical   acetaminophen (TYLENOL) 325 mg tablet Take  by mouth every four (4) hours as needed for Pain. Yes Provider, Historical   docusate sodium (COLACE) 100 mg capsule Take 100 mg by mouth two (2) times daily as needed. Yes Provider, Historical   finasteride (PROSCAR) 5 mg tablet Take 5 mg by mouth nightly.    Yes Provider, Historical   pravastatin (PRAVACHOL) 40 mg tablet Take 40 mg by mouth nightly. Yes Provider, Historical   polyvinyl alcohol (ARTIFICIAL TEARS, POLYVIN ALC,) 1.4 % ophthalmic solution Administer 1 Drop to both eyes as needed. Provider, Historical   GLUCOSAMINE HCL/CHONDR CHING A NA (GLUCOSAMINE-CHONDROITIN) 750-600 mg tab Take 1 Tab by mouth daily. Provider, Historical   omega-3 fatty acids-vitamin e 1,000 mg cap Take 1 Cap by mouth every other day. Provider, Historical   multivitamin (ONE A DAY) tablet Take 1 Tab by mouth daily. Provider, Historical     No Known Allergies   Family History   Problem Relation Age of Onset    Cancer Mother     Cancer Father         prostrate    Cancer Brother         prostrate ca    Anesth Problems Neg Hx       Social History:  reports that he has never smoked. He has never used smokeless tobacco. He reports current alcohol use of about 3.0 standard drinks of alcohol per week. He reports that he does not use drugs. Family and social history were personally reviewed, all pertinent and relevant details are outlined as above. Objective:     Visit Vitals  BP (!) 146/78   Pulse (!) 101   Temp 98.2 °F (36.8 °C)   Resp 19   SpO2 95%      O2 Device: None (Room air)    PHYSICAL EXAM:   General: Alert x oriented x 3, awake, no acute distress, resting in bed, pleasant male, appears to be stated age  HEENT: PEERL, EOMI, moist mucus membranes  Neck: Supple, no JVD, no meningeal signs  Chest: Clear to auscultation bilaterally ,no wheezing  CVS: Regular rythym,normal rate, S1 S2 heard,systolic murmur  Abd: Soft, non-tender, non-distended, +bowel sounds   Ext: 2+ LE  edema  Neuro/Psych: alert and oriented,has some hearing impairment,moves all extremities      Data Review: All diagnostic labs and studies have been reviewed.     Abnormal Labs Reviewed   COVID-19 RAPID TEST - Abnormal; Notable for the following components:       Result Value    COVID-19 rapid test Detected (*)     All other components within normal limits   CBC WITH AUTOMATED DIFF - Abnormal; Notable for the following components:    RBC 3.36 (*)     HGB 10.5 (*)     HCT 33.7 (*)     .3 (*)     RDW 15.6 (*)     PLATELET 98 (*)     NEUTROPHILS 80 (*)     LYMPHOCYTES 8 (*)     ABS. LYMPHOCYTES 0.4 (*)     All other components within normal limits   METABOLIC PANEL, COMPREHENSIVE - Abnormal; Notable for the following components:    Glucose 108 (*)     BUN/Creatinine ratio 21 (*)     Bilirubin, total 1.1 (*)     Albumin 3.2 (*)     A-G Ratio 0.8 (*)     All other components within normal limits   NT-PRO BNP - Abnormal; Notable for the following components:    NT pro-BNP 2,820 (*)     All other components within normal limits       All Micro Results     Procedure Component Value Units Date/Time    COVID-19 RAPID TEST [[id number]]  (Abnormal) Collected: 07/10/22 155    Order Status: Completed Specimen: Nasopharyngeal Updated: 07/10/22 1620     Specimen source Nasopharyngeal        COVID-19 rapid test Detected        Comment: CALLED TO AND READ BACK BY   GAUDENCIO Shane @ 1620/[facility]  Rapid Abbott ID Now       The specimen is POSITIVE for SARS-CoV-2, the novel coronavirus associated with COVID-19. This test has been authorized by the FDA under an Emergency Use Authorization (EUA) for use by authorized laboratories. Fact sheet for Healthcare Providers: ConventionUpdate.co.nz  Fact sheet for Patients: ConventionUpdate.co.nz       Methodology: Isothermal Nucleic Acid Amplification               IMAGING:   XR CHEST PORT   Final Result   No acute cardiopulmonary disease radiographically. .  .          DUPLEX LOWER EXT VENOUS BILAT    (Results Pending)        ECG/ECHO:    Results for orders placed or performed during the hospital encounter of 07/10/22   EKG, 12 LEAD, INITIAL   Result Value Ref Range    Ventricular Rate 89 BPM    Atrial Rate 92 BPM    QRS Duration 102 ms    Q-T Interval 380 ms    QTC Calculation (Bezet) 462 ms    Calculated R Axis 125 degrees    Calculated T Axis 178 degrees    Diagnosis       Atrial fibrillation  Right axis deviation  Abnormal ECG  When compared with ECG of 26-NOV-2020 20:40,  Current undetermined rhythm precludes rhythm comparison, needs review  ST no longer depressed in Lateral leads  Confirmed by Kelley Solorio ([id number]) on 7/10/2022 10:12:35 PM     Results for orders placed or performed in visit on 05/14/12   AMB POC EKG ROUTINE W/ 12 LEADS, INTER & REP    Impression    EKG: atrial fibrillation, . Assessment:   Given the patient's current clinical presentation, there is a high level of concern for decompensation if discharged from the emergency department. Complex decision making was performed, which includes reviewing the patient's available past medical records, laboratory results, and imaging studies. Active Problems:    CHF exacerbation ([address] (7/10/2022)      COVID-19 (7/10/2022)      # Acute on chronic diastolic heart failure  # COVID 10 infection  # Prosthetic aortic  valve stenosis  HTN    -he has 2+ LE edema, weight gain and some SOB  -received IV lasix twice today  Hold lisinopril as BP wnl  Cards consult  -on room air, no additional tretament for COVID   continous pulse oximetry    Atrial fib:  ?s/p watchman      # BPH-tamsulosin,fiansteride    Mitral regurgitation s/p MVR  Tricuspid regurgitation s/p prosthetic valv    CAD  History of pacemaker placement        DIET: ADULT DIET Regular   ISOLATION PRECAUTIONS: Droplet Plus  CODE STATUS: Full Code   DVT PROPHYLAXIS: Heparin  FUNCTIONAL STATUS PRIOR TO HOSPITALIZATION: Fully active and ambulatory; able to carry on all self-care without restriction. EARLY MOBILITY ASSESSMENT: Recommend routine ambulation while hospitalized with the assistance of nursing staff  ANTICIPATED DISCHARGE: 24-48 hours.         Signed By: Agatha Campos MD     July 11, 2022         Please note that this dictation may have been completed with [employer] computer voice recognition software. Quite often unanticipated grammatical, syntax, homophones, and other interpretive errors are inadvertently transcribed by the computer software. Please disregard these errors. Please excuse any errors that have escaped final proofreading.

## 2022-07-10 NOTE — ED PROVIDER NOTES
81M w/ hx AF, CHF, MVR, CAD, HTN, HLD, DEL, distant hx of prostate Ca p/w 6wks leg swelling. Pt reports b/l leg swelling L>R progressively worsening for past 6wks. Also notes SOB and ABDUL over past few weeks w/ dry cough, generalized fatigue. No N/V/D, F/C, rectal bleeding. No pain anywhere including no chest, abd or leg pain. Recent travel to the [place] w/ his family. Pt went to [facility] last night and was dx w/ acute CHF, given lasix but left AMA to come here [facility] is doctors are. \" No current anticoagulation, had Watchman Procedure.            Past Medical History:   Diagnosis Date    Arthritis     Atrial fibrillation ([address]     CAD (coronary artery disease)     Chronic pain     legs/knee    GERD (gastroesophageal reflux disease)     Hx of carcinoma in situ of prostate     Hyperlipidemia     Hypertension     Insomnia     DEL (obstructive sleep apnea)     Radiation proctitis     Vitamin D deficiency        Past Surgical History:   Procedure Laterality Date    COLONOSCOPY N/A 5/8/2020    COLONOSCOPY performed by Audie West MD at [address] [place] [address] COLONOSCOPY N/A 6/8/2020    COLONOSCOPY performed by Salvador Avalos MD at [address] N/A 11/23/2020    FLEXIBLE SIGMOIDOSCOPY WITH APC performed by Audie West MD at [address] [facility] HX AORTIC VALVE REPLACEMENT  2015    and mitral valve repair, left atrial cryo maze    HX HEENT      melanoma removed head    HX HERNIA REPAIR  2009    Right    HX HERNIA REPAIR      left inguinal hernia repair    HX HERNIA REPAIR Left 12/01/2016    lap left inguinal hernia repair with mesh    HX KNEE REPLACEMENT      Bilateral    HX ORTHOPAEDIC      BILATERAL KNEE REPLACEMENT    HX ORTHOPAEDIC      CARPEL TUNNEL REPAIR-RIGHT    HX OTHER SURGICAL  2015    closure of patent foramen ovale    HX OTHER SURGICAL  10/2020    watchman implant for afib / Dr. Bowen Erwin      42 radiation treatments         Family History: Problem Relation Age of Onset    Cancer Mother     Cancer Father         prostrate    Cancer Brother         prostrate ca    Anesth Problems Neg Hx        Social History     Socioeconomic History    Marital status:      Spouse name: Not on file    Number of children: Not on file    Years of education: Not on file    Highest education level: Not on file   Occupational History    Not on file   Tobacco Use    Smoking status: Never Smoker    Smokeless tobacco: Never Used   Substance and Sexual Activity    Alcohol use: Yes     Alcohol/week: 3.0 standard drinks     Types: 3 Cans of beer per week    Drug use: No    Sexual activity: Not Currently   Other Topics Concern    Not on file   Social History Narrative    Not on file     Social Determinants of Health     Financial Resource Strain:     Difficulty of Paying Living Expenses: Not on file   Food Insecurity:     Worried About Running Out of Food in the Last Year: Not on file    [place] of Food in the Last Year: Not on file   Transportation Needs:     Lack of Transportation (Medical): Not on file    Lack of Transportation (Non-Medical):  Not on file   Physical Activity:     Days of Exercise per Week: Not on file    Minutes of Exercise per Session: Not on file   Stress:     Feeling of Stress : Not on file   Social Connections:     Frequency of Communication with Friends and Family: Not on file    Frequency of Social Gatherings with Friends and Family: Not on file    Attends Alevism Services: Not on file    Active Member of Clubs or Organizations: Not on file    Attends Club or Organization Meetings: Not on file    Marital Status: Not on file   Intimate Partner Violence:     Fear of Current or Ex-Partner: Not on file    Emotionally Abused: Not on file    Physically Abused: Not on file    Sexually Abused: Not on file   Housing Stability:     Unable to Pay for Housing in the Last Year: Not on file    Number of [place] in the Last Year: Not on file    Unstable Housing in the Last Year: Not on file         ALLERGIES: Patient has no known allergies. Review of Systems   Constitutional: Positive for fatigue. Negative for chills, diaphoresis and fever. HENT: Negative for facial swelling, mouth sores, nosebleeds, trouble swallowing and voice change. Eyes: Negative for pain and visual disturbance. Respiratory: Positive for cough and shortness of breath. Negative for apnea, wheezing and stridor. Cardiovascular: Positive for leg swelling. Negative for chest pain and palpitations. Gastrointestinal: Negative for abdominal distention, abdominal pain, blood in stool, diarrhea, nausea and vomiting. Genitourinary: Negative for difficulty urinating, dysuria, flank pain, hematuria, scrotal swelling and testicular pain. Musculoskeletal: Negative for joint swelling. Skin: Negative for color change and rash. Allergic/Immunologic: Negative for immunocompromised state. Neurological: Negative for dizziness, syncope and light-headedness. Hematological: Does not bruise/bleed easily. Psychiatric/Behavioral: Negative for agitation and behavioral problems. Vitals:    07/10/22 1413 07/10/22 1513 07/10/22 1530 07/10/22 1555   BP: 125/75 (!) 152/95 (!) 143/71 128/87   Pulse: 91 88 100 90   Resp: 15  27 22   Temp: 98.6 °F (37 °C)      SpO2: 95%  94% 96%            Physical Exam  Vitals and nursing note reviewed. Constitutional:       General: He is not in acute distress. Appearance: Normal appearance. He is not ill-appearing or toxic-appearing. HENT:      Head: Normocephalic and atraumatic. Right Ear: External ear normal.      Left Ear: External ear normal.      Nose: Nose normal.      Mouth/Throat:      Mouth: Mucous membranes are moist.      Pharynx: Oropharynx is clear. No oropharyngeal exudate or posterior oropharyngeal erythema. Eyes:      General: No scleral icterus.      Extraocular Movements: Extraocular movements intact. Conjunctiva/sclera: Conjunctivae normal.      Pupils: Pupils are equal, round, and reactive to light. Cardiovascular:      Rate and Rhythm: Normal rate. Rhythm irregular. Pulses: Normal pulses. Heart sounds: No murmur heard. No friction rub. No gallop. Pulmonary:      Effort: Pulmonary effort is normal. No tachypnea, accessory muscle usage or respiratory distress. Breath sounds: Normal breath sounds. No stridor. No decreased breath sounds, wheezing, rhonchi or rales. Abdominal:      General: Bowel sounds are normal. There is no distension. Palpations: Abdomen is soft. Tenderness: There is no abdominal tenderness. There is no guarding or rebound. Musculoskeletal:         General: No deformity. Normal range of motion. Cervical back: Normal range of motion and neck supple. No rigidity. Right lower leg: Edema present. Left lower leg: Edema present. Skin:     General: Skin is warm. Capillary Refill: Capillary refill takes less than 2 seconds. Coloration: Skin is not jaundiced. Neurological:      General: No focal deficit present. Mental Status: He is alert. Cranial Nerves: No cranial nerve deficit. Sensory: No sensory deficit. Motor: No weakness. Coordination: Coordination normal.   Psychiatric:         Mood and Affect: Mood normal.         Behavior: Behavior normal.         Thought Content: Thought content normal.         Judgment: Judgment normal.          MDM  Number of Diagnoses or Management Options  Acute on chronic congestive heart failure, unspecified heart failure type (HCC)  Chronic atrial fibrillation (HCC)  Peripheral edema  Diagnosis management comments: 81M w/ hx AF, CHF, MVR, CAD, HTN, HLD, DEL, distant hx of prostate Ca p/w 6wks leg swelling and SOB/ABDUL. Pt nontoxic appearing, afebrile, hemodynamically stable w/o resp distress or hypoxia.  Extensive BLE edema but no joint effusion, erythema, point tenderness and NVI. [facility] and [facility] paperwork w/ him showing dx of acute CHF. Ddx includes PNA vs URI/bronchitis/flu/COVID19 vs COPD/asthma exacerbation vs decompensated CHF vs PNX vs pulm edema/pleural effusion vs valvular heart disease vs pulm fibrosis/HTN vs interstitial lung disease vs malignancy/mets vs ACS vs cardiac dysrythmia vs HTN emergency/urgency vs symptomatic anemia vs electrolyte/metabolic, less likely PE based on Wells/Cecilia score, unlikely pericardial effusion/tamponade based on presentation. Ordered CXR, EKG, labs. Monitor and reassess. 1600 Pt remains hemodynamically stable. CXR unremarkable. EKG narrow complex AF w/o ischemic changes. Reviewed last ECHO w/ LVEF 50-55%. Trop neg x1. BNP 2800 (usually low 1000s per cards notes). Low pre-test prob for VTE and age-adjusted Ddimer is neg so no further w/u at this time. COVID+ (vaccinated) so breakthrough infection but not hypoxic and no signs of PNA superinfection on CXR. Gave IV lasix and asa. Tele admit for acute decompensated CHF.            Amount and/or Complexity of Data Reviewed  Clinical lab tests: ordered and reviewed  Tests in the radiology section of CPT®: reviewed and ordered  Tests in the medicine section of CPT®: reviewed and ordered  Discussion of test results with the performing providers: yes  Decide to obtain previous medical records or to obtain history from someone other than the patient: yes  Review and summarize past medical records: yes  Discuss the patient with other providers: yes  Independent visualization of images, tracings, or specimens: yes    Risk of Complications, Morbidity, and/or Mortality  Presenting problems: moderate  Diagnostic procedures: moderate  Management options: moderate           Procedures    EKG Interpretation   Narrow complex rhythm likely AF w/ frequent PVCs, no ST changes  (EKG tracing interpreted by ED physician)    LABORATORY TESTS:  Recent Results (from the past 12 hour(s)) CBC WITH AUTOMATED DIFF    Collection Time: 07/10/22 12:22 PM   Result Value Ref Range    WBC 5.3 4.1 - 11.1 K/uL    RBC 3.36 (L) 4.10 - 5.70 M/uL    HGB 10.5 (L) 12.1 - 17.0 g/dL    HCT 33.7 (L) 36.6 - 50.3 %    .3 (H) 80.0 - 99.0 FL    MCH 31.3 26.0 - 34.0 PG    MCHC 31.2 30.0 - 36.5 g/dL    RDW 15.6 (H) 11.5 - 14.5 %    PLATELET 98 (L) 751 - 400 K/uL    MPV 10.2 8.9 - 12.9 FL    NRBC 0.0 0  WBC    ABSOLUTE NRBC 0.00 0.00 - 0.01 K/uL    NEUTROPHILS 80 (H) 32 - 75 %    LYMPHOCYTES 8 (L) 12 - 49 %    MONOCYTES 10 5 - 13 %    EOSINOPHILS 1 0 - 7 %    BASOPHILS 1 0 - 1 %    IMMATURE GRANULOCYTES 0 0.0 - 0.5 %    ABS. NEUTROPHILS 4.2 1.8 - 8.0 K/UL    ABS. LYMPHOCYTES 0.4 (L) 0.8 - 3.5 K/UL    ABS. MONOCYTES 0.5 0.0 - 1.0 K/UL    ABS. EOSINOPHILS 0.1 0.0 - 0.4 K/UL    ABS. BASOPHILS 0.1 0.0 - 0.1 K/UL    ABS. IMM. GRANS. 0.0 0.00 - 0.04 K/UL    DF SMEAR SCANNED      RBC COMMENTS MACROCYTOSIS  1+        RBC COMMENTS ANISOCYTOSIS  1+       METABOLIC PANEL, COMPREHENSIVE    Collection Time: 07/10/22 12:22 PM   Result Value Ref Range    Sodium 141 136 - 145 mmol/L    Potassium 3.8 3.5 - 5.1 mmol/L    Chloride 108 97 - 108 mmol/L    CO2 27 21 - 32 mmol/L    Anion gap 6 5 - 15 mmol/L    Glucose 108 (H) 65 - 100 mg/dL    BUN 18 6 - 20 MG/DL    Creatinine 0.85 0.70 - 1.30 MG/DL    BUN/Creatinine ratio 21 (H) 12 - 20      GFR est AA >60 >60 ml/min/1.73m2    GFR est non-AA >60 >60 ml/min/1.73m2    Calcium 8.6 8.5 - 10.1 MG/DL    Bilirubin, total 1.1 (H) 0.2 - 1.0 MG/DL    ALT (SGPT) 17 12 - 78 U/L    AST (SGOT) 22 15 - 37 U/L    Alk.  phosphatase 112 45 - 117 U/L    Protein, total 7.0 6.4 - 8.2 g/dL    Albumin 3.2 (L) 3.5 - 5.0 g/dL    Globulin 3.8 2.0 - 4.0 g/dL    A-G Ratio 0.8 (L) 1.1 - 2.2     NT-PRO BNP    Collection Time: 07/10/22 12:22 PM   Result Value Ref Range    NT pro-BNP 2,820 (H) <450 PG/ML   SAMPLES BEING HELD    Collection Time: 07/10/22 12:22 PM   Result Value Ref Range    SAMPLES BEING HELD 1RED 1BLU     COMMENT        Add-on orders for these samples will be processed based on acceptable specimen integrity and analyte stability, which may vary by analyte. TROPONIN-HIGH SENSITIVITY    Collection Time: 07/10/22 12:22 PM   Result Value Ref Range    Troponin-High Sensitivity 50 0 - 76 ng/L   D DIMER    Collection Time: 07/10/22 12:22 PM   Result Value Ref Range    D-dimer 0.56 0.00 - 0.65 mg/L FEU   COVID-19 RAPID TEST    Collection Time: 07/10/22  3:55 PM   Result Value Ref Range    Specimen source Nasopharyngeal      COVID-19 rapid test Detected (A) NOTD         IMAGING RESULTS:  XR CHEST PORT   Final Result   No acute cardiopulmonary disease radiographically. .  . MEDICATIONS GIVEN:  Medications   furosemide (LASIX) injection 40 mg (40 mg IntraVENous Given 7/10/22 1513)   aspirin chewable tablet 324 mg (324 mg Oral Given 7/10/22 1514)       PROGRESS NOTE:   4:57 PM Patient's symptoms have improved after treatment    CONSULTS:  none    IMPRESSION:  1. Peripheral edema    2. Acute on chronic congestive heart failure, unspecified heart failure type ([address]    3. Chronic atrial fibrillation (HCC)        PLAN:  - Admit to hospitalist    Juanita Brandon MD      Perfect Serve Consult for Admission  3:56 PM    ED Room Number: ER24/24  Patient Name and age: Yadira Stanley 79 y.o.  male  Working Diagnosis:   1. Peripheral edema    2. Acute on chronic congestive heart failure, unspecified heart failure type ([address]    3.  Chronic atrial fibrillation ([facility]        COVID-19 Suspicion:  no  Sepsis present:  no  Reassessment needed: no  Code Status:  Full Code  Readmission: no  Isolation Requirements:  no  Recommended Level of Care:  telemetry  Department:[facility] Adult ED - 21

## 2022-07-10 NOTE — ED TRIAGE NOTES
Patient arrives to ED with reports of leg swelling and shortness of breath. Patient reports he has a a history of CHF, doesn't take Lasix as prescribed.   Patient was seen at [place] this morning, given IV lasix and patient left AMA

## 2022-07-11 ENCOUNTER — APPOINTMENT (OUTPATIENT)
Dept: VASCULAR SURGERY | Age: 82
DRG: 291 | End: 2022-07-11
Attending: HOSPITALIST
Payer: MEDICARE

## 2022-07-11 LAB
ALBUMIN SERPL-MCNC: 3.3 G/DL (ref 3.5–5)
ANION GAP SERPL CALC-SCNC: 7 MMOL/L (ref 5–15)
BASOPHILS # BLD: 0.1 K/UL (ref 0–0.1)
BASOPHILS NFR BLD: 1 % (ref 0–1)
BUN SERPL-MCNC: 20 MG/DL (ref 6–20)
BUN/CREAT SERPL: 24 (ref 12–20)
CALCIUM SERPL-MCNC: 8.8 MG/DL (ref 8.5–10.1)
CHLORIDE SERPL-SCNC: 107 MMOL/L (ref 97–108)
CO2 SERPL-SCNC: 26 MMOL/L (ref 21–32)
CREAT SERPL-MCNC: 0.85 MG/DL (ref 0.7–1.3)
DIFFERENTIAL METHOD BLD: ABNORMAL
EOSINOPHIL # BLD: 0.1 K/UL (ref 0–0.4)
EOSINOPHIL NFR BLD: 1 % (ref 0–7)
ERYTHROCYTE [DISTWIDTH] IN BLOOD BY AUTOMATED COUNT: 15.7 % (ref 11.5–14.5)
GLUCOSE SERPL-MCNC: 103 MG/DL (ref 65–100)
HCT VFR BLD AUTO: 33.7 % (ref 36.6–50.3)
HGB BLD-MCNC: 10.6 G/DL (ref 12.1–17)
IMM GRANULOCYTES # BLD AUTO: 0 K/UL (ref 0–0.04)
IMM GRANULOCYTES NFR BLD AUTO: 0 % (ref 0–0.5)
LYMPHOCYTES # BLD: 0.5 K/UL (ref 0.8–3.5)
LYMPHOCYTES NFR BLD: 8 % (ref 12–49)
MAGNESIUM SERPL-MCNC: 2 MG/DL (ref 1.6–2.4)
MCH RBC QN AUTO: 31.5 PG (ref 26–34)
MCHC RBC AUTO-ENTMCNC: 31.5 G/DL (ref 30–36.5)
MCV RBC AUTO: 100.3 FL (ref 80–99)
MONOCYTES # BLD: 0.5 K/UL (ref 0–1)
MONOCYTES NFR BLD: 9 % (ref 5–13)
NEUTS SEG # BLD: 4.6 K/UL (ref 1.8–8)
NEUTS SEG NFR BLD: 81 % (ref 32–75)
NRBC # BLD: 0 K/UL (ref 0–0.01)
NRBC BLD-RTO: 0 PER 100 WBC
PHOSPHATE SERPL-MCNC: 2.7 MG/DL (ref 2.6–4.7)
PHOSPHATE SERPL-MCNC: 2.7 MG/DL (ref 2.6–4.7)
PLATELET # BLD AUTO: 93 K/UL (ref 150–400)
PLATELET COMMENTS,PCOM: ABNORMAL
PMV BLD AUTO: 10.2 FL (ref 8.9–12.9)
POTASSIUM SERPL-SCNC: 3.9 MMOL/L (ref 3.5–5.1)
RBC # BLD AUTO: 3.36 M/UL (ref 4.1–5.7)
RBC MORPH BLD: ABNORMAL
RBC MORPH BLD: ABNORMAL
SODIUM SERPL-SCNC: 140 MMOL/L (ref 136–145)
WBC # BLD AUTO: 5.8 K/UL (ref 4.1–11.1)

## 2022-07-11 PROCEDURE — 96372 THER/PROPH/DIAG INJ SC/IM: CPT

## 2022-07-11 PROCEDURE — 83735 ASSAY OF MAGNESIUM: CPT

## 2022-07-11 PROCEDURE — G0378 HOSPITAL OBSERVATION PER HR: HCPCS

## 2022-07-11 PROCEDURE — 36415 COLL VENOUS BLD VENIPUNCTURE: CPT

## 2022-07-11 PROCEDURE — 99223 1ST HOSP IP/OBS HIGH 75: CPT | Performed by: INTERNAL MEDICINE

## 2022-07-11 PROCEDURE — 96376 TX/PRO/DX INJ SAME DRUG ADON: CPT

## 2022-07-11 PROCEDURE — 74011250636 HC RX REV CODE- 250/636: Performed by: HOSPITALIST

## 2022-07-11 PROCEDURE — 84100 ASSAY OF PHOSPHORUS: CPT

## 2022-07-11 PROCEDURE — 80069 RENAL FUNCTION PANEL: CPT

## 2022-07-11 PROCEDURE — 93970 EXTREMITY STUDY: CPT

## 2022-07-11 PROCEDURE — 74011250637 HC RX REV CODE- 250/637: Performed by: HOSPITALIST

## 2022-07-11 PROCEDURE — 85025 COMPLETE CBC W/AUTO DIFF WBC: CPT

## 2022-07-11 RX ADMIN — TAMSULOSIN HYDROCHLORIDE 0.4 MG: 0.4 CAPSULE ORAL at 22:41

## 2022-07-11 RX ADMIN — HEPARIN SODIUM 5000 UNITS: 5000 INJECTION INTRAVENOUS; SUBCUTANEOUS at 06:27

## 2022-07-11 RX ADMIN — PRAVASTATIN SODIUM 40 MG: 40 TABLET ORAL at 22:41

## 2022-07-11 RX ADMIN — FUROSEMIDE 40 MG: 10 INJECTION, SOLUTION INTRAMUSCULAR; INTRAVENOUS at 11:51

## 2022-07-11 RX ADMIN — ASPIRIN 81 MG: 81 TABLET, COATED ORAL at 10:30

## 2022-07-11 RX ADMIN — HEPARIN SODIUM 5000 UNITS: 5000 INJECTION INTRAVENOUS; SUBCUTANEOUS at 18:38

## 2022-07-11 RX ADMIN — FINASTERIDE 5 MG: 5 TABLET, FILM COATED ORAL at 22:41

## 2022-07-11 RX ADMIN — TAMSULOSIN HYDROCHLORIDE 0.4 MG: 0.4 CAPSULE ORAL at 10:31

## 2022-07-11 NOTE — PROGRESS NOTES
Bedside shift change report given to Chata (oncoming nurse) by Yves Dalal (offgoing nurse).  Report included the following information SBAR, Kardex, MAR, Accordion and Recent Results.

## 2022-07-11 NOTE — PROGRESS NOTES
[facility] Adult  Hospitalist Group                                                                                          Hospitalist Progress Note  Christian Ferrer MD  Answering service: [phone] OR [phone] from in house phone        Date of Service:  2022  NAME:  Caroline Rodriguez  :  1940  MRN:  [id number]      Admission Summary:   79 y.o. male with PMH of diastolic heart failure,atrial fib,HTN,CAD and other comorbidities came to the hospital with cc of lower extremity edema. Interval history / Subjective:   Patient seen and examined. States that shortness of breath intermittent, has LE edema   Wants to see cardioloist  Assessment & Plan:      # Acute on chronic diastolic heart failure  # COVID 10 infection  # Prosthetic aortic  valve stenosis  HTN     -he has 2+ LE edema, weight gain and some SOB  -received IV lasix today  Hold lisinopril as BP wnl  Cards consulted  -on room air, no additional tretament for COVID   continous pulse oximetry     Atrial fib:  ?s/p watchman        # BPH-tamsulosin,fiansteride     Mitral regurgitation s/p MVR  Tricuspid regurgitation s/p prosthetic valv     CAD  History of pacemaker placement             Code status: full  Prophylaxis: heparin  Care Plan discussed with: patient,nurse  Anticipated Disposition: home     Hospital Problems  Date Reviewed: 2022          Codes Class Noted POA    CHF exacerbation ([facility] ICD-10-CM: I50.9  ICD-9-CM: 428.0  7/10/2022 Unknown        COVID-19 ICD-10-CM: U07.1  ICD-9-CM: 079.89  7/10/2022 Unknown                Review of Systems:   Pertinent items are noted in HPI. Vital Signs:    Last 24hrs VS reviewed since prior progress note.  Most recent are:  Visit Vitals  /66 (BP 1 Location: Right arm, BP Patient Position: At rest)   Pulse 84   Temp 98.6 °F (37 °C)   Resp 20   Ht 5' 7\" (1.702 m)   Wt 94.3 kg (207 lb 14.3 oz)   SpO2 95%   BMI 32.56 kg/m²         Intake/Output Summary (Last 24 hours) at 7/11/2022 1832  Last data filed at 7/11/2022 1420  Gross per 24 hour   Intake --   Output 500 ml   Net -500 ml        Physical Examination:     I had a face to face encounter with this patient and independently examined them on 7/11/2022 as outlined below:          Constitutional:  No acute distress, cooperative, pleasant    ENT:  Oral mucosa moist, EOMI. Resp:  CTA bilaterally. No wheezing/rhonchi/rales. No accessory muscle use. CV:  Regular rhythm, normal rate, no murmurs, gallops, rubs    GI:  Soft, non distended, non tender. normoactive bowel sounds, no hepatosplenomegaly     Musculoskeletal:  2+LE edema    Neurologic:  Moves all extremities. AAOx3, CN II-XII reviewed            Data Review:    Review and/or order of clinical lab test  Review and/or order of tests in the radiology section of CPT  Review and/or order of tests in the medicine section of CPT      Labs:     Recent Labs     07/11/22  0037 07/10/22  1222   WBC 5.8 5.3   HGB 10.6* 10.5*   HCT 33.7* 33.7*   PLT 93* 98*     Recent Labs     07/11/22  0042 07/11/22 0037 07/10/22  1222     --  141   K 3.9  --  3.8     --  108   CO2 26  --  27   BUN 20  --  18   CREA 0.85  --  0.85   *  --  108*   CA 8.8  --  8.6   MG  --  2.0  --    PHOS 2.7 2.7  --      Recent Labs     07/11/22  0042 07/10/22  1222   ALT  --  17   AP  --  112   TBILI  --  1.1*   TP  --  7.0   ALB 3.3* 3.2*   GLOB  --  3.8     No results for input(s): INR, PTP, APTT, INREXT in the last 72 hours. No results for input(s): FE, TIBC, PSAT, FERR in the last 72 hours. Lab Results   Component Value Date/Time    Folate 41.9 (H) 06/08/2020 09:29 PM      No results for input(s): PH, PCO2, PO2 in the last 72 hours. No results for input(s): CPK, CKNDX, TROIQ in the last 72 hours.     No lab exists for component: CPKMB  Lab Results   Component Value Date/Time    Cholesterol, total 117 03/16/2022 08:00 AM    HDL Cholesterol 59 03/16/2022 08:00 AM    LDL, calculated 45 03/16/2022 08:00 AM    LDL, calculated 32 12/11/2018 11:03 AM    Triglyceride 62 03/16/2022 08:00 AM     Lab Results   Component Value Date/Time    Glucose (POC) 113 (H) 12/27/2014 05:14 PM    Glucose (POC) 119 (H) 12/26/2014 04:41 PM    Glucose (POC) 131 (H) 12/26/2014 01:28 PM    Glucose (POC) 140 (H) 12/26/2014 08:37 AM    Glucose (POC) 121 (H) 12/25/2014 10:25 PM     Lab Results   Component Value Date/Time    Color Yellow 08/27/2021 12:00 AM    Appearance Clear 08/27/2021 12:00 AM    Specific gravity 1.007 06/09/2020 03:15 PM    Specific gravity 1.030 07/03/2012 02:11 PM    pH (UA) 6.5 08/27/2021 12:00 AM    Protein Negative 06/09/2020 03:15 PM    Glucose Negative 06/09/2020 03:15 PM    Ketone Negative 08/27/2021 12:00 AM    Bilirubin Negative 08/27/2021 12:00 AM    Urobilinogen 0.2 06/09/2020 03:15 PM    Nitrites Negative 08/27/2021 12:00 AM    Leukocyte Esterase Negative 08/27/2021 12:00 AM    Epithelial cells FEW 06/09/2020 03:15 PM    Bacteria Few 10/07/2020 09:50 AM    WBC 0-5 10/07/2020 09:50 AM    RBC 0-2 10/07/2020 09:50 AM         Medications Reviewed:     Current Facility-Administered Medications   Medication Dose Route Frequency    tamsulosin (FLOMAX) capsule 0.4 mg  0.4 mg Oral BID    pravastatin (PRAVACHOL) tablet 40 mg  40 mg Oral QHS    aspirin delayed-release tablet 81 mg  81 mg Oral DAILY    finasteride (PROSCAR) tablet 5 mg  5 mg Oral QHS    [Held by provider] lisinopriL (PRINIVIL, ZESTRIL) tablet 20 mg  20 mg Oral DAILY    heparin (porcine) injection 5,000 Units  5,000 Units SubCUTAneous Q12H    furosemide (LASIX) injection 40 mg  40 mg IntraVENous DAILY     ______________________________________________________________________  EXPECTED LENGTH OF STAY: - - -  ACTUAL LENGTH OF STAY:          0                 Alyce Brunner, MD

## 2022-07-11 NOTE — NURSE NAVIGATOR
Chart reviewed by Heart Failure Nurse Navigator. Heart Failure database completed. EF:  50/55    ACEi/ARB/ARNi: Lisinopril    BB: not indicated    Aldosterone Antagonist: not indicated    Obstructive Sleep Apnea Screening:Yes   Referred to Sleep Medicine:     CRT not indicated     NYHA Functional Class requested via provider message on [employer]. Heart Failure Teach Back in Patient Education. Heart Failure Avoiding Triggers on Discharge Instructions. Cardiologist: **      Post discharge follow up phone call to be made within 48-72 hours of discharge.

## 2022-07-11 NOTE — CONSULTS
[facility]  Cardiology Care Note                  [x]Initial visit     []Established visit     Patient Name: Ibrahima Castro - PIL:[id number] - MZL:[id number]  Primary Cardiologist: Floridalma Foster MD  Consulting Cardiologist: Ja Reid MD         Reason for consult: AS    HPI:   Ibrahima Castro is a 79 y.o. male with atrial fibrillation, CAD, hypertension, AS, MVR/TVR, dyslipidemia, prostate cancer/radiation proctitis and GI bleeding, s/p Watchman LAAC implant, s/p single chamber ppm for sinus pauses. He is followed by  Dr Karla Luciano who saw him recently with increasing shortness of breath as well as elevated transvalvular gradients across his aortic bioprosthesis. .   Pt reports b/l leg swelling L>R progressively worsening for past 6wks. Also notes over past few weeks w/ dry cough, generalized fatigue. Did not report of any other new symptoms. He is a  who actually went to the [place] first and then came over here and left AMA from [place]. SUBJECTIVE:    After arrival to the hospital in the emergency room he was tested for COVID and was noted to be COVID 19 positive. Assessment and Plan     1. Acute on chronic diastolic heart failure  2. 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  Long-term persistent atrial fibrillation  4. History of GI bleed with poor tolerance to long-term oral anticoagulation now status post watchman based left atrial appendage occlusion. 5.  Pulmonary hypertension  6. History of sick sinus syndrome    Mr. Claritza Benitez is admitted to the hospital with acute on chronic diastolic heart failure. We will recommend condition 5 diuretics. While he is COVID-19 positive, I am uncertain if that is the main cause of his shortness of breath. Heart failure might be the primary cause of his symptoms.   He will eventually require definitive intervention on his aortic valve given that he has bioprosthetic valve failure. Once he recovers from [facility], plan to perform a heart catheterization as well as a CT angiogram to assess coronary anatomy as well as potential option for valve in valve TAVR. Continue with diuresis. EF 50-55%. Will need valve eval and mostly likely TAVR once further out from [facility].          ____________________________________________________________    Cardiac testing  Echocardiogram June 2022: Reviewed. Normal LV function. Previously placed aortic bioprosthesis with increased transvalvular gradient in high 30s with LVOT to aortic VTI ratio of 0.15-0.18 suggesting severe aortic stenosis. Calculated EOA of 0.6 to 0.7 cm.  EF is about 50 to 55%. ECG: Atrial fibrillation, PVCs, nonspecific ST-T changes.     Review of Systems    [x]All other systems reviewed and all negative except as written in HPI    [] Patient unable to provide secondary to condition         Past Medical History:   Diagnosis Date    Arthritis     Atrial fibrillation ([address]     CAD (coronary artery disease)     Chronic pain     legs/knee    GERD (gastroesophageal reflux disease)     Hx of carcinoma in situ of prostate     Hyperlipidemia     Hypertension     Insomnia     DEL (obstructive sleep apnea)     Radiation proctitis     Vitamin D deficiency      Past Surgical History:   Procedure Laterality Date    COLONOSCOPY N/A 5/8/2020    COLONOSCOPY performed by Gladys Cali MD at [facility] ENDOSCOPY    COLONOSCOPY N/A 6/8/2020    COLONOSCOPY performed by Mikey Segovia MD at [address] [place] [address] N/A 11/23/2020    FLEXIBLE SIGMOIDOSCOPY WITH APC performed by Gladys Cali MD at [facility] HX AORTIC VALVE REPLACEMENT  2015    and mitral valve repair, left atrial cryo maze    HX HEENT      melanoma removed head    HX HERNIA REPAIR  2009    Right    HX HERNIA REPAIR      left inguinal hernia repair    HX HERNIA REPAIR Left 12/01/2016    lap left inguinal hernia repair with mesh    HX KNEE REPLACEMENT      Bilateral    HX ORTHOPAEDIC      BILATERAL KNEE REPLACEMENT    HX ORTHOPAEDIC      CARPEL TUNNEL REPAIR-RIGHT    HX OTHER SURGICAL  2015    closure of patent foramen ovale    HX OTHER SURGICAL  10/2020    watchman implant for afib / Dr. Beryl Sanchez      42 radiation treatments     Social Hx:  reports that he has never smoked. He has never used smokeless tobacco. He reports current alcohol use of about 3.0 standard drinks of alcohol per week. He reports that he does not use drugs. Family Hx: family history includes Cancer in his brother, father, and mother. No Known Allergies       OBJECTIVE:  Wt Readings from Last 3 Encounters:   06/13/22 212 lb (96.2 kg)   05/20/22 212 lb (96.2 kg)   05/09/22 213 lb 12.8 oz (97 kg)       Intake/Output Summary (Last 24 hours) at 7/11/2022 1018  Last data filed at 7/10/2022 1800  Gross per 24 hour   Intake --   Output 800 ml   Net -800 ml         Physical Exam    Vitals:   Vitals:    07/11/22 0327 07/11/22 0551 07/11/22 0627 07/11/22 1005   BP: (!) 151/92  133/89    Pulse: 85 75 85 85   Resp: 21      Temp: 99.6 °F (37.6 °C)      SpO2:         Telemetry: AFIB      PE: deferred due to pt status. Audibly winded on phone. Data Review:     Radiology:   XR Results (most recent):  Results from Hospital Encounter encounter on 07/10/22    XR CHEST PORT    Narrative  INDICATION:  dyspnea    EXAM: Chest single view. COMPARISON: 9/3/2021. FINDINGS: A single frontal view of the chest at 1437 hours shows clear lungs. The heart, mediastinum and pulmonary vasculature are stable with moderately  severe cardiomegaly. Transvenous pacemaker from the left appears stable. .  The  bony thorax is unremarkable for age. .    Impression  No acute cardiopulmonary disease radiographically. .  .     CT Results (most recent):  Results from [place] encounter on 11/26/20    CT ABD PELV WO CONT    Narrative  EXAM: CT ABD PELV WO CONT    INDICATION: abd distension and pain    COMPARISON: 6/8/2020    CONTRAST:  None. TECHNIQUE:  Thin axial images were obtained through the abdomen and pelvis. Coronal and  sagittal reformats were generated. Oral contrast was not administered. CT dose  reduction was achieved through use of a standardized protocol tailored for this  examination and automatic exposure control for dose modulation. The absence of intravenous contrast material reduces the sensitivity for  evaluation of the vasculature and solid organs. FINDINGS:  LOWER THORAX: Cardiomegaly. Coronary atherosclerotic disease. LIVER: Enlarged hepatic IVC and hepatic veins suspicious for elevated right  heart pressures. Hypodensities in liver unchanged. BILIARY TREE: Gallbladder is within normal limits. CBD is not dilated. SPLEEN: Splenic calcifications  PANCREAS: No focal abnormality. ADRENALS: Unremarkable. KIDNEYS/URETERS: Simple cysts in bilateral kidneys. No evidence for  hydronephrosis  STOMACH: Unremarkable. SMALL BOWEL: No dilatation or wall thickening. COLON: Metallic linear density in the rectum  APPENDIX: Not well visualized  PERITONEUM: No ascites or pneumoperitoneum. RETROPERITONEUM: No lymphadenopathy or aortic aneurysm. REPRODUCTIVE ORGANS: Mildly enlarged prostate  URINARY BLADDER: Unremarkable  BONES: No destructive bone lesions. Multilevel degenerative changes in lumbar  spine. ABDOMINAL WALL: No mass or hernia. ADDITIONAL COMMENTS: N/A    Impression  IMPRESSION:  1. No acute intra-abdominal pathology. 2.  Possible clip or foreign body in the rectum. 3.  Cardiomegaly. Probable elevated right heart pressures. 4.  Other incidental findings as above    MRI Results (most recent):  No results found for this or any previous visit. No results for input(s): CPK, TROIQ in the last 72 hours.     No lab exists for component: [facility], [place], [address]  Recent Labs     07/11/22  0042 07/11/22  0037 07/10/22  1222   NA 140  --  141   K 3.9  --  3.8     --  108   CO2 26  --  27   BUN 20  --  18   CREA 0.85  --  0.85   *  --  108*   PHOS 2.7 2.7  --    CA 8.8  --  8.6     Recent Labs     07/11/22  0037 07/10/22  1222   WBC 5.8 5.3   HGB 10.6* 10.5*   HCT 33.7* 33.7*   PLT 93* 98*     Recent Labs     07/10/22  1222        No results for input(s): CHOL, LDLC in the last 72 hours. No lab exists for component: TGL, HDLC,  HBA1C  No results for input(s): CRP, TSH, TSHEXT in the last 72 hours. No lab exists for component: ESR        Current meds:    Current Facility-Administered Medications:     tamsulosin (FLOMAX) capsule 0.4 mg, 0.4 mg, Oral, BID, Alissa Rey MD, 0.4 mg at 07/10/22 2150    pravastatin (PRAVACHOL) tablet 40 mg, 40 mg, Oral, QHS, Alissa Rey MD, 40 mg at 07/10/22 2150    aspirin delayed-release tablet 81 mg, 81 mg, Oral, DAILY, Alissa Rey MD    finasteride (PROSCAR) tablet 5 mg, 5 mg, Oral, QHS, Alissa Rey MD, 5 mg at 07/10/22 2150    [Held by provider] lisinopriL (PRINIVIL, ZESTRIL) tablet 20 mg, 20 mg, Oral, DAILY, Alissa Rey MD    heparin (porcine) injection 5,000 Units, 5,000 Units, SubCUTAneous, Q12H, Alissa Rey MD, 5,000 Units at 07/11/22 4604    furosemide (LASIX) injection 40 mg, 40 mg, IntraVENous, DAILY, Alissa Rey MD  [facility] [address] [place] [address] 13, [address] [place] [address],[address] [place] [address] to the emergency declaration under the [address] [place] [address], [facility] waiver authority and the [employer] and [employer] Act, this Virtual  Visit was conducted, with patient's consent, to reduce the patient's risk of exposure to COVID-19 and provide continuity of care for an established patient.   Discussed patient with RN, Twin Sewell MD

## 2022-07-11 NOTE — PROGRESS NOTES
Pt arrived to unit, alert and oriented x4; RA; tele applied. Pt oriented to room and unit procedures.  Encouraged to call for assistance as needed

## 2022-07-11 NOTE — ED NOTES
Has a final transfer review been performed? Yes    Reason for Admission: CHF exacerbation  Patient comes from: home  Mental Status: Alert and oriented  ADL:self care  Ambulation: mild difficulty  Pertinent Info/Safety Concerns: pt is high fall risk, doesn't like to stay in bed, ambulates independently with mostly steady gait  COVID Status: Positive  MEWS Score: 1    Vitals:    07/10/22 1555 07/10/22 1730 07/10/22 1830 07/10/22 2000   BP: 128/87 133/82 127/82 134/80   Pulse: 90 78 96 90   Resp: 22 16 18 20   Temp:    98.4 °F (36.9 °C)   SpO2: 96% 96% 95% 95%     Transport mode:ED stretcher    TRANSFER - OUT REPORT:    Leydi Blackmon  being transferred to 6E(unit) for routine progression of care     \"Notification of etransfer note given to (Name) Gurpreet Mercado (Title) RN    Report consisted of patient's Situation, Background, Assessment and   Recommendations(SBAR). Lines:   Peripheral IV 07/10/22 Left Antecubital (Active)   Site Assessment Clean, dry, & intact 07/10/22 1224   Phlebitis Assessment 0 07/10/22 1224   Infiltration Assessment 0 07/10/22 1224   Dressing Status Clean, dry, & intact 07/10/22 1224   Dressing Type Transparent 07/10/22 1224   Hub Color/Line Status Pink 07/10/22 1224   Action Taken Blood drawn 07/10/22 1224        Opportunity for questions and clarification was provided.

## 2022-07-11 NOTE — PROGRESS NOTES
Problem: Airway Clearance - Ineffective  Goal: Achieve or maintain patent airway  Outcome: Progressing Towards Goal     Problem: Fatigue  Goal: Verbalize increase energy and improved vitality  Outcome: Progressing Towards Goal

## 2022-07-11 NOTE — PROGRESS NOTES
Medicare Outpatient Observation Notice (MOON) provided to patient/representative with verbal explanation of the notice. Time allotted for questions regarding the notice. Patient /representative provided a completed copy of the MOON notice. Copy placed on bedside chart.   Damian Moreno, Care Management Assistant

## 2022-07-11 NOTE — PROGRESS NOTES
Reason for Admission:  Admitted from home with complaints of shortness of breath and bilateral leg edema. History of CHF, A-fib and CAD. RUR Score:   Observation             Plan for utilizing home health:  No skilled needs identified. PCP: First and Last name:  Samia Phelps MD   Are you a current patient: Yes/No: YES  Approximate date of last visit: 6/2022  Can you participate in a virtual visit with your PCP: No                 Current Advanced Directive/Advance Care Plan: Full Code  Healthcare Decision Maker:          Primary Decision Maker: Aimee Zapata  [phone]                  Transition of Care Plan:    Once medically stable will likely discharge home with family. Per cardiology he will likely need a TAVR when he is further out from [address] Care Management Interventions  PCP Verified by CM:  Yes  Last Visit to PCP: 06/29/22  Support Systems: Spouse/Significant Other  Confirm Follow Up Transport: Family  Discharge Location  Patient Expects to be Discharged to[de-identified] Home

## 2022-07-12 VITALS
HEIGHT: 67 IN | HEART RATE: 91 BPM | DIASTOLIC BLOOD PRESSURE: 93 MMHG | WEIGHT: 205.69 LBS | TEMPERATURE: 98 F | SYSTOLIC BLOOD PRESSURE: 129 MMHG | RESPIRATION RATE: 12 BRPM | BODY MASS INDEX: 32.28 KG/M2 | OXYGEN SATURATION: 100 %

## 2022-07-12 PROBLEM — U07.1 COVID: Status: ACTIVE | Noted: 2022-07-12

## 2022-07-12 LAB
ALBUMIN SERPL-MCNC: 2.8 G/DL (ref 3.5–5)
ANION GAP SERPL CALC-SCNC: 5 MMOL/L (ref 5–15)
BASOPHILS # BLD: 0 K/UL (ref 0–0.1)
BASOPHILS NFR BLD: 1 % (ref 0–1)
BUN SERPL-MCNC: 19 MG/DL (ref 6–20)
BUN/CREAT SERPL: 24 (ref 12–20)
CALCIUM SERPL-MCNC: 8.3 MG/DL (ref 8.5–10.1)
CHLORIDE SERPL-SCNC: 106 MMOL/L (ref 97–108)
CO2 SERPL-SCNC: 29 MMOL/L (ref 21–32)
CREAT SERPL-MCNC: 0.78 MG/DL (ref 0.7–1.3)
DIFFERENTIAL METHOD BLD: ABNORMAL
EOSINOPHIL # BLD: 0 K/UL (ref 0–0.4)
EOSINOPHIL NFR BLD: 1 % (ref 0–7)
ERYTHROCYTE [DISTWIDTH] IN BLOOD BY AUTOMATED COUNT: 15.9 % (ref 11.5–14.5)
GLUCOSE SERPL-MCNC: 99 MG/DL (ref 65–100)
HCT VFR BLD AUTO: 31.2 % (ref 36.6–50.3)
HGB BLD-MCNC: 9.9 G/DL (ref 12.1–17)
IMM GRANULOCYTES # BLD AUTO: 0 K/UL (ref 0–0.04)
IMM GRANULOCYTES NFR BLD AUTO: 0 % (ref 0–0.5)
LYMPHOCYTES # BLD: 0.6 K/UL (ref 0.8–3.5)
LYMPHOCYTES NFR BLD: 15 % (ref 12–49)
MCH RBC QN AUTO: 31.6 PG (ref 26–34)
MCHC RBC AUTO-ENTMCNC: 31.7 G/DL (ref 30–36.5)
MCV RBC AUTO: 99.7 FL (ref 80–99)
MONOCYTES # BLD: 0.7 K/UL (ref 0–1)
MONOCYTES NFR BLD: 18 % (ref 5–13)
NEUTS SEG # BLD: 2.4 K/UL (ref 1.8–8)
NEUTS SEG NFR BLD: 65 % (ref 32–75)
NRBC # BLD: 0 K/UL (ref 0–0.01)
NRBC BLD-RTO: 0 PER 100 WBC
PHOSPHATE SERPL-MCNC: 2.3 MG/DL (ref 2.6–4.7)
PLATELET # BLD AUTO: 84 K/UL (ref 150–400)
PMV BLD AUTO: 10.3 FL (ref 8.9–12.9)
POTASSIUM SERPL-SCNC: 3.6 MMOL/L (ref 3.5–5.1)
RBC # BLD AUTO: 3.13 M/UL (ref 4.1–5.7)
RBC MORPH BLD: ABNORMAL
RBC MORPH BLD: ABNORMAL
SODIUM SERPL-SCNC: 140 MMOL/L (ref 136–145)
WBC # BLD AUTO: 3.7 K/UL (ref 4.1–11.1)

## 2022-07-12 PROCEDURE — G0378 HOSPITAL OBSERVATION PER HR: HCPCS

## 2022-07-12 PROCEDURE — 96376 TX/PRO/DX INJ SAME DRUG ADON: CPT

## 2022-07-12 PROCEDURE — 36415 COLL VENOUS BLD VENIPUNCTURE: CPT

## 2022-07-12 PROCEDURE — 74011250637 HC RX REV CODE- 250/637: Performed by: HOSPITALIST

## 2022-07-12 PROCEDURE — 99233 SBSQ HOSP IP/OBS HIGH 50: CPT | Performed by: INTERNAL MEDICINE

## 2022-07-12 PROCEDURE — 65270000046 HC RM TELEMETRY

## 2022-07-12 PROCEDURE — 85025 COMPLETE CBC W/AUTO DIFF WBC: CPT

## 2022-07-12 PROCEDURE — 96372 THER/PROPH/DIAG INJ SC/IM: CPT

## 2022-07-12 PROCEDURE — 80069 RENAL FUNCTION PANEL: CPT

## 2022-07-12 PROCEDURE — 74011250636 HC RX REV CODE- 250/636: Performed by: HOSPITALIST

## 2022-07-12 RX ORDER — FUROSEMIDE 40 MG/1
40 TABLET ORAL 2 TIMES DAILY
Qty: 20 TABLET | Refills: 0 | Status: SHIPPED | OUTPATIENT
Start: 2022-07-12 | End: 2022-07-19 | Stop reason: DRUGHIGH

## 2022-07-12 RX ORDER — POTASSIUM CHLORIDE 20 MEQ/1
40 TABLET, EXTENDED RELEASE ORAL DAILY
Qty: 30 TABLET | Refills: 0 | Status: SHIPPED | OUTPATIENT
Start: 2022-07-12

## 2022-07-12 RX ADMIN — HEPARIN SODIUM 5000 UNITS: 5000 INJECTION INTRAVENOUS; SUBCUTANEOUS at 07:18

## 2022-07-12 RX ADMIN — TAMSULOSIN HYDROCHLORIDE 0.4 MG: 0.4 CAPSULE ORAL at 09:34

## 2022-07-12 RX ADMIN — ASPIRIN 81 MG: 81 TABLET, COATED ORAL at 09:34

## 2022-07-12 RX ADMIN — FUROSEMIDE 40 MG: 10 INJECTION, SOLUTION INTRAMUSCULAR; INTRAVENOUS at 09:34

## 2022-07-12 NOTE — PROGRESS NOTES
I have reviewed discharge instructions with the patient. The patient verbalized understanding. Current Discharge Medication List      START taking these medications    Details   potassium chloride (K-DUR, KLOR-CON M20) 20 mEq tablet Take 2 Tablets by mouth daily. Qty: 30 Tablet, Refills: 0  Start date: 7/12/2022         CONTINUE these medications which have CHANGED    Details   furosemide (Lasix) 40 mg tablet Take 1 Tablet by mouth two (2) times a day. Qty: 20 Tablet, Refills: 0  Start date: 7/12/2022    Comments: NEW MEDICATION DOSAGE AS OF 6/13/2022         CONTINUE these medications which have NOT CHANGED    Details   aspirin delayed-release 81 mg tablet Take 81 mg by mouth daily. TAKES ONE IN MORNING AND ONE IN EVENING      ipratropium (ATROVENT HFA) 17 mcg/actuation inhaler Take 2 Puffs by inhalation as needed. ferrous sulfate 325 mg (65 mg iron) cpER Take 1 Tab by mouth every other day. tamsulosin (FLOMAX) 0.4 mg capsule Take 0.8 mg by mouth two (2) times a day. cholecalciferol (VITAMIN D3) 1,000 unit tablet Take 2,000 Units by mouth two (2) times a day. acetaminophen (TYLENOL) 325 mg tablet Take  by mouth every four (4) hours as needed for Pain. docusate sodium (COLACE) 100 mg capsule Take 100 mg by mouth two (2) times daily as needed. finasteride (PROSCAR) 5 mg tablet Take 5 mg by mouth nightly. pravastatin (PRAVACHOL) 40 mg tablet Take 40 mg by mouth nightly. polyvinyl alcohol (ARTIFICIAL TEARS, POLYVIN ALC,) 1.4 % ophthalmic solution Administer 1 Drop to both eyes as needed. GLUCOSAMINE HCL/CHONDR CHING A NA (GLUCOSAMINE-CHONDROITIN) 750-600 mg tab Take 1 Tab by mouth daily. omega-3 fatty acids-vitamin e 1,000 mg cap Take 1 Cap by mouth every other day. multivitamin (ONE A DAY) tablet Take 1 Tab by mouth daily.          STOP taking these medications       chlorthalidone (HYGROTON) 25 mg tablet Comments:   Reason for Stopping:         lisinopriL (PRINIVIL, ZESTRIL) 20 mg tablet Comments:   Reason for Stopping:             Patient discharge with belongings by tech

## 2022-07-12 NOTE — PROGRESS NOTES
Patient/family seen: YES       Informed patient/family of BPCI-A Bundle Program. Also advised of potential outreach by Care Transitions Team.    Bundle Payment Care Improvement Beneficiary Letter Delivered to Beneficiary or Representative:YES.  Date BCPI -A was given:07/12/22

## 2022-07-12 NOTE — PROGRESS NOTES
Due to Contact Restrictions did not enter the room. Provided prayer at [facility] patient's doorway. Did not include sacramental care.       Tin Oconnell

## 2022-07-12 NOTE — PROGRESS NOTES
Bedside shift change report given to GAUDENCIO Woody (oncoming nurse) by Maya Delong (offgoing nurse). Report included the following information SBAR, ED Summary, Procedure Summary, Intake/Output, MAR, Recent Results and Med Rec Status.

## 2022-07-13 ENCOUNTER — TELEPHONE (OUTPATIENT)
Dept: CASE MANAGEMENT | Age: 82
End: 2022-07-13

## 2022-07-13 ENCOUNTER — PATIENT OUTREACH (OUTPATIENT)
Dept: CASE MANAGEMENT | Age: 82
End: 2022-07-13

## 2022-07-13 ENCOUNTER — TELEPHONE (OUTPATIENT)
Dept: CARDIOLOGY CLINIC | Age: 82
End: 2022-07-13

## 2022-07-13 DIAGNOSIS — I35.0 NONRHEUMATIC AORTIC VALVE STENOSIS: Primary | ICD-10-CM

## 2022-07-13 NOTE — TELEPHONE ENCOUNTER
Catalina Pratt, per Dr. Paul Cotto, he will need his TAVR CTA (ordered) next week and his office is going to arrange Cath. We will need to see him in clinic after those are complete. He will be discharged today. Can you follow up on this process and arrange for Valve Clinic appt with Dr. Lyla Camilo and Dr. Paul Cotto afterward?   Thanks, Miguel Bañuelos

## 2022-07-13 NOTE — TELEPHONE ENCOUNTER
HEART FAILURE NURSE NAVIGATOR POST DISCHARGE FOLLOW UP PHONE CALL     HF NN contacted patient by telephone to perform post hospital discharge follow up call. Verified patient name and date of birth as identifiers. Provided introduction to self and role. Reviewed discharge instructions; confirmed patient is in receipt of all prescribed HF medications. Reinforced importance of daily weights and dietary restrictions, following low sodium diet. Reinforced signs/symptoms of HF and when to notify the physician. Patient given opportunity to ask questions/express concerns. All questions answered with good understanding. HF NN advised patient that he has not heard from Dr. Ramy Long office by tomorrow morning, he should give them a call to discuss scheduling the CTA, as patient requests it be done on Monday. When patient is notified of appointment for CTA, he should also be given appointment to follow up with  in valve clinic.

## 2022-07-13 NOTE — PROGRESS NOTES
Care Transitions Initial Call    Call within 2 business days of discharge: Yes     Patient: Isaac Argueta Patient : 1940 MRN: [id number]    [facility] Discharge [facility]       Complaint Diagnosis Description Type Department Provider    7/10/22 Leg Swelling; Shortness of Breath Peripheral edema . .. ED to Hosp-Admission (Discharged) (ADMIT) Gaye Almonte MD; Ana Earl. .. Was this an external facility discharge? No     Challenges to be reviewed by the provider   Additional needs identified to be addressed with provider: yes  7/10/22- rapid test done- [facility] ED- noted to be positive. Remains asymptomatic. CTN working patient/family and cardiology to secure appts and orders for:   Recommended CT scans- TAVR work up. Scheduled for  at [facility]. Cardiology to schedule heart cath-   Clarify instructions given to patient about getting follow up lab work this Friday or Monday. Patient reports he had not been taking hygroten nor lisinopril PTA. Method of communication with provider : staff message    Discussed [id number] related testing which was available at this time. Test results were positive. Patient informed of results, if available? aware     Advance Care Planning:   Does patient have an Advance Directive: not on file. Inpatient Readmission Risk score: Unplanned Readmit Risk Score: 10.8 ( )    Was this a readmission?  no     Patients top risk factors for readmission: level of motivation, medical condition- , multiple health system providers, support system, utilization of services and to be further assessed   Interventions to address risk factors: Scheduled appointment with Specialist-Cardiology, Communication with specialists who will assume or re-assume care of the patient's system-specific problems-Cardiology, Education of patient/family/caregiver/guardian to support self-management- , Assessment and support for treatment adherence and medication management-  and communication with PCP    Care Transition Nurse (CTN) contacted the patient by telephone to perform post hospital discharge assessment. Verified name and  with patient as identifiers. Provided introduction to self, and explanation of the CTN role. CTN reviewed discharge instructions, medical action plan and red flags with patient who verbalized understanding. Were discharge instructions available to patient? yes. Reviewed appropriate site of care based on symptoms and resources available to patient including: Specialist, [employer] and [employer] . Patient and daughter, Gay Raza given an opportunity to ask questions and does not have any further questions or concerns at this time. The both agrees to contact the PCP office for questions related to their healthcare. Medication reconciliation was performed with patient, who verbalizes understanding of administration of home medications. Advised obtaining a 90-day supply of all daily and as-needed medications. Referral to Pharm D needed: no     Home Health/Outpatient orders at discharge: none    Durable Medical Equipment ordered at discharge: None    Was patient discharged with a pulse oximeter? no    Discussed follow-up appointments. If no appointment was previously scheduled, appointment scheduling offered: yes. Is follow up appointment scheduled within 7 days of discharge? no follow up appointments scheduled at time of discharge from hospital.   CTN reaching out to primary cardiology to secure [facility] appointment.       [facility] follow up appointment(s):   Future Appointments   Date Time Provider Vinay Reina   2022  8:40 AM Margaret Fine Ala, MD CAVSF BS AMB   2022  3:15 PM [facility] CT ER 2 [facility]   2022  3:00 PM Margaret Fine Ala, MD CAVIR BS AMB   9/15/2022  1:45 PM [facility] MARLENESF BS AMB   2022 11:00 AM Margaret Fine Ala, MD CAVIR BS AMB   2022  1:40 PM PACEMAKER, STFRROCHELLE LUNDBERG BS AMB   2022  2:00 PM Helena Reed, SULEIMAN CAVSF BS [facility]     Non-[facility] follow up appointment(s):   [address] [facility]- Dr. Alexx Lopez-D- called the [place] and sent records for office to schedule DEANNA. Plan for follow-up call in 5-7 days based on severity of symptoms and risk factors. Plan for next call:   · Assess current symptoms including daily monitoring items  · Assess adherence with medications; diet and fluid recommendations  · Appointments in place for: lab work, CT scans and cath. Follow up with cardiology. CTN provided contact information for future needs. Goals Addressed                 This Visit's Progress       Heart Failure     Patient verbalizes understanding of self-management goals of living with Congestive Heart Failure        7/14/22- received text from daughter, Gay Raza. She was asking about follow up lab work determined when, update on when cath can be done. [facility] Ma[facility]   She relayed father was telling her he was urinating every 20 minutes. CTN contacted Mr. Crista Cardenas- he patched daughter, Gay Raza in for 3 way conversation- he confirms urination in large amonnts- clear, pale color often. Does slow down around 8pm.  Gets up usual amount of 2-3 times per night. He did weigh this morning- digital scale showed: 206- lost 1 pound and later realized during our conversation that he was wearing his shoes. Reinforced good routine. He will be more consistent. Feels that there is now real change with LE edema. But questioned about abdomen- possibly less full. Explained location of fluid retention included abdomen. He will monitor. Denies light headed dizzy- feels great and wants to go do things but urinating too much. Emphasized BID diuretic may not be in place for long term- needs to have continued diuresis at home. Explained he has good EF and once Valve is \"addressed-treated\" will potentially change need for diuretics to be taken.  Emphasized to be patient and will slow down shortly. Discussion about NA intake- asked to stay under 200 mg per day- aim for <400 per intake. Explained the relationship between high NA intake and fluid retention. Stressed less is best. Monitor labels and pay attention to serving sizes. CTN working to secure ANTHONY AUGUSTIN appointment with primary cardiology. [facility]     7/13/22- spoke initially with Mr. Shelia Aleman, then daughter was added for 3-way phone call. He confirms recent vacation- with lifestyle changes; denies missing doses. He said he feels much improved- \"lost lots of fluid in hospital\".  He can weigh- explained good routine and will weigh each morning. Has not weighed this morning.  He reports PTA- he was taking furosemide 20 mg daily. Will monitor for changes related to weight, urine out put, etc.  Continues to have LE edema. (note has been seen-treated with lymphedema clinic- wearing stockings).  Reports that he had not been taking: discontinued meds- lisinopril and hygroten.  CTN working with cardiology- to determine:   Patient reported being told that he needs to do follow up lab work- K and Cr+. Follow up appointment- must quarantine for full 10 days from 7/10 test. Before can be see in person in office per policy. CT ordered scheduled- soonest available- 7/20 at [facility] location. Daughter called today to schedule. Awaiting Dr. Emil Iniguez office to schedule cath. Daughter is coming to [place] from [place] tomorrow- would like cath done while she is here to help. Updated Dr. Dalton Gibbons about above. [employer]          Post Hospitalization     Attends follow-up appointments as directed. 7/13/22-   PCP- Dr. Sara Haynes- at the [address]- routed via [address] fax- records from recent hospital stay. Her FAX is  [phone]. Cardiology- Dr. Josephine Vale- CTN reached out to office to secure ANTHONY AUGUSTIN appt.          [facility]        COMPLETED: Prevent complications post hospitalization. 10/16/2020 Patient reports attendance of cardio appointment and follow up for 10/27/2020. Denies chest pain, SOB, fever, N/V/D. Endorses sore throat. Patient will monitor BP and report at next week outreach. [facility]    10/28/2020 Patient report sore throat is gone, attendance of cardio appointment  with no change to plan of care. Denies SOB, chest pain, fever, N/V/D. Patient will continue to monitor BP and report at next outreach. [facility]    11/30/2020 Patient reports weight is 203 today, taking all medications as prescribed, eating low Na diet. Denies chest pain, SOB,N/V/D, fever. Patient will call cardio office to schedule appointment, weigh daily, and will report appointment details at next outreach.  [facility]

## 2022-07-13 NOTE — TELEPHONE ENCOUNTER
Called central scheduling, patient needs a negative Covid test before scheduling CTA. I know we spoke about this. Placing note for reminder for follow up.    Edgar Prasad RN

## 2022-07-13 NOTE — PROGRESS NOTES
Dr. Nan Stiles. [phone]            Cardiology valvular heart disease consult/Progress Note      Requesting/referring provider: MD Dr Josephine Mckenna    Reason for Consult: Valvular heart disease    Assessment/Plan:  1. History of severe aortic valve disease status post aortic valve replacement with a bioprosthesis in 2014 details unavailable  2. History of sick sinus syndrome and long-term persistent atrial fibrillation  3. History of prior GI bleed and still deemed as poor candidate for long-term oral anticoagulation status post watchman device placement in 2020  4. Progressive right-sided heart failure in conjunction with some component of left-sided heart failure  5. Progressive structural valve deterioration involvement aortic prosthesis  6. Status post mitral valve repair in 2014 in conjunction with aortic valve replacement. Now with residual mild mitral regurgitation  7. Significant tricuspid regurgitation secondary to right-sided lead and tricuspid annular dilatation with high flow low gradient transtricuspid systolic jet    Mr. Shelia Aleman is seen in the hospital for increasing shortness of breath and lower extremity edema. He has decompensated congestive heart failure which seems to be now resolving with IV diuretics. There are limited options to manage his tricuspid regurgitation which is the main  of his right heart failure but potentially treating his left heart failure and may help in some improvement of his right heart failure as well. His left heart failure is predominantly driven by diastolic dysfunction and bioprosthetic aortic valve dysfunction now with severe bioprosthetic aortic stenosis. He would potentially be a candidate for transcatheter valve in valve implantation based on his anatomy.   We will set him up to undergo a cardiac catheterization to assess his valve and right-sided hemodynamics as well as coronary anatomy. Additionally he would require a CT angiogram to assess his peripheral vasculature and help with sizing of the valve with implant. Currently he is COVID-19 positive and will need a few more days before any of these testing is performed. With IV diuretics he is feeling better and can be discharged from my standpoint with plan for outpatient reevaluation in valve clinic after CTA and cardiac catheterization. Investigations ordered    [x]    High complexity decision making was performed  []    Patient is at high-risk of decompensation with multiple organ involvement  []    Complex/difficult social determinants of health outcomes  Total of ** minutes were spent on reviewing the records, analyzing investigations and documentation in the chart, on the day of visit including time for examination and time spent with the patient  Investigations personally reviewed and interpreted  Echocardiogram for the past 3 years we will reviewed. Most recent echocardiogram from June 2022 shows low normal LV function with abnormal septal motion. He is got biatrial dilatation with significant mitral and tricuspid annular dilatation. Severe tricuspid regurgitation is noted with low-flow high-volume jet and early pressure equalization. Bioprosthetic aortic valve has mean gradient of about 44 mmHg with dimensionless index less than 0.25. Gradients have been progressively increasing from low 20s to low 30s to now high 30s to low 40s over the past 3 years. He has mild transvalvular aortic regurgitation as well. Investigations reviewed    HPI: Nicolaas Peter, a 79y.o. year-old who is seen for evaluation valvular heart disease and congestive heart failure. Over the past 1 to 2 years he has been having increased shortness of breath. He has nocturnal history of heart disease and underwent aortic valve replacement and mitral valve repair in 2014 by Dr Immanuel Puga. He did well for a few years.   He had subsequent issues with atrial fibrillation which has been persistent. Because of GI bleed and poor tolerance of oral anticoagulation, he underwent a watchman based left atrial appendage occlusion in 2020. He has a dual-chamber pacemaker for sick sinus syndrome/bradycardia. Past year has been progressive shortness of breath and lower extremity edema. Lately has been a bigger problem but in the past 2 months shortness of breath has been bothering him quite a bit. Functional capacity has reduced. He does not report of any chest pain or syncopal spells. .  He  has a past medical history of Arthritis, Atrial fibrillation ([facility] [address] CAD (coronary artery disease), Chronic pain, GERD (gastroesophageal reflux disease), carcinoma in situ of prostate, Hyperlipidemia, Hypertension, Insomnia, DEL (obstructive sleep apnea), Radiation proctitis, and Vitamin D deficiency. He has no past medical history of Diabetes ([facility] [address] Review of system:Patient reports no PND/Orthpnea/CP. He reports no cough/fever/focal neurological deficits/abdominal pain. All other systems negative except as above. Family History   Problem Relation Age of Onset    Cancer Mother     Cancer Father         prostrate    Cancer Brother         prostrate ca    Anesth Problems Neg Hx       Social History     Socioeconomic History    Marital status:    Tobacco Use    Smoking status: Never Smoker    Smokeless tobacco: Never Used   Substance and Sexual Activity    Alcohol use: Yes     Alcohol/week: 3.0 standard drinks     Types: 3 Cans of beer per week    Drug use: No    Sexual activity: Not Currently      PE  Vitals:    07/12/22 1104 07/12/22 1200 07/12/22 1400 07/12/22 1521   BP: 111/64   (!) 129/93   Pulse: 94 78 79 91   Resp: 16   12   Temp: 98.2 °F (36.8 °C)   98 °F (36.7 °C)   SpO2: 98%   100%   Weight:       Height:        Body mass index is 32.22 kg/m². General:    Alert, cooperative, no distress.    Psychiatric:    Normal Mood and affect    Eye/ENT: Pupils equal, No asymmetry, Conjunctival pink. Able to hear voice at normal amplitude   Lungs:      Visibly symmetric chest expansion, No palpable tenderness. Clear to auscultation bilaterally. Heart[de-identified]    Regular rate and rhythm, S1, S2 normal, mid peaking midsystolic grade 3/6 murmu best heard in right upper sternal border. No click, rub or gallop. JVD with V wave, Normal palpable peripheral pulses. No cyanosis   Abdomen:     Soft, non-tender. Bowel sounds normal. No masses,  No      organomegaly. Extremities:   Extremities normal, atraumatic, severe bilateral lower extremity. Neurologic:   CN II-XII grossly intact.  No gross focal deficits           Recent Labs:  Lab Results   Component Value Date/Time    Cholesterol, total 117 03/16/2022 08:00 AM    HDL Cholesterol 59 03/16/2022 08:00 AM    LDL, calculated 45 03/16/2022 08:00 AM    LDL, calculated 32 12/11/2018 11:03 AM    Triglyceride 62 03/16/2022 08:00 AM     Lab Results   Component Value Date/Time    Creatinine 0.78 07/12/2022 03:45 AM     Lab Results   Component Value Date/Time    BUN 19 07/12/2022 03:45 AM     Lab Results   Component Value Date/Time    Potassium 3.6 07/12/2022 03:45 AM     Lab Results   Component Value Date/Time    Hemoglobin A1c 5.9 12/12/2014 12:00 PM     Lab Results   Component Value Date/Time    HGB 9.9 (L) 07/12/2022 03:45 AM     Lab Results   Component Value Date/Time    PLATELET 84 (L) 13/96/2681 03:45 AM       Reviewed:  Past Medical History:   Diagnosis Date    Arthritis     Atrial fibrillation ([address]     CAD (coronary artery disease)     Chronic pain     legs/knee    GERD (gastroesophageal reflux disease)     Hx of carcinoma in situ of prostate     Hyperlipidemia     Hypertension     Insomnia     DEL (obstructive sleep apnea)     Radiation proctitis     Vitamin D deficiency      Social History     Tobacco Use   Smoking Status Never Smoker   Smokeless Tobacco Never Used     Social History     Substance and Sexual Activity   Alcohol Use Yes    Alcohol/week: 3.0 standard drinks    Types: 3 Cans of beer per week     No Known Allergies  Family History   Problem Relation Age of Onset    Cancer Mother     Cancer Father         prostrate    Cancer Brother         prostrate ca    Anesth Problems Neg Hx         Current Facility-Administered Medications   Medication Dose Route Frequency    tamsulosin (FLOMAX) capsule 0.4 mg  0.4 mg Oral BID    pravastatin (PRAVACHOL) tablet 40 mg  40 mg Oral QHS    aspirin delayed-release tablet 81 mg  81 mg Oral DAILY    finasteride (PROSCAR) tablet 5 mg  5 mg Oral QHS    [Held by provider] lisinopriL (PRINIVIL, ZESTRIL) tablet 20 mg  20 mg Oral DAILY    heparin (porcine) injection 5,000 Units  5,000 Units SubCUTAneous Q12H    furosemide (LASIX) injection 40 mg  40 mg IntraVENous DAILY     Current Outpatient Medications   Medication Sig    furosemide (Lasix) 40 mg tablet Take 1 Tablet by mouth two (2) times a day.  potassium chloride (K-DUR, KLOR-CON M20) 20 mEq tablet Take 2 Tablets by mouth daily.  aspirin delayed-release 81 mg tablet Take 81 mg by mouth daily. TAKES ONE IN MORNING AND ONE IN EVENING    ipratropium (ATROVENT HFA) 17 mcg/actuation inhaler Take 2 Puffs by inhalation as needed.  ferrous sulfate 325 mg (65 mg iron) cpER Take 1 Tab by mouth every other day.  tamsulosin (FLOMAX) 0.4 mg capsule Take 0.8 mg by mouth two (2) times a day.  cholecalciferol (VITAMIN D3) 1,000 unit tablet Take 2,000 Units by mouth two (2) times a day.  acetaminophen (TYLENOL) 325 mg tablet Take  by mouth every four (4) hours as needed for Pain.  docusate sodium (COLACE) 100 mg capsule Take 100 mg by mouth two (2) times daily as needed.  finasteride (PROSCAR) 5 mg tablet Take 5 mg by mouth nightly.  pravastatin (PRAVACHOL) 40 mg tablet Take 40 mg by mouth nightly.     polyvinyl alcohol (ARTIFICIAL TEARS, POLYVIN ALC,) 1.4 % ophthalmic solution Administer 1 Drop to both eyes as needed.  GLUCOSAMINE HCL/CHONDR CHING A NA (GLUCOSAMINE-CHONDROITIN) 750-600 mg tab Take 1 Tab by mouth daily.  omega-3 fatty acids-vitamin e 1,000 mg cap Take 1 Cap by mouth every other day.  multivitamin (ONE A DAY) tablet Take 1 Tab by mouth daily. ATTENTION:   This medical record was transcribed using an electronic medical records/speech recognition system. Although proofread, it may and can contain electronic, spelling and other errors. Corrections may be executed at a later time. Please feel free to contact us for any clarifications as needed.     [facility]  CAV, [employer],  [place], [place]. [phone]

## 2022-07-15 ENCOUNTER — TELEPHONE (OUTPATIENT)
Dept: CARDIOLOGY CLINIC | Age: 82
End: 2022-07-15

## 2022-07-15 NOTE — TELEPHONE ENCOUNTER
While scheduling the pt for the [facility] with Bilateral Angio the daughter Vesna Cote would like to know what the turn around is for an appt at the valve clinic and the replacement procedure. The daughter lives in [place] and would like to be able to fly down for his Cath and the appt with the valve clinic. Vesna Cote also was wanting to the know the timeline for the replacement because the pt is suppose to be in his grand-daughters wedding on 8/27 and she states he barely can walk and he feels terrible all the time. She would like for someone to give her a call to go over the information.          Call back: [phone]

## 2022-07-15 NOTE — TELEPHONE ENCOUNTER
Scheduled pt for [facility] with Bilateral Angio for 8/1/2022 with an arrival time of 10am at [facility]. Pt to have labs done the same day as [facility] appt. Reviewed instructions, pt and daughter confirmed understanding and further questions forwarded to valve clinic.

## 2022-07-18 NOTE — TELEPHONE ENCOUNTER
Pt daughter said to call number below since pts wife did not understand what was talked about according to pt. She is also asked that you ask him to get permission to speak with daughter.      [phone]

## 2022-07-18 NOTE — DISCHARGE INSTRUCTIONS
Discharge Instructions       PATIENT ID: Mylene Martinez  MRN: [id number]   YOB: 1940    DATE OF ADMISSION: 7/10/2022  1:56 PM    DATE OF DISCHARGE: 7/12/2022    PRIMARY CARE PROVIDER: Zee Edwards MD     ATTENDING PHYSICIAN: Dianna Garrett MD  DISCHARGING PROVIDER: Agata Adame MD    To contact this individual call [phone] and ask the  to page. If unavailable ask to be transferred the Adult Hospitalist Department. DISCHARGE DIAGNOSES ***    CONSULTATIONS: IP CONSULT TO CARDIOLOGY    PROCEDURES/SURGERIES: * No surgery found *    PENDING TEST RESULTS:   At the time of discharge the following test results are still pending: ***    FOLLOW UP APPOINTMENTS:   Follow-up Information     Follow up With Specialties Details Why Contact Info    Zee Edwards MD Internal Medicine Physician In 1 week  [address] [place] [address] 75.      Guillermo Guidry MD Cardiovascular Disease Physician, Interventional Cardiology Physician In 1 week  [address] [facility] [address] [facility]  [phone]             ADDITIONAL CARE RECOMMENDATIONS:   -We have increased the dose of  Lasix to 40 mg twice daily due to swelling in legs,follow up with PCP or cardiologist in 4-5 days and reassess the dose   -check BP every day and call if less than 659 mm hg systolic pressure  -check basal metabolic panel in 3-5 days    -[email or web address]      Calculating Isolation  Day 0 is your first day of symptoms or a positive viral test. Day 1 is the first full day after your symptoms developed or your test specimen was collected. If you have COVID-19 or have symptoms, isolate for at least 5 days. IF YOU  Tested positive for COVID-19 or have symptoms, regardless of vaccination status    Stay home for at least 5 days  Stay home for 5 days and isolate from others in your home.     Wear a well-fitting mask if you must be around others in your home. Do not travel. Ending isolation if you had symptoms  End isolation after 5 full days if you are fever-free for 24 hours (without the use of fever-reducing medication) and your symptoms are improving. Ending isolation if you did NOT have symptoms  End isolation after at least 5 full days after your positive test.    If you got very sick from COVID-19 or have a weakened immune system  You should isolate for at least 10 days. Consult your doctor before ending isolation. Take precautions until day 10    Wear a well-fitting mask   Wear a well-fitting mask for 10 full days any time you are around others inside your home or in public. Do not go to places where you are unable to wear a mask. Do not travel  Do not travel until a full 10 days after your symptoms started or the date your positive test was taken if you had no symptoms. Avoid being around people who are more likely to get very sick from COVID-19. DIET: Cardiac Diet      ACTIVITY: Activity as tolerated    WOUND CARE: na    EQUIPMENT needed: na      Radiology      DISCHARGE MEDICATIONS:   See Medication Reconciliation Form    · It is important that you take the medication exactly as they are prescribed. · Keep your medication in the bottles provided by the pharmacist and keep a list of the medication names, dosages, and times to be taken in your wallet. · Do not take other medications without consulting your doctor. NOTIFY YOUR PHYSICIAN FOR ANY OF THE FOLLOWING:   Fever over 101 degrees for 24 hours. Chest pain, shortness of breath, fever, chills, nausea, vomiting, diarrhea, change in mentation, falling, weakness, bleeding. Severe pain or pain not relieved by medications. Or, any other signs or symptoms that you may have questions about.       DISPOSITION:   x Home With:   OT  PT  [facility]  RN       SNF/Inpatient Rehab/LTAC    Independent/assisted living    Hospice    Other:       Signed:   Cecil Magdaleno Mc Mora MD  7/12/2022  5:24 PM

## 2022-07-18 NOTE — TELEPHONE ENCOUNTER
Spoke to patient's wife. Name noted on permission to release information. Identifiers x 2. Informed that I would route message for the possibility of valve clinic appt on 8-4-22 (after Dr. Mirela Alex procedures on 8-1-22) so that daughter may attend. Discussed that valve replacement would be discussed at that appt. Will need to take into consideration that Mr. Frances Xavier is the ring bearer in wedding on 8-27-22 and it is her hope that he will be able to walk down aisle. She will relay information to patient's daughter.

## 2022-07-18 NOTE — DISCHARGE SUMMARY
Discharge Summary       PATIENT ID: Livier Alvarez  MRN: [id number]   YOB: 1940    DATE OF ADMISSION: 7/10/2022  1:56 PM    DATE OF DISCHARGE: 7/12/2022   PRIMARY CARE PROVIDER: Jose Ramon Plaza MD     ATTENDING PHYSICIAN: Adron Gowers, MD    DISCHARGING PROVIDER: Adron Gowers, MD    To contact this individual call [phone] and ask the  to page. If unavailable ask to be transferred the Adult Hospitalist Department. CONSULTATIONS: IP CONSULT TO CARDIOLOGY    PROCEDURES/SURGERIES: * No surgery found *    DISCHARGE DIAGNOSES:   ADMISSION SUMMARY AND HOSPITAL COURSE:   DISCHARGE DIAGNOSES / PLAN:      79 y. o. male with PMH of diastolic heart failure,atrial fib,HTN,CAD and other comorbidities came to the hospital with cc of lower extremity edema. # Acute on chronic diastolic heart failure, NYHA class 3 at admission and discharge  # COVID 10 infection  # Prosthetic aortic  valve stenosis  HTN     -he has 2+ LE edema, weight gain and some SOB  -received IV lasix  Hold lisinopril as BP wnl  Cards consulted  -on room air, no additional tretament for COVID   continous pulse oximetry  Discharged on oral lasix BID  OP follow up cards -  Discussed with daughter-close follow up of renal function and repeat labs, cards follow up     Atrial fib:  ?s/p watchman        # BPH-tamsulosin,fiansteride     Mitral regurgitation s/p MVR  Tricuspid regurgitation s/p prosthetic valv     CAD  History of pacemaker placement       XR CHEST PORT    Result Date: 7/10/2022  INDICATION:  dyspnea EXAM: Chest single view. COMPARISON: 9/3/2021. FINDINGS: A single frontal view of the chest at 1437 hours shows clear lungs. The heart, mediastinum and pulmonary vasculature are stable with moderately severe cardiomegaly. Transvenous pacemaker from the left appears stable. .  The bony thorax is unremarkable for age. .     No acute cardiopulmonary disease radiographically. .  .      ECHO ADULT COMPLETE    Result Date: 6/21/2022    Left Ventricle: Preserved left ventricular systolic function with a visually estimated EF of 50 - 55%. Left ventricle size is normal. Increased wall thickness. Findings consistent with mild concentric hypertrophy. Normal wall motion.   Right Ventricle: Right ventricle is mildly dilated.   Aortic Valve: Bioprosthetic valve. Mild regurgitation.   Mitral Valve: Valve repaired by annular ring. Thickened leaflets. Mild regurgitation.   Tricuspid Valve: Moderate regurgitation. The estimated RVSP is 40 mmHg.   Left Atrium: Left atrium is severely dilated. LA Vol Index A/L is 72 mL/m2.   Right Atrium: Right atrium is moderately dilated. It appears his prosthetic AV is more stenotic than last year and will need evaluation in valve clinic ASA. It has progressed quickly from last year. DUPLEX LOWER EXT VENOUS BILAT    Result Date: 7/12/2022  · No evidence of right or left lower extremity deep vein thrombosis. · The right and left common femoral, great saphenous, proximal femoral and popliteal vein(s) demonstrate pulsatile flow by Doppler interrogation.         NOTIFY YOUR PHYSICIAN FOR ANY OF THE FOLLOWING:   Fever over 101 degrees for 24 hours. Chest pain, shortness of breath, fever, chills, nausea, vomiting, diarrhea, change in mentation, falling, weakness, bleeding. Severe pain or pain not relieved by medications, as well as any other signs or symptoms that you may have questions about.     FOLLOW UP APPOINTMENTS:    Follow-up Information     Follow up With Specialties Details Why Contact Info    Adam Rodriguez MD Internal Medicine Physician In 1 week  [address] [facility] 75.      Manolo Farias MD Cardiovascular Disease Physician, Interventional Cardiology Physician In 1 week  [address] [facility]  [address]  [place] [address]  [phone]               ADDITIONAL CARE RECOMMENDATIONS:   -We have increased the dose of  Lasix to 40 mg twice daily due to swelling in legs,follow up with PCP or cardiologist in 4-5 days and reassess the dose   -check BP every day and call if less than 661 mm hg systolic pressure  -check basal metabolic panel in 3-5 days    -[email or web address]      Calculating Isolation  Day 0 is your first day of symptoms or a positive viral test. Day 1 is the first full day after your symptoms developed or your test specimen was collected. If you have COVID-19 or have symptoms, isolate for at least 5 days. IF YOU  Tested positive for COVID-19 or have symptoms, regardless of vaccination status    Stay home for at least 5 days  Stay home for 5 days and isolate from others in your home. Wear a well-fitting mask if you must be around others in your home. Do not travel. Ending isolation if you had symptoms  End isolation after 5 full days if you are fever-free for 24 hours (without the use of fever-reducing medication) and your symptoms are improving. Ending isolation if you did NOT have symptoms  End isolation after at least 5 full days after your positive test.    If you got very sick from COVID-19 or have a weakened immune system  You should isolate for at least 10 days. Consult your doctor before ending isolation. Take precautions until day 10    Wear a well-fitting mask   Wear a well-fitting mask for 10 full days any time you are around others inside your home or in public. Do not go to places where you are unable to wear a mask. Do not travel  Do not travel until a full 10 days after your symptoms started or the date your positive test was taken if you had no symptoms. Avoid being around people who are more likely to get very sick from COVID-19.       DIET: Cardiac Diet      ACTIVITY: Activity as tolerated    WOUND CARE: na    EQUIPMENT needed: na      DISCHARGE MEDICATIONS:  Discharge Medication List as of 7/12/2022  5:55 PM      START taking these medications    Details   potassium chloride (K-DUR, KLOR-CON M20) 20 mEq tablet Take 2 Tablets by mouth daily. , Normal, Disp-30 Tablet, R-0         CONTINUE these medications which have CHANGED    Details   furosemide (Lasix) 40 mg tablet Take 1 Tablet by mouth two (2) times a day., Normal, Disp-20 Tablet, R-0NEW MEDICATION DOSAGE AS OF 6/13/2022         CONTINUE these medications which have NOT CHANGED    Details   aspirin delayed-release 81 mg tablet Take 81 mg by mouth daily. TAKES ONE IN MORNING AND ONE IN EVENING, Historical Med      polyvinyl alcohol (ARTIFICIAL TEARS, POLYVIN ALC,) 1.4 % ophthalmic solution Administer 1 Drop to both eyes as needed., Historical Med      ipratropium (ATROVENT HFA) 17 mcg/actuation inhaler Take 2 Puffs by inhalation as needed., Historical Med      ferrous sulfate 325 mg (65 mg iron) cpER Take 1 Tab by mouth every other day., Historical Med      tamsulosin (FLOMAX) 0.4 mg capsule Take 0.8 mg by mouth two (2) times a day., Historical Med      cholecalciferol (VITAMIN D3) 1,000 unit tablet Take 2,000 Units by mouth two (2) times a day., Historical Med      GLUCOSAMINE HCL/CHONDR CHING A NA (GLUCOSAMINE-CHONDROITIN) 750-600 mg tab Take 1 Tab by mouth daily. , Historical Med      omega-3 fatty acids-vitamin e 1,000 mg cap Take 1 Cap by mouth every other day., Historical Med      acetaminophen (TYLENOL) 325 mg tablet Take  by mouth every four (4) hours as needed for Pain., Historical Med      multivitamin (ONE A DAY) tablet Take 1 Tab by mouth daily. , Historical Med      docusate sodium (COLACE) 100 mg capsule Take 100 mg by mouth two (2) times daily as needed., Historical Med      finasteride (PROSCAR) 5 mg tablet Take 5 mg by mouth nightly., Historical Med      pravastatin (PRAVACHOL) 40 mg tablet Take 40 mg by mouth nightly., Historical Med         STOP taking these medications       chlorthalidone (HYGROTON) 25 mg tablet Comments:   Reason for Stopping:         lisinopriL (PRINIVIL, ZESTRIL) 20 mg tablet Comments:   Reason for Stopping:               DISPOSITION:  x  Home With:   OT  PT  HH  RN       Long term SNF/Inpatient Rehab    Independent/assisted living    Hospice    Other:       PATIENT CONDITION AT DISCHARGE:     Functional status    Poor     Deconditioned    x Independent      Cognition   x  Lucid     Forgetful     Dementia      Catheters/lines (plus indication)    Hurd     PICC     PEG    x None      Code status    x Full code     DNR      PHYSICAL EXAMINATION AT DISCHARGE:    Constitutional:  No acute distress, cooperative, pleasant    ENT:  Oral mucosa moist, EOMI. Resp:  CTA bilaterally. No wheezing/rhonchi/rales. No accessory muscle use. CV:  Regular rhythm, normal rate, no murmurs, gallops, rubs    GI:  Soft, non distended, non tender. normoactive bowel sounds, no hepatosplenomegaly     Musculoskeletal:  2+LE edema    Neurologic:  Moves all extremities.   AAOx3, CN II-XII reviewed         CHRONIC MEDICAL DIAGNOSES:  Problem List as of 7/12/2022 Date Reviewed: 5/9/2022          Codes Class Noted - Resolved    Diastolic CHF, acute on chronic [facility]) ICD-10-CM: I50.33  ICD-9-CM: 428.33, 428.0  11/28/2020 - Present        Anasarca ICD-10-CM: R60.1  ICD-9-CM: 782.3  11/27/2020 - Present        ABDUL (dyspnea on exertion) ICD-10-CM: R06.00  ICD-9-CM: 786.09  11/27/2020 - Present        Radiation proctitis ICD-10-CM: K62.7  ICD-9-CM: 569.49  Unknown - Present        DEL (obstructive sleep apnea) ICD-10-CM: G47.33  ICD-9-CM: 327.23  Unknown - Present        Insomnia ICD-10-CM: G47.00  ICD-9-CM: 780.52  Unknown - Present        Atrial fibrillation (HCC) ICD-10-CM: I48.91  ICD-9-CM: 427.31  Unknown - Present        Hyperlipidemia ICD-10-CM: E78.5  ICD-9-CM: 272.4  Unknown - Present        Hx of carcinoma in situ of prostate ICD-10-CM: Z86.002  ICD-9-CM: V13.89  Unknown - Present        GERD (gastroesophageal reflux disease) ICD-10-CM: K21.9  ICD-9-CM: 530.81  Unknown - Present Chronic pain ICD-10-CM: G89.29  ICD-9-CM: 338.29  Unknown - Present    Overview Signed 6/8/2020  8:12 PM by Kristen Mills MD     legs/knee             CAD (coronary artery disease) ICD-10-CM: I25.10  ICD-9-CM: 414.00  Unknown - Present        Arthritis ICD-10-CM: M19.90  ICD-9-CM: 716.90  Unknown - Present        GI bleed ICD-10-CM: K92.2  ICD-9-CM: 578.9  6/8/2020 - Present        Hypertension ICD-10-CM: I10  ICD-9-CM: 401.9  10/26/2015 - Present        Anemia due to acute blood loss ICD-10-CM: D62  ICD-9-CM: 285.1  12/26/2014 - Present        Aortic stenosis ICD-10-CM: I35.0  ICD-9-CM: 424.1  12/23/2014 - Present        S/P AVR (aortic valve replacement) ICD-10-CM: Z95.2  ICD-9-CM: V43.3  12/23/2014 - Present    Overview Signed 12/23/2014 12:45 PM by Fredy Mishra PA-C     AORTIC VALVE REPLACEMENT 23mm Deep Mitroflow Bioprosthesis                          S/P MVR (mitral valve repair) ICD-10-CM: R02.774  ICD-9-CM: V45.89  12/23/2014 - Present    Overview Signed 12/23/2014 12:45 PM by Fredy Mishra PA-C     MITRAL VALVE REPAIR Medtronic 25mm Adjustable Ring               S/P Maze operation for atrial fibrillation ICD-10-CM: Z98.890, Z86.79  ICD-9-CM: V45.89  12/23/2014 - Present    Overview Signed 12/23/2014 12:46 PM by Fredy Mishra PA-C     Cryo Maze Left Atrial             Aortic insufficiency ICD-10-CM: I35.1  ICD-9-CM: 424.1  12/8/2014 - Present        Arthritis of knee ICD-10-CM: M17.10  ICD-9-CM: 716.96  7/9/2012 - Present        RESOLVED: Hyperlipemia ICD-10-CM: E78.5  ICD-9-CM: 272.4  10/26/2015 - 8/3/2021        RESOLVED: Mitral regurgitation ICD-10-CM: I34.0  ICD-9-CM: 424.0  12/8/2014 - 6/8/2020        RESOLVED: A-fib ([address] ICD-10-CM: I48.91  ICD-9-CM: 427.31  6/17/2013 - 8/3/2021        Presence of Watchman left atrial appendage closure device ICD-10-CM: Z95.818  ICD-9-CM: V45.09  10/13/2020 - Present        COVID ICD-10-CM: U07.1  ICD-9-CM: 079.89  7/12/2022 - Present CHF exacerbation ([facility] [address] ICD-10-CM: I50.9  ICD-9-CM: 428.0  7/10/2022 - Present        COVID-19 ICD-10-CM: U07.1  ICD-9-CM: 079.89  7/10/2022 - Present              40 minutes were spent with the patient on counseling and coordination of care    Signed:   Christian Ferrer MD  7/18/2022  1:35 AM    Cc:Hoang Lopez MD

## 2022-07-18 NOTE — TELEPHONE ENCOUNTER
Attempted to reach patient by telephone. A message was left for return call. Daughter, Bassem Chand is not listed on permission to release information.

## 2022-07-19 ENCOUNTER — OFFICE VISIT (OUTPATIENT)
Dept: CARDIOLOGY CLINIC | Age: 82
End: 2022-07-19
Payer: MEDICARE

## 2022-07-19 VITALS
HEIGHT: 67 IN | HEART RATE: 76 BPM | WEIGHT: 193.2 LBS | SYSTOLIC BLOOD PRESSURE: 125 MMHG | DIASTOLIC BLOOD PRESSURE: 60 MMHG | BODY MASS INDEX: 30.32 KG/M2

## 2022-07-19 DIAGNOSIS — E78.00 PURE HYPERCHOLESTEROLEMIA: ICD-10-CM

## 2022-07-19 DIAGNOSIS — Z95.0 CARDIAC PACEMAKER IN SITU: ICD-10-CM

## 2022-07-19 DIAGNOSIS — I48.91 ATRIAL FIBRILLATION, UNSPECIFIED TYPE (HCC): ICD-10-CM

## 2022-07-19 DIAGNOSIS — Z95.818 PRESENCE OF WATCHMAN LEFT ATRIAL APPENDAGE CLOSURE DEVICE: ICD-10-CM

## 2022-07-19 DIAGNOSIS — I35.0 AORTIC VALVE STENOSIS, ETIOLOGY OF CARDIAC VALVE DISEASE UNSPECIFIED: Primary | ICD-10-CM

## 2022-07-19 DIAGNOSIS — Z95.2 S/P AVR (AORTIC VALVE REPLACEMENT): ICD-10-CM

## 2022-07-19 DIAGNOSIS — Z98.890 H/O MITRAL VALVE REPAIR: ICD-10-CM

## 2022-07-19 DIAGNOSIS — I25.10 CORONARY ARTERY DISEASE INVOLVING NATIVE CORONARY ARTERY OF NATIVE HEART WITHOUT ANGINA PECTORIS: ICD-10-CM

## 2022-07-19 PROCEDURE — 99496 TRANSJ CARE MGMT HIGH F2F 7D: CPT | Performed by: SPECIALIST

## 2022-07-19 PROCEDURE — G8427 DOCREV CUR MEDS BY ELIG CLIN: HCPCS | Performed by: SPECIALIST

## 2022-07-19 RX ORDER — FUROSEMIDE 20 MG/1
20 TABLET ORAL DAILY
Status: ON HOLD | COMMUNITY
End: 2022-08-01

## 2022-07-19 RX ORDER — FUROSEMIDE 40 MG/1
40 TABLET ORAL DAILY
COMMUNITY
End: 2022-11-04

## 2022-07-19 NOTE — PROGRESS NOTES
CARDIOLOGY OFFICE NOTE    Shayan Young MD, [address], [place], [address] [facility]  Phone [phone]; Fax [phone]  Mobile [phone]   Voice Mail [phone]    LAST OFFICE VISIT : Visit date not found  Jose Short MD       ATTENTION:   This medical record was transcribed using an electronic medical records/speech recognition system. Although proofread, it may and can contain electronic, spelling and other errors. Corrections may be executed at a later time. Please feel free to contact us for any clarifications as needed. ICD-10-CM ICD-9-CM   1. Aortic valve stenosis, etiology of cardiac valve disease unspecified  I35.0 424.1   2. Atrial fibrillation, unspecified type ([address]  I48.91 427.31   3. H/O mitral valve repair  Z98.890 V15.1   4. S/P AVR (aortic valve replacement)  Z95.2 V43.3   5. Coronary artery disease involving native coronary artery of native heart without angina pectoris  I25.10 414.01   6. Pure hypercholesterolemia  E78.00 272.0   7. Cardiac pacemaker in situ  Z95.0 V45.01   8. Presence of Watchman left atrial appendage closure device  Z95.818 V45.09       Mr. Gelacio Jordan is a 79y.o. year old male, he is seen today for Transition of Care services following a hospital discharge for CHF/AS on 7/12. Our office Nurse Navigator performed an outreach to Mr. Norva Gowers on 7/14 (within 2 business days of discharge) to complete medication reconciliation and a telephonic assessment of his condition. Rafael Steen is a 79 y.o. male with  referred for negativeHTN, dyslipidemia, and AF aortic valve replacement and mitral valve repair status post maze procedure. I have personally obtained the history from the patient. Cardiac risk factors: dyslipidemia, male gender, hypertension  I have personally obtained the history from the patient.     HISTORY OF PRESENTING ILLNESS     Rafael Steen is a 79 y.o. male   with HTN, dyslipidemia, and AF aortic valve replacement and mitral valve repair status post maze procedure and now status post watchman device. He was recently hospitalized for heart failure and was diuresed. He is currently taking 40 mg of Lasix in the morning and 20 at night. They note that his lower extremity edema has improved. He still has edema present. I showed them the images of his aortic valve on his last echo. He is scheduled for a left and right heart catheterization at [address] [place] [address]. Been having some difficulty sleeping and it may be related to the fact is not wearing his CPAP. He does sleep during the day with no problem his wife states. Comes in with his daughter as well today. He says that they need a blood work order so I ordered it for today.        ACTIVE PROBLEM LIST     Patient Active Problem List    Diagnosis Date Noted    Presence of Watchman left atrial appendage closure device 10/13/2020    COVID 07/12/2022    CHF exacerbation ([facility] 07/10/2022    COVID-19 33/75/1291    Diastolic CHF, acute on chronic ([facility] 11/28/2020    Anasarca 11/27/2020    ABDUL (dyspnea on exertion) 11/27/2020    GI bleed 06/08/2020    Radiation proctitis     DEL (obstructive sleep apnea)     Insomnia     Atrial fibrillation (HCC)     Hyperlipidemia     Hx of carcinoma in situ of prostate     GERD (gastroesophageal reflux disease)     Chronic pain     CAD (coronary artery disease)     Arthritis     Hypertension 10/26/2015    Anemia due to acute blood loss 12/26/2014    Aortic stenosis 12/23/2014    S/P AVR (aortic valve replacement) 12/23/2014    S/P MVR (mitral valve repair) 12/23/2014    S/P Maze operation for atrial fibrillation 12/23/2014    Aortic insufficiency 12/08/2014    Arthritis of knee 07/09/2012           PAST MEDICAL HISTORY     Past Medical History:   Diagnosis Date    Arthritis     Atrial fibrillation (HCC)     CAD (coronary artery disease)     Chronic pain     legs/knee    GERD (gastroesophageal reflux disease)     Hx of carcinoma in situ of prostate     Hyperlipidemia     Hypertension     Insomnia     DEL (obstructive sleep apnea)     Radiation proctitis     Vitamin D deficiency            PAST SURGICAL HISTORY     Past Surgical History:   Procedure Laterality Date    COLONOSCOPY N/A 5/8/2020    COLONOSCOPY performed by Kiley Delvalle MD at [facility] COLONOSCOPY N/A 6/8/2020    COLONOSCOPY performed by Ros Fall MD at [address] N/A 11/23/2020    FLEXIBLE SIGMOIDOSCOPY WITH APC performed by Kiley eDlvalle MD at [facility] ENDOSCOPY    HX AORTIC VALVE REPLACEMENT  2015    and mitral valve repair, left atrial cryo maze    HX HEENT      melanoma removed head    HX HERNIA REPAIR  2009    Right    HX HERNIA REPAIR      left inguinal hernia repair    HX HERNIA REPAIR Left 12/01/2016    lap left inguinal hernia repair with mesh    HX KNEE REPLACEMENT      Bilateral    HX ORTHOPAEDIC      BILATERAL KNEE REPLACEMENT    HX ORTHOPAEDIC      CARPEL TUNNEL REPAIR-RIGHT    HX OTHER SURGICAL  2015    closure of patent foramen ovale    HX OTHER SURGICAL  10/2020    watchman implant for afib / Dr. Imtiaz Aldana      42 radiation treatments          ALLERGIES     No Known Allergies       FAMILY HISTORY     Family History   Problem Relation Age of Onset    Cancer Mother     Cancer Father         prostrate    Cancer Brother         prostrate ca    Anesth Problems Neg Hx     negative for cardiac disease       SOCIAL HISTORY     Social History     Socioeconomic History    Marital status:    Tobacco Use    Smoking status: Never Smoker    Smokeless tobacco: Never Used   Substance and Sexual Activity    Alcohol use:  Yes     Alcohol/week: 3.0 standard drinks     Types: 3 Cans of beer per week    Drug use: No    Sexual activity: Not Currently         MEDICATIONS     Current Outpatient Medications   Medication Sig    furosemide (Lasix) 40 mg tablet Take 40 mg by mouth daily. Take in AM    furosemide (Lasix) 20 mg tablet Take 20 mg by mouth daily. Takes at 1 pm    potassium chloride (K-DUR, KLOR-CON M20) 20 mEq tablet Take 2 Tablets by mouth daily.  aspirin delayed-release 81 mg tablet Take 81 mg by mouth two (2) times a day. TAKES ONE IN MORNING AND ONE IN EVENING    polyvinyl alcohol (ARTIFICIAL TEARS, POLYVIN ALC,) 1.4 % ophthalmic solution Administer 1 Drop to both eyes as needed.  ipratropium (ATROVENT HFA) 17 mcg/actuation inhaler Take 2 Puffs by inhalation as needed.  ferrous sulfate 325 mg (65 mg iron) cpER Take 1 Tab by mouth every other day.  tamsulosin (FLOMAX) 0.4 mg capsule Take 0.8 mg by mouth two (2) times a day.  cholecalciferol (VITAMIN D3) 1,000 unit tablet Take 2,000 Units by mouth two (2) times a day.  GLUCOSAMINE HCL/CHONDR CHING A NA (GLUCOSAMINE-CHONDROITIN) 750-600 mg tab Take 1 Tab by mouth daily.  omega-3 fatty acids-vitamin e 1,000 mg cap Take 1 Cap by mouth every other day.  acetaminophen (TYLENOL) 325 mg tablet Take  by mouth every four (4) hours as needed for Pain.  multivitamin (ONE A DAY) tablet Take 1 Tab by mouth daily.  docusate sodium (COLACE) 100 mg capsule Take 100 mg by mouth two (2) times daily as needed.  finasteride (PROSCAR) 5 mg tablet Take 5 mg by mouth nightly.  pravastatin (PRAVACHOL) 40 mg tablet Take 40 mg by mouth nightly. No current facility-administered medications for this visit. I have reviewed the nurses notes, vitals, problem list, allergy list, medical history, family, social history and medications. REVIEW OF SYMPTOMS   Pertinent positive per HPI  General: Pt denies excessive weight gain or loss. Pt is able to conduct ADL's  HEENT: Denies blurred vision, headaches, hearing loss, epistaxis and difficulty swallowing. Respiratory: Denies cough, congestion, shortness of breath, ABDUL, wheezing or stridor.   Cardiovascular: Denies precordial pain, palpitations, edema or PND  Gastrointestinal: Denies poor appetite, indigestion, abdominal pain or blood in stool  Genitourinary: Denies hematuria, dysuria, increased urinary frequency  Musculoskeletal: Denies joint pain or swelling from muscles or joints  Neurologic: Denies tremor, paresthesias, headache, or sensory motor disturbance  Psychiatric: Denies confusion, insomnia, depression  Integumentray: Denies rash, itching or ulcers. Hematologic: Denies easy bruising, bleeding     PHYSICAL EXAMINATION      Vitals:    07/19/22 0844   BP: 125/60   Pulse: 76   Weight: 193 lb 3.2 oz (87.6 kg)   Height: 5' 7\" (1.702 m)     General: Well developed, in no acute distress. HEENT: No jaundice, oral mucosa moist, no oral ulcers  Neck: Supple, no stiffness, no lymphadenopathy, supple  Heart:   Irregular 2/6 systolic murmur left sternal border  Respiratory: Clear bilaterally x 4, no wheezing or rales  Extremities:  + edema, normal cap refill, no cyanosis. Musculoskeletal: No clubbing, no deformities  Neuro: A&Ox3, speech clear, gait stable, cooperative, no focal neurologic deficits  Skin: Skin color is normal. No rashes or lesions. Non diaphoretic, moist.         DIAGNOSTIC DATA     1.  Echocardiogram                                     9/5/14 Left ventricle: Systolic function was normal. Ejection fraction was   estimated in the range of 55 % to 60 %. There were no regional wall motion   abnormalities. Left atrium: The atrium was mildly dilated. 4/20/15- EF 48%, SHANTANU, atrial septum- poss PFO, MR mild, TR mild/mod, Pulm HTN mod, AV bioprosthesis normal function   6/19/17- EF 45-50%, RVE, SHANTANU, MR/TR mild/mod, AV bioprosthesis exhibits normal function, severe Pulm HTN, AL mild   Atrial septum: There was a secundum septal defect. Color Doppler   evaluation was performed. There was a mild left-to-right shunt. 12/11/18 TTE: LVEF 50%, no WMAs, wall thickness mod incr.  RV mild-mod dilated, LA mod-sev dilated, RA mod dilated, mod MR, mod TR, mod pulm HTN, mild PV regurg. AV w/ bioprosthesis, no AS / no AR   3/16/20-EF 50-55%, BVE, Mild concentric hypertrophy, SHANTANU, Mitral valve thickening and repaired with annuloplasty ring. Surgical repair, mild MR/TR, AV leaflet calcification Aortic valve mean gradient is 20.7 mmHg. Aortic valve area is 1.3 cm2. Mild AS, 26 mm bioprosthetic aortic valve. 10/14/20- Limited-DEFINITY- EF 55-60%, No evidence of pericardial effusion. 11/27/20- EF 55%, Moderate concentric hypertrophy, RVE, Aortic valve mean gradient is 25 mmHg. - bioprosthetic aortic valve,  Mitral valve repaired with annuloplasty ring, mod MR/TR, mod pulm HTN, PAP 59 mmHg   DEWEY-12/29/20-·Watchman device within YIMI shows excellent endothelialization without evidence of braden-device leak, EF 55 - 60%, SHANTANU, bioprosthetic aortic valve- insufficiency is present -mild centralized leaking, mild AI, Mitral valve repaired with annuloplasty ring, mod/severe MR  7/26/21-EF 55 - 60%, Mild concentric hypertrophy, LAE, MV thickening and repaired with annuloplasty ring, mild MR, mild/mod TR, RVSP 38 mmHg,Pulm HTN mild, mild PI, Prosthetic aortic valve. Aortic valve mean gradient is 30.3 mmHg. Mild to moderate AV stenosis, There is a bioprosthetic aortic valve,mild AI  6/13/22-EF 50 - 55%, mild concentric hypertrophy, RVE, mild AI, Bioprosthetic AV, MV repaired by annular ring, mild MR, mod TR, RVSP 40 mmHg, SHANTANU    2.  Myocardial perfusion study                      (9/5/14)Normal EF ;Normal perfusion     3. Cholesterol profile                            12/11/18- , HDL 61, LDL 32, TG 60   7/24/21- , HDL 52, LDL 60, TG 66  3/16/22- , HDL 59, LDL 45, TG 62     4. LE venous duplex                                           (10/10/14)   Right leg mod.  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 7/11/22- No DVT    5. Watchman 10/13/20     6. Carotid Doppler   5/11/20- 1-49% Kris    7.9/3/21:implantation of a [employer] single chamber pacemaker per Dr. Panchito White    8. Holter  8/16/21-AF occurred 305 time(s) with HR range of 24 - 123; Total AF [place] 96%  Pauses >2 seconds occurred 40 time(s) with longest pause 3.9 seconds         LABORATORY DATA            Lab Results   Component Value Date/Time    WBC 3.7 (L) 07/12/2022 03:45 AM    HGB 9.9 (L) 07/12/2022 03:45 AM    HCT 31.2 (L) 07/12/2022 03:45 AM    PLATELET 84 (L) 47/46/3179 03:45 AM    MCV 99.7 (H) 07/12/2022 03:45 AM      Lab Results   Component Value Date/Time    Sodium 140 07/12/2022 03:45 AM    Potassium 3.6 07/12/2022 03:45 AM    Chloride 106 07/12/2022 03:45 AM    CO2 29 07/12/2022 03:45 AM    Anion gap 5 07/12/2022 03:45 AM    Glucose 99 07/12/2022 03:45 AM    BUN 19 07/12/2022 03:45 AM    Creatinine 0.78 07/12/2022 03:45 AM    BUN/Creatinine ratio 24 (H) 07/12/2022 03:45 AM    GFR est AA >60 07/12/2022 03:45 AM    GFR est non-AA >60 07/12/2022 03:45 AM    Calcium 8.3 (L) 07/12/2022 03:45 AM    Bilirubin, total 1.1 (H) 07/10/2022 12:22 PM    Alk. phosphatase 112 07/10/2022 12:22 PM    Protein, total 7.0 07/10/2022 12:22 PM    Albumin 2.8 (L) 07/12/2022 03:45 AM    Globulin 3.8 07/10/2022 12:22 PM    A-G Ratio 0.8 (L) 07/10/2022 12:22 PM    ALT (SGPT) 17 07/10/2022 12:22 PM           ASSESSMENT/RECOMMENDATIONS:.      1. AF  -He is status post watchman device   -No issues with his atrial fibrillation    2. LE edema  -Think is multifactorial reflux of there was a component of lymphedema but also from the aortic stenosis  -His dimensionless index was 0.15 and is now on a work-up for aortic valve replacement with most likely with TAVR. He will have a CT scan of his chest soon I have ordered lab work for his heart catheterization 1 August.    3. HTN  -Blood pressure good today     4.  Dyslipidemia  -LDL is at goal with an LDL of 45.    5. Moderate PFO with left to right flow and CHRISTOPHER  -Aspirin 62 mg a day    6. AS and MR s/p AVR and MVR on 1/8/15  - He has moderate MR and moderate TR and bioprosthetic valves. -Moderate to severe aortic stenosis symptomatic.    7. DEL  -States he is noncompliant  -May be a component of his insomnia that is not wearing his CPAP regularly and is sleeping during the day      8. Return in 6 months or PRN. Orders Placed This Encounter    furosemide (Lasix) 40 mg tablet     Sig: Take 40 mg by mouth daily. Take in AM    furosemide (Lasix) 20 mg tablet     Sig: Take 20 mg by mouth daily. Takes at 1 pm       We discussed the expected course, resolution and complications of the diagnosis(es) in detail. Medication risks, benefits, costs, interactions, and alternatives were discussed as indicated. I advised him to contact the office if his condition worsens, changes or fails to improve as anticipated. He expressed understanding with the diagnosis(es) and plan          Follow-up and Dispositions  ·   Return in about 6 months (around 1/19/2023). I have discussed the diagnosis with  Diya Medina and the intended plan as seen in the above orders. Questions were answered concerning future plans. I have discussed medication side effects and warnings with the patient as well. Thank you,  Cris Rogel MD for involving me in the care of  Diya Medina. Please do not hesitate to contact me for further questions/concerns. Shayan Fine MD, [address] [place] [address] Rd[address]      [facility], [address] [place] [address] [address]      [phone] / [phone] Fax

## 2022-07-19 NOTE — PATIENT INSTRUCTIONS

## 2022-07-19 NOTE — LETTER
7/19/2022    Patient: Tim Oates   YOB: 1940   Date of Visit: 7/19/2022     Juany Galaviz MD  [facility] [place] [address]  Via Fax: [phone]    Dear Juany Galaviz MD,      Thank you for referring Mr. Itzel King to [address]  for evaluation. My notes for this consultation are attached. If you have questions, please do not hesitate to call me. I look forward to following your patient along with you.       Sincerely,    Denilson Payne MD

## 2022-07-20 ENCOUNTER — HOSPITAL ENCOUNTER (OUTPATIENT)
Dept: CT IMAGING | Age: 82
Discharge: HOME | End: 2022-07-20
Attending: NURSE PRACTITIONER
Payer: MEDICARE

## 2022-07-20 DIAGNOSIS — I35.0 NONRHEUMATIC AORTIC VALVE STENOSIS: ICD-10-CM

## 2022-07-20 PROCEDURE — 74011000636 HC RX REV CODE- 636: Performed by: RADIOLOGY

## 2022-07-20 PROCEDURE — 74174 CTA ABD&PLVS W/CONTRAST: CPT

## 2022-07-20 PROCEDURE — 71275 CT ANGIOGRAPHY CHEST: CPT

## 2022-07-20 RX ADMIN — IOPAMIDOL 100 ML: 755 INJECTION, SOLUTION INTRAVENOUS at 15:35

## 2022-07-20 RX ADMIN — IOPAMIDOL 20 ML: 755 INJECTION, SOLUTION INTRAVENOUS at 15:35

## 2022-07-21 ENCOUNTER — DOCUMENTATION ONLY (OUTPATIENT)
Dept: CARDIOLOGY CLINIC | Age: 82
End: 2022-07-21

## 2022-07-21 NOTE — TELEPHONE ENCOUNTER
Spoke to patient's daughter, Malka Webb. Name noted on permission to release information. Identifiers x 2. Reiterated procedure with Dr. Bishnu Nowak on 8-1-22. States that patient has been notified regarding valve clinic appt on 8-4-22. Provided with address and phone number for Valve Clinic. Reiterated that cardiac cath procedure would provide data for the possibility of TAVR. Plan of care and next step to be discussed with Dr. Bishnu Nowak and surgeon @ valve clinic appt.

## 2022-07-29 DIAGNOSIS — I35.0 AORTIC VALVE STENOSIS, ETIOLOGY OF CARDIAC VALVE DISEASE UNSPECIFIED: Primary | ICD-10-CM

## 2022-07-29 RX ORDER — DIPHENHYDRAMINE HYDROCHLORIDE 50 MG/ML
50 INJECTION, SOLUTION INTRAMUSCULAR; INTRAVENOUS
Status: CANCELLED | OUTPATIENT
Start: 2022-07-29 | End: 2022-07-30

## 2022-07-29 RX ORDER — SODIUM CHLORIDE 9 MG/ML
75 INJECTION, SOLUTION INTRAVENOUS CONTINUOUS
Status: CANCELLED | OUTPATIENT
Start: 2022-07-29 | End: 2022-07-29

## 2022-08-01 ENCOUNTER — HOSPITAL ENCOUNTER (OUTPATIENT)
Age: 82
Setting detail: OUTPATIENT SURGERY
Discharge: HOME | End: 2022-08-01
Attending: INTERNAL MEDICINE | Admitting: INTERNAL MEDICINE
Payer: MEDICARE

## 2022-08-01 VITALS
SYSTOLIC BLOOD PRESSURE: 118 MMHG | BODY MASS INDEX: 28.19 KG/M2 | DIASTOLIC BLOOD PRESSURE: 77 MMHG | HEIGHT: 68 IN | WEIGHT: 186 LBS | TEMPERATURE: 98.3 F | OXYGEN SATURATION: 95 % | HEART RATE: 73 BPM | RESPIRATION RATE: 18 BRPM

## 2022-08-01 DIAGNOSIS — I35.0 AORTIC VALVE STENOSIS, ETIOLOGY OF CARDIAC VALVE DISEASE UNSPECIFIED: ICD-10-CM

## 2022-08-01 PROCEDURE — 77030013797 HC KT TRNSDUC PRSSR EDWD -A: Performed by: INTERNAL MEDICINE

## 2022-08-01 PROCEDURE — 93460 R&L HRT ART/VENTRICLE ANGIO: CPT | Performed by: INTERNAL MEDICINE

## 2022-08-01 PROCEDURE — 77030013744: Performed by: INTERNAL MEDICINE

## 2022-08-01 PROCEDURE — C1887 CATHETER, GUIDING: HCPCS | Performed by: INTERNAL MEDICINE

## 2022-08-01 PROCEDURE — 75630 X-RAY AORTA LEG ARTERIES: CPT | Performed by: INTERNAL MEDICINE

## 2022-08-01 PROCEDURE — 36245 INS CATH ABD/L-EXT ART 1ST: CPT | Performed by: INTERNAL MEDICINE

## 2022-08-01 PROCEDURE — 74011000250 HC RX REV CODE- 250: Performed by: INTERNAL MEDICINE

## 2022-08-01 PROCEDURE — 99153 MOD SED SAME PHYS/QHP EA: CPT | Performed by: INTERNAL MEDICINE

## 2022-08-01 PROCEDURE — C1769 GUIDE WIRE: HCPCS | Performed by: INTERNAL MEDICINE

## 2022-08-01 PROCEDURE — 74011000636 HC RX REV CODE- 636: Performed by: INTERNAL MEDICINE

## 2022-08-01 PROCEDURE — 77030013406 HC CATH CTRL EDWD -B: Performed by: INTERNAL MEDICINE

## 2022-08-01 PROCEDURE — 74011250636 HC RX REV CODE- 250/636: Performed by: INTERNAL MEDICINE

## 2022-08-01 PROCEDURE — 99152 MOD SED SAME PHYS/QHP 5/>YRS: CPT | Performed by: INTERNAL MEDICINE

## 2022-08-01 PROCEDURE — C1894 INTRO/SHEATH, NON-LASER: HCPCS | Performed by: INTERNAL MEDICINE

## 2022-08-01 PROCEDURE — 77030042317 HC BND COMPR HEMSTAT -B: Performed by: INTERNAL MEDICINE

## 2022-08-01 PROCEDURE — 77030004522 HC CATH ANGI DX EXPO BSC -A: Performed by: INTERNAL MEDICINE

## 2022-08-01 PROCEDURE — 77030040934 HC CATH DIAG DXTERITY MEDT -A: Performed by: INTERNAL MEDICINE

## 2022-08-01 PROCEDURE — 76937 US GUIDE VASCULAR ACCESS: CPT | Performed by: INTERNAL MEDICINE

## 2022-08-01 RX ORDER — HEPARIN SODIUM 200 [USP'U]/100ML
INJECTION, SOLUTION INTRAVENOUS
Status: COMPLETED | OUTPATIENT
Start: 2022-08-01 | End: 2022-08-01

## 2022-08-01 RX ORDER — SODIUM CHLORIDE 0.9 % (FLUSH) 0.9 %
5-40 SYRINGE (ML) INJECTION AS NEEDED
Status: DISCONTINUED | OUTPATIENT
Start: 2022-08-01 | End: 2022-08-01 | Stop reason: HOSPADM

## 2022-08-01 RX ORDER — GUAIFENESIN 100 MG/5ML
81 LIQUID (ML) ORAL DAILY
Status: DISCONTINUED | OUTPATIENT
Start: 2022-08-02 | End: 2022-08-01 | Stop reason: HOSPADM

## 2022-08-01 RX ORDER — LIDOCAINE HYDROCHLORIDE 10 MG/ML
INJECTION INFILTRATION; PERINEURAL AS NEEDED
Status: DISCONTINUED | OUTPATIENT
Start: 2022-08-01 | End: 2022-08-01 | Stop reason: HOSPADM

## 2022-08-01 RX ORDER — DIPHENHYDRAMINE HYDROCHLORIDE 50 MG/ML
50 INJECTION, SOLUTION INTRAMUSCULAR; INTRAVENOUS
Status: DISCONTINUED | OUTPATIENT
Start: 2022-08-01 | End: 2022-08-01 | Stop reason: HOSPADM

## 2022-08-01 RX ORDER — HEPARIN SODIUM 1000 [USP'U]/ML
INJECTION, SOLUTION INTRAVENOUS; SUBCUTANEOUS AS NEEDED
Status: DISCONTINUED | OUTPATIENT
Start: 2022-08-01 | End: 2022-08-01 | Stop reason: HOSPADM

## 2022-08-01 RX ORDER — SODIUM CHLORIDE 9 MG/ML
75 INJECTION, SOLUTION INTRAVENOUS CONTINUOUS
Status: DISPENSED | OUTPATIENT
Start: 2022-08-01 | End: 2022-08-01

## 2022-08-01 RX ORDER — MIDAZOLAM HYDROCHLORIDE 1 MG/ML
INJECTION, SOLUTION INTRAMUSCULAR; INTRAVENOUS AS NEEDED
Status: DISCONTINUED | OUTPATIENT
Start: 2022-08-01 | End: 2022-08-01 | Stop reason: HOSPADM

## 2022-08-01 RX ORDER — SODIUM CHLORIDE 0.9 % (FLUSH) 0.9 %
5-40 SYRINGE (ML) INJECTION EVERY 8 HOURS
Status: DISCONTINUED | OUTPATIENT
Start: 2022-08-01 | End: 2022-08-01 | Stop reason: HOSPADM

## 2022-08-01 RX ORDER — FENTANYL CITRATE 50 UG/ML
INJECTION, SOLUTION INTRAMUSCULAR; INTRAVENOUS AS NEEDED
Status: DISCONTINUED | OUTPATIENT
Start: 2022-08-01 | End: 2022-08-01 | Stop reason: HOSPADM

## 2022-08-01 RX ORDER — LISINOPRIL 40 MG/1
40 TABLET ORAL
COMMUNITY
End: 2022-08-24

## 2022-08-01 RX ORDER — OMEPRAZOLE 40 MG/1
40 CAPSULE, DELAYED RELEASE ORAL DAILY
COMMUNITY

## 2022-08-01 RX ADMIN — SODIUM CHLORIDE 75 ML/HR: 9 INJECTION, SOLUTION INTRAVENOUS at 11:09

## 2022-08-01 NOTE — PROGRESS NOTES
Cardiac Cath Lab Procedure Area Arrival Note:    Nicolasa Peter arrived to Cardiac Cath Lab, Procedure Area. Patient identifiers verified with NAME and DATE OF BIRTH. Procedure verified with patient. Consent forms verified. Allergies verified. Patient informed of procedure and plan of care. Questions answered with review. Patient voiced understanding of procedure and plan of care. Patient on cardiac monitor, non-invasive blood pressure, SPO2 monitor. On RA. IV of NS on pump at 50 ml/hr. Patient status doing well without problems. Patient is A&Ox 4. Patient reports no chest pain or dyspnea. Patient medicated during procedure with orders obtained and verified by Dr. Mary Chiu. Refer to patients Cardiac Cath Lab PROCEDURE REPORT for vital signs, assessment, status, and response during procedure, printed at end of case. Printed report on chart or scanned into chart. 13:45- Transfer to [facility] from Procedure Area    Verbal report given to Claude Sierra) on Nicolasa Peter being transferred to Cardiac Cath Lab RR for routine progression of care   Patient is post [facility] procedure. Patient stable upon transfer to . Report consisted of patients Situation, Background, Assessment and   Recommendations(SBAR). Information from the following report(s) Procedure Summary was reviewed with the receiving nurse. Opportunity for questions and clarification was provided. Patient medicated during procedure with orders obtained and verified by Dr. Mary Chiu. Refer to patient PROCEDURE REPORT for vital signs, assessment, status, and response during procedure.

## 2022-08-01 NOTE — PROGRESS NOTES
TRANSFER - IN REPORT:    Verbal report received from Tal PALMER on [facility]  being received from Cath lab procedure area  for routine progression of care. Report consisted of patients Situation, Background, Assessment and Recommendations(SBAR). Information from the following report(s) Kardex and Procedure Summary was reviewed with the receiving clinician. Opportunity for questions and clarification was provided. Assessment completed upon patients arrival to [address] [place] [address] and care assumed. Cardiac Cath Lab Recovery Arrival Note:    [facility] arrived to [facility] recovery area. Patient procedure= LHC. Patient on cardiac monitor, non-invasive blood pressure, SPO2 monitor. On RA . IV  of NS on pump at 75 ml/hr. Patient status doing well without problems. Patient is A&Ox 3. Patient reports no pain. PROCEDURE SITE CHECK:    Procedure site:without any bleeding and no hematoma, No pain/discomfort reported at procedure site. No change in patient status. Continue to monitor patient and status.

## 2022-08-01 NOTE — PROGRESS NOTES
1400  Assumed care of pt.    1430  Pt eating and tolerating PO diet well.    1500  Began removal of air from TR BAND. No bleeding and no hematoma present at sites. 1600  Removal of air from TR Band complete. No bleeding and no hematoma. 1615  TR Band removed. Tegaderm and gauze dressing applied. RN reviewed discharge instructions with pt. Pt had an opportunity to ask questions. 1630  Pt walked to the restroom and voided successfully. Pt getting dressed  RN called pt ride home. [address] [facility] [address] removed pt IV. Pt discharged to ride at main entrance of hospital via wheel chair by RN.

## 2022-08-01 NOTE — DISCHARGE INSTRUCTIONS
Cardiac Catheterization  Discharge Instructions    Do not drive, operate any machinery, or sign any legal documents for 24 hours after your procedure. You must have someone to drive you home. You may take a shower 24 hours after your cardiac catheterization. Be sure to get the dressing wet and then remove it; gently wash the area with warm soapy water. Pat dry and leave open to air. To help prevent infections, be sure to keep the cath site clean and dry. No lotions, creams, powders, ointments, etc. in the cath site for approximately 1 week. Do not soak site with such activities as: hand wash dishes take a tub bath, get in a hot tub or swimming pool for approximately 5 days or until the cath site is completely healed. No strenuous activity or heavy lifting over 10 lbs. for 7 days. Drink plenty of fluids for 24-48 hours after your cath to flush the contrast dye from your kidneys. No alcoholic beverages for 24 hours. You may resume your previous diet (low fat, low cholesterol) after your cath. After your cath, some bruising or discomfort is common during the healing process. An ice pack and Tylenol, 1-2 tablets every 6 hours as needed, is recommended if you experience any discomfort. If you experience any signs or symptoms of infection such as fever, chills, or poorly healing incision, persistent tenderness or swelling around the cath site, redness and/or warmth to the touch, numbness, significant tingling or pain at the cath site or affected extremity, rash, drainage from the cath site, or if the affected arm or leg feels tight or swollen, call your physician right away. If bleeding at the cath site occurs, take a clean gauze pad and apply direct pressure to the cath site just above the puncture site. Call 911 immediately, and continue to apply direct pressure until an ambulance gets to your location to take you to the ER. DO not drive yourself, do not have anyone else drive you. You may return to work  2  days after your cardiac cath if no cath site bleeding.

## 2022-08-01 NOTE — PROGRESS NOTES
Cardiac Cath Lab Recovery Arrival Note:      Kristine Lawson arrived to Cardiac Cath Lab, Recovery Area. Staff introduced to patient. Patient identifiers verified with NAME and DATE OF BIRTH. Procedure verified with patient. Consent forms reviewed and signed by patient or authorized representative and verified. Allergies verified. Patient and family oriented to department. Patient and family informed of procedure and plan of care. Questions answered with review. Patient prepped for procedure, per orders from physician, prior to arrival.    Patient on cardiac monitor, non-invasive blood pressure, SPO2 monitor. On room air. Patient is A&Ox 4. Patient reports no pain. Patient in stretcher, in low position, with side rails up, call bell within reach, patient instructed to call if assistance as needed. Patient prep in: [address], [place] [address].   Patient family has pager # [phone]  Family in: [phone].    Prep by: JOCELYNN

## 2022-08-03 NOTE — PROGRESS NOTES
Patient: Clara Muro   Age: 79 y.o. Patient Care Team:  Vaishnavi Zavala MD as PCP - General (Internal Medicine Physician)  Mode Kirk MD (Orthopedic Surgery)  Anjum Esposito MD as Referring Provider (Cardiovascular Disease Physician)  Albert Connolly MD (General Surgery)  Cookie Carranza MD (Urology)  Karen Nunes MD (Cardiothoracic Surgery)  Collin Santoro MD (Cardiovascular Disease Physician)  Sharon Good MD (Cardiovascular Disease Physician)  Aruna Rosenthal RN as Care Transitions Nurse  Javier Bob MD ([address] [place] [address] Vascular Surgery)    PCP: Vaishnavi Zavala MD    Cardiologist: Dr. Lonnie Miller    Diagnosis/Reason for Consultation: The primary encounter diagnosis was Nonrheumatic aortic valve stenosis. Diagnoses of Nonrheumatic aortic valve insufficiency, S/P AVR (aortic valve replacement), and S/P MVR (mitral valve repair) were also pertinent to this visit.     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 male with PMHx of AS s/p AVR in Jan 2015 with Dr. Francisco Meyer #23 CHANTELL Jorgensenr, CryoMaze, LLAA, CAD, Afib and SSS with PPM, HTN, HLD, DEL, GERD, Recent admission for HF/COVID (7/13/22), that is referred to the [address] [place] [address] by Dr. Rayna Crawford for interventional evaluation of  Aortic Stenosis of Bioprosthetic AV. Patient arrives to the clinic visit with his wife and daughters. He used to go to the gym daily but hasn't done that for 3 years. Now he doesn't do any exercise. His normal activity consists of getting around the house and doing some small chores. He is dyspneic with activity such as stairs or walking too quickly. He feels fatigued some days of the week. Some nights he needs to sleep propped up on pillows but not all the time. He has LE edema but this has improved since starting Lasix. He is keeping track of his weight which was up to 207 lbs now down to 188 lbs. He thinks his dry weight is closer to 180-185. Denies palpitations, CP, dizziness, syncope, fall, PND, medication changes in the last 3 months. He does not some blood in his stool at times but believes this is related to hemorrhoids. His hospitalization in July was related to HF symptoms. 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 He is  and his wife and daughters can help following a surgery. He does not smoke or use recreational drugs. He rarely drinks alcohol. He is independent in his ADLs. SOB/ABDUL: Yes   Fatigue: Yes   Palpitations: No:    Chest pain: No:    Chest tightness with activity: No:    Lightheadedness: No:    Syncope: No:    Falls: No:    Orthopnea: Yes propped on pillows at times  PND: No:    LE edema: Yes   Medication changes in past 3 months: No:    Blood/Blackness/Tarriness of stools: Yes from hemorrhoid  Heart Failure Admission w/in past year:  Yes DC on 7/13/22  Heart Failure Admission w/in past 2 weeks: No:     NYHA Classification: IIIB   Class I (Mild): No limitation of physical activity.  Ordinary physical activity does not cause undue fatigue, palpitation, or dyspnea. Class II (Mild): Slight limitation of physical activity. Comfortable at rest, but ordinary physical activity results in fatigue, palpitation, or dyspnea. Class III (Moderate): Marked limitation of physical activity. Comfortable at rest, but less than ordinary activity causes fatigue, palpitation, or dyspnea   Class IV (Severe): Unable to carry out any physical activity without discomfort. Symptoms  of cardiac insufficiency at rest.  If any physical activity is undertaken, discomfort is increased.     Angina Classification: 0   Class 0: No symptoms   Class 1: Angina with strenuous exercise   Class 2: Angina with moderate exercise   Class 3: Angina with mild exertion   Walking 1-2 level blocks at normal pace; Climbing 1 flight of stairs at normal pace  Class 4: Angina at any level of physical exertion       Past Medical History:   Diagnosis Date    Arthritis     Atrial fibrillation (HCC)     CAD (coronary artery disease)     Chronic pain     legs/knee    GERD (gastroesophageal reflux disease)     Hx of carcinoma in situ of prostate     Hyperlipidemia     Hypertension     Insomnia     DEL (obstructive sleep apnea)     Radiation proctitis     Vitamin D deficiency      Endocarditis: No:    Pulmonary HTN:  No:  Greater than 2/3 systemic: No:   Immunocompromised/Steroids: No:   Liver Dz/Cirrhosis:   no If yes, MELD score:  n/a  Last Dental Visit:  4 months ago   Any dental pain/concerns: None    Past Surgical History:   Procedure Laterality Date    COLONOSCOPY N/A 5/8/2020    COLONOSCOPY performed by Kaley Dumont MD at [facility] ENDOSCOPY    COLONOSCOPY N/A 6/8/2020    COLONOSCOPY performed by Alexia Hill MD at [place] [address] N/A 11/23/2020    FLEXIBLE SIGMOIDOSCOPY WITH APC performed by Kaley Dumont MD at [facility] ENDOSCOPY    HX AORTIC VALVE REPLACEMENT  2015    and mitral valve repair, left atrial cryo maze    HX HEENT      melanoma removed head    HX HERNIA REPAIR  2009 Right    HX HERNIA REPAIR      left inguinal hernia repair    HX HERNIA REPAIR Left 12/01/2016    lap left inguinal hernia repair with mesh    HX KNEE REPLACEMENT      Bilateral    HX ORTHOPAEDIC      BILATERAL KNEE REPLACEMENT    HX ORTHOPAEDIC      CARPEL TUNNEL REPAIR-RIGHT    HX OTHER SURGICAL  2015    closure of patent foramen ovale    HX OTHER SURGICAL  10/2020    watchman implant for afib / Dr. Fermin Jordan      42 radiation treatments      Social History     Tobacco Use    Smoking status: Never    Smokeless tobacco: Never   Substance Use Topics    Alcohol use: Yes     Alcohol/week: 3.0 standard drinks     Types: 3 Cans of beer per week      Family History   Problem Relation Age of Onset    Cancer Mother     Cancer Father         prostrate    Cancer Brother         prostrate ca    Anesth Problems Neg Hx      Prior to Admission medications    Medication Sig Start Date End Date Taking? Authorizing Provider   lisinopriL (PRINIVIL, ZESTRIL) 40 mg tablet Take 40 mg by mouth in the morning. Provider, Historical   omeprazole (PRILOSEC) 40 mg capsule Take 40 mg by mouth in the morning. Provider, Historical   furosemide (LASIX) 40 mg tablet Take 40 mg by mouth daily. Take in AM    Provider, Historical   potassium chloride (K-DUR, KLOR-CON M20) 20 mEq tablet Take 2 Tablets by mouth daily. 7/12/22   Rocael Fraire MD   aspirin delayed-release 81 mg tablet Take 81 mg by mouth two (2) times a day. TAKES ONE IN MORNING AND ONE IN EVENING    Provider, Historical   polyvinyl alcohol (LIQUIFILM TEARS) 1.4 % ophthalmic solution Administer 1 Drop to both eyes as needed. Provider, Historical   ipratropium (ATROVENT HFA) 17 mcg/actuation inhaler Take 2 Puffs by inhalation as needed. Provider, Historical   ferrous sulfate 325 mg (65 mg iron) cpER Take 1 Tab by mouth every other day. Provider, Historical   tamsulosin (FLOMAX) 0.4 mg capsule Take 0.8 mg by mouth two (2) times a day.     Provider, Historical   cholecalciferol (VITAMIN D3) 1,000 unit tablet Take 2,000 Units by mouth two (2) times a day. Provider, Historical   GLUCOSAMINE HCL/CHONDR CHING A NA (GLUCOSAMINE-CHONDROITIN) 750-600 mg tab Take 1 Tab by mouth daily. Provider, Historical   omega-3 fatty acids-vitamin e 1,000 mg cap Take 1 Cap by mouth every other day. Provider, Historical   acetaminophen (TYLENOL) 325 mg tablet Take  by mouth every four (4) hours as needed for Pain. Provider, Historical   multivitamin (ONE A DAY) tablet Take 1 Tab by mouth daily. Provider, Historical   docusate sodium (COLACE) 100 mg capsule Take 100 mg by mouth two (2) times daily as needed. Provider, Historical   finasteride (PROSCAR) 5 mg tablet Take 5 mg by mouth nightly. Provider, Historical   pravastatin (PRAVACHOL) 40 mg tablet Take 40 mg by mouth nightly. Provider, Historical       No Known Allergies    Current Medications:   Current Outpatient Medications   Medication Sig Dispense Refill    lisinopriL (PRINIVIL, ZESTRIL) 40 mg tablet Take 40 mg by mouth in the morning. omeprazole (PRILOSEC) 40 mg capsule Take 40 mg by mouth in the morning. furosemide (LASIX) 40 mg tablet Take 40 mg by mouth daily. Take in AM      potassium chloride (K-DUR, KLOR-CON M20) 20 mEq tablet Take 2 Tablets by mouth daily. 30 Tablet 0    aspirin delayed-release 81 mg tablet Take 81 mg by mouth two (2) times a day. TAKES ONE IN MORNING AND ONE IN EVENING      polyvinyl alcohol (LIQUIFILM TEARS) 1.4 % ophthalmic solution Administer 1 Drop to both eyes as needed. ipratropium (ATROVENT HFA) 17 mcg/actuation inhaler Take 2 Puffs by inhalation as needed. ferrous sulfate 325 mg (65 mg iron) cpER Take 1 Tab by mouth every other day. tamsulosin (FLOMAX) 0.4 mg capsule Take 0.8 mg by mouth two (2) times a day. cholecalciferol (VITAMIN D3) 1,000 unit tablet Take 2,000 Units by mouth two (2) times a day.       GLUCOSAMINE HCL/CHONDR CHING A NA (GLUCOSAMINE-CHONDROITIN) 750-600 mg tab Take 1 Tab by mouth daily. omega-3 fatty acids-vitamin e 1,000 mg cap Take 1 Cap by mouth every other day. acetaminophen (TYLENOL) 325 mg tablet Take  by mouth every four (4) hours as needed for Pain.      multivitamin (ONE A DAY) tablet Take 1 Tab by mouth daily. docusate sodium (COLACE) 100 mg capsule Take 100 mg by mouth two (2) times daily as needed. finasteride (PROSCAR) 5 mg tablet Take 5 mg by mouth nightly. pravastatin (PRAVACHOL) 40 mg tablet Take 40 mg by mouth nightly. Vitals: Blood pressure 106/64, pulse 63, temperature 98.3 °F (36.8 °C), temperature source Oral, resp. rate 20, height 5' 8\" (1.727 m), weight 188 lb (85.3 kg), SpO2 96 %. Allergies: has No Known Allergies.     Review of Systems: Pertinent Positives per HPI   [] Unable to obtain  ROS due to  []mental status change  []sedated   []intubated   [x]Total of 13 systems reviewed as follows:  Constitutional: Negative fever, negative chills, +fatigue  Eyes:   Negative for amauroses fugax  ENT:   Negative sore throat,oral abscess   Endocrine Negative for thyroid replacement Rx; goiter; DM  Respiratory:  Negative chronic cough,sputum production, +dyspnea  Cards:   Negative for palpitations, varicosities, claudication, +lower extremity edema  GI:   Negative for dysphagia, bleeding, nausea, vomiting, diarrhea, and abdominal pain  Genitourinary: Negative for frequency, dysuria, BPH   Integument:  Negative for rash and pruritus  Hematologic:  Negative for easy bruising; bleeding dyscrasia   Musculoskel: Negative for muscle weakness inhibiting ambulation  Neurological:  Negative for stroke, TIA, syncope, dizziness  Behavl/Psych: Negative for feelings of anxiety, depression     Cardiovascular Testing:     EK/10/22 Reviewed showing  Afib at 89 bpm  NC n/a   ms    TTE:  22  Interpretation Summary         Left Ventricle: Preserved left ventricular systolic function with a visually estimated EF of 50 - 55%. Left ventricle size is normal. Increased wall thickness. Findings consistent with mild concentric hypertrophy. Normal wall motion. Right Ventricle: Right ventricle is mildly dilated. Aortic Valve: Bioprosthetic valve. Mild regurgitation. Mitral Valve: Valve repaired by annular ring. Thickened leaflets. Mild regurgitation. Tricuspid Valve: Moderate regurgitation. The estimated RVSP is 40 mmHg. Left Atrium: Left atrium is severely dilated. LA Vol Index A/L is 72 mL/m2. Right Atrium: Right atrium is moderately dilated. It appears his prosthetic AV is more stenotic than last year and will need evaluation in valve clinic ASA. It has progressed quickly from last year. Comparison Study Information      Prior Study    There is a prior study available for comparison. Prior study date: 7/26/2021. Echo Findings    Left Ventricle Preserved left ventricular systolic function with a visually estimated EF of 50 - 55%. Left ventricle size is normal. Increased wall thickness. Findings consistent with mild concentric hypertrophy. Normal wall motion. Indeterminate diastolic function. Left Atrium Left atrium is severely dilated. LA Vol Index A/L is 72 mL/m2. Interatrial Septum No interatrial shunt visualized with color Doppler. Right Ventricle Right ventricle is mildly dilated. Normal systolic function. Right Atrium Right atrium is moderately dilated. Aortic Valve Bioprosthetic valve. Mild regurgitation. Severe stenosis of the aortic valve. AV mean gradient is 38 mmHg. AV peak gradient is 57 mmHg. AV peak velocity is 3.8 m/s. LVOT:AV VTI Index is 0.15. Mitral Valve Valve repaired by annular ring. Thickened leaflets. Mild regurgitation. No stenosis noted. MV mean gradient is 3 mmHg. Tricuspid Valve Valve structure is normal. Moderate regurgitation. The estimated RVSP is 40 mmHg. No stenosis noted.    Pulmonic Valve The pulmonic valve visualization is suboptimal but appears to be functioning normally. No regurgitation. No stenosis noted. Aorta Normal sized aortic root and ascending aorta. IVC/Hepatic Veins IVC diameter is greater than 21 mm and decreases less than 50% during inspiration; therefore the estimated right atrial pressure is elevated (~15 mmHg). IVC is dilated. Pericardium No pericardial effusion. Cardiac catheterization: 8/1/22  Conclusion    Findings  1. Normal major epicardial vessels  2. Normal LVEDP  3. Mean transaortic gradient of 46 mmHg and calculated aortic valve area of 0.85 cm² consistent with severe bioprosthetic aortic valve restenosis  4. Large coronary sinuses  5. Normal right-sided filling pressures and high normal cardiac output  6. Selective right iliac angiogram was performed by advancing an angled glide catheter into the descending aorta and then selectively engaging the right common iliac artery. This demonstrated rather tortuous iliac vessels with no significant obstructive disease in about 10 to 12 mm vessel size for the iliac stent about 8 to 10 mm vessel size for the femorals. Access right radial, right internal jugular ultrasound-guided no issues  Contrast 30 cc     Recommendations-  1. Proceed with transcatheter valve in valve replacement. Coronary Findings      Diagnostic  Dominance: Right    Left Main   The vessel was visualized by angiography. The vessel is angiographically normal.   Left Anterior Descending   The vessel was visualized by angiography. The vessel is angiographically normal.   Left Circumflex   The vessel was visualized by angiography. The vessel is angiographically normal.   Right Coronary Artery   The vessel was visualized by angiography. The vessel is angiographically normal.     Intervention      No interventions have been documented.     Right Heart    Right Heart Cath Vitals - VO2 Value: 248.06 ml/min  Box Diff Value: 3.47  Hb: 11 %  Blood Oximetry Information - AV Hb PV: 0 gm/dL  Pressure Phase - Phase: Phase: Baseline  AO Pressures - AO Systolic: 213 mmHg  AO Diastolic: 63 mmHg  AO Mean: 88 mmHg  Heart Rate: 73 bpm  PA Pressures - PA Systolic: 55 mmHg  PA Diastolic: 28 mmHg  PA Mean: 37 mmHg  RV Pressures - RV Systolic: 58 mmHg  RV End Diastolic: 14 mmHg  RV dP/dt: 432 mmHg/sec  LV Pressures - LV Systolic: 199 mmHg  LV End Diastolic: 62 mmHg  LV dP/dt: 1440 mmHg/sec  RA Pressures - RA Wedge A Wave: 15 mmHg  RA Wedge V Wave: 21 mmHg  RA Wedge Mean: 17 mmHg  PCW Pressures - PCW A Wave: 15 mmHg  PCW V Wave: 25 mmHg  PCW Mean: 13 mmHg  Atrial Wedge Pressures - Source: Measured  RA Atrial Wedge Heart Rate: 71 bpm  Cath Pressure - FA End Diastolic Cath Pressure: 73 mmHg  PCW Source: Measured  PCW Heart Rate: 47 bpm  Cardiac Output Results - TDCO: 5.85 L/min  TDCI: 2.95 L/min/m2  Fiordaliza C.O.: 7.15 L/min  Fiordaliza C. I.: 3.6 L/min/m2  QSI Value: 3.6 L/min/m2  Resistance Results - PVR: 328.12 dsc-5  PVR-I: 651.15 dsc-5/m2  SVR: 794.52 dsc-5  SVR-I: 1576.7 dsc-5/m2  PVR/SVR: 0.41  TPR: 505.86 dsc-5  TPR-I: 1003.86 dsc-5/m2  TVR: 984.76 dsc-5  TVR-I: 1954.22 dsc-5/m2  TPR/TVR: 0.51  Aortic Valve Results - AV SEP: 22.8 sec/beat  AV Area: 0.85 cm2  AV Flow: 256.62 cc  AV Index: 0.43 cm2/m2  AV Gradient: 46.49 mmHg  Stroke Work Results - LVSW: 99.86 gm*m  LVSW-I: 50.32 gm*m/m2  RVSW: 20.66 gm*m  RVSW-I: 10.41 gm*m/m2  Valve Results - AV SEP: 22.8 sec/beat  AV Flow: 256.62 cc  AV Area: 0.85 cm2  AV Index: 0.43 cm2/m2  Aortic Valve HR: 78 bpm  AV Gradient: 46.49 mmHg  TPR/TVR: 0.51  PVR/SVR: 0.41       Carotid Dopplers: 5/11/2020  BICA 1-49%    PFTs: FEV1/Predicted:  None  Normal FEV1 > 1 Liter, Predicted = 100%, DLCO > 80%    Mild Lung Disease (60-70% Predicted)    Moderate Lung Disease (50-55% Predicted)    Severe Lung Disease (< 50% Predicted or PO2 < 60 or pCO2>50 on RA)    Gated C/A/P CTA: Done     Physical Exam:  General: Well nourished well groomed male appearing stated age accompanied by his family  Neuro: A&OX3. QURESHI. PERRL. Steady unassisted gait  Head:Normocephalic. Atraumatic. Symmetrical  Neck: Trachea Midline  Resp: CTA B. No Adv BS/cough/sputum/tachypnea with seated conversation  CV: S1S2 RRR. DANNIE IV/VI. No JVD/carotid bruits. Pink/warm/dry extremities. +1 LE peripheral edema  GI:Benign ab. Soft. NT/ND. Active BS  : Voids  Integ: No obvious s/s of infection or breakdown  Musculo/Skeletal: FROM in all major joints. Fair muscle tone    Clinic Evaluation:   KCCQ-12: scanned into EMR    5 meter gait: 7.78 seconds    Frality Survey:  Cristina Standard Index ADL - 5/6  scanned into EMR     STS 4.2 Risk Score / Predicted 30 day mortality: - calculations scanned into EMR    Assessment/Plan: Aortic Stenosis/AI of bioprosthetic AV (Deep [facility]ardoug #23 Jan 2015 Dr. Cristina Brody): CT images sent to [place] for review. Will tentatively plan for a RTF 26 Evolut Radha +/- [place] protection on 8/18. Hx of MV repair:Mitral Ring in place  CAD: ASA, Statin, ACEi  Afib s/p Cryomaze and LLAA with SSS and PPM  HTN: Lisinopril, Lasix  HLD: Statin  DEL: has been unable to use CPAP in the last few months. He is treated for this at the [place] and I have asked him to follow up with them. GERD: PPI  Recent hospitalization for HF/COVID (DC on 7/13/22)    **Dr. Nickie Garcia was unavoidable detained in the OR.  He will plan on seeing the patient when he is here for PAT/H&P

## 2022-08-04 ENCOUNTER — OFFICE VISIT (OUTPATIENT)
Dept: CARDIOLOGY CLINIC | Age: 82
End: 2022-08-04
Payer: MEDICARE

## 2022-08-04 VITALS
TEMPERATURE: 98.3 F | OXYGEN SATURATION: 96 % | RESPIRATION RATE: 20 BRPM | WEIGHT: 188 LBS | SYSTOLIC BLOOD PRESSURE: 106 MMHG | HEIGHT: 68 IN | HEART RATE: 63 BPM | BODY MASS INDEX: 28.49 KG/M2 | DIASTOLIC BLOOD PRESSURE: 64 MMHG

## 2022-08-04 DIAGNOSIS — I35.1 NONRHEUMATIC AORTIC VALVE INSUFFICIENCY: ICD-10-CM

## 2022-08-04 DIAGNOSIS — Z98.890 S/P MVR (MITRAL VALVE REPAIR): ICD-10-CM

## 2022-08-04 DIAGNOSIS — I35.0 NONRHEUMATIC AORTIC VALVE STENOSIS: Primary | ICD-10-CM

## 2022-08-04 DIAGNOSIS — Z95.2 S/P AVR (AORTIC VALVE REPLACEMENT): ICD-10-CM

## 2022-08-04 PROCEDURE — 1123F ACP DISCUSS/DSCN MKR DOCD: CPT | Performed by: NURSE PRACTITIONER

## 2022-08-04 PROCEDURE — 99204 OFFICE O/P NEW MOD 45 MIN: CPT | Performed by: NURSE PRACTITIONER

## 2022-08-04 PROCEDURE — 1111F DSCHRG MED/CURRENT MED MERGE: CPT | Performed by: NURSE PRACTITIONER

## 2022-08-04 PROCEDURE — G8417 CALC BMI ABV UP PARAM F/U: HCPCS | Performed by: NURSE PRACTITIONER

## 2022-08-04 PROCEDURE — G8752 SYS BP LESS 140: HCPCS | Performed by: NURSE PRACTITIONER

## 2022-08-04 PROCEDURE — 1101F PT FALLS ASSESS-DOCD LE1/YR: CPT | Performed by: NURSE PRACTITIONER

## 2022-08-04 PROCEDURE — G8754 DIAS BP LESS 90: HCPCS | Performed by: NURSE PRACTITIONER

## 2022-08-04 PROCEDURE — G8427 DOCREV CUR MEDS BY ELIG CLIN: HCPCS | Performed by: NURSE PRACTITIONER

## 2022-08-04 PROCEDURE — G8432 DEP SCR NOT DOC, RNG: HCPCS | Performed by: NURSE PRACTITIONER

## 2022-08-04 PROCEDURE — G8536 NO DOC ELDER MAL SCRN: HCPCS | Performed by: NURSE PRACTITIONER

## 2022-08-04 NOTE — LETTER
8/4/2022    Patient: Letty Ferreira   YOB: 1940   Date of Visit: 8/4/2022     Marko Fernando MD  [address] [facility] [place] [address]  Via Fax: [phone]    Dear Marko Fernando MD,      Thank you for referring Mr. Rico Hernández to [address] [place] [address] for evaluation. My notes for this consultation are attached. If you have questions, please do not hesitate to call me. I look forward to following your patient along with you.       Sincerely,    Zuly Peace MD

## 2022-08-04 NOTE — PROCEDURES
Findings  1. Normal major epicardial vessels  2. Normal LVEDP  3. Mean transaortic gradient of 46 mmHg and calculated aortic valve area of 0.85 cm² consistent with severe bioprosthetic aortic valve restenosis  4. Large coronary sinuses  5. Normal right-sided filling pressures and high normal cardiac output  6. Selective right iliac angiogram was performed by advancing an angled glide catheter into the descending aorta and then selectively engaging the right common iliac artery. This demonstrated rather tortuous iliac vessels with no significant obstructive disease in about 10 to 12 mm vessel size for the iliac stent about 8 to 10 mm vessel size for the femorals. Suitable for TF TAVR. Access right radial, right internal jugular ultrasound-guided no issues  Contrast 30 cc    Recommendations-  1.   Proceed with transcatheter valve in valve replacement by transfemoral route

## 2022-08-11 ENCOUNTER — PATIENT OUTREACH (OUTPATIENT)
Dept: CASE MANAGEMENT | Age: 82
End: 2022-08-11

## 2022-08-11 NOTE — PROGRESS NOTES
Patient has graduated from the Transitions of Care Coordination  program on 8/11/22. Patient/family has the ability to self-manage at this time Care management goals have been completed. Patient was not referred to the [facility] team for further management. Goals Addressed                   This Visit's Progress       Heart Failure     COMPLETED: Patient verbalizes understanding of self-management goals of living with Congestive Heart Failure        8/11/22- Left VM for daughterNarinder. Explained DEANNA completed- has good support with CM available to him at the [facility] and will be followed by our CTS team after his surgery next week on 8/18 at [facility]. He will have [facility] ordered, etc.   LLC     7/14/22- received text from daughter, Narinder Worthington. She was asking about follow up lab work determined when, update on when cath can be done. [facility] Marsha Flores [facility]   She relayed father was telling her he was urinating every 20 minutes. CTN contacted Mr. Nadia Robertson- he patched daughterNarinder in for 3 way conversation- he confirms urination in large amonnts- clear, pale color often. Does slow down around 8pm.  Gets up usual amount of 2-3 times per night. He did weigh this morning- digital scale showed: 206- lost 1 pound and later realized during our conversation that he was wearing his shoes. Reinforced good routine. He will be more consistent. Feels that there is now real change with LE edema. But questioned about abdomen- possibly less full. Explained location of fluid retention included abdomen. He will monitor. Denies light headed dizzy- feels great and wants to go do things but urinating too much. Emphasized BID diuretic may not be in place for long term- needs to have continued diuresis at home. Explained he has good EF and once Valve is \"addressed-treated\" will potentially change need for diuretics to be taken. Emphasized to be patient and will slow down shortly.          Discussion about NA intake- asked to stay under 200 mg per day- aim for <400 per intake. Explained the relationship between high NA intake and fluid retention. Stressed less is best. Monitor labels and pay attention to serving sizes. CTN working to secure ANTHONY AUGUSTIN appointment with primary cardiology. [facility]     7/13/22- spoke initially with Mr. Latosha White, then daughter was added for 3-way phone call. He confirms recent vacation- with lifestyle changes; denies missing doses. He said he feels much improved- \"lost lots of fluid in hospital\". He can weigh- explained good routine and will weigh each morning. Has not weighed this morning. He reports PTA- he was taking furosemide 20 mg daily. Will monitor for changes related to weight, urine out put, etc.  Continues to have LE edema. (note has been seen-treated with lymphedema clinic- wearing stockings). Reports that he had not been taking: discontinued meds- lisinopril and hygroten. CTN working with cardiology- to determine:   Patient reported being told that he needs to do follow up lab work- K and Cr+. Follow up appointment- must quarantine for full 10 days from 7/10 test. Before can be see in person in office per policy. CT ordered scheduled- soonest available- 7/20 at [facility] location. Daughter called today to schedule. Awaiting Dr. Jeovanny Jensen office to schedule cath. Daughter is coming to [facility] from [place] tomorrow- would like cath done while she is here to help. Updated Dr. Tara Kat about above. LLC          Post Hospitalization     COMPLETED: Attends follow-up appointments as directed. 7/13/22-   PCP- Dr. Abby Joseph- at the [address]- routed via [address] [facility] fax- records from recent hospital stay. Her FAX is  [phone]. Cardiology- Dr. Haven Neal- CTN reached out to office to secure [employer] appt.          Rafael Angeles               Patient has Care Transition Nurse's contact information for any further questions, concerns, or needs.   Patients upcoming visits:    Future Appointments   Date Time Provider Vinay Lemoni   8/12/2022  9:30 AM [facility] PAT EXAM RM 6 [address]   8/12/2022 11:00 AM Marie Gamez MD [facility] BS AMB   8/18/2022 11:15 AM Marie Calix MD [facility] BS AMB   9/15/2022  1:45 PM REMOTE1, [facility] BS AMB   11/14/2022 11:00 AM Benson Fine MD [facility] BS AMB   12/14/2022  1:40 PM PACEMAKER, [facility]HELLE [facility] BS AMB   12/14/2022  2:00 PM Jarrod Ward NP [facility] BS AMB

## 2022-08-12 ENCOUNTER — HOSPITAL ENCOUNTER (OUTPATIENT)
Dept: VASCULAR SURGERY | Age: 82
Discharge: HOME | End: 2022-08-12
Attending: NURSE PRACTITIONER
Payer: MEDICARE

## 2022-08-12 ENCOUNTER — HOSPITAL ENCOUNTER (OUTPATIENT)
Dept: PREADMISSION TESTING | Age: 82
Discharge: HOME | End: 2022-08-12
Payer: MEDICARE

## 2022-08-12 ENCOUNTER — OFFICE VISIT (OUTPATIENT)
Dept: CARDIOLOGY CLINIC | Age: 82
End: 2022-08-12
Payer: MEDICARE

## 2022-08-12 VITALS
SYSTOLIC BLOOD PRESSURE: 143 MMHG | HEIGHT: 67 IN | RESPIRATION RATE: 12 BRPM | TEMPERATURE: 97.6 F | DIASTOLIC BLOOD PRESSURE: 55 MMHG | HEART RATE: 75 BPM | WEIGHT: 190.92 LBS | BODY MASS INDEX: 29.97 KG/M2

## 2022-08-12 DIAGNOSIS — Z01.818 PREOP EXAMINATION: ICD-10-CM

## 2022-08-12 DIAGNOSIS — I50.22 CHRONIC SYSTOLIC (CONGESTIVE) HEART FAILURE (HCC): ICD-10-CM

## 2022-08-12 DIAGNOSIS — Z01.818 PREOP EXAMINATION: Primary | ICD-10-CM

## 2022-08-12 LAB
ALBUMIN SERPL-MCNC: 3.7 G/DL (ref 3.5–5)
ALBUMIN/GLOB SERPL: 1.1 {RATIO} (ref 1.1–2.2)
ALP SERPL-CCNC: 90 U/L (ref 45–117)
ALT SERPL-CCNC: 20 U/L (ref 12–78)
ANION GAP SERPL CALC-SCNC: 6 MMOL/L (ref 5–15)
APPEARANCE UR: CLEAR
APTT PPP: 27.9 SEC (ref 22.1–31)
AST SERPL-CCNC: 20 U/L (ref 15–37)
BACTERIA URNS QL MICRO: NEGATIVE /HPF
BASOPHILS # BLD: 0 K/UL (ref 0–0.1)
BASOPHILS NFR BLD: 1 % (ref 0–1)
BILIRUB SERPL-MCNC: 0.6 MG/DL (ref 0.2–1)
BILIRUB UR QL: NEGATIVE
BUN SERPL-MCNC: 23 MG/DL (ref 6–20)
BUN/CREAT SERPL: 24 (ref 12–20)
CALCIUM SERPL-MCNC: 9 MG/DL (ref 8.5–10.1)
CALCULATED R AXIS, ECG10: 7 DEGREES
CALCULATED T AXIS, ECG11: 71 DEGREES
CHLORIDE SERPL-SCNC: 105 MMOL/L (ref 97–108)
CO2 SERPL-SCNC: 30 MMOL/L (ref 21–32)
COLOR UR: NORMAL
CREAT SERPL-MCNC: 0.97 MG/DL (ref 0.7–1.3)
DIAGNOSIS, 93000: NORMAL
DIFFERENTIAL METHOD BLD: ABNORMAL
EOSINOPHIL # BLD: 0.2 K/UL (ref 0–0.4)
EOSINOPHIL NFR BLD: 5 % (ref 0–7)
EPITH CASTS URNS QL MICRO: NORMAL /LPF
ERYTHROCYTE [DISTWIDTH] IN BLOOD BY AUTOMATED COUNT: 15.2 % (ref 11.5–14.5)
EST. AVERAGE GLUCOSE BLD GHB EST-MCNC: 123 MG/DL
GLOBULIN SER CALC-MCNC: 3.4 G/DL (ref 2–4)
GLUCOSE SERPL-MCNC: 108 MG/DL (ref 65–100)
GLUCOSE UR STRIP.AUTO-MCNC: NEGATIVE MG/DL
HBA1C MFR BLD: 5.9 % (ref 4–5.6)
HCT VFR BLD AUTO: 34.3 % (ref 36.6–50.3)
HGB BLD-MCNC: 11 G/DL (ref 12.1–17)
HGB UR QL STRIP: NEGATIVE
HISTORY CHECKED?,CKHIST: NORMAL
HISTORY CHECKED?,CKHIST: NORMAL
HYALINE CASTS URNS QL MICRO: NORMAL /LPF (ref 0–5)
IMM GRANULOCYTES # BLD AUTO: 0 K/UL (ref 0–0.04)
IMM GRANULOCYTES NFR BLD AUTO: 0 % (ref 0–0.5)
INR PPP: 1.1 (ref 0.9–1.1)
KETONES UR QL STRIP.AUTO: NEGATIVE MG/DL
LEFT CCA DIST DIAS: 20.4 CM/S
LEFT CCA DIST SYS: 68.8 CM/S
LEFT CCA PROX DIAS: 16 CM/S
LEFT CCA PROX SYS: 67.7 CM/S
LEFT ECA DIAS: 10.5 CM/S
LEFT ECA SYS: 88.5 CM/S
LEFT ICA DIST DIAS: 20.2 CM/S
LEFT ICA DIST SYS: 88.9 CM/S
LEFT ICA MID DIAS: 12.7 CM/S
LEFT ICA MID SYS: 56.3 CM/S
LEFT ICA PROX DIAS: 16.7 CM/S
LEFT ICA PROX SYS: 76.2 CM/S
LEFT ICA/CCA SYS: 1.3 NO UNITS
LEFT VERTEBRAL DIAS: 10.4 CM/S
LEFT VERTEBRAL SYS: 38.8 CM/S
LEUKOCYTE ESTERASE UR QL STRIP.AUTO: NEGATIVE
LYMPHOCYTES # BLD: 0.7 K/UL (ref 0.8–3.5)
LYMPHOCYTES NFR BLD: 20 % (ref 12–49)
MAGNESIUM SERPL-MCNC: 2.2 MG/DL (ref 1.6–2.4)
MCH RBC QN AUTO: 31.3 PG (ref 26–34)
MCHC RBC AUTO-ENTMCNC: 32.1 G/DL (ref 30–36.5)
MCV RBC AUTO: 97.7 FL (ref 80–99)
MONOCYTES # BLD: 0.4 K/UL (ref 0–1)
MONOCYTES NFR BLD: 11 % (ref 5–13)
NEUTS SEG # BLD: 2.4 K/UL (ref 1.8–8)
NEUTS SEG NFR BLD: 63 % (ref 32–75)
NITRITE UR QL STRIP.AUTO: NEGATIVE
NRBC # BLD: 0 K/UL (ref 0–0.01)
NRBC BLD-RTO: 0 PER 100 WBC
PH UR STRIP: 6.5 [PH] (ref 5–8)
PLATELET # BLD AUTO: 123 K/UL (ref 150–400)
PMV BLD AUTO: 9.8 FL (ref 8.9–12.9)
POTASSIUM SERPL-SCNC: 4.2 MMOL/L (ref 3.5–5.1)
PROT SERPL-MCNC: 7.1 G/DL (ref 6.4–8.2)
PROT UR STRIP-MCNC: NEGATIVE MG/DL
PROTHROMBIN TIME: 11.5 SEC (ref 9–11.1)
Q-T INTERVAL, ECG07: 426 MS
QRS DURATION, ECG06: 112 MS
QTC CALCULATION (BEZET), ECG08: 435 MS
RBC # BLD AUTO: 3.51 M/UL (ref 4.1–5.7)
RBC #/AREA URNS HPF: NORMAL /HPF (ref 0–5)
RBC MORPH BLD: ABNORMAL
RIGHT CCA DIST DIAS: 21.6 CM/S
RIGHT CCA DIST SYS: 93.3 CM/S
RIGHT CCA PROX DIAS: 19.7 CM/S
RIGHT CCA PROX SYS: 80 CM/S
RIGHT ECA DIAS: 9.9 CM/S
RIGHT ECA SYS: 95.9 CM/S
RIGHT ICA DIST DIAS: 17.9 CM/S
RIGHT ICA DIST SYS: 65.3 CM/S
RIGHT ICA MID DIAS: 18.3 CM/S
RIGHT ICA MID SYS: 68.4 CM/S
RIGHT ICA PROX DIAS: 9.5 CM/S
RIGHT ICA PROX SYS: 53.4 CM/S
RIGHT ICA/CCA SYS: 0.7 NO UNITS
RIGHT VERTEBRAL DIAS: 6.8 CM/S
RIGHT VERTEBRAL SYS: 34.5 CM/S
SODIUM SERPL-SCNC: 141 MMOL/L (ref 136–145)
SP GR UR REFRACTOMETRY: 1.01 (ref 1–1.03)
THERAPEUTIC RANGE,PTTT: NORMAL SECS (ref 58–77)
TSH SERPL DL<=0.05 MIU/L-ACNC: 4.16 UIU/ML (ref 0.36–3.74)
UA: UC IF INDICATED,UAUC: NORMAL
UROBILINOGEN UR QL STRIP.AUTO: 0.2 EU/DL (ref 0.2–1)
VENTRICULAR RATE, ECG03: 63 BPM
WBC # BLD AUTO: 3.7 K/UL (ref 4.1–11.1)
WBC URNS QL MICRO: NORMAL /HPF (ref 0–4)

## 2022-08-12 PROCEDURE — 85610 PROTHROMBIN TIME: CPT

## 2022-08-12 PROCEDURE — G8432 DEP SCR NOT DOC, RNG: HCPCS | Performed by: NURSE PRACTITIONER

## 2022-08-12 PROCEDURE — 86923 COMPATIBILITY TEST ELECTRIC: CPT

## 2022-08-12 PROCEDURE — G8417 CALC BMI ABV UP PARAM F/U: HCPCS | Performed by: NURSE PRACTITIONER

## 2022-08-12 PROCEDURE — 83036 HEMOGLOBIN GLYCOSYLATED A1C: CPT

## 2022-08-12 PROCEDURE — 85025 COMPLETE CBC W/AUTO DIFF WBC: CPT

## 2022-08-12 PROCEDURE — G8753 SYS BP > OR = 140: HCPCS | Performed by: NURSE PRACTITIONER

## 2022-08-12 PROCEDURE — 80053 COMPREHEN METABOLIC PANEL: CPT

## 2022-08-12 PROCEDURE — 36415 COLL VENOUS BLD VENIPUNCTURE: CPT

## 2022-08-12 PROCEDURE — 81001 URINALYSIS AUTO W/SCOPE: CPT

## 2022-08-12 PROCEDURE — 84443 ASSAY THYROID STIM HORMONE: CPT

## 2022-08-12 PROCEDURE — G8536 NO DOC ELDER MAL SCRN: HCPCS | Performed by: NURSE PRACTITIONER

## 2022-08-12 PROCEDURE — 86900 BLOOD TYPING SEROLOGIC ABO: CPT

## 2022-08-12 PROCEDURE — 93880 EXTRACRANIAL BILAT STUDY: CPT

## 2022-08-12 PROCEDURE — G8427 DOCREV CUR MEDS BY ELIG CLIN: HCPCS | Performed by: NURSE PRACTITIONER

## 2022-08-12 PROCEDURE — 93005 ELECTROCARDIOGRAM TRACING: CPT

## 2022-08-12 PROCEDURE — 85730 THROMBOPLASTIN TIME PARTIAL: CPT

## 2022-08-12 PROCEDURE — G8754 DIAS BP LESS 90: HCPCS | Performed by: NURSE PRACTITIONER

## 2022-08-12 PROCEDURE — 1123F ACP DISCUSS/DSCN MKR DOCD: CPT | Performed by: NURSE PRACTITIONER

## 2022-08-12 PROCEDURE — 99214 OFFICE O/P EST MOD 30 MIN: CPT | Performed by: NURSE PRACTITIONER

## 2022-08-12 PROCEDURE — 83735 ASSAY OF MAGNESIUM: CPT

## 2022-08-12 PROCEDURE — 1101F PT FALLS ASSESS-DOCD LE1/YR: CPT | Performed by: NURSE PRACTITIONER

## 2022-08-12 NOTE — PERIOP NOTES
Preoperative instructions reviewed with patient and his daughter, Bari Wallace. Patient given SSI infection FAQS sheet. Given two bottles of skin prep chlorhexidine soap; given written and verbal instructions on use. Patient was given the opportunity to ask questions on the information provided. Instructed to go to [facility] upon leaving PAT Department, [facility], Outpatient Registration, for further instruction/testing. Advised to get instruction from surgeon's office on when/if to stop/take medication prior to surgery. EKG reviewed by Thang Mccrary NP; no further evaluation needed.

## 2022-08-12 NOTE — H&P (VIEW-ONLY)
CSS   History and Physical    Subjective:         PCP: Samia Phelps MD     Cardiologist:  Brit Arcos is a 79 y.o. male who was referred for cardiac evaluation to Dr. Yazmin Durand. Diagnosis/Reason for Consultation: The primary encounter diagnosis was Nonrheumatic aortic valve stenosis. Diagnoses of Nonrheumatic aortic valve insufficiency, S/P AVR (aortic valve replacement), and S/P MVR (mitral valve repair) were also pertinent to this visit. The patient has a permanent pacemaker. He is s/p Watchman. He has atrial fibrillation. HPI: 79 y.o. man with a history of AS s/p AVR in Jan 2015 with Dr. Harper Speaker #23 Deep Mitroflow, MVr, CryoMaze, LLAA, CAD, Afib and SSS with PPM, HTN, HLD, DEL, GERD, Recent admission for HF/COVID (7/13/22), that is referred to the [address] [place] [address] by Dr. Luciano Cole for interventional evaluation of  Aortic Stenosis of Bioprosthetic AV. Patient arrives to the clinic visit with his wife and daughters. He used to go to the gym daily but hasn't done that for 3 years. Now he doesn't do any exercise. His normal activity consists of getting around the house and doing some small chores. He is dyspneic with activity such as stairs or walking too quickly. He feels fatigued some days of the week. Some nights he needs to sleep propped up on pillows but not all the time. He has LE edema but this has improved since starting Lasix. He is keeping track of his weight which was up to 207 lbs now down to 188 lbs. He thinks his dry weight is closer to 180-185. Denies palpitations, CP, dizziness, syncope, fall, PND, medication changes in the last 3 months. He does not some blood in his stool at times but believes this is related to hemorrhoids. His hospitalization in July was related to HF symptoms. 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 He is  and his wife and daughters can help following a surgery.  He does not smoke or use recreational drugs. He rarely drinks alcohol. He is independent in his ADLs. Cardiac Testing    Cardiac catheterization:  Findings  1. Normal major epicardial vessels  2. Normal LVEDP  3. Mean transaortic gradient of 46 mmHg and calculated aortic valve area of 0.85 cm² consistent with severe bioprosthetic aortic valve restenosis  4. Large coronary sinuses  5. Normal right-sided filling pressures and high normal cardiac output  6. Selective right iliac angiogram was performed by advancing an angled glide catheter into the descending aorta and then selectively engaging the right common iliac artery. This demonstrated rather tortuous iliac vessels with no significant obstructive disease in about 10 to 12 mm vessel size for the iliac stent about 8 to 10 mm vessel size for the femorals. Access right radial, right internal jugular ultrasound-guided no issues  Contrast 30 cc     Recommendations-  1. Proceed with transcatheter valve in valve replacement. Coronary Findings      Diagnostic  Dominance: Right    Left Main   The vessel was visualized by angiography. The vessel is angiographically normal.   Left Anterior Descending   The vessel was visualized by angiography. The vessel is angiographically normal.   Left Circumflex   The vessel was visualized by angiography. The vessel is angiographically normal.   Right Coronary Artery   The vessel was visualized by angiography. The vessel is angiographically normal.      ECHO:      Left Ventricle: Preserved left ventricular systolic function with a visually estimated EF of 50 - 55%. Left ventricle size is normal. Increased wall thickness. Findings consistent with mild concentric hypertrophy. Normal wall motion. Right Ventricle: Right ventricle is mildly dilated. Aortic Valve: Bioprosthetic valve. Mild regurgitation. Mitral Valve: Valve repaired by annular ring. Thickened leaflets. Mild regurgitation. Tricuspid Valve: Moderate regurgitation.  The estimated RVSP is 40 mmHg. Left Atrium: Left atrium is severely dilated. LA Vol Index A/L is 72 mL/m2. Right Atrium: Right atrium is moderately dilated. It appears his prosthetic AV is more stenotic than last year and will need evaluation in valve clinic ASAP. It has progressed quickly from last year. Comparison Study Information      Prior Study    There is a prior study available for comparison. Prior study date: 7/26/2021. Echo Findings    Left Ventricle Preserved left ventricular systolic function with a visually estimated EF of 50 - 55%. Left ventricle size is normal. Increased wall thickness. Findings consistent with mild concentric hypertrophy. Normal wall motion. Indeterminate diastolic function. Left Atrium Left atrium is severely dilated. LA Vol Index A/L is 72 mL/m2. Interatrial Septum No interatrial shunt visualized with color Doppler. Right Ventricle Right ventricle is mildly dilated. Normal systolic function. Right Atrium Right atrium is moderately dilated. Aortic Valve Bioprosthetic valve. Mild regurgitation. Severe stenosis of the aortic valve. AV mean gradient is 38 mmHg. AV peak gradient is 57 mmHg. AV peak velocity is 3.8 m/s. LVOT:AV VTI Index is 0.15. Mitral Valve Valve repaired by annular ring. Thickened leaflets. Mild regurgitation. No stenosis noted. MV mean gradient is 3 mmHg. Tricuspid Valve Valve structure is normal. Moderate regurgitation. The estimated RVSP is 40 mmHg. No stenosis noted. Pulmonic Valve The pulmonic valve visualization is suboptimal but appears to be functioning normally. No regurgitation. No stenosis noted. Aorta Normal sized aortic root and ascending aorta. IVC/Hepatic Veins IVC diameter is greater than 21 mm and decreases less than 50% during inspiration; therefore the estimated right atrial pressure is elevated (~15 mmHg). IVC is dilated. Pericardium No pericardial effusion.       Past Medical History:   Diagnosis Date Arthritis     Atrial fibrillation (HCC)     CAD (coronary artery disease)     Cancer (HCC)     PROSTATE    Chronic pain     legs/knee    GERD (gastroesophageal reflux disease)     Hx of carcinoma in situ of prostate     Hyperlipidemia     Hypertension     Insomnia     DEL (obstructive sleep apnea)     Radiation proctitis     Vitamin D deficiency      Past Surgical History:   Procedure Laterality Date    COLONOSCOPY N/A 05/08/2020    COLONOSCOPY performed by Kiya Sahni MD at [address] N/A 06/08/2020    [facility] Walt Hawk MD [facility] [address] [facility] N/A 11/23/2020    FLEXIBLE SIGMOIDOSCOPY WITH APC performed by Kiya Sahni MD at [address] AORTIC VALVE REPLACEMENT  2014    and mitral valve repair, left atrial cryo maze    HX HEENT      melanoma removed head    HX HERNIA REPAIR  2009    Right    HX HERNIA REPAIR      left inguinal hernia repair    HX HERNIA REPAIR Left 12/01/2016    lap left inguinal hernia repair with mesh    HX KNEE REPLACEMENT      Bilateral    HX ORTHOPAEDIC      BILATERAL KNEE REPLACEMENT    HX ORTHOPAEDIC      CARPEL TUNNEL REPAIR-RIGHT    HX OTHER SURGICAL  2015    closure of patent foramen ovale    HX OTHER SURGICAL  10/2020    watchman implant for afib / Dr. Zabala Mutters      42 radiation treatments      Social History     Tobacco Use    Smoking status: Never    Smokeless tobacco: Never   Substance Use Topics    Alcohol use: Yes     Alcohol/week: 2.0 standard drinks     Types: 2 Cans of beer per week      Family History   Problem Relation Age of Onset    Cancer Mother     Cancer Father         prostrate    No Known Problems Sister     No Known Problems Sister     No Known Problems Sister     Cancer Brother         PROSTATE    No Known Problems Brother     Anesth Problems Neg Hx      Prior to Admission medications    Medication Sig Start Date End Date Taking?  Authorizing Provider   lisinopriL (PRINIVIL, ZESTRIL) 40 mg tablet Take 40 mg by mouth nightly. Yes Provider, Historical   omeprazole (PRILOSEC) 40 mg capsule Take 40 mg by mouth in the morning. Yes Provider, Historical   furosemide (LASIX) 40 mg tablet Take 40 mg by mouth daily. Take in AM   Yes Provider, Historical   potassium chloride (K-DUR, KLOR-CON M20) 20 mEq tablet Take 2 Tablets by mouth daily. 7/12/22  Yes Campbell Lopez MD   aspirin delayed-release 81 mg tablet Take 81 mg by mouth two (2) times a day. TAKES ONE IN MORNING AND TWO IN EVENING (PER PATIENT AS OF 8/12/22)   Yes Provider, Historical   polyvinyl alcohol (LIQUIFILM TEARS) 1.4 % ophthalmic solution Administer 1 Drop to both eyes as needed. Yes Provider, Historical   ipratropium (ATROVENT HFA) 17 mcg/actuation inhaler Take 2 Puffs by inhalation as needed. Yes Provider, Historical   ferrous sulfate 325 mg (65 mg iron) cpER Take 1 Tab by mouth every other day. Yes Provider, Historical   tamsulosin (FLOMAX) 0.4 mg capsule Take 0.8 mg by mouth two (2) times a day. Yes Provider, Historical   cholecalciferol (VITAMIN D3) 1,000 unit tablet Take 2,000 Units by mouth two (2) times a day. Yes Provider, Historical   GLUCOSAMINE HCL/CHONDR CHING A NA (GLUCOSAMINE-CHONDROITIN) 750-600 mg tab Take 1 Tab by mouth daily. Yes Provider, Historical   omega-3 fatty acids-vitamin e 1,000 mg cap Take 1 Cap by mouth every other day. Yes Provider, Historical   acetaminophen (TYLENOL) 325 mg tablet Take  by mouth every four (4) hours as needed for Pain. Yes Provider, Historical   multivitamin (ONE A DAY) tablet Take 1 Tab by mouth daily. Yes Provider, Historical   docusate sodium (COLACE) 100 mg capsule Take 100 mg by mouth two (2) times a day. Yes Provider, Historical   finasteride (PROSCAR) 5 mg tablet Take 5 mg by mouth nightly. Yes Provider, Historical   pravastatin (PRAVACHOL) 40 mg tablet Take 40 mg by mouth nightly.    Yes Provider, Historical       No Known Allergies        Review of Systems:   Consititutional: Denies fever or chills. No infections. Had Margarita in early July, end of June. Eyes:  Wears reading glasses. Denies vision problems(cataracts). ENT:  Hard of hearing. Denies swallowing difficulty. CV: Denies CP, claudication,.  + ABDUL. Resp: + ABDUL. + non productive cough. + sleep apnea. Non adherent to CPAP due to claustrophobia. : Denies dialysis or kidney problems. GI: Denies ulcers, esophageal strictures, liver problems. Occasional GERD and uses TUMS. No NVDC. No blood in stool. M/S: Bilateral knee replacements. Skin: + edema.  + varicose veins. Neuro: Denies strokes, or TIAs. Occasional headache. Psych: Denies anxiety or depression. Endocrine: Denies thyroid problems or diabetes. Heme/Lymphatic:  + easy bruising. Objective:     VS: none this visit. Physical Exam:    General appearance: elderly man, alert, cooperative, no distress. Accompanied by his daughter who is a . Head: normocephalic, without obvious abnormality; atraumatic  Eyes: conjunctivae/corneas clear; EOM's intact. HENT: hard of hearing. Have to increase voice to be heard. Nose: nares normal; no drainage. Neck: +  carotid bruit and no JVD. May be referred from heart murmur. Lungs: clear to auscultation bilaterally. Unlabored on room air. + cough. Heart: irregular rate and rhythm; 4/6 DANNIE across precordium. Abdomen: soft, non-tender; bowel sounds normal. Normally distended according to habitus. Extremities: moves all extremities; no weakness. Shuffled gait. Pedal pulses palpable. Skin: Skin color normal; severe bruising right arm. Multiple bruises on left arm. Hemosiderin deposits BLE.  + 1+ BLE edema. .  Neurologic: Grossly normal. [place]. Tongue midline. Smile symmetrical.  Shoulder shrug symmetrical.  Hand grasps equal and strong.      Labs:   Recent Labs     08/12/22  1020   WBC 3.7*   HGB 11.0*   HCT 34.3*   *   INR 1.1 Diagnostics:   PA and lateral:    Carotid doppler: ordered. Result pending. EKG: Atrial fibrillation with frequent ventricular-paced complexes   Nonspecific ST and T wave abnormality   Abnormal ECG   When compared with ECG of 10-JUL-2022 12:06,   Electronic ventricular pacemaker has replaced Atrial fibrillation   Assessment:     Active Problems: Aortic stenosis in prosthetic aortic valve      Plan:   The risk and benefit of surgery were reviewed with patient and family and all questions answered and the patient wishes to proceed. Risk include infection, bleeding, stroke, heart attack, irregular heart rhythm, kidney failure and death. The patient was given instructions. The patient was instructed to stop Lasix the day prior to surgery. Surgery is scheduled for 8-18-22 . I have discussed with the patient the rationale for blood component transfusion; its benefits in treating or preventing fatigue, organ damage, or death; and its risk which includes mild transfusion reactions, rare risk of blood borne infection, or more serious but rare reactions. I have discussed the alternatives to transfusion, including the risk and consequences of not receiving transfusion. The patient had an opportunity to ask questions and had agreed to proceed with transfusion of blood components. Treatment Plan:    TAVR. Valve in valve. 8-18-22  Hold Lasix the day prior to surgery. Continue all other medications. NPO from [place] prior to surgery. Chlorhexidine wipes provided and education provided. Daughter here for all education. U/A is negative. CBC: 3.7/11/34.3/123. JqjW0v=3.9   INR 1.1  aPtt normal.  CMP pending. Carotid duplex is pending.        Signed By: Ibeth Olson NP     August 12, 2022

## 2022-08-12 NOTE — PERIOP NOTES
[facility]  PREOPERATIVE INSTRUCTIONS    Surgery Date:   8/18/22    Your surgeon's office or [facility] staff will call you between 4 PM- 8 PM the day before surgery with your arrival time. If your surgery is on a Monday, you will receive a call the preceding Friday. Please report to [facility] Patient Access/Admitting on the 1st floor. Bring your insurance card, photo identification, and any copayment ( if applicable). If you are going home the same day of your surgery, you must have a responsible adult to drive you home. You need to have a responsible adult to stay with you the first 24 hours after surgery and you should not drive a car for 24 hours following your surgery. Nothing to eat or drink after midnight the night before surgery. This includes no water, gum, mints, coffee, juice, etc.  Please note special instructions, if applicable, below for medications. Do NOT drink alcohol or smoke 24 hours before surgery. STOP smoking for 14 days prior as it helps with breathing and healing after surgery. If you are being admitted to the hospital, please leave personal belongings/luggage in your car until you have an assigned hospital room number. Please wear comfortable clothes. Wear your glasses instead of contacts. We ask that all money, jewelry and valuables be left at home. Wear no make up, particularly mascara, the day of surgery. All body piercings, rings, and jewelry need to be removed and left at home. Please remove any nail polish or artificial nails from your fingernails. Please wear your hair loose or down. Please no pony-tails, buns, or any metal hair accessories. If you shower the morning of surgery, please do not apply any lotions or powders afterwards. You may wear deodorant, unless having breast surgery. Do not shave any body area within 24 hours of your surgery. Please follow all instructions to avoid any potential surgical cancellation.   Should your physical condition change, (i.e. fever, cold, flu, etc.) please notify your surgeon as soon as possible. It is important to be on time. If a situation occurs where you may be delayed, please call:  [phone] / [phone] on the day of surgery. The Preadmission Testing staff can be reached at [phone]. Special instructions: OBTAIN INSTRUCTION ON WHEN OR IF TO TAKE/STOP MEDICATIONS PRIOR TO SURGERY FROM YOUR SURGEON'S OFFICE. Current Outpatient Medications   Medication Sig    lisinopriL (PRINIVIL, ZESTRIL) 40 mg tablet Take 40 mg by mouth nightly. omeprazole (PRILOSEC) 40 mg capsule Take 40 mg by mouth in the morning. furosemide (LASIX) 40 mg tablet Take 40 mg by mouth daily. Take in AM    potassium chloride (K-DUR, KLOR-CON M20) 20 mEq tablet Take 2 Tablets by mouth daily. aspirin delayed-release 81 mg tablet Take 81 mg by mouth two (2) times a day. TAKES ONE IN MORNING AND TWO IN EVENING (PER PATIENT AS OF 8/12/22)    polyvinyl alcohol (LIQUIFILM TEARS) 1.4 % ophthalmic solution Administer 1 Drop to both eyes as needed. ferrous sulfate 325 mg (65 mg iron) cpER Take 1 Tab by mouth every other day. tamsulosin (FLOMAX) 0.4 mg capsule Take 0.8 mg by mouth two (2) times a day. cholecalciferol (VITAMIN D3) 1,000 unit tablet Take 2,000 Units by mouth two (2) times a day. GLUCOSAMINE HCL/CHONDR CHING A NA (GLUCOSAMINE-CHONDROITIN) 750-600 mg tab Take 1 Tab by mouth daily. omega-3 fatty acids-vitamin e 1,000 mg cap Take 1 Cap by mouth every other day. acetaminophen (TYLENOL) 325 mg tablet Take  by mouth every four (4) hours as needed for Pain.    multivitamin (ONE A DAY) tablet Take 1 Tab by mouth daily. docusate sodium (COLACE) 100 mg capsule Take 100 mg by mouth two (2) times a day. finasteride (PROSCAR) 5 mg tablet Take 5 mg by mouth nightly. pravastatin (PRAVACHOL) 40 mg tablet Take 40 mg by mouth nightly.     ipratropium (ATROVENT HFA) 17 mcg/actuation inhaler Take 2 Puffs by inhalation as needed. (Patient not taking: Reported on 8/12/2022)     No current facility-administered medications for this encounter. Preventing Infections Before and After - Your Surgery    IMPORTANT INSTRUCTIONS      You play an important role in your health and preparation for surgery. To reduce the germs on your skin you will need to shower with CHG soap (Chorhexidine gluconate 4%) two times before surgery. CHG soap (Hibiclens, Hex-A-Clens or store brand)  CHG soap will be provided at your Preadmission Testing (PAT) appointment. If you do not have a PAT appointment before surgery, you may arrange to  CHG soap from our office or purchase CHG soap at a pharmacy, grocery or department store. You need to purchase TWO 4 ounce bottles to use for your 2 showers. Steps to follow:  Isreal Fake your hair with your normal shampoo and your body with regular soap and rinse well to remove shampoo and soap from your skin. Wet a clean washcloth and turn off the shower. Put CHG soap on washcloth and apply to your entire body from the neck down. Do not use on your head, face or private parts(genitals). Do not use CHG soap on open sores, wounds or areas of skin irritation. Wash you body gently for 5 minutes. Do not wash your skin too hard. This soap does not create lather. Pay special attention to your underarms and from your belly button to your feet. Turn the shower back on and rinse well to get CHG soap off your body. Pat your skin dry with a clean, dry towel. Do not apply lotions or moisturizer. Put on clean clothes and sleep on fresh bed sheets and do not allow pets to sleep with you. Shower with CHG soap 2 times before your surgery  The evening before your surgery  The morning of your surgery      Tips to help prevent infections after your surgery:  Protect your surgical wound from germs:  Hand washing is the most important thing you and your caregivers can do to prevent infections.   Keep your bandage clean and dry!  Do not touch your surgical wound. Use clean, freshly washed towels and washcloths every time you shower; do not share bath linens with others. Until your surgical wound is healed, wear clothing and sleep on bed linens each day that are clean and freshly washed. Do not allow pets to sleep in your bed with you or touch your surgical wound. Do not smoke - smoking delays wound healing. This may be a good time to stop smoking. If you have diabetes, it is important for you to manage your blood sugar levels properly before your surgery as well as after your surgery. Poorly managed blood sugar levels slow down wound healing and prevent you from healing completely. Patient Information Regarding COVID Restrictions    Day of Procedure    Please park in the parking deck or any designated visitor parking lot. Enter the facility through the Main Entrance of the hospital.  On the day of surgery, please provide the cell phone number of the person who will be waiting for you to the Patient Access representative at the time of registration. Please wear a mask on the day of your procedure. We are now allowing two designated visitors per stay. Pediatric patients may have 2 designated visitors. These two people may come in with you on the day of your procedure. The designated visitor must also wear a mask. Once your procedure and the immediate recovery period is completed, a nurse in the recovery area will contact your designated visitor to inform them of your room number or to otherwise review other pertinent information regarding your care. Social distancing practices are to be adhered to in waiting areas and the cafeteria. The patient was contacted  in person. He verbalized understanding of all instructions does not  need reinforcement.

## 2022-08-12 NOTE — PROGRESS NOTES
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 male who was referred for cardiac evaluation to Dr. Chema Dumont. Diagnosis/Reason for Consultation: The primary encounter diagnosis was Nonrheumatic aortic valve stenosis. Diagnoses of Nonrheumatic aortic valve insufficiency, S/P AVR (aortic valve replacement), and S/P MVR (mitral valve repair) were also pertinent to this visit. The patient has a permanent pacemaker. He is s/p Watchman. He has atrial fibrillation. HPI: 79 y.o. man with a history of AS s/p AVR in Jan 2015 with Dr. Day Kim #23 Deep Mitroflow, MVr, CryoMaze, LLAA, CAD, Afib and SSS with PPM, HTN, HLD, DEL, GERD, Recent admission for HF/COVID (7/13/22), that is referred to the [address] [place] [address] by Dr. Anastasia Marshall for interventional evaluation of  Aortic Stenosis of Bioprosthetic AV. Patient arrives to the clinic visit with his wife and daughters. He used to go to the gym daily but hasn't done that for 3 years. Now he doesn't do any exercise. His normal activity consists of getting around the house and doing some small chores. He is dyspneic with activity such as stairs or walking too quickly. He feels fatigued some days of the week. Some nights he needs to sleep propped up on pillows but not all the time. He has LE edema but this has improved since starting Lasix. He is keeping track of his weight which was up to 207 lbs now down to 188 lbs. He thinks his dry weight is closer to 180-185. Denies palpitations, CP, dizziness, syncope, fall, PND, medication changes in the last 3 months. He does not some blood in his stool at times but believes this is related to hemorrhoids. His hospitalization in July was related to HF symptoms. 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 He is  and his wife and daughters can help following a surgery.  He does not smoke or use recreational drugs. He rarely drinks alcohol. He is independent in his ADLs. Cardiac Testing    Cardiac catheterization:  Findings  1. Normal major epicardial vessels  2. Normal LVEDP  3. Mean transaortic gradient of 46 mmHg and calculated aortic valve area of 0.85 cm² consistent with severe bioprosthetic aortic valve restenosis  4. Large coronary sinuses  5. Normal right-sided filling pressures and high normal cardiac output  6. Selective right iliac angiogram was performed by advancing an angled glide catheter into the descending aorta and then selectively engaging the right common iliac artery. This demonstrated rather tortuous iliac vessels with no significant obstructive disease in about 10 to 12 mm vessel size for the iliac stent about 8 to 10 mm vessel size for the femorals. Access right radial, right internal jugular ultrasound-guided no issues  Contrast 30 cc     Recommendations-  1. Proceed with transcatheter valve in valve replacement. Coronary Findings      Diagnostic  Dominance: Right    Left Main   The vessel was visualized by angiography. The vessel is angiographically normal.   Left Anterior Descending   The vessel was visualized by angiography. The vessel is angiographically normal.   Left Circumflex   The vessel was visualized by angiography. The vessel is angiographically normal.   Right Coronary Artery   The vessel was visualized by angiography. The vessel is angiographically normal.      ECHO:      Left Ventricle: Preserved left ventricular systolic function with a visually estimated EF of 50 - 55%. Left ventricle size is normal. Increased wall thickness. Findings consistent with mild concentric hypertrophy. Normal wall motion. Right Ventricle: Right ventricle is mildly dilated. Aortic Valve: Bioprosthetic valve. Mild regurgitation. Mitral Valve: Valve repaired by annular ring. Thickened leaflets. Mild regurgitation. Tricuspid Valve: Moderate regurgitation.  The estimated RVSP is 40 mmHg. Left Atrium: Left atrium is severely dilated. LA Vol Index A/L is 72 mL/m2. Right Atrium: Right atrium is moderately dilated. It appears his prosthetic AV is more stenotic than last year and will need evaluation in valve clinic ASAP. It has progressed quickly from last year. Comparison Study Information      Prior Study    There is a prior study available for comparison. Prior study date: 7/26/2021. Echo Findings    Left Ventricle Preserved left ventricular systolic function with a visually estimated EF of 50 - 55%. Left ventricle size is normal. Increased wall thickness. Findings consistent with mild concentric hypertrophy. Normal wall motion. Indeterminate diastolic function. Left Atrium Left atrium is severely dilated. LA Vol Index A/L is 72 mL/m2. Interatrial Septum No interatrial shunt visualized with color Doppler. Right Ventricle Right ventricle is mildly dilated. Normal systolic function. Right Atrium Right atrium is moderately dilated. Aortic Valve Bioprosthetic valve. Mild regurgitation. Severe stenosis of the aortic valve. AV mean gradient is 38 mmHg. AV peak gradient is 57 mmHg. AV peak velocity is 3.8 m/s. LVOT:AV VTI Index is 0.15. Mitral Valve Valve repaired by annular ring. Thickened leaflets. Mild regurgitation. No stenosis noted. MV mean gradient is 3 mmHg. Tricuspid Valve Valve structure is normal. Moderate regurgitation. The estimated RVSP is 40 mmHg. No stenosis noted. Pulmonic Valve The pulmonic valve visualization is suboptimal but appears to be functioning normally. No regurgitation. No stenosis noted. Aorta Normal sized aortic root and ascending aorta. IVC/Hepatic Veins IVC diameter is greater than 21 mm and decreases less than 50% during inspiration; therefore the estimated right atrial pressure is elevated (~15 mmHg). IVC is dilated. Pericardium No pericardial effusion.       Past Medical History:   Diagnosis Date Arthritis     Atrial fibrillation (HCC)     CAD (coronary artery disease)     Cancer (HCC)     PROSTATE    Chronic pain     legs/knee    GERD (gastroesophageal reflux disease)     Hx of carcinoma in situ of prostate     Hyperlipidemia     Hypertension     Insomnia     DEL (obstructive sleep apnea)     Radiation proctitis     Vitamin D deficiency      Past Surgical History:   Procedure Laterality Date    COLONOSCOPY N/A 05/08/2020    COLONOSCOPY performed by Kaley Dumont MD at [facility] N/A 06/08/2020    COLONOSCOPY performed by Alexia Hill MD at [facility] N/A 11/23/2020    FLEXIBLE SIGMOIDOSCOPY WITH APC performed by Kaley Dumont MD at [address] AORTIC VALVE REPLACEMENT  2014    and mitral valve repair, left atrial cryo maze    HX HEENT      melanoma removed head    HX HERNIA REPAIR  2009    Right    HX HERNIA REPAIR      left inguinal hernia repair    HX HERNIA REPAIR Left 12/01/2016    lap left inguinal hernia repair with mesh    HX KNEE REPLACEMENT      Bilateral    HX ORTHOPAEDIC      BILATERAL KNEE REPLACEMENT    HX ORTHOPAEDIC      CARPEL TUNNEL REPAIR-RIGHT    HX OTHER SURGICAL  2015    closure of patent foramen ovale    HX OTHER SURGICAL  10/2020    watchman implant for afib / Dr. Lois Fernandez      42 radiation treatments      Social History     Tobacco Use    Smoking status: Never    Smokeless tobacco: Never   Substance Use Topics    Alcohol use: Yes     Alcohol/week: 2.0 standard drinks     Types: 2 Cans of beer per week      Family History   Problem Relation Age of Onset    Cancer Mother     Cancer Father         prostrate    No Known Problems Sister     No Known Problems Sister     No Known Problems Sister     Cancer Brother         PROSTATE    No Known Problems Brother     Anesth Problems Neg Hx      Prior to Admission medications    Medication Sig Start Date End Date Taking?  Authorizing Provider   lisinopriL (PRINIVIL, ZESTRIL) 40 mg tablet Take 40 mg by mouth nightly. Yes Provider, Historical   omeprazole (PRILOSEC) 40 mg capsule Take 40 mg by mouth in the morning. Yes Provider, Historical   furosemide (LASIX) 40 mg tablet Take 40 mg by mouth daily. Take in AM   Yes Provider, Historical   potassium chloride (K-DUR, KLOR-CON M20) 20 mEq tablet Take 2 Tablets by mouth daily. 7/12/22  Yes Katja Kaplan MD   aspirin delayed-release 81 mg tablet Take 81 mg by mouth two (2) times a day. TAKES ONE IN MORNING AND TWO IN EVENING (PER PATIENT AS OF 8/12/22)   Yes Provider, Historical   polyvinyl alcohol (LIQUIFILM TEARS) 1.4 % ophthalmic solution Administer 1 Drop to both eyes as needed. Yes Provider, Historical   ipratropium (ATROVENT HFA) 17 mcg/actuation inhaler Take 2 Puffs by inhalation as needed. Yes Provider, Historical   ferrous sulfate 325 mg (65 mg iron) cpER Take 1 Tab by mouth every other day. Yes Provider, Historical   tamsulosin (FLOMAX) 0.4 mg capsule Take 0.8 mg by mouth two (2) times a day. Yes Provider, Historical   cholecalciferol (VITAMIN D3) 1,000 unit tablet Take 2,000 Units by mouth two (2) times a day. Yes Provider, Historical   GLUCOSAMINE HCL/CHONDR CHING A NA (GLUCOSAMINE-CHONDROITIN) 750-600 mg tab Take 1 Tab by mouth daily. Yes Provider, Historical   omega-3 fatty acids-vitamin e 1,000 mg cap Take 1 Cap by mouth every other day. Yes Provider, Historical   acetaminophen (TYLENOL) 325 mg tablet Take  by mouth every four (4) hours as needed for Pain. Yes Provider, Historical   multivitamin (ONE A DAY) tablet Take 1 Tab by mouth daily. Yes Provider, Historical   docusate sodium (COLACE) 100 mg capsule Take 100 mg by mouth two (2) times a day. Yes Provider, Historical   finasteride (PROSCAR) 5 mg tablet Take 5 mg by mouth nightly. Yes Provider, Historical   pravastatin (PRAVACHOL) 40 mg tablet Take 40 mg by mouth nightly.    Yes Provider, Historical       No Known Allergies        Review of Systems:   Consititutional: Denies fever or chills. No infections. Had Margarita in early July, end of June. Eyes:  Wears reading glasses. Denies vision problems(cataracts). ENT:  Hard of hearing. Denies swallowing difficulty. CV: Denies CP, claudication,.  + ABDUL. Resp: + ABDUL. + non productive cough. + sleep apnea. Non adherent to CPAP due to claustrophobia. : Denies dialysis or kidney problems. GI: Denies ulcers, esophageal strictures, liver problems. Occasional GERD and uses TUMS. No NVDC. No blood in stool. M/S: Bilateral knee replacements. Skin: + edema.  + varicose veins. Neuro: Denies strokes, or TIAs. Occasional headache. Psych: Denies anxiety or depression. Endocrine: Denies thyroid problems or diabetes. Heme/Lymphatic:  + easy bruising. Objective:     VS: none this visit. Physical Exam:    General appearance: elderly man, alert, cooperative, no distress. Accompanied by his daughter who is a . Head: normocephalic, without obvious abnormality; atraumatic  Eyes: conjunctivae/corneas clear; EOM's intact. HENT: hard of hearing. Have to increase voice to be heard. Nose: nares normal; no drainage. Neck: +  carotid bruit and no JVD. May be referred from heart murmur. Lungs: clear to auscultation bilaterally. Unlabored on room air. + cough. Heart: irregular rate and rhythm; 4/6 DANNIE across precordium. Abdomen: soft, non-tender; bowel sounds normal. Normally distended according to habitus. Extremities: moves all extremities; no weakness. Shuffled gait. Pedal pulses palpable. Skin: Skin color normal; severe bruising right arm. Multiple bruises on left arm. Hemosiderin deposits BLE.  + 1+ BLE edema. .  Neurologic: Grossly normal. [place]. Tongue midline. Smile symmetrical.  Shoulder shrug symmetrical.  Hand grasps equal and strong.      Labs:   Recent Labs     08/12/22  1020   WBC 3.7*   HGB 11.0*   HCT 34.3*   *   INR 1.1 Diagnostics:   PA and lateral:    Carotid doppler: ordered. Result pending. EKG: Atrial fibrillation with frequent ventricular-paced complexes   Nonspecific ST and T wave abnormality   Abnormal ECG   When compared with ECG of 10-JUL-2022 12:06,   Electronic ventricular pacemaker has replaced Atrial fibrillation   Assessment:     Active Problems: Aortic stenosis in prosthetic aortic valve      Plan:   The risk and benefit of surgery were reviewed with patient and family and all questions answered and the patient wishes to proceed. Risk include infection, bleeding, stroke, heart attack, irregular heart rhythm, kidney failure and death. The patient was given instructions. The patient was instructed to stop Lasix the day prior to surgery. Surgery is scheduled for 8-18-22 . I have discussed with the patient the rationale for blood component transfusion; its benefits in treating or preventing fatigue, organ damage, or death; and its risk which includes mild transfusion reactions, rare risk of blood borne infection, or more serious but rare reactions. I have discussed the alternatives to transfusion, including the risk and consequences of not receiving transfusion. The patient had an opportunity to ask questions and had agreed to proceed with transfusion of blood components. Treatment Plan:    TAVR. Valve in valve. 8-18-22  Hold Lasix the day prior to surgery. Continue all other medications. NPO from MN prior to surgery. Chlorhexidine wipes provided and education provided. Daughter here for all education. U/A is negative. CBC: 3.7/11/34.3/123. CplH2w=6.9   INR 1.1  aPtt normal.  CMP pending. Carotid duplex is pending.        Signed By: David Avila NP     August 12, 2022

## 2022-08-17 ENCOUNTER — ANESTHESIA EVENT (OUTPATIENT)
Dept: CARDIOTHORACIC SURGERY | Age: 82
DRG: 266 | End: 2022-08-17
Payer: MEDICARE

## 2022-08-17 ENCOUNTER — TELEPHONE (OUTPATIENT)
Dept: CASE MANAGEMENT | Age: 82
End: 2022-08-17

## 2022-08-17 RX ORDER — SODIUM CHLORIDE 9 MG/ML
1.5-3 INJECTION, SOLUTION INTRAVENOUS
Status: DISCONTINUED | OUTPATIENT
Start: 2022-08-18 | End: 2022-08-18

## 2022-08-17 RX ORDER — DEXMEDETOMIDINE HYDROCHLORIDE 4 UG/ML
.1-1.5 INJECTION, SOLUTION INTRAVENOUS
Status: DISCONTINUED | OUTPATIENT
Start: 2022-08-18 | End: 2022-08-18

## 2022-08-17 NOTE — TELEPHONE ENCOUNTER
Cardiac [id number] [facility] to review plan of care and day of surgery expectations. Encouraged Nahid Kulkarni to verbalize and offered emotional support. Nahid Kulkarni is without questions or concerns at this time.   Yahir Allen RN

## 2022-08-18 ENCOUNTER — APPOINTMENT (OUTPATIENT)
Dept: GENERAL RADIOLOGY | Age: 82
DRG: 266 | End: 2022-08-18
Attending: THORACIC SURGERY (CARDIOTHORACIC VASCULAR SURGERY)
Payer: MEDICARE

## 2022-08-18 ENCOUNTER — ANESTHESIA (OUTPATIENT)
Dept: CARDIOTHORACIC SURGERY | Age: 82
DRG: 266 | End: 2022-08-18
Payer: MEDICARE

## 2022-08-18 ENCOUNTER — APPOINTMENT (OUTPATIENT)
Dept: GENERAL RADIOLOGY | Age: 82
DRG: 266 | End: 2022-08-18
Attending: NURSE PRACTITIONER
Payer: MEDICARE

## 2022-08-18 ENCOUNTER — APPOINTMENT (OUTPATIENT)
Dept: NON INVASIVE DIAGNOSTICS | Age: 82
DRG: 266 | End: 2022-08-18
Attending: THORACIC SURGERY (CARDIOTHORACIC VASCULAR SURGERY)
Payer: MEDICARE

## 2022-08-18 ENCOUNTER — HOSPITAL ENCOUNTER (INPATIENT)
Age: 82
LOS: 6 days | Discharge: HOME HEALTH | DRG: 266 | End: 2022-08-24
Attending: THORACIC SURGERY (CARDIOTHORACIC VASCULAR SURGERY) | Admitting: THORACIC SURGERY (CARDIOTHORACIC VASCULAR SURGERY)
Payer: MEDICARE

## 2022-08-18 DIAGNOSIS — N32.89 BLADDER SPASM: ICD-10-CM

## 2022-08-18 DIAGNOSIS — Z95.2 S/P TAVR (TRANSCATHETER AORTIC VALVE REPLACEMENT): ICD-10-CM

## 2022-08-18 DIAGNOSIS — I35.0 NONRHEUMATIC AORTIC VALVE STENOSIS: Primary | ICD-10-CM

## 2022-08-18 LAB
ALBUMIN SERPL-MCNC: 3.1 G/DL (ref 3.5–5)
ALBUMIN/GLOB SERPL: 0.9 {RATIO} (ref 1.1–2.2)
ALP SERPL-CCNC: 80 U/L (ref 45–117)
ALT SERPL-CCNC: 23 U/L (ref 12–78)
ANION GAP SERPL CALC-SCNC: 8 MMOL/L (ref 5–15)
APTT PPP: 29.9 SEC (ref 22.1–31)
ARTERIAL PATENCY WRIST A: ABNORMAL
AST SERPL-CCNC: 27 U/L (ref 15–37)
BASE DEFICIT BLDA-SCNC: 2.3 MMOL/L
BASE DEFICIT BLDV-SCNC: 3 MMOL/L
BASOPHILS # BLD: 0 K/UL (ref 0–0.1)
BASOPHILS NFR BLD: 0 % (ref 0–1)
BDY SITE: ABNORMAL
BDY SITE: ABNORMAL
BILIRUB SERPL-MCNC: 0.8 MG/DL (ref 0.2–1)
BUN SERPL-MCNC: 15 MG/DL (ref 6–20)
BUN/CREAT SERPL: 23 (ref 12–20)
CA-I BLD-SCNC: 1.21 MMOL/L (ref 1.13–1.32)
CALCIUM SERPL-MCNC: 9.1 MG/DL (ref 8.5–10.1)
CHLORIDE SERPL-SCNC: 110 MMOL/L (ref 97–108)
CO2 SERPL-SCNC: 24 MMOL/L (ref 21–32)
CREAT SERPL-MCNC: 0.64 MG/DL (ref 0.7–1.3)
DIFFERENTIAL METHOD BLD: ABNORMAL
EOSINOPHIL # BLD: 0.2 K/UL (ref 0–0.4)
EOSINOPHIL NFR BLD: 2 % (ref 0–7)
ERYTHROCYTE [DISTWIDTH] IN BLOOD BY AUTOMATED COUNT: 15.8 % (ref 11.5–14.5)
FIO2 ON VENT: 80 %
FIO2 ON VENT: 80 %
GAS FLOW.O2 SETTING OXYMISER: 16 L/MIN
GAS FLOW.O2 SETTING OXYMISER: 16 L/MIN
GLOBULIN SER CALC-MCNC: 3.3 G/DL (ref 2–4)
GLUCOSE BLD STRIP.AUTO-MCNC: 103 MG/DL (ref 65–117)
GLUCOSE BLD STRIP.AUTO-MCNC: 91 MG/DL (ref 65–117)
GLUCOSE SERPL-MCNC: 109 MG/DL (ref 65–100)
HCO3 BLDA-SCNC: 22 MMOL/L (ref 22–26)
HCO3 BLDV-SCNC: 22 MMOL/L (ref 23–28)
HCT VFR BLD AUTO: 30.9 % (ref 36.6–50.3)
HGB BLD-MCNC: 10.2 G/DL (ref 12.1–17)
IMM GRANULOCYTES # BLD AUTO: 0 K/UL (ref 0–0.04)
IMM GRANULOCYTES NFR BLD AUTO: 0 % (ref 0–0.5)
INR PPP: 1.3 (ref 0.9–1.1)
LYMPHOCYTES # BLD: 0.3 K/UL (ref 0.8–3.5)
LYMPHOCYTES NFR BLD: 4 % (ref 12–49)
MAGNESIUM SERPL-MCNC: 2 MG/DL (ref 1.6–2.4)
MCH RBC QN AUTO: 31.8 PG (ref 26–34)
MCHC RBC AUTO-ENTMCNC: 33 G/DL (ref 30–36.5)
MCV RBC AUTO: 96.3 FL (ref 80–99)
MONOCYTES # BLD: 0.4 K/UL (ref 0–1)
MONOCYTES NFR BLD: 5 % (ref 5–13)
NEUTS SEG # BLD: 7 K/UL (ref 1.8–8)
NEUTS SEG NFR BLD: 89 % (ref 32–75)
NRBC # BLD: 0 K/UL (ref 0–0.01)
NRBC BLD-RTO: 0 PER 100 WBC
PCO2 BLDA: 39 MMHG (ref 35–45)
PCO2 BLDV: 38.9 MMHG (ref 41–51)
PEEP RESPIRATORY: 5 CM[H2O]
PEEP RESPIRATORY: 5 CM[H2O]
PH BLDA: 7.38 [PH] (ref 7.35–7.45)
PH BLDV: 7.37 [PH] (ref 7.32–7.42)
PLATELET # BLD AUTO: 84 K/UL (ref 150–400)
PMV BLD AUTO: 9.9 FL (ref 8.9–12.9)
PO2 BLDA: 228 MMHG (ref 80–100)
PO2 BLDV: 43 MMHG (ref 25–40)
POTASSIUM SERPL-SCNC: 3.8 MMOL/L (ref 3.5–5.1)
PRESSURE SUPPORT SETTING VENT: 5 CM[H2O]
PRESSURE SUPPORT SETTING VENT: 5 CM[H2O]
PROT SERPL-MCNC: 6.4 G/DL (ref 6.4–8.2)
PROTHROMBIN TIME: 12.9 SEC (ref 9–11.1)
RBC # BLD AUTO: 3.21 M/UL (ref 4.1–5.7)
RBC MORPH BLD: ABNORMAL
SAO2 % BLD: 100 % (ref 92–97)
SAO2% DEVICE SAO2% SENSOR NAME: ABNORMAL
SAO2% DEVICE SAO2% SENSOR NAME: ABNORMAL
SERVICE CMNT-IMP: NORMAL
SERVICE CMNT-IMP: NORMAL
SODIUM SERPL-SCNC: 142 MMOL/L (ref 136–145)
SPECIMEN SITE: ABNORMAL
SPECIMEN SITE: ABNORMAL
THERAPEUTIC RANGE,PTTT: NORMAL SECS (ref 58–77)
VENTILATION MODE VENT: ABNORMAL
VENTILATION MODE VENT: ABNORMAL
VT SETTING VENT: 480 ML
VT SETTING VENT: 480 ML
WBC # BLD AUTO: 7.9 K/UL (ref 4.1–11.1)

## 2022-08-18 PROCEDURE — 99223 1ST HOSP IP/OBS HIGH 75: CPT | Performed by: INTERNAL MEDICINE

## 2022-08-18 PROCEDURE — 77030040934 HC CATH DIAG DXTERITY MEDT -A: Performed by: INTERNAL MEDICINE

## 2022-08-18 PROCEDURE — 65610000003 HC RM ICU SURGICAL

## 2022-08-18 PROCEDURE — C1751 CATH, INF, PER/CENT/MIDLINE: HCPCS

## 2022-08-18 PROCEDURE — 74011000258 HC RX REV CODE- 258: Performed by: NURSE ANESTHETIST, CERTIFIED REGISTERED

## 2022-08-18 PROCEDURE — 74011250636 HC RX REV CODE- 250/636: Performed by: NURSE PRACTITIONER

## 2022-08-18 PROCEDURE — 74011000250 HC RX REV CODE- 250: Performed by: THORACIC SURGERY (CARDIOTHORACIC VASCULAR SURGERY)

## 2022-08-18 PROCEDURE — 74011000250 HC RX REV CODE- 250: Performed by: NURSE ANESTHETIST, CERTIFIED REGISTERED

## 2022-08-18 PROCEDURE — 77030019569 HC BND COMPR RAD TERU -B: Performed by: INTERNAL MEDICINE

## 2022-08-18 PROCEDURE — C1769 GUIDE WIRE: HCPCS | Performed by: INTERNAL MEDICINE

## 2022-08-18 PROCEDURE — 36415 COLL VENOUS BLD VENIPUNCTURE: CPT

## 2022-08-18 PROCEDURE — 93308 TTE F-UP OR LMTD: CPT

## 2022-08-18 PROCEDURE — C1760 CLOSURE DEV, VASC: HCPCS | Performed by: INTERNAL MEDICINE

## 2022-08-18 PROCEDURE — 77030004532 HC CATH ANGI DX IMP BSC -A: Performed by: INTERNAL MEDICINE

## 2022-08-18 PROCEDURE — C1892 INTRO/SHEATH,FIXED,PEEL-AWAY: HCPCS | Performed by: INTERNAL MEDICINE

## 2022-08-18 PROCEDURE — 85025 COMPLETE CBC W/AUTO DIFF WBC: CPT

## 2022-08-18 PROCEDURE — 74011250636 HC RX REV CODE- 250/636: Performed by: THORACIC SURGERY (CARDIOTHORACIC VASCULAR SURGERY)

## 2022-08-18 PROCEDURE — 2709999900 HC NON-CHARGEABLE SUPPLY: Performed by: INTERNAL MEDICINE

## 2022-08-18 PROCEDURE — C1751 CATH, INF, PER/CENT/MIDLINE: HCPCS | Performed by: INTERNAL MEDICINE

## 2022-08-18 PROCEDURE — 03HC33Z INSERTION OF INFUSION DEVICE INTO LEFT RADIAL ARTERY, PERCUTANEOUS APPROACH: ICD-10-PCS | Performed by: ANESTHESIOLOGY

## 2022-08-18 PROCEDURE — 77030004549 HC CATH ANGI DX PRF MRTM -A: Performed by: INTERNAL MEDICINE

## 2022-08-18 PROCEDURE — C1894 INTRO/SHEATH, NON-LASER: HCPCS | Performed by: INTERNAL MEDICINE

## 2022-08-18 PROCEDURE — 33361 REPLACE AORTIC VALVE PERQ: CPT | Performed by: INTERNAL MEDICINE

## 2022-08-18 PROCEDURE — 77030039825 HC MSK NSL PAP SUPERNO2VA VYRM -B: Performed by: ANESTHESIOLOGY

## 2022-08-18 PROCEDURE — 83735 ASSAY OF MAGNESIUM: CPT

## 2022-08-18 PROCEDURE — 77030013797 HC KT TRNSDUC PRSSR EDWD -A: Performed by: INTERNAL MEDICINE

## 2022-08-18 PROCEDURE — 74011250636 HC RX REV CODE- 250/636: Performed by: NURSE ANESTHETIST, CERTIFIED REGISTERED

## 2022-08-18 PROCEDURE — P9045 ALBUMIN (HUMAN), 5%, 250 ML: HCPCS | Performed by: STUDENT IN AN ORGANIZED HEALTH CARE EDUCATION/TRAINING PROGRAM

## 2022-08-18 PROCEDURE — 0BH17EZ INSERTION OF ENDOTRACHEAL AIRWAY INTO TRACHEA, VIA NATURAL OR ARTIFICIAL OPENING: ICD-10-PCS | Performed by: INTERNAL MEDICINE

## 2022-08-18 PROCEDURE — 76060000038 HC ANESTHESIA 3.5 TO 4 HR: Performed by: INTERNAL MEDICINE

## 2022-08-18 PROCEDURE — 02RF38Z REPLACEMENT OF AORTIC VALVE WITH ZOOPLASTIC TISSUE, PERCUTANEOUS APPROACH: ICD-10-PCS | Performed by: INTERNAL MEDICINE

## 2022-08-18 PROCEDURE — 5A1935Z RESPIRATORY VENTILATION, LESS THAN 24 CONSECUTIVE HOURS: ICD-10-PCS | Performed by: INTERNAL MEDICINE

## 2022-08-18 PROCEDURE — 5A1223Z PERFORMANCE OF CARDIAC PACING, CONTINUOUS: ICD-10-PCS | Performed by: INTERNAL MEDICINE

## 2022-08-18 PROCEDURE — 77030016699 HC CATH ANGI DX INFN1 CARD -A: Performed by: INTERNAL MEDICINE

## 2022-08-18 PROCEDURE — 77030018729 HC ELECTRD DEFIB PAD CARD -B: Performed by: INTERNAL MEDICINE

## 2022-08-18 PROCEDURE — 74011000250 HC RX REV CODE- 250: Performed by: NURSE PRACTITIONER

## 2022-08-18 PROCEDURE — 71045 X-RAY EXAM CHEST 1 VIEW: CPT

## 2022-08-18 PROCEDURE — 33361 REPLACE AORTIC VALVE PERQ: CPT | Performed by: THORACIC SURGERY (CARDIOTHORACIC VASCULAR SURGERY)

## 2022-08-18 PROCEDURE — 77030005513 HC CATH URETH FOL11 MDII -B

## 2022-08-18 PROCEDURE — 74011250636 HC RX REV CODE- 250/636: Performed by: STUDENT IN AN ORGANIZED HEALTH CARE EDUCATION/TRAINING PROGRAM

## 2022-08-18 PROCEDURE — 77030040922 HC BLNKT HYPOTHRM STRY -A

## 2022-08-18 PROCEDURE — 85610 PROTHROMBIN TIME: CPT

## 2022-08-18 PROCEDURE — 82803 BLOOD GASES ANY COMBINATION: CPT

## 2022-08-18 PROCEDURE — B24BZZ4 ULTRASONOGRAPHY OF HEART WITH AORTA, TRANSESOPHAGEAL: ICD-10-PCS | Performed by: INTERNAL MEDICINE

## 2022-08-18 PROCEDURE — 33370 TCAT PLMT&RMVL CEPD PERQ: CPT | Performed by: INTERNAL MEDICINE

## 2022-08-18 PROCEDURE — 77030019702 HC WRP THER MENM -C: Performed by: INTERNAL MEDICINE

## 2022-08-18 PROCEDURE — 77030041244 HC CBL PACE EXT TEMP REMG -B: Performed by: INTERNAL MEDICINE

## 2022-08-18 PROCEDURE — 80053 COMPREHEN METABOLIC PANEL: CPT

## 2022-08-18 PROCEDURE — C1884 EMBOLIZATION PROTECT SYST: HCPCS | Performed by: INTERNAL MEDICINE

## 2022-08-18 PROCEDURE — 4A023N7 MEASUREMENT OF CARDIAC SAMPLING AND PRESSURE, LEFT HEART, PERCUTANEOUS APPROACH: ICD-10-PCS | Performed by: INTERNAL MEDICINE

## 2022-08-18 PROCEDURE — 77030041294 HC VLV AORT BIOPROS EVOLUT PP2 MEDT -L: Performed by: INTERNAL MEDICINE

## 2022-08-18 PROCEDURE — 74011000250 HC RX REV CODE- 250: Performed by: INTERNAL MEDICINE

## 2022-08-18 PROCEDURE — 85730 THROMBOPLASTIN TIME PARTIAL: CPT

## 2022-08-18 PROCEDURE — 77030013715 HC INFL SYS MRTM -B: Performed by: INTERNAL MEDICINE

## 2022-08-18 PROCEDURE — 74011250636 HC RX REV CODE- 250/636: Performed by: ANESTHESIOLOGY

## 2022-08-18 PROCEDURE — 77030008684 HC TU ET CUF COVD -B: Performed by: ANESTHESIOLOGY

## 2022-08-18 PROCEDURE — 77030026438 HC STYL ET INTUB CARD -A: Performed by: ANESTHESIOLOGY

## 2022-08-18 PROCEDURE — 74011000250 HC RX REV CODE- 250: Performed by: STUDENT IN AN ORGANIZED HEALTH CARE EDUCATION/TRAINING PROGRAM

## 2022-08-18 PROCEDURE — 74011000636 HC RX REV CODE- 636: Performed by: INTERNAL MEDICINE

## 2022-08-18 PROCEDURE — 94002 VENT MGMT INPAT INIT DAY: CPT

## 2022-08-18 PROCEDURE — 93355 ECHO TRANSESOPHAGEAL (TEE): CPT | Performed by: INTERNAL MEDICINE

## 2022-08-18 PROCEDURE — 76010000111 HC CV SURG 3.5 TO 4 HR: Performed by: INTERNAL MEDICINE

## 2022-08-18 PROCEDURE — 82962 GLUCOSE BLOOD TEST: CPT

## 2022-08-18 PROCEDURE — B2111ZZ FLUOROSCOPY OF MULTIPLE CORONARY ARTERIES USING LOW OSMOLAR CONTRAST: ICD-10-PCS | Performed by: INTERNAL MEDICINE

## 2022-08-18 DEVICE — VLV EVPROPLUS-26 COMM US
Type: IMPLANTABLE DEVICE | Site: AORTIC VALVE | Status: FUNCTIONAL
Brand: EVOLUT™ PRO+

## 2022-08-18 RX ORDER — MIDAZOLAM HYDROCHLORIDE 1 MG/ML
1 INJECTION, SOLUTION INTRAMUSCULAR; INTRAVENOUS AS NEEDED
Status: DISCONTINUED | OUTPATIENT
Start: 2022-08-18 | End: 2022-08-18 | Stop reason: HOSPADM

## 2022-08-18 RX ORDER — PHENYLEPHRINE HCL IN 0.9% NACL 0.4MG/10ML
SYRINGE (ML) INTRAVENOUS AS NEEDED
Status: DISCONTINUED | OUTPATIENT
Start: 2022-08-18 | End: 2022-08-18 | Stop reason: HOSPADM

## 2022-08-18 RX ORDER — MIDAZOLAM HYDROCHLORIDE 1 MG/ML
INJECTION, SOLUTION INTRAMUSCULAR; INTRAVENOUS AS NEEDED
Status: DISCONTINUED | OUTPATIENT
Start: 2022-08-18 | End: 2022-08-18 | Stop reason: HOSPADM

## 2022-08-18 RX ORDER — FUROSEMIDE 40 MG/1
40 TABLET ORAL DAILY
Status: DISCONTINUED | OUTPATIENT
Start: 2022-08-19 | End: 2022-08-24 | Stop reason: HOSPADM

## 2022-08-18 RX ORDER — FINASTERIDE 5 MG/1
5 TABLET, FILM COATED ORAL
Status: DISCONTINUED | OUTPATIENT
Start: 2022-08-18 | End: 2022-08-24 | Stop reason: HOSPADM

## 2022-08-18 RX ORDER — LIDOCAINE HYDROCHLORIDE 10 MG/ML
INJECTION INFILTRATION; PERINEURAL AS NEEDED
Status: DISCONTINUED | OUTPATIENT
Start: 2022-08-18 | End: 2022-08-18 | Stop reason: HOSPADM

## 2022-08-18 RX ORDER — POTASSIUM CHLORIDE 750 MG/1
40 TABLET, FILM COATED, EXTENDED RELEASE ORAL DAILY
Status: DISCONTINUED | OUTPATIENT
Start: 2022-08-19 | End: 2022-08-24 | Stop reason: HOSPADM

## 2022-08-18 RX ORDER — ALBUTEROL SULFATE 0.83 MG/ML
2.5 SOLUTION RESPIRATORY (INHALATION)
Status: DISCONTINUED | OUTPATIENT
Start: 2022-08-18 | End: 2022-08-23 | Stop reason: SDUPTHER

## 2022-08-18 RX ORDER — SUCCINYLCHOLINE CHLORIDE 20 MG/ML INJECTION SOLUTION
SOLUTION AS NEEDED
Status: DISCONTINUED | OUTPATIENT
Start: 2022-08-18 | End: 2022-08-18 | Stop reason: HOSPADM

## 2022-08-18 RX ORDER — SODIUM CHLORIDE 9 MG/ML
9 INJECTION, SOLUTION INTRAVENOUS CONTINUOUS
Status: DISCONTINUED | OUTPATIENT
Start: 2022-08-18 | End: 2022-08-22

## 2022-08-18 RX ORDER — SODIUM CHLORIDE 9 MG/ML
INJECTION, SOLUTION INTRAVENOUS
Status: DISCONTINUED | OUTPATIENT
Start: 2022-08-18 | End: 2022-08-18 | Stop reason: HOSPADM

## 2022-08-18 RX ORDER — SODIUM CHLORIDE, SODIUM LACTATE, POTASSIUM CHLORIDE, CALCIUM CHLORIDE 600; 310; 30; 20 MG/100ML; MG/100ML; MG/100ML; MG/100ML
25 INJECTION, SOLUTION INTRAVENOUS CONTINUOUS
Status: DISCONTINUED | OUTPATIENT
Start: 2022-08-18 | End: 2022-08-18 | Stop reason: HOSPADM

## 2022-08-18 RX ORDER — FAMOTIDINE 20 MG/1
20 TABLET, FILM COATED ORAL EVERY 12 HOURS
Status: DISCONTINUED | OUTPATIENT
Start: 2022-08-19 | End: 2022-08-22

## 2022-08-18 RX ORDER — PROTAMINE SULFATE 10 MG/ML
INJECTION, SOLUTION INTRAVENOUS AS NEEDED
Status: DISCONTINUED | OUTPATIENT
Start: 2022-08-18 | End: 2022-08-18 | Stop reason: HOSPADM

## 2022-08-18 RX ORDER — SODIUM BICARBONATE 1 MEQ/ML
SYRINGE (ML) INTRAVENOUS AS NEEDED
Status: DISCONTINUED | OUTPATIENT
Start: 2022-08-18 | End: 2022-08-18 | Stop reason: HOSPADM

## 2022-08-18 RX ORDER — NALOXONE HYDROCHLORIDE 0.4 MG/ML
0.4 INJECTION, SOLUTION INTRAMUSCULAR; INTRAVENOUS; SUBCUTANEOUS AS NEEDED
Status: DISCONTINUED | OUTPATIENT
Start: 2022-08-18 | End: 2022-08-24 | Stop reason: HOSPADM

## 2022-08-18 RX ORDER — OXYCODONE AND ACETAMINOPHEN 5; 325 MG/1; MG/1
1-2 TABLET ORAL
Status: DISCONTINUED | OUTPATIENT
Start: 2022-08-18 | End: 2022-08-24 | Stop reason: HOSPADM

## 2022-08-18 RX ORDER — SODIUM CHLORIDE 0.9 % (FLUSH) 0.9 %
5-40 SYRINGE (ML) INJECTION AS NEEDED
Status: DISCONTINUED | OUTPATIENT
Start: 2022-08-18 | End: 2022-08-18 | Stop reason: HOSPADM

## 2022-08-18 RX ORDER — LISINOPRIL 20 MG/1
40 TABLET ORAL
Status: DISCONTINUED | OUTPATIENT
Start: 2022-08-18 | End: 2022-08-21

## 2022-08-18 RX ORDER — MORPHINE SULFATE 2 MG/ML
2 INJECTION, SOLUTION INTRAMUSCULAR; INTRAVENOUS
Status: DISCONTINUED | OUTPATIENT
Start: 2022-08-18 | End: 2022-08-22

## 2022-08-18 RX ORDER — SODIUM CHLORIDE 0.9 % (FLUSH) 0.9 %
5-40 SYRINGE (ML) INJECTION EVERY 8 HOURS
Status: DISCONTINUED | OUTPATIENT
Start: 2022-08-18 | End: 2022-08-18 | Stop reason: HOSPADM

## 2022-08-18 RX ORDER — FACIAL-BODY WIPES
10 EACH TOPICAL DAILY PRN
Status: DISCONTINUED | OUTPATIENT
Start: 2022-08-18 | End: 2022-08-24 | Stop reason: HOSPADM

## 2022-08-18 RX ORDER — SODIUM CHLORIDE 0.9 % (FLUSH) 0.9 %
5-40 SYRINGE (ML) INJECTION EVERY 8 HOURS
Status: DISCONTINUED | OUTPATIENT
Start: 2022-08-18 | End: 2022-08-22

## 2022-08-18 RX ORDER — ACETAMINOPHEN 325 MG/1
650 TABLET ORAL
Status: DISCONTINUED | OUTPATIENT
Start: 2022-08-18 | End: 2022-08-24 | Stop reason: HOSPADM

## 2022-08-18 RX ORDER — CALCIUM CHLORIDE INJECTION 100 MG/ML
INJECTION, SOLUTION INTRAVENOUS AS NEEDED
Status: DISCONTINUED | OUTPATIENT
Start: 2022-08-18 | End: 2022-08-18 | Stop reason: HOSPADM

## 2022-08-18 RX ORDER — SODIUM CHLORIDE, SODIUM LACTATE, POTASSIUM CHLORIDE, CALCIUM CHLORIDE 600; 310; 30; 20 MG/100ML; MG/100ML; MG/100ML; MG/100ML
INJECTION, SOLUTION INTRAVENOUS
Status: DISCONTINUED | OUTPATIENT
Start: 2022-08-18 | End: 2022-08-18 | Stop reason: HOSPADM

## 2022-08-18 RX ORDER — FLUMAZENIL 0.1 MG/ML
0.5 INJECTION INTRAVENOUS
Status: DISCONTINUED | OUTPATIENT
Start: 2022-08-18 | End: 2022-08-18 | Stop reason: HOSPADM

## 2022-08-18 RX ORDER — FENTANYL CITRATE 50 UG/ML
25 INJECTION, SOLUTION INTRAMUSCULAR; INTRAVENOUS ONCE
Status: DISCONTINUED | OUTPATIENT
Start: 2022-08-18 | End: 2022-08-18 | Stop reason: HOSPADM

## 2022-08-18 RX ORDER — ALBUMIN HUMAN 50 G/1000ML
25 SOLUTION INTRAVENOUS ONCE
Status: COMPLETED | OUTPATIENT
Start: 2022-08-18 | End: 2022-08-18

## 2022-08-18 RX ORDER — GUAIFENESIN 100 MG/5ML
81 LIQUID (ML) ORAL DAILY
Status: DISCONTINUED | OUTPATIENT
Start: 2022-08-19 | End: 2022-08-24 | Stop reason: HOSPADM

## 2022-08-18 RX ORDER — POTASSIUM CHLORIDE 29.8 MG/ML
20 INJECTION INTRAVENOUS ONCE
Status: COMPLETED | OUTPATIENT
Start: 2022-08-18 | End: 2022-08-18

## 2022-08-18 RX ORDER — AMOXICILLIN 250 MG
1 CAPSULE ORAL 2 TIMES DAILY
Status: DISCONTINUED | OUTPATIENT
Start: 2022-08-19 | End: 2022-08-22 | Stop reason: SDUPTHER

## 2022-08-18 RX ORDER — KETAMINE HYDROCHLORIDE 10 MG/ML
INJECTION, SOLUTION INTRAMUSCULAR; INTRAVENOUS AS NEEDED
Status: DISCONTINUED | OUTPATIENT
Start: 2022-08-18 | End: 2022-08-18 | Stop reason: HOSPADM

## 2022-08-18 RX ORDER — LANOLIN ALCOHOL/MO/W.PET/CERES
400 CREAM (GRAM) TOPICAL 2 TIMES DAILY
Status: DISCONTINUED | OUTPATIENT
Start: 2022-08-19 | End: 2022-08-24 | Stop reason: HOSPADM

## 2022-08-18 RX ORDER — PROPOFOL 10 MG/ML
INJECTION, EMULSION INTRAVENOUS
Status: DISCONTINUED | OUTPATIENT
Start: 2022-08-18 | End: 2022-08-18 | Stop reason: HOSPADM

## 2022-08-18 RX ORDER — TAMSULOSIN HYDROCHLORIDE 0.4 MG/1
0.8 CAPSULE ORAL 2 TIMES DAILY
Status: DISCONTINUED | OUTPATIENT
Start: 2022-08-18 | End: 2022-08-24 | Stop reason: HOSPADM

## 2022-08-18 RX ORDER — PROPOFOL 10 MG/ML
0-50 VIAL (ML) INTRAVENOUS
Status: DISCONTINUED | OUTPATIENT
Start: 2022-08-18 | End: 2022-08-22

## 2022-08-18 RX ORDER — DEXMEDETOMIDINE HYDROCHLORIDE 4 UG/ML
.1-1.5 INJECTION, SOLUTION INTRAVENOUS
Status: DISCONTINUED | OUTPATIENT
Start: 2022-08-18 | End: 2022-08-22

## 2022-08-18 RX ORDER — NALOXONE HYDROCHLORIDE 0.4 MG/ML
0.4 INJECTION, SOLUTION INTRAMUSCULAR; INTRAVENOUS; SUBCUTANEOUS
Status: DISCONTINUED | OUTPATIENT
Start: 2022-08-18 | End: 2022-08-18 | Stop reason: HOSPADM

## 2022-08-18 RX ORDER — FENTANYL CITRATE 50 UG/ML
INJECTION, SOLUTION INTRAMUSCULAR; INTRAVENOUS AS NEEDED
Status: DISCONTINUED | OUTPATIENT
Start: 2022-08-18 | End: 2022-08-18 | Stop reason: HOSPADM

## 2022-08-18 RX ORDER — EPINEPHRINE 1 MG/ML
INJECTION, SOLUTION, CONCENTRATE INTRAVENOUS AS NEEDED
Status: DISCONTINUED | OUTPATIENT
Start: 2022-08-18 | End: 2022-08-18 | Stop reason: HOSPADM

## 2022-08-18 RX ORDER — MIDAZOLAM HYDROCHLORIDE 1 MG/ML
1 INJECTION, SOLUTION INTRAMUSCULAR; INTRAVENOUS ONCE
Status: DISCONTINUED | OUTPATIENT
Start: 2022-08-18 | End: 2022-08-18 | Stop reason: HOSPADM

## 2022-08-18 RX ORDER — SODIUM CHLORIDE 0.9 % (FLUSH) 0.9 %
5-40 SYRINGE (ML) INJECTION AS NEEDED
Status: DISCONTINUED | OUTPATIENT
Start: 2022-08-18 | End: 2022-08-22

## 2022-08-18 RX ORDER — PRAVASTATIN SODIUM 40 MG/1
40 TABLET ORAL
Status: DISCONTINUED | OUTPATIENT
Start: 2022-08-18 | End: 2022-08-24 | Stop reason: HOSPADM

## 2022-08-18 RX ORDER — SODIUM CHLORIDE 450 MG/100ML
10 INJECTION, SOLUTION INTRAVENOUS CONTINUOUS
Status: DISCONTINUED | OUTPATIENT
Start: 2022-08-18 | End: 2022-08-22

## 2022-08-18 RX ORDER — TRAMADOL HYDROCHLORIDE 50 MG/1
50-100 TABLET ORAL
Status: DISCONTINUED | OUTPATIENT
Start: 2022-08-18 | End: 2022-08-24 | Stop reason: HOSPADM

## 2022-08-18 RX ORDER — ONDANSETRON 2 MG/ML
4 INJECTION INTRAMUSCULAR; INTRAVENOUS
Status: DISCONTINUED | OUTPATIENT
Start: 2022-08-18 | End: 2022-08-24 | Stop reason: HOSPADM

## 2022-08-18 RX ADMIN — SODIUM CHLORIDE, PRESERVATIVE FREE 10 ML: 5 INJECTION INTRAVENOUS at 18:55

## 2022-08-18 RX ADMIN — PROPOFOL 30 MCG/KG/MIN: 10 INJECTION, EMULSION INTRAVENOUS at 13:35

## 2022-08-18 RX ADMIN — SODIUM CHLORIDE: 900 INJECTION, SOLUTION INTRAVENOUS at 13:31

## 2022-08-18 RX ADMIN — PROTAMINE SULFATE 150 MG: 10 INJECTION, SOLUTION INTRAVENOUS at 16:07

## 2022-08-18 RX ADMIN — SODIUM CHLORIDE 5 MG/HR: 9 INJECTION, SOLUTION INTRAVENOUS at 18:23

## 2022-08-18 RX ADMIN — WATER 2 G: 1 INJECTION INTRAMUSCULAR; INTRAVENOUS; SUBCUTANEOUS at 14:27

## 2022-08-18 RX ADMIN — DEXMEDETOMIDINE HYDROCHLORIDE 0.6 MCG/KG/HR: 4 INJECTION, SOLUTION INTRAVENOUS at 13:35

## 2022-08-18 RX ADMIN — SODIUM CHLORIDE, POTASSIUM CHLORIDE, SODIUM LACTATE AND CALCIUM CHLORIDE: 600; 310; 30; 20 INJECTION, SOLUTION INTRAVENOUS at 16:20

## 2022-08-18 RX ADMIN — Medication 120 MCG: at 15:47

## 2022-08-18 RX ADMIN — SODIUM CHLORIDE, PRESERVATIVE FREE 10 ML: 5 INJECTION INTRAVENOUS at 19:01

## 2022-08-18 RX ADMIN — Medication 10 MG: at 13:46

## 2022-08-18 RX ADMIN — MIDAZOLAM 2 MG: 1 INJECTION INTRAMUSCULAR; INTRAVENOUS at 13:39

## 2022-08-18 RX ADMIN — SODIUM CHLORIDE 10 ML/HR: 4.5 INJECTION, SOLUTION INTRAVENOUS at 19:54

## 2022-08-18 RX ADMIN — SODIUM CHLORIDE, POTASSIUM CHLORIDE, SODIUM LACTATE AND CALCIUM CHLORIDE: 600; 310; 30; 20 INJECTION, SOLUTION INTRAVENOUS at 13:31

## 2022-08-18 RX ADMIN — SODIUM CHLORIDE, PRESERVATIVE FREE 10 ML: 5 INJECTION INTRAVENOUS at 22:26

## 2022-08-18 RX ADMIN — CALCIUM CHLORIDE 100 MG: 100 INJECTION, SOLUTION INTRAVENOUS; INTRAVENTRICULAR at 15:40

## 2022-08-18 RX ADMIN — FENTANYL CITRATE 25 MCG: 50 INJECTION, SOLUTION INTRAMUSCULAR; INTRAVENOUS at 13:58

## 2022-08-18 RX ADMIN — MIDAZOLAM 1 MG: 1 INJECTION INTRAMUSCULAR; INTRAVENOUS at 13:20

## 2022-08-18 RX ADMIN — CEFAZOLIN 2 G: 1 INJECTION, POWDER, FOR SOLUTION INTRAMUSCULAR; INTRAVENOUS at 19:36

## 2022-08-18 RX ADMIN — POTASSIUM CHLORIDE 20 MEQ: 400 INJECTION, SOLUTION INTRAVENOUS at 20:56

## 2022-08-18 RX ADMIN — ALBUMIN (HUMAN) 25 G: 12.5 INJECTION, SOLUTION INTRAVENOUS at 20:56

## 2022-08-18 RX ADMIN — Medication 40 MCG: at 15:30

## 2022-08-18 RX ADMIN — Medication 10 MG: at 13:39

## 2022-08-18 RX ADMIN — DEXMEDETOMIDINE HYDROCHLORIDE 0.6 MCG/KG/HR: 4 INJECTION, SOLUTION INTRAVENOUS at 18:29

## 2022-08-18 RX ADMIN — Medication 1 MG: at 15:35

## 2022-08-18 RX ADMIN — SODIUM CHLORIDE 3 ML/KG/HR: 9 INJECTION, SOLUTION INTRAVENOUS at 09:45

## 2022-08-18 RX ADMIN — SODIUM BICARBONATE 10 MEQ: 84 INJECTION, SOLUTION INTRAVENOUS at 16:23

## 2022-08-18 RX ADMIN — VASOPRESSIN 20 UNITS: 20 INJECTION INTRAVENOUS at 15:38

## 2022-08-18 RX ADMIN — SODIUM CHLORIDE, POTASSIUM CHLORIDE, SODIUM LACTATE AND CALCIUM CHLORIDE 25 ML/HR: 600; 310; 30; 20 INJECTION, SOLUTION INTRAVENOUS at 09:53

## 2022-08-18 RX ADMIN — PHENYLEPHRINE HYDROCHLORIDE 30 MCG/MIN: 10 INJECTION INTRAVENOUS at 18:45

## 2022-08-18 RX ADMIN — Medication 200 MG: at 15:34

## 2022-08-18 RX ADMIN — FENTANYL CITRATE 25 MCG: 50 INJECTION, SOLUTION INTRAMUSCULAR; INTRAVENOUS at 13:46

## 2022-08-18 RX ADMIN — SODIUM CHLORIDE 1.5 MG/HR: 9 INJECTION, SOLUTION INTRAVENOUS at 21:25

## 2022-08-18 RX ADMIN — MIDAZOLAM 1 MG: 1 INJECTION INTRAMUSCULAR; INTRAVENOUS at 13:14

## 2022-08-18 RX ADMIN — SODIUM CHLORIDE 50 MCG/MIN: 9 INJECTION, SOLUTION INTRAVENOUS at 15:45

## 2022-08-18 RX ADMIN — PROPOFOL 70 MG: 10 INJECTION, EMULSION INTRAVENOUS at 17:33

## 2022-08-18 NOTE — H&P
CRITICAL CARE NOTE      Name: Eh Day   : 1940   MRN: [id number]   Date: 2022      REASON FOR ICU ADMISSION:  TAVR c/b Cardiac Arrest    PRINCIPAL ICU DIAGNOSIS   Cardiac Arrest  AS s/p TAVR  Dehissence of MV Annulus  AHRF  Hematuria       HPI   81M with AI s/p AVR and MvRepair (, Caicedo Ek), Cryomaze, LLAA CAD, Afib, SSS with Pacer, HTN/HLD, covid in 2022 presented for elective TAVR. During procedure patient had aysytolic cardiac arrest, ROSC 10m, Epi x1. Moved to CVICU for continued management.     ASSESSMENT & PLAN     Neuro  Propofol, Precedex  SAT  Assess Neuro status  Q1 neurochecks      Cardiac  Telemetry  SBP<140  Titrate Levophed/Cardene to goal SBP  Pacemaker - interrogate  CXR    Pulmonary  ABG PRN  CXR  SBT if neurologic function allows    GI  NPO   PPI    Renal  Finnegan   Strict I/O    Endocrine  Insulin gtt per protocol    ID  No Abx at this time    Heme  SCDs  Hold hep in setting of hematuria, blood NGT  APT per Cardiology    [facility]  Diet - NPO  Mobility - -  Pt Contact -   Dispo - ICU    Code Status - FULL        OBJECTIVE     Labs and Data: Reviewed 22  Medications: Reviewed 22  Imaging: Reviewed 22    General - sedated intubated  HEENT- pupils equal, EOMI, anicteric  Neuro - sedated, pupils equal sluggish  CV - RRR  Resp - CTAB, intubated  Abd - soft, nontender, no guarding   - + finnegan, groin access b/l + hemostasis  MSK- intact, no sacral ulcer        Visit Vitals  /60 (BP 1 Location: Left lower arm, BP Patient Position: At rest)   Pulse 60   Temp (!) 95.5 °F (35.3 °C)   Resp 18   Ht 5' 6.5\" (1.689 m)   Wt 86.6 kg (190 lb 14.7 oz)   SpO2 100%   BMI 30.35 kg/m²      O2 Device: Endotracheal tube, Ventilator Temp (24hrs), Av.8 °F (36 °C), Min:95.5 °F (35.3 °C), Max:98.1 °F (36.7 °C)      CVP (mmHg): 7 mmHg (22 1736)      Intake/Output:     Intake/Output Summary (Last 24 hours) at 2022 1800  Last data filed at 2022 1739  Gross per 24 hour   Intake 2000 ml   Output 1075 ml   Net 925 ml       Imaging             CRITICAL CARE DOCUMENTATION  I had a face to face encounter with the patient, reviewed and interpreted patient data including clinical events, labs, images, vital signs, I/O's, and examined patient. I have discussed the case and the plan and management of the patient's care with the consulting services, the bedside nurses and the respiratory therapist.      NOTE OF PERSONAL INVOLVEMENT IN CARE   This patient has a high probability of imminent, clinically significant deterioration, which requires the highest level of preparedness to intervene urgently. I participated in the decision-making and personally managed or directed the management of the following life and organ supporting interventions that required my frequent assessment to treat or prevent imminent deterioration. I personally spent 60 minutes of critical care time. This is time spent at this critically ill patient's bedside actively involved in patient care as well as the coordination of care. This does not include any procedural time which has been billed separately.     Daniel Jovel DO  Staff Intensivist  [facility]  8/18/2022

## 2022-08-18 NOTE — PROGRESS NOTES
Cardiac Surgery Care Coordinator- Met with Lurdes Torres in pre-op holding. Reviewed role of the Cardiac Surgery Co- Nurse. Reviewed plan of care and discussed day of surgery expectations. Will continue to follow for educational and emotional needs. Will update family throughout surgery. 1200- Met with the family of Lurdes Torres, reviewed plan of care and encouraged them to verbalize. Will update daughters throughout the day. 1400- Met with the family, provided update from the OR and encouraged them to verbalize. 1650- Met with Ivone Valera family and Dr. Marisol Torres. Update given, Encouraged family to verbalize and offered emotional support. Escorted family to the fourth floor waiting room, exchanged contact information. Bedside nurse aware of their location.   Aleksander Koehler RN

## 2022-08-18 NOTE — Clinical Note
Right femoral artery. Accessed successfully. Micropuncture needle used. Using fluoro and ultrasound guidance.

## 2022-08-18 NOTE — ANESTHESIA PREPROCEDURE EVALUATION
Relevant Problems   RESPIRATORY SYSTEM   (+) ABDUL (dyspnea on exertion)   (+) DEL (obstructive sleep apnea)      CARDIOVASCULAR   (+) Aortic insufficiency   (+) Aortic stenosis   (+) Atrial fibrillation (HCC)   (+) CAD (coronary artery disease)   (+) CHF exacerbation (HCC)   (+) Hypertension   (+) Nonrheumatic aortic valve stenosis      GASTROINTESTINAL   (+) GERD (gastroesophageal reflux disease)      ENDOCRINE   (+) Arthritis   (+) Arthritis of knee      HEMATOLOGY   (+) Anemia due to acute blood loss       Anesthetic History   No history of anesthetic complications            Review of Systems / Medical History  Patient summary reviewed, nursing notes reviewed and pertinent labs reviewed    Pulmonary        Sleep apnea        Comments: 7/2022 covid   Neuro/Psych   Within defined limits           Cardiovascular  Within defined limits  Hypertension  Valvular problems/murmurs: aortic stenosis      Dysrhythmias : atrial fibrillation  Pacemaker and CAD    Exercise tolerance: <4 METS  Comments: Atrial fibrillation,H/O mitral valve repair  S/P AVR (aortic valve replacement);  Coronary artery disease involving native coronary artery of native heart Cardiac pacemaker in situ (Z95.0 (ICD-10-CM)); Presence of Watchman left atrial appendage closure device   recent echo:  Near nl bivent fx, sev AS, mild MR, no MS   GI/Hepatic/Renal     GERD           Endo/Other        Arthritis     Other Findings            Physical Exam    Airway  Mallampati: II  TM Distance: > 6 cm  Neck ROM: normal range of motion   Mouth opening: Normal     Cardiovascular    Rhythm: irregular      Murmur: Grade 3, Aortic area     Dental    Dentition: Caps/crowns     Pulmonary  Breath sounds clear to auscultation               Abdominal  GI exam deferred       Other Findings            Anesthetic Plan    ASA: 4  Anesthesia type: general    Monitoring Plan: Arterial line      Induction: Intravenous  Anesthetic plan and risks discussed with: Patient

## 2022-08-18 NOTE — Clinical Note
Sheath #2: Closed using manual compression. Site secured by Tegaderm. Pressure held for: 15 minutes.

## 2022-08-18 NOTE — Clinical Note
PROCEDURE  ECG 12 Lead Documentation  Date/Time: 2/23/2019 3:54 PM  Performed by: Negro Dickson DO  Authorized by: Negro Dickson DO     Indications / Diagnosis:  Chest pain   ECG reviewed by me, the ED Provider: yes    Patient location:  ED  Previous ECG:     Previous ECG comparison: When compared to EKG from December 19, 2018 patient's rate has increased  Nonspecific ST wave changes are present    Interpretation:     Interpretation: non-specific    Rate:     ECG rate:  107  Rhythm:     Rhythm: atrial fibrillation    ST segments:     ST segments:  Non-specific         Negro Dickson DO  02/23/19 1555 Sheath #4: Sheath: removed.   [facility]

## 2022-08-18 NOTE — PROCEDURES
Evolute 26 mm Valve in Valve TAVR. Right TF approach. Complicated by severe transient intraop hypotension. Required brief CPR along with intubation. DEWEY performed intraop also shows prior mitral ring dehiscence with severe MR. Plan : extubate when feasible. Access neurological status and functional status post extubation. Repeat imaging for MR assessment once compensated.

## 2022-08-18 NOTE — ANESTHESIA PROCEDURE NOTES
Arterial Line Placement    Start time: 8/18/2022 12:18 PM  End time: 8/18/2022 12:28 PM  Performed by: Nancy Flores MD  Authorized by: Nancy Flores MD     Pre-Procedure  Indications:  Arterial pressure monitoring and blood sampling  Preanesthetic Checklist: patient identified, risks and benefits discussed, anesthesia consent, site marked, patient being monitored, timeout performed, patient being monitored and fire risk safety assessment completed and verbalized    Timeout Time: 12:18 EDT      Procedure:   Prep:  Alcohol and ChloraPrep  Seldinger Technique?: Yes    Orientation:  Left  Location:  Radial artery  Catheter size:  20 G  Number of attempts:  1  Cont Cardiac Output Sensor: No      Assessment:   Post-procedure:  Line secured and sterile dressing applied  Patient Tolerance:  Patient tolerated the procedure well with no immediate complications

## 2022-08-18 NOTE — ANESTHESIA PROCEDURE NOTES
Central Line Placement    Start time: 8/18/2022 4:46 PM  End time: 8/18/2022 5:06 PM  Performed by: Buffy Salmon MD  Authorized by: Buffy Salmon MD     Indications: vascular access, central pressure monitoring and need for vasopressors  Preanesthetic Checklist: patient identified, risks and benefits discussed, anesthesia consent, site marked, patient being monitored and timeout performed      Pre-procedure: All elements of maximal sterile barrier technique followed? Yes    2% Chlorhexidine for cutaneous antisepsis, Hand hygiene performed prior to catheter insertion and Ultrasound guidance    Sterile Ultrasound Technique followed?: Yes            Procedure:   Prep:  Chlorhexidine    Orientation:  Right    Catheter type:  Triple lumen  Catheter size:  9 Fr  Catheter length:  16 cm  Number of attempts:  1  Successful placement: Yes      Assessment:   Post-procedure:  Catheter secured, sterile dressing applied and sterile dressing with CHG applied  Assessment:  Blood return through all ports and free fluid flow  Insertion:  Uncomplicated  Patient tolerance:  Patient tolerated the procedure well with no immediate complications  PA catheter inserted .

## 2022-08-18 NOTE — Clinical Note
Blake Falcon from [facility] called stating Patient has a UTI according from the Urine sample and she will collect another one later on, Patient needs antibiotics. Sheath #3: Sheath: removed.   TR BAND PLACED WITH 14CC OF AIR

## 2022-08-18 NOTE — Clinical Note
Left femoral vein. Accessed successfully. Micropuncture needle used. Using fluoro and ultrasound guidance.  Number of attempts =  1.

## 2022-08-18 NOTE — PROGRESS NOTES
1728- patient arrived from OR on vent, monitor, IV (precedex 1.2 mcg). Plan to access neuro status and possible extubate. TR band on right wrist; 14 ml of air. 1730- PA temp 95.5; bare hugger placed on patient. 1735- patient waking up startled, CRNA gave medications. 1810- patient waking again; this time following simple commands. Will start sedation wean after family visits. RT at bedside with ABG results; decreased FiO2 from 80% to 50%. 200- daughters at bedside; update given, no questions at this time. 1829- precedex decreased to 0.6 mcg. 200- called [employer] rep to adjust pacemaker settings per Adam Piedra MD. 3 ml out of TR band, now at 11 ml.    1905- [place] Rep called back; she will come this evening to adjust PPM to 90 BPM.    1915- 3 ml out of TR band, now at 8 ml.    2000- Bedside and Verbal shift change report given to Delano Agustin RN (oncoming nurse) by Oliva Juarez RN (offgoing nurse). Report included the following information SBAR, Kardex, Recent Results, and Cardiac Rhythm V paced .

## 2022-08-18 NOTE — Clinical Note
Bilateral groin and right radial prepped with ChloraPrep and draped. Wet prep solution applied at: 1401. Wet prep solution dried at: 1404. Wet prep elapsed drying time: 3 mins.

## 2022-08-18 NOTE — ANESTHESIA POSTPROCEDURE EVALUATION
Procedure(s):  TRANSCATHETER AORTIC VALVE REPLACEMENT VALVE IN VALVE RIGHT TRANSFEMORAL 26 EVOLUT POSSIBLE SENTINEL. general    Anesthesia Post Evaluation        Patient location during evaluation: PACU  Note status: Adequate. Level of consciousness: responsive to verbal stimuli and sleepy but conscious  Pain management: satisfactory to patient  Airway patency: patent  Anesthetic complications: no  Cardiovascular status: acceptable  Respiratory status: acceptable  Hydration status: acceptable  Comments: +Post-Anesthesia Evaluation and Assessment    Patient: Lurdes Torres MRN: [id number]  SSN: [id number]   YOB: 1940  Age: 79 y.o. Sex: male          Cardiovascular Function/Vital Signs    BP (!) 144/85   Pulse 60   Temp (!) 35.3 °C (95.5 °F)   Resp 16   Ht 5' 6.5\" (1.689 m)   Wt 86.6 kg (190 lb 14.7 oz)   SpO2 99%   BMI 30.35 kg/m²     Patient is status post Procedure(s):  TRANSCATHETER AORTIC VALVE REPLACEMENT VALVE IN VALVE RIGHT TRANSFEMORAL 26 EVOLUT POSSIBLE SENTINEL. Nausea/Vomiting: Controlled. Postoperative hydration reviewed and adequate. Pain:  Pain Scale 1: Adult Nonverbal Pain Scale (08/18/22 1739)  Pain Intensity 1: 0 (08/18/22 1633)   Managed. Neurological Status:   Neuro (WDL): Within Defined Limits (08/18/22 0921)   At baseline. Mental Status and Level of Consciousness: Arousable. Pulmonary Status:   O2 Device: Endotracheal tube;Ventilator (08/18/22 1739)   Adequate oxygenation and airway patent. Complications related to anesthesia: None    Post-anesthesia assessment completed. No concerns. I have evaluated the patient and the patient is stable and ready to be discharged from PACU .     Signed By: Adriel Ruiz MD    8/18/2022        INITIAL Post-op Vital signs:   Vitals Value Taken Time   /85 08/18/22 1800   Temp 35.3 °C (95.5 °F) 08/18/22 1739   Pulse 60 08/18/22 1845   Resp 19 08/18/22 1844   SpO2 95 % 08/18/22 1845   Vitals shown include unvalidated device data.

## 2022-08-18 NOTE — INTERVAL H&P NOTE
Date of Surgery Update:  Kvng Azar was seen and examined. History and physical has been reviewed. The patient has been examined.  There have been no significant clinical changes since the completion of the originally dated History and Physical.    Signed By: Jonnathan Zimmerman NP     August 18, 2022 11:04 AM

## 2022-08-18 NOTE — ANESTHESIA PROCEDURE NOTES
DEWEY        Procedure Details: probe placement, image aquisition & interpretation    Site marked  Risks and benefits discussed with the patient and plans are to proceed    Procedure Note    Performed by: Rigo Peterson MD  Authorized by: Rigo Peterson MD     Indications: assessment of surgical repair and suspected pericardial effusion  Modalities: 2D, CF, CWD, contrast, PWD  Probe Type: multiplane  Insertion: atraumatic  Patient Status: intubated  Echocardiographic and Doppler Measurements   Aorta  Size  Diam(cm)  Dissection PlaqueThick(mm)  Plaque Mobile    Ascending normal  No 0-3 No    Arch normal  No  No    Descending normal  No >3 No          Valves  Annulus  Stenosis  Area/Grad  Regurg  Leaflet   Morph  Leaflet   Motion    Aortic bioprosthetic none  0 thickened     Mitral bioprosthetic   4+ perforated Post    Tricuspid normal none  2+ normal normal          Atria  Size  SEC (smoke)  Thrombus  Tumor  Device    Rt Atrium dilated No No No No    Lt Atrium normal No No No No     Interatrial Septum Morphology: normal    Interventricular Septum Morphology: normal  Ventricle  Cavity Size  Cavity Dimension Hypertrophy  Thrombus  Gloal FXN  EF    RV dilated  No no moderately impaired     LV normal  Yes No normal 45       Regional Function  (1 = normal, 2 = mildly hypokinetic, 3 = severely hypokinetic, 4 = akinetic, 5 = dyskinetic) LAV - [place]   ME LAV = 0  ME LAV = 90  ME LAV = 130   Basal Sept:1 Basal Ant:1 Basal Post:1   Mid Sept:1 Mid Ant:1 Mid Post:1   Apical Sept:1 Apical Ant:1 Basal Ant Sept:1   Basal Lat:1 Basal Inf:2 Mid Ant Sept:2   Mid Lat:1 Mid Inf:2    Apical Lat:1 Apical Inf:3        Pericardium: normal    Post Intervention Follow-up Study         Valve  Function  Regurgitation  Area    Aortic       Mitral       Tricuspid       Prosthetic        Complications: None  Comments: Emergent DEWEY done after the pt coded and was getting CPR. Lv fn recovered, ef 45%, inf wall a bit down.  RV dilated and moderate dysfunctional.   Evolute valve well seated in aortic region after valve in valve procedure. No PVL, mean 4mm across the AV. Mitral ring is dehisced both anteriorly and posteriorly with a severe PVL in post region posteriorly directed. Pulmonary venous doppler confirms severe MR. No pericardial effusion.

## 2022-08-19 ENCOUNTER — TRANSCRIBE ORDER (OUTPATIENT)
Dept: CARDIAC REHAB | Age: 82
End: 2022-08-19

## 2022-08-19 ENCOUNTER — APPOINTMENT (OUTPATIENT)
Dept: NON INVASIVE DIAGNOSTICS | Age: 82
DRG: 266 | End: 2022-08-19
Attending: NURSE PRACTITIONER
Payer: MEDICARE

## 2022-08-19 DIAGNOSIS — Z95.4 S/P AORTIC VALVE ALLOGRAFT: Primary | ICD-10-CM

## 2022-08-19 LAB
ABO + RH BLD: NORMAL
ANION GAP SERPL CALC-SCNC: 10 MMOL/L (ref 5–15)
ARTERIAL PATENCY WRIST A: ABNORMAL
ATRIAL RATE: 88 BPM
BASE DEFICIT BLDA-SCNC: 2.3 MMOL/L
BDY SITE: ABNORMAL
BLD PROD TYP BPU: NORMAL
BLOOD GROUP ANTIBODIES SERPL: NORMAL
BPU ID: NORMAL
BUN SERPL-MCNC: 15 MG/DL (ref 6–20)
BUN/CREAT SERPL: 20 (ref 12–20)
CALCIUM SERPL-MCNC: 8.7 MG/DL (ref 8.5–10.1)
CALCULATED R AXIS, ECG10: -73 DEGREES
CALCULATED T AXIS, ECG11: 93 DEGREES
CHLORIDE SERPL-SCNC: 110 MMOL/L (ref 97–108)
CO2 SERPL-SCNC: 23 MMOL/L (ref 21–32)
CREAT SERPL-MCNC: 0.76 MG/DL (ref 0.7–1.3)
CROSSMATCH RESULT,%XM: NORMAL
DIAGNOSIS, 93000: NORMAL
ECHO AV AREA PEAK VELOCITY: 1.3 CM2
ECHO AV AREA VTI: 1.2 CM2
ECHO AV AREA/BSA PEAK VELOCITY: 0.7 CM2/M2
ECHO AV AREA/BSA VTI: 0.6 CM2/M2
ECHO AV MEAN GRADIENT: 28 MMHG
ECHO AV MEAN VELOCITY: 2.6 M/S
ECHO AV PEAK GRADIENT: 38 MMHG
ECHO AV PEAK VELOCITY: 3.1 M/S
ECHO AV VELOCITY RATIO: 0.32
ECHO AV VTI: 60.4 CM
ECHO EST RA PRESSURE: 3 MMHG
ECHO LA DIAMETER INDEX: 3.23 CM/M2
ECHO LA DIAMETER: 6.4 CM
ECHO LV FRACTIONAL SHORTENING: 25 % (ref 28–44)
ECHO LV INTERNAL DIMENSION DIASTOLE INDEX: 3.28 CM/M2
ECHO LV INTERNAL DIMENSION DIASTOLIC: 6.5 CM (ref 4.2–5.9)
ECHO LV INTERNAL DIMENSION SYSTOLIC INDEX: 2.47 CM/M2
ECHO LV INTERNAL DIMENSION SYSTOLIC: 4.9 CM
ECHO LV IVSD: 0.8 CM (ref 0.6–1)
ECHO LV MASS 2D: 283.1 G (ref 88–224)
ECHO LV MASS INDEX 2D: 143 G/M2 (ref 49–115)
ECHO LV POSTERIOR WALL DIASTOLIC: 1.2 CM (ref 0.6–1)
ECHO LV RELATIVE WALL THICKNESS RATIO: 0.37
ECHO LVOT AREA: 4.2 CM2
ECHO LVOT AV VTI INDEX: 0.29
ECHO LVOT DIAM: 2.3 CM
ECHO LVOT MEAN GRADIENT: 2 MMHG
ECHO LVOT PEAK GRADIENT: 4 MMHG
ECHO LVOT PEAK VELOCITY: 1 M/S
ECHO LVOT STROKE VOLUME INDEX: 36.9 ML/M2
ECHO LVOT SV: 73.1 ML
ECHO LVOT VTI: 17.6 CM
ECHO MV REGURGITANT PEAK GRADIENT: 130 MMHG
ECHO MV REGURGITANT PEAK VELOCITY: 5.7 M/S
ECHO RIGHT VENTRICULAR SYSTOLIC PRESSURE (RVSP): 58 MMHG
ECHO TV REGURGITANT MAX VELOCITY: 3.7 M/S
ECHO TV REGURGITANT PEAK GRADIENT: 55 MMHG
ERYTHROCYTE [DISTWIDTH] IN BLOOD BY AUTOMATED COUNT: 15.7 % (ref 11.5–14.5)
FIO2 ON VENT: 40 %
GLUCOSE SERPL-MCNC: 93 MG/DL (ref 65–100)
HCO3 BLDA-SCNC: 21 MMOL/L (ref 22–26)
HCT VFR BLD AUTO: 27.2 % (ref 36.6–50.3)
HCT VFR BLD AUTO: 29 % (ref 36.6–50.3)
HGB BLD-MCNC: 8.7 G/DL (ref 12.1–17)
HGB BLD-MCNC: 9.4 G/DL (ref 12.1–17)
MAGNESIUM SERPL-MCNC: 2.1 MG/DL (ref 1.6–2.4)
MAGNESIUM SERPL-MCNC: 2.1 MG/DL (ref 1.6–2.4)
MCH RBC QN AUTO: 31.4 PG (ref 26–34)
MCHC RBC AUTO-ENTMCNC: 32 G/DL (ref 30–36.5)
MCV RBC AUTO: 98.2 FL (ref 80–99)
NRBC # BLD: 0 K/UL (ref 0–0.01)
NRBC BLD-RTO: 0 PER 100 WBC
PCO2 BLDA: 32 MMHG (ref 35–45)
PEEP RESPIRATORY: 5 CM[H2O]
PH BLDA: 7.43 [PH] (ref 7.35–7.45)
PLATELET # BLD AUTO: 86 K/UL (ref 150–400)
PMV BLD AUTO: 10.2 FL (ref 8.9–12.9)
PO2 BLDA: 130 MMHG (ref 80–100)
POTASSIUM SERPL-SCNC: 4 MMOL/L (ref 3.5–5.1)
POTASSIUM SERPL-SCNC: 4.5 MMOL/L (ref 3.5–5.1)
PRESSURE SUPPORT SETTING VENT: 5 CM[H2O]
Q-T INTERVAL, ECG07: 436 MS
QRS DURATION, ECG06: 178 MS
QTC CALCULATION (BEZET), ECG08: 533 MS
RBC # BLD AUTO: 2.77 M/UL (ref 4.1–5.7)
SAO2 % BLD: 99 % (ref 92–97)
SAO2% DEVICE SAO2% SENSOR NAME: ABNORMAL
SODIUM SERPL-SCNC: 143 MMOL/L (ref 136–145)
SPECIMEN EXP DATE BLD: NORMAL
SPECIMEN SITE: ABNORMAL
STATUS OF UNIT,%ST: NORMAL
UNIT DIVISION, %UDIV: 0
VENTRICULAR RATE, ECG03: 90 BPM
WBC # BLD AUTO: 5.5 K/UL (ref 4.1–11.1)

## 2022-08-19 PROCEDURE — 83735 ASSAY OF MAGNESIUM: CPT

## 2022-08-19 PROCEDURE — 82803 BLOOD GASES ANY COMBINATION: CPT

## 2022-08-19 PROCEDURE — 74011000250 HC RX REV CODE- 250: Performed by: NURSE PRACTITIONER

## 2022-08-19 PROCEDURE — APPSS45 APP SPLIT SHARED TIME 31-45 MINUTES: Performed by: NURSE PRACTITIONER

## 2022-08-19 PROCEDURE — 85018 HEMOGLOBIN: CPT

## 2022-08-19 PROCEDURE — C8929 TTE W OR WO FOL WCON,DOPPLER: HCPCS

## 2022-08-19 PROCEDURE — 74011250637 HC RX REV CODE- 250/637: Performed by: NURSE PRACTITIONER

## 2022-08-19 PROCEDURE — 36415 COLL VENOUS BLD VENIPUNCTURE: CPT

## 2022-08-19 PROCEDURE — 74011250637 HC RX REV CODE- 250/637: Performed by: STUDENT IN AN ORGANIZED HEALTH CARE EDUCATION/TRAINING PROGRAM

## 2022-08-19 PROCEDURE — 93005 ELECTROCARDIOGRAM TRACING: CPT

## 2022-08-19 PROCEDURE — 74011250636 HC RX REV CODE- 250/636: Performed by: NURSE PRACTITIONER

## 2022-08-19 PROCEDURE — 97165 OT EVAL LOW COMPLEX 30 MIN: CPT

## 2022-08-19 PROCEDURE — 65610000003 HC RM ICU SURGICAL

## 2022-08-19 PROCEDURE — 97535 SELF CARE MNGMENT TRAINING: CPT

## 2022-08-19 PROCEDURE — 80048 BASIC METABOLIC PNL TOTAL CA: CPT

## 2022-08-19 PROCEDURE — 2709999900 HC NON-CHARGEABLE SUPPLY

## 2022-08-19 PROCEDURE — 74011250636 HC RX REV CODE- 250/636: Performed by: THORACIC SURGERY (CARDIOTHORACIC VASCULAR SURGERY)

## 2022-08-19 PROCEDURE — 30233N1 TRANSFUSION OF NONAUTOLOGOUS RED BLOOD CELLS INTO PERIPHERAL VEIN, PERCUTANEOUS APPROACH: ICD-10-PCS | Performed by: THORACIC SURGERY (CARDIOTHORACIC VASCULAR SURGERY)

## 2022-08-19 PROCEDURE — P9045 ALBUMIN (HUMAN), 5%, 250 ML: HCPCS | Performed by: STUDENT IN AN ORGANIZED HEALTH CARE EDUCATION/TRAINING PROGRAM

## 2022-08-19 PROCEDURE — 99233 SBSQ HOSP IP/OBS HIGH 50: CPT | Performed by: INTERNAL MEDICINE

## 2022-08-19 PROCEDURE — 84132 ASSAY OF SERUM POTASSIUM: CPT

## 2022-08-19 PROCEDURE — 93306 TTE W/DOPPLER COMPLETE: CPT | Performed by: INTERNAL MEDICINE

## 2022-08-19 PROCEDURE — 74011000250 HC RX REV CODE- 250: Performed by: THORACIC SURGERY (CARDIOTHORACIC VASCULAR SURGERY)

## 2022-08-19 PROCEDURE — 97161 PT EVAL LOW COMPLEX 20 MIN: CPT

## 2022-08-19 PROCEDURE — 85027 COMPLETE CBC AUTOMATED: CPT

## 2022-08-19 PROCEDURE — 51798 US URINE CAPACITY MEASURE: CPT

## 2022-08-19 PROCEDURE — 74011250636 HC RX REV CODE- 250/636: Performed by: STUDENT IN AN ORGANIZED HEALTH CARE EDUCATION/TRAINING PROGRAM

## 2022-08-19 PROCEDURE — 97530 THERAPEUTIC ACTIVITIES: CPT

## 2022-08-19 RX ORDER — LANOLIN ALCOHOL/MO/W.PET/CERES
3 CREAM (GRAM) TOPICAL
Status: DISCONTINUED | OUTPATIENT
Start: 2022-08-19 | End: 2022-08-24 | Stop reason: HOSPADM

## 2022-08-19 RX ORDER — ALBUMIN HUMAN 50 G/1000ML
25 SOLUTION INTRAVENOUS ONCE
Status: COMPLETED | OUTPATIENT
Start: 2022-08-19 | End: 2022-08-19

## 2022-08-19 RX ORDER — ATROPA BELLADONNA AND OPIUM 16.2; 3 MG/1; MG/1
1 SUPPOSITORY RECTAL
Status: DISCONTINUED | OUTPATIENT
Start: 2022-08-19 | End: 2022-08-24 | Stop reason: HOSPADM

## 2022-08-19 RX ORDER — HYDROXYZINE HYDROCHLORIDE 10 MG/1
10 TABLET, FILM COATED ORAL ONCE
Status: COMPLETED | OUTPATIENT
Start: 2022-08-19 | End: 2022-08-19

## 2022-08-19 RX ADMIN — TAMSULOSIN HYDROCHLORIDE 0.8 MG: 0.4 CAPSULE ORAL at 08:58

## 2022-08-19 RX ADMIN — FAMOTIDINE 20 MG: 20 TABLET ORAL at 08:58

## 2022-08-19 RX ADMIN — FINASTERIDE 5 MG: 5 TABLET, FILM COATED ORAL at 21:29

## 2022-08-19 RX ADMIN — ASPIRIN 81 MG CHEWABLE TABLET 81 MG: 81 TABLET CHEWABLE at 08:58

## 2022-08-19 RX ADMIN — FAMOTIDINE 20 MG: 20 TABLET ORAL at 21:25

## 2022-08-19 RX ADMIN — Medication 400 MG: at 17:54

## 2022-08-19 RX ADMIN — CEFAZOLIN 2 G: 1 INJECTION, POWDER, FOR SOLUTION INTRAMUSCULAR; INTRAVENOUS at 01:58

## 2022-08-19 RX ADMIN — MORPHINE SULFATE 2 MG: 2 INJECTION, SOLUTION INTRAMUSCULAR; INTRAVENOUS at 12:13

## 2022-08-19 RX ADMIN — FUROSEMIDE 40 MG: 40 TABLET ORAL at 08:58

## 2022-08-19 RX ADMIN — MORPHINE SULFATE 2 MG: 2 INJECTION, SOLUTION INTRAMUSCULAR; INTRAVENOUS at 05:21

## 2022-08-19 RX ADMIN — MORPHINE SULFATE 2 MG: 2 INJECTION, SOLUTION INTRAMUSCULAR; INTRAVENOUS at 07:16

## 2022-08-19 RX ADMIN — PERFLUTREN 1.5 ML: 6.52 INJECTION, SUSPENSION INTRAVENOUS at 12:00

## 2022-08-19 RX ADMIN — CEFAZOLIN 2 G: 1 INJECTION, POWDER, FOR SOLUTION INTRAMUSCULAR; INTRAVENOUS at 13:56

## 2022-08-19 RX ADMIN — OXYCODONE AND ACETAMINOPHEN 1 TABLET: 5; 325 TABLET ORAL at 23:10

## 2022-08-19 RX ADMIN — ALBUMIN (HUMAN) 25 G: 12.5 INJECTION, SOLUTION INTRAVENOUS at 02:00

## 2022-08-19 RX ADMIN — SODIUM CHLORIDE, PRESERVATIVE FREE 10 ML: 5 INJECTION INTRAVENOUS at 07:20

## 2022-08-19 RX ADMIN — Medication 400 MG: at 08:58

## 2022-08-19 RX ADMIN — SENNOSIDES AND DOCUSATE SODIUM 1 TABLET: 50; 8.6 TABLET ORAL at 08:58

## 2022-08-19 RX ADMIN — CEFAZOLIN 2 G: 1 INJECTION, POWDER, FOR SOLUTION INTRAMUSCULAR; INTRAVENOUS at 08:58

## 2022-08-19 RX ADMIN — LISINOPRIL 40 MG: 20 TABLET ORAL at 21:24

## 2022-08-19 RX ADMIN — SENNOSIDES AND DOCUSATE SODIUM 1 TABLET: 50; 8.6 TABLET ORAL at 17:54

## 2022-08-19 RX ADMIN — SODIUM CHLORIDE, PRESERVATIVE FREE 10 ML: 5 INJECTION INTRAVENOUS at 21:30

## 2022-08-19 RX ADMIN — HYDROXYZINE HYDROCHLORIDE 10 MG: 10 TABLET ORAL at 21:52

## 2022-08-19 RX ADMIN — SODIUM CHLORIDE, PRESERVATIVE FREE 10 ML: 5 INJECTION INTRAVENOUS at 13:24

## 2022-08-19 RX ADMIN — OXYCODONE AND ACETAMINOPHEN 2 TABLET: 5; 325 TABLET ORAL at 19:43

## 2022-08-19 RX ADMIN — PRAVASTATIN SODIUM 40 MG: 40 TABLET ORAL at 21:24

## 2022-08-19 RX ADMIN — POTASSIUM CHLORIDE 40 MEQ: 750 TABLET, FILM COATED, EXTENDED RELEASE ORAL at 08:58

## 2022-08-19 RX ADMIN — ATROPA BELLADONNA AND OPIUM 1 SUPPOSITORY: 16.2; 3 SUPPOSITORY RECTAL at 15:29

## 2022-08-19 RX ADMIN — TAMSULOSIN HYDROCHLORIDE 0.8 MG: 0.4 CAPSULE ORAL at 21:24

## 2022-08-19 RX ADMIN — Medication 3 MG: at 21:29

## 2022-08-19 RX ADMIN — OXYCODONE AND ACETAMINOPHEN 2 TABLET: 5; 325 TABLET ORAL at 13:03

## 2022-08-19 NOTE — PROGRESS NOTES
0745: Bedside and Verbal shift change report received from [facility] to [facility] ([facility]). Report included the following information SBAR, Kardex, Intake/Output, MAR, Recent Results, Med Rec Status, Cardiac Rhythm Ventricular paced, and Alarm Parameters . 0800: Delio Hope NP with Nephrology at bedside, Finnegan replaced with 3 way Coude catheter, and finnegan irrigated    0830: Pt reporting 9/10 pain to bladder - Catheter irrigated with 500 cc sterile water, urine bloody and numerous small clots removed with irrigation. 6339: ECHO present at bedside. 0845: CBI iniaited. Urine bloody. Pt reports some relief of pain. Bladder scan: 74 cc    0900; Cardiac surgery rounding at bedside,     1150: Urine pink tinged- flow on CBI decreased. 1200: Spoke with Ang Avila NP, orders received to d'c MAC and ART line. 1300: Pt c/o 9/10 pain to bladder. Manual irrigation performed and CBI rate slowed. NO clots present. 1302: Delio Hope NP at bedside, orders received for suppository for bladder spasms. 1400: Cuff pressure 96/54 (69), ART line pressure 101/52 (70) - ART line removed at this time. 1500: Urine continuing to clear, currently pink tinged with red flecks, CBI rate decreased. 1800: Bladder scan: 42 cc.     1900: Called to bedside by patient, reporting bladder pain. Bloody urine noted to catheter tubing. Manual irrigation performed with 350 cc, large clots evacuated. 1945: Bedside and Verbal shift change report given to Jasmeet Dunlap (oncoming nurse) by [facility] (offgoing nurse). Report included the following information SBAR, Kardex, Intake/Output, MAR, Recent Results, Med Rec Status, Cardiac Rhythm Ventricular paced, and Alarm Parameters .

## 2022-08-19 NOTE — CONSULTS
[facility]  Cardiology Care Note                  [x]Initial visit     []Established visit     Patient Name: Shabbir Powers - S:[id number]  Primary Cardiologist: Juliana Rivas MD  Consulting Cardiologist: Nain Bob MD         Reason for consult: AS    HPI:   Caroline Rodriguez is a 79 y.o. male with atrial fibrillation, CAD, hypertension, AS, MVR/TVR, dyslipidemia, prostate cancer/radiation proctitis and GI bleeding, s/p Watchman LAAC implant, s/p single chamber ppm for sinus pauses. He is followed by  Dr Malinda Oates who saw him recently with increasing shortness of breath as well as elevated transvalvular gradients across his aortic bioprosthesis. .   Pt reports b/l leg swelling L>R progressively worsening for past 6wks. Also notes over past few weeks w/ dry cough, generalized fatigue. Did not report of any other new symptoms. Assessment and Plan     1. Acute on chronic diastolic heart failure  2. 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 Long-term persistent atrial fibrillation  4. History of GI bleed with poor tolerance to long-term oral anticoagulation now status post watchman based left atrial appendage occlusion. 5.  Pulmonary hypertension  6. History of sick sinus syndrome  7. MR repair with dr Obi Segura in 2015    Mr. Paul Crespo is admitted for TF TAVR for bioprosthetic valve failure. I informed the patient that in the absence of any definitive therapy severe symptomatic aortic stenosis confers a 50% 2 to 5-year mortality. I specifically discussed TAVR related procedural risks such as vascular complication, risk of stroke, risk of pacemaker need post TAVR, risk of conversion to open surgery, death from TAVR procedure.   I also discussed the expected benefits include improvement in functional status, reduction in angina, improvement in exertional tolerance from the procedure. Finally I reviewed expected length of stay in the recovery with the patient based on if the perioperative course is complicated versus uncomplicated. Patient and family voiced understanding and are willing to proceed with further course of action. He has prior MV repai in 2015. While suboptimal, recent TTE does not show significant MR.           ____________________________________________________________    Cardiac testing  Echocardiogram June 2022: Reviewed. Normal LV function. Previously placed aortic bioprosthesis with increased transvalvular gradient in high 30s with LVOT to aortic VTI ratio of 0.15-0.18 suggesting severe aortic stenosis. Calculated EOA of 0.6 to 0.7 cm.  EF is about 50 to 55%. ECG: Atrial fibrillation, PVCs, nonspecific ST-T changes.     Review of Systems    [x]All other systems reviewed and all negative except as written in HPI    [] Patient unable to provide secondary to condition         Past Medical History:   Diagnosis Date    Arthritis     Atrial fibrillation (HCC)     CAD (coronary artery disease)     Cancer (HCC)     PROSTATE    Chronic pain     legs/knee    GERD (gastroesophageal reflux disease)     Hx of carcinoma in situ of prostate     Hyperlipidemia     Hypertension     Insomnia     DEL (obstructive sleep apnea)     Radiation proctitis     Vitamin D deficiency      Past Surgical History:   Procedure Laterality Date    COLONOSCOPY N/A 05/08/2020    COLONOSCOPY performed by Iqra Cali MD at [facility] ENDOSCOPY    COLONOSCOPY N/A 06/08/2020    COLONOSCOPY performed by Marie Larios MD at [facility] N/A 11/23/2020    FLEXIBLE SIGMOIDOSCOPY WITH APC performed by Iqra Cali MD at [address] [place] [address],[address] AORTIC VALVE REPLACEMENT  2014    and mitral valve repair, left atrial cryo maze    HX HEENT      melanoma removed head    HX HERNIA REPAIR  2009    Right    HX HERNIA REPAIR      left inguinal hernia repair    HX HERNIA REPAIR Left 12/01/2016    lap left inguinal hernia repair with mesh    HX KNEE REPLACEMENT      Bilateral    HX ORTHOPAEDIC      BILATERAL KNEE REPLACEMENT    HX ORTHOPAEDIC      CARPEL TUNNEL REPAIR-RIGHT    HX OTHER SURGICAL  2015    closure of patent foramen ovale    HX OTHER SURGICAL  10/2020    watchman implant for afib / Dr. Laquita Burnahm      42 radiation treatments     Social Hx:  reports that he has never smoked. He has never used smokeless tobacco. He reports current alcohol use of about 2.0 standard drinks per week. He reports that he does not use drugs. Family Hx: family history includes Cancer in his brother, father, and mother; No Known Problems in his brother, sister, sister, and sister. No Known Allergies       OBJECTIVE:  Wt Readings from Last 3 Encounters:   08/19/22 194 lb 3.6 oz (88.1 kg)   08/12/22 190 lb 14.7 oz (86.6 kg)   08/04/22 188 lb (85.3 kg)       Intake/Output Summary (Last 24 hours) at 8/19/2022 1208  Last data filed at 8/19/2022 1200  Gross per 24 hour   Intake 6925.12 ml   Output 64364 ml   Net -5999.88 ml         Physical Exam    Vitals:   Vitals:    08/19/22 1000 08/19/22 1100 08/19/22 1133 08/19/22 1200   BP:   (!) 144/85    Pulse: 90 90  89   Resp: 20 20  20   Temp:    97.8 °F (36.6 °C)   SpO2: 98% 98%  95%   Weight:   194 lb 3.6 oz (88.1 kg)    Height:   5' 6\" (1.676 m)      Telemetry: AFIB    BP (!) 144/85   Pulse 89   Temp 97.8 °F (36.6 °C)   Resp 20   Ht 5' 6\" (1.676 m)   Wt 194 lb 3.6 oz (88.1 kg)   SpO2 95%   BMI 31.35 kg/m²   General:    Alert, cooperative, no distress. Psychiatric:    Normal Mood and affect    Eye/ENT:      Pupils equal, No asymmetry, Conjunctival pink. Able to hear voice at normal amplitude   Lungs:      Visibly symmetric chest expansion, No palpable tenderness. Clear to auscultation bilaterally. Heart[de-identified]    Regular rate and rhythm, S1, S2 normal, MSM II/VI at RUSB.  HSM at apex III/VI. No click, rub or gallop. No JVD, Normal palpable peripheral pulses. No cyanosis   Abdomen:     Soft, non-tender. Bowel sounds normal. No masses,  No      organomegaly. Extremities:   Extremities normal, atraumatic, no edema. Neurologic:   CN II-XII grossly intact. No gross focal deficits           Data Review:     Radiology:   XR Results (most recent):  Results from Hospital Encounter encounter on 08/18/22    XR CHEST PORT    Narrative  EXAM: XR CHEST PORT    INDICATION: Recent surgical fixation of aortic valve. Respiratory distress,  intubation. COMPARISON: CT angiography chest on 7/20/2022. Portable chest on 7/10/2022 and  9/3/2021. TECHNIQUE: Supine portable chest AP view    FINDINGS: Endotracheal tube terminates 5 cm proximal to the yokasta. Geraldine-Jackie  catheter terminates in the main pulmonary artery. Aortic valve stent graft is  visible. Cardiac pacemaker battery pack and lead are unchanged. Cardiac  monitoring wires overlie the thorax. Cardiomegaly is unchanged. The pulmonary vasculature is within normal limits. Subtle bibasilar opacities most likely represent atelectasis. No pneumothorax. Osteopenia is unchanged. Impression  Postsurgical heart. Aortic valve stent graft. Bibasilar atelectasis. Endotracheal tube is in good position. No pneumothorax. CT Results (most recent):  Results from Hospital Encounter encounter on 07/20/22    CTA ABD PELV W WO CONT    Narrative  CLINICAL HISTORY: Aortic valve stenosis    COMPARISON: September 2020    TECHNIQUE: Arterial phase CT of the chest, abdomen, and pelvis according to the  departmental TAVR protocol was performed. 3-D MIP and volume rendered  reformations were created in the oblique, coronal and sagittal planes. Manual  post-processing of the images was also performed. Multiplanar reformatted  imaging was performed.   CT dose reduction was achieved through use of a  standardized protocol tailored for this examination and automatic exposure  control for dose modulation. Adaptive statistical iterative reconstruction  (ASIR) was utilized. Contrast: 120 cc Isovue 370      FINDINGS:  CHEST: Thyroid gland is normal. Left chest wall single-lead pacemaker. Moderate  cardiomegaly. Mitral valve replacement. Aortic valve replacement. No pericardial  effusion. Left atrial appendage occlusion device in place but there is contrast  opacifying the left atrial appendage around the Watchman. No mediastinal  lymphadenopathy. No axillary lymphadenopathy. No pleural effusion or  pneumothorax. Mild mucous plugging in the medial left lower lobe with peripheral  atelectasis. ABDOMEN: Dilated IVC and hepatic veins consistent with right heart dysfunction. Scattered hepatic hypodensities too small to characterize, likely cysts. Gallbladder contains a small stone. No significant distention. Common bile duct  is normal in caliber. Spleen contains calcified granulomas without a solid  lesion. Adrenal glands are normal. Kidneys contain simple cysts bilaterally not  requiring further follow-up. No hydronephrosis. The bowel is normal in caliber. Appendix is normal. No ascites or lymphadenopathy. No aortic aneurysm. PELVIS: No free fluid in the pelvis. Fiducial markers in the prostate. Urinary  bladder is normal.    BONES: Degenerative changes in the thoracolumbar spine with endplate osteophyte  formation and grade 1 anterolisthesis L4 and L5.  Minimal retrolisthesis of L3 on  L4 and L1 on L2.    TAVR Measurements:  Aortic Annulus: 30 x 27 mm  Right coronary artery height: 12.6 mm  Left coronary artery height: 11.9 mm  Sinotubular Junction: 33 x 32 mm  Ascending Aorta: 38 x 38 mm  Narrowest measurements of the:  Abdominal aorta: 19 x 19 mm  Right common iliac artery: 14 x 12 mm  Right external iliac artery: 9 x 8 mm  Right common femoral artery: 12 x 10 mm  Left common iliac artery: 15 x 12 mm  Left external iliac artery: 8 x 8 mm  Left common femoral artery: 11 x 10 mm  Tortuous iliac arteries bilaterally. Impression  1. Aortic and mitral valve replacements. 2.  Left atrial appendage occlusion device with flow around the Watchman  opacifying the left atrial appendage. 3.  Cardiomegaly and reflux of contrast into the hepatic veins and IVC  consistent with right heart dysfunction. 4.  Renal and hepatic cysts. MRI Results (most recent):  No results found for this or any previous visit. No results for input(s): CPK, TROIQ in the last 72 hours. No lab exists for component: CKQMB, CPKMB, BMPP  Recent Labs     08/19/22  0314 08/19/22  0008 08/18/22  1754     --  142   K 4.0 4.5 3.8   *  --  110*   CO2 23  --  24   BUN 15  --  15   CREA 0.76  --  0.64*   GLU 93  --  109*   CA 8.7  --  9.1     Recent Labs     08/19/22 0314 08/19/22  0008 08/18/22  1754   WBC 5.5  --  7.9   HGB 8.7* 9.4* 10.2*   HCT 27.2* 29.0* 30.9*   PLT 86*  --  84*     Recent Labs     08/18/22  1754   PTP 12.9*   INR 1.3*   AP 80     No results for input(s): CHOL, LDLC in the last 72 hours. No lab exists for component: TGL, HDLC,  HBA1C  No results for input(s): CRP, TSH, TSHEXT, TSHEXT in the last 72 hours.     No lab exists for component: ESR        Current meds:    Current Facility-Administered Medications:     finasteride (PROSCAR) tablet 5 mg, 5 mg, Oral, QHS, Maryuri Apple, NP    furosemide (LASIX) tablet 40 mg, 40 mg, Oral, DAILY, Annabelle SULEIMAN Eaton, 40 mg at 08/19/22 0858    lisinopriL (PRINIVIL, ZESTRIL) tablet 40 mg, 40 mg, Oral, QHS, Maryuri Apple, NP    potassium chloride SR (KLOR-CON 10) tablet 40 mEq, 40 mEq, Oral, DAILY, Annabelle Eaton NP, 40 mEq at 08/19/22 0858    pravastatin (PRAVACHOL) tablet 40 mg, 40 mg, Oral, QHS, Ambika, Maryuri N, NP    tamsulosin (FLOMAX) capsule 0.8 mg, 0.8 mg, Oral, BID, Tamsen Raring N, NP, 0.8 mg at 08/19/22 0858    sodium chloride (NS) flush 5-40 mL, 5-40 mL, IntraVENous, Q8H, Maryuri Apple N, NP, 10 mL at 08/19/22 0720 sodium chloride (NS) flush 5-40 mL, 5-40 mL, IntraVENous, PRN, Joseph Huston NP, 10 mL at 08/18/22 1901    acetaminophen (TYLENOL) tablet 650 mg, 650 mg, Oral, Q4H PRN, Joseph Huston NP    traMADoL (ULTRAM) tablet  mg,  mg, Oral, Q6H PRN, Joseph Huston NP    oxyCODONE-acetaminophen (PERCOCET) 5-325 mg per tablet 1-2 Tablet, 1-2 Tablet, Oral, Q4H PRN, Joseph Huston NP    morphine injection 2 mg, 2 mg, IntraVENous, Q2H PRN, Joseph Huston NP, 2 mg at 08/19/22 0716    naloxone (NARCAN) injection 0.4 mg, 0.4 mg, IntraVENous, PRN, Joseph Huston NP    ceFAZolin (ANCEF) 2 g in sterile water (preservative free) 20 mL IV syringe, 2 g, IntraVENous, Q6H, Maryuri Apple NP, 2 g at 08/19/22 0858    ondansetron (ZOFRAN) injection 4 mg, 4 mg, IntraVENous, Q4H PRN, Joseph Huston NP    albuterol (PROVENTIL VENTOLIN) nebulizer solution 2.5 mg, 2.5 mg, Nebulization, Q4H PRN, Joseph Huston NP    aspirin chewable tablet 81 mg, 81 mg, Oral, DAILY, Joseph Huston NP, 81 mg at 08/19/22 0858    famotidine (PEPCID) tablet 20 mg, 20 mg, Oral, Q12H, Joseph Huston NP, 20 mg at 08/19/22 0858    magnesium oxide (MAG-OX) tablet 400 mg, 400 mg, Oral, BID, Joseph Huston NP, 400 mg at 08/19/22 3949    calcium chloride 1 g in 0.9% sodium chloride 250 mL IVPB, 1 g, IntraVENous, PRN, Joseph Huston NP    bisacodyL (DULCOLAX) suppository 10 mg, 10 mg, Rectal, DAILY PRN, Joseph Huston NP    senna-docusate (PERICOLACE) 8.6-50 mg per tablet 1 Tablet, 1 Tablet, Oral, BID, Joseph Huston NP, 1 Tablet at 08/19/22 2939    ELECTROLYTE REPLACEMENT NOTE: Nurse to review Serum Potassium and Magnesuim levels and Initiate Electrolyte Replacement Protocol as needed, 1 Each, Other, PRN, Joseph Huston NP    dexmedeTOMidine in 0.9 % NaCl (PRECEDEX) 400 mcg/100 mL (4 mcg/mL) infusion soln, 0.1-1.5 mcg/kg/hr, IntraVENous, TITRATE, Aby Morris DO, Last Rate: 8.7 mL/hr at 08/18/22 2218, 0.4 mcg/kg/hr at 08/18/22 2218    propofol (DIPRIVAN) 10 mg/mL infusion, 0-50 mcg/kg/min, IntraVENous, TITRATE, Chay Miller DO    PHENYLephrine (JOSE ANTONIO-SYNEPHRINE) 30 mg in 0.9% sodium chloride 250 mL infusion,  mcg/min, IntraVENous, TITRATE, Andrei Sterling DO, Stopped at 08/18/22 1900    0.9% sodium chloride infusion, 9 mL/hr, IntraVENous, CONTINUOUS, Gloria Patel MD, Last Rate: 9 mL/hr at 08/18/22 1955, 9 mL/hr at 08/18/22 1955    0.45% sodium chloride infusion, 10 mL/hr, IntraVENous, CONTINUOUS, Gloria Patel MD, Last Rate: 10 mL/hr at 08/18/22 1954, 10 mL/hr at 08/18/22 1954    ELECTROLYTE REPLACEMENT PROTOCOL - Potassium and Magnesium, 1 Each, Other, PRN, Madison Stevens MD    niCARdipine (CARDENE) 25 mg in 0.9% sodium chloride 250 mL ([id number]), 0-15 mg/hr, IntraVENous, TITRATE, Fiser, Doria Mohs, MD, Stopped at 08/18/22 [address], MD    [facility] [address] [address]  [facility]  [phone]      Nira Denver, MD

## 2022-08-19 NOTE — PROCEDURES
PROCEDURALISTS:?   Surgeon 1. Leopold Elbe, MD?  CoSurgeon 1. Ned Arguello MD    PROCEDURES:  1. Angiography of the ascending aorta and aortoiliac bifurcation  2. Temporary transvenous pacemaker insertion  3. Left heart catheterization  4. Implantation of a 26 Medtronic Evolut aortic valve via the right transfemoral approach  5. 18F right?femoral artery access with Perclose pre-closure / 6F left femoral vein access with manual compression / 6F left femoral artery access with Perclose closure  16. Placement and removal of sentinel device. Helio Means is a 79 y.o. ?old male  with severe symptomatic aortic stenosis, NYHA Class IVsymptom status. ? He is referred for transfemoral TAVR procedure. He was deemed as high risk for SAVR. PRE-OP DIAGNOSIS:  1. Severe, symptomatic aortic stenosis (NYHA, Class III)  2. No Coronary artery disease  3. Cardiomyopathy  4. Long-term persistent atrial fibrillation  5. Status post watchman based left and appendage occlusion  6. Recent acute on chronic congestive heart failure  7. Prior AVR with 23 mm Mitroflow prosthesis  8. Prior mitral valve repair  9. Status post prior single-chamber pacemaker placement    POST-OP DIAGNOSIS:  1. Status post successful implantation of a 26Medtronic Evolut aortic valve via the right transfemoral approach  2. No significant post operative conduction block  3. Brief severe hypotension requiring cardiopulmonary resuscitation with PEA kind of presentation. No evidence of significant bradycardia arrhythmia/ventricular tachycardia. Possibility of vasovagal event cannot be ruled out. PROCEDURAL DETAILS: The risks (death, myocardial infarction, stroke, bleeding, vascular complications, renal dysfunction, allergic reactions, and other), benefits, and alternatives were explained and discussed. Signed, informed consent was obtained. The patient was placed on the table and prepped and draped in the usual fashion.     ARTERIAL ACCESS:?   - Right: Right femoral artery access was obtained using ultrasound and a micropuncture needle and sheath system. ? Angiography confirmed adequate position within the mid right CFA without significant angiographic stenosis or irregularity. Pre-close technique was performed using two orthogonally positioned Perclose closure devices. ? A 6F sheath was placed before it was later up-sized to the large bore TAVR sheath. ? Post-procedure, the sheath was removed and the arteriotomy was closed using the pre-close technique. Final hemostasis was excellent. - Left: A 6 Japanese sheath was placed in the left common femoral artery without difficulty. ? Post-procedure, the sheath was removed and hemostasis was achieved with a Perclose closure device. - A 6F sheath was placed in the left common femoral vein without difficulty. Post-procedure the sheath was removed and manual compression was applied to achieve hemostasis. PROCEDURAL MEDICATIONS:?   General anaesthesia. Please see Anesthesia record for full details. Local anesthesia - 1% lidocaine was administered to the bilateral groins. ?? FINDINGS:  ANGIOGRAPHY OF THE ASCENDING AORTA:? There is significant calcification of the aortic valve and annulus. ? Appropriate co-planar views were identified for valve deployment. Post-deployment, the transcatheter valve appeared well positioned with appropriate function and only trace to mild para-valvular aortic regurgitation. ? Coronary arteries remained patent without obstruction. ? AORTOILIAC ANGIOGRAPHY:?The abdominal aorta and bilateral iliac arteries are widely patent without evidence of aneurysm or stenosis. ? There is no evidence of dissection, perforation or other complications. ?     INTERVENTION:  - A temporary transvenous pacemaker wire was inserted via the left common femoral vein access and positioned in the RV apical position. ? Appropriate position and function were confirmed.  Rapid ventricular pacing (140 BPM) was performed in concert with the valve implantations procedures. - Left heart catheterization: aortic valve crossed with a 0.035 inch straight wire and this wire was exchanged out for an Confida wire. Systemic heparin was administered to maintain an ACT between 250-300 seconds. - Transaortic valve implantation: Rapid pacing performed in coordination with deployment of a transcatheter valve. ? Post-deployment angiography and transthoracic echocardiography confirmed appropriate position and function, there was no significant para-valvular regurgitation. LV function was preserved. ? There was no pericardial effusion. ? Immediately post dilatation patient developed severe hypotension requiring CPR for a few minutes. Echo was performed that did not show any pericardial effusion. With 1 mg of epinephrine, blood pressure promptly improved. LV and RV function a few minutes after that on transesophageal echocardiogram demonstrated near normalization. Patient had to be intubated however for stabilization. Blood gases were rechecked and did not show any CO2 retention. Monitor patient was hemodynamically completely stable, the valve was released from delivery system. - The temporary pacemaker was removed. DEWEY was performed and did demonstrate dehiscence of band used to previously repaired mitral valve with residual moderate to severe/severe mitral regurgitation.     CONTRAST VOLUME:?45 mL Omnipaque  BLOOD LOSS:Minimal  COMPLICATIONS:?None  SPECIMENS:?None    RECOMMENDATIONS  - Admit to CVICU, Extubation when feasible  - Aspirin  - Echocardiogram, labs and ECG in AM  - Early ambulation

## 2022-08-19 NOTE — PROGRESS NOTES
Cardiac Surgery Care Coordinator- Met with the family of Hayley Meza and Dr Corrinne Scarlet, reviewed post op plan of care and discussed the procedure from yesterday in detail. Answered questions and offered encouragement and emotional support. Family escorted to the bedside. They are without questions.  Dustin Garcia RN

## 2022-08-19 NOTE — PROGRESS NOTES
Problem: Mobility Impaired (Adult and Pediatric)  Goal: *Acute Goals and Plan of Care (Insert Text)  Description: FUNCTIONAL STATUS PRIOR TO ADMISSION: Patient was independent and active without use of DME.    HOME SUPPORT PRIOR TO ADMISSION: The patient lived with his wife and also has supportive daughters. Physical Therapy Goals  Initiated 8/19/2022  1. Patient will move from supine to sit and sit to supine , scoot up and down, and roll side to side in bed with modified independence within 7 day(s). 2.  Patient will transfer from bed to chair and chair to bed with modified independence using the least restrictive device within 7 day(s). 3.  Patient will perform sit to stand with modified independence within 7 day(s). 4.  Patient will ambulate with modified independence for 150 feet with the least restrictive device within 7 day(s). 5.  Patient will ascend/descend 5 stairs with right handrail(s) with modified independence within 7 day(s). Outcome: Progressing Towards Goal   PHYSICAL THERAPY EVALUATION  Patient: Yadira Stanley (74 y.o. male)  Date: 8/19/2022  Primary Diagnosis: Aortic stenosis [I35.0]  Procedure(s) (LRB):  TRANSCATHETER AORTIC VALVE REPLACEMENT VALVE IN VALVE RIGHT TRANSFEMORAL 26 EVOLUT POSSIBLE SENTINEL (Bilateral) 1 Day Post-Op   Precautions:   Fall      ASSESSMENT  Based on the objective data described below, the patient presents with impaired balance, generalized weakness, pain with bladder spasms + clots (on CBI during eval), and impulsivity following admission for TAVR and procedure complicated by period of cardiac arrest and intubation. Received supine in bed, on CBI, and anxious to mobilize. Overall min-mod A for mobility and stood at bedside for marching and a few steps to sink. Required HHAx2 for standing and mobility therefore recommend RW next session. BP low but stable (80's/60's with MAP in 70's). Returned to bed due to need for suppository to treat bladder symptoms. Recommend HHPT pending progress with acute PT. Current Level of Function Impacting Discharge (mobility/balance): min-mod A    Functional Outcome Measure: The patient scored Total Score: 1/28 on the Tinetti outcome measure which is indicative of high fall risk. Other factors to consider for discharge: period of cardiac arrest     Patient will benefit from skilled therapy intervention to address the above noted impairments. PLAN :  Recommendations and Planned Interventions: bed mobility training, transfer training, gait training, therapeutic exercises, neuromuscular re-education, edema management/control, patient and family training/education, and therapeutic activities      Frequency/Duration: Patient will be followed by physical therapy:  daily to address goals. Recommendation for discharge: (in order for the patient to meet his/her long term goals)  Physical therapy at least 2 days/week in the home pending progress with acute PT    This discharge recommendation:  Has been made in collaboration with the attending provider and/or case management    IF patient discharges home will need the following DME: to be determined (TBD)         SUBJECTIVE:   Patient stated You're big enough to handle me.  referencing need for assistance from this PT    OBJECTIVE DATA SUMMARY:   HISTORY:    Past Medical History:   Diagnosis Date    Arthritis     Atrial fibrillation (HCC)     CAD (coronary artery disease)     Cancer (HCC)     PROSTATE    Chronic pain     legs/knee    GERD (gastroesophageal reflux disease)     Hx of carcinoma in situ of prostate     Hyperlipidemia     Hypertension     Insomnia     DEL (obstructive sleep apnea)     Radiation proctitis     Vitamin D deficiency      Past Surgical History:   Procedure Laterality Date    COLONOSCOPY N/A 05/08/2020    COLONOSCOPY performed by Christiana Troy MD at [facility] ENDOSCOPY    COLONOSCOPY N/A 06/08/2020    COLONOSCOPY performed by Farrah Sarah MD at [facility] ENDOSCOPY    FLEXIBLE SIGMOIDOSCOPY N/A 11/23/2020    FLEXIBLE SIGMOIDOSCOPY WITH APC performed by Christiana Troy MD at [address] AORTIC VALVE REPLACEMENT  2014    and mitral valve repair, left atrial cryo maze    HX HEENT      melanoma removed head    HX HERNIA REPAIR  2009    Right    HX HERNIA REPAIR      left inguinal hernia repair    HX HERNIA REPAIR Left 12/01/2016    lap left inguinal hernia repair with mesh    HX KNEE REPLACEMENT      Bilateral    HX ORTHOPAEDIC      BILATERAL KNEE REPLACEMENT    HX ORTHOPAEDIC      CARPEL TUNNEL REPAIR-RIGHT    HX OTHER SURGICAL  2015    closure of patent foramen ovale    HX OTHER SURGICAL  10/2020    watchman implant for afib / Dr. Russell Monson      42 radiation treatments       Personal factors and/or comorbidities impacting plan of care: PMH    Home Situation  Home Environment: Private residence  # Steps to Enter: 5  Rails to Enter: Yes  Hand Rails : Right  One/Two Story Residence: [facility] level  # of Interior Steps: 5  Living Alone: No  Support Systems: Spouse/Significant Other, Child(bessy)  Patient Expects to be Discharged to[de-identified] Home with home health  Current DME Used/Available at Home: Cane, straight, Walker, rolling  Tub or Shower Type: Tub/Shower combination    EXAMINATION/PRESENTATION/DECISION MAKING:   Critical Behavior:  Neurologic State: Alert  Orientation Level: Oriented X4  Cognition: Follows commands  Range Of Motion:  AROM: Generally decreased, functional  Strength:    Strength: Generally decreased, functional  Tone & Sensation:   Tone: Normal  Coordination:  Coordination: Within functional limits  Functional Mobility:  Bed Mobility:  Supine to Sit: Moderate assistance  Sit to Supine: Minimum assistance  Scooting: Minimum assistance  Transfers:  Sit to Stand: Minimum assistance;Assist x2  Stand to Sit: Minimum assistance;Assist x2  Balance:   Sitting: Impaired; Without support  Sitting - Static: Good (unsupported)  Sitting - Dynamic: Good (unsupported); Fair (occasional)  Standing: Impaired; With support  Standing - Static: Fair  Standing - Dynamic : Fair    Functional Measure:  Tinetti test:    Sitting Balance: 1  Arises: 0  Attempts to Rise: 0  Immediate Standing Balance: 0  Standing Balance: 0  Nudged: 0  Eyes Closed: 0  Turn 360 Degrees - Continuous/Discontinuous: 0  Turn 360 Degrees - Steady/Unsteady: 0  Sitting Down: 0  Balance Score: 1 Balance total score  Indication of Gait: 0  R Step Length/Height: 0  L Step Length/Height: 0  R Foot Clearance: 0  L Foot Clearance: 0  Step Symmetry: 0  Step Continuity: 0  Path: 0  Trunk: 0  Walking Time: 0  Gait Score: 0 Gait total score  Total Score: 1/28 Overall total score         Tinetti Tool Score Risk of Falls  <19 = High Fall Risk  19-24 = Moderate Fall Risk  25-28 = Low Fall Risk  Tinetti ME. Performance-Oriented Assessment of Mobility Problems in Elderly Patients. Mello 65; [id number].  (Scoring Description: PT Bulletin Feb. 10, 1993)    Older adults: Mg Johnson et al, 2009; n = 1000 [facility] elderly evaluated with ABC, PARAG, ADL, and IADL)  · Mean PARAG score for males aged 69-68 years = 26.21(3.40)  · Mean PARAG score for females age 69-68 years = 25.16(4.30)  · Mean PARAG score for males over 80 years = 23.29(6.02)  · Mean PARAG score for females over 80 years = 17.20(8.32)        Physical Therapy Evaluation Charge Determination   History Examination Presentation Decision-Making   HIGH Complexity :3+ comorbidities / personal factors will impact the outcome/ POC  HIGH Complexity : 4+ Standardized tests and measures addressing body structure, function, activity limitation and / or participation in recreation  LOW Complexity : Stable, uncomplicated  Other outcome measures Tinetti 1/28  HIGH       Based on the above components, the patient evaluation is determined to be of the following complexity level: LOW     Pain Rating:  Bladder spasms    Activity Tolerance:   Good and SpO2 stable on RA      After treatment patient left in no apparent distress:   Supine in bed, Call bell within reach, and Side rails x 3    COMMUNICATION/EDUCATION:   The patients plan of care was discussed with: Occupational therapist and Registered nurse. Fall prevention education was provided and the patient/caregiver indicated understanding., Patient/family have participated as able in goal setting and plan of care. , and Patient/family agree to work toward stated goals and plan of care.     Thank you for this referral.  Mal Tabares, PT, DPT   Time Calculation: 18 mins

## 2022-08-19 NOTE — CONSULTS
Requesting Provider: Claudine Huitron MD - Reason for Consultation: \"hematuria\"  Pre-existing [facility] Patient:   yes                Patient: Saul Trivedi MRN: [id number]  SSN: [id number]    YOB: 1940  Age: 79 y.o. Sex: male     Location: [address] [place] [address]       Code Status: Full Code   PCP: Elver Cortes MD  - [phone]   Emergency Contact:  Primary Emergency Contact: Tim Caceres, Home Phone: [phone]   Race/Congregation/Language: [facility] / Emerald Elliott / Samaria Serenity   Payor: Payor: [employer] / Plan: [address] / Product Type: Medicare /    Prior Admission Data: 8/1/22 [facility] CARDIAC CATH LAB Aurora Thayer   Hospitalized:  Hospital Day: 2 - Admitted 8/18/2022  7:17 AM   POD # 1 Day Post-Op Procedure(s):  TRANSCATHETER AORTIC VALVE REPLACEMENT VALVE IN VALVE RIGHT TRANSFEMORAL 26 EVOLUT POSSIBLE SENTINEL by Aurora Thayer MD - Blood Loss: * No values recorded between 8/18/2022  2:29 PM and 8/18/2022  5:18 PM * 3 Hr 55 Min 62 Sec     CONSULTANTS  IP CONSULT TO UROLOGY   ADMISSION DIAGNOSES  No diagnosis found. Assessment/Plan:       78 yo male POD#1 TAVR with hx of Prostate CA s/p Radiation/short term ADT and BPH with onset of gross hematuria post-op    - Hurd upsized. Start CBI and titrate to clear. Hand irrigate prn to keep drained. Discussed/demonstrated with staff. Monitor Hgb and transfuse prn. Hold AC if able. Finasteride, Flomax on board. Following. Supervising MD, Dr. Caron Lara      CC: No chief complaint on file. HPI: He is a 79 y.o. male with PMH of AS s/p AVR, MVr, LLAA, CAD, Afib and SSS with PPM, HTN, HLD, DEL, GERD, prostate CA who presented for elective TAVR 8/18/22. During procedure patient had aysytolic cardiac arrest. Moved to CVICU for continued management. Seen in consult by Urology for gross hematuria. He is followed by Dr. Marisa Boswell for hx of prostate CA s/p radiation in 2019 with short term Lupron.  On Flomax and finasteride for his BPH with prior history of retention. Onset of hematuria overnight. He is on 81 mg ASA. CT A/P w/wo in July showed simple cysts bilaterally, no renal mass or stone. He complains of abdominal pressure and feeling like he has to void. 12 F finnegan in place draining dark red UA with large clots around the catheter. Bedside bladder scan reading 400 cc. I removed the 16 F finnegan and with sterile technique placed a 24 F 3-way catheter with immediate output of 400 cc and relief of his abd pain. I hand irrigated a few small clots out. Urine quickly clotted after stopping hand irrigation. He is hemodynamically stable off pressors. Hgb 8.7 (11 pre-op). Plt 86. Cr 0.76. UA unremarkable.     ====last OV note=====  He returns for follow-up. Prostate Ca., BPH. Urge incontinence with recent lasix due to leg swelling. Continues off Lupron. Radiation in 2019 for [place] 9 with short-term Lupron. Continues on Flomax for his BPH with prior history of retention. Urodynamics and cystoscopy in 2018. Plan:    He agrees to discuss with Dr. Kristen Liao to discontinue lasix. He continues off Lupron and f/u in 6mo with PSA and PVR. Agrees to hold on urge incontinence medications and review with cardiology with discontinuing lasix. If he cannot discontinue lasix he agrees to call back to review urge incontinence meds. He agrees to continue flomax. PAST MEDICAL HISTORY:    Allergies: No known allergies. DENIES: Latex, Shellfish, X-Ray Dye, Iodine.      Medications: furosemide 40 mg tablet (furosemide) once a day   docusate sodium 100 mg capsule (docusate sodium) as needed   pravastatin 40 mg tablet (pravastatin) once a week   Tylenol 325 mg tablet (acetaminophen) once a day   multivitamin tablet (multivitamin) once a day   Artificial Tears (polyvin alc) 1.4% drops (polyvinyl alcohol) as needed   ferrous sulfate 325 mg (65 mg iron) tablet (ferrous sulfate) as needed   carvedilol 3.125 mg tablet (carvedilol) once a day   aspirin 81 mg tablet,delayed release (DR/EC) (aspirin) once a week   glucosamine HCl 750 mg tablet (glucosamine hcl) once a day   * D 2000 2000 UNIT ORAL TABLET twice a day   * CHOLECALCIFEROL 25 MCG/0.03ML LIQD (CHOLECALCIFEROL) once a day   tamsulosin 0.4 mg capsule (tamsulosin) by mouth twice a day   ammonium lactate 12% lotion (ammonium lactate) as needed   acetaminophen 325 mg tablet (acetaminophen) as needed   ipratropium-albuterol 0.5 mg-3 mg(2.5 mg base)/3 mL solution for nebulization (ipratropium-albuterol) as directed   amlodipine 5 mg tablet (amlodipine) Take 1 tablet by mouth once a day   * OMEGA-3 + D CAPSULE once a day   lisinopril 20 mg tablet (lisinopril) 1.5/day  tamsulosin 0.4 mg capsule (tamsulosin) once a week     Problems: Osteopenia (ICD-733.90) (RNB50-A09.80)  Renal cyst (ICD-753.10) (VOJ61-L19.1)  UTI (ICD-599.0) (GHK13-T64.0)  Nocturia (ICD-788.43) (FPX51-F32.1)  Dysuria (ICD-788.1) (LBT60-V71.0)  Hx of external beam radiation therapy (ICD-V15.3) (YMT34-S42. 3)  Prostate cancer (ICD-185) (LHW47-D78)  Anticoagulation (ICD-V58.61) (VPN20-D14.01)  Elevated prostate specific antigen PSA (ICD-790.93) (PKK06-G71.20)  Urinary retention (ICD-788.20) (VJY83-S56.9)  BPH with lower urinary tract symptoms (ICD-600.01) (QJP73-Z84.1)    Illnesses: Bowel Problems and Cancer. DENIES: Pacemaker/Defibrillator, Lung Disease, Diabetes, Stroke/Seizure, Kidney Problems, Bleeding Problems, HIV, or Hepatitis. Surgeries: Joint Replacement Surgery,Open Heart Surgery, andHernia Repair. Family History: Prostate Cancer. DENIES: Kidney cancer, Kidney disease, Kidney stones. Social History: Retired. . Smoking status: never smoker. Does not drink alcohol. System Review: Admits to: None.    DENIES: Unexplained Weight Loss, Dry Eyes, Dry Mouth, Leg Swelling, Shortness of Breath, Constipation, Involuntary Urine Loss, Lower Extremity Weakness, Dry Skin, Difficulty Walking, Psychiatric Problems, Impaired Sex Drive, Easy Bleeding, Rash. EXAMINATION: Vitals: Pulse: 71 BP: 111/62 Weight: 205 lbs Height: 5' 6\" BMI: 33.21 kg/m^2      URINALYSIS from Voided specimen  Urine Dip: pH: 5.0, Bld: Neg, LE: Neg, Nit: Neg, Prot: Neg, Ket: Neg, Gluc: Neg  Urine Micro not done    PSA HISTORY  <0.014 ng/mL ng/ml on 2022  <0.1 ng/ml on 09/10/2021  <0.1 ng/ml on 2021        cc: Kailee Howard MD  Transcribed by Speech to Text Technology  [employer]  E&M Service    Upcoming Orders  Return Office Visit - with Reza Abrams MD in 6 months  Bladder Scan, PSA, UA        By signing my name below, I, Karolina Vargas, attest that this documentation has been prepared under the direct and in the presence of Reza Abrams MD.  Electronically Signed: Eneidalyndon Patino   Alyssa  3, 2022 11:25 AM    I, Dr. Reza Abrams MD, personally performed the services described in this documentation. All medical record entries made by the scribe were at my direction and in my presence. I have reviewed the chart and agree that the record reflects my personal performance and is accurate and complete. Electronically signed by Reza Abrams MD on 2022 at 3:35 PM    ________________________________________________________________________      Temp (24hrs), Av.3 °F (36.3 °C), Min:95.5 °F (35.3 °C), Max:98.3 °F (36.8 °C)    Urinary Status: Hurd  Creatinine   Date/Time Value Ref Range Status   2022 03:14 AM 0.76 0.70 - 1.30 MG/DL Final   2022 05:54 PM 0.64 (L) 0.70 - 1.30 MG/DL Final   2022 09:46 AM 0.97 0.70 - 1.30 MG/DL Final   2022 10:03 AM 0.87 0.70 - 1.30 MG/DL Final   2022 03:45 AM 0.78 0.70 - 1.30 MG/DL Final     Current Antimicrobial Therapy (168h ago, onward)       Ordered     Start Stop    22 0759  ceFAZolin (ANCEF) 2 g in sterile water (preservative free) 20 mL IV syringe  2 g,   IntraVENous,   EVERY 6 HOURS        Note to Pharmacy: Last dose not to exceed 24 hours from surgery end time. Pharmacy may adjust per renal function. References:    Lexicomp    08/18/22 2000 08/19/22 1959                  Key Anti-Platelet Anticoagulant Meds               aspirin delayed-release 81 mg tablet (Taking) Take 81 mg by mouth two (2) times a day. TAKES ONE IN MORNING AND TWO IN EVENING (PER PATIENT AS OF 8/12/22)          Diet: DIET NPO -       Labs     Lab Results   Component Value Date/Time    WBC 5.5 08/19/2022 03:14 AM    HCT 27.2 (L) 08/19/2022 03:14 AM    PLATELET 86 (L) 09/66/9838 03:14 AM    Sodium 143 08/19/2022 03:14 AM    Potassium 4.0 08/19/2022 03:14 AM    Chloride 110 (H) 08/19/2022 03:14 AM    CO2 23 08/19/2022 03:14 AM    BUN 15 08/19/2022 03:14 AM    Creatinine 0.76 08/19/2022 03:14 AM    Glucose 93 08/19/2022 03:14 AM    Calcium 8.7 08/19/2022 03:14 AM    Magnesium 2.1 08/19/2022 03:14 AM    INR 1.3 (H) 08/18/2022 05:54 PM     UA:   Lab Results   Component Value Date/Time    Color YELLOW/STRAW 08/12/2022 10:20 AM    Appearance CLEAR 08/12/2022 10:20 AM    Specific gravity 1.009 08/12/2022 10:20 AM    Specific gravity 1.030 07/03/2012 02:11 PM    pH (UA) 6.5 08/12/2022 10:20 AM    Protein Negative 08/12/2022 10:20 AM    Glucose Negative 08/12/2022 10:20 AM    Ketone Negative 08/12/2022 10:20 AM    Bilirubin Negative 08/12/2022 10:20 AM    Urobilinogen 0.2 08/12/2022 10:20 AM    Nitrites Negative 08/12/2022 10:20 AM    Leukocyte Esterase Negative 08/12/2022 10:20 AM    Epithelial cells FEW 08/12/2022 10:20 AM    Bacteria Negative 08/12/2022 10:20 AM    WBC 0-4 08/12/2022 10:20 AM    RBC 0-5 08/12/2022 10:20 AM     Imaging     Results for orders placed during the hospital encounter of 07/20/22    CTA ABD PELV W WO CONT    Narrative  CLINICAL HISTORY: Aortic valve stenosis    COMPARISON: September 2020    TECHNIQUE: Arterial phase CT of the chest, abdomen, and pelvis according to the  departmental TAVR protocol was performed.  3-D MIP and volume rendered  reformations were created in the oblique, coronal and sagittal planes. Manual  post-processing of the images was also performed. Multiplanar reformatted  imaging was performed. CT dose reduction was achieved through use of a  standardized protocol tailored for this examination and automatic exposure  control for dose modulation. Adaptive statistical iterative reconstruction  (ASIR) was utilized. Contrast: 120 cc Isovue 370      FINDINGS:  CHEST: Thyroid gland is normal. Left chest wall single-lead pacemaker. Moderate  cardiomegaly. Mitral valve replacement. Aortic valve replacement. No pericardial  effusion. Left atrial appendage occlusion device in place but there is contrast  opacifying the left atrial appendage around the Watchman. No mediastinal  lymphadenopathy. No axillary lymphadenopathy. No pleural effusion or  pneumothorax. Mild mucous plugging in the medial left lower lobe with peripheral  atelectasis. ABDOMEN: Dilated IVC and hepatic veins consistent with right heart dysfunction. Scattered hepatic hypodensities too small to characterize, likely cysts. Gallbladder contains a small stone. No significant distention. Common bile duct  is normal in caliber. Spleen contains calcified granulomas without a solid  lesion. Adrenal glands are normal. Kidneys contain simple cysts bilaterally not  requiring further follow-up. No hydronephrosis. The bowel is normal in caliber. Appendix is normal. No ascites or lymphadenopathy. No aortic aneurysm. PELVIS: No free fluid in the pelvis. Fiducial markers in the prostate. Urinary  bladder is normal.    BONES: Degenerative changes in the thoracolumbar spine with endplate osteophyte  formation and grade 1 anterolisthesis L4 and L5.  Minimal retrolisthesis of L3 on  L4 and L1 on L2.    TAVR Measurements:  Aortic Annulus: 30 x 27 mm  Right coronary artery height: 12.6 mm  Left coronary artery height: 11.9 mm  Sinotubular Junction: 33 x 32 mm  Ascending Aorta: 38 x 38 mm  Narrowest measurements of the:  Abdominal aorta: 19 x 19 mm  Right common iliac artery: 14 x 12 mm  Right external iliac artery: 9 x 8 mm  Right common femoral artery: 12 x 10 mm  Left common iliac artery: 15 x 12 mm  Left external iliac artery: 8 x 8 mm  Left common femoral artery: 11 x 10 mm  Tortuous iliac arteries bilaterally. Impression  1. Aortic and mitral valve replacements. 2.  Left atrial appendage occlusion device with flow around the Watchman  opacifying the left atrial appendage. 3.  Cardiomegaly and reflux of contrast into the hepatic veins and IVC  consistent with right heart dysfunction. 4.  Renal and hepatic cysts. US Results (most recent):  Results from [place] encounter on 11/26/20    US ABD LTD    Narrative  INDICATION: ruq pain    COMPARISON: None    TECHNIQUE:  Routine ultrasound images of the abdomen were obtained. FINDINGS:  LIVER: Liver demonstrates heterogeneous echotexture. Anca Olson MAIN PORTAL VEIN: Patent with appropriate hepatopetal flow direction. GALLBLADDER: Cholelithiasis. Contracted gallbladder with suggestion of  gallbladder wall thickening. Anca Funji COMMON BILE DUCT: Not visualized secondary to bowel gas. PANCREAS: Not well visualized due to bowel gas though visualized portions are  unremarkable. RIGHT KIDNEY: 11.0 cm in length. No evidence of hydronephrosis. Simple cyst in  the right kidney. .  OTHER: No ascites is visualized. .    Impression  IMPRESSION:  1. Heterogeneous echotexture of the liver suggestive of underlying liver  parenchymal disease. 2.  Cholelithiasis. There is suggestion of gallbladder wall thickening however  the gallbladder is nondistended.       Cultures     All Micro Results       None             Past History: (Complete 2+/3 areas)   No Known Allergies   Current Facility-Administered Medications   Medication Dose Route Frequency    finasteride (PROSCAR) tablet 5 mg  5 mg Oral QHS    furosemide (LASIX) tablet 40 mg  40 mg Oral DAILY    lisinopriL (Tivis Knows) tablet 40 mg  40 mg Oral QHS    potassium chloride SR (KLOR-CON 10) tablet 40 mEq  40 mEq Oral DAILY    pravastatin (PRAVACHOL) tablet 40 mg  40 mg Oral QHS    tamsulosin (FLOMAX) capsule 0.8 mg  0.8 mg Oral BID    sodium chloride (NS) flush 5-40 mL  5-40 mL IntraVENous Q8H    sodium chloride (NS) flush 5-40 mL  5-40 mL IntraVENous PRN    acetaminophen (TYLENOL) tablet 650 mg  650 mg Oral Q4H PRN    traMADoL (ULTRAM) tablet  mg   mg Oral Q6H PRN    oxyCODONE-acetaminophen (PERCOCET) 5-325 mg per tablet 1-2 Tablet  1-2 Tablet Oral Q4H PRN    morphine injection 2 mg  2 mg IntraVENous Q2H PRN    naloxone (NARCAN) injection 0.4 mg  0.4 mg IntraVENous PRN    ceFAZolin (ANCEF) 2 g in sterile water (preservative free) 20 mL IV syringe  2 g IntraVENous Q6H    ondansetron (ZOFRAN) injection 4 mg  4 mg IntraVENous Q4H PRN    albuterol (PROVENTIL VENTOLIN) nebulizer solution 2.5 mg  2.5 mg Nebulization Q4H PRN    aspirin chewable tablet 81 mg  81 mg Oral DAILY    famotidine (PEPCID) tablet 20 mg  20 mg Oral Q12H    magnesium oxide (MAG-OX) tablet 400 mg  400 mg Oral BID    calcium chloride 1 g in 0.9% sodium chloride 250 mL IVPB  1 g IntraVENous PRN    bisacodyL (DULCOLAX) suppository 10 mg  10 mg Rectal DAILY PRN    senna-docusate (PERICOLACE) 8.6-50 mg per tablet 1 Tablet  1 Tablet Oral BID    ELECTROLYTE REPLACEMENT NOTE: Nurse to review Serum Potassium and Magnesuim levels and Initiate Electrolyte Replacement Protocol as needed  1 Each Other PRN    dexmedeTOMidine in 0.9 % NaCl (PRECEDEX) 400 mcg/100 mL (4 mcg/mL) infusion soln  0.1-1.5 mcg/kg/hr IntraVENous TITRATE    propofol (DIPRIVAN) 10 mg/mL infusion  0-50 mcg/kg/min IntraVENous TITRATE    PHENYLephrine (JOSE ANTONIO-SYNEPHRINE) 30 mg in 0.9% sodium chloride 250 mL infusion   mcg/min IntraVENous TITRATE    0.9% sodium chloride infusion  9 mL/hr IntraVENous CONTINUOUS    0.45% sodium chloride infusion  10 mL/hr IntraVENous CONTINUOUS    ELECTROLYTE REPLACEMENT PROTOCOL - Potassium and Magnesium  1 Each Other PRN    niCARdipine (CARDENE) 25 mg in 0.9% sodium chloride 250 mL ([id number])  0-15 mg/hr IntraVENous TITRATE    Prior to Admission medications    Medication Sig Start Date End Date Taking? Authorizing Provider   lisinopriL (PRINIVIL, ZESTRIL) 40 mg tablet Take 40 mg by mouth nightly. Yes Provider, Historical   omeprazole (PRILOSEC) 40 mg capsule Take 40 mg by mouth in the morning. Yes Provider, Historical   potassium chloride (K-DUR, KLOR-CON M20) 20 mEq tablet Take 2 Tablets by mouth daily. 7/12/22  Yes Francisco East MD   aspirin delayed-release 81 mg tablet Take 81 mg by mouth two (2) times a day. TAKES ONE IN MORNING AND TWO IN EVENING (PER PATIENT AS OF 8/12/22)   Yes Provider, Historical   tamsulosin (FLOMAX) 0.4 mg capsule Take 0.8 mg by mouth two (2) times a day. Yes Provider, Historical   cholecalciferol (VITAMIN D3) 1,000 unit tablet Take 2,000 Units by mouth two (2) times a day. Yes Provider, Historical   GLUCOSAMINE HCL/CHONDR CHING A NA (GLUCOSAMINE-CHONDROITIN) 750-600 mg tab Take 1 Tab by mouth daily. Yes Provider, Historical   omega-3 fatty acids-vitamin e 1,000 mg cap Take 1 Cap by mouth every other day. Yes Provider, Historical   acetaminophen (TYLENOL) 325 mg tablet Take  by mouth every four (4) hours as needed for Pain. Yes Provider, Historical   multivitamin (ONE A DAY) tablet Take 1 Tab by mouth daily. Yes Provider, Historical   docusate sodium (COLACE) 100 mg capsule Take 100 mg by mouth two (2) times a day. Yes Provider, Historical   finasteride (PROSCAR) 5 mg tablet Take 5 mg by mouth nightly. Yes Provider, Historical   pravastatin (PRAVACHOL) 40 mg tablet Take 40 mg by mouth nightly. Yes Provider, Historical   furosemide (LASIX) 40 mg tablet Take 40 mg by mouth daily.  Take in AM  Patient not taking: Reported on 8/18/2022    Provider, Historical   polyvinyl alcohol (LIQUIFILM TEARS) 1.4 % ophthalmic solution Administer 1 Drop to both eyes as needed. Provider, Historical   ipratropium (ATROVENT HFA) 17 mcg/actuation inhaler Take 2 Puffs by inhalation as needed. Patient not taking: Reported on 8/18/2022    Provider, Historical   ferrous sulfate 325 mg (65 mg iron) cpER Take 1 Tab by mouth every other day. Provider, Historical        PMHx:  has a past medical history of Arthritis, Atrial fibrillation ([facility] CAD (coronary artery disease), Cancer ([facility] Chronic pain, GERD (gastroesophageal reflux disease), carcinoma in situ of prostate, Hyperlipidemia, Hypertension, Insomnia, DEL (obstructive sleep apnea), Radiation proctitis, and Vitamin D deficiency. He has no past medical history of Diabetes ([facility] PSurgHx:  has a past surgical history that includes hx aortic valve replacement (2014); hx hernia repair (2009); hx hernia repair; hx hernia repair (Left, 12/01/2016); hx prostate surgery; colonoscopy (N/A, 05/08/2020); colonoscopy (N/A, 06/08/2020); hx knee replacement; hx orthopaedic; hx orthopaedic; hx heent; hx other surgical (2015); hx other surgical (10/2020); and flexible sigmoidoscopy (N/A, 11/23/2020). PSocHx:  reports that he has never smoked. He has never used smokeless tobacco. He reports current alcohol use of about 2.0 standard drinks per week. He reports that he does not use drugs.    ROS:  (Complete - 10 systems) - DENIES: Weightloss (Constitutional), Dry mouth (ENMT), Chest pain (CV), SOB (Respiratory), Constipation (GI), Weakness (MS), Pallor (Skin), TIA Sx (Neuro), Confusion (Psych), Easy bruising (Heme)    Physical Exam: (Comprehesive - 8+ 1995 Systems)     (1) Constitutional:  NAD; pleasant  FIO2: FIO2 (%): 40 % on SpO2: O2 Sat (%): 98 %  O2 Device: Nasal cannula O2 Flow Rate (L/min): 2 l/min  Patient Vitals for the past 24 hrs:   BP Temp Pulse Resp SpO2 Height Weight   08/19/22 0700 -- -- 91 24 98 % -- --   08/19/22 0600 -- -- 95 17 94 % -- --   08/19/22 0500 -- -- 90 17 99 % -- --   08/19/22 0400 -- 98.3 °F (36.8 °C) 90 23 99 % -- 88.1 kg (194 lb 3.2 oz)   08/19/22 0300 -- -- 90 20 98 % -- --   08/19/22 0200 -- -- 86 (!) 0 98 % -- --   08/19/22 0100 -- -- 85 13 97 % -- --   08/19/22 0000 -- 98.1 °F (36.7 °C) 84 19 98 % -- --   08/18/22 2300 -- -- 85 21 98 % -- --   08/18/22 2200 -- -- 90 12 99 % -- --   08/18/22 2100 -- -- 85 18 98 % -- --   08/18/22 2000 -- 97.9 °F (36.6 °C) 60 13 99 % -- --   08/18/22 1900 -- (!) 96.1 °F (35.6 °C) 60 16 98 % -- --   08/18/22 1830 -- -- 60 16 99 % -- --   08/18/22 1802 -- -- 60 (!) 0 99 % -- --   08/18/22 1800 (!) 144/85 -- 65 16 100 % -- --   08/18/22 1745 -- -- 72 12 100 % -- --   08/18/22 1739 126/60 (!) 95.5 °F (35.3 °C) 60 18 100 % -- --   08/18/22 1736 -- -- 62 11 -- -- --   08/18/22 1705 130/76 -- 64 22 98 % -- --   08/18/22 1650 (!) 140/75 -- 65 24 98 % -- --   08/18/22 1635 (!) 196/85 -- 60 15 100 % -- --   08/18/22 0907 138/68 98.1 °F (36.7 °C) 77 14 98 % 5' 6.5\" (1.689 m) 86.6 kg (190 lb 14.7 oz)       Date 08/18/22 0700 - 08/19/22 0659 08/19/22 0700 - 08/20/22 0659   Shift 2644-6609 5176-4379 24 Hour Total 0700-1859 1900-0659 24 Hour Total   INTAKE   P.O.  120 120        P. O.  120 120      I. V.(mL/kg/hr) 2000(1.9) 1375.1(1.3) 3375.1(1.6)        Precedex Volume  99.3 99.3        Cardene Volume  68.3 68.3        Phenylephrine Volume  3.8 3.8        Volume (lactated Ringers infusion) 1000  1000        Volume (0.9% sodium chloride infusion) 1000  1000        Volume (0.9% sodium chloride infusion)  72.8 72.8        Volume (0.45% sodium chloride infusion)  81 81        Volume (potassium chloride 20 mEq in 50 ml IVPB)  50 50        Volume (albumin human 5% (BUMINATE) solution 25 g)  500 500        Volume (albumin human 5% (BUMINATE) solution 25 g)  500 500      Other  70 70        Irrigation Volume Input (mL) ([REMOVED] Urinary Catheter 08/18/22 Hurd - Temperature)  70 70      Shift Total(mL/kg) 2000(23.1) 1565. 1(17.8) 3565. 1(40.5)      OUTPUT   Urine(mL/kg/hr) 1075(1) 2850(2.7) 3925(1.9) 0  0     Urine Output 1000  1000        Urine Output (mL) ([REMOVED] Urinary Catheter 08/18/22 Hurd - Temperature) 75 2700 2775        Urine Output (mL) (Urinary Catheter 08/19/22)  150 150 0  0   Shift Total(mL/kg) 1075(12.4) 2265(25.1) 3925(44.6) 0(0)  0(0)    -1284.9 -359.9 0  0   Weight (kg) 86.6 88.1 88.1 88.1 88.1 88.1      (2) ENMT:   moist mucous membranes, normal sinuses   (3) Respiratory:  breathing easily, no distress   (4) GI:  no abdominal masses, distended   (5) :   Hurd draining dark red UA   (6) Lymphatic:  no adenopathy, neck supple   (7) Muscloskeletal:  no gross deformity, normal ROM   (8) Skin:  no rash, warm & dry   (9) Neuro:  Alert, no focal deficits, normal speech      Signed By: Ravinder Merritt NP  - August 19, 2022

## 2022-08-19 NOTE — PROGRESS NOTES
Problem: Self Care Deficits Care Plan (Adult)  Goal: *Acute Goals and Plan of Care (Insert Text)  Description: FUNCTIONAL STATUS PRIOR TO ADMISSION: Patient was independent and active without the use of DME.     HOME SUPPORT: The patient lived with his wife but did not require assist. Pt reports supportive family living locally, and daughter who lives out of town but visits frequently. Occupational Therapy Goals  Initiated 8/19/2022  1. Patient will perform ADLs standing 5 mins without fatigue or LOB with supervision/set-up within 7 day(s). 2.  Patient will perform lower body ADLs with supervision/set-up within 7 day(s). 3.  Patient will perform gathering ADL items high and low 2/2 with supervision/set-up within 7 day(s). 4.  Patient will perform toilet transfers with supervision/set-up within 7 day(s). 5.  Patient will perform all aspects of toileting with supervision/set-up within 7 day(s). Outcome: Not Met   OCCUPATIONAL THERAPY EVALUATION  Patient: Dutch Shaikh (29 y.o. male)  Date: 8/19/2022  Primary Diagnosis: Aortic stenosis [I35.0]  Procedure(s) (LRB):  TRANSCATHETER AORTIC VALVE REPLACEMENT VALVE IN VALVE RIGHT TRANSFEMORAL 26 EVOLUT POSSIBLE SENTINEL (Bilateral) 1 Day Post-Op   Precautions:   Fall    ASSESSMENT  Based on the objective data described below, the patient presents with impaired balance, activity tolerance, LB reach, and generalized weakness s/p admission for TAVR POD1. Pt received supine and agreeable to participation in therapy session. BP soft but stable throughout session. Functional mobility and completion of OOB ADL tasks limited at this time by continuous bladder irrigation. However, pt agreeable and able to tolerate short mobility to sink with min A x2 and standing grooming tasks completed with min A for maintenance of balance. Following standing ADLs pt returned to supine per RN request in prep for administration of medication to address bladder spasms.  RN notified of session and pt status. Current Level of Function Impacting Discharge (ADLs/self-care): up to min A inferred     Functional Outcome Measure: The patient scored Total: 30/100 on the Barthel Index outcome measure which is indicative of being partially dependent in basic self-care. Other factors to consider for discharge: PLOF     Patient will benefit from skilled therapy intervention to address the above noted impairments. PLAN :  Recommendations and Planned Interventions: self care training, functional mobility training, therapeutic exercise, balance training, therapeutic activities, endurance activities, patient education, and home safety training    Frequency/Duration: Patient will be followed by occupational therapy 5 times a week to address goals. Recommendation for discharge: (in order for the patient to meet his/her long term goals)  Occupational therapy at least 2 days/week in the home     This discharge recommendation:  Has not yet been discussed the attending provider and/or case management    IF patient discharges home will need the following DME: TBD       SUBJECTIVE:   Patient stated [facility] on these balls need adjusting.     OBJECTIVE DATA SUMMARY:   HISTORY:   Past Medical History:   Diagnosis Date    Arthritis     Atrial fibrillation (HCC)     CAD (coronary artery disease)     Cancer (HCC)     PROSTATE    Chronic pain     legs/knee    GERD (gastroesophageal reflux disease)     Hx of carcinoma in situ of prostate     Hyperlipidemia     Hypertension     Insomnia     DEL (obstructive sleep apnea)     Radiation proctitis     Vitamin D deficiency      Past Surgical History:   Procedure Laterality Date    COLONOSCOPY N/A 05/08/2020    COLONOSCOPY performed by Pillo Guerrier MD at [facility] ENDOSCOPY    COLONOSCOPY N/A 06/08/2020    COLONOSCOPY performed by Justin Guzman MD at [address] [place] [address] 11/23/2020    FLEXIBLE SIGMOIDOSCOPY WITH APC performed by Gladys Cali MD at [address] AORTIC VALVE REPLACEMENT  2014    and mitral valve repair, left atrial cryo maze    HX HEENT      melanoma removed head    HX HERNIA REPAIR  2009    Right    HX HERNIA REPAIR      left inguinal hernia repair    HX HERNIA REPAIR Left 12/01/2016    lap left inguinal hernia repair with mesh    HX KNEE REPLACEMENT      Bilateral    HX ORTHOPAEDIC      BILATERAL KNEE REPLACEMENT    HX ORTHOPAEDIC      CARPEL TUNNEL REPAIR-RIGHT    HX OTHER SURGICAL  2015    closure of patent foramen ovale    HX OTHER SURGICAL  10/2020    watchman implant for afib / Dr. Jose Valencia      42 radiation treatments       Expanded or extensive additional review of patient history:     Home Situation  Home Environment: Private residence  # Steps to Enter: 5  Rails to Enter: Yes  Hand Rails : Right  One/Two Story Residence: Split level  # of Interior Steps: 5  Living Alone: No  Support Systems: Spouse/Significant Other, Child(bessy)  Patient Expects to be Discharged to[de-identified] Home with home health  Current DME Used/Available at Home: Cane, straight, Walker, rolling  Tub or Shower Type: Tub/Shower combination      EXAMINATION OF PERFORMANCE DEFICITS:  Cognitive/Behavioral Status:  Neurologic State: Alert  Orientation Level: Oriented X4  Cognition: Follows commands  Perception: Appears intact  Perseveration: No perseveration noted  Safety/Judgement: Awareness of environment    Skin: visually intact     Edema: none noted     Hearing:   Auditory  Auditory Impairment: Hard of hearing, bilateral    Vision/Perceptual:                           Acuity: Within Defined Limits;Able to read employee name badge without difficulty         Range of Motion:    AROM: Generally decreased, functional                         Strength:    Strength: Generally decreased, functional                Coordination:  Coordination: Within functional limits            Tone & Sensation:    Tone: Normal Balance:  Sitting: Impaired; Without support  Sitting - Static: Good (unsupported)  Sitting - Dynamic: Good (unsupported); Fair (occasional)  Standing: Impaired; With support  Standing - Static: Fair  Standing - Dynamic : Fair    Functional Mobility and Transfers for ADLs:  Bed Mobility:  Supine to Sit: Moderate assistance  Sit to Supine: Minimum assistance  Scooting: Minimum assistance    Transfers:  Sit to Stand: Minimum assistance;Assist x2  Stand to Sit: Minimum assistance;Assist x2    ADL Assessment:  Feeding: Independent (inferred)    Oral Facial Hygiene/Grooming: Minimum assistance (standing)    Bathing: Minimum assistance (inferred for transfer and LB reach)         Upper Body Dressing: Minimum assistance (inferred, seated)    Lower Body Dressing: Minimum assistance (inferred, seated, for distal reach)    Toileting: Minimum assistance (inferred, for transfer and balance with hygiene)                ADL Intervention and task modifications:       Grooming  Grooming Assistance: Minimum assistance  Position Performed: Standing (at sink)  Washing Hands: Minimum assistance (for maintenance of balance)  Cues: Verbal cues provided;Physical assistance                                  Cognitive Retraining  Safety/Judgement: Awareness of environment    Functional Measure:    Barthel Index:  Bathin  Bladder: 0  Bowels: 5  Groomin  Dressin  Feeding: 10  Mobility: 0  Stairs: 0  Toilet Use: 5  Transfer (Bed to Chair and Back): 5  Total: 30/100      The Barthel ADL Index: Guidelines  1. The index should be used as a record of what a patient does, not as a record of what a patient could do. 2. The main aim is to establish degree of independence from any help, physical or verbal, however minor and for whatever reason. 3. The need for supervision renders the patient not independent. 4. A patient's performance should be established using the best available evidence.  Asking the patient, friends/relatives and nurses are the usual sources, but direct observation and common sense are also important. However direct testing is not needed. 5. Usually the patient's performance over the preceding 24-48 hours is important, but occasionally longer periods will be relevant. 6. Middle categories imply that the patient supplies over 50 per cent of the effort. 7. Use of aids to be independent is allowed. Score Interpretation (from [address])    Independent   60-79 Minimally independent   40-59 Partially dependent   20-39 Very dependent   <20 Totally dependent     -Roddy Barakat., Barthel, D.W. (1965). Functional evaluation: the Barthel Index. [address] ([address] (1997). The Barthel activities of daily living index: self-reporting versus actual performance in the old (> or = 75 years). Journal of [address] [place] [address] 45(7), [facility], LARISA, Alex Estrada., Darrian Sanchez. (1999). Measuring the change in disability after inpatient rehabilitation; comparison of the responsiveness of the Barthel Index and Functional Kimball Measure. Journal of Neurology, Neurosurgery, and Psychiatry, 66(4), 668-398. Rafael Ulloa, N.J.A, RK Leblanc.EDGAR, & Brooklynn Viera MJIMMIE. (2004) Assessment of post-stroke quality of life in cost-effectiveness studies: The usefulness of the Barthel Index and the EuroQoL-5D. Quality of Life Research, 15, 508-31     Occupational Therapy Evaluation Charge Determination   History Examination Decision-Making   LOW Complexity : Brief history review  LOW Complexity : 1-3 performance deficits relating to physical, cognitive , or psychosocial skils that result in activity limitations and / or participation restrictions  MEDIUM Complexity : Patient may present with comorbidities that affect occupational performnce.  Miniml to moderate modification of tasks or assistance (eg, physical or verbal ) with assesment(s) is necessary to enable patient to complete evaluation       Based on the above components, the patient evaluation is determined to be of the following complexity level: LOW   Pain Rating:  Pain reported at rest and with movement - related to CBI, pt did not rate     Activity Tolerance:   Fair    After treatment patient left in no apparent distress:    Supine in bed, Call bell within reach, and Side rails x 3, RN notified     COMMUNICATION/EDUCATION:   The patients plan of care was discussed with: Physical therapist and Registered nurse. Patient/family have participated as able in goal setting and plan of care. and Patient/family agree to work toward stated goals and plan of care. This patients plan of care is appropriate for delegation to [facility].     Thank you for this referral.  Suyapa Campos OT  Time Calculation: 19 mins

## 2022-08-19 NOTE — PROGRESS NOTES
2000 Assumed care of patient from [address] started @ 2.5 to maintain -120 per orders.   3cc removed from TR band    2020 [employer] at bedside to increase pacemaker per Dr. Cori Mane. Set pacemaker to 90 VVI   3cc removed from TR band    2045 TR band completely deflated, no oozing from site. Ecchymotic surrounding area but no hematoma present     2050 Dr. Caren Lucero at bedside and updated on patient status. Aware of slow bleeding and clot pulled out of right side of mouth ok with waiting to extubate patient. Aware of bleeding noted in finnegan catheter-stated to continue to monitor. Based on hemodynamics- CVP 4-5, PAD <15, ok to give albumin 5% 500mL IV x1 now  2200 large clot suctioned out of patients right mouth- no active bleeding noted upon examination after removal    2230 Precedex stopped    2315 waking up and following commands, able to lift head off pillow. Dr. Caren Lucero at bedside and aware of above. Stated ok to place patient on SBT with goal to extubate but to do cuff leak assessment prior to extubation. 2330 placed on SBT by RT     0012 Cuff leak present prior to extubation. Extubated to 4L NC by RT. Oriented x4, voice hoarse    0135 Filling pressures remain low, CVP 3-4 PAD 12-13 urine output trending down and MAP 60-65. Dr. Caren Lucero gave order to give albumin 5% 500mL     3983-5900 0mL urine output noted. Bladder scan 275mL noted. Clots noted in urometer and in catheter close to meatus. Irrigated with sterile saline but did not drain. Urine drained around catheter. Dr. Caren Lucero made aware and gave order to replace finnegan. 1st finnegan removed and clot noted at tip of catheter. New finnegan placed without difficulty and initially drained 150mL red urine    6920-5150 No urine output noted post finnegan insertion attempted to irrigate with sterile water 70mL but no output noted from finnegan and sanginous drainage noted surround finnegan. Bladder scan @ 0700 noted 430mL.  Dr. Caren Lucero made aware of above and stated to consult urology    0725 Dr. Lulu Tadeo called back from urology and made aware of bleeding around finnegan, retention and pain issues     0800 Bedside and Verbal shift change report given to Robert Zarate RN (oncoming nurse) by Yanique Lepe (offgoing nurse). Report included the following information SBAR, Kardex, MAR, Recent Results, and Cardiac Rhythm Vpaced  .

## 2022-08-19 NOTE — PROGRESS NOTES
[facility] ICU Progress Note    Admit Date: 2022  POD:  1 Day Post-Op    Procedure:  Procedure(s):  TRANSCATHETER AORTIC VALVE REPLACEMENT VALVE IN VALVE RIGHT TRANSFEMORAL 26 EVOLUT POSSIBLE SENTINEL        Subjective:   Pt seen with Dr. Loraine Frank. Afebrile, Room air. Objective:   Vitals:  Blood pressure (!) 144/85, pulse 91, temperature 98.3 °F (36.8 °C), resp. rate 24, height 5' 6.5\" (1.689 m), weight 194 lb 3.2 oz (88.1 kg), SpO2 98 %. Temp (24hrs), Av.2 °F (36.2 °C), Min:95.5 °F (35.3 °C), Max:98.3 °F (36.8 °C)    Hemodynamics:   CO: CO (l/min): 8.6 l/min   CI: CI (l/min/m2): 4.4 l/min/m2   CVP: CVP (mmHg): 11 mmHg (22 0600)   SVR: SVR (dyne*sec)/cm5: 572 (dyne*sec)/cm5 (22 9421)   PAP Systolic: PAP Systolic: 41 (67/89/94 1235)   PAP Diastolic: PAP Diastolic: 18 (29/75/76 6157)   PVR:     SV02: SVO2 (%): 75 % (22 0500)   SCV02:      EKG/Rhythm:  V paced at 90 bpm    Extubation Date / Time: 22 at 0012    Ventilator:  Ventilator Volumes  Vt Set (ml): 480 ml (22)  Vt Exhaled (Machine Breath) (ml): 538 ml (22)  Ve Observed (l/min): 8.4 l/min (22)      Oxygen Therapy:  Oxygen Therapy  O2 Sat (%): 98 % (22 0700)  O2 Device: Nasal cannula (22)  O2 Flow Rate (L/min): 2 l/min (22)  FIO2 (%): 40 % (22)    CXR:   CXR Results  (Last 48 hours)                 08/18/22 1826  XR CHEST PORT Final result    Impression:      Postsurgical heart. Aortic valve stent graft. Bibasilar atelectasis. Endotracheal tube is in good position. No pneumothorax. Narrative:  EXAM: XR CHEST PORT       INDICATION: Recent surgical fixation of aortic valve. Respiratory distress,   intubation. COMPARISON: CT angiography chest on 2022. Portable chest on 7/10/2022 and   9/3/2021. TECHNIQUE: Supine portable chest AP view       FINDINGS: Endotracheal tube terminates 5 cm proximal to the yokasta.  Ackworth-Jackie   catheter terminates in the main pulmonary artery. Aortic valve stent graft is   visible. Cardiac pacemaker battery pack and lead are unchanged. Cardiac   monitoring wires overlie the thorax. Cardiomegaly is unchanged. The pulmonary vasculature is within normal limits. Subtle bibasilar opacities most likely represent atelectasis. No pneumothorax. Osteopenia is unchanged. Admission Weight: Last Weight   Weight: 190 lb 14.7 oz (86.6 kg) Weight: 194 lb 3.2 oz (88.1 kg)     Intake / Output / Drain:  Current Shift: No intake/output data recorded. Last 24 hrs.:   Intake/Output Summary (Last 24 hours) at 8/19/2022 1010  Last data filed at 8/19/2022 0700  Gross per 24 hour   Intake 3565.12 ml   Output 3925 ml   Net -359.88 ml       EXAM:  General:  No acute distress. Resting in bed. Lungs:   Clear to auscultation bilaterally. Incision:  No erythema, drainage or swelling. Heart:  Regular rate and rhythm, S1, S2 normal, no click, rub or gallop. +DANNIE    Abdomen:   Soft, non-tender. Bowel sounds normal. No masses,  No organomegaly. Extremities:  No edema. PPP. Neurologic:  Urology:  Gross motor and sensory apparatus intact. Urology at bedside inserting Coude with bladder irrigation. Urine with agustin hematuria       Labs:   Recent Labs     08/19/22  0314 08/19/22  0008 08/18/22  1756 08/18/22  1754   WBC 5.5  --   --  7.9   HGB 8.7*   < >  --  10.2*   HCT 27.2*   < >  --  30.9*   PLT 86*  --   --  84*     --   --  142   K 4.0   < >  --  3.8   BUN 15  --   --  15   CREA 0.76  --   --  0.64*   GLU 93  --   --  109*   GLUCPOC  --   --  103  --    INR  --   --   --  1.3*    < > = values in this interval not displayed. Assessment:     Active Problems: Aortic stenosis (8/18/2022)         Plan/Recommendations/Medical Decision Making: Aortic Stenosis/AI of bioprosthetic AV (Deep Dominguez #23 Jan 2015 Dr. Sandra Payton): s/p RTF 32 Evolut Radha with [place] protection on 8/18. ASA. EKG and Echo this am-pending. Hx of MV repair:Mitral Ring in place. 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 Echo done this am-awaiting report. Cardiac Arrest following TAVR placement with approximately 2 min of CPR with ROSC. Transferred from OR to CVICU for monitoring. Acute hypoxic respiratory failure requiring mechanical ventilation during cardiac Arrest: Now extubated. Residual atelectasis-increase activity, IS, KS > 92%. CAD: ASA, Statin, ACEi  Afib s/p Cryomaze and LLAA with SSS and PPM  HTN: Lisinopril, Lasix  HLD: Statin  DEL: has been unable to use CPAP in the last few months. He is treated for this at the [place] and I have asked him to follow up with them as an outpatient. GERD: PPI  Recent hospitalization for HF/COVID (DC on 7/13/22)  Hematuria: Likely combination or IV Heparin during procedure and urgent Hurd insertion in OR/possible trauma. Urology placed a 3 way Coude this am for CBI. They anticipate that it can be weaned off. Thrombocytopenia: Continue ASA for now. Continue to monitor. Anemia: secondary to acute blood loss following surgery. Currently above transfusion threshold. Monitor daily CBC. DVT prophylaxis: SCD     Dispo: PT/OT/Cardiac Rehab. Case Management for discharge planning. Will plan to assess functional status over the weekend and review Echo. Likely home with family in 2-3 days pending PT/OT recs.        Signed By: Marylee Fu, NP

## 2022-08-20 LAB
ANION GAP SERPL CALC-SCNC: 5 MMOL/L (ref 5–15)
BUN SERPL-MCNC: 19 MG/DL (ref 6–20)
BUN/CREAT SERPL: 22 (ref 12–20)
CALCIUM SERPL-MCNC: 8.7 MG/DL (ref 8.5–10.1)
CHLORIDE SERPL-SCNC: 107 MMOL/L (ref 97–108)
CO2 SERPL-SCNC: 28 MMOL/L (ref 21–32)
CREAT SERPL-MCNC: 0.85 MG/DL (ref 0.7–1.3)
ERYTHROCYTE [DISTWIDTH] IN BLOOD BY AUTOMATED COUNT: 16.5 % (ref 11.5–14.5)
GLUCOSE SERPL-MCNC: 102 MG/DL (ref 65–100)
HCT VFR BLD AUTO: 25.7 % (ref 36.6–50.3)
HGB BLD-MCNC: 8.3 G/DL (ref 12.1–17)
MCH RBC QN AUTO: 32.3 PG (ref 26–34)
MCHC RBC AUTO-ENTMCNC: 32.3 G/DL (ref 30–36.5)
MCV RBC AUTO: 100 FL (ref 80–99)
NRBC # BLD: 0 K/UL (ref 0–0.01)
NRBC BLD-RTO: 0 PER 100 WBC
PLATELET # BLD AUTO: 97 K/UL (ref 150–400)
PMV BLD AUTO: 10.6 FL (ref 8.9–12.9)
POTASSIUM SERPL-SCNC: 4 MMOL/L (ref 3.5–5.1)
RBC # BLD AUTO: 2.57 M/UL (ref 4.1–5.7)
SODIUM SERPL-SCNC: 140 MMOL/L (ref 136–145)
WBC # BLD AUTO: 6.3 K/UL (ref 4.1–11.1)

## 2022-08-20 PROCEDURE — APPSS45 APP SPLIT SHARED TIME 31-45 MINUTES: Performed by: NURSE PRACTITIONER

## 2022-08-20 PROCEDURE — 74011000250 HC RX REV CODE- 250: Performed by: NURSE PRACTITIONER

## 2022-08-20 PROCEDURE — 80048 BASIC METABOLIC PNL TOTAL CA: CPT

## 2022-08-20 PROCEDURE — 74011250636 HC RX REV CODE- 250/636: Performed by: NURSE PRACTITIONER

## 2022-08-20 PROCEDURE — 74011250637 HC RX REV CODE- 250/637: Performed by: NURSE PRACTITIONER

## 2022-08-20 PROCEDURE — 36415 COLL VENOUS BLD VENIPUNCTURE: CPT

## 2022-08-20 PROCEDURE — 51798 US URINE CAPACITY MEASURE: CPT

## 2022-08-20 PROCEDURE — 65610000003 HC RM ICU SURGICAL

## 2022-08-20 PROCEDURE — P9045 ALBUMIN (HUMAN), 5%, 250 ML: HCPCS | Performed by: STUDENT IN AN ORGANIZED HEALTH CARE EDUCATION/TRAINING PROGRAM

## 2022-08-20 PROCEDURE — 74011250636 HC RX REV CODE- 250/636: Performed by: STUDENT IN AN ORGANIZED HEALTH CARE EDUCATION/TRAINING PROGRAM

## 2022-08-20 PROCEDURE — 85027 COMPLETE CBC AUTOMATED: CPT

## 2022-08-20 RX ORDER — ALBUMIN HUMAN 50 G/1000ML
25 SOLUTION INTRAVENOUS ONCE
Status: COMPLETED | OUTPATIENT
Start: 2022-08-20 | End: 2022-08-20

## 2022-08-20 RX ADMIN — OXYCODONE AND ACETAMINOPHEN 2 TABLET: 5; 325 TABLET ORAL at 11:00

## 2022-08-20 RX ADMIN — Medication 400 MG: at 08:51

## 2022-08-20 RX ADMIN — TAMSULOSIN HYDROCHLORIDE 0.8 MG: 0.4 CAPSULE ORAL at 08:51

## 2022-08-20 RX ADMIN — PRAVASTATIN SODIUM 40 MG: 40 TABLET ORAL at 21:00

## 2022-08-20 RX ADMIN — TAMSULOSIN HYDROCHLORIDE 0.8 MG: 0.4 CAPSULE ORAL at 20:55

## 2022-08-20 RX ADMIN — Medication 400 MG: at 17:25

## 2022-08-20 RX ADMIN — FAMOTIDINE 20 MG: 20 TABLET ORAL at 08:51

## 2022-08-20 RX ADMIN — SENNOSIDES AND DOCUSATE SODIUM 1 TABLET: 50; 8.6 TABLET ORAL at 17:25

## 2022-08-20 RX ADMIN — FINASTERIDE 5 MG: 5 TABLET, FILM COATED ORAL at 21:45

## 2022-08-20 RX ADMIN — FAMOTIDINE 20 MG: 20 TABLET ORAL at 20:55

## 2022-08-20 RX ADMIN — OXYCODONE AND ACETAMINOPHEN 1 TABLET: 5; 325 TABLET ORAL at 07:12

## 2022-08-20 RX ADMIN — SENNOSIDES AND DOCUSATE SODIUM 1 TABLET: 50; 8.6 TABLET ORAL at 08:51

## 2022-08-20 RX ADMIN — SODIUM CHLORIDE, PRESERVATIVE FREE 10 ML: 5 INJECTION INTRAVENOUS at 06:14

## 2022-08-20 RX ADMIN — OXYCODONE AND ACETAMINOPHEN 1 TABLET: 5; 325 TABLET ORAL at 03:16

## 2022-08-20 RX ADMIN — POTASSIUM CHLORIDE 40 MEQ: 750 TABLET, FILM COATED, EXTENDED RELEASE ORAL at 08:51

## 2022-08-20 RX ADMIN — ATROPA BELLADONNA AND OPIUM 1 SUPPOSITORY: 16.2; 3 SUPPOSITORY RECTAL at 07:36

## 2022-08-20 RX ADMIN — ATROPA BELLADONNA AND OPIUM 1 SUPPOSITORY: 16.2; 3 SUPPOSITORY RECTAL at 19:03

## 2022-08-20 RX ADMIN — LISINOPRIL 40 MG: 20 TABLET ORAL at 21:00

## 2022-08-20 RX ADMIN — TRAMADOL HYDROCHLORIDE 100 MG: 50 TABLET, COATED ORAL at 08:51

## 2022-08-20 RX ADMIN — ASPIRIN 81 MG CHEWABLE TABLET 81 MG: 81 TABLET CHEWABLE at 08:51

## 2022-08-20 RX ADMIN — ALBUMIN (HUMAN) 25 G: 12.5 INJECTION, SOLUTION INTRAVENOUS at 04:46

## 2022-08-20 RX ADMIN — TRAMADOL HYDROCHLORIDE 100 MG: 50 TABLET, COATED ORAL at 20:52

## 2022-08-20 RX ADMIN — OXYCODONE AND ACETAMINOPHEN 2 TABLET: 5; 325 TABLET ORAL at 18:58

## 2022-08-20 RX ADMIN — MORPHINE SULFATE 2 MG: 2 INJECTION, SOLUTION INTRAMUSCULAR; INTRAVENOUS at 13:35

## 2022-08-20 NOTE — PROGRESS NOTES
2000: Bedside and Verbal shift change report given to Yohan Lee RN (oncoming nurse) by Brooke Byrne (offgoing nurse). Report included the following information SBAR, Intake/Output, MAR, Recent Results, and Cardiac Rhythm Vpaced . 0425: Pt's BP 73/48; MAPs 52-57; Intensivist notified; orders received to give 2  5% albumin     0610: Dr. Winter Herrera w/ Urology at bedside; updated by RN; will continue with CBI today    0720: Pt OOB to chair x2 assist; Pt complaining a lot of pressure and pain; Pt assisted back to bed; Catheter manually irrigated; multiple large clots evacuated; Pt's pain decreased after clots evacuated     0745: Bedside and Verbal shift change report given to Manjinder Varela RN (oncoming nurse) by Yohan Lee RN (offgoing nurse). Report included the following information SBAR, Intake/Output, MAR, Recent Results, and Cardiac Rhythm Vpaced  .

## 2022-08-20 NOTE — PROGRESS NOTES
Physical Therapy 8/20/22    Chart reviewed, conferred with RN. Patient endorsing chest pain at this time. RN requests to defer treatment. Will follow up later as able/appropriate. 1010: Attempt #2 to see, pain better controlled but sleeping soundly. Request to defer again. Will follow up as able.     Thank you for your consideration,    Marjie Holter, PT, DPT

## 2022-08-20 NOTE — PROGRESS NOTES
0745: Bedside and Verbal shift change report received from [facility] to [employer]unch. Report included the following information SBAR, Kardex, Intake/Output, Recent Results, Med Rec Status, Cardiac Rhythm Ventricular paced, and Alarm Parameters . 1871: Cardiac surgery rounding at bedside. 0800: CBI running freely, urine pink tinged to clear with red flecks. 0815: Pt c/o 9/10 pain to L chest - PRN tramadol given. 0845: Pt reporting relief of pain, intermittently sleeping in bed.     1200: Orders received from Ari Mcclendon NP to decrease pacemaker rate from 90 to 60.     1500: Urine clear to pink tinged, CBI rate decreased. 1645: Pt assisted OOB to chair x 2 assist. Reporting no c/o pain at this time. 1700: Spoke with [facility] regarding decreasing pacemaker rate to 60.     1850: Michael with Marya Jordan at bedside, pacemaker rate decreased to 60.     1910: Shift summary - continued CBI, currently rate is a trickle. Urine remains clear. No manual irrigation required for entirety of shift. 1936: Pt converted into a fib 90s-100s. 1945: Bedside and Verbal shift change report given to Ernesto Lennox (oncoming nurse) by [employer]unch (offgoing nurse). Report included the following information SBAR, Kardex, Intake/Output, MAR, Recent Results, Med Rec Status, Cardiac Rhythm NSR, and Alarm Parameters .

## 2022-08-20 NOTE — PROGRESS NOTES
Progress Note  Date:2022       Room:[address] [place] [address]  Patient Name:Srinivas Whitney     Date of Birth:[birth year]     Age:81 y.o. Subjective    Subjective:  Symptoms:  Improved. No shortness of breath. Pain:  He reports pain is improving. Review of Systems   Respiratory:  Negative for shortness of breath. Objective         Vitals Last 24 Hours:  TEMPERATURE:  Temp  Av.3 °F (36.8 °C)  Min: 97.8 °F (36.6 °C)  Max: 98.8 °F (37.1 °C)  RESPIRATIONS RANGE: Resp  Avg: 15.9  Min: 8  Max: 26  PULSE OXIMETRY RANGE: SpO2  Av.5 %  Min: 89 %  Max: 98 %  PULSE RANGE: Pulse  Av.1  Min: 89  Max: 96  BLOOD PRESSURE RANGE: Systolic (57CQZ), XQF:16 , Min:68 , RIN:156   ; Diastolic (16UEH), DXF:28, Min:49, Max:85    I/O (24Hr): Intake/Output Summary (Last 24 hours) at 2022 0614  Last data filed at 2022 0600  Gross per 24 hour   Intake 25790 ml   Output 35898 ml   Net -21959 ml     Objective  Labs/Imaging/Diagnostics    Labs:  CBC:  Recent Labs     22  0328 22  0314 22  0008 22  1754   WBC 6.3 5.5  --  7.9   RBC 2.57* 2.77*  --  3.21*   HGB 8.3* 8.7* 9.4* 10.2*   HCT 25.7* 27.2* 29.0* 30.9*   .0* 98.2  --  96.3   RDW 16.5* 15.7*  --  15.8*   PLT 97* 86*  --  84*     CHEMISTRIES:  Recent Labs     22  0334 22  0314 22  0008 22  1754    143  --  142   K 4.0 4.0 4.5 3.8    110*  --  110*   CO2 28 23  --  24   BUN 19 15  --  15   CA 8.7 8.7  --  9.1   MG  --  2.1 2.1 2.0   PT/INR:  Recent Labs     22   INR 1.3*     APTT:  Recent Labs     22   APTT 29.9     LIVER PROFILE:  Recent Labs     22   AST 27   ALT 23     Lab Results   Component Value Date/Time    ALT (SGPT) 23 2022 05:54 PM    AST (SGOT) 27 2022 05:54 PM    Alk.  phosphatase 80 2022 05:54 PM    Bilirubin, direct 0.15 2022 08:00 AM    Bilirubin, total 0.8 2022 05:54 PM       Imaging Last 24 Hours:  ECHO ADULT COMPLETE    Result Date: 8/19/2022  Formatting of this result is different from the original.   Left Ventricle: Preserved left ventricular systolic function with a visually estimated EF of 50 - 55%. Left ventricle size is normal. Increased wall thickness. Findings consistent with mild concentric hypertrophy. Normal wall motion. Right Ventricle: Right ventricle is mildly dilated. Aortic Valve: Medtronic transcatheter bioprosthetic valve that is well-seated with a size of 26 mm. Mild patient prosthesis mismatch with EOA of 1.2cm2 and AV mean gradient is 21 mmHg. .   Mitral Valve: Valve repaired by annular ring. Thickened leaflets. Previously placed ring appears to be dehisced. MR jest is eccentric and is not clearly visualized and may be underestimated. Tricuspid Valve: Moderate regurgitation. The estimated RVSP is 40 mmHg. Left Atrium: Left atrium is severely dilated. Right Atrium: Right atrium is moderately dilated. It appears his prosthetic AV is more stenotic than last year and will need evaluation in valve clinic ASAP. It has progressed quickly from last year. Assessment//Plan   Active Problems:     Aortic stenosis (8/18/2022)      Assessment & Plan  Gross hematuria from anticoagulation plus hx of RT for prostate cancer   CBI is running pale red on moderate drip    No need to hand irrigate overnight    Cont CBI   Electronically signed by Jose Rey MD on 8/20/2022 at 6:14 AM

## 2022-08-20 NOTE — PROGRESS NOTES
[facility] ICU Progress Note    Admit Date: 2022  POD:  2 Day Post-Op    Procedure:  Procedure(s):  TRANSCATHETER AORTIC VALVE REPLACEMENT VALVE IN VALVE RIGHT TRANSFEMORAL 26 EVOLUT POSSIBLE SENTINEL        Subjective:   Pt seen with Dr. Gerald Francis. Afebrile, Room air. Objective:   Vitals:  Blood pressure 103/61, pulse 91, temperature 98.5 °F (36.9 °C), resp. rate 21, height 5' 6\" (1.676 m), weight 188 lb 4.4 oz (85.4 kg), SpO2 94 %. Temp (24hrs), Av.4 °F (36.9 °C), Min:97.8 °F (36.6 °C), Max:98.7 °F (37.1 °C)    EKG/Rhythm:  V paced at 90 bpm    Oxygen Therapy:  Oxygen Therapy  O2 Sat (%): 94 % (22 1300)  O2 Device: None (Room air) (22 1200)  O2 Flow Rate (L/min): 2 l/min (22 1200)  FIO2 (%): 40 % (22 2333)    CXR:   CXR Results  (Last 48 hours)                 22 1826  XR CHEST PORT Final result    Impression:      Postsurgical heart. Aortic valve stent graft. Bibasilar atelectasis. Endotracheal tube is in good position. No pneumothorax. Narrative:  EXAM: XR CHEST PORT       INDICATION: Recent surgical fixation of aortic valve. Respiratory distress,   intubation. COMPARISON: CT angiography chest on 2022. Portable chest on 7/10/2022 and   9/3/2021. TECHNIQUE: Supine portable chest AP view       FINDINGS: Endotracheal tube terminates 5 cm proximal to the yokasta. Ballinger-Jackie   catheter terminates in the main pulmonary artery. Aortic valve stent graft is   visible. Cardiac pacemaker battery pack and lead are unchanged. Cardiac   monitoring wires overlie the thorax. Cardiomegaly is unchanged. The pulmonary vasculature is within normal limits. Subtle bibasilar opacities most likely represent atelectasis. No pneumothorax. Osteopenia is unchanged.                      Admission Weight: Last Weight   Weight: 190 lb 14.7 oz (86.6 kg) Weight: 188 lb 4.4 oz (85.4 kg)     Intake / Output / Drain:  Current Shift: 701 - 1900  In: 4525 [P.O.:540]  Out: 66998 [Urine:02055]  Last 24 hrs.:   Intake/Output Summary (Last 24 hours) at 8/20/2022 1405  Last data filed at 8/20/2022 1300  Gross per 24 hour   Intake 69905 ml   Output 76047 ml   Net -95830 ml       EXAM:  General:  No acute distress. Resting in bed. Lungs:   Clear to auscultation bilaterally. Incision:  No erythema, drainage or swelling. Heart:  Regular rate and rhythm, S1, S2 normal, no click, rub or gallop. +DANNIE    Abdomen:   Soft, non-tender. Bowel sounds normal. No masses,  No organomegaly. Extremities:  No edema. PPP. Neurologic:  Urology:  Gross motor and sensory apparatus intact. Coude with bladder irrigation. Urine pink in tubing       Labs:   Recent Labs     08/20/22  0334 08/20/22  0328 08/19/22  0008 08/18/22  1756 08/18/22  1754   WBC  --  6.3   < >  --  7.9   HGB  --  8.3*   < >  --  10.2*   HCT  --  25.7*   < >  --  30.9*   PLT  --  97*   < >  --  84*     --    < >  --  142   K 4.0  --    < >  --  3.8   BUN 19  --    < >  --  15   CREA 0.85  --    < >  --  0.64*   *  --    < >  --  109*   GLUCPOC  --   --   --  103  --    INR  --   --   --   --  1.3*    < > = values in this interval not displayed. Assessment:     Active Problems: Aortic stenosis (8/18/2022)       Plan/Recommendations/Medical Decision Making: Aortic Stenosis/AI of bioprosthetic AV (Deep Dominguez #23 Jan 2015 Dr. Nora Cavazos): s/p RTF 32 Evolut Radha with [place] protection on 8/18. ASA. EKG and Echo reviewed by Dr. 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 Echo done 8/19 showing eccentric MR jet-not clearly visualized and could be underestimated. Cardiac Arrest following TAVR placement with approximately 2 min of CPR with ROSC. Stable and off all vasoactive drips  Acute hypoxic respiratory failure requiring mechanical ventilation during cardiac Arrest: Now extubated.  Residual atelectasis-increase activity, IS, KS > 92%. CAD: ASA, Statin, ACEi  Afib s/p Cryomaze and LLAA with SSS and PPM. PPM rate increased to 90 bpm following arrest. Discussed with Dr. Mookie Gibbons and will drop pacer rate back to previous 60 bpm  HTN: Lisinopril, Lasix  HLD: Statin  DEL: has been unable to use CPAP in the last few months. He is treated for this at the [facility] and I have asked him to follow up with them as an outpatient. GERD: PPI  Recent hospitalization for HF/COVID (DC on 7/13/22)  Hematuria: Likely combination or IV Heparin during procedure and urgent Hurd insertion in OR/possible trauma and Hx of Radiation therapy for prostate CA. Urology placed a 3 way Coude 8/19 for CBI. Improving. Wean as appropriate per Urology. Thrombocytopenia: Continue ASA for now. Continue to monitor. Anemia: secondary to acute blood loss following surgery. Currently above transfusion threshold. Monitor daily CBC. DVT prophylaxis: SCD     Dispo: PT/OT/Cardiac Rehab. Case Management for discharge planning. Will plan to assess functional status over the weekend and review Echo. Likely home with family in 2-3 days pending PT/OT recs.        Signed By: Kayla Singh NP

## 2022-08-20 NOTE — PROGRESS NOTES
Problem: Pressure Injury - Risk of  Goal: *Prevention of pressure injury  Description: Document Alberto Scale and appropriate interventions in the flowsheet. Outcome: Progressing Towards Goal  Note: Pressure Injury Interventions:  Sensory Interventions: Assess changes in LOC, Check visual cues for pain, Float heels, Keep linens dry and wrinkle-free, Maintain/enhance activity level, Minimize linen layers, Pressure redistribution bed/mattress (bed type), Turn and reposition approx. every two hours (pillows and wedges if needed)    Moisture Interventions: Absorbent underpads, Maintain skin hydration (lotion/cream), Minimize layers    Activity Interventions: Assess need for specialty bed, Pressure redistribution bed/mattress(bed type), Increase time out of bed    Mobility Interventions: Assess need for specialty bed, Float heels, Pressure redistribution bed/mattress (bed type), Turn and reposition approx. every two hours(pillow and wedges)    Nutrition Interventions: Discuss nutritional consult with provider    Friction and Shear Interventions: Apply protective barrier, creams and emollients, Lift sheet, Lift team/patient mobility team, Minimize layers, Transferring/repositioning devices                Problem: Patient Education: Go to Patient Education Activity  Goal: Patient/Family Education  Outcome: Progressing Towards Goal     Problem: Non-Violent Restraints  Goal: Removal from restraints as soon as assessed to be safe  Outcome: Progressing Towards Goal  Goal: No harm/injury to patient while restraints in use  Outcome: Progressing Towards Goal  Goal: Patient's dignity will be maintained  Outcome: Progressing Towards Goal  Goal: Patient Interventions  Outcome: Progressing Towards Goal     Problem: Falls - Risk of  Goal: *Absence of Falls  Description: Document Rosetta Hurt Fall Risk and appropriate interventions in the flowsheet.   Outcome: Progressing Towards Goal  Note: Fall Risk Interventions:       Mentation Interventions: Adequate sleep, hydration, pain control, Evaluate medications/consider consulting pharmacy, Door open when patient unattended    Medication Interventions: Evaluate medications/consider consulting pharmacy    Elimination Interventions: Patient to call for help with toileting needs, Toileting schedule/hourly rounds, Call light in reach              Problem: Patient Education: Go to Patient Education Activity  Goal: Patient/Family Education  Outcome: Progressing Towards Goal     Problem: Patient Education: Go to Patient Education Activity  Goal: Patient/Family Education  Outcome: Progressing Towards Goal     Problem: Patient Education: Go to Patient Education Activity  Goal: Patient/Family Education  Outcome: Progressing Towards Goal

## 2022-08-21 LAB
ANION GAP SERPL CALC-SCNC: 5 MMOL/L (ref 5–15)
BUN SERPL-MCNC: 18 MG/DL (ref 6–20)
BUN/CREAT SERPL: 27 (ref 12–20)
CALCIUM SERPL-MCNC: 9 MG/DL (ref 8.5–10.1)
CHLORIDE SERPL-SCNC: 107 MMOL/L (ref 97–108)
CO2 SERPL-SCNC: 26 MMOL/L (ref 21–32)
CREAT SERPL-MCNC: 0.67 MG/DL (ref 0.7–1.3)
ERYTHROCYTE [DISTWIDTH] IN BLOOD BY AUTOMATED COUNT: 16.4 % (ref 11.5–14.5)
GLUCOSE SERPL-MCNC: 104 MG/DL (ref 65–100)
HCT VFR BLD AUTO: 25.2 % (ref 36.6–50.3)
HGB BLD-MCNC: 8 G/DL (ref 12.1–17)
MCH RBC QN AUTO: 31.9 PG (ref 26–34)
MCHC RBC AUTO-ENTMCNC: 31.7 G/DL (ref 30–36.5)
MCV RBC AUTO: 100.4 FL (ref 80–99)
NRBC # BLD: 0 K/UL (ref 0–0.01)
NRBC BLD-RTO: 0 PER 100 WBC
PLATELET # BLD AUTO: 95 K/UL (ref 150–400)
PMV BLD AUTO: 9.8 FL (ref 8.9–12.9)
POTASSIUM SERPL-SCNC: 3.9 MMOL/L (ref 3.5–5.1)
RBC # BLD AUTO: 2.51 M/UL (ref 4.1–5.7)
SODIUM SERPL-SCNC: 138 MMOL/L (ref 136–145)
WBC # BLD AUTO: 7.1 K/UL (ref 4.1–11.1)

## 2022-08-21 PROCEDURE — 74011250637 HC RX REV CODE- 250/637: Performed by: NURSE PRACTITIONER

## 2022-08-21 PROCEDURE — 85027 COMPLETE CBC AUTOMATED: CPT

## 2022-08-21 PROCEDURE — 80048 BASIC METABOLIC PNL TOTAL CA: CPT

## 2022-08-21 PROCEDURE — 36415 COLL VENOUS BLD VENIPUNCTURE: CPT

## 2022-08-21 PROCEDURE — 2709999900 HC NON-CHARGEABLE SUPPLY

## 2022-08-21 PROCEDURE — 51798 US URINE CAPACITY MEASURE: CPT

## 2022-08-21 PROCEDURE — 65660000001 HC RM ICU INTERMED STEPDOWN

## 2022-08-21 PROCEDURE — 97110 THERAPEUTIC EXERCISES: CPT

## 2022-08-21 PROCEDURE — APPSS45 APP SPLIT SHARED TIME 31-45 MINUTES: Performed by: NURSE PRACTITIONER

## 2022-08-21 PROCEDURE — 97530 THERAPEUTIC ACTIVITIES: CPT

## 2022-08-21 PROCEDURE — 74011000250 HC RX REV CODE- 250: Performed by: NURSE PRACTITIONER

## 2022-08-21 RX ORDER — LISINOPRIL 20 MG/1
20 TABLET ORAL
Status: DISCONTINUED | OUTPATIENT
Start: 2022-08-21 | End: 2022-08-24 | Stop reason: HOSPADM

## 2022-08-21 RX ADMIN — PRAVASTATIN SODIUM 40 MG: 40 TABLET ORAL at 21:56

## 2022-08-21 RX ADMIN — Medication 400 MG: at 08:03

## 2022-08-21 RX ADMIN — LISINOPRIL 20 MG: 20 TABLET ORAL at 21:56

## 2022-08-21 RX ADMIN — Medication 400 MG: at 17:32

## 2022-08-21 RX ADMIN — ASPIRIN 81 MG CHEWABLE TABLET 81 MG: 81 TABLET CHEWABLE at 08:03

## 2022-08-21 RX ADMIN — TAMSULOSIN HYDROCHLORIDE 0.8 MG: 0.4 CAPSULE ORAL at 08:03

## 2022-08-21 RX ADMIN — SODIUM CHLORIDE, PRESERVATIVE FREE 10 ML: 5 INJECTION INTRAVENOUS at 05:46

## 2022-08-21 RX ADMIN — TRAMADOL HYDROCHLORIDE 100 MG: 50 TABLET, COATED ORAL at 22:01

## 2022-08-21 RX ADMIN — POTASSIUM CHLORIDE 40 MEQ: 750 TABLET, FILM COATED, EXTENDED RELEASE ORAL at 08:03

## 2022-08-21 RX ADMIN — SODIUM CHLORIDE, PRESERVATIVE FREE 10 ML: 5 INJECTION INTRAVENOUS at 21:56

## 2022-08-21 RX ADMIN — FUROSEMIDE 40 MG: 40 TABLET ORAL at 08:02

## 2022-08-21 RX ADMIN — OXYCODONE AND ACETAMINOPHEN 2 TABLET: 5; 325 TABLET ORAL at 08:30

## 2022-08-21 RX ADMIN — SODIUM CHLORIDE, PRESERVATIVE FREE 10 ML: 5 INJECTION INTRAVENOUS at 05:47

## 2022-08-21 RX ADMIN — FINASTERIDE 5 MG: 5 TABLET, FILM COATED ORAL at 21:58

## 2022-08-21 RX ADMIN — FAMOTIDINE 20 MG: 20 TABLET ORAL at 21:56

## 2022-08-21 RX ADMIN — TAMSULOSIN HYDROCHLORIDE 0.8 MG: 0.4 CAPSULE ORAL at 21:56

## 2022-08-21 RX ADMIN — FAMOTIDINE 20 MG: 20 TABLET ORAL at 08:03

## 2022-08-21 RX ADMIN — SENNOSIDES AND DOCUSATE SODIUM 1 TABLET: 50; 8.6 TABLET ORAL at 08:03

## 2022-08-21 NOTE — PROGRESS NOTES
Problem: Mobility Impaired (Adult and Pediatric)  Goal: *Acute Goals and Plan of Care (Insert Text)  Description: FUNCTIONAL STATUS PRIOR TO ADMISSION: Patient was independent and active without use of DME.    HOME SUPPORT PRIOR TO ADMISSION: The patient lived with his wife and also has supportive daughters. Physical Therapy Goals  Initiated 8/19/2022  1. Patient will move from supine to sit and sit to supine , scoot up and down, and roll side to side in bed with modified independence within 7 day(s). 2.  Patient will transfer from bed to chair and chair to bed with modified independence using the least restrictive device within 7 day(s). 3.  Patient will perform sit to stand with modified independence within 7 day(s). 4.  Patient will ambulate with modified independence for 150 feet with the least restrictive device within 7 day(s). 5.  Patient will ascend/descend 5 stairs with right handrail(s) with modified independence within 7 day(s). Outcome: Progressing Towards Goal     PHYSICAL THERAPY TREATMENT  Patient: Michael Li (4 y.o. male)  Date: 8/21/2022  Diagnosis: Aortic stenosis [I35.0] <principal problem not specified>  Procedure(s) (LRB):  TRANSCATHETER AORTIC VALVE REPLACEMENT VALVE IN VALVE RIGHT TRANSFEMORAL 26 EVOLUT POSSIBLE SENTINEL (Bilateral) 3 Days Post-Op  Precautions: Fall  Chart, physical therapy assessment, plan of care and goals were reviewed. ASSESSMENT  Patient continues with skilled PT services and is progressing towards goals. Found sitting in the chair. Continues to be on CBI (continuous bladder irrigation). Patient demonstrated sit to stand with min assist x 2, needing min assist and RW to stabilize. Patient is eager to walk today, however low BP (74/49) is limiting safe progress. Patient seated and able to perform some APs and marching which brought SBP back to 90. However in standing, SBP dropped into the 70s again.  Patient is not reporting dizziness, but he is pale and sways in standing. Patient demonstrated some forward steps, backwards steps, marching in place and toe ups. He experience some loose bowels after standing approx 6 minutes. Assisted stand step to bedside commode. Patient will be ready for walking in the hallway once BPs stabilize. Nursing notified. Current Level of Function Impacting Discharge (mobility/balance): min assist and RW to keep standing balance    Other factors to consider for discharge: previously independent, cardiac arrest during surgery         PLAN :  Patient continues to benefit from skilled intervention to address the above impairments. Continue treatment per established plan of care. to address goals. Recommendation for discharge: (in order for the patient to meet his/her long term goals)  Physical therapy at least 2 days/week in the home AND ensure assist and/or supervision for safety with mobility, pending progress with acute care PT    This discharge recommendation:  Has not yet been discussed the attending provider and/or case management    IF patient discharges home will need the following DME: bedside commode and rolling walker? SUBJECTIVE:   Patient stated Let's just walk. If I pass out, I pass out.     OBJECTIVE DATA SUMMARY:   Critical Behavior:  Neurologic State: Alert  Orientation Level: Oriented X4  Cognition: Follows commands  Safety/Judgement: Awareness of environment  Functional Mobility Training:    Transfers:  Sit to Stand: Minimum assistance;Assist x1;Adaptive equipment  Stand to Sit: Minimum assistance;Assist x1;Adaptive equipment    Balance:  Standing: Impaired; With support  Standing - Static: Fair  Standing - Dynamic : Fair    Ambulation/Gait Training:  Distance (ft): 5 Feet (ft)  Assistive Device: Gait belt;Walker, rolling  Ambulation - Level of Assistance: Minimal assistance;Assist x2  Gait Abnormalities: Decreased step clearance;Trunk sway increased  Base of Support: Narrowed; Center of gravity altered      Therapeutic Exercises:   Standing marching, toe ups  Seated LAQ, APs, and marching    Pain Rating:  No pain reported during treatment session    Activity Tolerance:   Fair, desaturates with exertion and requires oxygen, and signs and symptoms of orthostatic hypotension    After treatment patient left in no apparent distress:   Sitting in chair and Call bell within reach    COMMUNICATION/COLLABORATION:   The patients plan of care was discussed with: Registered nurse.      Muna Flores PT, DPT  Geriatric Clinical Specialist     Time Calculation: 24 mins

## 2022-08-21 NOTE — PROGRESS NOTES
[facility] ICU Progress Note    Admit Date: 2022  POD:  3 Day Post-Op    Procedure:  Procedure(s):  TRANSCATHETER AORTIC VALVE REPLACEMENT VALVE IN VALVE RIGHT TRANSFEMORAL 26 EVOLUT POSSIBLE SENTINEL        Subjective:   Pt seen with Dr. Blaine Frank. Afebrile, required 2L oxygen overnight. Objective:   Vitals:  Blood pressure (!) 83/51, pulse 96, temperature 98.4 °F (36.9 °C), resp. rate 18, height 5' 6\" (1.676 m), weight 188 lb 12.8 oz (85.6 kg), SpO2 94 %. Temp (24hrs), Av.4 °F (36.9 °C), Min:98.1 °F (36.7 °C), Max:98.6 °F (37 °C)    EKG/Rhythm:  V paced at 90 bpm    Oxygen Therapy:  Oxygen Therapy  O2 Sat (%): 94 % (22 1000)  O2 Device: Nasal cannula (22 0800)  O2 Flow Rate (L/min): 2 l/min (22 0800)  FIO2 (%): 40 % (22 2333)    CXR:   CXR Results  (Last 48 hours)      None              Admission Weight: Last Weight   Weight: 190 lb 14.7 oz (86.6 kg) Weight: 188 lb 12.8 oz (85.6 kg)     Intake / Output / Drain:  Current Shift:  0701 -  1900  In: 4000   Out: 6500 [Urine:6500]  Last 24 hrs.:   Intake/Output Summary (Last 24 hours) at 2022 1043  Last data filed at 2022 1000  Gross per 24 hour   Intake 25617 ml   Output 69322 ml   Net -07592 ml       EXAM:  General:  No acute distress. Up in the chair      Lungs:   Clear to auscultation bilaterally. Incision:  No erythema, drainage or swelling. Heart:  Regular rate and rhythm, S1, S2 normal, no click, rub or gallop. +DANNIE    Abdomen:   Soft, non-tender. Bowel sounds normal. No masses,  No organomegaly. Extremities:  No edema. PPP. Neurologic:  Urology:  Gross motor and sensory apparatus intact. Coude with bladder irrigation.  Urine light pink in tubing       Labs:   Recent Labs     22  0547 22  0544 22  0008 22  1756 22  1754   WBC  --  7.1   < >  --  7.9   HGB  --  8.0*   < >  --  10.2*   HCT  --  25.2*   < >  --  30.9*   PLT  --  95*   < >  --  84*     --    < >  -- 142   K 3.9  --    < >  --  3.8   BUN 18  --    < >  --  15   CREA 0.67*  --    < >  --  0.64*   *  --    < >  --  109*   GLUCPOC  --   --   --  103  --    INR  --   --   --   --  1.3*    < > = values in this interval not displayed. Assessment:     Active Problems: Aortic stenosis (8/18/2022)       Plan/Recommendations/Medical Decision Making: Aortic Stenosis/AI of bioprosthetic AV (Deep Dominguez #23 Jan 2015 Dr. Mookie Cleveland): s/p RTF 32 Evolut Radha with [place] protection on 8/18. ASA. EKG and Echo reviewed by Dr. 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 Echo done 8/19 showing eccentric MR jet-not clearly visualized and could be underestimated. Daily Lasix    Cardiac Arrest following TAVR placement with approximately 2 min of CPR with ROSC. Stable and off all vasoactive drips    Acute hypoxic respiratory failure requiring mechanical ventilation during cardiac Arrest: Now extubated. Residual atelectasis-increase activity, IS, KS > 92%. Diurese today and wean oxygen. CAD: ASA, Statin, ACEi    Afib s/p Cryomaze and LLAA with SSS and PPM. PPM rate increased to 90 bpm following arrest. Discussed with Dr. Dalton Gibbons and will drop pacer rate back to previous 60 bpm    HTN: Lisinopril, Lasix    HLD: Statin    DEL: has been unable to use CPAP in the last few months. He is treated for this at the [facility] [place] and I have asked him to follow up with them as an outpatient. GERD: PPI    Recent hospitalization for HF/COVID (DC on 7/13/22)    Hematuria: Likely combination or IV Heparin during procedure and urgent Hurd insertion in OR/possible trauma and Hx of Radiation therapy for prostate CA. Urology placed a 3 way Coude 8/19 for CBI. Improving. Wean as appropriate per Urology. Thrombocytopenia: Continue ASA for now. Continue to monitor. Anemia: secondary to acute blood loss following surgery. Currently above transfusion threshold. Monitor daily CBC. DVT prophylaxis: SCD     Dispo: PT/OT/Cardiac Rehab. Case Management for discharge planning. Will plan to assess functional status over the weekend and review Echo. Likely home with family in 1-2 days pending PT/OT recs.  Can transfer to [facility]      Signed By: Torrie Viera NP

## 2022-08-21 NOTE — PROGRESS NOTES
Progress Note  Date:2022       Room:[address] [place] [address]  Patient Name:Srinivas Whitney     Date of Birth:[birth year]     Age:81 y.o. Subjective    Subjective I'm in pain! Review of Systems  Objective         Vitals Last 24 Hours:  TEMPERATURE:  Temp  Av.4 °F (36.9 °C)  Min: 98.1 °F (36.7 °C)  Max: 98.6 °F (37 °C)  RESPIRATIONS RANGE: Resp  Av.8  Min: 9  Max: 21  PULSE OXIMETRY RANGE: SpO2  Av.4 %  Min: 77 %  Max: 95 %  PULSE RANGE: Pulse  Av.8  Min: 67  Max: 96  BLOOD PRESSURE RANGE: Systolic (18FJU), ZMR:029 , Min:83 , FAQ:502   ; Diastolic (32LZF), KYN:22, Min:51, Max:80    I/O (24Hr): Intake/Output Summary (Last 24 hours) at 2022 1159  Last data filed at 2022 1000  Gross per 24 hour   Intake 90980 ml   Output 35703 ml   Net -67350 ml     Objective  Labs/Imaging/Diagnostics    Labs:  CBC:  Recent Labs     22  0544 22  0328 22  0314   WBC 7.1 6.3 5.5   RBC 2.51* 2.57* 2.77*   HGB 8.0* 8.3* 8.7*   HCT 25.2* 25.7* 27.2*   .4* 100.0* 98.2   RDW 16.4* 16.5* 15.7*   PLT 95* 97* 86*     CHEMISTRIES:  Recent Labs     22  0547 22  0334 22  0314 22  0008 22  1754    140 143  --  142   K 3.9 4.0 4.0 4.5 3.8    107 110*  --  110*   CO2 26 28 23  --  24   BUN 18 19 15  --  15   CA 9.0 8.7 8.7  --  9.1   MG  --   --  2.1 2.1 2.0   PT/INR:  Recent Labs     22  1754   INR 1.3*     APTT:  Recent Labs     224   APTT 29.9     LIVER PROFILE:  Recent Labs     22  175   AST 27   ALT 23     Lab Results   Component Value Date/Time    ALT (SGPT) 23 2022 05:54 PM    AST (SGOT) 27 2022 05:54 PM    Alk. phosphatase 80 2022 05:54 PM    Bilirubin, direct 0.15 2022 08:00 AM    Bilirubin, total 0.8 2022 05:54 PM       Imaging Last 24 Hours:  No results found. Assessment//Plan   Active Problems:     Aortic stenosis (2022)    Gross hem- catheter acutely clogged. Assessment & Plan    With help of nursing staff, I irrigated by hand and retrieved some bulky clots. No more found with lengthy washing -   Hope this is the end of the clots   I don't perceive active bleeding - just old clots causing obstruction - they may be lysing and thus easier to get out now. Cont the CBI for 24 hrs   Check again tomorrow.       Electronically signed by Merle Murphy MD on 8/21/2022 at 11:59 AM

## 2022-08-21 NOTE — PROGRESS NOTES
2000- Bedside and Verbal shift change report given to Kylie (oncoming nurse) by Honey Gomez (offgoing nurse). Report included the following information SBAR, Procedure Summary, Intake/Output, Recent Results, Cardiac Rhythm afib, and Alarm Parameters . 2100- Intensivist notified that patient is in controled rate A fib, no new orders received. 2130- Cardiac surgery paged to notify of Afib, no call back yet. 0230- Spoke with New tr PA for CT surgery, notified of Afib, no new orders received. 0700- patient up to chair with assist moderate assist, up in chair, denies pain. O2 sat 87% with good pleath and a few minutes, 2L O2 applied. 0800- Bedside and Verbal shift change report given to jessica (oncoming nurse) by Fidelia (offgoing nurse). Report included the following information SBAR, Intake/Output, Recent Results, Cardiac Rhythm Afib, and Alarm Parameters .

## 2022-08-21 NOTE — PROGRESS NOTES
0745: Bedside and Verbal shift change report received from Gonzalo Osgood to [employer]. Report included the following information SBAR, Kardex, Intake/Output, MAR, Recent Results, Med Rec Status, Cardiac Rhythm Atrial fib, and Alarm Parameters . 0900: Cardiac surgery rounding at bedside, plan for transfer orders. 1913: Pt c/o pressure to bladder 9/10, ABD distended. Manual irrigation performed with 300 cc.    1000: Marnie Lowe MD with Urology MD at bedside, manual irrigation performed - Large amount of clots removed. Pt now reporting decrease to pain. ABD distention resolved. Bladder scan: 25 cc    1500: Pt walked with this RN and tech 100 ft from room and back. Gait belt in place throughout entirety of walk. VSS and on room air. O2 sats 94% following ambulation. 1945: Bedside and Verbal shift change report given to Zhou Schrader (oncoming nurse) by [employer] (offgoing nurse). Report included the following information SBAR, Intake/Output, MAR, Recent Results, Med Rec Status, Cardiac Rhythm Atrial fib, and Alarm Parameters .

## 2022-08-22 PROCEDURE — 74011250636 HC RX REV CODE- 250/636: Performed by: NURSE PRACTITIONER

## 2022-08-22 PROCEDURE — 65660000001 HC RM ICU INTERMED STEPDOWN

## 2022-08-22 PROCEDURE — 74011250637 HC RX REV CODE- 250/637: Performed by: NURSE PRACTITIONER

## 2022-08-22 PROCEDURE — 74011000250 HC RX REV CODE- 250: Performed by: NURSE PRACTITIONER

## 2022-08-22 PROCEDURE — 97110 THERAPEUTIC EXERCISES: CPT

## 2022-08-22 PROCEDURE — P9045 ALBUMIN (HUMAN), 5%, 250 ML: HCPCS | Performed by: NURSE PRACTITIONER

## 2022-08-22 PROCEDURE — APPSS45 APP SPLIT SHARED TIME 31-45 MINUTES: Performed by: NURSE PRACTITIONER

## 2022-08-22 PROCEDURE — 97116 GAIT TRAINING THERAPY: CPT

## 2022-08-22 RX ORDER — PANTOPRAZOLE SODIUM 40 MG/1
40 TABLET, DELAYED RELEASE ORAL
Status: DISCONTINUED | OUTPATIENT
Start: 2022-08-22 | End: 2022-08-24 | Stop reason: HOSPADM

## 2022-08-22 RX ORDER — ACETAMINOPHEN 325 MG/1
650 TABLET ORAL
Status: SHIPPED | COMMUNITY
Start: 2022-08-22

## 2022-08-22 RX ORDER — DOCUSATE SODIUM 100 MG/1
100 CAPSULE, LIQUID FILLED ORAL 2 TIMES DAILY
Status: DISCONTINUED | OUTPATIENT
Start: 2022-08-22 | End: 2022-08-24 | Stop reason: HOSPADM

## 2022-08-22 RX ORDER — SODIUM CHLORIDE 0.9 % (FLUSH) 0.9 %
5-40 SYRINGE (ML) INJECTION AS NEEDED
Status: DISCONTINUED | OUTPATIENT
Start: 2022-08-22 | End: 2022-08-24 | Stop reason: HOSPADM

## 2022-08-22 RX ORDER — ALBUMIN HUMAN 50 G/1000ML
12.5 SOLUTION INTRAVENOUS ONCE
Status: COMPLETED | OUTPATIENT
Start: 2022-08-22 | End: 2022-08-22

## 2022-08-22 RX ORDER — SODIUM CHLORIDE 0.9 % (FLUSH) 0.9 %
5-40 SYRINGE (ML) INJECTION EVERY 8 HOURS
Status: DISCONTINUED | OUTPATIENT
Start: 2022-08-22 | End: 2022-08-24 | Stop reason: HOSPADM

## 2022-08-22 RX ADMIN — PRAVASTATIN SODIUM 40 MG: 40 TABLET ORAL at 21:11

## 2022-08-22 RX ADMIN — TRAMADOL HYDROCHLORIDE 50 MG: 50 TABLET, COATED ORAL at 21:11

## 2022-08-22 RX ADMIN — FINASTERIDE 5 MG: 5 TABLET, FILM COATED ORAL at 21:11

## 2022-08-22 RX ADMIN — Medication 3 MG: at 21:11

## 2022-08-22 RX ADMIN — MORPHINE SULFATE 2 MG: 2 INJECTION, SOLUTION INTRAMUSCULAR; INTRAVENOUS at 04:57

## 2022-08-22 RX ADMIN — SODIUM CHLORIDE, PRESERVATIVE FREE 10 ML: 5 INJECTION INTRAVENOUS at 17:46

## 2022-08-22 RX ADMIN — SODIUM CHLORIDE, PRESERVATIVE FREE 10 ML: 5 INJECTION INTRAVENOUS at 22:00

## 2022-08-22 RX ADMIN — TAMSULOSIN HYDROCHLORIDE 0.8 MG: 0.4 CAPSULE ORAL at 21:11

## 2022-08-22 RX ADMIN — Medication 400 MG: at 08:33

## 2022-08-22 RX ADMIN — ACETAMINOPHEN 650 MG: 325 TABLET, FILM COATED ORAL at 19:14

## 2022-08-22 RX ADMIN — ALBUMIN (HUMAN) 12.5 G: 12.5 INJECTION, SOLUTION INTRAVENOUS at 12:12

## 2022-08-22 RX ADMIN — ASPIRIN 81 MG CHEWABLE TABLET 81 MG: 81 TABLET CHEWABLE at 08:34

## 2022-08-22 RX ADMIN — DOCUSATE SODIUM 100 MG: 100 CAPSULE, LIQUID FILLED ORAL at 08:34

## 2022-08-22 RX ADMIN — Medication 400 MG: at 17:44

## 2022-08-22 RX ADMIN — POTASSIUM CHLORIDE 40 MEQ: 750 TABLET, FILM COATED, EXTENDED RELEASE ORAL at 08:34

## 2022-08-22 RX ADMIN — SODIUM CHLORIDE, PRESERVATIVE FREE 10 ML: 5 INJECTION INTRAVENOUS at 08:38

## 2022-08-22 RX ADMIN — TAMSULOSIN HYDROCHLORIDE 0.8 MG: 0.4 CAPSULE ORAL at 08:34

## 2022-08-22 RX ADMIN — FUROSEMIDE 40 MG: 40 TABLET ORAL at 08:33

## 2022-08-22 RX ADMIN — PANTOPRAZOLE SODIUM 40 MG: 40 TABLET, DELAYED RELEASE ORAL at 11:23

## 2022-08-22 RX ADMIN — DOCUSATE SODIUM 100 MG: 100 CAPSULE, LIQUID FILLED ORAL at 17:44

## 2022-08-22 NOTE — PROGRESS NOTES
[address] visit attempted. Mr. Belita Gaucher was asleep. He is [employer]. No family member was with him at the time of the visit. Prayer for spiritual communion offered.     HELEN Keith, RN, ACSW, LCSW   Page:  [phone]

## 2022-08-22 NOTE — DISCHARGE SUMMARY
[facility] Discharge Summary     Patient ID:  Lurdes Torres  [id number]  63 y.o.  1940    Admit date: 8/18/2022    Discharge date: 8/24/2022     Admitting Physician: Micheal Bradley MD     Referring Cardiologist:  Dr. Ellie Chamberlain    PCP:  Aaron Villanueva MD     Admitting Diagnoses: Aortic Stenosis of bioprosthetic AV    Discharge Diagnoses: AS s/p Radha TAVR    Hospital Problems  Date Reviewed: 8/18/2022            Codes Class Noted POA    Aortic stenosis ICD-10-CM: I35.0  ICD-9-CM: 424.1  8/18/2022 Unknown           Discharged Condition: stable    Disposition: Home with Home Health    Procedures for this admission:  Procedure(s):  TRANSCATHETER AORTIC VALVE REPLACEMENT VALVE IN VALVE RIGHT TRANSFEMORAL 26 EVOLUT POSSIBLE SENTINEL    Discharge Medications:      My Medications        START taking these medications        Instructions Each Dose to Equal Morning Noon Evening Bedtime   furosemide 40 mg tablet  Commonly known as: LASIX    Your last dose was: Your next dose is: Take 40 mg by mouth daily. Take in AM   40 mg                 opium-belladonna 16.2-30 mg suppository  Commonly known as: B&O 15-A    Your last dose was: Your next dose is: Insert 1 Suppository into rectum every eight (8) hours as needed for Pain for up to 3 days. Max Daily Amount: 3 Suppositories. 1 Suppository                 oxyCODONE-acetaminophen 5-325 mg per tablet  Commonly known as: PERCOCET    Your last dose was: Your next dose is: Take 1 Tablet by mouth every four (4) hours as needed for Pain for up to 3 days. Max Daily Amount: 6 Tablets. 1 Tablet                        CHANGE how you take these medications        Instructions Each Dose to Equal Morning Noon Evening Bedtime   acetaminophen 325 mg tablet  Commonly known as: TYLENOL  What changed: how much to take    Your last dose was: Your next dose is: Take 2 Tablets by mouth every four (4) hours as needed for Pain.    650 mg CONTINUE taking these medications        Instructions Each Dose to Equal Morning Noon Evening Bedtime   aspirin delayed-release 81 mg tablet    Your last dose was: Your next dose is: Take 81 mg by mouth two (2) times a day. TAKES ONE IN MORNING AND TWO IN EVENING (PER PATIENT AS OF 8/12/22)   81 mg                 cholecalciferol (1000 Units /25 mcg) tablet  Commonly known as: VITAMIN D3    Your last dose was: Your next dose is: Take 2,000 Units by mouth two (2) times a day. 2,000 Units                 docusate sodium 100 mg capsule  Commonly known as: COLACE    Your last dose was: Your next dose is: Take 100 mg by mouth two (2) times a day. 100 mg                 ferrous sulfate 325 mg (65 mg iron) Cper    Your last dose was: Your next dose is: Take 1 Tab by mouth every other day. 1 Tablet                 finasteride 5 mg tablet  Commonly known as: PROSCAR    Your last dose was: Your next dose is: Take 5 mg by mouth nightly. 5 mg                 glucosamine-chondroitin 750-600 mg Tab    Your last dose was: Your next dose is: Take 1 Tab by mouth daily. 1 Tablet                 ipratropium 17 mcg/actuation inhaler  Commonly known as: ATROVENT HFA    Your last dose was: Your next dose is: Take 2 Puffs by inhalation as needed. 2 Puff                 multivitamin tablet  Commonly known as: ONE A DAY    Your last dose was: Your next dose is: Take 1 Tab by mouth daily. 1 Tablet                 omega-3 fatty acids-vitamin e 1,000 mg Cap    Your last dose was: Your next dose is: Take 1 Cap by mouth every other day. 1 Capsule                 omeprazole 40 mg capsule  Commonly known as: PRILOSEC    Your last dose was: Your next dose is: Take 40 mg by mouth in the morning.    40 mg                 polyvinyl alcohol 1.4 % ophthalmic solution  Commonly known as: [address] last dose was: Your next dose is:         Administer 1 Drop to both eyes as needed. 1 Drop                 potassium chloride 20 mEq tablet  Commonly known as: K-DUR, KLOR-CON M20    Your last dose was: Your next dose is: Take 2 Tablets by mouth daily. 40 mEq                 pravastatin 40 mg tablet  Commonly known as: PRAVACHOL    Your last dose was: Your next dose is: Take 40 mg by mouth nightly. 40 mg                 tamsulosin 0.4 mg capsule  Commonly known as: FLOMAX    Your last dose was: Your next dose is: Take 0.8 mg by mouth two (2) times a day. 0.8 mg                        STOP taking these medications      lisinopriL 40 mg tablet  Commonly known as: Horneyesy Carey                  Where to Get Your Medications        These medications were sent to [facility], [address] [address], [facility]      Phone: [phone]   opium-belladonna 16.2-30 mg suppository  oxyCODONE-acetaminophen 5-325 mg per tablet       HPI: Copied from H&P dated 8/18/22    79 y.o. man with a history of AS s/p AVR in Jan 2015 with Dr. Mert Rodriguez #23 Deep Mitroflow, MVr, CryoMaze, LLAA, CAD, Afib and SSS with PPM, HTN, HLD, DEL, GERD, Recent admission for HF/COVID (7/13/22), that is referred to the [address] [place] [address] by Dr. Yaya Lopez for interventional evaluation of  Aortic Stenosis of Bioprosthetic AV. Patient arrives to the clinic visit with his wife and daughters. He used to go to the gym daily but hasn't done that for 3 years. Now he doesn't do any exercise. His normal activity consists of getting around the house and doing some small chores. He is dyspneic with activity such as stairs or walking too quickly. He feels fatigued some days of the week. Some nights he needs to sleep propped up on pillows but not all the time. He has LE edema but this has improved since starting Lasix.   He is keeping track of his weight which was up to 207 lbs now down to 188 lbs. He thinks his dry weight is closer to 180-185. Denies palpitations, CP, dizziness, syncope, fall, PND, medication changes in the last 3 months. He does not some blood in his stool at times but believes this is related to hemorrhoids. His hospitalization in July was related to HF symptoms. 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 He is  and his wife and daughters can help following a surgery. He does not smoke or use recreational drugs. He rarely drinks alcohol. He is independent in his ADLs. Hospital Course: Nahid Kulkarni was taken to the OR on 8/18/22 for a transcatheter valve in valve aortic valve replacement with a 26 Evolut via the RTF approach. His procedure was complicated by severe transient intraoperative hypotension. He required a brief period of CPR and intubation. 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 He was taken from the OR to the CVICU in stable condition on Precedex. During his admission, he was also seen by Urology due to gross hematuria and this was treated with CBI. He was also seen by PT/OT who worked with him to increase his activity and learn more about postoperative care. He was felt stable for discharge on 08/24/22 . He was discharged to home with home health and his family with the following assessment and plan: Aortic Stenosis/AI of bioprosthetic AV (Deep Mitdoug #23 Jan 2015 Dr. Sreekanth Ott): s/p RTF 32 Evolut Radha with [place] protection on 8/18. ASA. EKG and Echo reviewed by Dr. José Payan     Hx of MV repair:Mitral Ring in place. 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 Echo done 8/19 showing eccentric MR jet-not clearly visualized and could be underestimated. Daily Lasix     Cardiac Arrest following TAVR placement with approximately 2 min of CPR with ROSC.   Stable and off all vasoactive drips     Acute hypoxic respiratory failure requiring mechanical ventilation during cardiac Arrest: Now extubated. Residual atelectasis-increase activity, IS, KS > 92%. Diurese today and wean oxygen. CAD: ASA, Statin, ACEi     Afib s/p Cryomaze and LLAA with SSS and PPM. PPM rate increased to 90 bpm following arrest. Discussed with Dr. Paul Cotto and will drop pacer rate back to previous 60 bpm     HTN: Lisinopril, Lasix PTA. Has been hypotensive. Lisinopril dose decreased. HLD: Statin     DEL: has been unable to use CPAP in the last few months. He is treated for this at the [place] and I have asked him to follow up with them as an outpatient. GERD: PPI     Recent hospitalization for HF/COVID (DC on 7/13/22)     Hematuria: Likely combination or IV Heparin during procedure and urgent Hurd insertion in OR/possible trauma and Hx of Radiation therapy for prostate CA. Urology placed a 3 way Coude 8/19 for CBI. Improving. Wean as appropriate per Urology. Thrombocytopenia: Continue ASA for now. Continue to monitor. Anemia: secondary to acute blood loss following surgery. Currently above transfusion threshold. Monitor daily CBC. DVT prophylaxis: SCD       Referral to outpatient cardiac rehab made.      Discharge Vital Signs: Visit Vitals  BP (!) 137/54 (BP 1 Location: Left arm, BP Patient Position: At rest)   Pulse 62   Temp 98.5 °F (36.9 °C)   Resp 18   Ht 5' 6\" (1.676 m)   Wt 190 lb 0.6 oz (86.2 kg)   SpO2 95%   BMI 30.67 kg/m²       Labs:   Recent Labs     08/24/22  0432   WBC 5.4   HGB 8.1*   HCT 24.9*   *      K 4.5   BUN 20   CREA 0.63*   *       Diagnostics:   EKG RESULTS       Procedure Component Value Units Date/Time    EKG, 12 LEAD, INITIAL [[id number]] Collected: 08/19/22 1127    Order Status: Completed Updated: 08/19/22 1517     Ventricular Rate 90 BPM      Atrial Rate 88 BPM      QRS Duration 178 ms      Q-T Interval 436 ms      QTC Calculation (Bezet) 533 ms      Calculated R Axis -73 degrees      Calculated T Axis 93 degrees      Diagnosis --     Ventricular paced rhythm  Left axis deviation  Nonspecific intraventricular block  Possible Lateral infarct , age undetermined  Inferior infarct , age undetermined  When compared with ECG of 12-AUG-2022 10:16,  Wide QRS rhythm has replaced Electronic ventricular pacemaker  Confirmed by Lilia Weber MD, Medina Fonseca ([id number]) on 8/19/2022 3:17:47 PM      EKG, 12 LEAD, INITIAL [[id number]] Collected: 08/12/22 1016    Order Status: Completed Updated: 08/12/22 1725     Ventricular Rate 63 BPM      QRS Duration 112 ms      Q-T Interval 426 ms      QTC Calculation (Bezet) 435 ms      Calculated R Axis 7 degrees      Calculated T Axis 71 degrees      Diagnosis --     Atrial fibrillation with frequent ventricular-paced complexes  Nonspecific ST and T wave abnormality  Abnormal ECG  When compared with ECG of 10-JUL-2022 12:06,  Electronic ventricular pacemaker has replaced Atrial fibrillation  Confirmed by Curtis Leblanc ([id number]) on 8/12/2022 5:25:20 PM            08/18/22    ECHO ADULT COMPLETE 08/19/2022 8/19/2022    Interpretation Summary  Formatting of this result is different from the original.      Left Ventricle: Preserved left ventricular systolic function with a visually estimated EF of 50 - 55%. Left ventricle size is normal. Increased wall thickness. Findings consistent with mild concentric hypertrophy. Normal wall motion. Right Ventricle: Right ventricle is mildly dilated. Aortic Valve: Medtronic transcatheter bioprosthetic valve that is well-seated with a size of 26 mm. Mild patient prosthesis mismatch with EOA of 1.2cm2 and AV mean gradient is 21 mmHg. .    Mitral Valve: Valve repaired by annular ring. Thickened leaflets. Previously placed ring appears to be dehisced. MR jest is eccentric and is not clearly visualized and may be underestimated. Tricuspid Valve: Moderate regurgitation. The estimated RVSP is 40 mmHg. Left Atrium: Left atrium is severely dilated.     Right Atrium: Right atrium is moderately dilated. It appears his prosthetic AV is more stenotic than last year and will need evaluation in valve clinic ASAP. It has progressed quickly from last year. Signed by: David Larios MD on 8/19/2022  5:30 PM        Patient Instructions/Follow Up Care:  Discharge instructions were reviewed with the patient and family present. Questions were also answered at this time. Prescriptions and medications were reviewed. The patient has a follow up appointment with the Nurse Practitioner or Physician's Assistant on 8/26/22 at 10 am by telephone and with Dr. Sheila Escalante on 9/22/22 at 11am following your Echo at 10am. The patient was also instructed to follow up with his primary care physician as needed. The patient and family were encouraged to call with any questions or concerns.        Signed:  Skylar Duckworth NP  8/24/2022  3:42 PM

## 2022-08-22 NOTE — PROGRESS NOTES
Physician Progress Note      Juanjo Ward  CSN #:                  [id number]  :                       1940  ADMIT DATE:       2022 7:17 AM  DISCH DATE:  RESPONDING  PROVIDER #:        Gisselle Su NP          QUERY TEXT:    Noted documentation of Acute on Chronic Diastolic CHF per Cardiology Consultant in the  and  Progress Notes. However there is also documentation of pt not currently demonstrating any symptoms of CHF. If possible, please document in progress notes and discharge summary if you are evaluating and /or treating any of the following: The medical record reflects the following:  Risk Factors: hx of diastolic CHF, underwent TAVR, post procedure Cardiac arrest  Clinical Indicators: admitted for Aortic Stenosis and underwent TAVR that was complicated by Cardiac Arrest. Echo showing EF 50-55%, documentation of clear lung sounds, no edema in BLE.  CXR showing cardiomegaly and atelectasis. No documentation of any signs of fluid or volume overload. Cardiology consultant has documented 'A/C Diastolic CHF' in the - Progress notes, however there is also documentation of 'currently does not demonstrate any s/s CHF'. Treatment: Lasix po, Prinivil po, I/O, ICU monitoring, Cardiology following. Thank you,  Rubi Guaman RN  Clinical Documentation'[phone]  Options provided:  -- Acute on Chronic Diastolic CHF confirmed  -- Acute on Chronic Diastolic CHF ruled out  -- Other - I will add my own diagnosis  -- Disagree - Not applicable / Not valid  -- Disagree - Clinically unable to determine / Unknown  -- Refer to Clinical Documentation Reviewer    PROVIDER RESPONSE TEXT:    After study, Acute on Chronic Diastolic CHF confirmed.     Query created by: Douglas Hannon on 2022 3:05 PM      Electronically signed by:  Gisselle Su NP 2022 3:20 PM

## 2022-08-22 NOTE — PROGRESS NOTES
Problem: Mobility Impaired (Adult and Pediatric)  Goal: *Acute Goals and Plan of Care (Insert Text)  Description: FUNCTIONAL STATUS PRIOR TO ADMISSION: Patient was independent and active without use of DME.    HOME SUPPORT PRIOR TO ADMISSION: The patient lived with his wife and also has supportive daughters. Physical Therapy Goals  Initiated 8/19/2022  1. Patient will move from supine to sit and sit to supine , scoot up and down, and roll side to side in bed with modified independence within 7 day(s). 2.  Patient will transfer from bed to chair and chair to bed with modified independence using the least restrictive device within 7 day(s). 3.  Patient will perform sit to stand with modified independence within 7 day(s). 4.  Patient will ambulate with modified independence for 150 feet with the least restrictive device within 7 day(s). 5.  Patient will ascend/descend 5 stairs with right handrail(s) with modified independence within 7 day(s). Outcome: Progressing Towards Goal   PHYSICAL THERAPY TREATMENT  Patient: Hayley Meza (42 y.o. male)  Date: 8/22/2022  Diagnosis: Aortic stenosis [I35.0] <principal problem not specified>  Procedure(s) (LRB):  TRANSCATHETER AORTIC VALVE REPLACEMENT VALVE IN VALVE RIGHT TRANSFEMORAL 26 EVOLUT POSSIBLE SENTINEL (Bilateral) 4 Days Post-Op  Precautions: Fall  Chart, physical therapy assessment, plan of care and goals were reviewed. ASSESSMENT  Patient continues with skilled PT services and is progressing towards goals. Patient received sitting in chair, agreeable to therapy. He was able to progress with gait but still technically orthostatic with major drop in DBP as below. Patient asymptomatic throughout mobility and improved with anita and step clearance during ambulation. Intermittently impulsive, taking hands off RW. Improved with transfers, requiring CGA only today.  Educated on need for RW use while at home and recommend that family be around for assist with transfers and household management/mobility as patient's wife is not able to assist him (has limited mobility herself). Vitals:    08/22/22 1055 08/22/22 1058 08/22/22 1102 08/22/22 1110   BP: 99/70 (!) 89/52 (!) 85/55 (!) 91/57   BP 2:       BP 1 Location: Right upper arm Right upper arm Right upper arm Right upper arm   BP Patient Position: Sitting Standing Standing;Walking Sitting   Pulse:       Pulse 2:       Temp:       Resp:       Height:       Weight:       SpO2:             Current Level of Function Impacting Discharge (mobility/balance): CGA    Other factors to consider for discharge: RW use, fall PTA, wife elderly and unable to physically assist         PLAN :  Patient continues to benefit from skilled intervention to address the above impairments. Continue treatment per established plan of care. to address goals. Recommendation for discharge: (in order for the patient to meet his/her long term goals)  Physical therapy at least 2 days/week in the home AND ensure assist and/or supervision for safety with household mobility/management    This discharge recommendation:  Has been made in collaboration with the attending provider and/or case management    IF patient discharges home will need the following DME: patient owns DME required for discharge       SUBJECTIVE:   Patient stated Did you know I had heart surgery?     OBJECTIVE DATA SUMMARY:   Critical Behavior:  Neurologic State: Alert, Appropriate for age  Orientation Level: Oriented X4  Cognition: Appropriate decision making, Appropriate for age attention/concentration, Appropriate safety awareness, Follows commands, Recognition of people/places  Safety/Judgement: Awareness of environment  Functional Mobility Training:  Transfers:  Sit to Stand: Contact guard assistance  Stand to Sit: Contact guard assistance  Balance:  Sitting: Intact; With support  Standing: Impaired; With support  Standing - Static: Good;Fair  Standing - Dynamic : Fair  Ambulation/Gait Training:  Distance (ft): 160 Feet (ft)  Assistive Device: Gait belt;Walker, rolling  Ambulation - Level of Assistance: Contact guard assistance  Gait Abnormalities: Decreased step clearance  Base of Support: Widened  Speed/Annie: Pace decreased (<100 feet/min)  Step Length: Right shortened;Left shortened  Therapeutic Exercises:   Sit<>stand x4  Pain Rating:  Pain with coughing    Activity Tolerance:   Good and SpO2 stable on RA    After treatment patient left in no apparent distress:   Sitting in chair and Call bell within reach    COMMUNICATION/COLLABORATION:   The patients plan of care was discussed with: Occupational therapist, Registered nurse, and Case management.      Odalys Travis PT, DPT   Time Calculation: 25 mins

## 2022-08-22 NOTE — CONSULTS
[facility]  Cardiology Care Note                  [x]Initial visit     []Established visit     Patient Name: Tennille Feng - LZU:[id number]  Primary Cardiologist: Kyara Miranda MD  Consulting Cardiologist: Miriam Mcnally MD         Reason for consult: AS    HPI:   Michael Li is a 79 y.o. male with atrial fibrillation, CAD, hypertension, AS, MVR/TVR, dyslipidemia, prostate cancer/radiation proctitis and GI bleeding, s/p Watchman LAAC implant, s/p single chamber ppm for sinus pauses. He is followed by  Dr Anastasia Marshall who saw him recently with increasing shortness of breath as well as elevated transvalvular gradients across his aortic bioprosthesis. .   Pt reports b/l leg swelling L>R progressively worsening for past 6wks. Also notes over past few weeks w/ dry cough, generalized fatigue. Did not report of any other new symptoms. Subjective: Pain in bladder. No dyspnea. Extubated last night     Assessment and Plan     1. Acute on chronic diastolic heart failure  2. 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 Long-term persistent atrial fibrillation  4. History of GI bleed with poor tolerance to long-term oral anticoagulation now status post watchman based left atrial appendage occlusion. 5.  Pulmonary hypertension  6. History of sick sinus syndrome  7. MR repair with dr Day Kim in 2015 with recurrent significant/severe mitral regurgitation with ring dehiscence. Mr. Grant Rockwell is admitted for TF TAVR for bioprosthetic valve failure. He underwent 26 mm evolute prosthesis placement inside his previous surgical bioprosthesis. Procedure was complicated by transient severe hypotension requiring 2 L of CPR.   He was intubated yesterday for hemodynamic stabilization and now appears to be doing well    . Major concern appears to be painful urinary obstruction. He has a catheter entrance. [place] working medications. Echocardiogram awaited. Does have a soft flow murmur across the aortic prosthesis. No evidence of significant conduction delay on ECG. Notably valve was not potentially due to hemodynamic instability yesterday. If there is a concern of continued patient prosthesis mismatch, elevated gradients, may consider interval postdilatation balloon valvuloplasty. He has residual significant mitral regurgitation with ring/band dehiscence which would also require further evaluation as an outpatient. Currently he does not demonstrate any signs of congestive heart failure.           ____________________________________________________________    Cardiac testing  Echocardiogram June 2022: Reviewed. Normal LV function. Previously placed aortic bioprosthesis with increased transvalvular gradient in high 30s with LVOT to aortic VTI ratio of 0.15-0.18 suggesting severe aortic stenosis. Calculated EOA of 0.6 to 0.7 cm.  EF is about 50 to 55%. ECG: Atrial fibrillation, PVCs, nonspecific ST-T changes.     Review of Systems    [x]All other systems reviewed and all negative except as written in HPI    [] Patient unable to provide secondary to condition         Past Medical History:   Diagnosis Date    Arthritis     Atrial fibrillation (HCC)     CAD (coronary artery disease)     Cancer (HCC)     PROSTATE    Chronic pain     legs/knee    GERD (gastroesophageal reflux disease)     Hx of carcinoma in situ of prostate     Hyperlipidemia     Hypertension     Insomnia     DEL (obstructive sleep apnea)     Radiation proctitis     Vitamin D deficiency      Past Surgical History:   Procedure Laterality Date    COLONOSCOPY N/A 05/08/2020    COLONOSCOPY performed by Kelvin Plata MD at [facility] ENDOSCOPY    COLONOSCOPY N/A 06/08/2020    COLONOSCOPY performed by Dennis Rios MD at [facility] ENDOSCOPY    FLEXIBLE SIGMOIDOSCOPY N/A 11/23/2020    FLEXIBLE SIGMOIDOSCOPY WITH APC performed by Lamar Nevarez MD at [address] AORTIC VALVE REPLACEMENT  2014    and mitral valve repair, left atrial cryo maze    HX HEENT      melanoma removed head    HX HERNIA REPAIR  2009    Right    HX HERNIA REPAIR      left inguinal hernia repair    HX HERNIA REPAIR Left 12/01/2016    lap left inguinal hernia repair with mesh    HX KNEE REPLACEMENT      Bilateral    HX ORTHOPAEDIC      BILATERAL KNEE REPLACEMENT    HX ORTHOPAEDIC      CARPEL TUNNEL REPAIR-RIGHT    HX OTHER SURGICAL  2015    closure of patent foramen ovale    HX OTHER SURGICAL  10/2020    watchman implant for afib / Dr. Tonya Smith      42 radiation treatments     Social Hx:  reports that he has never smoked. He has never used smokeless tobacco. He reports current alcohol use of about 2.0 standard drinks per week. He reports that he does not use drugs. Family Hx: family history includes Cancer in his brother, father, and mother; No Known Problems in his brother, sister, sister, and sister. No Known Allergies       OBJECTIVE:  Wt Readings from Last 3 Encounters:   08/21/22 188 lb 12.8 oz (85.6 kg)   08/12/22 190 lb 14.7 oz (86.6 kg)   08/04/22 188 lb (85.3 kg)       Intake/Output Summary (Last 24 hours) at 8/21/2022 2232  Last data filed at 8/21/2022 2000  Gross per 24 hour   Intake 18432 ml   Output 63965 ml   Net -62071 ml           Physical Exam    Vitals:   Vitals:    08/21/22 1900 08/21/22 2000 08/21/22 2052 08/21/22 2100   BP: (!) 111/52 (!) 90/43 99/66 (!) 96/58   Pulse: 97 75 77 70   Resp: 17 13 18 13   Temp:  98.5 °F (36.9 °C)     SpO2: 96% 94% (!) 88% 94%   Weight:       Height:         Telemetry: AFIB    BP (!) 96/58   Pulse 70   Temp 98.5 °F (36.9 °C)   Resp 13   Ht 5' 6\" (1.676 m)   Wt 188 lb 12.8 oz (85.6 kg)   SpO2 94%   BMI 30.47 kg/m²   General:    Alert, cooperative, no distress.    Psychiatric:    Normal Mood and affect    Eye/ENT:      Pupils equal, No asymmetry, Conjunctival pink. Able to hear voice at normal amplitude   Lungs:      Visibly symmetric chest expansion, No palpable tenderness. Clear to auscultation bilaterally. Heart[de-identified]    Regular rate and rhythm, S1, S2 normal, MSM II/VI at RUSB. HSM at apex III/VI. No click, rub or gallop. No JVD, Normal palpable peripheral pulses. No cyanosis   Abdomen:     Soft, non-tender. Bowel sounds normal. No masses,  No      organomegaly. Extremities:   Extremities normal, atraumatic, no edema. Neurologic:   CN II-XII grossly intact. No gross focal deficits           Data Review:     Radiology:   XR Results (most recent):  Results from Hospital Encounter encounter on 08/18/22    XR CHEST PORT    Narrative  EXAM: XR CHEST PORT    INDICATION: Recent surgical fixation of aortic valve. Respiratory distress,  intubation. COMPARISON: CT angiography chest on 7/20/2022. Portable chest on 7/10/2022 and  9/3/2021. TECHNIQUE: Supine portable chest AP view    FINDINGS: Endotracheal tube terminates 5 cm proximal to the yokasta. [place]  catheter terminates in the main pulmonary artery. Aortic valve stent graft is  visible. Cardiac pacemaker battery pack and lead are unchanged. Cardiac  monitoring wires overlie the thorax. Cardiomegaly is unchanged. The pulmonary vasculature is within normal limits. Subtle bibasilar opacities most likely represent atelectasis. No pneumothorax. Osteopenia is unchanged. Impression  Postsurgical heart. Aortic valve stent graft. Bibasilar atelectasis. Endotracheal tube is in good position. No pneumothorax. CT Results (most recent):  Results from Hospital Encounter encounter on 07/20/22    CTA ABD PELV W WO CONT    Narrative  CLINICAL HISTORY: Aortic valve stenosis    COMPARISON: September 2020    TECHNIQUE: Arterial phase CT of the chest, abdomen, and pelvis according to the  departmental TAVR protocol was performed.  3-D MIP and volume rendered  reformations were created in the oblique, coronal and sagittal planes. Manual  post-processing of the images was also performed. Multiplanar reformatted  imaging was performed. CT dose reduction was achieved through use of a  standardized protocol tailored for this examination and automatic exposure  control for dose modulation. Adaptive statistical iterative reconstruction  (ASIR) was utilized. Contrast: 120 cc Isovue 370      FINDINGS:  CHEST: Thyroid gland is normal. Left chest wall single-lead pacemaker. Moderate  cardiomegaly. Mitral valve replacement. Aortic valve replacement. No pericardial  effusion. Left atrial appendage occlusion device in place but there is contrast  opacifying the left atrial appendage around the Watchman. No mediastinal  lymphadenopathy. No axillary lymphadenopathy. No pleural effusion or  pneumothorax. Mild mucous plugging in the medial left lower lobe with peripheral  atelectasis. ABDOMEN: Dilated IVC and hepatic veins consistent with right heart dysfunction. Scattered hepatic hypodensities too small to characterize, likely cysts. Gallbladder contains a small stone. No significant distention. Common bile duct  is normal in caliber. Spleen contains calcified granulomas without a solid  lesion. Adrenal glands are normal. Kidneys contain simple cysts bilaterally not  requiring further follow-up. No hydronephrosis. The bowel is normal in caliber. Appendix is normal. No ascites or lymphadenopathy. No aortic aneurysm. PELVIS: No free fluid in the pelvis. Fiducial markers in the prostate. Urinary  bladder is normal.    BONES: Degenerative changes in the thoracolumbar spine with endplate osteophyte  formation and grade 1 anterolisthesis L4 and L5.  Minimal retrolisthesis of L3 on  L4 and L1 on L2.    TAVR Measurements:  Aortic Annulus: 30 x 27 mm  Right coronary artery height: 12.6 mm  Left coronary artery height: 11.9 mm  Sinotubular Junction: 33 x 32 mm  Ascending Aorta: 38 x 38 mm  Narrowest measurements of the:  Abdominal aorta: 19 x 19 mm  Right common iliac artery: 14 x 12 mm  Right external iliac artery: 9 x 8 mm  Right common femoral artery: 12 x 10 mm  Left common iliac artery: 15 x 12 mm  Left external iliac artery: 8 x 8 mm  Left common femoral artery: 11 x 10 mm  Tortuous iliac arteries bilaterally. Impression  1. Aortic and mitral valve replacements. 2.  Left atrial appendage occlusion device with flow around the Watchman  opacifying the left atrial appendage. 3.  Cardiomegaly and reflux of contrast into the hepatic veins and IVC  consistent with right heart dysfunction. 4.  Renal and hepatic cysts. MRI Results (most recent):  No results found for this or any previous visit. No results for input(s): CPK, TROIQ in the last 72 hours. No lab exists for component: CKQMB, CPKMB, BMPP  Recent Labs     08/21/22  0547 08/20/22  0334    140   K 3.9 4.0    107   CO2 26 28   BUN 18 19   CREA 0.67* 0.85   * 102*   CA 9.0 8.7       Recent Labs     08/21/22  0544 08/20/22  0328   WBC 7.1 6.3   HGB 8.0* 8.3*   HCT 25.2* 25.7*   PLT 95* 97*       No results for input(s): PTP, INR, AP, INREXT, INREXT in the last 72 hours. No lab exists for component: PTTP, GPT, SGOT    No results for input(s): CHOL, LDLC in the last 72 hours. No lab exists for component: TGL, HDLC,  HBA1C  No results for input(s): CRP, TSH, TSHEXT, TSHEXT in the last 72 hours.     No lab exists for component: ESR        Current meds:    Current Facility-Administered Medications:     lisinopriL (PRINIVIL, ZESTRIL) tablet 20 mg, 20 mg, Oral, QHS, Maryuri Apple NP, 20 mg at 08/21/22 2156    opium-belladonna (B&O 15-A) 16.2-30 mg suppository 1 Suppository, 1 Suppository, Rectal, Q8H PRN, Ruth Pound, NP, 1 Suppository at 08/20/22 1903    melatonin tablet 3 mg, 3 mg, Oral, QHS PRN, Jeovanny GARCIA MD, 3 mg at 08/19/22 2129    finasteride (PROSCAR) tablet 5 mg, 5 mg, Oral, QHS, Ambika, Maryuri N, NP, 5 mg at 08/21/22 2158    furosemide (LASIX) tablet 40 mg, 40 mg, Oral, DAILY, Robbert Prima, NP, 40 mg at 08/21/22 0802    potassium chloride SR (KLOR-CON 10) tablet 40 mEq, 40 mEq, Oral, DAILY, Robbert Prima, NP, 40 mEq at 08/21/22 0803    pravastatin (PRAVACHOL) tablet 40 mg, 40 mg, Oral, QHS, Ambika, Izora Midget, NP, 40 mg at 08/21/22 2156    tamsulosin (FLOMAX) capsule 0.8 mg, 0.8 mg, Oral, BID, Gearldine Sprinkle N, NP, 0.8 mg at 08/21/22 2156    sodium chloride (NS) flush 5-40 mL, 5-40 mL, IntraVENous, Q8H, Ambika, Maryuri N, NP, 10 mL at 08/21/22 2156    sodium chloride (NS) flush 5-40 mL, 5-40 mL, IntraVENous, PRN, Robbert Prima, NP, 10 mL at 08/21/22 0547    acetaminophen (TYLENOL) tablet 650 mg, 650 mg, Oral, Q4H PRN, Robbert Prima, NP    traMADoL Carmel Barajas) tablet  mg,  mg, Oral, Q6H PRN, Robbert Prima, NP, 100 mg at 08/21/22 2201    oxyCODONE-acetaminophen (PERCOCET) 5-325 mg per tablet 1-2 Tablet, 1-2 Tablet, Oral, Q4H PRN, Robbert Prima, NP, 2 Tablet at 08/21/22 0830    morphine injection 2 mg, 2 mg, IntraVENous, Q2H PRN, Robbert Prima, NP, 2 mg at 08/20/22 1335    naloxone [facility]) injection 0.4 mg, 0.4 mg, IntraVENous, PRN, Robbert Prima, NP    ondansetron (ZOFRAN) injection 4 mg, 4 mg, IntraVENous, Q4H PRN, Robbert Prima, NP    albuterol (PROVENTIL VENTOLIN) nebulizer solution 2.5 mg, 2.5 mg, Nebulization, Q4H PRN, Robbert Prima, NP    aspirin chewable tablet 81 mg, 81 mg, Oral, DAILY, Joan Skaggs NP, 81 mg at 08/21/22 0803    famotidine (PEPCID) tablet 20 mg, 20 mg, Oral, Q12H, Maryuri Apple NP, 20 mg at 08/21/22 2156    magnesium oxide (MAG-OX) tablet 400 mg, 400 mg, Oral, BID, Joan Skaggs NP, 400 mg at 08/21/22 1732    calcium chloride 1 g in 0.9% sodium chloride 250 mL IVPB, 1 g, IntraVENous, PRN, Joan Skaggs NP    bisacodyL (DULCOLAX) suppository 10 mg, 10 mg, Rectal, DAILY PRN, Joan Skaggs NP senna-docusate (PERICOLACE) 8.6-50 mg per tablet 1 Tablet, 1 Tablet, Oral, BID, Cheryle Monday, NP, 1 Tablet at 08/21/22 0803    ELECTROLYTE REPLACEMENT NOTE: Nurse to review Serum Potassium and Magnesuim levels and Initiate Electrolyte Replacement Protocol as needed, 1 Each, Other, PRN, Cheryle Monday, NP    dexmedeTOMidine in 0.9 % NaCl (PRECEDEX) 400 mcg/100 mL (4 mcg/mL) infusion soln, 0.1-1.5 mcg/kg/hr, IntraVENous, TITRATE, Mumtaz Gordon DO, Last Rate: 8.7 mL/hr at 08/18/22 2218, 0.4 mcg/kg/hr at 08/18/22 2218    propofol (DIPRIVAN) 10 mg/mL infusion, 0-50 mcg/kg/min, IntraVENous, TITRATE, Chay Miller DO    PHENYLephrine (JOSE ANTONIO-SYNEPHRINE) 30 mg in 0.9% sodium chloride 250 mL infusion,  mcg/min, IntraVENous, TITRATE, Mumtaz Gordon DO, Stopped at 08/18/22 1900    0.9% sodium chloride infusion, 9 mL/hr, IntraVENous, CONTINUOUS, Renan Patel MD, Last Rate: 9 mL/hr at 08/18/22 1955, 9 mL/hr at 08/18/22 1955    0.45% sodium chloride infusion, 10 mL/hr, IntraVENous, CONTINUOUS, Karen Solis MD, Last Rate: 10 mL/hr at 08/18/22 1954, 10 mL/hr at 08/18/22 1954    ELECTROLYTE REPLACEMENT PROTOCOL - Potassium and Magnesium, 1 Each, Other, PRN, Karen Solis MD    niCARdipine (CARDENE) 25 mg in 0.9% sodium chloride 250 mL ([id number]), 0-15 mg/hr, IntraVENous, TITRATE, Callie Gamez MD, Stopped at 08/18/22 [address], MD    [facility] [facility] [address]  [place], [facility]  [phone]      Formerly [facility] MD Kal

## 2022-08-22 NOTE — PROGRESS NOTES
: Bedside and Verbal shift change report given to Gentry Cooper (oncoming nurse) by Jean Carlos Garcia (offgoing nurse). Report included the following information SBAR, Intake/Output, MAR, Recent Results, Med Rec Status, Cardiac Rhythm Atrial fib, and Alarm Parameters . 0630 Trickle flow on CBI. Urine stopped flowing. Flushed finnegan and three small clots pushed through tip of finnegan. After the flush urine turned back to clear. [facility] NP bedside      0800: Bedside and Verbal shift change report given to TARAοsylwiaιbarbraώνalice Taylor (oncoming nurse) by Gentry Cooper (offgoing nurse). Report included the following information SBAR, Intake/Output, MAR, Recent Results, Med Rec Status, Cardiac Rhythm Atrial fib, and Alarm Parameters .

## 2022-08-22 NOTE — PROGRESS NOTES
[facility] ICU Progress Note    Admit Date: 2022  POD:  4 Day Post-Op    Procedure:  Procedure(s):  TRANSCATHETER AORTIC VALVE REPLACEMENT VALVE IN VALVE RIGHT TRANSFEMORAL 26 EVOLUT POSSIBLE SENTINEL        Subjective:   Pt seen with Dr. Melissa Glynn. Afebrile, room air. Feeling better today. Objective:   Vitals:  Blood pressure 98/61, pulse 69, temperature 98.5 °F (36.9 °C), resp. rate 13, height 5' 6\" (1.676 m), weight 188 lb 12.8 oz (85.6 kg), SpO2 93 %. Temp (24hrs), Av.3 °F (36.8 °C), Min:98 °F (36.7 °C), Max:98.5 °F (36.9 °C)    EKG/Rhythm:  Afib in the 60s    Oxygen Therapy:  Oxygen Therapy  O2 Sat (%): 93 % (22 0000)  O2 Device: None (Room air) (22 0000)  O2 Flow Rate (L/min): 1 l/min (22 1300)  FIO2 (%): 40 % (22 2333)    CXR:   CXR Results  (Last 48 hours)      None              Admission Weight: Last Weight   Weight: 190 lb 14.7 oz (86.6 kg) Weight: 188 lb 12.8 oz (85.6 kg)     Intake / Output / Drain:  Current Shift: No intake/output data recorded. Last 24 hrs.:   Intake/Output Summary (Last 24 hours) at 2022 0757  Last data filed at 2022 0000  Gross per 24 hour   Intake 70955 ml   Output 45184 ml   Net -78311 ml       EXAM:  General:  No acute distress. Up in the chair      Lungs:   Clear to auscultation bilaterally. Incision:  No erythema, drainage or swelling. Heart:  Regular rate and rhythm, S1, S2 normal, no click, rub or gallop. +DANNIE    Abdomen:   Soft, non-tender. Bowel sounds normal. No masses,  No organomegaly. Extremities:  No edema. PPP. Neurologic:  Urology:  Gross motor and sensory apparatus intact. Coude with bladder irrigation. Urine light pink in tubing       Labs:   Recent Labs     22  0547 22  0544   WBC  --  7.1   HGB  --  8.0*   HCT  --  25.2*   PLT  --  95*     --    K 3.9  --    BUN 18  --    CREA 0.67*  --    *  --         Assessment:     Active Problems:     Aortic stenosis (2022) Plan/Recommendations/Medical Decision Making: Aortic Stenosis/AI of bioprosthetic AV (Deep Dominguez #23 Jan 2015 Dr. Jessica Martinez): s/p RTF 32 Evolut Radha with [place] protection on 8/18. ASA. EKG and Echo reviewed by Dr. Soham Jacobsen    Hx of MV repair:Mitral Ring in place. 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 Echo done 8/19 showing eccentric MR jet-not clearly visualized and could be underestimated. Daily Lasix    Cardiac Arrest following TAVR placement with approximately 2 min of CPR with ROSC. Stable and off all vasoactive drips    Acute hypoxic respiratory failure requiring mechanical ventilation during cardiac Arrest: Now extubated. Residual atelectasis-increase activity, IS, KS > 92%. Diurese today and wean oxygen. CAD: ASA, Statin, ACEi    Afib s/p Cryomaze and LLAA with SSS and PPM. PPM rate increased to 90 bpm following arrest. Discussed with Dr. Soham Jacobsen and will drop pacer rate back to previous 60 bpm    HTN: Lisinopril, Lasix PTA. Has been hypotensive. Lisinopril dose decreased. HLD: Statin    DEL: has been unable to use CPAP in the last few months. He is treated for this at the [place] and I have asked him to follow up with them as an outpatient. GERD: PPI    Recent hospitalization for HF/COVID (DC on 7/13/22)    Hematuria: Likely combination or IV Heparin during procedure and urgent Hurd insertion in OR/possible trauma and Hx of Radiation therapy for prostate CA. Urology placed a 3 way Coude 8/19 for CBI. Improving. Wean as appropriate per Urology. Thrombocytopenia: Continue ASA for now. Continue to monitor. Anemia: secondary to acute blood loss following surgery. Currently above transfusion threshold. Monitor daily CBC. DVT prophylaxis: SCD     Dispo: PT/OT/Cardiac Rehab. Case Management for discharge planning. Will plan to assess functional status over the weekend and review Echo. Likely home with family in 1-2 days.  Can transfer to [facility]      Signed By: Torrie Viera, NP

## 2022-08-22 NOTE — DISCHARGE INSTRUCTIONS
Cardiac Surgery Specialist    [address] [place] [address]                                       [address], [address] [facility] [address]  Office- [phone]  Fax- [phone]       Office- [phone]  Fax- [phone]  _____________________________________________________________  Dr. Seferino Shay, JOHANNA Fontana, NP  Dr. Tanya Tello, NP     Bakari Staff, JOHANNA Bianchi, JOHANNA Eugene PA-C  ______________________________________________________________     [address]     Surgery & Date: Procedure(s):  TRANSCATHETER AORTIC VALVE REPLACEMENT VALVE IN VALVE RIGHT TRANSFEMORAL 26 EVOLUT POSSIBLE SENTINEL    Discharge Date: 08/24/22     MEDICATIONS:  Please refer to your After Visit Summary for your medication list.       DO NOT TAKE ANY MEDICATIONS THAT ARE NOT ON THIS LIST    INSTRUCTIONS:  NO SMOKING OR TOBACCO PRODUCTS  Do not follow the activity/exercise instructions in your discharge book given to you as an inpatient. You have no activity restrictions. You may shower. Wash all incisions twice daily with mild soap and water. No lotions, ointments or powder. Call the office immediately for any redness, swelling, or drainage from your incision. Take your temperature daily and call for a temperature of 101 degrees or higher or for any symptoms that make you think you have and infection. Weigh yourself each morning. Call if you gain more than 5 pounds in 48 hours. Use the incentive spirometer 6-8 times a day-10 breaths each time. Walk several hundred feet several times daily. DIET  Eat an American Heart Association diet. If you are having trouble with your appetite, eat what you can. Try eating small, frequent meals throughout the day. ACTIVITY  1. You have no activity restrictions. You may resume your daily activities at home, based on your comfort level. You may also drive. FOLLOW UP  Your first follow up appointment will be on 8/26/22  at 10 am BY TELEPHONE. Our office is located in [address] [place] [address]. Your second follow up appointment will be in four weeks, on 9/22/22  at 11am following your Echo at 10am. Please call our office at [phone] if you are unable to make either one of these appointments. You will be receiving a call before your 3 day follow up appointment to begin cardiac rehab. They are programs located at the [address] [place] [address].  The contact information is located in your Cardiac Surgery booklet. Please call if you have not been contacted 2-3 weeks after discharge from the hospital.  We will make an appointment for you with your cardiologist in 4-5 weeks. Consult you primary care physician regarding your influenza &   pneumovax vaccines. 5.   Please bring all medications with you to your appointment.     Signature:___________________________________________________

## 2022-08-22 NOTE — PROGRESS NOTES
Patient: Saul Trivedi MRN: [id number]  SSN: [id number]    YOB: 1940  Age: 79 y.o. Sex: male        ADMITTED: 2022 to Tommy Pizarro MD by García Schneider MD for Aortic stenosis [I35.0]  POD# 4 Days Post-Op Procedure(s):  TRANSCATHETER AORTIC VALVE REPLACEMENT VALVE IN VALVE RIGHT TRANSFEMORAL 26 EVOLUT POSSIBLE SENTINEL    F/U for gross hematuria. He is followed by Dr. Marisa Boswell for hx of prostate CA s/p radiation in  with short term Lupron. On Flomax and finasteride for his BPH with prior history of retention. He is on 81 mg ASA. CT A/P w/wo in July showed simple cysts bilaterally, no renal mass or stone. He has no complaints of abd pain or distension. No catheter irritation. 24 F 3-way catheter in place draining light pink UA with CBI at steady rate. Staff unable to wean CBI overnight due to clotting. I hand irrigated the catheter with no clots present. Hgb 8.0 (11 pre-op). Plt 95. Cr 0.67. UA unremarkable. Vitals: Temp (24hrs), Av.3 °F (36.8 °C), Min:98 °F (36.7 °C), Max:98.5 °F (36.9 °C)    Blood pressure 98/61, pulse 69, temperature 98.5 °F (36.9 °C), resp. rate 13, height 5' 6\" (1.676 m), weight 85.6 kg (188 lb 12.8 oz), SpO2 93 %. Intake and Output:   1901 -  0700  In: 91746 [P.O.:620]  Out: 48353 [Urine:82734]  No intake/output data recorded. FÁTIMA Output lats 24 hrs: No data found. FÁTIMA Output last 8 hrs: No data found.   BM over last 24 hrs: Patient Vitals for the past 24 hrs:   Stool Occurrence(s)   22 1030 1     Physical Exam  General: NAD, pleasant  Respiratory: no distress, breathing easily   Abdomen: soft, no distention   : Hurd, pink UA  Neuro: Appropriate, no focal neurological deficits  Skin: warm, dry  Extremities: moves all, full ROM    Labs:  CBC:   Lab Results   Component Value Date/Time    WBC 7.1 2022 05:44 AM    HCT 25.2 (L) 2022 05:44 AM    PLATELET 95 (L)  05:44 AM     BMP:   Lab Results   Component Value Date/Time    Glucose 104 (H) 08/21/2022 05:47 AM    Sodium 138 08/21/2022 05:47 AM    Potassium 3.9 08/21/2022 05:47 AM    Chloride 107 08/21/2022 05:47 AM    CO2 26 08/21/2022 05:47 AM    BUN 18 08/21/2022 05:47 AM    Creatinine 0.67 (L) 08/21/2022 05:47 AM    Calcium 9.0 08/21/2022 05:47 AM        Assessment/Plan:   78 yo male s/p TAVR with Gross hematuria from anticoagulation plus hx of RT for prostate cancer      - No clots with manual irrigation this AM. Wean CBI as able. Hand irrigate prn. Monitor Hgb and transfuse prn. Finasteride, Flomax on board. Following.       Supervising Dr. Michael MAE   Signed By: Princess Canales NP - August 22, 2022

## 2022-08-22 NOTE — PROGRESS NOTES
0730: Bedside and Verbal shift change report given to [address] nurse (oncoming nurse) by Dominga Cutler (offgoing nurse). Report included the following information SBAR, Procedure Summary, Intake/Output, MAR, Recent Results, Cardiac Rhythm Afib and V paced, and Alarm Parameters . 1600: CBI stopped. 1815: TRANSFER - OUT REPORT:    Verbal report given to Jacinta Saldaña on Edward Sommer  being transferred to [facility] (unit) for routine progression of care       Report consisted of patients Situation, Background, Assessment and   Recommendations(SBAR). Information from the following report(s) SBAR, Procedure Summary, Intake/Output, MAR, Recent Results, Cardiac Rhythm Afib , and Alarm Parameters  was reviewed with the receiving nurse. Opportunity for questions and clarification was provided.       Patient transported with:   Monitor  Registered Nurse

## 2022-08-22 NOTE — PROGRESS NOTES
Problem: Pressure Injury - Risk of  Goal: *Prevention of pressure injury  Description: Document Alberto Scale and appropriate interventions in the flowsheet. Outcome: Progressing Towards Goal  Note: Pressure Injury Interventions:  Sensory Interventions: Assess changes in LOC, Discuss PT/OT consult with provider, Float heels, Keep linens dry and wrinkle-free, Maintain/enhance activity level    Moisture Interventions: Internal/External urinary devices, Apply protective barrier, creams and emollients, Maintain skin hydration (lotion/cream), Minimize layers, Moisture barrier    Activity Interventions: PT/OT evaluation, Chair cushion, Increase time out of bed    Mobility Interventions: PT/OT evaluation, Turn and reposition approx.  every two hours(pillow and wedges), Float heels, Chair cushion    Nutrition Interventions: Offer support with meals,snacks and hydration, Discuss nutritional consult with provider, Document food/fluid/supplement intake    Friction and Shear Interventions: Lift sheet, Minimize layers                Problem: Non-Violent Restraints  Goal: Removal from restraints as soon as assessed to be safe  Outcome: Resolved/Met  Goal: No harm/injury to patient while restraints in use  Outcome: Resolved/Met  Goal: Patient's dignity will be maintained  Outcome: Resolved/Met  Goal: Patient Interventions  Outcome: Resolved/Met     Problem: Patient Education: Go to Patient Education Activity  Goal: Patient/Family Education  Outcome: Progressing Towards Goal     Problem: Patient Education: Go to Patient Education Activity  Goal: Patient/Family Education  Outcome: Progressing Towards Goal

## 2022-08-22 NOTE — PROGRESS NOTES
Cardiac Surgery Care Coordinator- Met with Edward Sommer, reviewed plan of care and discussed potential discharge date. Encouraged continued use of the incentive spirometer. Reviewed goals for the day and emphasized the importance of increased activity to meet discharge goals. Will continue to follow for educational and emotional needs.  Carlos Capone RN

## 2022-08-23 LAB
ANION GAP SERPL CALC-SCNC: 4 MMOL/L (ref 5–15)
BUN SERPL-MCNC: 18 MG/DL (ref 6–20)
BUN/CREAT SERPL: 29 (ref 12–20)
CALCIUM SERPL-MCNC: 8.8 MG/DL (ref 8.5–10.1)
CHLORIDE SERPL-SCNC: 106 MMOL/L (ref 97–108)
CO2 SERPL-SCNC: 28 MMOL/L (ref 21–32)
CREAT SERPL-MCNC: 0.63 MG/DL (ref 0.7–1.3)
ERYTHROCYTE [DISTWIDTH] IN BLOOD BY AUTOMATED COUNT: 16.7 % (ref 11.5–14.5)
GLUCOSE SERPL-MCNC: 101 MG/DL (ref 65–100)
HCT VFR BLD AUTO: 24.5 % (ref 36.6–50.3)
HGB BLD-MCNC: 7.9 G/DL (ref 12.1–17)
MAGNESIUM SERPL-MCNC: 2.1 MG/DL (ref 1.6–2.4)
MCH RBC QN AUTO: 31.6 PG (ref 26–34)
MCHC RBC AUTO-ENTMCNC: 32.2 G/DL (ref 30–36.5)
MCV RBC AUTO: 98 FL (ref 80–99)
NRBC # BLD: 0 K/UL (ref 0–0.01)
NRBC BLD-RTO: 0 PER 100 WBC
PLATELET # BLD AUTO: 117 K/UL (ref 150–400)
PMV BLD AUTO: 9.5 FL (ref 8.9–12.9)
POTASSIUM SERPL-SCNC: 4.3 MMOL/L (ref 3.5–5.1)
RBC # BLD AUTO: 2.5 M/UL (ref 4.1–5.7)
SODIUM SERPL-SCNC: 138 MMOL/L (ref 136–145)
WBC # BLD AUTO: 5.2 K/UL (ref 4.1–11.1)

## 2022-08-23 PROCEDURE — 77030029684 HC NEB SM VOL KT MONA -A

## 2022-08-23 PROCEDURE — 74011000250 HC RX REV CODE- 250: Performed by: NURSE PRACTITIONER

## 2022-08-23 PROCEDURE — 80048 BASIC METABOLIC PNL TOTAL CA: CPT

## 2022-08-23 PROCEDURE — 83735 ASSAY OF MAGNESIUM: CPT

## 2022-08-23 PROCEDURE — 65660000001 HC RM ICU INTERMED STEPDOWN

## 2022-08-23 PROCEDURE — 97530 THERAPEUTIC ACTIVITIES: CPT

## 2022-08-23 PROCEDURE — 97116 GAIT TRAINING THERAPY: CPT

## 2022-08-23 PROCEDURE — 94664 DEMO&/EVAL PT USE INHALER: CPT

## 2022-08-23 PROCEDURE — 94760 N-INVAS EAR/PLS OXIMETRY 1: CPT

## 2022-08-23 PROCEDURE — 74011250637 HC RX REV CODE- 250/637: Performed by: NURSE PRACTITIONER

## 2022-08-23 PROCEDURE — 94640 AIRWAY INHALATION TREATMENT: CPT

## 2022-08-23 PROCEDURE — 51798 US URINE CAPACITY MEASURE: CPT

## 2022-08-23 PROCEDURE — 36415 COLL VENOUS BLD VENIPUNCTURE: CPT

## 2022-08-23 PROCEDURE — 97535 SELF CARE MNGMENT TRAINING: CPT

## 2022-08-23 PROCEDURE — 85027 COMPLETE CBC AUTOMATED: CPT

## 2022-08-23 RX ORDER — IPRATROPIUM BROMIDE AND ALBUTEROL SULFATE 2.5; .5 MG/3ML; MG/3ML
3 SOLUTION RESPIRATORY (INHALATION)
Status: DISCONTINUED | OUTPATIENT
Start: 2022-08-23 | End: 2022-08-24 | Stop reason: HOSPADM

## 2022-08-23 RX ORDER — LIDOCAINE 4 G/100G
2 PATCH TOPICAL EVERY 24 HOURS
Status: DISCONTINUED | OUTPATIENT
Start: 2022-08-23 | End: 2022-08-24 | Stop reason: HOSPADM

## 2022-08-23 RX ADMIN — POTASSIUM CHLORIDE 40 MEQ: 750 TABLET, FILM COATED, EXTENDED RELEASE ORAL at 08:38

## 2022-08-23 RX ADMIN — ACETAMINOPHEN 650 MG: 325 TABLET, FILM COATED ORAL at 14:46

## 2022-08-23 RX ADMIN — PRAVASTATIN SODIUM 40 MG: 40 TABLET ORAL at 21:46

## 2022-08-23 RX ADMIN — FINASTERIDE 5 MG: 5 TABLET, FILM COATED ORAL at 21:45

## 2022-08-23 RX ADMIN — Medication 400 MG: at 17:19

## 2022-08-23 RX ADMIN — TAMSULOSIN HYDROCHLORIDE 0.8 MG: 0.4 CAPSULE ORAL at 08:38

## 2022-08-23 RX ADMIN — FUROSEMIDE 40 MG: 40 TABLET ORAL at 08:38

## 2022-08-23 RX ADMIN — ACETAMINOPHEN 650 MG: 325 TABLET, FILM COATED ORAL at 21:48

## 2022-08-23 RX ADMIN — Medication 400 MG: at 08:38

## 2022-08-23 RX ADMIN — TAMSULOSIN HYDROCHLORIDE 0.8 MG: 0.4 CAPSULE ORAL at 21:46

## 2022-08-23 RX ADMIN — SODIUM CHLORIDE, PRESERVATIVE FREE 10 ML: 5 INJECTION INTRAVENOUS at 14:00

## 2022-08-23 RX ADMIN — SODIUM CHLORIDE, PRESERVATIVE FREE 10 ML: 5 INJECTION INTRAVENOUS at 22:00

## 2022-08-23 RX ADMIN — ASPIRIN 81 MG CHEWABLE TABLET 81 MG: 81 TABLET CHEWABLE at 08:38

## 2022-08-23 RX ADMIN — IPRATROPIUM BROMIDE AND ALBUTEROL SULFATE 3 ML: .5; 3 SOLUTION RESPIRATORY (INHALATION) at 19:22

## 2022-08-23 RX ADMIN — TRAMADOL HYDROCHLORIDE 50 MG: 50 TABLET, COATED ORAL at 19:47

## 2022-08-23 RX ADMIN — DOCUSATE SODIUM 100 MG: 100 CAPSULE, LIQUID FILLED ORAL at 08:38

## 2022-08-23 RX ADMIN — DOCUSATE SODIUM 100 MG: 100 CAPSULE, LIQUID FILLED ORAL at 17:19

## 2022-08-23 RX ADMIN — SODIUM CHLORIDE, PRESERVATIVE FREE 10 ML: 5 INJECTION INTRAVENOUS at 07:18

## 2022-08-23 RX ADMIN — PANTOPRAZOLE SODIUM 40 MG: 40 TABLET, DELAYED RELEASE ORAL at 07:18

## 2022-08-23 NOTE — PROGRESS NOTES
Shift Summary    0730: Bedside shift change report given to Yonatan Smith (oncoming nurse) by Maria Guadalupe Zhang (offgoing nurse). Report included the following information SBAR, Kardex, Procedure Summary, Intake/Output, MAR, and Recent Results. Patient voided 25 mL; post void bladder scan was found to have 188 mL. Complains of chest pain--PRN Tylenol and lidocaine patches applied. Encouraged turns. Up to chair with PT. Monitored I's and O's. Spoke with daughter regarding discharge--expected discharge tomorrow. 1930: Bedside shift change report given to Maria Guadalupe Zhang (oncoming nurse) by Yonatan Smith (offgoing nurse). Report included the following information SBAR, Intake/Output, MAR, and Recent Results.

## 2022-08-23 NOTE — PROGRESS NOTES
Transitions of Care Plan  RUR: 16% - moderate  Clinical Update: s/p TAVR; CBI discontinued; bladder checks today  Consults: Urology; Therapy  Baseline: independent; resides w wife  Barriers to Discharge: medical  Disposition: home with daughter to transport  Estimated Discharge Date: 8/24/22    Reason for Admission:   TAVR                 RUR Score:     16% - moderate             PCP: First and Last name:   Abby Ohara MD     Name of Practice:    Are you a current patient: Yes/No:    Approximate date of last visit:    Can you participate in a virtual visit if needed:     Do you (patient/family) have any concerns for transition/discharge? No concerns              Plan for utilizing home health:   Patient declined home health service    Current Advanced Directive/Advance Care Plan:  Full Code      Healthcare Decision Maker:   Click here to complete [place] including selection of the Healthcare Decision Maker Relationship (ie \"Primary\")            Primary Decision MakerNajuan Flanagan - [phone]    Transition of Care Plan:          CM spoke with patient at bedside. Patient was very short in responses to initial assessment and disposition plan. Patient states his daughter will transport him home and family will help him once home. He declined the offer for home health services. PM UPDATE:  [facility] RN advised  patient will discharge tomorrow per Cardiac Surgery. Tomasz Bennett, MPH  Care Manager [facility]  Available via [employer]      Care Management Interventions  PCP Verified by CM: Yes  Palliative Care Criteria Met (RRAT>21 & CHF Dx)?: No  Mode of Transport at Discharge:  Other (see comment)  Transition of Care Consult (CM Consult): Discharge Planning  Physical Therapy Consult: Yes  Occupational Therapy Consult: Yes  Speech Therapy Consult: No  Support Systems: Spouse/Significant Other, Child(bessy)  Confirm Follow Up Transport: Family  Discharge Location  Patient Expects to be Discharged to[de-identified] Home with family assistance

## 2022-08-23 NOTE — PROGRESS NOTES
Problem: Mobility Impaired (Adult and Pediatric)  Goal: *Acute Goals and Plan of Care (Insert Text)  Description: FUNCTIONAL STATUS PRIOR TO ADMISSION: Patient was independent and active without use of DME.    HOME SUPPORT PRIOR TO ADMISSION: The patient lived with his wife and also has supportive daughters. Physical Therapy Goals  Initiated 8/19/2022  1. Patient will move from supine to sit and sit to supine , scoot up and down, and roll side to side in bed with modified independence within 7 day(s). 2.  Patient will transfer from bed to chair and chair to bed with modified independence using the least restrictive device within 7 day(s). 3.  Patient will perform sit to stand with modified independence within 7 day(s). 4.  Patient will ambulate with modified independence for 150 feet with the least restrictive device within 7 day(s). 5.  Patient will ascend/descend 5 stairs with right handrail(s) with modified independence within 7 day(s). Outcome: Progressing Towards Goal   PHYSICAL THERAPY TREATMENT  Patient: Wiliam Elise (10 y.o. male)  Date: 8/23/2022  Diagnosis: Aortic stenosis [I35.0] <principal problem not specified>  Procedure(s) (LRB):  TRANSCATHETER AORTIC VALVE REPLACEMENT VALVE IN VALVE RIGHT TRANSFEMORAL 26 EVOLUT POSSIBLE SENTINEL (Bilateral) 5 Days Post-Op  Precautions: Fall, Bed Alarm  Chart, physical therapy assessment, plan of care and goals were reviewed. ASSESSMENT  Patient continues with skilled PT services and is progressing towards goals. Patient received supine in bed, agreeable to therapy. Improving with bed mobility and transfers but still needs intermittent cues for hand placement during transfers and RW proximity to self during transfers and gait training. Participated in toileting and ambulation.  Recommend HHPT and use of RW at all times + family assist for supervision and household management as patient's wife is unable to assist him and he typically assists her with small tasks. Current Level of Function Impacting Discharge (mobility/balance): supervision-CGA    Other factors to consider for discharge: recommend RW use at all times at d/c, complicated post op course         PLAN :  Patient continues to benefit from skilled intervention to address the above impairments. Continue treatment per established plan of care. to address goals. Recommendation for discharge: (in order for the patient to meet his/her long term goals)  Physical therapy at least 2 days/week in the home AND ensure assist and/or supervision for safety with household mobility, assisting wife, household management    This discharge recommendation:  Has been made in collaboration with the attending provider and/or case management    IF patient discharges home will need the following DME: patient owns DME required for discharge       SUBJECTIVE:   Patient stated I need to use that.  re: BSC    OBJECTIVE DATA SUMMARY:   Critical Behavior:  Neurologic State: Alert  Orientation Level: Oriented X4  Cognition: Appropriate decision making  Safety/Judgement: Awareness of environment  Functional Mobility Training:  Bed Mobility:  Supine to Sit: Supervision; Additional time  Scooting: Supervision  Transfers:  Sit to Stand: Contact guard assistance  Stand to Sit: Contact guard assistance  Balance:  Sitting: Intact; Without support  Standing: Impaired; With support  Standing - Static: Good;Constant support  Standing - Dynamic : Good;Fair;Constant support  Ambulation/Gait Training:  Distance (ft): 300 Feet (ft)  Assistive Device: Gait belt;Walker, rolling  Ambulation - Level of Assistance: Contact guard assistance;Stand-by assistance  Gait Abnormalities: Decreased step clearance  Base of Support: Widened  Speed/Annie: Pace decreased (<100 feet/min)  Step Length: Right shortened;Left shortened    Pain Rating:  Stiffness, no pain    Activity Tolerance:   Good and SpO2 stable on RA    After treatment patient left in no apparent distress:   Sitting in chair, Call bell within reach, and Bed / chair alarm activated    COMMUNICATION/COLLABORATION:   The patients plan of care was discussed with: Occupational therapist, Registered nurse, and Case management.      Sasha Goldberg PT, DPT   Time Calculation: 28 mins

## 2022-08-23 NOTE — OP NOTES
PROCEDURALISTS:?   Christianourgeon 1. Lizy De La Rosa MD?  Christianourgeon 1. Gregoria Villareal. MD     PROCEDURES:  1. Angiography of the ascending aorta and aortoiliac bifurcation  2. Temporary transvenous pacemaker insertion  3. Left heart catheterization  4. Implantation of a 26 Medtronic Evolut aortic valve via the right transfemoral approach  5. 18F right?femoral artery access with Perclose pre-closure / 6F left femoral vein access with manual compression / 6F left femoral artery access with Perclose closure  16. Placement and removal of sentinel device. Dustin Bah is a 79 y.o. ?old male  with severe symptomatic aortic stenosis, NYHA Class IVsymptom status. ? He is referred for transfemoral TAVR procedure. He was deemed as high risk for SAVR. PRE-OP DIAGNOSIS:  1. Severe, symptomatic aortic stenosis (NYHA, Class III)  2. No Coronary artery disease  3. Cardiomyopathy  4. Long-term persistent atrial fibrillation  5. Status post watchman based left and appendage occlusion  6. Recent acute on chronic congestive heart failure  7. Prior AVR with 23 mm Mitroflow prosthesis  8. Prior mitral valve repair  9. Status post prior single-chamber pacemaker placement     POST-OP DIAGNOSIS:  1. Status post successful implantation of a 26Medtronic Evolut aortic valve via the right transfemoral approach  2. No significant post operative conduction block  3. Brief severe hypotension requiring cardiopulmonary resuscitation with PEA kind of presentation. No evidence of significant bradycardia arrhythmia/ventricular tachycardia. Possibility of vasovagal event cannot be ruled out. PROCEDURAL DETAILS: The risks (death, myocardial infarction, stroke, bleeding, vascular complications, renal dysfunction, allergic reactions, and other), benefits, and alternatives were explained and discussed. Signed, informed consent was obtained. The patient was placed on the table and prepped and draped in the usual fashion. ARTERIAL ACCESS:?   - Right: Right femoral artery access was obtained using ultrasound and a micropuncture needle and sheath system. ? Angiography confirmed adequate position within the mid right CFA without significant angiographic stenosis or irregularity. Pre-close technique was performed using two orthogonally positioned Perclose closure devices. ? A 6F sheath was placed before it was later up-sized to the large bore TAVR sheath. ? Post-procedure, the sheath was removed and the arteriotomy was closed using the pre-close technique. Final hemostasis was excellent. - Left: A 6 Azeri sheath was placed in the left common femoral artery without difficulty. ? Post-procedure, the sheath was removed and hemostasis was achieved with a Perclose closure device. - A 6F sheath was placed in the left common femoral vein without difficulty. Post-procedure the sheath was removed and manual compression was applied to achieve hemostasis. PROCEDURAL MEDICATIONS:?   General anaesthesia. Please see Anesthesia record for full details. Local anesthesia - 1% lidocaine was administered to the bilateral groins. ?? FINDINGS:  ANGIOGRAPHY OF THE ASCENDING AORTA:? There is significant calcification of the aortic valve and annulus. ? Appropriate co-planar views were identified for valve deployment. Post-deployment, the transcatheter valve appeared well positioned with appropriate function and only trace to mild para-valvular aortic regurgitation. ? Coronary arteries remained patent without obstruction. ? AORTOILIAC ANGIOGRAPHY:?The abdominal aorta and bilateral iliac arteries are widely patent without evidence of aneurysm or stenosis. ? There is no evidence of dissection, perforation or other complications. ?      INTERVENTION:  - A temporary transvenous pacemaker wire was inserted via the left common femoral vein access and positioned in the RV apical position. ? Appropriate position and function were confirmed.  Rapid ventricular pacing (140 BPM) was performed in concert with the valve implantations procedures. - Left heart catheterization: aortic valve crossed with a 0.035 inch straight wire and this wire was exchanged out for an Confida wire. Systemic heparin was administered to maintain an ACT between 250-300 seconds. - Transaortic valve implantation: Rapid pacing performed in coordination with deployment of a transcatheter valve. ? Post-deployment angiography and transthoracic echocardiography confirmed appropriate position and function, there was no significant para-valvular regurgitation. LV function was preserved. ? There was no pericardial effusion. ? Immediately post dilatation patient developed severe hypotension requiring CPR for a few minutes. Echo was performed that did not show any pericardial effusion. With 1 mg of epinephrine, blood pressure promptly improved. LV and RV function a few minutes after that on transesophageal echocardiogram demonstrated near normalization. Patient had to be intubated however for stabilization. Blood gases were rechecked and did not show any CO2 retention. Monitor patient was hemodynamically completely stable, the valve was released from delivery system. - The temporary pacemaker was removed. DEWEY was performed and did demonstrate dehiscence of band used to previously repaired mitral valve with residual moderate to severe/severe mitral regurgitation.      CONTRAST VOLUME:?45 mL Omnipaque  BLOOD LOSS:Minimal  COMPLICATIONS:?None  SPECIMENS:?None

## 2022-08-23 NOTE — PROGRESS NOTES
TRANSFER - IN REPORT:    Verbal report received from GAUDENCIO Nichols(name) on Leydi Blackmon  being received from [facility](unit) for routine progression of care      Report consisted of patients Situation, Background, Assessment and   Recommendations(SBAR). Information from the following report(s) SBAR, Kardex, Procedure Summary, MAR, Med Rec Status, and Cardiac Rhythm afib  was reviewed with the receiving nurse. Opportunity for questions and clarification was provided. Assessment completed upon patients arrival to unit and care assumed.

## 2022-08-23 NOTE — PROGRESS NOTES
[facility] ICU Progress Note    Admit Date: 2022  POD:  5 Day Post-Op    Procedure:  Procedure(s):  TRANSCATHETER AORTIC VALVE REPLACEMENT VALVE IN VALVE RIGHT TRANSFEMORAL 26 EVOLUT POSSIBLE SENTINEL        Subjective:   Pt seen with Dr. Christopher Grullon, in bed, room air, Tmax 99.8     Objective:   Vitals:  Blood pressure (!) 121/51, pulse 78, temperature 99.5 °F (37.5 °C), resp. rate 19, height 5' 6\" (1.676 m), weight 192 lb 3.9 oz (87.2 kg), SpO2 95 %. Temp (24hrs), Av.3 °F (37.4 °C), Min:98.4 °F (36.9 °C), Max:99.8 °F (37.7 °C)    EKG/Rhythm:  Afib in the 60s    Oxygen Therapy:  Oxygen Therapy  O2 Sat (%): 95 % (22 1631)  O2 Device: None (Room air) (22 1445)  O2 Flow Rate (L/min): 1 l/min (22 1300)  FIO2 (%): 40 % (22 2333)    CXR:   CXR Results  (Last 48 hours)      None              Admission Weight: Last Weight   Weight: 190 lb 14.7 oz (86.6 kg) Weight: 192 lb 3.9 oz (87.2 kg)     Intake / Output / Drain:  Current Shift:  0701 -  1900  In: -   Out: 600 [Urine:600]  Last 24 hrs.:   Intake/Output Summary (Last 24 hours) at 2022 1736  Last data filed at 2022 1637  Gross per 24 hour   Intake --   Output 1100 ml   Net -1100 ml       EXAM:  General:  No acute distress. Up in the chair      Lungs:   Clear to auscultation bilaterally. Incision:  No erythema, drainage or swelling. Heart:  Regular rate and rhythm, S1, S2 normal, no click, rub or gallop. +DANNIE    Abdomen:   Soft, non-tender. Bowel sounds hypoactive, complaints of abdominal pain with no recent BM   Extremities:  No edema. PPP. Neurologic:  Urology:  Gross motor and sensory apparatus intact. Coude in place, irrigation stopped yesterday. Labs:   Recent Labs     22  0522   WBC 5.2   HGB 7.9*   HCT 24.5*   *      K 4.3   BUN 18   CREA 0.63*   *        Assessment:     Active Problems: Aortic stenosis (2022)       Plan/Recommendations/Medical Decision Making:      Aortic Stenosis/AI of bioprosthetic AV (Deep Dominguez #23 Jan 2015 Dr. Neymar Oviedo): s/p RTF 32 Evolut Radha with [place] protection on 8/18. ASA. EKG and Echo reviewed by Dr. Becky Shah    Hx of MV repair:Mitral Ring in place. 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 Echo done 8/19 showing eccentric MR jet-not clearly visualized and could be underestimated. Daily Lasix 40mg PO, difficult to assess fluid status with CBI previously. Cardiac Arrest following TAVR placement with approximately 2 min of CPR with ROSC. Stable and off all vasoactive drips. Complaints of chest soreness, most likely secondary to chest compressions. Start lidocaine patches and PRN tylenol. Acute hypoxic respiratory failure requiring mechanical ventilation during cardiac Arrest: Now extubated. Residual atelectasis-increase activity, IS, KS > 92%. Diurese today and wean oxygen. CAD: Continue ASA and pravastatin. No betablocker at baseline, HR 60s. Afib s/p Cryomaze and LLAA with SSS and PPM. PPM rate increased to 90 bpm following arrest. Discussed with Dr. Becky hSah and will drop pacer rate back to previous 60 bpm    HTN: Lisinopril 40mg , Lasix 40mg PO PTA. Has been hypotensive, lisinopril held. HLD: Statin    DEL: has been unable to use CPAP in the last few months. He is treated for this at the [place] and I have asked him to follow up with them as an outpatient. GERD: PPI    Recent hospitalization for HF/COVID (DC on 7/13/22)    Hematuria: Likely combination or IV Heparin during procedure and urgent Finnegan insertion in OR/possible trauma and Hx of Radiation therapy for prostate CA. Urology placed a 3 way Coude 8/19 for CBI, irrigation stopped yesterday. Finnegan remains in place, no clots per nursing. DC finnegan today and monitor. History of Urinary Retention: Discussed with Urology, bladder scan after voids, no intervention if less than 200mL. Continue home finasteride and flomax.      Thrombocytopenia: Continue ASA for now. Continue to monitor. Anemia: secondary to acute blood loss following surgery. Currently above transfusion threshold. Monitor daily CBC. DVT prophylaxis: SCD     Dispo: PT/OT/Cardiac Rehab. Case Management for discharge planning. If does ok today with voids post finnegan removal, anticipate discharge tomorrow with Home Health.        Signed By: Bernardo King NP

## 2022-08-23 NOTE — PROGRESS NOTES
Patient: Eh Day MRN: [id number]  SSN: [id number]    YOB: 1940  Age: 79 y.o. Sex: male        ADMITTED: 2022 to Justine Biggs MD by Tim Ravi MD for Aortic stenosis [I35.0]  POD# 5 Days Post-Op Procedure(s):  TRANSCATHETER AORTIC VALVE REPLACEMENT VALVE IN VALVE RIGHT TRANSFEMORAL 26 EVOLUT POSSIBLE SENTINEL    F/U for gross hematuria. He is followed by Dr. Katarina Watson for hx of prostate CA s/p radiation in  with short term Lupron. On Flomax and finasteride for his BPH with prior history of retention. He is on 81 mg ASA. CT A/P w/wo in July showed simple cysts bilaterally, no renal mass or stone. He has no complaints of abd pain or distension. No catheter irritation. 24 F 3-way catheter in place draining light pink UA. CBI stopped yesterday afternoon. Urine yellow. Hgb 7.9 (8.0). Plt 117. Cr 0.63. UA unremarkable. Vitals: Temp (24hrs), Av.9 °F (37.2 °C), Min:97.6 °F (36.4 °C), Max:99.8 °F (37.7 °C)    Blood pressure (!) 115/54, pulse 69, temperature 98.4 °F (36.9 °C), resp. rate 15, height 5' 6\" (1.676 m), weight 90.4 kg (199 lb 4.7 oz), SpO2 99 %. Intake and Output:   1901 -  0700  In: 03601 [P.O.:380]  Out: 16712 [Urine:86870]  No intake/output data recorded. FÁTIMA Output lats 24 hrs: No data found. FÁTIMA Output last 8 hrs: No data found. BM over last 24 hrs: No data found.     Physical Exam  General: NAD, pleasant  Respiratory: no distress, breathing easily   Abdomen: soft, no distention   : Hurd, yellow UA  Neuro: Appropriate, no focal neurological deficits  Skin: warm, dry  Extremities: moves all, full ROM    Labs:  CBC:   Lab Results   Component Value Date/Time    WBC 5.2 2022 05:22 AM    HCT 24.5 (L) 2022 05:22 AM    PLATELET 154 (L)  05:22 AM     BMP:   Lab Results   Component Value Date/Time    Glucose 101 (H) 2022 05:22 AM    Sodium 138 2022 05:22 AM    Potassium 4.3 2022 05:22 AM    Chloride 106 08/23/2022 05:22 AM    CO2 28 08/23/2022 05:22 AM    BUN 18 08/23/2022 05:22 AM    Creatinine 0.63 (L) 08/23/2022 05:22 AM    Calcium 8.8 08/23/2022 05:22 AM      Assessment/Plan:   78 yo male s/p TAVR with Gross hematuria from anticoagulation plus hx of RT for prostate cancer      - Hematuria resolved. Okay to dc finnegan from  standpoint when ready per the primary team. To note, he does have a Hx of retention - Finasteride, Flomax on board. Check PVR bladder scan after finnegan removal to ensure he is emptying adequately. Will  arrange  F/U with his primary urologist, please call with questions.      Supervising MD, Dr. Remigio Marley   Signed By: Cristin Garcia NP - August 23, 2022

## 2022-08-23 NOTE — PROGRESS NOTES
Problem: Self Care Deficits Care Plan (Adult)  Goal: *Acute Goals and Plan of Care (Insert Text)  Description: FUNCTIONAL STATUS PRIOR TO ADMISSION: Patient was independent and active without the use of DME.     HOME SUPPORT: The patient lived with his wife but did not require assist. Pt reports supportive family living locally, and daughter who lives out of town but visits frequently. Occupational Therapy Goals  Initiated 8/19/2022  1. Patient will perform ADLs standing 5 mins without fatigue or LOB with supervision/set-up within 7 day(s). 2.  Patient will perform lower body ADLs with supervision/set-up within 7 day(s). 3.  Patient will perform gathering ADL items high and low 2/2 with supervision/set-up within 7 day(s). 4.  Patient will perform toilet transfers with supervision/set-up within 7 day(s). 5.  Patient will perform all aspects of toileting with supervision/set-up within 7 day(s). Outcome: Progressing Towards Goal     OCCUPATIONAL THERAPY TREATMENT  Patient: Mylene Martinez (45 y.o. male)  Date: 8/23/2022  Diagnosis: Aortic stenosis [I35.0] <principal problem not specified>  Procedure(s) (LRB):  TRANSCATHETER AORTIC VALVE REPLACEMENT VALVE IN VALVE RIGHT TRANSFEMORAL 26 EVOLUT POSSIBLE SENTINEL (Bilateral) 5 Days Post-Op  Precautions: Fall, Bed Alarm  Chart, occupational therapy assessment, plan of care, and goals were reviewed. ASSESSMENT  Patient continues with skilled OT services and is progressing towards goals. Pt's performance of ADL/IADL tasks continues to be limited at this time by impaired balance, activity tolerance, and generalized weakness. Pt received sitting in bedside chair and agreeable to participation in therapy session. He demonstrated improving activity tolerance with completion of seated UB/LB bathing/dressing, standing grooming, and toileting routine without rest breaks.  Up to min A required for toilet transfer, however otherwise CGA required for functional transfers and bathroom mobility. VSS throughout session on RA. Pt returned to bedside chair and left with all needs met. Current Level of Function Impacting Discharge (ADLs): min A toilet transfer, CGA standing ADLs, seated UB/LB dressing/bathing set-up    Other factors to consider for discharge: PLOF         PLAN :  Patient continues to benefit from skilled intervention to address the above impairments. Continue treatment per established plan of care to address goals. Recommendation for discharge: (in order for the patient to meet his/her long term goals)  Occupational therapy at least 2 days/week in the home AND ensure assist and/or supervision for safety with OOB mobility/ADL/IADL tasks     This discharge recommendation:  Has been made in collaboration with the attending provider and/or case management    IF patient discharges home will need the following DME: patient owns DME necessary for d/c        SUBJECTIVE:   Patient stated Will I be here another day?     OBJECTIVE DATA SUMMARY:   Cognitive/Behavioral Status:  Neurologic State: Alert  Orientation Level: Oriented X4  Cognition: Appropriate decision making  Perception: Appears intact  Perseveration: No perseveration noted  Safety/Judgement: Awareness of environment    Functional Mobility and Transfers for ADLs:  Bed Mobility:  Supine to Sit: Supervision; Additional time  Scooting: Supervision    Transfers:  Sit to Stand: Contact guard assistance  Functional Transfers  Bathroom Mobility: Contact guard assistance  Toilet Transfer : Minimum assistance  Cues: Verbal cues provided       Balance:  Sitting: Intact; Without support  Standing: Impaired; With support  Standing - Static: Good;Constant support  Standing - Dynamic : Good;Fair;Constant support    ADL Intervention:       Grooming  Grooming Assistance: Contact guard assistance  Position Performed: Standing (at sink)  Brushing Teeth: Contact guard assistance    Upper Body Bathing  Bathing Assistance: Set-up  Position Performed: Seated in chair         Lower Body Bathing  Perineal  : Contact guard assistance  Position Performed: Standing  Cues: Verbal cues provided  Lower Body : Set-up  Position Performed: Seated in chair  Cues: Verbal cues provided    Upper Body [address]: Set-up         Toileting  Toileting Assistance: Contact guard assistance  Bowel Hygiene: Contact guard assistance  Clothing Management: Contact guard assistance  Cues: Verbal cues provided  Adaptive Equipment: Walker;Grab bars    Cognitive Retraining  Safety/Judgement: Awareness of environment      Pain:  None reported     Activity Tolerance:   Good    After treatment patient left in no apparent distress:   Sitting in chair, Call bell within reach, and Bed / chair alarm activated    COMMUNICATION/COLLABORATION:   The patients plan of care was discussed with: Registered nurse.      PRINCE Newby, OTR/L

## 2022-08-24 VITALS
OXYGEN SATURATION: 95 % | DIASTOLIC BLOOD PRESSURE: 54 MMHG | RESPIRATION RATE: 18 BRPM | HEIGHT: 66 IN | SYSTOLIC BLOOD PRESSURE: 137 MMHG | WEIGHT: 190.04 LBS | HEART RATE: 62 BPM | BODY MASS INDEX: 30.54 KG/M2 | TEMPERATURE: 98.5 F

## 2022-08-24 LAB
ANION GAP SERPL CALC-SCNC: 6 MMOL/L (ref 5–15)
BUN SERPL-MCNC: 20 MG/DL (ref 6–20)
BUN/CREAT SERPL: 32 (ref 12–20)
CALCIUM SERPL-MCNC: 8.7 MG/DL (ref 8.5–10.1)
CHLORIDE SERPL-SCNC: 105 MMOL/L (ref 97–108)
CO2 SERPL-SCNC: 28 MMOL/L (ref 21–32)
CREAT SERPL-MCNC: 0.63 MG/DL (ref 0.7–1.3)
ERYTHROCYTE [DISTWIDTH] IN BLOOD BY AUTOMATED COUNT: 17 % (ref 11.5–14.5)
GLUCOSE SERPL-MCNC: 116 MG/DL (ref 65–100)
HCT VFR BLD AUTO: 24.9 % (ref 36.6–50.3)
HGB BLD-MCNC: 8.1 G/DL (ref 12.1–17)
MAGNESIUM SERPL-MCNC: 2.2 MG/DL (ref 1.6–2.4)
MCH RBC QN AUTO: 32 PG (ref 26–34)
MCHC RBC AUTO-ENTMCNC: 32.5 G/DL (ref 30–36.5)
MCV RBC AUTO: 98.4 FL (ref 80–99)
NRBC # BLD: 0 K/UL (ref 0–0.01)
NRBC BLD-RTO: 0 PER 100 WBC
PLATELET # BLD AUTO: 138 K/UL (ref 150–400)
PMV BLD AUTO: 9.2 FL (ref 8.9–12.9)
POTASSIUM SERPL-SCNC: 4.5 MMOL/L (ref 3.5–5.1)
RBC # BLD AUTO: 2.53 M/UL (ref 4.1–5.7)
SODIUM SERPL-SCNC: 139 MMOL/L (ref 136–145)
WBC # BLD AUTO: 5.4 K/UL (ref 4.1–11.1)

## 2022-08-24 PROCEDURE — 74011250637 HC RX REV CODE- 250/637: Performed by: NURSE PRACTITIONER

## 2022-08-24 PROCEDURE — 85027 COMPLETE CBC AUTOMATED: CPT

## 2022-08-24 PROCEDURE — 36415 COLL VENOUS BLD VENIPUNCTURE: CPT

## 2022-08-24 PROCEDURE — 83735 ASSAY OF MAGNESIUM: CPT

## 2022-08-24 PROCEDURE — 80048 BASIC METABOLIC PNL TOTAL CA: CPT

## 2022-08-24 RX ORDER — ATROPA BELLADONNA AND OPIUM 16.2; 3 MG/1; MG/1
1 SUPPOSITORY RECTAL
Qty: 3 SUPPOSITORY | Refills: 0 | Status: SHIPPED | OUTPATIENT
Start: 2022-08-24 | End: 2022-08-26 | Stop reason: ALTCHOICE

## 2022-08-24 RX ORDER — OXYCODONE AND ACETAMINOPHEN 5; 325 MG/1; MG/1
1 TABLET ORAL
Qty: 18 TABLET | Refills: 0 | Status: SHIPPED | OUTPATIENT
Start: 2022-08-24 | End: 2022-08-27

## 2022-08-24 RX ADMIN — FUROSEMIDE 40 MG: 40 TABLET ORAL at 09:19

## 2022-08-24 RX ADMIN — Medication 400 MG: at 09:20

## 2022-08-24 RX ADMIN — PANTOPRAZOLE SODIUM 40 MG: 40 TABLET, DELAYED RELEASE ORAL at 09:19

## 2022-08-24 RX ADMIN — POTASSIUM CHLORIDE 40 MEQ: 750 TABLET, FILM COATED, EXTENDED RELEASE ORAL at 09:19

## 2022-08-24 RX ADMIN — TRAMADOL HYDROCHLORIDE 50 MG: 50 TABLET, COATED ORAL at 04:35

## 2022-08-24 RX ADMIN — DOCUSATE SODIUM 100 MG: 100 CAPSULE, LIQUID FILLED ORAL at 09:19

## 2022-08-24 RX ADMIN — ASPIRIN 81 MG CHEWABLE TABLET 81 MG: 81 TABLET CHEWABLE at 09:19

## 2022-08-24 RX ADMIN — TAMSULOSIN HYDROCHLORIDE 0.8 MG: 0.4 CAPSULE ORAL at 09:20

## 2022-08-24 NOTE — PROGRESS NOTES
[facility] ICU Progress Note    Admit Date: 2022  POD:  6 Day Post-Op    Procedure:  Procedure(s):  TRANSCATHETER AORTIC VALVE REPLACEMENT VALVE IN VALVE RIGHT TRANSFEMORAL 26 EVOLUT POSSIBLE SENTINEL        Subjective:   Pt seen with Dr. Yazan Snell. Afebrile, room air. Feeling better today. Objective:   Vitals:  Blood pressure (!) 137/54, pulse 62, temperature 98.5 °F (36.9 °C), resp. rate 18, height 5' 6\" (1.676 m), weight 190 lb 0.6 oz (86.2 kg), SpO2 95 %. Temp (24hrs), Av.7 °F (37.1 °C), Min:97.9 °F (36.6 °C), Max:99.6 °F (37.6 °C)    EKG/Rhythm:  Afib in the 60s    Oxygen Therapy:  Oxygen Therapy  O2 Sat (%): 95 % (22 0738)  Pulse via Oximetry: 67 beats per minute (22 1922)  O2 Device: None (Room air) (22 0800)  O2 Flow Rate (L/min): 1 l/min (22 1300)  FIO2 (%): 40 % (22 2333)    CXR:   CXR Results  (Last 48 hours)      None              Admission Weight: Last Weight   Weight: 190 lb 14.7 oz (86.6 kg) Weight: 190 lb 0.6 oz (86.2 kg)     Intake / Output / Drain:  Current Shift:  0701 -  1900  In: -   Out: 430 [Urine:430]  Last 24 hrs.:   Intake/Output Summary (Last 24 hours) at 2022 1613  Last data filed at 2022 1156  Gross per 24 hour   Intake --   Output 945 ml   Net -945 ml       EXAM:  General:  No acute distress. Up in the chair      Lungs:   Clear to auscultation bilaterally. Incision:  No erythema, drainage or swelling. Heart:  Regular rate and rhythm, S1, S2 normal, no click, rub or gallop. +DANNIE    Abdomen:   Soft, non-tender. Bowel sounds normal. No masses,  No organomegaly. Extremities:  No edema. PPP. Neurologic:  Urology:  Gross motor and sensory apparatus intact. Coude with bladder irrigation. Urine light pink in tubing       Labs:   Recent Labs     22  0432   WBC 5.4   HGB 8.1*   HCT 24.9*   *      K 4.5   BUN 20   CREA 0.63*   *        Assessment:     Active Problems:     Aortic stenosis (8/18/2022)       Plan/Recommendations/Medical Decision Making: Aortic Stenosis/AI of bioprosthetic AV (Deep Hairmaddi #23 Jan 2015 Dr. Harper Speaker): s/p RTF 32 Evolut Radha with Olpe protection on 8/18. ASA. EKG and Echo reviewed by Dr. 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 Echo done 8/19 showing eccentric MR jet-not clearly visualized and could be underestimated. Daily Lasix    Cardiac Arrest following TAVR placement with approximately 2 min of CPR with ROSC. Stable and off all vasoactive drips    Acute hypoxic respiratory failure requiring mechanical ventilation during cardiac Arrest: Now extubated. Residual atelectasis-increase activity, IS, KS > 92%. Diurese today and wean oxygen. CAD: ASA, Statin, ACEi    Afib s/p Cryomaze and LLAA with SSS and PPM. PPM rate increased to 90 bpm following arrest. Discussed with Dr. Rojelio Ochoa and will drop pacer rate back to previous 60 bpm    HTN: Lisinopril, Lasix PTA. Has been hypotensive. Lisinopril dose decreased. HLD: Statin    DEL: has been unable to use CPAP in the last few months. He is treated for this at the [place] and I have asked him to follow up with them as an outpatient. GERD: PPI    Recent hospitalization for HF/COVID (DC on 7/13/22)    Hematuria: Likely combination or IV Heparin during procedure and urgent Hurd insertion in OR/possible trauma and Hx of Radiation therapy for prostate CA. Urology placed a 3 way Coude 8/19 for CBI. Improving. Wean as appropriate per Urology. Thrombocytopenia: Continue ASA for now. Continue to monitor. Anemia: secondary to acute blood loss following surgery. Currently above transfusion threshold. Monitor daily CBC. DVT prophylaxis: SCD     Dispo: PT/OT/Cardiac Rehab. Case Management for discharge planning. Will plan to assess functional status over the weekend and review Echo.  Home today    Signed By: Sonja Thomas NP

## 2022-08-24 NOTE — PROGRESS NOTES
IV removed. I have reviewed discharge instructions with the patient and his daughter. The patient and his daughter verbalized understanding. Discharge medications reviewed with patient and his daughter and appropriate educational materials and side effects teaching were provided. Patient discharged home with daughter.

## 2022-08-24 NOTE — PROGRESS NOTES
Transitions of Care Plan  RUR: 16% - moderate  Clinical Update: stable for d/c; daughter to transport  Consults: Therapy; Urology  Baseline: independent; caregiver for wife  Barriers to Discharge: none  Disposition: home health; personal care aides  Estimated Discharge Date: 8/24/22    CM spoke with patient and daughter at bedside. Patient's daughter would like patient to have home health and personal care aide services after discharge. Patient is the caregiver at the home and does all household chores in addition to assisting his wife who has some health issues. Daughter states the patient will need additional assistance at home. Patient is a [address] will provide options for personal care agencies. Patient and daughter are open to any home health agency that can service patient's home residence. CM provided update to [facility] NP - agreeable to home health. Verbal orders received. 1145 - Home health order and referral sent to [facility] [address] [address] location. 1255 - Received approval from [facility]. CM will continue to follow.     Disposition:   [address] aide - list provided  Transportation - daughter    Eric Delacruz, [address]  Available via [employer] or

## 2022-08-24 NOTE — PROGRESS NOTES
Cardiac Surgery Care Coordinator-  Met with Nahid Kulkarni  Reviewed plan of care and discussed discharge today. He is preparing for discharge shortly. Encouraged continued use of the incentive spirometer. Reviewed the importance of daily temp and weight monitoring, discussed groin site care and reviewed signs and symptoms of infection. Red reminder bracelet on left wrist, reviewed purpose of the bracelet and when to call the MD. Provided him with the valve identification and dental prophylaxis card. Discussed the purpose of both items. Reminded pt of appts and encouraged him to verbalize and emotional support given. He is without questions or concerns at this time. Will follow up with a phone call after discharge.   Yahir Allen RN

## 2022-08-24 NOTE — PROGRESS NOTES
[facility] visit. Mr.r. Cynthia Gilford was sitting up in his chair. He declined communion today. Prayer offered.     HELEN Priest, RN, ACSW, LCSW   Page:  [phone]

## 2022-08-25 ENCOUNTER — TELEPHONE (OUTPATIENT)
Dept: CASE MANAGEMENT | Age: 82
End: 2022-08-25

## 2022-08-25 NOTE — TELEPHONE ENCOUNTER
Cardiac Surgery Discharge - Follow up call placed to [facility], unable to clearly understand him on cell phone. Informed him that I would reach out to his daughter. He was in agreement. Spoke to  Nuovo Wind, the daughter of [facility]  Reviewed plan of care after discharge and encouraged her to verbalize. She stated her Dad's weight is up by 2 lbs, she confirmed that they have his lasix, but is not sure if he has taken it today. She also stated that they were unable to  belladonna supp because the pharmacy was out of the medication. Confirmed follow up phone call appt tomorrow with Shannon Doshi. She is  other without questions or concerns.  Jesus Veliz RN

## 2022-08-26 PROBLEM — Z95.2 S/P TAVR (TRANSCATHETER AORTIC VALVE REPLACEMENT): Status: ACTIVE | Noted: 2022-08-26

## 2022-08-31 LAB
ECHO AV MEAN GRADIENT: 4 MMHG
ECHO AV MEAN VELOCITY: 0.9 M/S
ECHO AV PEAK GRADIENT: 11 MMHG
ECHO AV PEAK VELOCITY: 1.7 M/S
ECHO AV VTI: 30.1 CM

## 2022-08-31 PROCEDURE — 93321 DOPPLER ECHO F-UP/LMTD STD: CPT | Performed by: INTERNAL MEDICINE

## 2022-08-31 PROCEDURE — 93325 DOPPLER ECHO COLOR FLOW MAPG: CPT | Performed by: INTERNAL MEDICINE

## 2022-08-31 PROCEDURE — 93308 TTE F-UP OR LMTD: CPT | Performed by: INTERNAL MEDICINE

## 2022-09-07 ENCOUNTER — TELEPHONE (OUTPATIENT)
Dept: CARDIOLOGY CLINIC | Age: 82
End: 2022-09-07

## 2022-09-07 RX ORDER — LANOLIN ALCOHOL/MO/W.PET/CERES
3 CREAM (GRAM) TOPICAL
Qty: 30 TABLET | Refills: 1 | Status: SHIPPED | OUTPATIENT
Start: 2022-09-07

## 2022-09-07 NOTE — TELEPHONE ENCOUNTER
I spoke with . Maribell Ring who has gained about 8 lbs and has swelling in his feet and ankles. He has been eating more salt in his diet including turkey aguayo. He has continued his lasix 40 mg daily with the potassium. We discussed nutrition and following a heart healthy, low sodium (2-3 grams) daily. He will increase his lasix and potassium to BID for the next 3 days.       He also notes that he is starting Cardiac Rehab on 9/13

## 2022-09-13 ENCOUNTER — HOSPITAL ENCOUNTER (OUTPATIENT)
Dept: CARDIAC REHAB | Age: 82
Discharge: HOME | End: 2022-09-13
Payer: MEDICARE

## 2022-09-13 PROCEDURE — 93798 PHYS/QHP OP CAR RHAB W/ECG: CPT

## 2022-09-13 PROCEDURE — 93797 PHYS/QHP OP CAR RHAB WO ECG: CPT

## 2022-09-14 VITALS — HEIGHT: 70 IN | WEIGHT: 195.2 LBS | BODY MASS INDEX: 27.94 KG/M2

## 2022-09-14 NOTE — CARDIO/PULMONARY
Ibrahima Castro   79 y.o. presented to cardiac rehab for orientation and exercise tolerance test today with a primary diagnosis of Aortic Valve Replacement. Ibrahima Castro has a history of CHF, Mitral Valve Repair, Afib, pacemaker placement and a watchman device. Cardiac risk factors include dyslipidemia, stress, valvular heart disease. Ibrahima Castro is  and lives with his wife. PHQ9, depression score, is 6 and this is considered mild. The result was discussed with patient who affirms score to be accurate and  no action required. Patient denied chest pain or SOB during 6 minute walk test and was in Afib without ectopy. Exercise prescription developed around patient stated goals, to be supplemented with home exercise recommendations. EF is 50-55%. Education manual given to patient and reviewed. Patient will attend cardiac rehab 2 times/week and education classes.
47.2

## 2022-09-15 ENCOUNTER — OFFICE VISIT (OUTPATIENT)
Dept: CARDIOLOGY CLINIC | Age: 82
End: 2022-09-15
Payer: MEDICARE

## 2022-09-15 ENCOUNTER — HOSPITAL ENCOUNTER (OUTPATIENT)
Dept: CARDIAC REHAB | Age: 82
Discharge: HOME | End: 2022-09-15
Payer: MEDICARE

## 2022-09-15 ENCOUNTER — APPOINTMENT (OUTPATIENT)
Dept: CARDIAC REHAB | Age: 82
End: 2022-09-15
Payer: MEDICARE

## 2022-09-15 VITALS — BODY MASS INDEX: 28.15 KG/M2 | WEIGHT: 196.2 LBS

## 2022-09-15 DIAGNOSIS — Z95.0 CARDIAC PACEMAKER IN SITU: Primary | ICD-10-CM

## 2022-09-15 PROCEDURE — 93798 PHYS/QHP OP CAR RHAB W/ECG: CPT

## 2022-09-15 PROCEDURE — 93294 REM INTERROG EVL PM/LDLS PM: CPT | Performed by: INTERNAL MEDICINE

## 2022-09-15 PROCEDURE — 93797 PHYS/QHP OP CAR RHAB WO ECG: CPT

## 2022-09-15 NOTE — CARDIO/PULMONARY
Lifestyle/Nutrition Nurse    Met with patient via Doxy. me and discussed the following topics noted below. Stress:  Patient initially denied feeling that stress might be a risk factor for him but then shared that his wife suffers from severe arthritis and he has to take responsibility for all cooking and grocery shopping. He states that he has brought in help to care for the yard and clean the house. Sleep:  Patient states he has had poor sleep since his procedure. He says he has a CPAP machine but has felt claustrophobic using it since his procedure. I encouraged him to reach out to the [place] to reassess his machine for him. We also talked about timing of his lasix to minimize his sleep disruptions. Food:  Patient describes measures he is taking to decrease his intake of saturated fat and sodium. We reviewed the nutrition section of the [facility] education book with a focus on the [address] and the Dash Diet. We also discussed the potential benefits of 5-10% weight loss. Patient states he currently weights 196 lb. And is 5 ft 10 in. Tall. Physical Activity:  Patient states he used to work in the yard regularly. He also states that in the past he used to go to the gym and had many friends there. We discussed the recommendations for and potential benefits of regular physical activity. Patient states he has a treadmill and stationary bike at home but did enjoy going to the gym with friends. I encouraged him to reach out to those friends to see where they are exercising now. Social Connections:  Patient describes having multiple family and friend connections. Risky Substances:  Patient states he has never been a smoker and does not use any substances not prescribed for him. Patient states he usually drinks 3-4 beers each weekend. We discussed the potential benefits of cutting back on beer currently.     Patient Plan: Patient plans to contact the [place] about the difficulty he is currently experiencing with his CPAP machine. Patient plans to take his lasix late afternoon when possible to decrease sleep disruption. Patient plans to contact his gym buddies to find out where they are exercising now. Patient plans to continue exploring eating a diet lower in saturated fat, high in fiber and plants. Patient plans to aim for an initial weight loss of 10 pounds by decreasing beer intake, limiting higher fat foods, and portion control.

## 2022-09-20 ENCOUNTER — HOSPITAL ENCOUNTER (OUTPATIENT)
Dept: CARDIAC REHAB | Age: 82
Discharge: HOME | End: 2022-09-20
Payer: MEDICARE

## 2022-09-20 VITALS — WEIGHT: 195 LBS | BODY MASS INDEX: 27.98 KG/M2

## 2022-09-20 PROCEDURE — 93798 PHYS/QHP OP CAR RHAB W/ECG: CPT

## 2022-09-21 NOTE — PROGRESS NOTES
Patient: Marsha Brunson   Age: 79 y.o. Patient Care Team:  Jesse Leon MD as PCP - General (Internal Medicine Physician)  Nazario Marroquin MD (Orthopedic Surgery)  Gurdeep Graves MD as Referring Provider (Cardiovascular Disease Physician)  Paco Little MD (General Surgery)  Kaylen Luis MD (Urology)  Adama Cooper MD (Cardiothoracic Surgery)  Ruth Pete MD (Cardiovascular Disease Physician)  Maria Esther Zamarripa MD (Cardiovascular Disease Physician)  Morales Garcia MD (Cardio Vascular Surgery)    PCP: Jesse Leon MD    Cardiologist: Dr. Melvin Polo    Diagnosis/Reason for Consultation: The primary encounter diagnosis was Nonrheumatic aortic valve stenosis. A diagnosis of S/P TAVR (transcatheter aortic valve replacement) was also pertinent to this visit.     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    DEL (obstructive sleep apnea) G47.33    Insomnia G47.00    Atrial fibrillation (HCC) I48.91    Hyperlipidemia E78.5    Hx of carcinoma in situ of prostate Z86.002    GERD (gastroesophageal reflux disease) K21.9    Chronic pain G89.29    CAD (coronary artery disease) I25.10    Arthritis M19.90    GI bleed K92.2    Presence of Watchman left atrial appendage closure device Z95.818    Anasarca R60.1    ABDUL (dyspnea on exertion) K96.37    Diastolic CHF, acute on chronic (HCC) I50.33    CHF exacerbation (HCC) I50.9    COVID-19 U07.1    COVID U07.1    Chronic systolic (congestive) heart failure I50.22    Aortic stenosis I35.0    S/P TAVR (transcatheter aortic valve replacement) Z95.2         HPI: 79 y.o. y.o. male  S/P Radah TAVR with a 32 Evolut with [place] protection via the RTF approach on 8/18/22. Immediately post dilatation of the AV patient developed severe hypotension requiring CPR for a few minutes and was intubated for stabilization purposes. He also developed hematuria requiring consultation with Urology and CBI. Jaylan Escobar was discharged to Home with Home Health on 8/24/22. he  is here today for his  One Month Post Op Appointment. He is still fatigued at times. His main complaint is that he feels he is forever in the bathroom due to his Lasix. He feels that this is impacting his quality of life. Denies fever, chills, worsening shortness of breath, chest pain, orthopnea, PND, dizziness, syncope or recent fall. He just completed his Echo and this has been reviewed by Dr. Dalton Gibbons who discussed it with the patient. Last Dental Visit:  Has one coming up in the next 3 months   Any dental pain/concerns: None    NYHA Classification: IIB   Class I (Mild): No limitation of physical activity. Ordinary physical activity does not cause undue fatigue, palpitation, or dyspnea. Class II (Mild): Slight limitation of physical activity. Comfortable at rest, but ordinary physical activity results in fatigue, palpitation, or dyspnea. Class III (Moderate): Marked limitation of physical activity. Comfortable at rest, but less than ordinary activity causes fatigue, palpitation, or dyspnea   Class IV (Severe): Unable to carry out any physical activity without discomfort. Symptoms  of cardiac insufficiency at rest.  If any physical activity is undertaken, discomfort is increased.     Angina Classification: 0   Class 0: No symptoms   Class 1: Angina with strenuous exercise   Class 2: Angina with moderate exercise   Class 3: Angina with mild exertion   Walking 1-2 level blocks at normal pace; Climbing 1 flight of stairs at normal pace  Class 4: Angina at any level of physical exertion       Past Medical History:   Diagnosis Date    Anasarca Aortic valve stenosis     Repaired 8/18/2022    Arthritis     in hands and knee (before replacement)    Atrial fibrillation (HCC)     CAD (coronary artery disease)     CHF (congestive heart failure) ([facility] [address] 11/01/2020    Chronic pain     legs/knee    GERD (gastroesophageal reflux disease)     Hx of carcinoma in situ of prostate     Hyperlipidemia     Hypertension     Insomnia     DEL (obstructive sleep apnea)     Prostate cancer ([facility] [address] 01/01/2017    Radiation proctitis     Vitamin D deficiency          Past Surgical History:   Procedure Laterality Date    COLONOSCOPY N/A 05/08/2020    COLONOSCOPY performed by Lake Turner MD at [facility] ENDOSCOPY    COLONOSCOPY N/A 06/08/2020    COLONOSCOPY performed by Deo Mahmood MD at [address] N/A 11/23/2020    FLEXIBLE SIGMOIDOSCOPY WITH APC performed by Lake Turner MD at [address] AORTIC VALVE REPLACEMENT  2014    and mitral valve repair, left atrial cryo maze    HX HEART VALVE SURGERY  01/01/2014    Mitral Valve Repair    HX HEENT      melanoma removed head    HX HERNIA REPAIR  2009    Right    HX HERNIA REPAIR      left inguinal hernia repair    HX HERNIA REPAIR Left 12/01/2016    lap left inguinal hernia repair with mesh    HX KNEE REPLACEMENT      Bilateral    HX ORTHOPAEDIC      BILATERAL KNEE REPLACEMENT    HX ORTHOPAEDIC      CARPEL TUNNEL REPAIR-RIGHT    HX OTHER SURGICAL  2015    closure of patent foramen ovale    HX OTHER SURGICAL  10/2020    watchman implant for afib / Dr. Jaylon Rasmussen      42 radiation treatments      Social History     Tobacco Use    Smoking status: Never    Smokeless tobacco: Never   Substance Use Topics    Alcohol use:  Yes     Alcohol/week: 2.0 standard drinks     Types: 2 Cans of beer per week     Comment: Occasionaly      Family History   Problem Relation Age of Onset    Cancer Mother     Cancer Father         prostrate    No Known Problems Sister     No Known Problems Sister     No Known Problems Sister     Cancer Brother         PROSTATE    No Known Problems Brother     Anesth Problems Neg Hx      Prior to Admission medications    Medication Sig Start Date End Date Taking? Authorizing Provider   melatonin 3 mg tablet Take 1 Tablet by mouth nightly. 9/7/22   Yaya Garcia PA-C   acetaminophen (TYLENOL) 325 mg tablet Take 2 Tablets by mouth every four (4) hours as needed for Pain. 8/22/22   Rajiv Newberry NP   omeprazole (PRILOSEC) 40 mg capsule Take 40 mg by mouth in the morning. Provider, Historical   furosemide (LASIX) 40 mg tablet Take 40 mg by mouth daily. Take in AM    Provider, Historical   potassium chloride (K-DUR, KLOR-CON M20) 20 mEq tablet Take 2 Tablets by mouth daily. 7/12/22   Mauricio Alaniz MD   aspirin delayed-release 81 mg tablet Take 81 mg by mouth two (2) times a day. TAKES ONE IN MORNING AND TWO IN EVENING (PER PATIENT AS OF 8/12/22)    Provider, Historical   polyvinyl alcohol (LIQUIFILM TEARS) 1.4 % ophthalmic solution Administer 1 Drop to both eyes as needed. Provider, Historical   ferrous sulfate 325 mg (65 mg iron) cpER Take 1 Tab by mouth every other day. Provider, Historical   tamsulosin (FLOMAX) 0.4 mg capsule Take 0.4 mg by mouth two (2) times a day. Provider, Historical   cholecalciferol (VITAMIN D3) 1,000 unit tablet Take 2,000 Units by mouth two (2) times a day. Provider, Historical   GLUCOSAMINE HCL/CHONDR CHING A NA (GLUCOSAMINE-CHONDROITIN) 750-600 mg tab Take 1 Tab by mouth daily. Provider, Historical   omega-3 fatty acids-vitamin e 1,000 mg cap Take 1 Cap by mouth every other day. Provider, Historical   multivitamin (ONE A DAY) tablet Take 1 Tab by mouth daily. Provider, Historical   docusate sodium (COLACE) 100 mg capsule Take 100 mg by mouth two (2) times a day. Provider, Historical   finasteride (PROSCAR) 5 mg tablet Take 5 mg by mouth nightly.     Provider, Historical   pravastatin (PRAVACHOL) 40 mg tablet Take 40 mg by mouth nightly. Provider, Historical       No Known Allergies    Current Medications:   Current Outpatient Medications   Medication Sig Dispense Refill    melatonin 3 mg tablet Take 1 Tablet by mouth nightly. 30 Tablet 1    acetaminophen (TYLENOL) 325 mg tablet Take 2 Tablets by mouth every four (4) hours as needed for Pain. omeprazole (PRILOSEC) 40 mg capsule Take 40 mg by mouth in the morning. furosemide (LASIX) 40 mg tablet Take 40 mg by mouth daily. Take in AM      potassium chloride (K-DUR, KLOR-CON M20) 20 mEq tablet Take 2 Tablets by mouth daily. 30 Tablet 0    aspirin delayed-release 81 mg tablet Take 81 mg by mouth two (2) times a day. TAKES ONE IN MORNING AND TWO IN EVENING (PER PATIENT AS OF 8/12/22)      polyvinyl alcohol (LIQUIFILM TEARS) 1.4 % ophthalmic solution Administer 1 Drop to both eyes as needed. ferrous sulfate 325 mg (65 mg iron) cpER Take 1 Tab by mouth every other day. tamsulosin (FLOMAX) 0.4 mg capsule Take 0.4 mg by mouth two (2) times a day. cholecalciferol (VITAMIN D3) 1,000 unit tablet Take 2,000 Units by mouth two (2) times a day. GLUCOSAMINE HCL/CHONDR CHING A NA (GLUCOSAMINE-CHONDROITIN) 750-600 mg tab Take 1 Tab by mouth daily. omega-3 fatty acids-vitamin e 1,000 mg cap Take 1 Cap by mouth every other day. multivitamin (ONE A DAY) tablet Take 1 Tab by mouth daily. docusate sodium (COLACE) 100 mg capsule Take 100 mg by mouth two (2) times a day. finasteride (PROSCAR) 5 mg tablet Take 5 mg by mouth nightly. pravastatin (PRAVACHOL) 40 mg tablet Take 40 mg by mouth nightly. Vitals: Blood pressure (!) 154/86, pulse 60, temperature 97.9 °F (36.6 °C), temperature source Oral, resp. rate 16, height 5' 10\" (1.778 m), weight 194 lb (88 kg), SpO2 97 %. Allergies: has No Known Allergies.     Review of Systems: Pertinent Positives per HPI   [x]Total of 13 systems reviewed as follows:  Constitutional: Negative fever, negative chills, +fatigue  Eyes:   Negative for amauroses fugax  ENT:   Negative sore throat,oral abscess   Endocrine Negative for thyroid replacement Rx; goiter; DM  Respiratory:  Negative chronic cough,sputum production  Cards:   Negative for palpitations, varicosities, claudication,+ lower extremity edema  GI:   Negative for dysphagia, bleeding, nausea, vomiting, diarrhea, and abdominal pain  Genitourinary: Negative for frequency, dysuria  Integument:  Negative for rash and pruritus  Hematologic:  Negative for easy bruising; bleeding dyscrasia   Musculoskel: Negative for muscle weakness inhibiting ambulation  Neurological:  Negative for stroke, TIA, syncope, dizziness  Behavl/Psych: Negative for feelings of anxiety, +depression     Cardiovascular Testing:     EK22  Component Ref Range & Units 1 mo ago   (22) 1 mo ago   (22)   Ventricular Rate BPM 90  63    Atrial Rate BPM 88     QRS Duration ms 178  112    Q-T Interval ms 436  426    QTC Calculation (Bezet) ms 533  435    Calculated R Axis degrees -73  7    Calculated T Axis degrees 93  71    Diagnosis  Ventricular paced rhythm   Left axis deviation   Nonspecific intraventricular block   Possible Lateral infarct , age undetermined   Inferior infarct , age undetermined   When compared with ECG of 12-AUG-2022 10:16,   Wide QRS rhythm has replaced Electronic ventricular pacemaker   Confirmed by Chan Aviles MD, [facility] ([id number]) on 2022 3:17:47 PM  Atrial fibrillation with frequent ventricular-paced complexes   Nonspecific ST and T wave abnormality   Abnormal ECG   When compared with ECG of 10-JUL-2022 12:06,   Electronic ventricular pacemaker has replaced Atrial fibrillation   Confirmed by Teddy Law ([id number]) on 2022 5:25:20 PM        TTE:  22 Completed-Report pending     Physical Exam:  General: Well nourished well groomed male appearing stated age   Neuro: A&OX3. QURESHI. PERRL. Steady unassisted gait  Head:Normocephalic. Atraumatic. Symmetrical  Neck: Trachea Midline  Resp: CTA B. No Adv BS/cough/sputum/tachypnea with seated conversation  CV: S1S2 RRR. 2/6 DANNIE. No JVD/carotid bruits. Pink/warm/dry extremities. +1 BLE peripheral edema  GI:Benign ab. Soft. NT/ND. Active BS  : Voids  Integ: No obvious s/s of infection or breakdown  Musculo/Skeletal: FROM in all major joints. Good muscle tone    Clinic Evaluation:   KCCQ-12: scanned into EMR      Assessment/Plan: Aortic Stenosis/AI of bioprosthetic AV (Deep Dominguez #23 Jan 2015 Dr. Sreekanth Ott): s/p RTF 32 Evolut Radha with [place] protection on 8/18. ASA. 1 month echo completed today and results reviewed with the patient by Dr. José Payan. Hx of MV repair:Mitral Ring in place. 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 Echo today shows improvement in MR but would benefit from a repeat DEWEY in the next 3-6 months. Cardiac Arrest following TAVR placement with approximately 2 min of CPR with ROSC. Resolved      CAD: ASA, Statin. Afib s/p Cryomaze and LLAA with SSS and PPM.      HTN: Continue Lasix. Lisinopril held on DC due to hypotension while hospitalized. BP is more robust today. Could likely restart Lisinopril-will defer to Dr. Juany Hirsch    HLD: Statin     8. DEL: has been unable to use CPAP in the last few months. He is treated for this at the [address] [facility] and I have asked him to follow up with them as an outpatient. 9.  GERD: PPI     10. Recent hospitalization for HF/COVID (DC on 7/13/22)     11. Hematuria: Likely combination or IV Heparin during procedure and urgent Hurd insertion in OR/possible trauma and Hx of Radiation therapy for prostate CA. Urology placed a 3 way Coude 8/19 for CBI. Hurd now out -has been following with Urology as an outpatient. 12.  Anemia: secondary to acute blood loss following surgery. DC Hbg was 8.1. Continue iron. SBE prophylax reviewed.     May begin Cardiac Rehab     F/U with primary cardiologist

## 2022-09-22 ENCOUNTER — OFFICE VISIT (OUTPATIENT)
Dept: CARDIOLOGY CLINIC | Age: 82
End: 2022-09-22
Payer: MEDICARE

## 2022-09-22 ENCOUNTER — APPOINTMENT (OUTPATIENT)
Dept: CARDIAC REHAB | Age: 82
End: 2022-09-22
Payer: MEDICARE

## 2022-09-22 ENCOUNTER — HOSPITAL ENCOUNTER (OUTPATIENT)
Dept: NON INVASIVE DIAGNOSTICS | Age: 82
Discharge: HOME | End: 2022-09-22
Attending: NURSE PRACTITIONER
Payer: MEDICARE

## 2022-09-22 VITALS
SYSTOLIC BLOOD PRESSURE: 135 MMHG | DIASTOLIC BLOOD PRESSURE: 68 MMHG | WEIGHT: 195 LBS | HEIGHT: 70 IN | BODY MASS INDEX: 27.92 KG/M2

## 2022-09-22 VITALS
RESPIRATION RATE: 16 BRPM | DIASTOLIC BLOOD PRESSURE: 86 MMHG | SYSTOLIC BLOOD PRESSURE: 154 MMHG | WEIGHT: 194 LBS | HEIGHT: 70 IN | TEMPERATURE: 97.9 F | BODY MASS INDEX: 27.77 KG/M2 | OXYGEN SATURATION: 97 % | HEART RATE: 60 BPM

## 2022-09-22 DIAGNOSIS — Z95.2 S/P TAVR (TRANSCATHETER AORTIC VALVE REPLACEMENT): ICD-10-CM

## 2022-09-22 DIAGNOSIS — I35.0 NONRHEUMATIC AORTIC VALVE STENOSIS: ICD-10-CM

## 2022-09-22 DIAGNOSIS — I35.0 NONRHEUMATIC AORTIC VALVE STENOSIS: Primary | ICD-10-CM

## 2022-09-22 PROCEDURE — G8432 DEP SCR NOT DOC, RNG: HCPCS | Performed by: NURSE PRACTITIONER

## 2022-09-22 PROCEDURE — 99214 OFFICE O/P EST MOD 30 MIN: CPT | Performed by: NURSE PRACTITIONER

## 2022-09-22 PROCEDURE — G8752 SYS BP LESS 140: HCPCS | Performed by: NURSE PRACTITIONER

## 2022-09-22 PROCEDURE — G8427 DOCREV CUR MEDS BY ELIG CLIN: HCPCS | Performed by: NURSE PRACTITIONER

## 2022-09-22 PROCEDURE — G8754 DIAS BP LESS 90: HCPCS | Performed by: NURSE PRACTITIONER

## 2022-09-22 PROCEDURE — G8536 NO DOC ELDER MAL SCRN: HCPCS | Performed by: NURSE PRACTITIONER

## 2022-09-22 PROCEDURE — 1123F ACP DISCUSS/DSCN MKR DOCD: CPT | Performed by: NURSE PRACTITIONER

## 2022-09-22 PROCEDURE — 1101F PT FALLS ASSESS-DOCD LE1/YR: CPT | Performed by: NURSE PRACTITIONER

## 2022-09-22 PROCEDURE — 1111F DSCHRG MED/CURRENT MED MERGE: CPT | Performed by: NURSE PRACTITIONER

## 2022-09-22 PROCEDURE — 93306 TTE W/DOPPLER COMPLETE: CPT

## 2022-09-22 PROCEDURE — G8417 CALC BMI ABV UP PARAM F/U: HCPCS | Performed by: NURSE PRACTITIONER

## 2022-09-22 NOTE — LETTER
9/22/2022    Patient: Kvng Wade   YOB: 1940   Date of Visit: 9/22/2022     Chandni Aguirre MD  [facility] [address]  Via Fax: [phone]     Yudi Redman [address]  Via In [place]    Dear MD Yudi Burton MD,      Thank you for referring Mr. Sergio Oakley to [address] [place] [address] [facility] [employer] for evaluation. My notes for this consultation are attached. If you have questions, please do not hesitate to call me. I look forward to following your patient along with you.       Sincerely,    Vicky Sapp NP

## 2022-09-23 LAB
ECHO AO ROOT DIAM: 3.9 CM
ECHO AO ROOT INDEX: 1.89 CM/M2
ECHO AV AREA PEAK VELOCITY: 2 CM2
ECHO AV AREA VTI: 2 CM2
ECHO AV AREA/BSA PEAK VELOCITY: 1 CM2/M2
ECHO AV AREA/BSA VTI: 1 CM2/M2
ECHO AV MEAN GRADIENT: 22 MMHG
ECHO AV MEAN VELOCITY: 2.2 M/S
ECHO AV PEAK GRADIENT: 40 MMHG
ECHO AV PEAK VELOCITY: 3.2 M/S
ECHO AV VELOCITY RATIO: 0.38
ECHO AV VTI: 64.9 CM
ECHO EST RA PRESSURE: 10 MMHG
ECHO LA DIAMETER INDEX: 3.11 CM/M2
ECHO LA DIAMETER: 6.4 CM
ECHO LA TO AORTIC ROOT RATIO: 1.64
ECHO LV FRACTIONAL SHORTENING: 12 % (ref 28–44)
ECHO LV INTERNAL DIMENSION DIASTOLE INDEX: 3.16 CM/M2
ECHO LV INTERNAL DIMENSION DIASTOLIC: 6.5 CM (ref 4.2–5.9)
ECHO LV INTERNAL DIMENSION SYSTOLIC INDEX: 2.77 CM/M2
ECHO LV INTERNAL DIMENSION SYSTOLIC: 5.7 CM
ECHO LV IVSD: 0.9 CM (ref 0.6–1)
ECHO LV MASS 2D: 265.2 G (ref 88–224)
ECHO LV MASS INDEX 2D: 128.8 G/M2 (ref 49–115)
ECHO LV POSTERIOR WALL DIASTOLIC: 1 CM (ref 0.6–1)
ECHO LV RELATIVE WALL THICKNESS RATIO: 0.31
ECHO LVOT AREA: 5.3 CM2
ECHO LVOT AV VTI INDEX: 0.39
ECHO LVOT DIAM: 2.6 CM
ECHO LVOT MEAN GRADIENT: 4 MMHG
ECHO LVOT PEAK GRADIENT: 5 MMHG
ECHO LVOT PEAK VELOCITY: 1.2 M/S
ECHO LVOT STROKE VOLUME INDEX: 64.4 ML/M2
ECHO LVOT SV: 132.7 ML
ECHO LVOT VTI: 25 CM
ECHO MV A VELOCITY: 0.8 M/S
ECHO MV AREA VTI: 3.3 CM2
ECHO MV E DECELERATION TIME (DT): 149.2 MS
ECHO MV E VELOCITY: 1.35 M/S
ECHO MV E/A RATIO: 1.69
ECHO MV LVOT VTI INDEX: 1.61
ECHO MV MAX VELOCITY: 1.8 M/S
ECHO MV MEAN GRADIENT: 4 MMHG
ECHO MV MEAN VELOCITY: 0.9 M/S
ECHO MV PEAK GRADIENT: 12 MMHG
ECHO MV PRESSURE HALF TIME (PHT): 43.3 MS
ECHO MV PRESSURE HALF TIME (PHT): 89.5 MS
ECHO MV VTI: 40.3 CM
ECHO PV MAX VELOCITY: 0.7 M/S
ECHO PV PEAK GRADIENT: 2 MMHG
ECHO RIGHT VENTRICULAR SYSTOLIC PRESSURE (RVSP): 52 MMHG
ECHO RV FREE WALL PEAK S': 11 CM/S
ECHO RV INTERNAL DIMENSION: 6.3 CM
ECHO RV TAPSE: 1.8 CM (ref 1.7–?)
ECHO TV REGURGITANT MAX VELOCITY: 3.25 M/S
ECHO TV REGURGITANT PEAK GRADIENT: 42 MMHG

## 2022-09-23 PROCEDURE — 93306 TTE W/DOPPLER COMPLETE: CPT | Performed by: INTERNAL MEDICINE

## 2022-09-27 ENCOUNTER — HOSPITAL ENCOUNTER (OUTPATIENT)
Dept: CARDIAC REHAB | Age: 82
Discharge: HOME | End: 2022-09-27
Payer: MEDICARE

## 2022-09-27 VITALS — BODY MASS INDEX: 27.84 KG/M2 | WEIGHT: 194 LBS

## 2022-09-27 PROCEDURE — 93798 PHYS/QHP OP CAR RHAB W/ECG: CPT

## 2022-09-29 ENCOUNTER — HOSPITAL ENCOUNTER (OUTPATIENT)
Dept: CARDIAC REHAB | Age: 82
Discharge: HOME | End: 2022-09-29
Payer: MEDICARE

## 2022-09-29 ENCOUNTER — APPOINTMENT (OUTPATIENT)
Dept: CARDIAC REHAB | Age: 82
End: 2022-09-29
Payer: MEDICARE

## 2022-09-29 VITALS — BODY MASS INDEX: 28.12 KG/M2 | WEIGHT: 196 LBS

## 2022-09-29 PROCEDURE — 93798 PHYS/QHP OP CAR RHAB W/ECG: CPT

## 2022-10-04 ENCOUNTER — HOSPITAL ENCOUNTER (OUTPATIENT)
Dept: CARDIAC REHAB | Age: 82
Discharge: HOME | End: 2022-10-04
Payer: MEDICARE

## 2022-10-04 VITALS — BODY MASS INDEX: 28.12 KG/M2 | WEIGHT: 196 LBS

## 2022-10-04 PROCEDURE — 93798 PHYS/QHP OP CAR RHAB W/ECG: CPT

## 2022-10-06 ENCOUNTER — HOSPITAL ENCOUNTER (OUTPATIENT)
Dept: CARDIAC REHAB | Age: 82
Discharge: HOME | End: 2022-10-06
Payer: MEDICARE

## 2022-10-06 VITALS — WEIGHT: 195 LBS | BODY MASS INDEX: 27.98 KG/M2

## 2022-10-06 PROCEDURE — 93797 PHYS/QHP OP CAR RHAB WO ECG: CPT

## 2022-10-06 PROCEDURE — 93798 PHYS/QHP OP CAR RHAB W/ECG: CPT

## 2022-10-10 ENCOUNTER — HOSPITAL ENCOUNTER (OUTPATIENT)
Dept: CARDIAC REHAB | Age: 82
Discharge: HOME | End: 2022-10-10
Payer: MEDICARE

## 2022-10-10 VITALS — BODY MASS INDEX: 28.55 KG/M2 | WEIGHT: 199 LBS

## 2022-10-10 PROCEDURE — 93798 PHYS/QHP OP CAR RHAB W/ECG: CPT

## 2022-10-11 ENCOUNTER — HOSPITAL ENCOUNTER (OUTPATIENT)
Dept: CARDIAC REHAB | Age: 82
Discharge: HOME | End: 2022-10-11
Payer: MEDICARE

## 2022-10-11 VITALS — WEIGHT: 200 LBS | BODY MASS INDEX: 28.7 KG/M2

## 2022-10-11 PROCEDURE — 93798 PHYS/QHP OP CAR RHAB W/ECG: CPT

## 2022-10-13 ENCOUNTER — HOSPITAL ENCOUNTER (OUTPATIENT)
Dept: CARDIAC REHAB | Age: 82
Discharge: HOME | End: 2022-10-13
Payer: MEDICARE

## 2022-10-13 VITALS — WEIGHT: 202 LBS | BODY MASS INDEX: 28.98 KG/M2

## 2022-10-13 PROCEDURE — 93797 PHYS/QHP OP CAR RHAB WO ECG: CPT

## 2022-10-13 PROCEDURE — 93798 PHYS/QHP OP CAR RHAB W/ECG: CPT

## 2022-10-18 ENCOUNTER — HOSPITAL ENCOUNTER (OUTPATIENT)
Dept: CARDIAC REHAB | Age: 82
Discharge: HOME | End: 2022-10-18
Payer: MEDICARE

## 2022-10-18 VITALS — WEIGHT: 201 LBS | BODY MASS INDEX: 28.84 KG/M2

## 2022-10-18 PROCEDURE — 93798 PHYS/QHP OP CAR RHAB W/ECG: CPT

## 2022-10-20 ENCOUNTER — HOSPITAL ENCOUNTER (OUTPATIENT)
Dept: CARDIAC REHAB | Age: 82
Discharge: HOME | End: 2022-10-20
Payer: MEDICARE

## 2022-10-20 VITALS — BODY MASS INDEX: 29.13 KG/M2 | WEIGHT: 203 LBS

## 2022-10-20 PROCEDURE — 93797 PHYS/QHP OP CAR RHAB WO ECG: CPT

## 2022-10-20 PROCEDURE — 93798 PHYS/QHP OP CAR RHAB W/ECG: CPT

## 2022-10-25 ENCOUNTER — HOSPITAL ENCOUNTER (OUTPATIENT)
Dept: CARDIAC REHAB | Age: 82
Discharge: HOME | End: 2022-10-25
Payer: MEDICARE

## 2022-10-25 VITALS — BODY MASS INDEX: 29.41 KG/M2 | WEIGHT: 205 LBS

## 2022-10-25 PROCEDURE — 93798 PHYS/QHP OP CAR RHAB W/ECG: CPT

## 2022-10-27 ENCOUNTER — HOSPITAL ENCOUNTER (OUTPATIENT)
Dept: CARDIAC REHAB | Age: 82
Discharge: HOME | End: 2022-10-27
Payer: MEDICARE

## 2022-10-27 VITALS — WEIGHT: 207 LBS | BODY MASS INDEX: 29.7 KG/M2

## 2022-10-27 PROCEDURE — 93797 PHYS/QHP OP CAR RHAB WO ECG: CPT

## 2022-10-27 PROCEDURE — 93798 PHYS/QHP OP CAR RHAB W/ECG: CPT

## 2022-11-01 ENCOUNTER — TELEPHONE (OUTPATIENT)
Dept: CARDIOLOGY CLINIC | Age: 82
End: 2022-11-01

## 2022-11-01 ENCOUNTER — HOSPITAL ENCOUNTER (OUTPATIENT)
Dept: CARDIAC REHAB | Age: 82
Discharge: HOME | End: 2022-11-01
Payer: MEDICARE

## 2022-11-01 VITALS — BODY MASS INDEX: 29.27 KG/M2 | WEIGHT: 204 LBS

## 2022-11-01 PROCEDURE — 93798 PHYS/QHP OP CAR RHAB W/ECG: CPT

## 2022-11-01 NOTE — TELEPHONE ENCOUNTER
Mr. Sharon Gamez called our office today complaining of difficulty breathing at night. He was instructed to follow up with the [place] at his September apt regarding his CPAP but he has not done this. He says he does not like wearing his CPAP because he feels claustrophobic and he can't breathe with it on. He is taking Lasix 40 mg once daily but complains he urinates too much with this. I instructed him to sleep propped up with some pillows tonight and to reach out to his PCP regarding his CPAP and that he should be wearing this. I let him know I would reach out to Dr. Slime Booker and let him know about our conversation and to guide diuretic therapy. Thank you. He reports his weight is 2014, he was 89 prior to TAVR.

## 2022-11-03 ENCOUNTER — HOSPITAL ENCOUNTER (OUTPATIENT)
Dept: CARDIAC REHAB | Age: 82
Discharge: HOME | End: 2022-11-03
Payer: MEDICARE

## 2022-11-03 VITALS — WEIGHT: 204 LBS | BODY MASS INDEX: 29.27 KG/M2

## 2022-11-03 PROCEDURE — 93798 PHYS/QHP OP CAR RHAB W/ECG: CPT

## 2022-11-03 PROCEDURE — 93797 PHYS/QHP OP CAR RHAB WO ECG: CPT

## 2022-11-04 ENCOUNTER — OFFICE VISIT (OUTPATIENT)
Dept: CARDIOLOGY CLINIC | Age: 82
End: 2022-11-04
Payer: MEDICARE

## 2022-11-04 ENCOUNTER — TELEPHONE (OUTPATIENT)
Dept: CARDIOLOGY CLINIC | Age: 82
End: 2022-11-04

## 2022-11-04 ENCOUNTER — PATIENT MESSAGE (OUTPATIENT)
Dept: CARDIOLOGY CLINIC | Age: 82
End: 2022-11-04

## 2022-11-04 VITALS
SYSTOLIC BLOOD PRESSURE: 138 MMHG | OXYGEN SATURATION: 96 % | BODY MASS INDEX: 29.63 KG/M2 | HEIGHT: 70 IN | RESPIRATION RATE: 16 BRPM | WEIGHT: 207 LBS | DIASTOLIC BLOOD PRESSURE: 88 MMHG | HEART RATE: 67 BPM

## 2022-11-04 DIAGNOSIS — Z98.890 S/P MAZE OPERATION FOR ATRIAL FIBRILLATION: ICD-10-CM

## 2022-11-04 DIAGNOSIS — D62 ANEMIA DUE TO ACUTE BLOOD LOSS: ICD-10-CM

## 2022-11-04 DIAGNOSIS — Z95.2 S/P AVR (AORTIC VALVE REPLACEMENT): ICD-10-CM

## 2022-11-04 DIAGNOSIS — I25.10 CORONARY ARTERY DISEASE INVOLVING NATIVE CORONARY ARTERY OF NATIVE HEART WITHOUT ANGINA PECTORIS: ICD-10-CM

## 2022-11-04 DIAGNOSIS — Z98.890 S/P MVR (MITRAL VALVE REPAIR): ICD-10-CM

## 2022-11-04 DIAGNOSIS — I50.22 CHRONIC SYSTOLIC (CONGESTIVE) HEART FAILURE (HCC): Primary | ICD-10-CM

## 2022-11-04 DIAGNOSIS — Z86.79 S/P MAZE OPERATION FOR ATRIAL FIBRILLATION: ICD-10-CM

## 2022-11-04 PROCEDURE — G0463 HOSPITAL OUTPT CLINIC VISIT: HCPCS | Performed by: INTERNAL MEDICINE

## 2022-11-04 PROCEDURE — 99214 OFFICE O/P EST MOD 30 MIN: CPT | Performed by: INTERNAL MEDICINE

## 2022-11-04 PROCEDURE — 1123F ACP DISCUSS/DSCN MKR DOCD: CPT | Performed by: INTERNAL MEDICINE

## 2022-11-04 PROCEDURE — 3078F DIAST BP <80 MM HG: CPT | Performed by: INTERNAL MEDICINE

## 2022-11-04 PROCEDURE — 3074F SYST BP LT 130 MM HG: CPT | Performed by: INTERNAL MEDICINE

## 2022-11-04 RX ORDER — BUMETANIDE 2 MG/1
2 TABLET ORAL DAILY
Qty: 90 TABLET | Refills: 3 | Status: SHIPPED | OUTPATIENT
Start: 2022-11-04

## 2022-11-04 NOTE — PROGRESS NOTES
Maty Camacho MD, MS, [facility] [place]            HISTORY OF PRESENT ILLNESS:    Skylar Chapa is a 79 y.o. male here for urgent visit. Underwent idalia in valve TAVR with a 26 Evolut with Sobieski protection via the RTF approach on 8/18/22. Immediately post dilatation of the AV patient developed severe hypotension requiring CPR for a few minutes and was intubated for stabilization purposes. He also developed hematuria requiring consultation with Urology and CBI. PMH CAD, sp AVR, sp MVRepair, PAF, sp CRYOMAZE, sp watchman, SSS sp PPM, diastolic heart failure. Last echo 9/2022    Left Ventricle: Preserved left ventricular systolic function with a visually estimated EF of 50 - 55%. Left ventricle size is normal. Increased wall thickness. Findings consistent with mild concentric hypertrophy. Normal wall motion. Right Ventricle: Right ventricle is mildly dilated. Aortic Valve: Medtronic transcatheter bioprosthetic valve that is well-seated with a size of 26 mm. Mild patient prosthesis mismatch with EOA of 1.3cm2 and AV mean gradient is 20 mmHg. .    Mitral Valve: Valve repaired by annular ring. Thickened leaflets. Previously placed ring appears to be dehisced. MR jest is eccentric and is not clearly visualized and may be underestimated. By color it appears moderate at worse. Tricuspid Valve: Moderate regurgitation. The estimated RVSP is 40 mmHg. Left Atrium: Left atrium is severely dilated. Right Atrium: Right atrium is moderately dilated    He is quite swollen. 10# ++ weight gain. + orthopnea, PND. Discussed BNP, stop lasix, start bumex 2mg po daily, set up DEWEY to reassess MV. Close FU, may need IV diuresis.       Total time spent >35 min, reviewing my prior notes, referring physician notes, other subspecialty and primary care notes, all available lab values, all available cardiac testing, all available radiology imaging, vital signs, weights, medications, potential medication interactions, family history, preparing my notes, updating diagnoses, correcting chart errors from other providers, documenting the above, discussing findings/results/testing methodologies/plan with patient, ordering tests/lab, sending prescriptions.        SUMMARY:   Problem List  Date Reviewed: 11/4/2022            Codes Class Noted    Presence of Watchman left atrial appendage closure device ICD-10-CM: Z95.818  ICD-9-CM: V45.09  10/13/2020        S/P TAVR (transcatheter aortic valve replacement) ICD-10-CM: Z95.2  ICD-9-CM: V43.3  8/26/2022        Aortic stenosis ICD-10-CM: I35.0  ICD-9-CM: 424.1  8/18/2022        Chronic systolic (congestive) heart failure ICD-10-CM: I50.22  ICD-9-CM: 428.22, 428.0  8/12/2022        COVID ICD-10-CM: U07.1  ICD-9-CM: 079.89  7/12/2022        CHF exacerbation ([facility] [address] ICD-10-CM: I50.9  ICD-9-CM: 428.0  7/10/2022        COVID-19 ICD-10-CM: U07.1  ICD-9-CM: 079.89  5/62/7205        Diastolic CHF, acute on chronic (HCC) ICD-10-CM: I50.33  ICD-9-CM: 428.33, 428.0  11/28/2020        Anasarca ICD-10-CM: R60.1  ICD-9-CM: 782.3  11/27/2020        ABDUL (dyspnea on exertion) ICD-10-CM: R06.09  ICD-9-CM: 786.09  11/27/2020        Radiation proctitis ICD-10-CM: K62.7  ICD-9-CM: 569.49  Unknown        DEL (obstructive sleep apnea) ICD-10-CM: G47.33  ICD-9-CM: 327.23  Unknown        Insomnia ICD-10-CM: G47.00  ICD-9-CM: 780.52  Unknown        Atrial fibrillation (HCC) ICD-10-CM: I48.91  ICD-9-CM: 427.31  Unknown        Hyperlipidemia ICD-10-CM: E78.5  ICD-9-CM: 272.4  Unknown        Hx of carcinoma in situ of prostate ICD-10-CM: Z86.002  ICD-9-CM: V13.89  Unknown        GERD (gastroesophageal reflux disease) ICD-10-CM: K21.9  ICD-9-CM: 530.81  Unknown        Chronic pain ICD-10-CM: G89.29  ICD-9-CM: 338.29  Unknown    Overview Signed 6/8/2020  8:12 PM by Mila Card MD     legs/knee             CAD (coronary artery disease) ICD-10-CM: I25.10  ICD-9-CM: 414.00  Unknown        Arthritis ICD-10-CM: M19.90  ICD-9-CM: 716.90  Unknown        GI bleed ICD-10-CM: K92.2  ICD-9-CM: 578.9  6/8/2020        Hypertension ICD-10-CM: I10  ICD-9-CM: 401.9  10/26/2015        Anemia due to acute blood loss ICD-10-CM: D62  ICD-9-CM: 285.1  12/26/2014        Nonrheumatic aortic valve stenosis ICD-10-CM: I35.0  ICD-9-CM: 424.1  12/23/2014        S/P AVR (aortic valve replacement) ICD-10-CM: Z95.2  ICD-9-CM: V43.3  12/23/2014    Overview Signed 12/23/2014 12:45 PM by Serena Campbell PA-C     AORTIC VALVE REPLACEMENT 23mm Deep Mitroflow Bioprosthesis                          S/P MVR (mitral valve repair) ICD-10-CM: A71.878  ICD-9-CM: V45.89  12/23/2014    Overview Signed 12/23/2014 12:45 PM by Serena Campbell PA-C     MITRAL VALVE REPAIR Medtronic 25mm Adjustable Ring               S/P Maze operation for atrial fibrillation ICD-10-CM: Z98.890, Z86.79  ICD-9-CM: V45.89  12/23/2014    Overview Signed 12/23/2014 12:46 PM by Serena Campbell PA-C     Cryo Maze Left Atrial             Aortic insufficiency ICD-10-CM: I35.1  ICD-9-CM: 424.1  12/8/2014        Arthritis of knee ICD-10-CM: M17.10  ICD-9-CM: 716.96  7/9/2012           Current Outpatient Medications on File Prior to Visit   Medication Sig    melatonin 3 mg tablet Take 1 Tablet by mouth nightly. acetaminophen (TYLENOL) 325 mg tablet Take 2 Tablets by mouth every four (4) hours as needed for Pain. omeprazole (PRILOSEC) 40 mg capsule Take 40 mg by mouth in the morning. potassium chloride (K-DUR, KLOR-CON M20) 20 mEq tablet Take 2 Tablets by mouth daily. aspirin delayed-release 81 mg tablet Take 81 mg by mouth two (2) times a day. TAKES ONE IN MORNING AND TWO IN EVENING (PER PATIENT AS OF 8/12/22)    polyvinyl alcohol (LIQUIFILM TEARS) 1.4 % ophthalmic solution Administer 1 Drop to both eyes as needed. ferrous sulfate 325 mg (65 mg iron) cpER Take 1 Tab by mouth every other day.     tamsulosin (FLOMAX) 0.4 mg capsule Take 0.4 mg by mouth two (2) times a day. cholecalciferol (VITAMIN D3) 1,000 unit tablet Take 2,000 Units by mouth two (2) times a day. GLUCOSAMINE HCL/CHONDR CHING A NA (GLUCOSAMINE-CHONDROITIN) 750-600 mg tab Take 1 Tab by mouth daily. omega-3 fatty acids-vitamin e 1,000 mg cap Take 1 Cap by mouth every other day. multivitamin (ONE A DAY) tablet Take 1 Tab by mouth daily. docusate sodium (COLACE) 100 mg capsule Take 100 mg by mouth two (2) times a day. finasteride (PROSCAR) 5 mg tablet Take 5 mg by mouth nightly. pravastatin (PRAVACHOL) 40 mg tablet Take 40 mg by mouth nightly. No current facility-administered medications on file prior to visit. CARDIOLOGY STUDIES TO DATE:  No results found for this visit on 11/04/22.   08/18/22    CARDIAC PROCEDURE 08/21/2022 8/21/2022    Conclusion  PROCEDURALISTS:?  Surgeon 1. Pablo Concepcion MD?  CoSurgeon 1. Maggie Carranza MD    PROCEDURES:  1. Angiography of the ascending aorta and aortoiliac bifurcation  2. Temporary transvenous pacemaker insertion  3. Left heart catheterization  4. Implantation of a 26 Medtronic Evolut aortic valve via the right transfemoral approach  5. 18F right?femoral artery access with Perclose pre-closure / 6F left femoral vein access with manual compression / 6F left femoral artery access with Perclose closure  16. Placement and removal of sentinel device. Jossy Davidson is a 79 y.o. ?old male  with severe symptomatic aortic stenosis, NYHA Class IVsymptom status. ? He is referred for transfemoral TAVR procedure. He was deemed as high risk for SAVR. PRE-OP DIAGNOSIS:  1. Severe, symptomatic aortic stenosis (NYHA, Class III)  2. No Coronary artery disease  3. Cardiomyopathy  4. Long-term persistent atrial fibrillation  5. Status post watchman based left and appendage occlusion  6. Recent acute on chronic congestive heart failure  7. Prior AVR with 23 mm Mitroflow prosthesis  8. Prior mitral valve repair  9.   Status post prior single-chamber pacemaker placement    POST-OP DIAGNOSIS:  1. Status post successful implantation of a 26Medtronic Evolut aortic valve via the right transfemoral approach  2. No significant post operative conduction block  3. Brief severe hypotension requiring cardiopulmonary resuscitation with PEA kind of presentation. No evidence of significant bradycardia arrhythmia/ventricular tachycardia. Possibility of vasovagal event cannot be ruled out. PROCEDURAL DETAILS: The risks (death, myocardial infarction, stroke, bleeding, vascular complications, renal dysfunction, allergic reactions, and other), benefits, and alternatives were explained and discussed. Signed, informed consent was obtained. The patient was placed on the table and prepped and draped in the usual fashion. ARTERIAL ACCESS:?  - Right: Right femoral artery access was obtained using ultrasound and a micropuncture needle and sheath system. ? Angiography confirmed adequate position within the mid right CFA without significant angiographic stenosis or irregularity. Pre-close technique was performed using two orthogonally positioned Perclose closure devices. ? A 6F sheath was placed before it was later up-sized to the large bore TAVR sheath. ? Post-procedure, the sheath was removed and the arteriotomy was closed using the pre-close technique. Final hemostasis was excellent. - Left: A 6 Icelandic sheath was placed in the left common femoral artery without difficulty. ? Post-procedure, the sheath was removed and hemostasis was achieved with a Perclose closure device. - A 6F sheath was placed in the left common femoral vein without difficulty. Post-procedure the sheath was removed and manual compression was applied to achieve hemostasis. PROCEDURAL MEDICATIONS:?  General anaesthesia. Please see Anesthesia record for full details. Local anesthesia - 1% lidocaine was administered to the bilateral groins. ??     FINDINGS:  ANGIOGRAPHY OF THE ASCENDING AORTA:? There is significant calcification of the aortic valve and annulus. ? Appropriate co-planar views were identified for valve deployment. Post-deployment, the transcatheter valve appeared well positioned with appropriate function and only trace to mild para-valvular aortic regurgitation. ? Coronary arteries remained patent without obstruction. ? AORTOILIAC ANGIOGRAPHY:?The abdominal aorta and bilateral iliac arteries are widely patent without evidence of aneurysm or stenosis. ? There is no evidence of dissection, perforation or other complications. ?    INTERVENTION:  - A temporary transvenous pacemaker wire was inserted via the left common femoral vein access and positioned in the RV apical position. ? Appropriate position and function were confirmed. Rapid ventricular pacing (140 BPM) was performed in concert with the valve implantations procedures. - Left heart catheterization: aortic valve crossed with a 0.035 inch straight wire and this wire was exchanged out for an Confida wire. Systemic heparin was administered to maintain an ACT between 250-300 seconds. - Transaortic valve implantation: Rapid pacing performed in coordination with deployment of a transcatheter valve. ? Post-deployment angiography and transthoracic echocardiography confirmed appropriate position and function, there was no significant para-valvular regurgitation. LV function was preserved. ? There was no pericardial effusion. ? Immediately post dilatation patient developed severe hypotension requiring CPR for a few minutes. Echo was performed that did not show any pericardial effusion. With 1 mg of epinephrine, blood pressure promptly improved. LV and RV function a few minutes after that on transesophageal echocardiogram demonstrated near normalization. Patient had to be intubated however for stabilization. Blood gases were rechecked and did not show any CO2 retention.   Monitor patient was hemodynamically completely stable, the valve was released from delivery system. - The temporary pacemaker was removed. DEWEY was performed and did demonstrate dehiscence of band used to previously repaired mitral valve with residual moderate to severe/severe mitral regurgitation. CONTRAST VOLUME:?45 mL Omnipaque  BLOOD LOSS:Minimal  COMPLICATIONS:?None  SPECIMENS:?None    RECOMMENDATIONS  - Admit to CVICU, Extubation when feasible  - Aspirin  - Echocardiogram, labs and ECG in AM  - Early ambulation    Signed by: Daniel Hercules MD on 8/21/2022 10:30 PM     09/22/22    ECHO ADULT COMPLETE 09/23/2022 9/23/2022    Interpretation Summary    Left Ventricle: Preserved left ventricular systolic function with a visually estimated EF of 50 - 55%. Left ventricle size is normal. Increased wall thickness. Findings consistent with mild concentric hypertrophy. Normal wall motion. Right Ventricle: Right ventricle is mildly dilated. Aortic Valve: Medtronic transcatheter bioprosthetic valve that is well-seated with a size of 26 mm. Mild patient prosthesis mismatch with EOA of 1.3cm2 and AV mean gradient is 20 mmHg. .    Mitral Valve: Valve repaired by annular ring. Thickened leaflets. Previously placed ring appears to be dehisced. MR jest is eccentric and is not clearly visualized and may be underestimated. By color it appears moderate at worse. Tricuspid Valve: Moderate regurgitation. The estimated RVSP is 40 mmHg. Left Atrium: Left atrium is severely dilated. Right Atrium: Right atrium is moderately dilated. It appears his prosthetic AV is more stenotic than last year and will need evaluation in valve clinic ASAP. It has progressed quickly from last year. Signed by:  Daniel Hercules MD on 9/23/2022  1:07 PM            CARDIAC ROS:   positive for dyspnea, fatigue, lower extremity edema    Past Medical History:   Diagnosis Date    Anasarca     Aortic valve stenosis     Repaired 8/18/2022    Arthritis     in hands and knee (before replacement)    Atrial fibrillation ([address]     CAD (coronary artery disease)     CHF (congestive heart failure) ([address] 11/01/2020    Chronic pain     legs/knee    GERD (gastroesophageal reflux disease)     Hx of carcinoma in situ of prostate     Hyperlipidemia     Hypertension     Insomnia     DEL (obstructive sleep apnea)     Prostate cancer ([address] 01/01/2017    Radiation proctitis     Vitamin D deficiency        Family History   Problem Relation Age of Onset    Cancer Mother     Cancer Father         prostrate    No Known Problems Sister     No Known Problems Sister     No Known Problems Sister     Cancer Brother         PROSTATE    No Known Problems Brother     Anesth Problems Neg Hx        Social History     Socioeconomic History    Marital status:      Spouse name: Lamont Leo    Number of children: 2    Years of education: 13 years    Highest education level: High school graduate   Occupational History    Not on file   Tobacco Use    Smoking status: Never    Smokeless tobacco: Never   Vaping Use    Vaping Use: Never used   Substance and Sexual Activity    Alcohol use:  Yes     Alcohol/week: 2.0 standard drinks     Types: 2 Cans of beer per week     Comment: Occasionaly    Drug use: No    Sexual activity: Not Currently   Other Topics Concern     Service Yes    Blood Transfusions No    Caffeine Concern No    Occupational Exposure No    Hobby Hazards No    Sleep Concern Yes     Comment: Insomina    Stress Concern No    Weight Concern No    Special Diet No    Back Care No    Exercise No    Bike Helmet Yes    Seat Belt Yes    Self-Exams Yes   Social History Narrative    Not on file     Social Determinants of Health     Financial Resource Strain: Not on file   Food Insecurity: Not on file   Transportation Needs: Not on file   Physical Activity: Not on file   Stress: Not on file   Social Connections: Not on file   Intimate Partner Violence: Not on file   Housing Stability: Not on file        GENERAL ROS:  A comprehensive review of systems was negative except for that written in the HPI.     Visit Vitals  /88   Pulse 67   Resp 16   Ht 5' 10\" (1.778 m)   Wt 93.9 kg (207 lb)   SpO2 96%   BMI 29.70 kg/m²     Vitals:    11/04/22 1053   BP: 138/88   Pulse: 67   Resp: 16   SpO2: 96%   Weight: 93.9 kg (207 lb)   Height: 5' 10\" (1.778 m)        Wt Readings from Last 3 Encounters:   11/04/22 93.9 kg (207 lb)   11/03/22 92.5 kg (204 lb)   11/01/22 92.5 kg (204 lb)            BP Readings from Last 3 Encounters:   11/04/22 138/88   09/22/22 135/68   09/22/22 (!) 154/86       PHYSICAL EXAM  General appearance: alert, cooperative, no distress, appears stated age  Neck: supple, symmetrical, trachea midline, no adenopathy, thyroid: not enlarged, symmetric, no tenderness/mass/nodules, no carotid bruit, and no JVD  Lungs: rhonchi R base, diminished breath sounds R base  Heart: regular rate and rhythm, S1, S2 normal, no murmur, click, rub or gallop  Extremities: ++ edema bilateral 2+++    Lab Results   Component Value Date/Time    Cholesterol, total 117 03/16/2022 08:00 AM    Cholesterol, total 126 07/24/2021 07:45 AM    Cholesterol, total 105 12/11/2018 11:03 AM    Cholesterol, total 146 10/13/2015 08:07 AM    HDL Cholesterol 59 03/16/2022 08:00 AM    HDL Cholesterol 52 07/24/2021 07:45 AM    HDL Cholesterol 61 12/11/2018 11:03 AM    HDL Cholesterol 67 10/13/2015 08:07 AM    LDL, calculated 45 03/16/2022 08:00 AM    LDL, calculated 60 07/24/2021 07:45 AM    LDL, calculated 32 12/11/2018 11:03 AM    LDL, calculated 54 10/13/2015 08:07 AM    Triglyceride 62 03/16/2022 08:00 AM    Triglyceride 66 07/24/2021 07:45 AM    Triglyceride 60 12/11/2018 11:03 AM    Triglyceride 124 10/13/2015 08:07 AM       Lab Results   Component Value Date/Time    WBC 5.4 08/24/2022 04:32 AM    HGB 8.1 (L) 08/24/2022 04:32 AM    HCT 24.9 (L) 08/24/2022 04:32 AM    PLATELET 086 (L) 47/17/8983 04:32 AM    MCV 98.4 08/24/2022 04:32 AM        Lab Results   Component Value Date/Time Cholesterol, total 117 03/16/2022 08:00 AM    Cholesterol, total 126 07/24/2021 07:45 AM    Cholesterol, total 105 12/11/2018 11:03 AM    Cholesterol, total 146 10/13/2015 08:07 AM    HDL Cholesterol 59 03/16/2022 08:00 AM    HDL Cholesterol 52 07/24/2021 07:45 AM    HDL Cholesterol 61 12/11/2018 11:03 AM    HDL Cholesterol 67 10/13/2015 08:07 AM    LDL, calculated 45 03/16/2022 08:00 AM    LDL, calculated 60 07/24/2021 07:45 AM    LDL, calculated 32 12/11/2018 11:03 AM    LDL, calculated 54 10/13/2015 08:07 AM    Triglyceride 62 03/16/2022 08:00 AM    Triglyceride 66 07/24/2021 07:45 AM    Triglyceride 60 12/11/2018 11:03 AM    Triglyceride 124 10/13/2015 08:07 AM        ASSESSMENT    ICD-10-CM ICD-9-CM    1. Chronic systolic (congestive) heart failure (HCC)  I50.22 428.22 bumetanide (BUMEX) 2 mg tablet     428.0 NT-PRO BNP      2. S/P AVR (aortic valve replacement)  Z95.2 V43.3       3. S/P MVR (mitral valve repair)  Z98.890 V45.89       4. S/P Maze operation for atrial fibrillation  Z98.890 V45.89     Z86.79        5. Anemia due to acute blood loss  D62 285.1       6. Coronary artery disease involving native coronary artery of native heart without angina pectoris  I25.10 414.01           Encounter Diagnoses   Name Primary? Chronic systolic (congestive) heart failure (HCC) Yes    S/P AVR (aortic valve replacement)     S/P MVR (mitral valve repair)     S/P Maze operation for atrial fibrillation     Anemia due to acute blood loss     Coronary artery disease involving native coronary artery of native heart without angina pectoris      Orders Placed This Encounter    NT-PRO BNP    bumetanide (BUMEX) 2 mg tablet       Plan    Discussed BNP, stop lasix, start bumex 2mg po daily, set up DEWEY to reassess MV. Close FU    May need IV diuresis  Follow-up and Dispositions    Return in about 1 week (around 11/11/2022).          Alfred Ruggiero MD  [address] [place] [address] [address]  [facility], [address]  [phone] (F)    [address], [address] [facility]  [phone] (P)  [phone] (F)    ATTENTION:   This medical record was transcribed using an electronic medical records/speech recognition system. Although proofread, it may and can contain electronic, spelling and other errors. Corrections may be executed at a later time. Please feel free to contact us for any clarifications as needed.

## 2022-11-04 NOTE — TELEPHONE ENCOUNTER
I called Mr. Rosangela Peralta daughter, Darlene Davila, to explain that I advised Mr. Antonia Gamez to see his cardiologist soon to help manage his diuresis plan and to follow back up with the [place], as he was advised in September, about his CPAP. I told her I saw he was able to see Dr. David Sherman today and she increased his diuretic to Bumex 2mg. He will have a DEWEY next week. I will forward this message to the nurse that saw Mr. Antonia Gamez today because his daughter has questions regarding time/place of his echo. Thank you.

## 2022-11-04 NOTE — TELEPHONE ENCOUNTER
Maria Isabel Jade LPN 26/8/3568 0:57 PM EDT    Can you call his daughter to confirm DEWEY info with her please       ----- Message -----  From: Yady Aldrich NP  Sent: 11/4/2022 1:50 PM EDT  To: Juan Slade LPN  Subject: FW: Neisha Kirk, would you be able to call MR. Whitney[facility] at these numbers? [phone] - she has questions about where/when his echo is and transportation.  Thank you.    ----- Message -----  From: He Lin  Sent: 11/4/2022 12:35 PM EDT  To: [facility] Nurses  Subject: [employer] - my phone numbers are: [phone]  Thank you,  Sharyn Jordan

## 2022-11-04 NOTE — TELEPHONE ENCOUNTER
----- Message from Ozzie Mayberry sent at 2022 12:35 PM EDT -----  [address] - my phone numbers are: [phone]  Thank you,  Tristian Valdivia

## 2022-11-04 NOTE — PROGRESS NOTES
Message sent to  regarding  DEWEY next week with Dr. Lester Hansen. Patient already has an appointment scheduled with Dr. Kerry Vang on 11/14/22.

## 2022-11-04 NOTE — TELEPHONE ENCOUNTER
Spoke with patient. 2 patient identifiers used. Patient notified of below. Your transesophageal echocardiogram procedure has been scheduled for 11/8/22 at 1:30 pm, at [facility].    Please report to Admitting Department by 11:30 am. Please bring a list of your current medications and medication bottles, if able, to the hospital on this day. You will be unable to drive after your procedure so please make sure to bring someone with you to your procedure. You will need to have nothing to eat or drink after midnight, the night prior to your procedure. You may have small sips of water, if needed, to take with your medication. You will have labs drawn morning of procedure. Hold Bumex morning of procedure. You will be following up with Dr. Shamar Pabon 11/14/22.

## 2022-11-04 NOTE — PROGRESS NOTES
Per Dr. Jonny Varghese- Bumex 2mg daily sent to pharmacy. DEWEY next week and follow up next with Tavon or Linnea Jaimes.     Dr. Rosalia Noriega nurse notified of the need for DEWEY and will contact patient on Monday.

## 2022-11-04 NOTE — TELEPHONE ENCOUNTER
Called daughter Mayuri Harp. Is on release of information form. Patient identifiers x2 used. Daughter wanted location, time, and provider of DEWEY. Provided daughter with this information. Confirmed the new medication patient had been started on was Bumex. Had questions about if patient is not feeling better. Advised daughter if patient has worsening SOB for him to go to the ER. Daughter verbalized understanding and was appreciative of the call.

## 2022-11-05 LAB — NT-PROBNP SERPL-MCNC: 1001 PG/ML (ref 0–486)

## 2022-11-08 ENCOUNTER — HOSPITAL ENCOUNTER (OUTPATIENT)
Dept: CARDIAC CATH/INVASIVE PROCEDURES | Age: 82
End: 2022-11-08
Attending: INTERNAL MEDICINE
Payer: MEDICARE

## 2022-11-08 ENCOUNTER — ANESTHESIA (OUTPATIENT)
Dept: NON INVASIVE DIAGNOSTICS | Age: 82
End: 2022-11-08
Payer: MEDICARE

## 2022-11-08 ENCOUNTER — ANESTHESIA EVENT (OUTPATIENT)
Dept: NON INVASIVE DIAGNOSTICS | Age: 82
End: 2022-11-08
Payer: MEDICARE

## 2022-11-08 ENCOUNTER — APPOINTMENT (OUTPATIENT)
Dept: CARDIAC REHAB | Age: 82
End: 2022-11-08
Payer: MEDICARE

## 2022-11-08 VITALS
HEART RATE: 68 BPM | SYSTOLIC BLOOD PRESSURE: 160 MMHG | WEIGHT: 205.03 LBS | RESPIRATION RATE: 15 BRPM | BODY MASS INDEX: 29.35 KG/M2 | HEIGHT: 70 IN | TEMPERATURE: 98.1 F | OXYGEN SATURATION: 93 % | DIASTOLIC BLOOD PRESSURE: 93 MMHG

## 2022-11-08 DIAGNOSIS — I34.0 MITRAL VALVE INSUFFICIENCY, UNSPECIFIED ETIOLOGY: ICD-10-CM

## 2022-11-08 PROCEDURE — 74011250636 HC RX REV CODE- 250/636: Performed by: ANESTHESIOLOGY

## 2022-11-08 PROCEDURE — 76060000032 HC ANESTHESIA 0.5 TO 1 HR

## 2022-11-08 PROCEDURE — 93325 DOPPLER ECHO COLOR FLOW MAPG: CPT

## 2022-11-08 RX ORDER — SODIUM CHLORIDE 9 MG/ML
INJECTION, SOLUTION INTRAVENOUS
Status: DISCONTINUED | OUTPATIENT
Start: 2022-11-08 | End: 2022-11-08 | Stop reason: HOSPADM

## 2022-11-08 RX ORDER — PROPOFOL 10 MG/ML
INJECTION, EMULSION INTRAVENOUS AS NEEDED
Status: DISCONTINUED | OUTPATIENT
Start: 2022-11-08 | End: 2022-11-08 | Stop reason: HOSPADM

## 2022-11-08 RX ADMIN — PROPOFOL 25 MG: 10 INJECTION, EMULSION INTRAVENOUS at 15:36

## 2022-11-08 RX ADMIN — PROPOFOL 25 MG: 10 INJECTION, EMULSION INTRAVENOUS at 15:41

## 2022-11-08 RX ADMIN — PROPOFOL 25 MG: 10 INJECTION, EMULSION INTRAVENOUS at 15:29

## 2022-11-08 RX ADMIN — PROPOFOL 25 MG: 10 INJECTION, EMULSION INTRAVENOUS at 15:49

## 2022-11-08 RX ADMIN — PROPOFOL 25 MG: 10 INJECTION, EMULSION INTRAVENOUS at 15:34

## 2022-11-08 RX ADMIN — PROPOFOL 25 MG: 10 INJECTION, EMULSION INTRAVENOUS at 15:38

## 2022-11-08 RX ADMIN — SODIUM CHLORIDE: 900 INJECTION, SOLUTION INTRAVENOUS at 15:05

## 2022-11-08 RX ADMIN — PROPOFOL 25 MG: 10 INJECTION, EMULSION INTRAVENOUS at 15:28

## 2022-11-08 RX ADMIN — PROPOFOL 25 MG: 10 INJECTION, EMULSION INTRAVENOUS at 15:45

## 2022-11-08 RX ADMIN — PROPOFOL 25 MG: 10 INJECTION, EMULSION INTRAVENOUS at 15:31

## 2022-11-08 RX ADMIN — PROPOFOL 50 MG: 10 INJECTION, EMULSION INTRAVENOUS at 15:27

## 2022-11-08 RX ADMIN — PROPOFOL 25 MG: 10 INJECTION, EMULSION INTRAVENOUS at 15:48

## 2022-11-08 RX ADMIN — PROPOFOL 25 MG: 10 INJECTION, EMULSION INTRAVENOUS at 15:53

## 2022-11-08 RX ADMIN — PROPOFOL 25 MG: 10 INJECTION, EMULSION INTRAVENOUS at 15:42

## 2022-11-08 NOTE — PROGRESS NOTES
Anesthesia name:  Dr. Phoebe Scheuermann    Anesthesia is present for case. Refer to anesthesia log for vitals.

## 2022-11-08 NOTE — Clinical Note
ID band present and verified. Family is in the waiting area. Parker Gaffney, Daughter- [phone].  Wife Isabelle Torresom- [phone]

## 2022-11-08 NOTE — PROGRESS NOTES
Your BNP blood test was 1,000 - lower than it had been before. Looks like your DEWEY is tomorrow. Please call if you have any questions.     Dr. Mode Segovia

## 2022-11-08 NOTE — PROGRESS NOTES
Anesthesia name:  Dr. Alyssa Lovell    Anesthesia is present for case. Refer to anesthesia log for vitals.

## 2022-11-08 NOTE — PROGRESS NOTES
Patient received from CCL s/p DEWEY. Patient is A/O x 4.     1630: provided ice chips and crackers. No issues with swallowing or choking. 1645: IV removed, reviewed discharge instructions. Patient dressed.    1700: taken out in wheelchair by RN

## 2022-11-08 NOTE — PROGRESS NOTES
Anesthesia name:  Rajinder Luther  Anesthesia is present for case. Refer to anesthesia log for vitals.

## 2022-11-08 NOTE — Clinical Note
ID band present and verified. Family is in the waiting area. Nate Dale, Daughter- [phone].  Wife Mandy Bride- [phone]

## 2022-11-08 NOTE — ANESTHESIA PREPROCEDURE EVALUATION
Relevant Problems   RESPIRATORY SYSTEM   (+) ABDUL (dyspnea on exertion)   (+) DEL (obstructive sleep apnea)      CARDIOVASCULAR   (+) Aortic insufficiency   (+) Aortic stenosis   (+) Atrial fibrillation (HCC)   (+) CAD (coronary artery disease)   (+) CHF exacerbation (HCC)   (+) Hypertension   (+) Nonrheumatic aortic valve stenosis      GASTROINTESTINAL   (+) GERD (gastroesophageal reflux disease)      ENDOCRINE   (+) Arthritis   (+) Arthritis of knee      HEMATOLOGY   (+) Anemia due to acute blood loss       Anesthetic History   No history of anesthetic complications            Review of Systems / Medical History  Patient summary reviewed, nursing notes reviewed and pertinent labs reviewed    Pulmonary        Sleep apnea        Comments: 7/2022 covid   Neuro/Psych   Within defined limits           Cardiovascular  Within defined limits  Hypertension  Valvular problems/murmurs: aortic stenosis      Dysrhythmias : atrial fibrillation  Pacemaker and CAD    Exercise tolerance: <4 METS  Comments: Atrial fibrillation,H/O mitral valve repair  S/P AVR (aortic valve replacement);  Coronary artery disease involving native coronary artery of native heart Cardiac pacemaker in situ (Z95.0 (ICD-10-CM)); Presence of Watchman left atrial appendage closure device   recent echo:  Near nl bivent fx, sev AS, mild MR, no MS   GI/Hepatic/Renal     GERD           Endo/Other        Arthritis     Other Findings              Physical Exam    Airway  Mallampati: II  TM Distance: > 6 cm  Neck ROM: normal range of motion   Mouth opening: Normal     Cardiovascular    Rhythm: irregular      Murmur: Grade 3, Aortic area     Dental    Dentition: Caps/crowns     Pulmonary  Breath sounds clear to auscultation               Abdominal  GI exam deferred       Other Findings            Anesthetic Plan    ASA: 3  Anesthesia type: MAC          Induction: Intravenous  Anesthetic plan and risks discussed with: Patient

## 2022-11-09 NOTE — CARDIO/PULMONARY
Patient arrived to cardiac rehab on Monday 11/7/22 to notify rehab staff he will have to be put on hold from rehab for now due to an upcoming surgical procedure for his heart valve. Patient reported he would like to return to rehab after if he is able, and rehab staff let patient know he will be able to continue once and if he is cleared from his heart Dr. To continue therapy. Rehab staff let patient know he will be put on hold and patient stated he will keep staff notified of status when he is able.

## 2022-11-09 NOTE — ANESTHESIA POSTPROCEDURE EVALUATION
* No procedures listed *. MAC    Anesthesia Post Evaluation        Patient participation: complete - patient participated  Level of consciousness: awake  Pain management: adequate  Airway patency: patent  Anesthetic complications: no  Cardiovascular status: hemodynamically stable  Respiratory status: acceptable  Hydration status: acceptable  Comments: The patient is ready for PACU discharge.   Tosha Leong DO                   Post anesthesia nausea and vomiting:  controlled      INITIAL Post-op Vital signs:   Vitals Value Taken Time   /93 11/08/22 1650   Temp     Pulse 68 11/08/22 1650   Resp 15 11/08/22 1650   SpO2 93 % 11/08/22 1650

## 2022-11-10 ENCOUNTER — HOSPITAL ENCOUNTER (OUTPATIENT)
Dept: CARDIAC REHAB | Age: 82
Discharge: HOME | End: 2022-11-10
Payer: MEDICARE

## 2022-11-10 VITALS — WEIGHT: 203 LBS | BODY MASS INDEX: 29.13 KG/M2

## 2022-11-10 PROCEDURE — 93798 PHYS/QHP OP CAR RHAB W/ECG: CPT

## 2022-11-10 PROCEDURE — 93797 PHYS/QHP OP CAR RHAB WO ECG: CPT

## 2022-11-13 NOTE — PROGRESS NOTES
CARDIOLOGY OFFICE NOTE    Shayan Leigh MD, [address], [place], [address] [facility]  Phone [phone]; Fax [phone]  Mobile [phone]   Voice Mail [phone]      Aleyda Lanza MD       ATTENTION:   This medical record was transcribed using an electronic medical records/speech recognition system. Although proofread, it may and can contain electronic, spelling and other errors. Corrections may be executed at a later time. Please feel free to contact us for any clarifications as needed. ICD-10-CM ICD-9-CM   1. Mitral valve insufficiency, unspecified etiology  I34.0 424.0   2. S/P AVR (aortic valve replacement)  Z95.2 V43.3   3. S/P Maze operation for atrial fibrillation  Z98.890 V45.89    Z86.79    4. Coronary artery disease involving native coronary artery of native heart without angina pectoris  I25.10 414.01   5. S/P TAVR (transcatheter aortic valve replacement)  Z95.2 V43.3   6. S/P MVR (mitral valve repair)  Z98.890 V45.89   7. Cardiac pacemaker in situ  Z95.0 V45.01                I have personally obtained the history from the patient. Cardiac risk factors: dyslipidemia, male gender, hypertension  I have personally obtained the history from the patient. HISTORY OF PRESENTING ILLNESS   Mr. Veronica Huertas is a 79y.o. year old male past medical history significant C/Anjel Mckinnon CAD, sp AVR, sp MVRepair, PAF, sp CRYOMAZE, sp watchman, SSS sp PPM, diastolic heart failure. I am seeing today for lower extremity edema status post TAVR with aTAVR with a 26 Evolut with [place] protection via the RTF approach on 8/18/22 was associated with a postdilatation of aortic valve associate with severe hypotension requiring CPR for a few minutes       Last echo:Normal left ventricular systolic function with a visually estimated EF of 55 - 60%. Left ventricle size is normal. Mildly increased wall thickness. Normal wall motion.  Diastolic dysfunction present with increased LAP with normal LV EF. Right Ventricle: Right ventricle is moderately dilated. Mildly increased wall thickness. Moderately reduced systolic function. Aortic Valve: Medtronic Evolut appropriately positioned transcatheter bioprosthetic valve with a size of 26 mm. Mitral Valve: Valve repaired by annular ring. Moderate regurgitation. ERO of 0.2 cm2 with regurgitant volume of 45 ml/beat. Systolic blunting of PV flow. Tricuspid Valve: Moderate annular dilation. Severe regurgitation. The estimated RVSP is 48 mmHg. Left Atrium: Left atrium is severely dilated. Device closure in the appendage with a Watchman. Right Atrium: Right atrium is severely dilated.     Benadryl that       ACTIVE PROBLEM LIST     Patient Active Problem List    Diagnosis Date Noted    Presence of Watchman left atrial appendage closure device 10/13/2020    S/P TAVR (transcatheter aortic valve replacement) 08/26/2022    Aortic stenosis 08/18/2022    Chronic systolic (congestive) heart failure 08/12/2022    COVID 07/12/2022    CHF exacerbation ([address] 07/10/2022    COVID-19 50/18/5375    Diastolic CHF, acute on chronic ([address] 11/28/2020    Anasarca 11/27/2020    ABDUL (dyspnea on exertion) 11/27/2020    GI bleed 06/08/2020    Radiation proctitis     DEL (obstructive sleep apnea)     Insomnia     Atrial fibrillation ([address]     Hyperlipidemia     Hx of carcinoma in situ of prostate     GERD (gastroesophageal reflux disease)     Chronic pain     CAD (coronary artery disease)     Arthritis     Hypertension 10/26/2015    Anemia due to acute blood loss 12/26/2014    Nonrheumatic aortic valve stenosis 12/23/2014    S/P AVR (aortic valve replacement) 12/23/2014    S/P MVR (mitral valve repair) 12/23/2014    S/P Maze operation for atrial fibrillation 12/23/2014    Aortic insufficiency 12/08/2014    Arthritis of knee 07/09/2012           PAST MEDICAL HISTORY     Past Medical History:   Diagnosis Date    Anasarca     Aortic valve stenosis Repaired 8/18/2022    Arthritis     in hands and knee (before replacement)    Atrial fibrillation (HCC)     CAD (coronary artery disease)     CHF (congestive heart failure) ([facility] 11/01/2020    Chronic pain     legs/knee    GERD (gastroesophageal reflux disease)     Hx of carcinoma in situ of prostate     Hyperlipidemia     Hypertension     Insomnia     DEL (obstructive sleep apnea)     Prostate cancer ([facility] 01/01/2017    Radiation proctitis     Vitamin D deficiency            PAST SURGICAL HISTORY     Past Surgical History:   Procedure Laterality Date    COLONOSCOPY N/A 05/08/2020    COLONOSCOPY performed by Cierra Brizuela MD at [facility] ENDOSCOPY    COLONOSCOPY N/A 06/08/2020    COLONOSCOPY performed by Florencia De La Cruz MD at [address] [place] [address] N/A 11/23/2020    FLEXIBLE SIGMOIDOSCOPY WITH APC performed by Cierra Brizuela MD at [facility] 2    HX AORTIC VALVE REPLACEMENT  2014    and mitral valve repair, left atrial cryo maze    HX HEART VALVE SURGERY  01/01/2014    Mitral Valve Repair    HX HEENT      melanoma removed head    HX HERNIA REPAIR  2009    Right    HX HERNIA REPAIR      left inguinal hernia repair    HX HERNIA REPAIR Left 12/01/2016    lap left inguinal hernia repair with mesh    HX KNEE REPLACEMENT      Bilateral    HX ORTHOPAEDIC      BILATERAL KNEE REPLACEMENT    HX ORTHOPAEDIC      CARPEL TUNNEL REPAIR-RIGHT    HX OTHER SURGICAL  2015    closure of patent foramen ovale    HX OTHER SURGICAL  10/2020    watchman implant for afib / Dr. Ferreira Reap      42 radiation treatments          ALLERGIES     No Known Allergies       FAMILY HISTORY     Family History   Problem Relation Age of Onset    Cancer Mother     Cancer Father         prostrate    No Known Problems Sister     No Known Problems Sister     No Known Problems Sister     Cancer Brother         PROSTATE    No Known Problems Brother     Anesth Problems Neg Hx     negative for cardiac disease       SOCIAL HISTORY Social History     Socioeconomic History    Marital status:      Spouse name: Fernanda Day    Number of children: 2    Years of education: 13 years    Highest education level: High school graduate   Tobacco Use    Smoking status: Never    Smokeless tobacco: Never   Vaping Use    Vaping Use: Never used   Substance and Sexual Activity    Alcohol use: Yes     Alcohol/week: 2.0 standard drinks     Types: 2 Cans of beer per week     Comment: Occasionaly    Drug use: No    Sexual activity: Not Currently   Other Topics Concern     Service Yes    Blood Transfusions No    Caffeine Concern No    Occupational Exposure No    Hobby Hazards No    Sleep Concern Yes     Comment: Insomina    Stress Concern No    Weight Concern No    Special Diet No    Back Care No    Exercise No    Bike Helmet Yes    Seat Belt Yes    Self-Exams Yes         MEDICATIONS     Current Outpatient Medications   Medication Sig    oxyCODONE-acetaminophen (PERCOCET) 5-325 mg per tablet Take 1 Tablet by mouth every four (4) hours as needed for Pain. bumetanide (BUMEX) 2 mg tablet Take 1 Tablet by mouth daily. acetaminophen (TYLENOL) 325 mg tablet Take 2 Tablets by mouth every four (4) hours as needed for Pain. omeprazole (PRILOSEC) 40 mg capsule Take 40 mg by mouth in the morning. potassium chloride (K-DUR, KLOR-CON M20) 20 mEq tablet Take 2 Tablets by mouth daily. aspirin delayed-release 81 mg tablet Take 81 mg by mouth two (2) times a day. TAKES ONE IN MORNING AND TWO IN EVENING (PER PATIENT AS OF 8/12/22)    polyvinyl alcohol (LIQUIFILM TEARS) 1.4 % ophthalmic solution Administer 1 Drop to both eyes as needed. ferrous sulfate 325 mg (65 mg iron) cpER Take 1 Tab by mouth every other day. tamsulosin (FLOMAX) 0.4 mg capsule Take 0.4 mg by mouth two (2) times a day. cholecalciferol (VITAMIN D3) 1,000 unit tablet Take 2,000 Units by mouth two (2) times a day.     GLUCOSAMINE HCL/CHONDR CIHNG A NA (GLUCOSAMINE-CHONDROITIN) 750-600 mg tab Take 1 Tab by mouth daily. omega-3 fatty acids-vitamin e 1,000 mg cap Take 1 Cap by mouth every other day. multivitamin (ONE A DAY) tablet Take 1 Tab by mouth daily. docusate sodium (COLACE) 100 mg capsule Take 100 mg by mouth two (2) times a day. finasteride (PROSCAR) 5 mg tablet Take 5 mg by mouth nightly. pravastatin (PRAVACHOL) 40 mg tablet Take 40 mg by mouth nightly. No current facility-administered medications for this visit. I have reviewed the nurses notes, vitals, problem list, allergy list, medical history, family, social history and medications. REVIEW OF SYMPTOMS   Pertinent positive per HPI  General: Pt denies excessive weight gain or loss. Pt is able to conduct ADL's  HEENT: Denies blurred vision, headaches, hearing loss, epistaxis and difficulty swallowing. Respiratory: Denies cough, congestion, shortness of breath, ABDUL, wheezing or stridor. Cardiovascular: Denies precordial pain, palpitations, edema or PND  Gastrointestinal: Denies poor appetite, indigestion, abdominal pain or blood in stool  Genitourinary: Denies hematuria, dysuria, increased urinary frequency  Musculoskeletal: Denies joint pain or swelling from muscles or joints  Neurologic: Denies tremor, paresthesias, headache, or sensory motor disturbance  Psychiatric: Denies confusion, insomnia, depression  Integumentray: Denies rash, itching or ulcers. Hematologic: Denies easy bruising, bleeding     PHYSICAL EXAMINATION      Vitals:    11/14/22 1102   BP: 120/62   Pulse: 76   Weight: 197 lb (89.4 kg)   Height: 5' 10\" (1.778 m)       General: Well developed, in no acute distress. HEENT: No jaundice, oral mucosa moist, no oral ulcers  Neck: Supple, no stiffness, no lymphadenopathy, supple  Heart:   Irregular 2/6 systolic murmur left sternal border  Respiratory: Clear bilaterally x 4, no wheezing or rales  Extremities:  + edema, normal cap refill, no cyanosis.   Musculoskeletal: No clubbing, no deformities  Neuro: A&Ox3, speech clear, gait stable, cooperative, no focal neurologic deficits  Skin: Skin color is normal. No rashes or lesions. Non diaphoretic, moist.         DIAGNOSTIC DATA     1.  Echocardiogram                                     9/5/14 Left ventricle: Systolic function was normal. Ejection fraction was   estimated in the range of 55 % to 60 %. There were no regional wall motion   abnormalities. Left atrium: The atrium was mildly dilated. 4/20/15- EF 48%, SHANTANU, atrial septum- poss PFO, MR mild, TR mild/mod, Pulm HTN mod, AV bioprosthesis normal function   6/19/17- EF 45-50%, RVE, SHANTANU, MR/TR mild/mod, AV bioprosthesis exhibits normal function, severe Pulm HTN, NJ mild   Atrial septum: There was a secundum septal defect. Color Doppler   evaluation was performed. There was a mild left-to-right shunt. 12/11/18 TTE: LVEF 50%, no WMAs, wall thickness mod incr. RV mild-mod dilated, LA mod-sev dilated, RA mod dilated, mod MR, mod TR, mod pulm HTN, mild PV regurg. AV w/ bioprosthesis, no AS / no AR   3/16/20-EF 50-55%, BVE, Mild concentric hypertrophy, SHANTANU, Mitral valve thickening and repaired with annuloplasty ring. Surgical repair, mild MR/TR, AV leaflet calcification Aortic valve mean gradient is 20.7 mmHg. Aortic valve area is 1.3 cm2. Mild AS, 26 mm bioprosthetic aortic valve. 10/14/20- Limited-DEFINITY- EF 55-60%, No evidence of pericardial effusion. 11/27/20- EF 55%, Moderate concentric hypertrophy, RVE, Aortic valve mean gradient is 25 mmHg.  - bioprosthetic aortic valve,  Mitral valve repaired with annuloplasty ring, mod MR/TR, mod pulm HTN, PAP 59 mmHg   DEWEY-12/29/20-·Watchman device within YIMI shows excellent endothelialization without evidence of braden-device leak, EF 55 - 60%, SHANTANU, bioprosthetic aortic valve- insufficiency is present -mild centralized leaking, mild AI, Mitral valve repaired with annuloplasty ring, mod/severe MR  7/26/21-EF 55 - 60%, Mild concentric hypertrophy, LAE, MV thickening and repaired with annuloplasty ring, mild MR, mild/mod TR, RVSP 38 mmHg,Pulm HTN mild, mild PI, Prosthetic aortic valve. Aortic valve mean gradient is 30.3 mmHg. Mild to moderate AV stenosis, There is a bioprosthetic aortic valve,mild AI  6/13/22-EF 50 - 55%, mild concentric hypertrophy, RVE, mild AI, Bioprosthetic AV, MV repaired by annular ring, mild MR, mod TR, RVSP 40 mmHg, SHANTANU  9/22/22-EF 50 - 55%, mild concentric hypertrophy, RVE, Medtronic transcatheter bioprosthetic AV that is well-seated with a size of 26 mm. Mild patient prosthesis mismatch with EOA of 1.3cm2 and AV mean gradient is 20 mmHg, MV repaired by annular ring. Thickened leaflets. Previously placed ring appears to be dehisced. MR jest is eccentric and is not clearly visualized and may be underestimated. By color it appears moderate at worse, mod TR, RVSP 40 mmHg, SHANTANU    2. Myocardial perfusion study                      (9/5/14)Normal EF ;Normal perfusion     3. Cholesterol profile                            12/11/18- , HDL 61, LDL 32, TG 60   7/24/21- , HDL 52, LDL 60, TG 66  3/16/22- , HDL 59, LDL 45, TG 62    4. LE venous duplex                                           (10/10/14)   Right leg mod. 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 7/11/22- No DVT    5. Watchman 10/13/20     6. Carotid Doppler   5/11/20- 1-49% Kris    7.9/3/21:implantation of a [employer] single chamber pacemaker per Dr. Jeyson Mccarthy    8. Holter  8/16/21-AF occurred 305 time(s) with HR range of 24 - 123;  Total AF [place] 96%  Pauses >2 seconds occurred 40 time(s) with longest pause 3.9 seconds         LABORATORY DATA            Lab Results   Component Value Date/Time    WBC 5.4 08/24/2022 04:32 AM    HGB 8.1 (L) 08/24/2022 04:32 AM    HCT 24.9 (L) 08/24/2022 04:32 AM PLATELET 163 (L) 62/37/7746 04:32 AM    MCV 98.4 08/24/2022 04:32 AM      Lab Results   Component Value Date/Time    Sodium 139 08/24/2022 04:32 AM    Potassium 4.5 08/24/2022 04:32 AM    Chloride 105 08/24/2022 04:32 AM    CO2 28 08/24/2022 04:32 AM    Anion gap 6 08/24/2022 04:32 AM    Glucose 116 (H) 08/24/2022 04:32 AM    BUN 20 08/24/2022 04:32 AM    Creatinine 0.63 (L) 08/24/2022 04:32 AM    BUN/Creatinine ratio 32 (H) 08/24/2022 04:32 AM    GFR est AA >60 08/24/2022 04:32 AM    GFR est non-AA >60 08/24/2022 04:32 AM    Calcium 8.7 08/24/2022 04:32 AM    Bilirubin, total 0.8 08/18/2022 05:54 PM    Alk. phosphatase 80 08/18/2022 05:54 PM    Protein, total 6.4 08/18/2022 05:54 PM    Albumin 3.1 (L) 08/18/2022 05:54 PM    Globulin 3.3 08/18/2022 05:54 PM    A-G Ratio 0.9 (L) 08/18/2022 05:54 PM    ALT (SGPT) 23 08/18/2022 05:54 PM           ASSESSMENT/RECOMMENDATIONS:.      1. AF  -He is status post watchman device   -Stable with no further irregularity in rhythm    2. LE edema  -Think is multifactorial secondary to a component of lymphedema but also from the aortic stenosis  -His dimensionless index was 0.15 and is now on a work-up for aortic valve replacement with most likely with TAVR. He is status post TAVR  -He does have 1+ pitting edema and is on Bumex 2 mg a day this does not improve the lower extremity edema.  -He will need a BMP and magnesium in about a week    3. HTN  -Blood pressure at goal 120/62       4. Dyslipidemia  -LDL is at goal with an LDL of 45.    5. Moderate PFO with left to right flow and CHRISTOPHER  -Aspirin 62 mg a day    6. AS and MR s/p AVR and MVR on 1/8/15;TAVR with a 26 Evolut with Tad protection via the RTF approach on 8/18/22  - He has moderate MR and moderate TR and bioprosthetic valves.    -He is status post TAVR and doing well from that standpoint a recent echo that demonstrated that was functioning appropriately and was followed in valve clinic    7.  DEL  -Patient does not wear CPAP regularly and this may be contributing also to his lower extremity edema    8. Carotid bruit on the left  -will check carotid ultrasounds      8. Return in 6 months or PRN. Orders Placed This Encounter    oxyCODONE-acetaminophen (PERCOCET) 5-325 mg per tablet     Sig: Take 1 Tablet by mouth every four (4) hours as needed for Pain. We discussed the expected course, resolution and complications of the diagnosis(es) in detail. Medication risks, benefits, costs, interactions, and alternatives were discussed as indicated. I advised him to contact the office if his condition worsens, changes or fails to improve as anticipated. He expressed understanding with the diagnosis(es) and plan          Follow-up and Dispositions    Return in about 6 months (around 5/14/2023). I have discussed the diagnosis with  Mary Blake and the intended plan as seen in the above orders. Questions were answered concerning future plans. I have discussed medication side effects and warnings with the patient as well. Thank you,  Stanley Jay MD for involving me in the care of  Mary Blake. Please do not hesitate to contact me for further questions/concerns. Shayan Fine MD, [facility] [address]      [facility], [address] [place] [address]      [phone] / [phone] Fax

## 2022-11-13 NOTE — PATIENT INSTRUCTIONS

## 2022-11-14 ENCOUNTER — OFFICE VISIT (OUTPATIENT)
Dept: CARDIOLOGY CLINIC | Age: 82
End: 2022-11-14
Payer: MEDICARE

## 2022-11-14 VITALS
DIASTOLIC BLOOD PRESSURE: 62 MMHG | HEIGHT: 70 IN | HEART RATE: 76 BPM | WEIGHT: 197 LBS | SYSTOLIC BLOOD PRESSURE: 120 MMHG | BODY MASS INDEX: 28.2 KG/M2

## 2022-11-14 DIAGNOSIS — Z95.2 S/P TAVR (TRANSCATHETER AORTIC VALVE REPLACEMENT): ICD-10-CM

## 2022-11-14 DIAGNOSIS — Z95.0 CARDIAC PACEMAKER IN SITU: ICD-10-CM

## 2022-11-14 DIAGNOSIS — I25.10 CORONARY ARTERY DISEASE INVOLVING NATIVE CORONARY ARTERY OF NATIVE HEART WITHOUT ANGINA PECTORIS: ICD-10-CM

## 2022-11-14 DIAGNOSIS — Z95.2 S/P AVR (AORTIC VALVE REPLACEMENT): ICD-10-CM

## 2022-11-14 DIAGNOSIS — Z86.79 S/P MAZE OPERATION FOR ATRIAL FIBRILLATION: ICD-10-CM

## 2022-11-14 DIAGNOSIS — R09.89 BRUIT OF LEFT CAROTID ARTERY: Primary | ICD-10-CM

## 2022-11-14 DIAGNOSIS — Z98.890 S/P MAZE OPERATION FOR ATRIAL FIBRILLATION: ICD-10-CM

## 2022-11-14 DIAGNOSIS — I34.0 MITRAL VALVE INSUFFICIENCY, UNSPECIFIED ETIOLOGY: Primary | ICD-10-CM

## 2022-11-14 DIAGNOSIS — Z98.890 S/P MVR (MITRAL VALVE REPAIR): ICD-10-CM

## 2022-11-14 LAB
ECHO AV MEAN GRADIENT: 14 MMHG
ECHO AV MEAN VELOCITY: 1.8 M/S
ECHO AV PEAK GRADIENT: 16 MMHG
ECHO AV PEAK GRADIENT: 28 MMHG
ECHO AV PEAK VELOCITY: 2 M/S
ECHO AV PEAK VELOCITY: 2.6 M/S
ECHO AV VTI: 75.2 CM
ECHO EST RA PRESSURE: 15 MMHG
ECHO MV REGURGITANT ALIASING (NYQUIST) VELOCITY: 33 CM/S
ECHO MV REGURGITANT VELOCITY PISA: 6.8 M/S
ECHO MV REGURGITANT VTIA: 224.7 CM
ECHO RIGHT VENTRICULAR SYSTOLIC PRESSURE (RVSP): 48 MMHG
ECHO TV REGURGITANT MAX VELOCITY: 2.87 M/S
ECHO TV REGURGITANT PEAK GRADIENT: 33 MMHG

## 2022-11-14 PROCEDURE — 3074F SYST BP LT 130 MM HG: CPT | Performed by: SPECIALIST

## 2022-11-14 PROCEDURE — 3078F DIAST BP <80 MM HG: CPT | Performed by: SPECIALIST

## 2022-11-14 PROCEDURE — G8536 NO DOC ELDER MAL SCRN: HCPCS | Performed by: SPECIALIST

## 2022-11-14 PROCEDURE — 1101F PT FALLS ASSESS-DOCD LE1/YR: CPT | Performed by: SPECIALIST

## 2022-11-14 PROCEDURE — 99214 OFFICE O/P EST MOD 30 MIN: CPT | Performed by: SPECIALIST

## 2022-11-14 PROCEDURE — G8754 DIAS BP LESS 90: HCPCS | Performed by: SPECIALIST

## 2022-11-14 PROCEDURE — G0463 HOSPITAL OUTPT CLINIC VISIT: HCPCS | Performed by: SPECIALIST

## 2022-11-14 PROCEDURE — 1123F ACP DISCUSS/DSCN MKR DOCD: CPT | Performed by: SPECIALIST

## 2022-11-14 PROCEDURE — G8752 SYS BP LESS 140: HCPCS | Performed by: SPECIALIST

## 2022-11-14 PROCEDURE — G8427 DOCREV CUR MEDS BY ELIG CLIN: HCPCS | Performed by: SPECIALIST

## 2022-11-14 PROCEDURE — G8432 DEP SCR NOT DOC, RNG: HCPCS | Performed by: SPECIALIST

## 2022-11-14 PROCEDURE — G8417 CALC BMI ABV UP PARAM F/U: HCPCS | Performed by: SPECIALIST

## 2022-11-14 RX ORDER — OXYCODONE AND ACETAMINOPHEN 5; 325 MG/1; MG/1
1 TABLET ORAL
COMMUNITY

## 2022-11-14 NOTE — LETTER
11/14/2022    Patient: Cecilio Eli   YOB: 1940   Date of Visit: 11/14/2022     David Sanchez MD  [address] [facility] [address]  Via Fax: [phone]    Dear David Sanchez MD,      Thank you for referring Mr. Jorge Reddy to [address]  for evaluation. My notes for this consultation are attached. If you have questions, please do not hesitate to call me. I look forward to following your patient along with you.       Sincerely,    Arcelia De La Torre MD

## 2022-11-15 ENCOUNTER — HOSPITAL ENCOUNTER (OUTPATIENT)
Dept: CARDIAC REHAB | Age: 82
Discharge: HOME | End: 2022-11-15
Payer: MEDICARE

## 2022-11-15 VITALS — BODY MASS INDEX: 27.98 KG/M2 | WEIGHT: 195 LBS

## 2022-11-15 PROCEDURE — 93798 PHYS/QHP OP CAR RHAB W/ECG: CPT

## 2022-11-17 ENCOUNTER — HOSPITAL ENCOUNTER (OUTPATIENT)
Dept: CARDIAC REHAB | Age: 82
Discharge: HOME | End: 2022-11-17
Payer: MEDICARE

## 2022-11-17 VITALS — BODY MASS INDEX: 27.98 KG/M2 | WEIGHT: 195 LBS

## 2022-11-17 PROCEDURE — 93797 PHYS/QHP OP CAR RHAB WO ECG: CPT

## 2022-11-17 PROCEDURE — 93798 PHYS/QHP OP CAR RHAB W/ECG: CPT

## 2022-11-21 ENCOUNTER — HOSPITAL ENCOUNTER (OUTPATIENT)
Dept: CARDIAC REHAB | Age: 82
Discharge: HOME | End: 2022-11-21
Payer: MEDICARE

## 2022-11-21 VITALS — WEIGHT: 196 LBS | BODY MASS INDEX: 28.12 KG/M2

## 2022-11-21 PROCEDURE — 93798 PHYS/QHP OP CAR RHAB W/ECG: CPT

## 2022-11-22 ENCOUNTER — APPOINTMENT (OUTPATIENT)
Dept: CARDIAC REHAB | Age: 82
End: 2022-11-22
Payer: MEDICARE

## 2022-11-24 LAB
BUN SERPL-MCNC: 19 MG/DL (ref 8–27)
BUN/CREAT SERPL: 21 (ref 10–24)
CALCIUM SERPL-MCNC: 8.9 MG/DL (ref 8.6–10.2)
CHLORIDE SERPL-SCNC: 102 MMOL/L (ref 96–106)
CO2 SERPL-SCNC: 27 MMOL/L (ref 20–29)
CREAT SERPL-MCNC: 0.91 MG/DL (ref 0.76–1.27)
EGFR: 84 ML/MIN/1.73
GLUCOSE SERPL-MCNC: 102 MG/DL (ref 70–99)
MAGNESIUM SERPL-MCNC: 2.1 MG/DL (ref 1.6–2.3)
POTASSIUM SERPL-SCNC: 4.2 MMOL/L (ref 3.5–5.2)
SODIUM SERPL-SCNC: 142 MMOL/L (ref 134–144)

## 2022-11-24 NOTE — PROGRESS NOTES
Your potassium and kidney function and magnesium are normal and this is great news. I hope all is well.     All the bestIdania

## 2022-11-28 ENCOUNTER — HOSPITAL ENCOUNTER (OUTPATIENT)
Dept: VASCULAR SURGERY | Age: 82
Discharge: HOME | End: 2022-11-28
Attending: SPECIALIST
Payer: MEDICARE

## 2022-11-28 DIAGNOSIS — R09.89 BRUIT OF LEFT CAROTID ARTERY: ICD-10-CM

## 2022-11-28 LAB
LEFT CCA DIST DIAS: 13.9 CM/S
LEFT CCA DIST SYS: 61.9 CM/S
LEFT CCA PROX DIAS: 21.9 CM/S
LEFT CCA PROX SYS: 99.5 CM/S
LEFT ECA DIAS: 11.98 CM/S
LEFT ECA SYS: 85.6 CM/S
LEFT ICA DIST DIAS: 29.2 CM/S
LEFT ICA DIST SYS: 80.9 CM/S
LEFT ICA MID DIAS: 30.5 CM/S
LEFT ICA MID SYS: 101.7 CM/S
LEFT ICA PROX DIAS: 19.7 CM/S
LEFT ICA PROX SYS: 85.6 CM/S
LEFT ICA/CCA SYS: 1.64 NO UNITS
LEFT VERTEBRAL DIAS: 14.16 CM/S
LEFT VERTEBRAL SYS: 48.3 CM/S
RIGHT CCA DIST DIAS: 17.1 CM/S
RIGHT CCA DIST SYS: 72.9 CM/S
RIGHT CCA PROX DIAS: 15 CM/S
RIGHT CCA PROX SYS: 91.4 CM/S
RIGHT ECA DIAS: 15.01 CM/S
RIGHT ECA SYS: 87.5 CM/S
RIGHT ICA DIST DIAS: 25.9 CM/S
RIGHT ICA DIST SYS: 79.8 CM/S
RIGHT ICA MID DIAS: 21.5 CM/S
RIGHT ICA MID SYS: 89.7 CM/S
RIGHT ICA PROX DIAS: 23.7 CM/S
RIGHT ICA PROX SYS: 84.2 CM/S
RIGHT ICA/CCA SYS: 1.2 NO UNITS
RIGHT VERTEBRAL DIAS: 20.77 CM/S
RIGHT VERTEBRAL SYS: 62.5 CM/S
VAS LEFT SUBCLAVIAN PROX EDV: 0 CM/S
VAS LEFT SUBCLAVIAN PROX PSV: 108.8 CM/S
VAS RIGHT SUBCLAVIAN PROX EDV: 8.4 CM/S
VAS RIGHT SUBCLAVIAN PROX PSV: 60.7 CM/S

## 2022-11-28 PROCEDURE — 93880 EXTRACRANIAL BILAT STUDY: CPT

## 2022-11-29 ENCOUNTER — HOSPITAL ENCOUNTER (OUTPATIENT)
Dept: CARDIAC REHAB | Age: 82
End: 2022-11-29
Payer: MEDICARE

## 2022-11-29 VITALS — WEIGHT: 194 LBS | BODY MASS INDEX: 27.84 KG/M2

## 2022-11-29 PROCEDURE — 93798 PHYS/QHP OP CAR RHAB W/ECG: CPT

## 2022-12-01 ENCOUNTER — HOSPITAL ENCOUNTER (OUTPATIENT)
Dept: CARDIAC REHAB | Age: 82
Discharge: HOME | End: 2022-12-01
Payer: MEDICARE

## 2022-12-01 VITALS — BODY MASS INDEX: 27.84 KG/M2 | WEIGHT: 194 LBS

## 2022-12-01 PROCEDURE — 93798 PHYS/QHP OP CAR RHAB W/ECG: CPT

## 2022-12-01 PROCEDURE — 93797 PHYS/QHP OP CAR RHAB WO ECG: CPT

## 2022-12-06 ENCOUNTER — APPOINTMENT (OUTPATIENT)
Dept: CARDIAC REHAB | Age: 82
End: 2022-12-06
Payer: MEDICARE

## 2022-12-07 ENCOUNTER — HOSPITAL ENCOUNTER (OUTPATIENT)
Dept: CARDIAC REHAB | Age: 82
Discharge: HOME | End: 2022-12-07
Payer: MEDICARE

## 2022-12-07 ENCOUNTER — APPOINTMENT (OUTPATIENT)
Dept: CARDIAC REHAB | Age: 82
End: 2022-12-07
Payer: MEDICARE

## 2022-12-07 VITALS — WEIGHT: 196 LBS | BODY MASS INDEX: 28.12 KG/M2

## 2022-12-07 PROCEDURE — 93798 PHYS/QHP OP CAR RHAB W/ECG: CPT

## 2022-12-08 ENCOUNTER — APPOINTMENT (OUTPATIENT)
Dept: CARDIAC REHAB | Age: 82
End: 2022-12-08
Payer: MEDICARE

## 2022-12-13 ENCOUNTER — HOSPITAL ENCOUNTER (OUTPATIENT)
Dept: CARDIAC REHAB | Age: 82
Discharge: HOME | End: 2022-12-13
Payer: MEDICARE

## 2022-12-13 VITALS — BODY MASS INDEX: 28.7 KG/M2 | WEIGHT: 200 LBS

## 2022-12-13 PROCEDURE — 93798 PHYS/QHP OP CAR RHAB W/ECG: CPT

## 2022-12-13 NOTE — PROGRESS NOTES
Cardiac Electrophysiology Office Follow-up    NAME:  Ro Padron   :  1940  MRM:  [id number]    Date:  2022     Primary Cardiologist: Dr. Allyn Lipscomb and Plan:     1. Pacemaker  2. S/P Maze operation for atrial fibrillation  3. S/P MVR (mitral valve repair)  4. S/P AVR (aortic valve replacement)  5. S/P TAVR (transcatheter aortic valve replacement)  6. Presence of Watchman left atrial appendage closure device     Pacemaker. Device Interrogation    Device: [employer]: 8.5 years  Programmed Mode: VVI 60    RV lead    Intrinsic amplitude:  9.4 mV    Pacing impedance:  734 ohms    Pacing threshold: 1.2V @ 0.4ms    %pacin    No sustained VT/VF or therapies  No AF/AT    Programming changes:  Iterative programming was performed to test the leads sensing and threshold parameters without any permanent changes. Paroxysmal atrial fibrillation.   - S/p MAZE in . - S/p Watchman implant . Continue aspirin 81 mg daily. S/p AVR and MVR in . - S/p in valve TAVR 2022.   - Followed by Dr. Cassi Ramirez. Continue Q3 month remote device interrogations. Follow-up in the EP clinic in one year, or sooner for any concerns. Subjective: Ro Padron, a 79y.o. year-old who presents for followup of his pacemaker. He is feeling well and denies chest pain, dyspnea, dizziness or syncope. He is almost done with cardiac rehab. Review of Systems:   12 point review of systems was performed.  All negative except for HPI    Exam:     Physical Exam:  /76 (BP 1 Location: Left upper arm, BP Patient Position: Sitting, BP Cuff Size: Adult)   Pulse 68   Resp 20   Ht 5' 10\" (1.778 m)   Wt 90 kg (198 lb 6.4 oz)   SpO2 96%   BMI 28.47 kg/m²     PHYSICAL EXAM  General:  Alert and oriented, in no acute distress  Head:  Atraumatic, normocephalic  Eyes:  extraocular muscles intact  Neck:  Supple, normal range of motion  Lungs:  Clear to auscultation bilaterally, no wheezes/rales/rhonchi   Cardiovascular:  Regular rate and rhythm, normal S1-S2, 2/6 murmur  Abdomen:  Soft, nontender, nondistended, normoactive bowel sounds  Skin:  Intact, no rash  Extremities:, no clubbing, cyanosis, or edema  Musculoskeletal: normal range of motion  Neurological:  Alert and oriented, no focal neurologic deficits  Psychiatric:  Normal mood and affect      Medications:     Current Outpatient Medications   Medication Sig    gabapentin (NEURONTIN) 100 mg capsule Take 100 mg by mouth two (2) times a day. bumetanide (BUMEX) 2 mg tablet Take 1 Tablet by mouth daily. acetaminophen (TYLENOL) 325 mg tablet Take 2 Tablets by mouth every four (4) hours as needed for Pain. omeprazole (PRILOSEC) 40 mg capsule Take 40 mg by mouth in the morning. potassium chloride (K-DUR, KLOR-CON M20) 20 mEq tablet Take 2 Tablets by mouth daily. aspirin delayed-release 81 mg tablet Take 81 mg by mouth two (2) times a day. TAKES ONE IN MORNING AND TWO IN EVENING (PER PATIENT AS OF 8/12/22)    polyvinyl alcohol (LIQUIFILM TEARS) 1.4 % ophthalmic solution Administer 1 Drop to both eyes as needed. ferrous sulfate 325 mg (65 mg iron) cpER Take 1 Tab by mouth every other day. tamsulosin (FLOMAX) 0.4 mg capsule Take 0.4 mg by mouth two (2) times a day. cholecalciferol (VITAMIN D3) 1,000 unit tablet Take 2,000 Units by mouth two (2) times a day. GLUCOSAMINE HCL/CHONDR CHING A NA (GLUCOSAMINE-CHONDROITIN) 750-600 mg tab Take 1 Tab by mouth daily. omega-3 fatty acids-vitamin e 1,000 mg cap Take 1 Cap by mouth every other day. multivitamin (ONE A DAY) tablet Take 1 Tab by mouth daily. docusate sodium (COLACE) 100 mg capsule Take 100 mg by mouth two (2) times a day. finasteride (PROSCAR) 5 mg tablet Take 5 mg by mouth nightly. pravastatin (PRAVACHOL) 40 mg tablet Take 40 mg by mouth nightly. No current facility-administered medications for this visit.       Diagnostic Data Review:       Results for orders placed or performed during the hospital encounter of 08/18/22   EKG, 12 LEAD, INITIAL   Result Value Ref Range    Ventricular Rate 90 BPM    Atrial Rate 88 BPM    QRS Duration 178 ms    Q-T Interval 436 ms    QTC Calculation (Bezet) 533 ms    Calculated R Axis -73 degrees    Calculated T Axis 93 degrees    Diagnosis       Ventricular paced rhythm  Left axis deviation  Nonspecific intraventricular block  Possible Lateral infarct , age undetermined  Inferior infarct , age undetermined  When compared with ECG of 12-AUG-2022 10:16,  Wide QRS rhythm has replaced Electronic ventricular pacemaker  Confirmed by Ed Rowe MD, Arminda Caceres ([id number]) on 8/19/2022 3:17:47 PM     Results for orders placed or performed in visit on 05/14/12   AMB POC EKG ROUTINE W/ 12 LEADS, INTER & REP    Impression    EKG: atrial fibrillation, .        1.  Echocardiogram                                     9/5/14 Left ventricle: Systolic function was normal. Ejection fraction was   estimated in the range of 55 % to 60 %. There were no regional wall motion   abnormalities. Left atrium: The atrium was mildly dilated. 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 Color Doppler   evaluation was performed. There was a mild left-to-right shunt. 12/11/18 TTE: LVEF 50%, no WMAs, wall thickness mod incr. RV mild-mod dilated, LA mod-sev dilated, RA mod dilated, mod MR, mod TR, mod pulm HTN, mild PV regurg. AV w/ bioprosthesis, no AS / no AR   3/16/20-EF 50-55%, BVE, Mild concentric hypertrophy, SHANTANU, Mitral valve thickening and repaired with annuloplasty ring. Surgical repair, mild MR/TR, AV leaflet calcification Aortic valve mean gradient is 20.7 mmHg. Aortic valve area is 1.3 cm2.  Mild AS, 26 mm bioprosthetic aortic valve. 10/14/20- Limited-DEFINITY- EF 55-60%, No evidence of pericardial effusion. 11/27/20- EF 55%, Moderate concentric hypertrophy, RVE, Aortic valve mean gradient is 25 mmHg. - bioprosthetic aortic valve,  Mitral valve repaired with annuloplasty ring, mod MR/TR, mod pulm HTN, PAP 59 mmHg   DEWEY-12/29/20-·Watchman device within YIMI shows excellent endothelialization without evidence of braden-device leak, EF 55 - 60%, SHANTANU, bioprosthetic aortic valve- insufficiency is present -mild centralized leaking, mild AI, Mitral valve repaired with annuloplasty ring, mod/severe MR  7/26/21-EF 55 - 60%, Mild concentric hypertrophy, LAE, MV thickening and repaired with annuloplasty ring, mild MR, mild/mod TR, RVSP 38 mmHg,Pulm HTN mild, mild PI, Prosthetic aortic valve. Aortic valve mean gradient is 30.3 mmHg. Mild to moderate AV stenosis, There is a bioprosthetic aortic valve,mild AI  6/13/22-EF 50 - 55%, mild concentric hypertrophy, RVE, mild AI, Bioprosthetic AV, MV repaired by annular ring, mild MR, mod TR, RVSP 40 mmHg, SHANTANU  9/22/22-EF 50 - 55%, mild concentric hypertrophy, RVE, [employer] transcatheter bioprosthetic AV that is well-seated with a size of 26 mm. Mild patient prosthesis mismatch with EOA of 1.3cm2 and AV mean gradient is 20 mmHg, MV repaired by annular ring. Thickened leaflets. Previously placed ring appears to be dehisced. MR jest is eccentric and is not clearly visualized and may be underestimated. By color it appears moderate at worse, mod TR, RVSP 40 mmHg, SHANTANU    2. Myocardial perfusion study                      (9/5/14)Normal EF ;Normal perfusion     3. Cholesterol profile                            12/11/18- , HDL 61, LDL 32, TG 60   7/24/21- , HDL 52, LDL 60, TG 66  3/16/22- , HDL 59, LDL 45, TG 62    4. LE venous duplex                                           (10/10/14)   Right leg mod.  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 7/11/22- No DVT    5. Watchman 10/13/20     6. Carotid Doppler   5/11/20- 1-49% Kris    7.9/3/21:implantation of a [employer] single chamber pacemaker per Dr. Jaime Menjivar    8. Holter  8/16/21-AF occurred 305 time(s) with HR range of 24 - 123; Total AF [place] 96%  Pauses >2 seconds occurred 40 time(s) with longest pause 3.9 seconds      Lab Review:     Lab Results   Component Value Date/Time    Cholesterol, total 117 03/16/2022 08:00 AM    HDL Cholesterol 59 03/16/2022 08:00 AM    LDL, calculated 45 03/16/2022 08:00 AM    LDL, calculated 32 12/11/2018 11:03 AM    Triglyceride 62 03/16/2022 08:00 AM     Lab Results   Component Value Date/Time    Creatinine 0.91 11/23/2022 09:06 AM     Lab Results   Component Value Date/Time    BUN 19 11/23/2022 09:06 AM     Lab Results   Component Value Date/Time    Potassium 4.2 11/23/2022 09:06 AM     Lab Results   Component Value Date/Time    Hemoglobin A1c 5.9 (H) 08/12/2022 10:20 AM     Lab Results   Component Value Date/Time    HGB 8.1 (L) 08/24/2022 04:32 AM     Lab Results   Component Value Date/Time    PLATELET 794 (L) 05/39/9973 04:32 AM                  ___________________________________________________    Nakul Youngblood NP    Cardiac Electrophysiology  [facility]   [address] [facility].  [place] [address] [address] [address] [facility], [address] [place] [address] [phone]

## 2022-12-15 ENCOUNTER — HOSPITAL ENCOUNTER (OUTPATIENT)
Dept: CARDIAC REHAB | Age: 82
Discharge: HOME | End: 2022-12-15
Payer: MEDICARE

## 2022-12-15 VITALS — WEIGHT: 199 LBS | BODY MASS INDEX: 28.55 KG/M2

## 2022-12-15 PROCEDURE — 93797 PHYS/QHP OP CAR RHAB WO ECG: CPT

## 2022-12-15 PROCEDURE — 93798 PHYS/QHP OP CAR RHAB W/ECG: CPT

## 2022-12-16 ENCOUNTER — OFFICE VISIT (OUTPATIENT)
Dept: CARDIOLOGY CLINIC | Age: 82
End: 2022-12-16
Payer: MEDICARE

## 2022-12-16 VITALS
HEART RATE: 68 BPM | DIASTOLIC BLOOD PRESSURE: 76 MMHG | RESPIRATION RATE: 20 BRPM | BODY MASS INDEX: 28.4 KG/M2 | OXYGEN SATURATION: 96 % | WEIGHT: 198.4 LBS | HEIGHT: 70 IN | SYSTOLIC BLOOD PRESSURE: 132 MMHG

## 2022-12-16 DIAGNOSIS — Z95.2 S/P TAVR (TRANSCATHETER AORTIC VALVE REPLACEMENT): ICD-10-CM

## 2022-12-16 DIAGNOSIS — Z86.79 S/P MAZE OPERATION FOR ATRIAL FIBRILLATION: ICD-10-CM

## 2022-12-16 DIAGNOSIS — Z95.0 CARDIAC PACEMAKER IN SITU: Primary | ICD-10-CM

## 2022-12-16 DIAGNOSIS — Z98.890 S/P MVR (MITRAL VALVE REPAIR): ICD-10-CM

## 2022-12-16 DIAGNOSIS — Z95.0 PACEMAKER: Primary | ICD-10-CM

## 2022-12-16 DIAGNOSIS — Z98.890 S/P MAZE OPERATION FOR ATRIAL FIBRILLATION: ICD-10-CM

## 2022-12-16 DIAGNOSIS — Z95.2 S/P AVR (AORTIC VALVE REPLACEMENT): ICD-10-CM

## 2022-12-16 DIAGNOSIS — Z95.818 PRESENCE OF WATCHMAN LEFT ATRIAL APPENDAGE CLOSURE DEVICE: ICD-10-CM

## 2022-12-16 RX ORDER — GABAPENTIN 100 MG/1
100 CAPSULE ORAL 2 TIMES DAILY
COMMUNITY
Start: 2022-11-22

## 2022-12-16 NOTE — PROGRESS NOTES
Room #: 4    No cardiac complaints. Recent visit at [facility] with a  bone marrow biopsy. Chief Complaint   Patient presents with    Annual Exam    Pacemaker Check    Irregular Heart Beat     AFIB    Slow Heart Rate    Other     Watchman 2020       Visit Vitals  /76 (BP 1 Location: Left upper arm, BP Patient Position: Sitting, BP Cuff Size: Adult)   Pulse 68   Resp 20   Ht 5' 10\" (1.778 m)   Wt 198 lb 6.4 oz (90 kg)   SpO2 96%   BMI 28.47 kg/m²         Chest pain:  NO  Shortness of breath:  NO  Edema: NO  Palpitations, skipped beats, rapid heartbeat:  NO  Dizziness:  NO    1. Have you been to the ER, urgent care clinic since your last visit? Hospitalized since your last visit? NO    2. Have you seen or consulted any other health care providers outside of the [address] [place] [address] since your last visit? Include any pap smears or colon screening.  [facility]       Refills:  NO

## 2022-12-19 ENCOUNTER — APPOINTMENT (OUTPATIENT)
Dept: CARDIAC REHAB | Age: 82
End: 2022-12-19
Payer: MEDICARE

## 2022-12-22 ENCOUNTER — APPOINTMENT (OUTPATIENT)
Dept: CARDIAC REHAB | Age: 82
End: 2022-12-22
Payer: MEDICARE

## 2022-12-22 ENCOUNTER — TELEPHONE (OUTPATIENT)
Dept: CARDIOLOGY CLINIC | Age: 82
End: 2022-12-22

## 2022-12-28 NOTE — CARDIO/PULMONARY
Called to primary cardiology office to follow up on renewal order for cardiac rehab extension referral. Message was left for nurse to make doctor aware. Will follow up.

## 2022-12-28 NOTE — TELEPHONE ENCOUNTER
Will Concho from [address] [facility] called and would like the pt order for cardiac rehab renewed, please advise      Cardiac [facility]hab /[facility]   [phone]  Fax [phone]

## 2022-12-29 ENCOUNTER — HOSPITAL ENCOUNTER (OUTPATIENT)
Dept: CARDIAC REHAB | Age: 82
End: 2022-12-29
Payer: MEDICARE

## 2022-12-29 VITALS — BODY MASS INDEX: 28.98 KG/M2 | WEIGHT: 202 LBS

## 2022-12-29 PROCEDURE — 93798 PHYS/QHP OP CAR RHAB W/ECG: CPT

## 2023-01-03 ENCOUNTER — APPOINTMENT (OUTPATIENT)
Dept: CARDIAC REHAB | Age: 83
End: 2023-01-03
Payer: MEDICARE

## 2023-01-04 ENCOUNTER — HOSPITAL ENCOUNTER (OUTPATIENT)
Dept: CARDIAC REHAB | Age: 83
Discharge: HOME | End: 2023-01-04
Payer: MEDICARE

## 2023-01-04 VITALS — BODY MASS INDEX: 29.56 KG/M2 | WEIGHT: 206 LBS

## 2023-01-04 PROCEDURE — 93798 PHYS/QHP OP CAR RHAB W/ECG: CPT

## 2023-01-05 ENCOUNTER — HOSPITAL ENCOUNTER (OUTPATIENT)
Dept: CARDIAC REHAB | Age: 83
Discharge: HOME | End: 2023-01-05
Payer: MEDICARE

## 2023-01-05 VITALS — BODY MASS INDEX: 28.84 KG/M2 | WEIGHT: 201 LBS

## 2023-01-05 PROCEDURE — 93798 PHYS/QHP OP CAR RHAB W/ECG: CPT

## 2023-01-10 ENCOUNTER — HOSPITAL ENCOUNTER (OUTPATIENT)
Dept: CARDIAC REHAB | Age: 83
Discharge: HOME | End: 2023-01-10
Payer: MEDICARE

## 2023-01-10 VITALS — BODY MASS INDEX: 28.7 KG/M2 | WEIGHT: 200 LBS

## 2023-01-10 PROCEDURE — 93798 PHYS/QHP OP CAR RHAB W/ECG: CPT

## 2023-01-12 ENCOUNTER — HOSPITAL ENCOUNTER (OUTPATIENT)
Dept: CARDIAC REHAB | Age: 83
Discharge: HOME | End: 2023-01-12
Payer: MEDICARE

## 2023-01-12 VITALS — BODY MASS INDEX: 28.7 KG/M2 | WEIGHT: 200 LBS

## 2023-01-12 PROCEDURE — 93798 PHYS/QHP OP CAR RHAB W/ECG: CPT

## 2023-01-17 ENCOUNTER — HOSPITAL ENCOUNTER (OUTPATIENT)
Dept: CARDIAC REHAB | Age: 83
Discharge: HOME | End: 2023-01-17
Payer: MEDICARE

## 2023-01-17 VITALS — WEIGHT: 200 LBS | BODY MASS INDEX: 28.7 KG/M2

## 2023-01-17 PROCEDURE — 93798 PHYS/QHP OP CAR RHAB W/ECG: CPT

## 2023-01-19 ENCOUNTER — HOSPITAL ENCOUNTER (OUTPATIENT)
Dept: CARDIAC REHAB | Age: 83
Discharge: HOME | End: 2023-01-19
Payer: MEDICARE

## 2023-01-19 VITALS — BODY MASS INDEX: 28.55 KG/M2 | WEIGHT: 199 LBS

## 2023-01-19 PROCEDURE — 93798 PHYS/QHP OP CAR RHAB W/ECG: CPT

## 2023-01-24 ENCOUNTER — HOSPITAL ENCOUNTER (OUTPATIENT)
Dept: CARDIAC REHAB | Age: 83
Discharge: HOME | End: 2023-01-24
Payer: MEDICARE

## 2023-01-24 VITALS — BODY MASS INDEX: 28.55 KG/M2 | WEIGHT: 199 LBS

## 2023-01-24 PROCEDURE — 93798 PHYS/QHP OP CAR RHAB W/ECG: CPT

## 2023-01-26 ENCOUNTER — HOSPITAL ENCOUNTER (OUTPATIENT)
Dept: CARDIAC REHAB | Age: 83
Discharge: HOME | End: 2023-01-26
Payer: MEDICARE

## 2023-01-26 ENCOUNTER — TELEPHONE (OUTPATIENT)
Dept: CARDIOLOGY CLINIC | Age: 83
End: 2023-01-26

## 2023-01-26 VITALS — BODY MASS INDEX: 28.55 KG/M2 | WEIGHT: 199 LBS

## 2023-01-26 DIAGNOSIS — I50.22 CHRONIC SYSTOLIC (CONGESTIVE) HEART FAILURE (HCC): ICD-10-CM

## 2023-01-26 PROCEDURE — 93798 PHYS/QHP OP CAR RHAB W/ECG: CPT

## 2023-01-26 RX ORDER — BUMETANIDE 2 MG/1
2 TABLET ORAL DAILY
Qty: 90 TABLET | Refills: 3 | Status: SHIPPED | OUTPATIENT
Start: 2023-01-26

## 2023-01-26 RX ORDER — BUMETANIDE 2 MG/1
2 TABLET ORAL DAILY
Qty: 90 TABLET | Refills: 3 | Status: SHIPPED | OUTPATIENT
Start: 2023-01-26 | End: 2023-01-26 | Stop reason: SDUPTHER

## 2023-01-26 NOTE — TELEPHONE ENCOUNTER
Pt requested a hard copy presciption for bumetanide 2mg faxed to the [facility], please advise         [facility]   Fax [phone]        Pt# [phone]

## 2023-01-26 NOTE — TELEPHONE ENCOUNTER
Refill per VO of Dr. Mandy Gay  Last appt: 12/16/2022  Future Appointments   Date Time Provider Vinay Lemoni   1/31/2023  8:00 AM SRM CARDIOPULM EXERCISE [facility] SRM   2/2/2023  8:00 AM SRM CARDIOPULM EXERCISE SRMCR SRM   2/7/2023  8:00 AM SRM CARDIOPULM EXERCISE SRMCR SRM   2/9/2023  8:00 AM SRM CARDIOPULM EXERCISE SRMCR SRM   2/14/2023  8:00 AM SRM CARDIOPULM EXERCISE SRMCR SRM   2/16/2023  8:00 AM SRM CARDIOPULM EXERCISE [facility] MED CTR SRM   2/17/2023 11:00 AM Cr Fine MD CAVIR BS AMB   2/21/2023  8:00 AM SRM CARDIOPULM EXERCISE SRMCR SRM   2/23/2023  8:00 AM SRM CARDIOPULM EXERCISE SRMCR SRM   2/28/2023  8:00 AM SRM CARDIOPULM EXERCISE SRMCR SRM   3/2/2023  8:00 AM SRM CARDIOPULM EXERCISE SRMCR SRM   3/7/2023  8:00 AM SRM CARDIOPULM EXERCISE SRMCR SRM   3/9/2023  8:00 AM SRM CARDIOPULM EXERCISE SRMCR SRM   3/14/2023  8:00 AM SRM CARDIOPULM EXERCISE SRMCR SRM   3/16/2023  8:00 AM SRM CARDIOPULM EXERCISE SRMCR SRM   3/21/2023  8:00 AM SRM CARDIOPULM EXERCISE SRMCR SRM   3/21/2023  9:15 AM [place] [address] [facility] CAVSF BS AMB   3/23/2023  8:00 AM SRM CARDIOPULM EXERCISE SRMCR SRM   3/28/2023  8:00 AM SRM CARDIOPULM EXERCISE [facility] MED CTR SRM   11/1/2023 10:20 AM PACEMAKER, STFRANCES TAMIKA BS AMB   11/1/2023 10:40 AM Josefina Perry MD CAVSF BS AMB       Requested Prescriptions     Signed Prescriptions Disp Refills    bumetanide (BUMEX) 2 mg tablet 90 Tablet 3     Sig: Take 1 Tablet by mouth daily.      Authorizing Provider: Samia Alberto     Ordering User: Gage Jo

## 2023-01-30 NOTE — TELEPHONE ENCOUNTER
Called patient this morning for alternate fax number as the one provided was not in service. New number [phone]    Faxed with confirmation received. Called to notify patient fax sent.

## 2023-01-31 ENCOUNTER — HOSPITAL ENCOUNTER (OUTPATIENT)
Dept: CARDIAC REHAB | Age: 83
End: 2023-01-31
Payer: MEDICARE

## 2023-01-31 VITALS — BODY MASS INDEX: 28.84 KG/M2 | WEIGHT: 201 LBS

## 2023-01-31 PROCEDURE — 93798 PHYS/QHP OP CAR RHAB W/ECG: CPT

## 2023-02-02 ENCOUNTER — HOSPITAL ENCOUNTER (OUTPATIENT)
Dept: CARDIAC REHAB | Age: 83
Discharge: HOME | End: 2023-02-02
Payer: MEDICARE

## 2023-02-02 VITALS — WEIGHT: 201 LBS | BODY MASS INDEX: 28.84 KG/M2

## 2023-02-02 PROCEDURE — 93798 PHYS/QHP OP CAR RHAB W/ECG: CPT

## 2023-02-03 ENCOUNTER — TELEPHONE (OUTPATIENT)
Dept: CARDIOLOGY CLINIC | Age: 83
End: 2023-02-03

## 2023-02-03 NOTE — TELEPHONE ENCOUNTER
Iman from [facility] called in reference to prescription (bumex 2mg), fax was not received for the bumex, Arguello Stamp would like it faxed to [phone].        Arguello Stamp   [phone], would like a call back to confirm fax was sent

## 2023-02-07 ENCOUNTER — HOSPITAL ENCOUNTER (OUTPATIENT)
Dept: CARDIAC REHAB | Age: 83
Discharge: HOME | End: 2023-02-07
Payer: MEDICARE

## 2023-02-07 VITALS — WEIGHT: 204 LBS | BODY MASS INDEX: 29.27 KG/M2

## 2023-02-07 PROCEDURE — 93798 PHYS/QHP OP CAR RHAB W/ECG: CPT

## 2023-02-09 ENCOUNTER — HOSPITAL ENCOUNTER (OUTPATIENT)
Dept: CARDIAC REHAB | Age: 83
Discharge: HOME | End: 2023-02-09
Payer: MEDICARE

## 2023-02-09 VITALS — BODY MASS INDEX: 29.27 KG/M2 | WEIGHT: 204 LBS

## 2023-02-09 PROCEDURE — 93798 PHYS/QHP OP CAR RHAB W/ECG: CPT

## 2023-02-14 ENCOUNTER — HOSPITAL ENCOUNTER (OUTPATIENT)
Dept: CARDIAC REHAB | Age: 83
Discharge: HOME | End: 2023-02-14
Payer: MEDICARE

## 2023-02-14 VITALS — BODY MASS INDEX: 29.13 KG/M2 | WEIGHT: 203 LBS

## 2023-02-14 PROCEDURE — 93798 PHYS/QHP OP CAR RHAB W/ECG: CPT

## 2023-02-16 ENCOUNTER — HOSPITAL ENCOUNTER (OUTPATIENT)
Dept: CARDIAC REHAB | Age: 83
Discharge: HOME | End: 2023-02-16
Payer: MEDICARE

## 2023-02-16 VITALS — WEIGHT: 204 LBS | BODY MASS INDEX: 29.27 KG/M2

## 2023-02-16 PROCEDURE — 93797 PHYS/QHP OP CAR RHAB WO ECG: CPT

## 2023-02-16 PROCEDURE — 93798 PHYS/QHP OP CAR RHAB W/ECG: CPT

## 2023-02-17 ENCOUNTER — OFFICE VISIT (OUTPATIENT)
Dept: CARDIOLOGY CLINIC | Age: 83
End: 2023-02-17
Payer: MEDICARE

## 2023-02-17 VITALS
DIASTOLIC BLOOD PRESSURE: 70 MMHG | OXYGEN SATURATION: 96 % | HEART RATE: 63 BPM | HEIGHT: 70 IN | WEIGHT: 207 LBS | SYSTOLIC BLOOD PRESSURE: 120 MMHG | BODY MASS INDEX: 29.63 KG/M2

## 2023-02-17 DIAGNOSIS — Z86.79 S/P MAZE OPERATION FOR ATRIAL FIBRILLATION: ICD-10-CM

## 2023-02-17 DIAGNOSIS — Z95.2 S/P TAVR (TRANSCATHETER AORTIC VALVE REPLACEMENT): ICD-10-CM

## 2023-02-17 DIAGNOSIS — Z98.890 S/P MAZE OPERATION FOR ATRIAL FIBRILLATION: ICD-10-CM

## 2023-02-17 DIAGNOSIS — Z98.890 S/P MVR (MITRAL VALVE REPAIR): ICD-10-CM

## 2023-02-17 DIAGNOSIS — Z95.0 PACEMAKER: ICD-10-CM

## 2023-02-17 DIAGNOSIS — Z95.2 S/P AVR (AORTIC VALVE REPLACEMENT): ICD-10-CM

## 2023-02-17 DIAGNOSIS — G47.33 OSA (OBSTRUCTIVE SLEEP APNEA): ICD-10-CM

## 2023-02-17 DIAGNOSIS — I10 HYPERTENSION, UNSPECIFIED TYPE: Primary | ICD-10-CM

## 2023-02-17 NOTE — LETTER
2/17/2023    Patient: Alvina Mclean   YOB: 1940   Date of Visit: 2/17/2023     Gabriella Thrasher MD  [facility] [address]  Via Fax: [phone]    Dear Gabriella Thrasher MD,      Thank you for referring Mr. Newton Riding to [address] for evaluation. My notes for this consultation are attached. If you have questions, please do not hesitate to call me. I look forward to following your patient along with you.       Sincerely,    Jocelyn Gibbs MD

## 2023-02-17 NOTE — PROGRESS NOTES
Whitney Santiago is a 79 y.o. male    Vitals:    02/17/23 1106   BP: 120/70   BP 1 Location: Left upper arm   BP Patient Position: Sitting   BP Cuff Size: Adult   Pulse: 63   Height: 5' 10\" (1.778 m)   Weight: 207 lb (93.9 kg)   SpO2: 96%       No chief complaint on file. Chest pain NO  SOB NO  Dizziness NO  Swelling NO  Recent hospital visit NO  Refills NO  COVID VACCINE YES  HAD COVID? [facility] PCP [facility].

## 2023-02-17 NOTE — PROGRESS NOTES
CARDIOLOGY OFFICE NOTE    Shayan Ortiz MD, [address], [place], [address] [facility]  Phone [phone]; Fax [phone]  Mobile [phone]   Voice Mail [phone]      Arsh Amaya MD       ATTENTION:   This medical record was transcribed using an electronic medical records/speech recognition system. Although proofread, it may and can contain electronic, spelling and other errors. Corrections may be executed at a later time. Please feel free to contact us for any clarifications as needed. No diagnosis found. I have personally obtained the history from the patient. Cardiac risk factors: dyslipidemia, male gender, hypertension  I have personally obtained the history from the patient. HISTORY OF PRESENTING ILLNESS   Mr. Aman Noel is a 79y.o. year old male past medical history significant C/Anjel Mckinnon CAD, sp AVR, sp MVRepair, PAF, sp CRYOMAZE, sp watchman, SSS sp PPM, looks diastolic heart failure. I am seeing today for lower extremity edema status post TAVR with aTAVR with a 26 Evolut with [place] protection via the RTF approach on 8/18/22 was associated with a postdilatation of aortic valve associate with severe hypotension requiring CPR for a few minutes       Last echo:Normal left ventricular systolic function with a visually estimated EF of 55 - 60%. Left ventricle size is normal. Mildly increased wall thickness. Normal wall motion. Diastolic dysfunction present with increased LAP with normal LV EF. Right Ventricle: Right ventricle is moderately dilated. Mildly increased wall thickness. Moderately reduced systolic function. Aortic Valve: Medtronic Evolut appropriately positioned transcatheter bioprosthetic valve with a size of 26 mm. Mitral Valve: Valve repaired by annular ring. Moderate regurgitation. ERO of 0.2 cm2 with regurgitant volume of 45 ml/beat. Systolic blunting of PV flow. Tricuspid Valve:  Moderate annular dilation. Severe regurgitation. The estimated RVSP is 48 mmHg. Left Atrium: Left atrium is severely dilated. Device closure in the appendage with a Watchman. Right Atrium: Right atrium is severely dilated. Benadryl that    He seems to be doing well. He denies any chest pain shortness of breath. He is doing well with lower extremity edema on diuretics. Has not had cholesterol checked recently so I gave him a lab requisition at this time.      ACTIVE PROBLEM LIST     Patient Active Problem List    Diagnosis Date Noted    Presence of Watchman left atrial appendage closure device 10/13/2020    S/P TAVR (transcatheter aortic valve replacement) 08/26/2022    Aortic stenosis 08/18/2022    Chronic systolic (congestive) heart failure 08/12/2022    COVID 07/12/2022    CHF exacerbation ([address] 07/10/2022    COVID-19 49/82/3593    Diastolic CHF, acute on chronic ([address] 11/28/2020    Anasarca 11/27/2020    ABDUL (dyspnea on exertion) 11/27/2020    GI bleed 06/08/2020    Radiation proctitis     DEL (obstructive sleep apnea)     Insomnia     Atrial fibrillation ([address]     Hyperlipidemia     Hx of carcinoma in situ of prostate     GERD (gastroesophageal reflux disease)     Chronic pain     CAD (coronary artery disease)     Arthritis     Hypertension 10/26/2015    Anemia due to acute blood loss 12/26/2014    Nonrheumatic aortic valve stenosis 12/23/2014    S/P AVR (aortic valve replacement) 12/23/2014    S/P MVR (mitral valve repair) 12/23/2014    S/P Maze operation for atrial fibrillation 12/23/2014    Aortic insufficiency 12/08/2014    Arthritis of knee 07/09/2012           PAST MEDICAL HISTORY     Past Medical History:   Diagnosis Date    Anasarca     Aortic valve stenosis     Repaired 8/18/2022    Arthritis     in hands and knee (before replacement)    Atrial fibrillation (HCC)     CAD (coronary artery disease)     CHF (congestive heart failure) ([address] 11/01/2020    Chronic pain     legs/knee    GERD (gastroesophageal reflux disease)     Hx of carcinoma in situ of prostate     Hyperlipidemia     Hypertension     Insomnia     DEL (obstructive sleep apnea)     Prostate cancer ([facility] 01/01/2017    Radiation proctitis     Vitamin D deficiency            PAST SURGICAL HISTORY     Past Surgical History:   Procedure Laterality Date    COLONOSCOPY N/A 05/08/2020    COLONOSCOPY performed by Grace Matthews MD at [address] N/A 06/08/2020    COLONOSCOPY performed by Emma Giraldo MD at [address] [facility] N/A 11/23/2020    FLEXIBLE SIGMOIDOSCOPY WITH APC performed by Grace Matthews MD at [address] AORTIC VALVE REPLACEMENT  2014    and mitral valve repair, left atrial cryo maze    HX HEART VALVE SURGERY  01/01/2014    Mitral Valve Repair    HX HEENT      melanoma removed head    HX HERNIA REPAIR  2009    Right    HX HERNIA REPAIR      left inguinal hernia repair    HX HERNIA REPAIR Left 12/01/2016    lap left inguinal hernia repair with mesh    HX KNEE REPLACEMENT      Bilateral    HX ORTHOPAEDIC      BILATERAL KNEE REPLACEMENT    HX ORTHOPAEDIC      CARPEL TUNNEL REPAIR-RIGHT    HX OTHER SURGICAL  2015    closure of patent foramen ovale    HX OTHER SURGICAL  10/2020    watchman implant for afib / Dr. Annetta Mcclain      42 radiation treatments          ALLERGIES     No Known Allergies       FAMILY HISTORY     Family History   Problem Relation Age of Onset    Cancer Mother     Cancer Father         prostrate    No Known Problems Sister     No Known Problems Sister     No Known Problems Sister     Cancer Brother         PROSTATE    No Known Problems Brother     Anesth Problems Neg Hx     negative for cardiac disease       SOCIAL HISTORY     Social History     Socioeconomic History    Marital status:      Spouse name: Angela Laws    Number of children: 2    Years of education: 13 years    Highest education level: High school graduate   Tobacco Use    Smoking status: Never Smokeless tobacco: Never   Vaping Use    Vaping Use: Never used   Substance and Sexual Activity    Alcohol use: Yes     Alcohol/week: 2.0 standard drinks     Types: 2 Cans of beer per week     Comment: Occasionaly    Drug use: No    Sexual activity: Not Currently   Other Topics Concern     Service Yes    Blood Transfusions No    Caffeine Concern No    Occupational Exposure No    Hobby Hazards No    Sleep Concern Yes     Comment: Insomina    Stress Concern No    Weight Concern No    Special Diet No    Back Care No    Exercise No    Bike Helmet Yes    Seat Belt Yes    Self-Exams Yes         MEDICATIONS     Current Outpatient Medications   Medication Sig    bumetanide (BUMEX) 2 mg tablet Take 1 Tablet by mouth daily. gabapentin (NEURONTIN) 100 mg capsule Take 100 mg by mouth two (2) times a day. acetaminophen (TYLENOL) 325 mg tablet Take 2 Tablets by mouth every four (4) hours as needed for Pain. omeprazole (PRILOSEC) 40 mg capsule Take 40 mg by mouth in the morning. potassium chloride (K-DUR, KLOR-CON M20) 20 mEq tablet Take 2 Tablets by mouth daily. aspirin delayed-release 81 mg tablet Take 81 mg by mouth two (2) times a day. TAKES ONE IN MORNING AND TWO IN EVENING (PER PATIENT AS OF 8/12/22)    polyvinyl alcohol (LIQUIFILM TEARS) 1.4 % ophthalmic solution Administer 1 Drop to both eyes as needed. ferrous sulfate 325 mg (65 mg iron) cpER Take 1 Tab by mouth every other day. tamsulosin (FLOMAX) 0.4 mg capsule Take 0.4 mg by mouth two (2) times a day. cholecalciferol (VITAMIN D3) 1,000 unit tablet Take 2,000 Units by mouth two (2) times a day. GLUCOSAMINE HCL/CHONDR CHING A NA (GLUCOSAMINE-CHONDROITIN) 750-600 mg tab Take 1 Tab by mouth daily. omega-3 fatty acids-vitamin e 1,000 mg cap Take 1 Cap by mouth every other day. multivitamin (ONE A DAY) tablet Take 1 Tab by mouth daily. docusate sodium (COLACE) 100 mg capsule Take 100 mg by mouth two (2) times a day.     finasteride (PROSCAR) 5 mg tablet Take 5 mg by mouth nightly. pravastatin (PRAVACHOL) 40 mg tablet Take 40 mg by mouth nightly. No current facility-administered medications for this visit. I have reviewed the nurses notes, vitals, problem list, allergy list, medical history, family, social history and medications. REVIEW OF SYMPTOMS   Pertinent positive per HPI  General: Pt denies excessive weight gain or loss. Pt is able to conduct ADL's  HEENT: Denies blurred vision, headaches, hearing loss, epistaxis and difficulty swallowing. Respiratory: Denies cough, congestion, shortness of breath, ABDUL, wheezing or stridor. Cardiovascular: Denies precordial pain, palpitations, edema or PND  Gastrointestinal: Denies poor appetite, indigestion, abdominal pain or blood in stool  Genitourinary: Denies hematuria, dysuria, increased urinary frequency  Musculoskeletal: Denies joint pain or swelling from muscles or joints  Neurologic: Denies tremor, paresthesias, headache, or sensory motor disturbance  Psychiatric: Denies confusion, insomnia, depression  Integumentray: Denies rash, itching or ulcers. Hematologic: Denies easy bruising, bleeding     PHYSICAL EXAMINATION      Vitals:    02/17/23 1106   BP: 120/70   Pulse: 63   SpO2: 96%   Weight: 207 lb (93.9 kg)   Height: 5' 10\" (1.778 m)       General: Well developed, in no acute distress. HEENT: No jaundice  Neck: Supple, no stiffness  Heart:   Irregular 2/6 systolic murmur left sternal border  Respiratory: Clear bilaterally x 4, no wheezing or rales  Extremities:  + edema, normal cap refill, no cyanosis. Musculoskeletal: No clubbing, no deformities  Neuro: A&Ox3, speech clear, gait stable, cooperative, no focal neurologic deficits  Skin: Skin color is normal. No rashes or lesions.  Non diaphoretic, moist.         DIAGNOSTIC DATA     1.  Echocardiogram                                     9/5/14 Left ventricle: Systolic function was normal. Ejection fraction was   estimated in the range of 55 % to 60 %. There were no regional wall motion   abnormalities. Left atrium: The atrium was mildly dilated. 4/20/15- EF 48%, SHANTANU, atrial septum- poss PFO, MR mild, TR mild/mod, Pulm HTN mod, AV bioprosthesis normal function   6/19/17- EF 45-50%, RVE, SHANTANU, MR/TR mild/mod, AV bioprosthesis exhibits normal function, severe Pulm HTN, WA mild   Atrial septum: There was a secundum septal defect. Color Doppler   evaluation was performed. There was a mild left-to-right shunt. 12/11/18 TTE: LVEF 50%, no WMAs, wall thickness mod incr. RV mild-mod dilated, LA mod-sev dilated, RA mod dilated, mod MR, mod TR, mod pulm HTN, mild PV regurg. AV w/ bioprosthesis, no AS / no AR   3/16/20-EF 50-55%, BVE, Mild concentric hypertrophy, SHANTANU, Mitral valve thickening and repaired with annuloplasty ring. Surgical repair, mild MR/TR, AV leaflet calcification Aortic valve mean gradient is 20.7 mmHg. Aortic valve area is 1.3 cm2. Mild AS, 26 mm bioprosthetic aortic valve. 10/14/20- Limited-DEFINITY- EF 55-60%, No evidence of pericardial effusion. 11/27/20- EF 55%, Moderate concentric hypertrophy, RVE, Aortic valve mean gradient is 25 mmHg. - bioprosthetic aortic valve,  Mitral valve repaired with annuloplasty ring, mod MR/TR, mod pulm HTN, PAP 59 mmHg   DEWEY-12/29/20-·Watchman device within YIMI shows excellent endothelialization without evidence of braden-device leak, EF 55 - 60%, SHANTANU, bioprosthetic aortic valve- insufficiency is present -mild centralized leaking, mild AI, Mitral valve repaired with annuloplasty ring, mod/severe MR  7/26/21-EF 55 - 60%, Mild concentric hypertrophy, LAE, MV thickening and repaired with annuloplasty ring, mild MR, mild/mod TR, RVSP 38 mmHg,Pulm HTN mild, mild PI, Prosthetic aortic valve. Aortic valve mean gradient is 30.3 mmHg.  Mild to moderate AV stenosis, There is a bioprosthetic aortic valve,mild AI  6/13/22-EF 50 - 55%, mild concentric hypertrophy, RVE, mild AI, Bioprosthetic AV, MV repaired by annular ring, mild MR, mod TR, RVSP 40 mmHg, SHANTANU  9/22/22-EF 50 - 55%, mild concentric hypertrophy, RVE, Medtronic transcatheter bioprosthetic AV that is well-seated with a size of 26 mm. Mild patient prosthesis mismatch with EOA of 1.3cm2 and AV mean gradient is 20 mmHg, MV repaired by annular ring. Thickened leaflets. Previously placed ring appears to be dehisced. MR jest is eccentric and is not clearly visualized and may be underestimated. By color it appears moderate at worse, mod TR, RVSP 40 mmHg, SHANTANU  11/8/22-DEWEY-EF 55 - 60%, RVE, Medtronic Evolut appropriately positioned transcatheter bioprosthetic AV with a size of 26 mm, MV repaired by annular ring, mod MR,ERO of 0.2 cm2 with regurgitant volume of 45 ml/beat. Systolic blunting of PV flow, TV-mod annular dilation, severe TR, RVSP 48 mmHg, SHANTANU, LA-Device closure in the appendage with a Watchman. 2.  Myocardial perfusion study                      (9/5/14)Normal EF ;Normal perfusion     3. Cholesterol profile                            12/11/18- , HDL 61, LDL 32, TG 60   7/24/21- , HDL 52, LDL 60, TG 66  3/16/22- , HDL 59, LDL 45, TG 62    4. LE venous duplex                                           (10/10/14)   Right leg mod. 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 7/11/22- No DVT    5. Watchman 10/13/20     6. Carotid Doppler   5/11/20- 1-49% Kris  11/28/22-0-24% Kris    7.9/3/21:implantation of a [employer] single chamber pacemaker per Dr. Danika Pinto    8. Holter  8/16/21-AF occurred 305 time(s) with HR range of 24 - 123;  Total AF [place] 96%  Pauses >2 seconds occurred 40 time(s) with longest pause 3.9 seconds         LABORATORY DATA            Lab Results   Component Value Date/Time    WBC 5.4 08/24/2022 04:32 AM    HGB 8.1 (L) 08/24/2022 04:32 AM HCT 24.9 (L) 08/24/2022 04:32 AM    PLATELET 362 (L) 02/34/2855 04:32 AM    MCV 98.4 08/24/2022 04:32 AM      Lab Results   Component Value Date/Time    Sodium 142 11/23/2022 09:06 AM    Potassium 4.2 11/23/2022 09:06 AM    Chloride 102 11/23/2022 09:06 AM    CO2 27 11/23/2022 09:06 AM    Anion gap 6 08/24/2022 04:32 AM    Glucose 102 (H) 11/23/2022 09:06 AM    BUN 19 11/23/2022 09:06 AM    Creatinine 0.91 11/23/2022 09:06 AM    BUN/Creatinine ratio 21 11/23/2022 09:06 AM    GFR est AA >60 08/24/2022 04:32 AM    GFR est non-AA >60 08/24/2022 04:32 AM    Calcium 8.9 11/23/2022 09:06 AM    Bilirubin, total 0.8 08/18/2022 05:54 PM    Alk. phosphatase 80 08/18/2022 05:54 PM    Protein, total 6.4 08/18/2022 05:54 PM    Albumin 3.1 (L) 08/18/2022 05:54 PM    Globulin 3.3 08/18/2022 05:54 PM    A-G Ratio 0.9 (L) 08/18/2022 05:54 PM    ALT (SGPT) 23 08/18/2022 05:54 PM           ASSESSMENT/RECOMMENDATIONS:.      1. AF/PPM( [place] [employer])  -He is status post watchman device   -He is ventricularly paced  -Pacer check in the clinic as scheduled    2. LE edema  -Edema is better on Bumex 2 mg      3. HTN  -Blood pressure at goal 120/70       4. Dyslipidemia  -LDL is at goal with an LDL of 45.  -Repeat cholesterol    5. Moderate PFO with left to right flow and CHRISTOPHER  -Aspirin 62 mg a day    6. AS and MR s/p AVR and MVR on 1/8/15;TAVR with a 26 Evolut with [place] protection via the RTF approach on 8/18/22  - He has moderate MR and moderate TR and bioprosthetic valves. -Understands about antibiotic prophylaxis    7. DEL  -Did not discuss sleep apnea    8. Carotid bruit on the left  -He does have carotid bruits by things radiating from his prostatic aortic valve since he has no significant disease on carotid studies in November 2022      8. Return in 3 months or PRN. No orders of the defined types were placed in this encounter.         We discussed the expected course, resolution and complications of the diagnosis(es) in detail. Medication risks, benefits, costs, interactions, and alternatives were discussed as indicated. I advised him to contact the office if his condition worsens, changes or fails to improve as anticipated. He expressed understanding with the diagnosis(es) and plan                  I have discussed the diagnosis with  Shadi Vera and the intended plan as seen in the above orders. Questions were answered concerning future plans. I have discussed medication side effects and warnings with the patient as well. Thank you,  Vance Murillo MD for involving me in the care of  Shadi Vera. Please do not hesitate to contact me for further questions/concerns. Shayan Fine MD, [facility] [address]      [facility], [address] [place] [address] Drive      [phone] / [phone] Fax

## 2023-02-21 ENCOUNTER — HOSPITAL ENCOUNTER (OUTPATIENT)
Dept: CARDIAC REHAB | Age: 83
Discharge: HOME | End: 2023-02-21
Payer: MEDICARE

## 2023-02-21 VITALS — BODY MASS INDEX: 29.27 KG/M2 | WEIGHT: 204 LBS

## 2023-02-21 PROCEDURE — 93798 PHYS/QHP OP CAR RHAB W/ECG: CPT

## 2023-02-23 ENCOUNTER — HOSPITAL ENCOUNTER (OUTPATIENT)
Dept: CARDIAC REHAB | Age: 83
Discharge: HOME | End: 2023-02-23
Payer: MEDICARE

## 2023-02-23 VITALS — BODY MASS INDEX: 29.27 KG/M2 | WEIGHT: 204 LBS

## 2023-02-23 DIAGNOSIS — E78.00 PURE HYPERCHOLESTEROLEMIA: Primary | ICD-10-CM

## 2023-02-23 PROCEDURE — 93798 PHYS/QHP OP CAR RHAB W/ECG: CPT

## 2023-02-28 ENCOUNTER — HOSPITAL ENCOUNTER (OUTPATIENT)
Dept: CARDIAC REHAB | Age: 83
End: 2023-02-28
Payer: MEDICARE

## 2023-02-28 VITALS — WEIGHT: 203 LBS | BODY MASS INDEX: 29.13 KG/M2

## 2023-02-28 PROCEDURE — 93798 PHYS/QHP OP CAR RHAB W/ECG: CPT

## 2023-03-02 ENCOUNTER — HOSPITAL ENCOUNTER (OUTPATIENT)
Dept: CARDIAC REHAB | Age: 83
Discharge: HOME | End: 2023-03-02
Payer: MEDICARE

## 2023-03-02 VITALS — WEIGHT: 204 LBS | BODY MASS INDEX: 29.27 KG/M2

## 2023-03-02 PROCEDURE — 93798 PHYS/QHP OP CAR RHAB W/ECG: CPT

## 2023-03-03 ENCOUNTER — TELEPHONE (OUTPATIENT)
Dept: CARDIOLOGY CLINIC | Age: 83
End: 2023-03-03

## 2023-03-03 NOTE — TELEPHONE ENCOUNTER
Pt states he is noticing blood in his stool occasionally - He had seen Dr Helene Sarah in the past. Ciara Marcelinoes him the # to his office.

## 2023-03-07 ENCOUNTER — HOSPITAL ENCOUNTER (OUTPATIENT)
Dept: CARDIAC REHAB | Age: 83
Discharge: HOME | End: 2023-03-07
Payer: MEDICARE

## 2023-03-07 VITALS — WEIGHT: 201 LBS | BODY MASS INDEX: 28.84 KG/M2

## 2023-03-07 PROCEDURE — 93798 PHYS/QHP OP CAR RHAB W/ECG: CPT

## 2023-03-09 ENCOUNTER — HOSPITAL ENCOUNTER (OUTPATIENT)
Dept: CARDIAC REHAB | Age: 83
Discharge: HOME | End: 2023-03-09
Payer: MEDICARE

## 2023-03-09 VITALS — BODY MASS INDEX: 28.84 KG/M2 | WEIGHT: 201 LBS

## 2023-03-09 PROCEDURE — 93798 PHYS/QHP OP CAR RHAB W/ECG: CPT

## 2023-03-10 ENCOUNTER — HOSPITAL ENCOUNTER (EMERGENCY)
Age: 83
Discharge: HOME | End: 2023-03-10
Attending: EMERGENCY MEDICINE
Payer: MEDICARE

## 2023-03-10 ENCOUNTER — APPOINTMENT (OUTPATIENT)
Dept: GENERAL RADIOLOGY | Age: 83
End: 2023-03-10
Attending: PHYSICIAN ASSISTANT
Payer: MEDICARE

## 2023-03-10 VITALS
WEIGHT: 200.29 LBS | HEART RATE: 62 BPM | DIASTOLIC BLOOD PRESSURE: 81 MMHG | BODY MASS INDEX: 28.67 KG/M2 | HEIGHT: 70 IN | OXYGEN SATURATION: 96 % | RESPIRATION RATE: 16 BRPM | TEMPERATURE: 98.1 F | SYSTOLIC BLOOD PRESSURE: 145 MMHG

## 2023-03-10 DIAGNOSIS — M70.21 OLECRANON BURSITIS OF RIGHT ELBOW: Primary | ICD-10-CM

## 2023-03-10 PROCEDURE — 99283 EMERGENCY DEPT VISIT LOW MDM: CPT

## 2023-03-10 PROCEDURE — 73080 X-RAY EXAM OF ELBOW: CPT

## 2023-03-10 NOTE — DISCHARGE INSTRUCTIONS
Thank you for allowing us to provide you with medical care today. We realize that you have many choices for your emergency care needs. We thank you for choosing [facility]. Please choose us in the future for any continued health care needs. The exam and treatment you received in the Emergency Department were for an emergent problem and are not intended as complete care. It is important that you follow up with a doctor, nurse practitioner, or physician's assistant for ongoing care. If your symptoms worsen or you do not improve as expected and you are unable to reach your usual health care provider, you should return to the Emergency Department. We are available 24 hours a day. Please make an appointment with your health care provider(s) for follow up of your Emergency Department visit. Take this sheet with you when you go to your follow-up visit.

## 2023-03-10 NOTE — ED PROVIDER NOTES
78 yo M who presents with swelling of his R elbow x this morning. Describes he woke up with the swelling. Denies any pain or known injury. No recent injections to the joint. No fevers. Elbow Problem   Pertinent negatives include no back pain and no neck pain.       Past Medical History:   Diagnosis Date    Anasarca     Aortic valve stenosis     Repaired 8/18/2022    Arthritis     in hands and knee (before replacement)    Atrial fibrillation (HCC)     CAD (coronary artery disease)     CHF (congestive heart failure) ([facility] [address] 11/01/2020    Chronic pain     legs/knee    GERD (gastroesophageal reflux disease)     Hx of carcinoma in situ of prostate     Hyperlipidemia     Hypertension     Insomnia     DEL (obstructive sleep apnea)     Prostate cancer ([facility] [address] 01/01/2017    Radiation proctitis     Vitamin D deficiency        Past Surgical History:   Procedure Laterality Date    COLONOSCOPY N/A 05/08/2020    COLONOSCOPY performed by Chris Means MD at [facility] ENDOSCOPY    COLONOSCOPY N/A 06/08/2020    COLONOSCOPY performed by Joce Carvajal MD at [facility] N/A 11/23/2020    FLEXIBLE SIGMOIDOSCOPY WITH APC performed by Chris Means MD at [address] AORTIC VALVE REPLACEMENT  2014    and mitral valve repair, left atrial cryo maze    HX HEART VALVE SURGERY  01/01/2014    Mitral Valve Repair    HX HEENT      melanoma removed head    HX HERNIA REPAIR  2009    Right    HX HERNIA REPAIR      left inguinal hernia repair    HX HERNIA REPAIR Left 12/01/2016    lap left inguinal hernia repair with mesh    HX KNEE REPLACEMENT      Bilateral    HX ORTHOPAEDIC      BILATERAL KNEE REPLACEMENT    HX ORTHOPAEDIC      CARPEL TUNNEL REPAIR-RIGHT    HX OTHER SURGICAL  2015    closure of patent foramen ovale    HX OTHER SURGICAL  10/2020    watchman implant for afib / Dr. Sanna Almaraz      42 radiation treatments         Family History:   Problem Relation Age of Onset    Cancer Mother Cancer Father         prostrate    No Known Problems Sister     No Known Problems Sister     No Known Problems Sister     Cancer Brother         PROSTATE    No Known Problems Brother     Anesth Problems Neg Hx        Social History     Socioeconomic History    Marital status:      Spouse name: Ananya Meyer    Number of children: 2    Years of education: 13 years    Highest education level: High school graduate   Occupational History    Not on file   Tobacco Use    Smoking status: Never    Smokeless tobacco: Never   Vaping Use    Vaping Use: Never used   Substance and Sexual Activity    Alcohol use: Yes     Alcohol/week: 2.0 standard drinks     Types: 2 Cans of beer per week     Comment: Occasionaly    Drug use: No    Sexual activity: Not Currently   Other Topics Concern     Service Yes    Blood Transfusions No    Caffeine Concern No    Occupational Exposure No    Hobby Hazards No    Sleep Concern Yes     Comment: Insomina    Stress Concern No    Weight Concern No    Special Diet No    Back Care No    Exercise No    Bike Helmet Yes    Seat Belt Yes    Self-Exams Yes   Social History Narrative    Not on file     Social Determinants of Health     Financial Resource Strain: Not on file   Food Insecurity: Not on file   Transportation Needs: Not on file   Physical Activity: Not on file   Stress: Not on file   Social Connections: Not on file   Intimate Partner Violence: Not on file   Housing Stability: Not on file         ALLERGIES: Patient has no known allergies. Review of Systems   Constitutional:  Negative for activity change, fatigue and fever. HENT:  Negative for congestion, ear discharge, ear pain, sinus pressure, sore throat and trouble swallowing. Eyes:  Negative for pain, discharge and visual disturbance. Respiratory:  Negative for chest tightness, shortness of breath and wheezing. Cardiovascular:  Negative for chest pain, palpitations and leg swelling.    Gastrointestinal:  Negative for abdominal pain, nausea and vomiting. Musculoskeletal:  Negative for back pain, neck pain and neck stiffness. Skin:  Negative for color change, rash and wound. Neurological:  Negative for dizziness, weakness and headaches. Psychiatric/Behavioral:  Negative for agitation, behavioral problems and confusion. All other systems reviewed and are negative. Vitals:    03/10/23 1549   BP: (!) 145/81   Pulse: 62   Resp: 16   Temp: 98.1 °F (36.7 °C)   SpO2: 96%   Weight: 90.9 kg (200 lb 4.6 oz)   Height: 5' 10\" (1.778 m)            Physical Exam  Vitals and nursing note reviewed. Constitutional:       General: He is not in acute distress. Appearance: Normal appearance. He is normal weight. He is not ill-appearing or toxic-appearing. HENT:      Head: Normocephalic and atraumatic. Nose: Nose normal.      Mouth/Throat:      Mouth: Mucous membranes are moist.      Pharynx: Oropharynx is clear. Eyes:      Extraocular Movements: Extraocular movements intact. Conjunctiva/sclera: Conjunctivae normal.      Pupils: Pupils are equal, round, and reactive to light. Cardiovascular:      Rate and Rhythm: Normal rate and regular rhythm. Pulses: Normal pulses. Heart sounds: Normal heart sounds. No murmur heard. No friction rub. No gallop. Pulmonary:      Effort: Pulmonary effort is normal. No respiratory distress. Breath sounds: Normal breath sounds. No wheezing or rhonchi. Chest:      Chest wall: No tenderness. Abdominal:      General: Abdomen is flat. Bowel sounds are normal. There is no distension. Palpations: Abdomen is soft. There is no mass. Tenderness: There is no abdominal tenderness. There is no right CVA tenderness, left CVA tenderness or guarding. Musculoskeletal:         General: No swelling, tenderness or deformity. Normal range of motion.       Right elbow: Swelling (effusion noted to the olecranon process bursa full ROM of the elbow without pain, no erythema or warmth to the area.) present. Normal range of motion. No tenderness. Cervical back: Normal range of motion and neck supple. No rigidity. Skin:     General: Skin is warm and dry. Capillary Refill: Capillary refill takes less than 2 seconds. Neurological:      General: No focal deficit present. Mental Status: He is alert and oriented to person, place, and time. Mental status is at baseline. Psychiatric:         Mood and Affect: Mood normal.         Behavior: Behavior normal.         Thought Content: Thought content normal.         Judgment: Judgment normal.        Medical Decision Making  78 yo M who presents with R elbow swelling at the bursa. FULL ROM without tenderness. No tenderness on exam. Xray shows no acute findings. Symptoms most consistent with bursitis. I do not suspect infection at this time. Patient mention to Dr. Edwige Cartagena when he went to see him that he was putting a needle in it to try to drain. Amount and/or Complexity of Data Reviewed  Radiology: ordered. Procedures      IMAGING RESULTS:  XR ELBOW RT MIN 3 V   Final Result   1. Prominent soft tissue swelling about the olecranon, which may represent   olecranon bursitis. No evidence of acute fracture. MEDICATIONS GIVEN:  Medications - No data to display  Further personalized recommendations for outpatient care as below. Key Discharge Instructions and summary of care: orthopedic follow up. The patient has been re-evaluated and feeling better. Patient is stable for discharge. All available radiology and laboratory results have been reviewed with patient and/or available family.   Patient and/or family verbally conveyed their understanding and agreement of the patient's signs, symptoms, diagnosis, treatment and prognosis and additionally agree to follow-up as recommended in the discharge instructions or to return to the Emergency Department should their condition change or worsen prior to their follow-up appointment. All questions have been answered and patient and/or available family express understanding. ED Impression:    ICD-10-CM ICD-9-CM    1. Olecranon bursitis of right elbow  M70.21 726.33            DISPOSITION: Discharged    Nate Valles PA-C      Presentation, management, and disposition were discussed with the attending physician, Dr. Rakesh Cooper, who is in agreement with plan of care. Patient is over the age of 71 , and the attending will see the patient.

## 2023-03-10 NOTE — ED TRIAGE NOTES
Patient arrives to ER ambulatory with c/o right elbow pain that started this morning. Denies falling or trauma.

## 2023-03-14 ENCOUNTER — HOSPITAL ENCOUNTER (OUTPATIENT)
Dept: CARDIAC REHAB | Age: 83
Discharge: HOME | End: 2023-03-14
Payer: MEDICARE

## 2023-03-14 VITALS — WEIGHT: 203 LBS | BODY MASS INDEX: 29.13 KG/M2

## 2023-03-14 PROCEDURE — 93798 PHYS/QHP OP CAR RHAB W/ECG: CPT

## 2023-03-16 ENCOUNTER — HOSPITAL ENCOUNTER (OUTPATIENT)
Dept: CARDIAC REHAB | Age: 83
Discharge: HOME | End: 2023-03-16
Payer: MEDICARE

## 2023-03-16 VITALS — BODY MASS INDEX: 28.84 KG/M2 | WEIGHT: 201 LBS

## 2023-03-16 PROCEDURE — 93798 PHYS/QHP OP CAR RHAB W/ECG: CPT

## 2023-03-21 ENCOUNTER — APPOINTMENT (OUTPATIENT)
Dept: CARDIAC REHAB | Age: 83
End: 2023-03-21
Payer: MEDICARE

## 2023-03-21 ENCOUNTER — OFFICE VISIT (OUTPATIENT)
Dept: CARDIOLOGY CLINIC | Age: 83
End: 2023-03-21

## 2023-03-21 DIAGNOSIS — Z95.0 PACEMAKER: Primary | ICD-10-CM

## 2023-03-22 ENCOUNTER — HOSPITAL ENCOUNTER (OUTPATIENT)
Dept: CARDIAC REHAB | Age: 83
Discharge: HOME | End: 2023-03-22
Payer: MEDICARE

## 2023-03-22 VITALS — BODY MASS INDEX: 28.98 KG/M2 | WEIGHT: 202 LBS

## 2023-03-22 PROCEDURE — 93798 PHYS/QHP OP CAR RHAB W/ECG: CPT

## 2023-03-23 ENCOUNTER — HOSPITAL ENCOUNTER (OUTPATIENT)
Dept: CARDIAC REHAB | Age: 83
Discharge: HOME | End: 2023-03-23
Payer: MEDICARE

## 2023-03-23 VITALS — BODY MASS INDEX: 29.13 KG/M2 | WEIGHT: 203 LBS

## 2023-03-23 PROCEDURE — 93798 PHYS/QHP OP CAR RHAB W/ECG: CPT

## 2023-03-28 ENCOUNTER — HOSPITAL ENCOUNTER (OUTPATIENT)
Dept: CARDIAC REHAB | Age: 83
End: 2023-03-28
Payer: MEDICARE

## 2023-03-28 VITALS — BODY MASS INDEX: 28.98 KG/M2 | WEIGHT: 202 LBS

## 2023-03-28 PROCEDURE — 93798 PHYS/QHP OP CAR RHAB W/ECG: CPT

## 2023-03-30 ENCOUNTER — HOSPITAL ENCOUNTER (OUTPATIENT)
Dept: CARDIAC REHAB | Age: 83
End: 2023-03-30
Payer: MEDICARE

## 2023-03-30 VITALS — BODY MASS INDEX: 28.98 KG/M2 | WEIGHT: 202 LBS

## 2023-03-30 PROCEDURE — 93798 PHYS/QHP OP CAR RHAB W/ECG: CPT

## 2023-04-11 ENCOUNTER — HOSPITAL ENCOUNTER (OUTPATIENT)
Dept: CARDIAC REHAB | Age: 83
Discharge: HOME | End: 2023-04-11
Payer: MEDICARE

## 2023-04-11 VITALS — BODY MASS INDEX: 28.84 KG/M2 | WEIGHT: 201 LBS

## 2023-04-11 PROCEDURE — 93798 PHYS/QHP OP CAR RHAB W/ECG: CPT

## 2023-04-13 ENCOUNTER — HOSPITAL ENCOUNTER (OUTPATIENT)
Dept: CARDIAC REHAB | Age: 83
Discharge: HOME | End: 2023-04-13
Payer: MEDICARE

## 2023-04-13 VITALS — BODY MASS INDEX: 28.55 KG/M2 | WEIGHT: 199 LBS

## 2023-04-13 PROCEDURE — 93798 PHYS/QHP OP CAR RHAB W/ECG: CPT

## 2023-04-18 ENCOUNTER — HOSPITAL ENCOUNTER (OUTPATIENT)
Dept: CARDIAC REHAB | Age: 83
Discharge: HOME | End: 2023-04-18
Payer: MEDICARE

## 2023-04-18 VITALS — BODY MASS INDEX: 28.84 KG/M2 | WEIGHT: 201 LBS

## 2023-04-18 PROCEDURE — 93798 PHYS/QHP OP CAR RHAB W/ECG: CPT

## 2023-04-18 RX ORDER — LISINOPRIL 40 MG/1
40 TABLET ORAL DAILY
COMMUNITY

## 2023-04-20 ENCOUNTER — ANESTHESIA EVENT (OUTPATIENT)
Dept: ENDOSCOPY | Age: 83
End: 2023-04-20
Payer: MEDICARE

## 2023-04-20 ENCOUNTER — ANESTHESIA (OUTPATIENT)
Dept: ENDOSCOPY | Age: 83
End: 2023-04-20
Payer: MEDICARE

## 2023-04-20 ENCOUNTER — HOSPITAL ENCOUNTER (OUTPATIENT)
Age: 83
Setting detail: OUTPATIENT SURGERY
Discharge: HOME | End: 2023-04-20
Attending: INTERNAL MEDICINE | Admitting: INTERNAL MEDICINE
Payer: MEDICARE

## 2023-04-20 ENCOUNTER — APPOINTMENT (OUTPATIENT)
Dept: CARDIAC REHAB | Age: 83
End: 2023-04-20
Payer: MEDICARE

## 2023-04-20 VITALS
RESPIRATION RATE: 18 BRPM | TEMPERATURE: 98.4 F | HEIGHT: 70 IN | OXYGEN SATURATION: 97 % | SYSTOLIC BLOOD PRESSURE: 124 MMHG | HEART RATE: 65 BPM | DIASTOLIC BLOOD PRESSURE: 79 MMHG | WEIGHT: 200.4 LBS | BODY MASS INDEX: 28.69 KG/M2

## 2023-04-20 PROCEDURE — 76040000019: Performed by: INTERNAL MEDICINE

## 2023-04-20 PROCEDURE — 77030022875 HC PRB AR PLSM COAG ERBE -C: Performed by: INTERNAL MEDICINE

## 2023-04-20 PROCEDURE — 74011250636 HC RX REV CODE- 250/636: Performed by: NURSE ANESTHETIST, CERTIFIED REGISTERED

## 2023-04-20 PROCEDURE — 2709999900 HC NON-CHARGEABLE SUPPLY: Performed by: INTERNAL MEDICINE

## 2023-04-20 PROCEDURE — 76060000031 HC ANESTHESIA FIRST 0.5 HR: Performed by: INTERNAL MEDICINE

## 2023-04-20 RX ORDER — EPINEPHRINE 0.1 MG/ML
1 INJECTION INTRACARDIAC; INTRAVENOUS
Status: DISCONTINUED | OUTPATIENT
Start: 2023-04-20 | End: 2023-04-20 | Stop reason: HOSPADM

## 2023-04-20 RX ORDER — NALOXONE HYDROCHLORIDE 0.4 MG/ML
0.4 INJECTION, SOLUTION INTRAMUSCULAR; INTRAVENOUS; SUBCUTANEOUS
Status: DISCONTINUED | OUTPATIENT
Start: 2023-04-20 | End: 2023-04-20 | Stop reason: HOSPADM

## 2023-04-20 RX ORDER — PROPOFOL 10 MG/ML
INJECTION, EMULSION INTRAVENOUS
Status: DISCONTINUED | OUTPATIENT
Start: 2023-04-20 | End: 2023-04-20 | Stop reason: HOSPADM

## 2023-04-20 RX ORDER — DEXTROMETHORPHAN/PSEUDOEPHED 2.5-7.5/.8
1.2 DROPS ORAL
Status: DISCONTINUED | OUTPATIENT
Start: 2023-04-20 | End: 2023-04-20 | Stop reason: HOSPADM

## 2023-04-20 RX ORDER — ATROPINE SULFATE 0.1 MG/ML
0.4 INJECTION INTRAVENOUS
Status: DISCONTINUED | OUTPATIENT
Start: 2023-04-20 | End: 2023-04-20 | Stop reason: HOSPADM

## 2023-04-20 RX ORDER — PROPOFOL 10 MG/ML
INJECTION, EMULSION INTRAVENOUS AS NEEDED
Status: DISCONTINUED | OUTPATIENT
Start: 2023-04-20 | End: 2023-04-20 | Stop reason: HOSPADM

## 2023-04-20 RX ORDER — MIDAZOLAM HYDROCHLORIDE 1 MG/ML
.25-5 INJECTION, SOLUTION INTRAMUSCULAR; INTRAVENOUS
Status: DISCONTINUED | OUTPATIENT
Start: 2023-04-20 | End: 2023-04-20 | Stop reason: HOSPADM

## 2023-04-20 RX ORDER — SODIUM CHLORIDE 9 MG/ML
50 INJECTION, SOLUTION INTRAVENOUS CONTINUOUS
Status: DISCONTINUED | OUTPATIENT
Start: 2023-04-20 | End: 2023-04-20 | Stop reason: HOSPADM

## 2023-04-20 RX ORDER — FLUMAZENIL 0.1 MG/ML
0.2 INJECTION INTRAVENOUS
Status: DISCONTINUED | OUTPATIENT
Start: 2023-04-20 | End: 2023-04-20 | Stop reason: HOSPADM

## 2023-04-20 RX ORDER — SODIUM CHLORIDE 9 MG/ML
INJECTION, SOLUTION INTRAVENOUS
Status: DISCONTINUED | OUTPATIENT
Start: 2023-04-20 | End: 2023-04-20 | Stop reason: HOSPADM

## 2023-04-20 RX ADMIN — PROPOFOL 100 MCG/KG/MIN: 10 INJECTION, EMULSION INTRAVENOUS at 09:06

## 2023-04-20 RX ADMIN — PROPOFOL 20 MG: 10 INJECTION, EMULSION INTRAVENOUS at 09:10

## 2023-04-20 RX ADMIN — PROPOFOL 10 MG: 10 INJECTION, EMULSION INTRAVENOUS at 09:07

## 2023-04-20 RX ADMIN — PROPOFOL 20 MG: 10 INJECTION, EMULSION INTRAVENOUS at 09:13

## 2023-04-20 RX ADMIN — SODIUM CHLORIDE: 9 INJECTION, SOLUTION INTRAVENOUS at 08:48

## 2023-04-20 RX ADMIN — PROPOFOL 70 MG: 10 INJECTION, EMULSION INTRAVENOUS at 09:06

## 2023-04-20 NOTE — PROCEDURES
Tucker Burnham M.D.  [phone]            2023          Colonoscopy Operative Report  Brenda Charlton  :  1940  [facility] Medical Record Number:  [id number]      Indications:    Lower rectal bleeding     :  Zara Chambers MD    Referring Provider: Esa Hirsch MD    Sedation:  MAC anesthesia    Pre-Procedural Exam:      Airway: clear,  No airway problems anticipated  Heart: RRR, without gallops or rubs  Lungs: clear bilaterally without wheezes, crackles, or rhonchi  Abdomen: soft, nontender, nondistended, bowel sounds present  Mental Status: awake, alert and oriented to person, place and time     Procedure Details:  After informed consent was obtained with all risks and benefits of procedure explained and preoperative exam completed, the patient was taken to the endoscopy suite and placed in the left lateral decubitus position. Upon sequential sedation as per above, a digital rectal exam was performed. The Olympus videocolonoscope  was inserted in the rectum and carefully advanced to the cecum, which was identified by the ileocecal valve and appendiceal orifice. The quality of preparation was good. The colonoscope was slowly withdrawn with careful inspection and evaluation between folds. Retroflexion in the rectum was performed. Findings:   Terminal Ileum: not intubated  Cecum: normal  Ascending Colon: normal  Transverse Colon: normal  Descending Colon: no mucosal lesion appreciated  mild diverticulosis; Sigmoid: no mucosal lesion appreciated  moderate diverticulosis; Rectum: Few residual diminutive angioectasias seen in the distal rectum consistent with minimal residual radiation proctitis, and small internal hemorrhoids    Interventions:  APC was applied in the distal rectum over the angioectasias with complete ablation done. Specimen Removed:  none    Complications: None. EBL:  None.     Impression:  Minimal residual radiation proctitis treated with APC                        Small internal hemorrhoids and diverticulosis, otherwise mucosa within normal    Recommendations:  -Follow-up office visit if bleeding is recurrent, we can proceed with hemorrhoids rubber band ligation   -High fiber diet. Daily fiber supplements  -Resume current medication(s)    Discharge Disposition:  Home in the company of a  when able to ambulate.     Alissa Velazquez MD  4/20/2023  9:23 AM

## 2023-04-20 NOTE — H&P
All Capone M.D.  [phone]            History and Physical       NAME:  Debra Henderson   :   1940   MRN:   [id number]       Referring Physician:  Dr. Heather Yang Date: 2023 8:58 AM    Chief Complaint:  Bright red blood per rectum    History of Present Illness:  Patient is a 79 y.o. who is seen for recurrent bright red blood per rectum, he has history of radiation proctitis treated with APC in .       PMH:  Past Medical History:   Diagnosis Date    Anasarca     Aortic valve stenosis     Repaired 2022    Arthritis     in hands and knee (before replacement)    Atrial fibrillation (HCC)     CAD (coronary artery disease)     CHF (congestive heart failure) (HCC) 2020    Chronic pain     legs/knee    GERD (gastroesophageal reflux disease)     Hx of carcinoma in situ of prostate     Hyperlipidemia     Hypertension     Ill-defined condition     Watchman device    Insomnia     DEL (obstructive sleep apnea)     uses CPAP    Prostate cancer ([facility] [address] 2017    Radiation proctitis     Vitamin D deficiency        PSH:  Past Surgical History:   Procedure Laterality Date    COLONOSCOPY N/A 2020    COLONOSCOPY performed by Christina Ibanez MD at [facility] END[facility]    COLONOSCOPY N/A 2020    [facility] by Milady Stiles MD [facility] N/A 2020    FLEXIBLE SIGMOIDOSCOPY WITH APC performed by Christina Ibanez MD at [address] AORTIC VALVE REPLACEMENT      and mitral valve repair, left atrial cryo maze    HX HEART VALVE SURGERY  2014    Mitral Valve Repair    HX HEENT      melanoma removed head    HX HERNIA REPAIR      Right    HX HERNIA REPAIR      left inguinal hernia repair    HX HERNIA REPAIR Left 2016    lap left inguinal hernia repair with mesh    HX KNEE REPLACEMENT      Bilateral    HX ORTHOPAEDIC      BILATERAL KNEE REPLACEMENT    HX ORTHOPAEDIC      CARPEL TUNNEL REPAIR-RIGHT    HX OTHER SURGICAL  2015    closure of patent foramen ovale    HX OTHER SURGICAL  10/2020    watchman implant for afib / Dr. Cade Peon  2021    HX PROSTATE SURGERY      42 radiation treatments       Allergies:  No Known Allergies    Home Medications:  Prior to Admission Medications   Prescriptions Last Dose Informant Patient Reported? Taking? GLUCOSAMINE HCL/CHONDR CHING A NA (GLUCOSAMINE-CHONDROITIN) 750-600 mg tab  Other Yes Yes   Sig: Take 1 Tab by mouth daily. acetaminophen (TYLENOL) 325 mg tablet   Yes Yes   Sig: Take 2 Tablets by mouth every four (4) hours as needed for Pain. aspirin delayed-release 81 mg tablet 4/19/2023 Self Yes Yes   Sig: Take 1 Tablet by mouth two (2) times a day. TAKES ONE IN MORNING AND TWO IN EVENING (PER PATIENT AS OF 8/12/22)   bumetanide (BUMEX) 2 mg tablet 4/18/2023  No Yes   Sig: Take 1 Tablet by mouth daily. cholecalciferol (VITAMIN D3) 1,000 unit tablet 4/18/2023 Other Yes Yes   Sig: Take 2 Tablets by mouth two (2) times a day. docusate sodium (COLACE) 100 mg capsule 4/19/2023 Other Yes Yes   Sig: Take 1 Capsule by mouth two (2) times a day. ferrous sulfate 325 mg (65 mg iron) cpER 4/18/2023 Other Yes Yes   Sig: Take 1 Tab by mouth every other day. finasteride (PROSCAR) 5 mg tablet 4/18/2023 Other Yes Yes   Sig: Take 1 Tablet by mouth nightly.   gabapentin (NEURONTIN) 100 mg capsule 4/19/2023  Yes Yes   Sig: Take 1 Capsule by mouth two (2) times a day. lisinopriL (PRINIVIL, ZESTRIL) 40 mg tablet 4/19/2023  Yes Yes   Sig: Take 1 Tablet by mouth daily. multivitamin (ONE A DAY) tablet 4/19/2023 Other Yes Yes   Sig: Take 1 Tablet by mouth daily. omega-3 fatty acids-vitamin e 1,000 mg cap 4/19/2023 Other Yes Yes   Sig: Take 1 Capsule by mouth every other day. omeprazole (PRILOSEC) 40 mg capsule 4/19/2023  Yes Yes   Sig: Take 1 Capsule by mouth daily.    polyvinyl alcohol (LIQUIFILM TEARS) 1.4 % ophthalmic solution 4/19/2023 Other Yes Yes   Sig: Administer 1 Drop to both eyes as needed. potassium chloride (K-DUR, KLOR-CON M20) 20 mEq tablet Not Taking  No No   Sig: Take 2 Tablets by mouth daily. Patient not taking: Reported on 4/18/2023   pravastatin (PRAVACHOL) 40 mg tablet 4/19/2023 Other Yes Yes   Sig: Take 1 Tablet by mouth nightly. tamsulosin (FLOMAX) 0.4 mg capsule 4/19/2023 Other Yes Yes   Sig: Take 1 Capsule by mouth two (2) times a day. Facility-Administered Medications: None       Hospital Medications:  Current Facility-Administered Medications   Medication Dose Route Frequency    0.9% sodium chloride infusion  50 mL/hr IntraVENous CONTINUOUS    midazolam (VERSED) injection 0.25-5 mg  0.25-5 mg IntraVENous Multiple    naloxone (NARCAN) injection 0.4 mg  0.4 mg IntraVENous Multiple    flumazeniL (ROMAZICON) 0.1 mg/mL injection 0.2 mg  0.2 mg IntraVENous Multiple    simethicone (MYLICON) 32NL/3.0EV oral drops 80 mg  1.2 mL Oral Multiple    atropine injection 0.4 mg  0.4 mg IntraVENous ONCE PRN    EPINEPHrine (ADRENALIN) 0.1 mg/mL syringe 1 mg  1 mg Endoscopically ONCE PRN       Social History:  Social History     Tobacco Use    Smoking status: Never    Smokeless tobacco: Never   Substance Use Topics    Alcohol use:  Yes     Alcohol/week: 2.0 standard drinks     Types: 2 Cans of beer per week     Comment: Occasionaly       Family History:  Family History   Problem Relation Age of Onset    Cancer Mother     Cancer Father         prostrate    No Known Problems Sister     No Known Problems Sister     No Known Problems Sister     Cancer Brother         PROSTATE    No Known Problems Brother     Anesth Problems Neg Hx              Review of Systems:      Constitutional: negative fever, negative chills, negative weight loss  Eyes:   negative visual changes  ENT:   negative sore throat, tongue or lip swelling  Respiratory:  negative cough, negative dyspnea  Cards:  negative for chest pain, palpitations, lower extremity edema  GI:   See HPI  :  negative for frequency, dysuria  Integument:  negative for rash and pruritus  Heme:  negative for easy bruising and gum/nose bleeding  Musculoskel: negative for myalgias,  back pain and muscle weakness  Neuro: negative for headaches, dizziness, vertigo  Psych:  negative for feelings of anxiety, depression       Objective:   Patient Vitals for the past 8 hrs:   BP Temp Pulse Resp SpO2 Height Weight   04/20/23 0753 (!) 159/73 98 °F (36.7 °C) 75 18 98 % 5' 10\" (1.778 m) 90.9 kg (200 lb 6.4 oz)     No intake/output data recorded. No intake/output data recorded. EXAM:     NEURO-a&o   HEENT-wnl   LUNGS-clear    COR-regular rate and rhythym     ABD-soft , no tenderness, no rebound, good bs     EXT-no edema     Data Review     No results for input(s): WBC, HGB, HCT, PLT, HGBEXT, HCTEXT, PLTEXT in the last 72 hours. No results for input(s): NA, K, CL, CO2, BUN, CREA, GLU, PHOS, CA in the last 72 hours. No results for input(s): AP, TBIL, TP, ALB, GLOB, GGT, AML, LPSE in the last 72 hours. No lab exists for component: SGOT, GPT, AMYP, HLPSE  No results for input(s): INR, PTP, APTT, INREXT in the last 72 hours.     Patient Active Problem List   Diagnosis Code    Arthritis of knee M17.10    Aortic insufficiency I35.1    Nonrheumatic aortic valve stenosis I35.0    S/P AVR (aortic valve replacement) Z95.2    S/P MVR (mitral valve repair) Z98.890    S/P Maze operation for atrial fibrillation Z98.890, Z86.79    Anemia due to acute blood loss D62    Hypertension I10    Radiation proctitis K62.7    DEL (obstructive sleep apnea) G47.33    Insomnia G47.00    Atrial fibrillation (HCC) I48.91    Hyperlipidemia E78.5    Hx of carcinoma in situ of prostate Z86.002    GERD (gastroesophageal reflux disease) K21.9    Chronic pain G89.29    CAD (coronary artery disease) I25.10    Arthritis M19.90    GI bleed K92.2    Presence of Watchman left atrial appendage closure device Z95.818    Crystal R60.1 ABDUL (dyspnea on exertion) A55.12    Diastolic CHF, acute on chronic (HCC) I50.33    CHF exacerbation (HCC) I50.9    COVID-19 U07.1    COVID U07.1    Chronic systolic (congestive) heart failure I50.22    Aortic stenosis I35.0    S/P TAVR (transcatheter aortic valve replacement) Z95.2      Assessment:     Bright red blood per rectum   Plan:     Colonoscopy today.      Signed By: Sanjeev Cifuentes MD     4/20/2023  8:58 AM

## 2023-04-20 NOTE — DISCHARGE INSTRUCTIONS
[facility]. Celia Ngo M.D.  [phone]            COLON DISCHARGE INSTRUCTIONS       2023    Jerrod Delgado  :  1940  [facility] Medical Record Number:  [id number]      COLONOSCOPY FINDINGS:  Your colonoscopy showed diverticulosis and small internal hemorrhoids with minimal radiation proctitis that was treated with APC and completely cauterized. DISCOMFORT:  Redness at IV site- apply warm compress to area; if redness or soreness persist- contact your physician  There may be a slight amount of blood passed from the rectum  Gaseous discomfort- walking, belching will help relieve any discomfort  You may not operate a vehicle for 12 hours  You may not engage in an occupation involving machinery or appliances for rest of today  You may not drink alcoholic beverages for at least 12 hours  Avoid making any critical decisions for at least 24 hour  DIET:   High fiber diet. - however -  remember your colon is empty and a heavy meal will produce gas. Avoid these foods:  vegetables, fried / greasy foods, carbonated drinks for today     ACTIVITY:  You may resume your normal daily activities it is recommended that you spend the remainder of the day resting -  avoid any strenuous activity. CALL M.D. ANY SIGN OF:   Increasing pain, nausea, vomiting  Abdominal distension (swelling)  New increased bleeding (oral or rectal)  Fever (chills)  Pain in chest area  Bloody discharge from nose or mouth   Shortness of breath    Follow-up Instructions:   Call Dr. Jenn Aguillon if any questions or problems. Telephone # [phone]  Take Metamucil or Benefiber 1 tablespoon in 8 oz water daily.

## 2023-04-20 NOTE — ANESTHESIA PREPROCEDURE EVALUATION
Relevant Problems   CARDIOVASCULAR   (+) Aortic insufficiency   (+) Aortic stenosis   (+) Atrial fibrillation (HCC)   (+) CAD (coronary artery disease)   (+) Nonrheumatic aortic valve stenosis      GASTROINTESTINAL   (+) GERD (gastroesophageal reflux disease)      ENDOCRINE   (+) Arthritis      HEMATOLOGY   (+) Anemia due to acute blood loss       Anesthetic History   No history of anesthetic complications            Review of Systems / Medical History  Patient summary reviewed, nursing notes reviewed and pertinent labs reviewed    Pulmonary        Sleep apnea: CPAP        Comments: 7/2022 covid   Neuro/Psych   Within defined limits           Cardiovascular    Hypertension  Valvular problems/murmurs: aortic stenosis      Dysrhythmias : atrial fibrillation  Pacemaker, CAD and hyperlipidemia    Exercise tolerance: >4 METS  Comments: Atrial fibrillation,H/O mitral valve repair  S/P AVR (aortic valve replacement);  Coronary artery disease involving native coronary artery of native heart Cardiac pacemaker in situ (Z95.0 (ICD-10-CM)); Presence of Watchman left atrial appendage closure device   recent echo:  Near nl bivent fx, sev AS, mild MR, no MS   GI/Hepatic/Renal     GERD: well controlled           Endo/Other        Arthritis and cancer (prostate cancer)     Other Findings              Physical Exam    Airway  Mallampati: II  TM Distance: > 6 cm  Neck ROM: normal range of motion   Mouth opening: Normal     Cardiovascular    Rhythm: irregular      Murmur: Grade 3, Aortic area     Dental    Dentition: Caps/crowns     Pulmonary  Breath sounds clear to auscultation               Abdominal  GI exam deferred       Other Findings            Anesthetic Plan    ASA: 3  Anesthesia type: MAC          Induction: Intravenous  Anesthetic plan and risks discussed with: Patient

## 2023-04-20 NOTE — PERIOP NOTES

## 2023-04-20 NOTE — ANESTHESIA POSTPROCEDURE EVALUATION
Procedure(s):  COLONOSCOPY  ENDOSCOPIC ARGON PLASMA COAGULATION. MAC    Anesthesia Post Evaluation      Multimodal analgesia: multimodal analgesia used between 6 hours prior to anesthesia start to PACU discharge  Patient location during evaluation: bedside  Patient participation: complete - patient participated  Level of consciousness: awake  Pain management: adequate  Airway patency: patent  Anesthetic complications: no  Cardiovascular status: acceptable  Respiratory status: acceptable  Hydration status: acceptable        INITIAL Post-op Vital signs:   Vitals Value Taken Time   /79 04/20/23 0945   Temp 36.9 °C (98.4 °F) 04/20/23 0928   Pulse 73 04/20/23 0946   Resp 18 04/20/23 0946   SpO2 98 % 04/20/23 0946   Vitals shown include unvalidated device data.

## 2023-04-25 ENCOUNTER — HOSPITAL ENCOUNTER (OUTPATIENT)
Dept: CARDIAC REHAB | Age: 83
Discharge: HOME | End: 2023-04-25
Payer: MEDICARE

## 2023-04-25 VITALS — BODY MASS INDEX: 28.55 KG/M2 | WEIGHT: 199 LBS

## 2023-04-25 PROCEDURE — 93798 PHYS/QHP OP CAR RHAB W/ECG: CPT

## 2023-04-26 ENCOUNTER — HOSPITAL ENCOUNTER (OUTPATIENT)
Dept: CARDIAC REHAB | Age: 83
Discharge: HOME | End: 2023-04-26
Payer: MEDICARE

## 2023-04-26 VITALS — BODY MASS INDEX: 28.55 KG/M2 | WEIGHT: 199 LBS

## 2023-04-26 PROCEDURE — 93798 PHYS/QHP OP CAR RHAB W/ECG: CPT

## 2023-04-27 ENCOUNTER — APPOINTMENT (OUTPATIENT)
Dept: CARDIAC REHAB | Age: 83
End: 2023-04-27
Payer: MEDICARE

## 2023-05-01 ENCOUNTER — APPOINTMENT (OUTPATIENT)
Facility: HOSPITAL | Age: 83
End: 2023-05-01
Payer: MEDICARE

## 2023-05-02 ENCOUNTER — HOSPITAL ENCOUNTER (OUTPATIENT)
Dept: CARDIAC REHAB | Age: 83
Discharge: HOME | End: 2023-05-02
Payer: MEDICARE

## 2023-05-02 VITALS — BODY MASS INDEX: 28.55 KG/M2 | WEIGHT: 199 LBS

## 2023-05-02 PROCEDURE — 93798 PHYS/QHP OP CAR RHAB W/ECG: CPT

## 2023-05-02 PROCEDURE — 9990 CHARGE CONVERSION

## 2023-05-04 ENCOUNTER — APPOINTMENT (OUTPATIENT)
Dept: CARDIAC REHAB | Age: 83
End: 2023-05-04
Payer: MEDICARE

## 2023-05-04 NOTE — Clinical Note
Onset: 05/04/2023 and ongoing   Location / description: patient is on one lung - exerted himself today mowing lawn and requesting a stronger inhaler. Last 6 months inhaler not having same effect. Had doctors appointment yesterday. Patient states he does not have shortness of breath now, he can control breathing, patient wants better inhaler to help push himself better.   Precipitating Factors: None  Pain Scale (1-10), 10 highest: no pain/10  Associated Symptoms: none  What improves / worsens symptoms: none  Symptom specific medications: Emmie inhaler.   Recent visits (last 3-4 weeks) for same reason or recent surgery: none    PLAN:   See/Call Provider within 24 hours     Advised to make another appointment. Patient refusing at this time. Just saw doctor yesterday. Patient wants a new pr stronger inhaler to help with breathing and to make lungs better.     Patient/Caller refuses to follow recommendations.    Reason for Disposition  • [1] MILD asthma attack (e.g., no SOB at rest, mild SOB with walking, speaks normally in sentences, mild wheezing) AND [2] lasting > 24 hours on prescribed treatment    Protocols used: ASTHMA ATTACK-A-AH       Sheath #1: Dressed using transparent dressing. Site: clean, dry, & intact, no bleeding and no hematoma.

## 2023-05-09 ENCOUNTER — APPOINTMENT (OUTPATIENT)
Facility: HOSPITAL | Age: 83
End: 2023-05-09
Payer: MEDICARE

## 2023-05-11 ENCOUNTER — APPOINTMENT (OUTPATIENT)
Facility: HOSPITAL | Age: 83
End: 2023-05-11
Payer: MEDICARE

## 2023-05-16 ENCOUNTER — APPOINTMENT (OUTPATIENT)
Facility: HOSPITAL | Age: 83
End: 2023-05-16
Payer: MEDICARE

## 2023-06-02 ENCOUNTER — OFFICE VISIT (OUTPATIENT)
Age: 83
End: 2023-06-02
Payer: MEDICARE

## 2023-06-02 VITALS
OXYGEN SATURATION: 96 % | DIASTOLIC BLOOD PRESSURE: 80 MMHG | BODY MASS INDEX: 29.06 KG/M2 | HEART RATE: 68 BPM | SYSTOLIC BLOOD PRESSURE: 130 MMHG | WEIGHT: 203 LBS | HEIGHT: 70 IN

## 2023-06-02 DIAGNOSIS — Z95.0 PRESENCE OF CARDIAC PACEMAKER: Primary | ICD-10-CM

## 2023-06-02 DIAGNOSIS — G47.33 OBSTRUCTIVE SLEEP APNEA (ADULT) (PEDIATRIC): ICD-10-CM

## 2023-06-02 DIAGNOSIS — I50.22 CHRONIC SYSTOLIC (CONGESTIVE) HEART FAILURE (HCC): ICD-10-CM

## 2023-06-02 DIAGNOSIS — I10 ESSENTIAL (PRIMARY) HYPERTENSION: ICD-10-CM

## 2023-06-02 DIAGNOSIS — Z95.2 PRESENCE OF PROSTHETIC HEART VALVE: ICD-10-CM

## 2023-06-02 PROCEDURE — G8419 CALC BMI OUT NRM PARAM NOF/U: HCPCS | Performed by: SPECIALIST

## 2023-06-02 PROCEDURE — 3075F SYST BP GE 130 - 139MM HG: CPT | Performed by: SPECIALIST

## 2023-06-02 PROCEDURE — 99214 OFFICE O/P EST MOD 30 MIN: CPT | Performed by: SPECIALIST

## 2023-06-02 PROCEDURE — 1123F ACP DISCUSS/DSCN MKR DOCD: CPT | Performed by: SPECIALIST

## 2023-06-02 PROCEDURE — G8427 DOCREV CUR MEDS BY ELIG CLIN: HCPCS | Performed by: SPECIALIST

## 2023-06-02 PROCEDURE — 1036F TOBACCO NON-USER: CPT | Performed by: SPECIALIST

## 2023-06-02 PROCEDURE — 3079F DIAST BP 80-89 MM HG: CPT | Performed by: SPECIALIST

## 2023-06-02 RX ORDER — MULTIVIT WITH MINERALS/LUTEIN
1000 TABLET ORAL DAILY
COMMUNITY

## 2023-06-02 NOTE — PROGRESS NOTES
CARDIOLOGY OFFICE NOTE    Prateek Magaña MD, [address], [place], [address] [facility]  Phone [phone]; Fax [phone]  Mobile [phone]   Voice Mail [phone]    Primary care: Radha Wells MD       ATTENTION:   This medical record was transcribed using an electronic medical records/speech recognition system. Although proofread, it may and can contain electronic, spelling and other errors. Corrections may be executed at a later time. Please feel free to contact us for any clarifications as needed. Irasema Viera is a 79 y.o. male with  referred for CAD status post aortic valve replacement status post mitral valve repair         I have personally obtained the history from the patient. Cardiac risk factors: dyslipidemia, male gender, hypertension   I have personally obtained the history from the patient. HISTORY OF PRESENTING ILLNESS    Ms./Mr. Irasema Viera  79 y.o. is here for follow-up and is here with his daughter from [address] [place] [address]. From previous note: Mr. Ramon Murdock is a 79y.o. year old male past medical history significant C/Brandon Mahan CAD, sp AVR, sp MVRepair, PAF, sp CRYOMAZE,  sp watchman, SSS sp PPM, looks diastolic heart failure. I am seeing today for lower extremity edema status post TAVR with aTAVR with a 26 Evolut  with [place] protection via the RTF approach on 8/18/22 was associated with a postdilatation of aortic valve associate with severe hypotension requiring CPR for a few minutes . He is doing well and finished cardiac rehab which he did not number of occasions. Still has a little bit shortness of breath and some lower extremity edema. Otherwise he is doing well.        ACTIVE PROBLEM LIST     Patient Active Problem List    Diagnosis Date Noted    Presence of Watchman left atrial appendage closure device 10/13/2020    S/P TAVR (transcatheter aortic valve replacement) 08/26/2022    Aortic stenosis
repaired with  annuloplasty ring, mod/severe MR  7/26/21-EF 55 - 60%, Mild concentric hypertrophy, LAE, MV thickening and repaired with annuloplasty ring, mild MR, mild/mod TR, RVSP 38 mmHg,Pulm HTN mild, mild PI, Prosthetic aortic valve. Aortic valve mean gradient is 30.3 mmHg. Mild to moderate AV  stenosis, There is a bioprosthetic aortic valve,mild AI  6/13/22-EF 50 - 55%, mild concentric hypertrophy, RVE, mild AI, Bioprosthetic AV, MV repaired by annular ring, mild MR, mod TR, RVSP 40 mmHg, MOIZ  9/22/22-EF 50 - 55%, mild concentric hypertrophy, RVE, Medtronic transcatheter bioprosthetic AV that is well-seated with a size of 26 mm. Mild patient prosthesis mismatch with EOA of 1.3cm2 and AV mean gradient is 20 mmHg, MV repaired by annular ring. Thickened leaflets. Previously placed ring appears to be dehisced. MR jest is eccentric and is not clearly visualized and may be underestimated. By color it appears moderate at worse, mod TR, RVSP 40 mmHg, MOIZ  11/8/22-EDDA-EF 55 - 60%, RVE, Medtronic Evolut appropriately positioned transcatheter bioprosthetic AV with a size of 26 mm, MV repaired by annular ring, mod MR,ERO of 0.2 cm2 with regurgitant volume of 45 ml/beat. Systolic blunting of PV flow, TV-mod  annular dilation, severe TR, RVSP 48 mmHg, MOIZ, LA-Device closure in the appendage with a Watchman. 2.  Myocardial perfusion study                      (9/5/14)Normal EF ;Normal perfusion     3. Lipid                         7/24/21- , HDL 52, LDL 60, TG 66  3/16/22- , HDL 59, LDL 45, TG 62  2/20/23- , HDL 63, LDL 28, TG 34    4. LE venous duplex                                           (10/10/14)   Right leg mod. 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 7/11/22- No DVT    5. Watchman 10/13/20     6.  Carotid Doppler   5/11/20- 1-49%

## 2023-06-02 NOTE — PATIENT INSTRUCTIONS
1) you do have some slight pitting edema. I think it is from dependent edema. I would favor taking the Bumex daily for at least 4 days to see if some of this edema improves. Is probably a component of venous insufficiency and from sitting. When you take your Bumex you take your potassium but if you do not take the Bumex do not take the potassium    2) set like for you to follow-up in 6 months      It is  my pleasure to have the opportunity to be involved in taking care of you and to provide you the best cardiac care. At the end of every visit I  encourage you to eat healthy and clean and reduce your red meat intake as well as exercise 30 minutes 5 days a week to ensure a healthy heart. If you are a smoker , it will be essential that you stop smoking to reduce your risk of heart disease. Part of providing you the best heart care possible IS AN ACCURATE KNOWLEDGE OF YOUR MEDICINE. It  will be  essential at each office visit that you provide me with an accurate list of your medicines. When you come into the office you should have that list or another option is lining up your pill bottles  and taking a snapshot with your cell phone of all the medicines you are taking currently and show it to the nurse in the examining room. Inaccurate reporting of your medication to the nurse may lead to adverse events and medical errors. Thank you again for giving me the opportunity to be part of your heart care and it is my pleasure. All the best,    Prateek Alexander MD

## 2023-06-19 ENCOUNTER — TELEPHONE (OUTPATIENT)
Age: 83
End: 2023-06-19

## 2023-06-19 RX ORDER — POTASSIUM CHLORIDE 20 MEQ/1
40 TABLET, EXTENDED RELEASE ORAL DAILY
Qty: 180 TABLET | Refills: 2 | Status: SHIPPED | OUTPATIENT
Start: 2023-06-19

## 2023-06-19 NOTE — TELEPHONE ENCOUNTER
Refill per VO of Dr. Leighann Wilson  Last appt: 6/2/2023    Future Appointments   Date Time Provider Jazzy Saleemi   6/22/2023  2:30 PM [facility] CAV BS AMB   7/27/2023 10:30 AM [facility] ECHO LAB 1 [facility]   7/27/2023 11:30 AM LENA Bhandari NP [facility] AMB   11/1/2023 10:20 AM PACEMAKER, STFRKAROLINA [facility] AMB   11/1/2023 10:40 AM Frida Kwok MD [facility] AMB   12/18/2023 10:00 AM Iwona Conley MD [facility] AMB       Requested Prescriptions     Signed Prescriptions Disp Refills    potassium chloride (KLOR-CON M) 20 MEQ extended release tablet 180 tablet 2     Sig: Take 2 tablets by mouth daily     Authorizing Provider: Samanta Erazo     Ordering User: Ryanne Kuo

## 2023-06-19 NOTE — TELEPHONE ENCOUNTER
Pt is calling because he would like to know if his medicine potassium chloride  was called into the pharmacy 20 meq. Pt would like to be notified when it has been taken care of. Pharmacy is confirmed.     [phone]

## 2023-06-22 ENCOUNTER — NURSE ONLY (OUTPATIENT)
Age: 83
End: 2023-06-22

## 2023-06-22 DIAGNOSIS — Z95.0 PRESENCE OF CARDIAC PACEMAKER: Primary | ICD-10-CM

## 2023-07-26 NOTE — PROGRESS NOTES
Patient: Chase Winston   Age: 79 y.o. Patient Care Team:  Rossy Solano MD as PCP - Sweta Garcia MD as Referring Physician    PCP: José Miguel Atkins MD    Cardiologist: Dr. Cj Estrada (Dr. Jimbo Apodaca TAVR Proceduralist)    Diagnosis/Reason for Consultation: The primary encounter diagnosis was Nonrheumatic aortic valve stenosis. A diagnosis of S/P TAVR (transcatheter aortic valve replacement) was also pertinent to this visit. Problem List:   Patient Active Problem List   Diagnosis    Hypertension    Radiation proctitis    Insomnia    Anemia due to acute blood loss    S/P AVR (aortic valve replacement)    S/P MVR (mitral valve repair)    VANESSA (obstructive sleep apnea)    S/P Maze operation for atrial fibrillation    GI bleed    Atrial fibrillation (HCC)    ELLIS (dyspnea on exertion)    Presence of Watchman left atrial appendage closure device    CAD (coronary artery disease)    Aortic insufficiency    Hyperlipidemia    Hx of carcinoma in situ of prostate    Arthritis    GERD (gastroesophageal reflux disease)    Chronic pain    Arthritis of knee    Diastolic CHF, acute on chronic (HCC)    Anasarca    CHF exacerbation (HCC)    COVID-19    COVID    Nonrheumatic aortic valve stenosis    Chronic systolic (congestive) heart failure (HCC)    Aortic stenosis    S/P TAVR (transcatheter aortic valve replacement)         HPI: 79 y.o. y.o. male  S/P Kristofer TAVR with a 32 Evolut with [place] protection via the RTF approach on 8/18/22. Immediately post dilatation of the AV patient developed severe hypotension requiring CPR for a few minutes and was intubated for stabilization purposes. He also developed hematuria requiring consultation with Urology and CBI. Chase Winston was discharged to Home with Home Health on 8/24/22. he  is here today for his  [facility] Appointment. He is doing fine walking on level ground but does notice shortness of breath with walking up hills.   He is taking care of his wife and

## 2023-07-27 ENCOUNTER — TELEPHONE (OUTPATIENT)
Age: 83
End: 2023-07-27

## 2023-07-27 ENCOUNTER — OFFICE VISIT (OUTPATIENT)
Age: 83
End: 2023-07-27

## 2023-07-27 ENCOUNTER — HOSPITAL ENCOUNTER (OUTPATIENT)
Facility: HOSPITAL | Age: 83
Discharge: HOME | End: 2023-07-29
Payer: MEDICARE

## 2023-07-27 VITALS
SYSTOLIC BLOOD PRESSURE: 140 MMHG | WEIGHT: 200 LBS | BODY MASS INDEX: 28.63 KG/M2 | HEIGHT: 70 IN | DIASTOLIC BLOOD PRESSURE: 83 MMHG

## 2023-07-27 VITALS
BODY MASS INDEX: 28.77 KG/M2 | HEART RATE: 72 BPM | DIASTOLIC BLOOD PRESSURE: 75 MMHG | TEMPERATURE: 98 F | HEIGHT: 70 IN | WEIGHT: 201 LBS | SYSTOLIC BLOOD PRESSURE: 156 MMHG | RESPIRATION RATE: 18 BRPM | OXYGEN SATURATION: 97 %

## 2023-07-27 DIAGNOSIS — Z95.2 AORTIC VALVE REPLACED: ICD-10-CM

## 2023-07-27 DIAGNOSIS — Z95.2 S/P TAVR (TRANSCATHETER AORTIC VALVE REPLACEMENT): ICD-10-CM

## 2023-07-27 DIAGNOSIS — I35.0 NONRHEUMATIC AORTIC VALVE STENOSIS: Primary | ICD-10-CM

## 2023-07-27 PROCEDURE — 93306 TTE W/DOPPLER COMPLETE: CPT

## 2023-07-27 ASSESSMENT — PATIENT HEALTH QUESTIONNAIRE - PHQ9
1. LITTLE INTEREST OR PLEASURE IN DOING THINGS: 0
SUM OF ALL RESPONSES TO PHQ QUESTIONS 1-9: 0
2. FEELING DOWN, DEPRESSED OR HOPELESS: 0
SUM OF ALL RESPONSES TO PHQ QUESTIONS 1-9: 0
SUM OF ALL RESPONSES TO PHQ9 QUESTIONS 1 & 2: 0

## 2023-07-27 NOTE — TELEPHONE ENCOUNTER
----- Message from Bryant Simon RN sent at 7/27/2023  1:55 PM EDT -----  Regarding: FW: Appt  Can you get him scheduled at [facility]?  ----- Message -----  From: LENA Evans NP  Sent: 7/27/2023  11:53 AM EDT  To: Bryant Simon RN  Subject: Appt                                             Myles Carlos,  We saw Mr. Kendell Lynch today for his 1 year TAVR. He really wanted to see Dr. Lyndon Swanson but he is tied up in the cath lab. I offered him an appt with Dr. Lyndon Swanson later and he would like to see him in the [facility] Guest office. Would you mind calling him to set that up please? He has his Echo today and did his KCCQ already. Thank you!   Caleb Vázquez

## 2023-07-27 NOTE — TELEPHONE ENCOUNTER
Spoke to patient and scheduled appointment. Patient is aware of date, time and location.      Future Appointments   Date Time Provider [address]   8/18/2023  2:20 PM MD PILI Oates AMB   11/1/2023 10:20 AM PACEMAKER, ISIS EVANS AMB   11/1/2023 10:40 AM An MD PILI Herrera AMB   12/18/2023 10:00 AM MD RADHA Ireland [facility]

## 2023-07-29 LAB
ALBUMIN SERPL-MCNC: 4 G/DL (ref 3.7–4.7)
ALP SERPL-CCNC: 75 IU/L (ref 44–121)
ALT SERPL-CCNC: 12 IU/L (ref 0–44)
AST SERPL-CCNC: 18 IU/L (ref 0–40)
BILIRUB DIRECT SERPL-MCNC: 0.18 MG/DL (ref 0–0.4)
BILIRUB SERPL-MCNC: 0.4 MG/DL (ref 0–1.2)
CHOLEST SERPL-MCNC: 108 MG/DL (ref 100–199)
ECHO AV AREA PEAK VELOCITY: 1.1 CM2
ECHO AV AREA VTI: 1.1 CM2
ECHO AV AREA/BSA PEAK VELOCITY: 0.5 CM2/M2
ECHO AV AREA/BSA VTI: 0.5 CM2/M2
ECHO AV MEAN GRADIENT: 18 MMHG
ECHO AV MEAN VELOCITY: 2 M/S
ECHO AV PEAK GRADIENT: 30 MMHG
ECHO AV PEAK VELOCITY: 2.8 M/S
ECHO AV VELOCITY RATIO: 0.25
ECHO AV VTI: 60.7 CM
ECHO BSA: 2.12 M2
ECHO EST RA PRESSURE: 8 MMHG
ECHO LA DIAMETER INDEX: 3.16 CM/M2
ECHO LA DIAMETER: 6.6 CM
ECHO LA VOL 2C: 141 ML (ref 18–58)
ECHO LA VOL 2C: 142 ML (ref 18–58)
ECHO LA VOL 4C: 143 ML (ref 18–58)
ECHO LA VOL 4C: 150 ML (ref 18–58)
ECHO LA VOL BP: 147 ML (ref 18–58)
ECHO LA VOL/BSA BIPLANE: 70 ML/M2 (ref 16–34)
ECHO LA VOLUME AREA LENGTH: 152 ML
ECHO LA VOLUME INDEX AREA LENGTH: 73 ML/M2 (ref 16–34)
ECHO LV EDV A2C: 191 ML
ECHO LV EDV A4C: 135 ML
ECHO LV EDV BP: 163 ML (ref 67–155)
ECHO LV EDV INDEX A4C: 65 ML/M2
ECHO LV EDV INDEX BP: 78 ML/M2
ECHO LV EDV NDEX A2C: 91 ML/M2
ECHO LV EJECTION FRACTION A2C: 57 %
ECHO LV EJECTION FRACTION A4C: 18 %
ECHO LV EJECTION FRACTION BIPLANE: 41 % (ref 55–100)
ECHO LV ESV A2C: 83 ML
ECHO LV ESV A4C: 110 ML
ECHO LV ESV BP: 97 ML (ref 22–58)
ECHO LV ESV INDEX A2C: 40 ML/M2
ECHO LV ESV INDEX A4C: 53 ML/M2
ECHO LV ESV INDEX BP: 46 ML/M2
ECHO LV FRACTIONAL SHORTENING: 21 % (ref 28–44)
ECHO LV INTERNAL DIMENSION DIASTOLE INDEX: 3.01 CM/M2
ECHO LV INTERNAL DIMENSION DIASTOLIC: 6.3 CM (ref 4.2–5.9)
ECHO LV INTERNAL DIMENSION SYSTOLIC INDEX: 2.39 CM/M2
ECHO LV INTERNAL DIMENSION SYSTOLIC: 5 CM
ECHO LV IVSD: 1.2 CM (ref 0.6–1)
ECHO LV MASS 2D: 303.5 G (ref 88–224)
ECHO LV MASS INDEX 2D: 145.2 G/M2 (ref 49–115)
ECHO LV POSTERIOR WALL DIASTOLIC: 1 CM (ref 0.6–1)
ECHO LV RELATIVE WALL THICKNESS RATIO: 0.32
ECHO LVOT AREA: 3.8 CM2
ECHO LVOT AV VTI INDEX: 0.27
ECHO LVOT DIAM: 2.2 CM
ECHO LVOT MEAN GRADIENT: 1 MMHG
ECHO LVOT PEAK GRADIENT: 2 MMHG
ECHO LVOT PEAK VELOCITY: 0.7 M/S
ECHO LVOT STROKE VOLUME INDEX: 30.2 ML/M2
ECHO LVOT SV: 63.1 ML
ECHO LVOT VTI: 16.6 CM
ECHO MV AREA PHT: 2.3 CM2
ECHO MV AREA VTI: 1.8 CM2
ECHO MV LVOT VTI INDEX: 2.12
ECHO MV MAX VELOCITY: 1.5 M/S
ECHO MV MEAN GRADIENT: 3 MMHG
ECHO MV MEAN VELOCITY: 0.7 M/S
ECHO MV PEAK GRADIENT: 9 MMHG
ECHO MV PRESSURE HALF TIME (PHT): 95 MS
ECHO MV VTI: 35.2 CM
ECHO PV MAX VELOCITY: 0.8 M/S
ECHO PV PEAK GRADIENT: 3 MMHG
ECHO RIGHT VENTRICULAR SYSTOLIC PRESSURE (RVSP): 46 MMHG
ECHO RV FREE WALL PEAK S': 11 CM/S
ECHO RV INTERNAL DIMENSION: 7.8 CM
ECHO RV TAPSE: 1.8 CM (ref 1.7–?)
ECHO TV REGURGITANT MAX VELOCITY: 3.07 M/S
ECHO TV REGURGITANT PEAK GRADIENT: 39 MMHG
HDLC SERPL-MCNC: 64 MG/DL
IMP & REVIEW OF LAB RESULTS: NORMAL
LDLC SERPL CALC-MCNC: 33 MG/DL (ref 0–99)
PROT SERPL-MCNC: 6.7 G/DL (ref 6–8.5)
TRIGL SERPL-MCNC: 43 MG/DL (ref 0–149)
VLDLC SERPL CALC-MCNC: 11 MG/DL (ref 5–40)

## 2023-07-31 ENCOUNTER — TELEPHONE (OUTPATIENT)
Age: 83
End: 2023-07-31

## 2023-08-18 ENCOUNTER — OFFICE VISIT (OUTPATIENT)
Age: 83
End: 2023-08-18
Payer: MEDICARE

## 2023-08-18 VITALS
HEART RATE: 60 BPM | BODY MASS INDEX: 28.49 KG/M2 | WEIGHT: 199 LBS | OXYGEN SATURATION: 95 % | SYSTOLIC BLOOD PRESSURE: 108 MMHG | HEIGHT: 70 IN | DIASTOLIC BLOOD PRESSURE: 60 MMHG

## 2023-08-18 DIAGNOSIS — I50.22 CHRONIC SYSTOLIC (CONGESTIVE) HEART FAILURE (HCC): Primary | ICD-10-CM

## 2023-08-18 PROCEDURE — 99214 OFFICE O/P EST MOD 30 MIN: CPT | Performed by: INTERNAL MEDICINE

## 2023-08-18 PROCEDURE — 3074F SYST BP LT 130 MM HG: CPT | Performed by: INTERNAL MEDICINE

## 2023-08-18 PROCEDURE — 1036F TOBACCO NON-USER: CPT | Performed by: INTERNAL MEDICINE

## 2023-08-18 PROCEDURE — 93005 ELECTROCARDIOGRAM TRACING: CPT | Performed by: INTERNAL MEDICINE

## 2023-08-18 PROCEDURE — 3078F DIAST BP <80 MM HG: CPT | Performed by: INTERNAL MEDICINE

## 2023-08-18 PROCEDURE — G8427 DOCREV CUR MEDS BY ELIG CLIN: HCPCS | Performed by: INTERNAL MEDICINE

## 2023-08-18 PROCEDURE — 1123F ACP DISCUSS/DSCN MKR DOCD: CPT | Performed by: INTERNAL MEDICINE

## 2023-08-18 PROCEDURE — G8419 CALC BMI OUT NRM PARAM NOF/U: HCPCS | Performed by: INTERNAL MEDICINE

## 2023-08-18 PROCEDURE — 93010 ELECTROCARDIOGRAM REPORT: CPT | Performed by: INTERNAL MEDICINE

## 2023-08-18 NOTE — PROGRESS NOTES
MD Nohemi Arguello, MARIUM                         [address] [facility]. [phone]            Cardiology Consult/Progress Note      Jerri Bolton  79 y.o.  1940    Requesting/referring provider: Maya Rodriguez MD     Reason for Consult:    Assessment/Plan:    Requesting/referring provider: MD Dr Brenden Lin  Reason for Consult: Valvular heart disease  Assessment/Plan:  History of severe aortic valve disease status post aortic valve replacement with a bioprosthesis in 2014 details unavailable   History of sick sinus syndrome and long-term persistent atrial fibrillation   History of prior GI bleed and still deemed as poor candidate for long-term oral anticoagulation status post watchman device placement in 2020   Progressive right-sided heart failure in conjunction with some component of left-sided heart failure   Progressive structural valve deterioration involvement aortic prosthesis now status post TAVR with 26 mm evolute valve inside surgical prosthesis  6. Status post mitral valve repair in 2014 in conjunction with aortic valve replacement. Now with residual moderate mitral regurgitation in conjunction with ring dehiscence  7. Significant tricuspid regurgitation secondary to right-sided lead and tricuspid annular dilatation with high flow low gradient transtricuspid systolic jet      MrSpring Diallo is seen for chronic postoperative shortness of breath is stable and is probably related to lack of activity tolerance because of not exercising much. .  His most recent echocardiogram demonstrated elevated gradient across the valve in valve prosthesis but are not changed compared to his immediate post procedural echocardiogram.  His EOA is 1.3 cm. There also his mitral valve regurgitation appears to be moderate at best with known ring dehiscence.       The main  of his lower extremity mild RV dysfunction with tricuspid regurgitation unfortunately is not a great candidate

## 2023-08-18 NOTE — PROGRESS NOTES
Brady Hopkins is a 79 y.o. male    had concerns including Follow-up. Vitals:    08/18/23 1358   BP: 108/60   Site: Left Upper Arm   Position: Sitting   Pulse: 60   SpO2: 95%   Weight: 199 lb (90.3 kg)   Height: 5' 10\" (1.778 m)        Chest pain No    SOB No    Dizziness No    Swelling No    Refills No    Covid Vaccinated yes      1. Have you been to the ER, urgent care clinic since your last visit? Hospitalized since your last visit? no    2. Have you seen or consulted any other health care providers outside of the [address] [place] [address] since your last visit? Include any pap smears or colon screening.  no

## 2023-09-05 DIAGNOSIS — Z95.2 AORTIC VALVE REPLACED: ICD-10-CM

## 2023-09-05 DIAGNOSIS — Z95.0 PRESENCE OF CARDIAC PACEMAKER: ICD-10-CM

## 2023-09-05 DIAGNOSIS — I10 ESSENTIAL (PRIMARY) HYPERTENSION: ICD-10-CM

## 2023-09-05 DIAGNOSIS — I25.10 ATHEROSCLEROTIC HEART DISEASE OF NATIVE CORONARY ARTERY WITHOUT ANGINA PECTORIS: Primary | ICD-10-CM

## 2023-09-05 DIAGNOSIS — I50.22 CHRONIC SYSTOLIC (CONGESTIVE) HEART FAILURE (HCC): ICD-10-CM

## 2023-11-02 ENCOUNTER — PROCEDURE VISIT (OUTPATIENT)
Age: 83
End: 2023-11-02
Payer: MEDICARE

## 2023-11-02 ENCOUNTER — OFFICE VISIT (OUTPATIENT)
Age: 83
End: 2023-11-02
Payer: MEDICARE

## 2023-11-02 VITALS
BODY MASS INDEX: 29.46 KG/M2 | DIASTOLIC BLOOD PRESSURE: 64 MMHG | HEIGHT: 70 IN | HEART RATE: 68 BPM | RESPIRATION RATE: 18 BRPM | WEIGHT: 205.8 LBS | OXYGEN SATURATION: 96 % | SYSTOLIC BLOOD PRESSURE: 128 MMHG

## 2023-11-02 DIAGNOSIS — Z95.0 PRESENCE OF CARDIAC PACEMAKER: Primary | ICD-10-CM

## 2023-11-02 DIAGNOSIS — Z98.890 H/O MAZE PROCEDURE: ICD-10-CM

## 2023-11-02 DIAGNOSIS — I48.11 LONGSTANDING PERSISTENT ATRIAL FIBRILLATION (HCC): ICD-10-CM

## 2023-11-02 DIAGNOSIS — Z95.0 PACEMAKER: Primary | ICD-10-CM

## 2023-11-02 DIAGNOSIS — I34.0 NONRHEUMATIC MITRAL VALVE REGURGITATION: ICD-10-CM

## 2023-11-02 DIAGNOSIS — I35.0 NONRHEUMATIC AORTIC VALVE STENOSIS: ICD-10-CM

## 2023-11-02 DIAGNOSIS — Z79.01 ANTICOAGULATED: ICD-10-CM

## 2023-11-02 DIAGNOSIS — Z98.890 S/P MITRAL VALVE REPAIR: ICD-10-CM

## 2023-11-02 DIAGNOSIS — Z95.2 S/P TAVR (TRANSCATHETER AORTIC VALVE REPLACEMENT): ICD-10-CM

## 2023-11-02 PROCEDURE — 93279 PRGRMG DEV EVAL PM/LDLS PM: CPT | Performed by: INTERNAL MEDICINE

## 2023-11-02 PROCEDURE — 99214 OFFICE O/P EST MOD 30 MIN: CPT | Performed by: NURSE PRACTITIONER

## 2023-11-02 RX ORDER — FERROUS SULFATE 325(65) MG
1 TABLET ORAL EVERY OTHER DAY
COMMUNITY

## 2023-11-02 RX ORDER — LISINOPRIL 40 MG/1
40 TABLET ORAL DAILY
COMMUNITY
Start: 2023-03-20

## 2023-11-02 RX ORDER — SENNOSIDES A AND B 8.6 MG/1
8.6 TABLET, FILM COATED ORAL PRN
COMMUNITY
Start: 2015-02-03

## 2023-11-02 ASSESSMENT — PATIENT HEALTH QUESTIONNAIRE - PHQ9
2. FEELING DOWN, DEPRESSED OR HOPELESS: 0
SUM OF ALL RESPONSES TO PHQ9 QUESTIONS 1 & 2: 0
SUM OF ALL RESPONSES TO PHQ QUESTIONS 1-9: 0
1. LITTLE INTEREST OR PLEASURE IN DOING THINGS: 0
SUM OF ALL RESPONSES TO PHQ QUESTIONS 1-9: 0

## 2023-11-02 NOTE — PROGRESS NOTES
Room #: 5    No complaints. Goes to the gym regularly. Taking 81 mg of aspirin BID and not sure why. Chief Complaint   Patient presents with    Device Check     Marty ppm    Congestive Heart Failure    Atrial Fibrillation       Vitals:    11/02/23 1028   BP: 128/64   Site: Left Upper Arm   Position: Sitting   Cuff Size: Large Adult   Pulse: 68   Resp: 18   SpO2: 96%   Weight: 93.4 kg (205 lb 12.8 oz)   Height: 1.778 m (5' 10\")         Chest pain:  NO  Shortness of breath:  NO  Edema: NO  Palpitations, skipped beats, rapid heartbeat:  NO  Dizziness:  NO    1. Have you been to the ER, urgent care clinic since your last visit? Hospitalized since your last visit? NO    2. Have you seen or consulted any other health care providers outside of the [address] [place] [address] since your last visit? Include any pap smears or colon screening.  NO      Refills:  NO
pravastatin (PRAVACHOL) 40 MG tablet Take 1 tablet by mouth nightly      tamsulosin (FLOMAX) 0.4 MG capsule Take 1 capsule by mouth 2 times daily       No current facility-administered medications for this visit.           LENA Mantilla - MARIUM  [facility]  [address] [place] [address] [phone]  [place] [facility]  [phone]      Lisa Carmen MD

## 2024-03-24 PROCEDURE — 93296 REM INTERROG EVL PM/IDS: CPT | Performed by: INTERNAL MEDICINE

## 2024-03-24 PROCEDURE — 93294 REM INTERROG EVL PM/LDLS PM: CPT | Performed by: INTERNAL MEDICINE

## 2024-06-20 NOTE — PATIENT INSTRUCTIONS

## 2024-06-21 ENCOUNTER — TELEPHONE (OUTPATIENT)
Age: 84
End: 2024-06-21

## 2024-06-21 ENCOUNTER — OFFICE VISIT (OUTPATIENT)
Age: 84
End: 2024-06-21
Payer: MEDICARE

## 2024-06-21 VITALS
HEART RATE: 58 BPM | HEIGHT: 70 IN | WEIGHT: 208.4 LBS | SYSTOLIC BLOOD PRESSURE: 118 MMHG | DIASTOLIC BLOOD PRESSURE: 62 MMHG | BODY MASS INDEX: 29.84 KG/M2 | OXYGEN SATURATION: 97 %

## 2024-06-21 DIAGNOSIS — Z98.890 S/P MITRAL VALVE REPAIR: ICD-10-CM

## 2024-06-21 DIAGNOSIS — Z98.890 H/O MAZE PROCEDURE: ICD-10-CM

## 2024-06-21 DIAGNOSIS — I48.11 LONGSTANDING PERSISTENT ATRIAL FIBRILLATION (HCC): ICD-10-CM

## 2024-06-21 DIAGNOSIS — Z95.0 PACEMAKER: ICD-10-CM

## 2024-06-21 DIAGNOSIS — Z95.2 S/P TAVR (TRANSCATHETER AORTIC VALVE REPLACEMENT): Primary | ICD-10-CM

## 2024-06-21 DIAGNOSIS — Z79.01 ANTICOAGULATED: ICD-10-CM

## 2024-06-21 DIAGNOSIS — G47.33 OBSTRUCTIVE SLEEP APNEA (ADULT) (PEDIATRIC): ICD-10-CM

## 2024-06-21 PROCEDURE — 99214 OFFICE O/P EST MOD 30 MIN: CPT | Performed by: SPECIALIST

## 2024-06-21 NOTE — TELEPHONE ENCOUNTER
----- Message from Dorina Carmona LPN sent at 6/21/2024  1:09 PM EDT -----  Regarding: FW: Blood Work  Contact: [phone]  Pt saw y'all today.  ----- Message -----  From: Jaylen Rodriguez  Sent: 6/21/2024  11:27 AM EDT  To: Brett Dorsey Clinical Staff  Subject: Blood Work                                       Attached is the most recent bloodwork for Jaylen Rodriguez. Dr. Yoo asked that it be emailed to him.

## 2024-06-21 NOTE — PROGRESS NOTES
Chief Complaint   Patient presents with    Sleep Apnea    Other     PAF  NAVS     Vitals:    24 0908   BP: 118/62   Site: Left Upper Arm   Position: Sitting   Pulse: 58   SpO2: 97%   Weight: 94.5 kg (208 lb 6.4 oz)   Height: 1.778 m (5' 10\")     Chest pain: DENIED     Recent hospital stays: DENIED     Refills: DENIED     NAME Jaylen Rodriguez         1940      MRN    [id number]      LAST OFFICE APPOINTMENT: 2023     DIAGNOSIS    ICD-10-CM    1. S/P TAVR (transcatheter aortic valve replacement)  Z95.2       2. S/P mitral valve repair  Z98.890       3. Pacemaker  Z95.0       4. Obstructive sleep apnea (adult) (pediatric)  G47.33       5. H/O maze procedure  Z98.890       6. Anticoagulated  Z79.01       7. Longstanding persistent atrial fibrillation (HCC)  I48.11           HOME MEDICATION  Current Outpatient Medications   Medication Sig    cyanocobalamin 500 MCG tablet Take 1 tablet by mouth daily    ipratropium (ATROVENT HFA) 17 MCG/ACT inhaler INHALE 2 PUFFS BY MOUTH EVERY 6 HOURS    lisinopril (PRINIVIL;ZESTRIL) 40 MG tablet Take 1 tablet by mouth daily    senna (SENOKOT) 8.6 MG tablet Take 1 tablet by mouth as needed    ferrous sulfate (IRON 325) 325 (65 Fe) MG tablet Take 1 tablet by mouth every other day Tuesday, Thursday, Saturday    potassium chloride (KLOR-CON M) 20 MEQ extended release tablet Take 2 tablets by mouth daily    Multiple Vitamin (MULTIVITAMIN ADULT PO) Take by mouth    acetaminophen (TYLENOL) 325 MG tablet Take 2 tablets by mouth every 4 hours as needed    aspirin 81 MG EC tablet Take 1 tablet by mouth 2 times daily    bumetanide (BUMEX) 2 MG tablet Take 1 tablet by mouth daily    vitamin D (CHOLECALCIFEROL) 25 MCG (1000 UT) TABS tablet Take 2 tablets by mouth 2 times daily    docusate (COLACE, DULCOLAX) 100 MG CAPS Take 100 mg by mouth 2 times daily    gabapentin (NEURONTIN) 300 MG capsule Take 150 mg by mouth daily. 1 tablet in the morning and 1 tablet in the evening    
a size of 26 mm positioned within previous surgical prosthesis, mild AS, MV annular ring which has anterior dehiscence, mod MR, RVSP 46 mmHg, MOIZ, Lead present RA    2.  Myocardial perfusion study                      (9/5/14)Normal EF ;Normal perfusion     3. Lipid                         7/24/21- , HDL 52, LDL 60, TG 66  3/16/22- , HDL 59, LDL 45, TG 62  2/20/23- , HDL 63, LDL 28, TG 34  07/28/23- , HDL 64, LDL 33, TG 43    4. LE venous duplex                                           (10/10/14)   R leg mod. 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 distally   No DVT   1/24/18 - No DVT,  evidence of valve incompetence   (reflux) involving L popliteal vein.   7/11/22- No DVT    5. Watchman 10/13/20     6. Carotid Doppler   5/11/20- 1-49% Talon  11/28/22-0-24% Talon    7.9/3/21:implantation of a [employer] single chamber pacemaker per Dr. Cresencio Garrett    8. Holter  8/16/21-AF occurred 305 time(s) with HR range of 24 - 123; Total AF Ashley 96%  Pauses >2 seconds occurred 40 time(s) with longest pause 3.9 seconds              LABORATORY DATA       Lab Results   Component Value Date/Time    WBC 5.4 08/24/2022 04:32 AM    HGB 8.1 08/24/2022 04:32 AM    HCT 24.9 08/24/2022 04:32 AM     08/24/2022 04:32 AM    MCV 98.4 08/24/2022 04:32 AM      Lab Results   Component Value Date/Time     11/23/2022 09:06 AM    K 4.2 11/23/2022 09:06 AM     11/23/2022 09:06 AM    CO2 27 11/23/2022 09:06 AM    BUN 19 11/23/2022 09:06 AM    GFRAA >60 08/24/2022 04:32 AM    GLOB 3.3 08/18/2022 05:54 PM    ALT 12 07/28/2023 08:01 AM          ICD-10-CM    1. S/P TAVR (transcatheter aortic valve replacement)  Z95.2       2. S/P mitral valve repair  Z98.890       3. Pacemaker  Z95.0       4. Obstructive sleep apnea (adult) (pediatric)  G47.33       5. H/O maze procedure  Z98.890       6. Anticoagulated  Z79.01       7. Longstanding persistent atrial fibrillation (HCC)  I48.11

## 2024-06-27 PROCEDURE — 93294 REM INTERROG EVL PM/LDLS PM: CPT | Performed by: INTERNAL MEDICINE

## 2024-07-01 DIAGNOSIS — E78.00 PURE HYPERCHOLESTEROLEMIA, UNSPECIFIED: Primary | ICD-10-CM

## 2024-07-02 LAB
ALBUMIN SERPL-MCNC: 4.2 G/DL (ref 3.7–4.7)
ALP SERPL-CCNC: 64 IU/L (ref 44–121)
ALT SERPL-CCNC: 12 IU/L (ref 0–44)
AST SERPL-CCNC: 18 IU/L (ref 0–40)
BILIRUB DIRECT SERPL-MCNC: 0.18 MG/DL (ref 0–0.4)
BILIRUB SERPL-MCNC: 0.5 MG/DL (ref 0–1.2)
CHOLEST SERPL-MCNC: 119 MG/DL (ref 100–199)
HDLC SERPL-MCNC: 59 MG/DL
IMP & REVIEW OF LAB RESULTS: NORMAL
LDLC SERPL CALC-MCNC: 40 MG/DL (ref 0–99)
PROT SERPL-MCNC: 6.8 G/DL (ref 6–8.5)
TRIGL SERPL-MCNC: 111 MG/DL (ref 0–149)
VLDLC SERPL CALC-MCNC: 20 MG/DL (ref 5–40)

## 2024-09-26 PROCEDURE — 93294 REM INTERROG EVL PM/LDLS PM: CPT | Performed by: INTERNAL MEDICINE

## 2024-11-13 ENCOUNTER — OFFICE VISIT (OUTPATIENT)
Age: 84
End: 2024-11-13
Payer: MEDICARE

## 2024-11-13 ENCOUNTER — PROCEDURE VISIT (OUTPATIENT)
Age: 84
End: 2024-11-13
Payer: MEDICARE

## 2024-11-13 VITALS
HEART RATE: 68 BPM | DIASTOLIC BLOOD PRESSURE: 84 MMHG | SYSTOLIC BLOOD PRESSURE: 138 MMHG | HEIGHT: 70 IN | BODY MASS INDEX: 29.81 KG/M2 | WEIGHT: 208.2 LBS | OXYGEN SATURATION: 96 %

## 2024-11-13 DIAGNOSIS — Z98.890 S/P MVR (MITRAL VALVE REPAIR): ICD-10-CM

## 2024-11-13 DIAGNOSIS — Z98.890 S/P MAZE OPERATION FOR ATRIAL FIBRILLATION: ICD-10-CM

## 2024-11-13 DIAGNOSIS — Z95.2 S/P AVR (AORTIC VALVE REPLACEMENT): ICD-10-CM

## 2024-11-13 DIAGNOSIS — Z95.818 PRESENCE OF WATCHMAN LEFT ATRIAL APPENDAGE CLOSURE DEVICE: ICD-10-CM

## 2024-11-13 DIAGNOSIS — Z95.0 PRESENCE OF CARDIAC PACEMAKER: Primary | ICD-10-CM

## 2024-11-13 DIAGNOSIS — I48.21 PERMANENT ATRIAL FIBRILLATION (HCC): Primary | ICD-10-CM

## 2024-11-13 DIAGNOSIS — Z95.2 S/P TAVR (TRANSCATHETER AORTIC VALVE REPLACEMENT): ICD-10-CM

## 2024-11-13 DIAGNOSIS — I10 PRIMARY HYPERTENSION: ICD-10-CM

## 2024-11-13 DIAGNOSIS — G47.33 OSA (OBSTRUCTIVE SLEEP APNEA): ICD-10-CM

## 2024-11-13 DIAGNOSIS — Z86.79 S/P MAZE OPERATION FOR ATRIAL FIBRILLATION: ICD-10-CM

## 2024-11-13 PROCEDURE — 99214 OFFICE O/P EST MOD 30 MIN: CPT | Performed by: INTERNAL MEDICINE

## 2024-11-13 PROCEDURE — 93279 PRGRMG DEV EVAL PM/LDLS PM: CPT | Performed by: INTERNAL MEDICINE

## 2024-11-13 NOTE — PATIENT INSTRUCTIONS
Obtain echocardiogram next available    Continue remote transmission every 3 months  FU with NP in 1 year  FU with Dr. Orellana in 2 years

## 2024-11-13 NOTE — PROGRESS NOTES
Cardiac Electrophysiology Office Follow-up    NAME: Jaylen Rodriguez   :  1940  MRM:  [id number]    Date:  2024         Assessment and Plan:     1. Permanent atrial fibrillation (HCC)  2. S/P TAVR (transcatheter aortic valve replacement)  3. Presence of Watchman left atrial appendage closure device  4. Primary hypertension  5. VANESSA (obstructive sleep apnea)  6. S/P AVR (aortic valve replacement)  7. S/P Maze operation for atrial fibrillation  8. S/P MVR (mitral valve repair)  9. Unspecified atrial fibrillation (HCC)  -     ECHO COMPLETE ADULT (TTE) W OR WO CONTR- Clinic Performed; Future       Bradycardia/Pacemaker.   DOI 9/3/21  [employer] single chamber pacemaker with normal function. Battery life 6.5 years. No new or significant lead issues requiring intervention. No significant arrhythmias noted requiring intervention. Iterative programing was performed to assess lead parameters without any permanent changes.  56%.  - normal device interrogation  - remote transmission every 3 months  - ELLIS, will need to rule out PPM-mediated cardiomyopathy, obtain echocardiogram next available  - if he has decreased in LVEF he would benefit from CRT-P upgrade  - FU with EP in 1 year     Paroxysmal atrial fibrillation.   - S/p MAZE in .   - S/p Watchman implant . Continue aspirin 81 mg daily.      S/p AVR and MVR in .   - S/p in valve TAVR 2022.   - Followed by Dr. Mitchell.        Subjective:      Jaylen Rodriguez, a 82 y.o. year-old who presents for followup of his pacemaker. He is feeling well and denies chest pain, dyspnea, dizziness or syncope. He is almost done with cardiac rehab    ELLIS on certain days. Goes to the gym 3 times a week.     I independently review internal and external records of most recent labs, EKGs, event monitors or Holters, and imaging including most recent echocardiograms and stress test.  Ordered follow up testing as indicated in orders, patient instructions. Previous notes

## 2024-11-13 NOTE — PROGRESS NOTES
Chief Complaint   Patient presents with    Annual Exam    Device Check    Atrial Fibrillation     Vitals:    11/13/24 1031   BP: 138/84   Site: Left Upper Arm   Position: Sitting   Pulse: 68   SpO2: 96%   Weight: 94.4 kg (208 lb 3.2 oz)   Height: 1.778 m (5' 10\")     Chest pain: DENIED     Recent hospital stays: DENIED     Refills: DENIED

## 2025-01-03 ENCOUNTER — ANCILLARY PROCEDURE (OUTPATIENT)
Age: 85
End: 2025-01-03
Payer: MEDICARE

## 2025-01-03 VITALS
BODY MASS INDEX: 29.78 KG/M2 | HEIGHT: 70 IN | HEART RATE: 63 BPM | DIASTOLIC BLOOD PRESSURE: 80 MMHG | WEIGHT: 208 LBS | SYSTOLIC BLOOD PRESSURE: 130 MMHG

## 2025-01-03 DIAGNOSIS — I48.21 PERMANENT ATRIAL FIBRILLATION (HCC): ICD-10-CM

## 2025-01-03 DIAGNOSIS — Z95.2 S/P AVR (AORTIC VALVE REPLACEMENT): ICD-10-CM

## 2025-01-03 PROCEDURE — 93306 TTE W/DOPPLER COMPLETE: CPT | Performed by: SPECIALIST

## 2025-01-04 LAB
ECHO AO ROOT DIAM: 3 CM
ECHO AO ROOT INDEX: 1.42 CM/M2
ECHO AV MEAN GRADIENT: 21 MMHG
ECHO AV MEAN VELOCITY: 2.2 M/S
ECHO AV PEAK GRADIENT: 35 MMHG
ECHO AV PEAK VELOCITY: 3 M/S
ECHO AV VELOCITY RATIO: 0.27
ECHO AV VTI: 64.3 CM
ECHO BSA: 2.16 M2
ECHO EST RA PRESSURE: 3 MMHG
ECHO LA DIAMETER INDEX: 3.87 CM/M2
ECHO LA DIAMETER: 8.2 CM
ECHO LA TO AORTIC ROOT RATIO: 2.73
ECHO LA VOL A-L A2C: 138 ML (ref 18–58)
ECHO LA VOL A-L A4C: 166 ML (ref 18–58)
ECHO LA VOL BP: 150 ML (ref 18–58)
ECHO LA VOL MOD A2C: 135 ML (ref 18–58)
ECHO LA VOL MOD A4C: 159 ML (ref 18–58)
ECHO LA VOL/BSA BIPLANE: 71 ML/M2 (ref 16–34)
ECHO LA VOLUME AREA LENGTH: 156 ML
ECHO LA VOLUME INDEX A-L A2C: 65 ML/M2 (ref 16–34)
ECHO LA VOLUME INDEX A-L A4C: 78 ML/M2 (ref 16–34)
ECHO LA VOLUME INDEX AREA LENGTH: 74 ML/M2 (ref 16–34)
ECHO LA VOLUME INDEX MOD A2C: 64 ML/M2 (ref 16–34)
ECHO LA VOLUME INDEX MOD A4C: 75 ML/M2 (ref 16–34)
ECHO LV E' LATERAL VELOCITY: 11.88 CM/S
ECHO LV E' SEPTAL VELOCITY: 7.35 CM/S
ECHO LV EDV A2C: 149 ML
ECHO LV EDV A4C: 111 ML
ECHO LV EDV BP: 135 ML (ref 67–155)
ECHO LV EDV INDEX A4C: 52 ML/M2
ECHO LV EDV INDEX BP: 64 ML/M2
ECHO LV EDV NDEX A2C: 70 ML/M2
ECHO LV EF PHYSICIAN: 50 %
ECHO LV EJECTION FRACTION A2C: 50 %
ECHO LV EJECTION FRACTION A4C: 52 %
ECHO LV ESV A2C: 75 ML
ECHO LV ESV A4C: 53 ML
ECHO LV ESV BP: 65 ML (ref 22–58)
ECHO LV ESV INDEX A2C: 35 ML/M2
ECHO LV ESV INDEX A4C: 25 ML/M2
ECHO LV ESV INDEX BP: 31 ML/M2
ECHO LV FRACTIONAL SHORTENING: 19 % (ref 28–44)
ECHO LV INTERNAL DIMENSION DIASTOLE INDEX: 2.78 CM/M2
ECHO LV INTERNAL DIMENSION DIASTOLIC: 5.9 CM (ref 4.2–5.9)
ECHO LV INTERNAL DIMENSION SYSTOLIC INDEX: 2.26 CM/M2
ECHO LV INTERNAL DIMENSION SYSTOLIC: 4.8 CM
ECHO LV IVSD: 1.3 CM (ref 0.6–1)
ECHO LV MASS 2D: 305.5 G (ref 88–224)
ECHO LV MASS INDEX 2D: 144.1 G/M2 (ref 49–115)
ECHO LV POSTERIOR WALL DIASTOLIC: 1.1 CM (ref 0.6–1)
ECHO LV RELATIVE WALL THICKNESS RATIO: 0.37
ECHO LVOT AV VTI INDEX: 0.24
ECHO LVOT MEAN GRADIENT: 2 MMHG
ECHO LVOT PEAK GRADIENT: 2 MMHG
ECHO LVOT PEAK VELOCITY: 0.8 M/S
ECHO LVOT VTI: 15.5 CM
ECHO MV LVOT VTI INDEX: 2.45
ECHO MV MAX VELOCITY: 1.6 M/S
ECHO MV MEAN GRADIENT: 3 MMHG
ECHO MV MEAN VELOCITY: 0.7 M/S
ECHO MV PEAK GRADIENT: 10 MMHG
ECHO MV VTI: 38 CM
ECHO RIGHT VENTRICULAR SYSTOLIC PRESSURE (RVSP): 29 MMHG
ECHO RV FREE WALL PEAK S': 11.5 CM/S
ECHO RV INTERNAL DIMENSION: 4.4 CM
ECHO RV TAPSE: 1.6 CM (ref 1.7–?)
ECHO TV REGURGITANT MAX VELOCITY: 2.56 M/S
ECHO TV REGURGITANT PEAK GRADIENT: 26 MMHG

## 2025-01-07 ENCOUNTER — OFFICE VISIT (OUTPATIENT)
Age: 85
End: 2025-01-07
Payer: MEDICARE

## 2025-01-07 VITALS
DIASTOLIC BLOOD PRESSURE: 62 MMHG | HEIGHT: 70 IN | WEIGHT: 206.6 LBS | HEART RATE: 66 BPM | OXYGEN SATURATION: 94 % | BODY MASS INDEX: 29.58 KG/M2 | SYSTOLIC BLOOD PRESSURE: 108 MMHG

## 2025-01-07 DIAGNOSIS — R06.02 SOB (SHORTNESS OF BREATH): Primary | ICD-10-CM

## 2025-01-07 DIAGNOSIS — I10 PRIMARY HYPERTENSION: ICD-10-CM

## 2025-01-07 DIAGNOSIS — E78.5 DYSLIPIDEMIA: ICD-10-CM

## 2025-01-07 DIAGNOSIS — R53.83 OTHER FATIGUE: ICD-10-CM

## 2025-01-07 DIAGNOSIS — I48.21 PERMANENT ATRIAL FIBRILLATION (HCC): ICD-10-CM

## 2025-01-07 PROCEDURE — 1036F TOBACCO NON-USER: CPT | Performed by: SPECIALIST

## 2025-01-07 PROCEDURE — 93005 ELECTROCARDIOGRAM TRACING: CPT | Performed by: SPECIALIST

## 2025-01-07 PROCEDURE — 99215 OFFICE O/P EST HI 40 MIN: CPT | Performed by: SPECIALIST

## 2025-01-07 PROCEDURE — 1159F MED LIST DOCD IN RCRD: CPT | Performed by: SPECIALIST

## 2025-01-07 PROCEDURE — 93010 ELECTROCARDIOGRAM REPORT: CPT | Performed by: SPECIALIST

## 2025-01-07 PROCEDURE — G8427 DOCREV CUR MEDS BY ELIG CLIN: HCPCS | Performed by: SPECIALIST

## 2025-01-07 PROCEDURE — G8419 CALC BMI OUT NRM PARAM NOF/U: HCPCS | Performed by: SPECIALIST

## 2025-01-07 PROCEDURE — 1126F AMNT PAIN NOTED NONE PRSNT: CPT | Performed by: SPECIALIST

## 2025-01-07 PROCEDURE — 3074F SYST BP LT 130 MM HG: CPT | Performed by: SPECIALIST

## 2025-01-07 PROCEDURE — 3078F DIAST BP <80 MM HG: CPT | Performed by: SPECIALIST

## 2025-01-07 PROCEDURE — 1123F ACP DISCUSS/DSCN MKR DOCD: CPT | Performed by: SPECIALIST

## 2025-01-07 PROCEDURE — M1308 PR FLU IMMUNIZE NO ADMIN: HCPCS | Performed by: SPECIALIST

## 2025-01-07 RX ORDER — POTASSIUM CHLORIDE 1500 MG/1
20 TABLET, EXTENDED RELEASE ORAL DAILY
Qty: 90 TABLET | Refills: 3 | Status: SHIPPED | OUTPATIENT
Start: 2025-01-07

## 2025-01-07 RX ORDER — BUMETANIDE 2 MG/1
2 TABLET ORAL DAILY
Qty: 90 TABLET | Refills: 3 | Status: SHIPPED | OUTPATIENT
Start: 2025-01-07

## 2025-01-07 RX ORDER — LISINOPRIL 20 MG/1
20 TABLET ORAL DAILY
Qty: 90 TABLET | Refills: 3 | Status: SHIPPED | OUTPATIENT
Start: 2025-01-07

## 2025-01-07 NOTE — PROGRESS NOTES
Chief Complaint   Patient presents with    Atrial Fibrillation     Vitals:    01/07/25 0944   BP: 108/62   Site: Left Upper Arm   Position: Sitting   Pulse: 66   SpO2: 94%   Weight: 93.7 kg (206 lb 9.6 oz)   Height: 1.778 m (5' 10\")         Chest pain: DENIED     Recent hospital stays: DENIED     Refills: DENIED     Patient would like to discuss coming off some medications if possible. (Or cutting back)

## 2025-01-07 NOTE — PROGRESS NOTES
Harmeet Torres MD. [facility]          Patient: Jaylen Rodriguez  : 1940      Today's Date: 2025        HISTORY OF PRESENT ILLNESS:     History of Present Illness:    He has a history of CAD, status post AVR, PAF, status post cryo maze procedures, status post watchman, sick sinus syndrome status post PPM, and then he also had a TAVR with a 26 evolute with sentinel protection via the RTF approach on 2022. The procedure was complicated by hypotension requiring a few minutes of CPR.     On 25 - he feels more SOB lately.  He has difficulty sleeping - feels anxiety.  Denies orthopnea.  Wife passed in 2024.        PAST MEDICAL HISTORY:     Past Medical History:   Diagnosis Date    Anasarca     Aortic valve stenosis     Repaired 2022    Arthritis     in hands and knee (before replacement)    Atrial fibrillation (HCC)     CAD (coronary artery disease)     CHF (congestive heart failure) ([facility]) 2020    Chronic pain     legs/knee    GERD (gastroesophageal reflux disease)     Hx of carcinoma in situ of prostate     Hyperlipidemia     Hypertension     Ill-defined condition     Watchman device    Insomnia     VANESSA (obstructive sleep apnea)     uses CPAP    Prostate cancer ([facility]) 2017    Radiation proctitis     Vitamin D deficiency        Past Surgical History:   Procedure Laterality Date    AORTIC VALVE REPLACEMENT      and mitral valve repair, left atrial cryo maze    CARDIAC VALVE SURGERY  2014    Mitral Valve Repair    COLONOSCOPY N/A 2023    COLONOSCOPY performed by Richard Bautista MD at [facility] ENDOSCOPY    COLONOSCOPY N/A 2020    COLONOSCOPY performed by Richard Bautista MD at [facility] ENDOSCOPY    COLONOSCOPY N/A 2020    COLONOSCOPY performed by Ludivina Hendricks MD at [facility] ENDOSCOPY    FLEXIBLE SIGMOIDOSCOPY N/A 2020    FLEXIBLE SIGMOIDOSCOPY WITH APC performed by Richard Bautista MD at [facility] ENDOSCOPY    HEENT      melanoma removed head    HERNIA REPAIR Left 2016

## 2025-01-09 LAB
ALBUMIN SERPL-MCNC: 4 G/DL (ref 3.7–4.7)
ALP SERPL-CCNC: 85 IU/L (ref 44–121)
ALT SERPL-CCNC: 14 IU/L (ref 0–44)
AST SERPL-CCNC: 19 IU/L (ref 0–40)
BASOPHILS # BLD AUTO: 0 X10E3/UL (ref 0–0.2)
BASOPHILS NFR BLD AUTO: 1 %
BILIRUB SERPL-MCNC: 0.4 MG/DL (ref 0–1.2)
BNP SERPL-MCNC: 154.5 PG/ML (ref 0–100)
BUN SERPL-MCNC: 24 MG/DL (ref 8–27)
BUN/CREAT SERPL: 27 (ref 10–24)
CALCIUM SERPL-MCNC: 8.7 MG/DL (ref 8.6–10.2)
CHLORIDE SERPL-SCNC: 104 MMOL/L (ref 96–106)
CHOLEST SERPL-MCNC: 136 MG/DL (ref 100–199)
CO2 SERPL-SCNC: 24 MMOL/L (ref 20–29)
CREAT SERPL-MCNC: 0.89 MG/DL (ref 0.76–1.27)
EGFRCR SERPLBLD CKD-EPI 2021: 84 ML/MIN/1.73
EOSINOPHIL # BLD AUTO: 0.4 X10E3/UL (ref 0–0.4)
EOSINOPHIL NFR BLD AUTO: 10 %
ERYTHROCYTE [DISTWIDTH] IN BLOOD BY AUTOMATED COUNT: 14.5 % (ref 11.6–15.4)
GLOBULIN SER CALC-MCNC: 2.4 G/DL (ref 1.5–4.5)
GLUCOSE SERPL-MCNC: 99 MG/DL (ref 70–99)
HCT VFR BLD AUTO: 31.1 % (ref 37.5–51)
HDLC SERPL-MCNC: 65 MG/DL
HGB BLD-MCNC: 10 G/DL (ref 13–17.7)
IMM GRANULOCYTES # BLD AUTO: 0 X10E3/UL (ref 0–0.1)
IMM GRANULOCYTES NFR BLD AUTO: 0 %
LDLC SERPL CALC-MCNC: 60 MG/DL (ref 0–99)
LYMPHOCYTES # BLD AUTO: 0.8 X10E3/UL (ref 0.7–3.1)
LYMPHOCYTES NFR BLD AUTO: 22 %
MAGNESIUM SERPL-MCNC: 2.3 MG/DL (ref 1.6–2.3)
MCH RBC QN AUTO: 31.3 PG (ref 26.6–33)
MCHC RBC AUTO-ENTMCNC: 32.2 G/DL (ref 31.5–35.7)
MCV RBC AUTO: 97 FL (ref 79–97)
MONOCYTES # BLD AUTO: 0.4 X10E3/UL (ref 0.1–0.9)
MONOCYTES NFR BLD AUTO: 12 %
NEUTROPHILS # BLD AUTO: 2 X10E3/UL (ref 1.4–7)
NEUTROPHILS NFR BLD AUTO: 55 %
PLATELET # BLD AUTO: 117 X10E3/UL (ref 150–450)
POTASSIUM SERPL-SCNC: 4.6 MMOL/L (ref 3.5–5.2)
PROT SERPL-MCNC: 6.4 G/DL (ref 6–8.5)
RBC # BLD AUTO: 3.2 X10E6/UL (ref 4.14–5.8)
SODIUM SERPL-SCNC: 140 MMOL/L (ref 134–144)
TRIGL SERPL-MCNC: 47 MG/DL (ref 0–149)
TSH SERPL DL<=0.005 MIU/L-ACNC: 4.44 UIU/ML (ref 0.45–4.5)
VLDLC SERPL CALC-MCNC: 11 MG/DL (ref 5–40)
WBC # BLD AUTO: 3.6 X10E3/UL (ref 3.4–10.8)

## 2025-03-28 ENCOUNTER — OFFICE VISIT (OUTPATIENT)
Facility: CLINIC | Age: 85
End: 2025-03-28

## 2025-03-28 VITALS
BODY MASS INDEX: 29.78 KG/M2 | DIASTOLIC BLOOD PRESSURE: 69 MMHG | WEIGHT: 208 LBS | SYSTOLIC BLOOD PRESSURE: 115 MMHG | OXYGEN SATURATION: 96 % | RESPIRATION RATE: 18 BRPM | HEIGHT: 70 IN | HEART RATE: 68 BPM | TEMPERATURE: 97.6 F

## 2025-03-28 DIAGNOSIS — Z86.002 HX OF CARCINOMA IN SITU OF PROSTATE: ICD-10-CM

## 2025-03-28 DIAGNOSIS — D50.0 IRON DEFICIENCY ANEMIA DUE TO CHRONIC BLOOD LOSS: ICD-10-CM

## 2025-03-28 DIAGNOSIS — K92.2 GASTROINTESTINAL HEMORRHAGE, UNSPECIFIED GASTROINTESTINAL HEMORRHAGE TYPE: ICD-10-CM

## 2025-03-28 DIAGNOSIS — I50.22 CHRONIC SYSTOLIC (CONGESTIVE) HEART FAILURE: ICD-10-CM

## 2025-03-28 DIAGNOSIS — G62.9 NEUROPATHY: Primary | ICD-10-CM

## 2025-03-28 DIAGNOSIS — K62.5 RECTAL BLEEDING: ICD-10-CM

## 2025-03-28 DIAGNOSIS — I48.21 PERMANENT ATRIAL FIBRILLATION (HCC): ICD-10-CM

## 2025-03-28 DIAGNOSIS — I25.10 CORONARY ARTERY DISEASE INVOLVING NATIVE HEART WITHOUT ANGINA PECTORIS, UNSPECIFIED VESSEL OR LESION TYPE: ICD-10-CM

## 2025-03-28 DIAGNOSIS — K21.9 GASTROESOPHAGEAL REFLUX DISEASE, UNSPECIFIED WHETHER ESOPHAGITIS PRESENT: ICD-10-CM

## 2025-03-28 DIAGNOSIS — G47.33 OSA (OBSTRUCTIVE SLEEP APNEA): ICD-10-CM

## 2025-03-28 DIAGNOSIS — Z95.2 S/P TAVR (TRANSCATHETER AORTIC VALVE REPLACEMENT): ICD-10-CM

## 2025-03-28 DIAGNOSIS — I10 PRIMARY HYPERTENSION: ICD-10-CM

## 2025-03-28 PROBLEM — I50.9 CHF EXACERBATION (HCC): Status: RESOLVED | Noted: 2022-07-10 | Resolved: 2025-03-28

## 2025-03-28 PROBLEM — I50.33 DIASTOLIC CHF, ACUTE ON CHRONIC (HCC): Status: RESOLVED | Noted: 2020-11-28 | Resolved: 2025-03-28

## 2025-03-28 PROBLEM — R60.1 ANASARCA: Status: RESOLVED | Noted: 2020-11-27 | Resolved: 2025-03-28

## 2025-03-28 PROBLEM — U07.1 COVID-19: Status: RESOLVED | Noted: 2022-07-10 | Resolved: 2025-03-28

## 2025-03-28 PROBLEM — R06.09 DOE (DYSPNEA ON EXERTION): Status: RESOLVED | Noted: 2020-11-27 | Resolved: 2025-03-28

## 2025-03-28 PROBLEM — U07.1 COVID: Status: RESOLVED | Noted: 2022-07-12 | Resolved: 2025-03-28

## 2025-03-28 RX ORDER — LISINOPRIL 20 MG/1
10 TABLET ORAL DAILY
Qty: 90 TABLET | Refills: 3 | Status: SHIPPED
Start: 2025-03-28

## 2025-03-28 SDOH — ECONOMIC STABILITY: FOOD INSECURITY: WITHIN THE PAST 12 MONTHS, YOU WORRIED THAT YOUR FOOD WOULD RUN OUT BEFORE YOU GOT MONEY TO BUY MORE.: NEVER TRUE

## 2025-03-28 SDOH — ECONOMIC STABILITY: FOOD INSECURITY: WITHIN THE PAST 12 MONTHS, THE FOOD YOU BOUGHT JUST DIDN'T LAST AND YOU DIDN'T HAVE MONEY TO GET MORE.: NEVER TRUE

## 2025-03-28 ASSESSMENT — PATIENT HEALTH QUESTIONNAIRE - PHQ9
SUM OF ALL RESPONSES TO PHQ QUESTIONS 1-9: 0
2. FEELING DOWN, DEPRESSED OR HOPELESS: NOT AT ALL
1. LITTLE INTEREST OR PLEASURE IN DOING THINGS: NOT AT ALL

## 2025-03-28 NOTE — PROGRESS NOTES
[facility] MEDICINE  Subjective  Chief Complaint   Patient presents with    New Patient    Rectal Bleeding     HPI:  Jaylen Rodriguez  : 1940    PCP: Carlyle Paez MD    History of Present Illness  The patient is an 84-year-old male who presents today to establish care and for concerns of rectal bleeding.    Rectal bleeding has been noted intermittently, described as minimal and dark red in color. He experienced a bleeding episode yesterday, but no blood was observed during his bowel movement this morning. He suspects that his diet may be contributing to the bleeding. The bleeding episodes typically resolve quickly. He has a history of multiple colon biopsies at [facility], with the most recent one revealing polyps. His last colonoscopy was performed in , with findings of internal hemorrhoids and diverticulosis. He also has a history of external hemorrhoids, which resolve rapidly. He has not required blood transfusions for his rectal bleeding but did receive a transfusion following heart surgery years ago. He continues to take iron supplements daily or every other day, although it is unclear who is monitoring his iron levels. He is not currently under the care of a gastroenterologist. He has a history of prostate cancer and has undergone 43 radiation treatments.    A history of atrial fibrillation is present, and valve replacement surgery was performed in  or . He is scheduled for a cardiology appointment with Dr. Torres on 2025. A Watchman device has been implanted. He takes baby aspirin twice daily.    Neuropathy is reported, with persistent tingling in his feet that intensifies to a burning sensation after gym workouts. Compression stockings are worn, and diuretics are taken to manage leg swelling. He does not believe his neuropathy is related to iron or radiation therapy. Gabapentin 300 mg is taken twice daily for neuropathy, [facility] The

## 2025-03-28 NOTE — PROGRESS NOTES
\"Have you been to the ER, urgent care clinic since your last visit?  Hospitalized since your last visit?\"    NO    “Have you seen or consulted any other health care providers outside our system since your last visit?”    NO         Chief Complaint   Patient presents with    New Patient    Rectal Bleeding     /69 (BP Site: Right Upper Arm, Patient Position: Sitting, BP Cuff Size: Large Adult)   Pulse 68   Temp 97.6 °F (36.4 °C) (Temporal)   Resp 18   Ht 1.778 m (5' 10\")   Wt 94.3 kg (208 lb)   SpO2 96%   BMI 29.84 kg/m²

## 2025-03-30 PROCEDURE — 93294 REM INTERROG EVL PM/LDLS PM: CPT | Performed by: INTERNAL MEDICINE

## 2025-04-16 ENCOUNTER — HOSPITAL ENCOUNTER (OUTPATIENT)
Facility: HOSPITAL | Age: 85
Discharge: HOME | End: 2025-04-19
Payer: MEDICARE

## 2025-04-16 ENCOUNTER — RESULTS FOLLOW-UP (OUTPATIENT)
Age: 85
End: 2025-04-16

## 2025-04-16 ENCOUNTER — OFFICE VISIT (OUTPATIENT)
Age: 85
End: 2025-04-16
Payer: MEDICARE

## 2025-04-16 VITALS
BODY MASS INDEX: 29.06 KG/M2 | WEIGHT: 203 LBS | HEART RATE: 70 BPM | HEIGHT: 70 IN | SYSTOLIC BLOOD PRESSURE: 120 MMHG | OXYGEN SATURATION: 96 % | DIASTOLIC BLOOD PRESSURE: 70 MMHG

## 2025-04-16 DIAGNOSIS — Z95.2 S/P AVR (AORTIC VALVE REPLACEMENT): ICD-10-CM

## 2025-04-16 DIAGNOSIS — Z95.818 PRESENCE OF WATCHMAN LEFT ATRIAL APPENDAGE CLOSURE DEVICE: ICD-10-CM

## 2025-04-16 DIAGNOSIS — R06.02 SOB (SHORTNESS OF BREATH): Primary | ICD-10-CM

## 2025-04-16 DIAGNOSIS — I48.21 PERMANENT ATRIAL FIBRILLATION (HCC): ICD-10-CM

## 2025-04-16 DIAGNOSIS — R06.02 SOB (SHORTNESS OF BREATH): ICD-10-CM

## 2025-04-16 PROCEDURE — G8419 CALC BMI OUT NRM PARAM NOF/U: HCPCS | Performed by: SPECIALIST

## 2025-04-16 PROCEDURE — 71046 X-RAY EXAM CHEST 2 VIEWS: CPT

## 2025-04-16 PROCEDURE — 1126F AMNT PAIN NOTED NONE PRSNT: CPT | Performed by: SPECIALIST

## 2025-04-16 PROCEDURE — 1160F RVW MEDS BY RX/DR IN RCRD: CPT | Performed by: SPECIALIST

## 2025-04-16 PROCEDURE — 99214 OFFICE O/P EST MOD 30 MIN: CPT | Performed by: SPECIALIST

## 2025-04-16 PROCEDURE — 1123F ACP DISCUSS/DSCN MKR DOCD: CPT | Performed by: SPECIALIST

## 2025-04-16 PROCEDURE — 3074F SYST BP LT 130 MM HG: CPT | Performed by: SPECIALIST

## 2025-04-16 PROCEDURE — 1036F TOBACCO NON-USER: CPT | Performed by: SPECIALIST

## 2025-04-16 PROCEDURE — 1159F MED LIST DOCD IN RCRD: CPT | Performed by: SPECIALIST

## 2025-04-16 PROCEDURE — 3078F DIAST BP <80 MM HG: CPT | Performed by: SPECIALIST

## 2025-04-16 PROCEDURE — G8427 DOCREV CUR MEDS BY ELIG CLIN: HCPCS | Performed by: SPECIALIST

## 2025-04-16 RX ORDER — AMMONIUM LACTATE 12 G/100G
LOTION TOPICAL PRN
COMMUNITY
Start: 2024-08-13

## 2025-04-16 RX ORDER — FINASTERIDE 5 MG/1
5 TABLET, FILM COATED ORAL DAILY
COMMUNITY

## 2025-04-16 RX ORDER — TRAZODONE HYDROCHLORIDE 50 MG/1
25 TABLET ORAL NIGHTLY PRN
COMMUNITY
Start: 2025-03-25

## 2025-04-16 NOTE — PROGRESS NOTES
Chief Complaint   Patient presents with    Hypertension    Coronary Artery Disease    Atrial Fibrillation     PAF    Congestive Heart Failure    Shortness of Breath    Other     S/P TAVR     Vitals:    04/16/25 1353   BP: 120/70   BP Site: Left Upper Arm   Patient Position: Sitting   Pulse: 70   SpO2: 96%   Weight: 92.1 kg (203 lb)   Height: 1.778 m (5' 10\")         Chest pain: DENIED     Recent hospital stays: DENIED     Refills: DENIED

## 2025-04-16 NOTE — PROGRESS NOTES
Harmeet Torres MD. [facility]          Patient: Jaylen Rodriguez  : 1940      Today's Date: 2025        HISTORY OF PRESENT ILLNESS:     History of Present Illness:    He has a history of CAD, status post AVR, PAF, status post cryo maze procedures, status post watchman, sick sinus syndrome status post PPM, and then he also had a TAVR with a 26 evolute with sentinel protection via the RTF approach on 2022. The procedure was complicated by hypotension requiring a few minutes of CPR.     On 25 - he feels more SOB lately.  He has difficulty sleeping - feels anxiety.  Denies orthopnea.  Wife passed in 2024.      On 25 - still SOB      PAST MEDICAL HISTORY:     Past Medical History:   Diagnosis Date    Anasarca     Aortic valve stenosis     Repaired 2022    Arthritis     in hands and knee (before replacement)    Atrial fibrillation (HCC)     CAD (coronary artery disease)     CHF (congestive heart failure) (HCC) 2020    CHF exacerbation (HCC) 07/10/2022    Chronic pain     legs/knee    GERD (gastroesophageal reflux disease)     Hx of carcinoma in situ of prostate     Hyperlipidemia     Hypertension     Ill-defined condition     Watchman device    Insomnia     VANESSA (obstructive sleep apnea)     uses CPAP    Prostate cancer (HCC) 2017    Radiation proctitis     Vitamin D deficiency        Past Surgical History:   Procedure Laterality Date    AORTIC VALVE REPLACEMENT      and mitral valve repair, left atrial cryo maze    CARDIAC VALVE SURGERY  2014    Mitral Valve Repair    COLONOSCOPY N/A 2023    COLONOSCOPY performed by Richard Bautista MD at [facility] ENDOSCOPY    COLONOSCOPY N/A 2020    COLONOSCOPY performed by Richard Bautista MD at [facility] ENDOSCOPY    COLONOSCOPY N/A 2020    COLONOSCOPY performed by Ludivina Hendricks MD at [facility] ENDOSCOPY    FLEXIBLE SIGMOIDOSCOPY N/A 2020    FLEXIBLE SIGMOIDOSCOPY WITH APC performed by Richard Bautista MD at [facility] ENDOSCOPY

## 2025-04-17 NOTE — TELEPHONE ENCOUNTER
Future Appointments   Date Time Provider Department Center   4/30/2025  9:20 AM Harmeet Torres MD [facility] AMB   7/29/2025 10:30 AM Bekah Keen,  [facility]   11/13/2025 10:00 AM PACEMAKER, [facility] AMB   11/13/2025 10:20 AM Rose Mary Reyez, APRN - NP [facility] AMB     ID verified using two patient identifiers. Writer spoke with patient and discussed below from Dr. Torres, patient stated understanding and confirmed next appointment.    Per Dr. Harmeet Torres; \"Can you please let him know that there is some interstitial edema on CXR and to help get off that fluid, we have to use diuretics like the Bumex 2 mg daily we've ordered.  I know he does not like to urinate, but we have to use some type of diuretic.     Thanks\"

## 2025-04-30 ENCOUNTER — OFFICE VISIT (OUTPATIENT)
Age: 85
End: 2025-04-30
Payer: MEDICARE

## 2025-04-30 VITALS
HEIGHT: 70 IN | HEART RATE: 68 BPM | WEIGHT: 206 LBS | SYSTOLIC BLOOD PRESSURE: 138 MMHG | OXYGEN SATURATION: 98 % | DIASTOLIC BLOOD PRESSURE: 70 MMHG | BODY MASS INDEX: 29.49 KG/M2

## 2025-04-30 DIAGNOSIS — R06.02 SOB (SHORTNESS OF BREATH): Primary | ICD-10-CM

## 2025-04-30 DIAGNOSIS — I48.91 ATRIAL FIBRILLATION, UNSPECIFIED TYPE (HCC): ICD-10-CM

## 2025-04-30 DIAGNOSIS — I50.33 ACUTE ON CHRONIC DIASTOLIC HEART FAILURE (HCC): ICD-10-CM

## 2025-04-30 PROCEDURE — 3075F SYST BP GE 130 - 139MM HG: CPT | Performed by: SPECIALIST

## 2025-04-30 PROCEDURE — G8419 CALC BMI OUT NRM PARAM NOF/U: HCPCS | Performed by: SPECIALIST

## 2025-04-30 PROCEDURE — 1160F RVW MEDS BY RX/DR IN RCRD: CPT | Performed by: SPECIALIST

## 2025-04-30 PROCEDURE — 99214 OFFICE O/P EST MOD 30 MIN: CPT | Performed by: SPECIALIST

## 2025-04-30 PROCEDURE — 3078F DIAST BP <80 MM HG: CPT | Performed by: SPECIALIST

## 2025-04-30 PROCEDURE — 1123F ACP DISCUSS/DSCN MKR DOCD: CPT | Performed by: SPECIALIST

## 2025-04-30 PROCEDURE — G8427 DOCREV CUR MEDS BY ELIG CLIN: HCPCS | Performed by: SPECIALIST

## 2025-04-30 PROCEDURE — 1036F TOBACCO NON-USER: CPT | Performed by: SPECIALIST

## 2025-04-30 PROCEDURE — 1159F MED LIST DOCD IN RCRD: CPT | Performed by: SPECIALIST

## 2025-04-30 PROCEDURE — 1126F AMNT PAIN NOTED NONE PRSNT: CPT | Performed by: SPECIALIST

## 2025-04-30 NOTE — PROGRESS NOTES
Chief Complaint   Patient presents with    2 Week Follow-Up    Atrial Fibrillation    Congestive Heart Failure    Shortness of Breath     Vitals:    04/30/25 0902   BP: 138/70   BP Site: Left Upper Arm   Patient Position: Sitting   BP Cuff Size: Medium Adult   Pulse: 68   SpO2: 98%   Weight: 93.4 kg (206 lb)   Height: 1.778 m (5' 10\")       Chest pain NO     ER, urgent care, or hospitalized outside of [facility] since your last visit?  NO     Refills NO   
pause 3.9 seconds       CXR 4/16/25 - Cardiomegaly and minimal interstitial pulmonary edema.         ASSESSMENT AND PLAN:     Assessment and Plan:    Chronic HFpEF  - Echo 1/5/25 with LVEF 50% and stable valve findings (see above)   - On 1/7/25 - he notes more SOB lately.  Wife passed away Nov 2024.  Denies orthopnea, but has chronic LE edema.  Will check basic labs (CBC, proBNP, TSH, Etc).  Will try increasing Bumex to 2 mg daily to see if it helps SOB.   - On 4/16/25 - Still SOB.  He only takes Bumex 1 mg daily only since it makes him urinate --> will cut lisinopril out given some lightheadedness.  Try Bumex 2 mg daily again.  Check a CXR.  Reassess in 2 weeks.  Can consider CAD vs Pulm workup later?  - CXR 4/16/25 - Cardiomegaly and minimal interstitial pulmonary edema.   - On 4/30/25 - He says he is feeling a little better but still SOB - taking Bumex 2 mg daily --> will try adding Jardiance 10 mg daily to try and remove more volume (he says he has 2 pillow orthopnea) --> follow labs and reviewed risks.  If lightheadedness worsens, then would cut back meds.     HTN  - On 1/7/25 - BP lowish in office so will cut lisinopril to 20 mg daily and have him keep BP log at home   - On 4/16/25 -  Will stop lisinopril to avoid orthostasis     Valve disease   - History of AS and MR s/p AVR and MVR on 1/8/15  - s/p TAVR with a 26 Evolut on 8/18/22  - Echo 1/5/25 with stable valve findings (see above)   - Follow on serial studies     Atrial fibrillation  - S/p MAZE in 2014  - Remains in persistent afib   - S/p Watchman implant 2020  - Continue aspirin 81 mg daily.      Bradycardia, s/p Pacemaker  - s/p PPM DOI 9/3/21 - Sebastian single chamber pacemaker   - Device checks with EP      Chronic LE edema  - on Bumex and compression hose      Dyslipidemia   - cont statin      VANESSA on CPAP    Neuropathy, difficulty sleeping   - I asked him to see PCP to address these questions     See me in 4 weeks     Retired superintendent.  Wife

## 2025-05-09 ENCOUNTER — TELEPHONE (OUTPATIENT)
Age: 85
End: 2025-05-09

## 2025-05-09 NOTE — TELEPHONE ENCOUNTER
Patient is calling because he is taking Jardiance 10 mg and he has blood in his urine.    Patient would like to know if he should continue taking the medicine or hold the medicine.    [phone]

## 2025-05-09 NOTE — PROGRESS NOTES
Jennifer called (verified w/ 2 identifiers) to ask if we could fax his Rx for Jardiance 10mg to to [facility] at [phone].

## 2025-05-09 NOTE — TELEPHONE ENCOUNTER
R/t call to pt,    Per FAVIO Ruano, okay to hold ASA.  Contact PCP to r/o root cause of bleeding.    Informed pt that the Jardiance rx is being faxed to Dr. Paez today ([phone]).    Fax number that was provided was a phone number to a different location.  Called pt, he confirmed he wanted it to be sent to Dr. Paez in the [facility].  Fax [phone]

## 2025-05-22 ENCOUNTER — RESULTS FOLLOW-UP (OUTPATIENT)
Age: 85
End: 2025-05-22

## 2025-05-23 LAB
ALBUMIN SERPL-MCNC: 4.3 G/DL (ref 3.7–4.7)
ALP SERPL-CCNC: 82 IU/L (ref 44–121)
ALT SERPL-CCNC: 14 IU/L (ref 0–44)
AST SERPL-CCNC: 23 IU/L (ref 0–40)
BILIRUB SERPL-MCNC: 0.6 MG/DL (ref 0–1.2)
BUN SERPL-MCNC: 18 MG/DL (ref 8–27)
BUN/CREAT SERPL: 20 (ref 10–24)
CALCIUM SERPL-MCNC: 9.2 MG/DL (ref 8.6–10.2)
CHLORIDE SERPL-SCNC: 102 MMOL/L (ref 96–106)
CO2 SERPL-SCNC: 25 MMOL/L (ref 20–29)
CREAT SERPL-MCNC: 0.88 MG/DL (ref 0.76–1.27)
EGFRCR SERPLBLD CKD-EPI 2021: 84 ML/MIN/1.73
GLOBULIN SER CALC-MCNC: 2.6 G/DL (ref 1.5–4.5)
GLUCOSE SERPL-MCNC: 91 MG/DL (ref 70–99)
NT-PROBNP SERPL-MCNC: 675 PG/ML (ref 0–486)
POTASSIUM SERPL-SCNC: 4.4 MMOL/L (ref 3.5–5.2)
PROT SERPL-MCNC: 6.9 G/DL (ref 6–8.5)
SODIUM SERPL-SCNC: 144 MMOL/L (ref 134–144)

## 2025-07-11 ENCOUNTER — ANESTHESIA EVENT (OUTPATIENT)
Facility: HOSPITAL | Age: 85
End: 2025-07-11
Payer: MEDICARE

## 2025-07-11 ENCOUNTER — HOSPITAL ENCOUNTER (OUTPATIENT)
Facility: HOSPITAL | Age: 85
Setting detail: OUTPATIENT SURGERY
Discharge: HOME | End: 2025-07-11
Attending: INTERNAL MEDICINE | Admitting: INTERNAL MEDICINE
Payer: MEDICARE

## 2025-07-11 ENCOUNTER — ANESTHESIA (OUTPATIENT)
Facility: HOSPITAL | Age: 85
End: 2025-07-11
Payer: MEDICARE

## 2025-07-11 VITALS
RESPIRATION RATE: 15 BRPM | HEIGHT: 71 IN | HEART RATE: 63 BPM | DIASTOLIC BLOOD PRESSURE: 84 MMHG | SYSTOLIC BLOOD PRESSURE: 137 MMHG | BODY MASS INDEX: 29.35 KG/M2 | WEIGHT: 209.66 LBS | TEMPERATURE: 97.6 F | OXYGEN SATURATION: 97 %

## 2025-07-11 LAB
GLUCOSE BLD STRIP.AUTO-MCNC: 90 MG/DL (ref 65–117)
SERVICE CMNT-IMP: NORMAL

## 2025-07-11 PROCEDURE — 2580000003 HC RX 258: Performed by: NURSE ANESTHETIST, CERTIFIED REGISTERED

## 2025-07-11 PROCEDURE — 6360000002 HC RX W HCPCS: Performed by: NURSE ANESTHETIST, CERTIFIED REGISTERED

## 2025-07-11 PROCEDURE — 3600007512: Performed by: INTERNAL MEDICINE

## 2025-07-11 PROCEDURE — 3700000001 HC ADD 15 MINUTES (ANESTHESIA): Performed by: INTERNAL MEDICINE

## 2025-07-11 PROCEDURE — 3700000000 HC ANESTHESIA ATTENDED CARE: Performed by: INTERNAL MEDICINE

## 2025-07-11 PROCEDURE — 7100000010 HC PHASE II RECOVERY - FIRST 15 MIN: Performed by: INTERNAL MEDICINE

## 2025-07-11 PROCEDURE — 82962 GLUCOSE BLOOD TEST: CPT

## 2025-07-11 PROCEDURE — 7100000011 HC PHASE II RECOVERY - ADDTL 15 MIN: Performed by: INTERNAL MEDICINE

## 2025-07-11 PROCEDURE — 3600007502: Performed by: INTERNAL MEDICINE

## 2025-07-11 PROCEDURE — 2709999900 HC NON-CHARGEABLE SUPPLY: Performed by: INTERNAL MEDICINE

## 2025-07-11 RX ORDER — SODIUM CHLORIDE 9 MG/ML
INJECTION, SOLUTION INTRAVENOUS
Status: DISCONTINUED | OUTPATIENT
Start: 2025-07-11 | End: 2025-07-11 | Stop reason: SDUPTHER

## 2025-07-11 RX ORDER — PROPOFOL 10 MG/ML
INJECTION, EMULSION INTRAVENOUS
Status: DISCONTINUED | OUTPATIENT
Start: 2025-07-11 | End: 2025-07-11 | Stop reason: SDUPTHER

## 2025-07-11 RX ADMIN — PROPOFOL 25 MG: 10 INJECTION, EMULSION INTRAVENOUS at 10:03

## 2025-07-11 RX ADMIN — PROPOFOL 25 MG: 10 INJECTION, EMULSION INTRAVENOUS at 10:05

## 2025-07-11 RX ADMIN — PROPOFOL 20 MG: 10 INJECTION, EMULSION INTRAVENOUS at 10:15

## 2025-07-11 RX ADMIN — PROPOFOL 20 MG: 10 INJECTION, EMULSION INTRAVENOUS at 10:13

## 2025-07-11 RX ADMIN — PROPOFOL 20 MG: 10 INJECTION, EMULSION INTRAVENOUS at 10:10

## 2025-07-11 RX ADMIN — PROPOFOL 50 MG: 10 INJECTION, EMULSION INTRAVENOUS at 10:01

## 2025-07-11 RX ADMIN — SODIUM CHLORIDE: 9 INJECTION, SOLUTION INTRAVENOUS at 09:56

## 2025-07-11 RX ADMIN — PROPOFOL 20 MG: 10 INJECTION, EMULSION INTRAVENOUS at 10:08

## 2025-07-11 ASSESSMENT — PAIN SCALES - GENERAL
PAINLEVEL_OUTOF10: 0

## 2025-07-11 ASSESSMENT — PAIN - FUNCTIONAL ASSESSMENT: PAIN_FUNCTIONAL_ASSESSMENT: 0-10

## 2025-07-11 NOTE — ANESTHESIA PRE PROCEDURE
05:54 PM       HCG (If Applicable): No results found for: \"PREGTESTUR\", \"PREGSERUM\", \"HCG\", \"HCGQUANT\"     ABGs: No results found for: \"PHART\", \"PO2ART\", \"YXB9JME\", \"GYN0EVD\", \"BEART\", \"P7NMALXA\"     Type & Screen (If Applicable):  Lab Results   Component Value Date    ABORH A NEGATIVE 08/12/2022    LABANTI NEG 08/12/2022       Drug/Infectious Status (If Applicable):  No results found for: \"HIV\", \"HEPCAB\"    COVID-19 Screening (If Applicable):   Lab Results   Component Value Date/Time    COVID19 Detected 07/10/2022 03:55 PM    COVID19 Not detected 08/30/2021 09:04 AM           Anesthesia Evaluation  Patient summary reviewed and Nursing notes reviewed  Airway: Mallampati: II  TM distance: >3 FB   Neck ROM: full  Mouth opening: > = 3 FB   Dental: normal exam         Pulmonary: breath sounds clear to auscultation  (+)     sleep apnea:                                  Cardiovascular:    (+) hypertension:, pacemaker:, CAD:, dysrhythmias: atrial fibrillation, CHF:        Rhythm: regular  Rate: normal                 ROS comment: Watchman procedure     Neuro/Psych:   Negative Neuro/Psych ROS              GI/Hepatic/Renal:   (+) GERD:         ROS comment: Prostatic cancer.   Endo/Other:                     Abdominal:             Vascular: negative vascular ROS.         Other Findings:       Anesthesia Plan      MAC     ASA 3       Induction: intravenous.      Anesthetic plan and risks discussed with patient.                    Brian Quinonez MD   7/11/2025

## 2025-07-11 NOTE — OP NOTE
[facility]  Richard Bautista M.D.  [phone]            2025          Colonoscopy Operative Report  Jaylen Rodriguez  :  1940  [facility] Medical Record Number:  [id number]      Indications:    Lower rectal bleeding     :  Richard Bautista MD    Referring Provider: Carlyle Paez MD    Sedation:  MAC anesthesia    Pre-Procedural Exam:      Airway: clear,  No airway problems anticipated  Heart: RRR, without gallops or rubs  Lungs: clear bilaterally without wheezes, crackles, or rhonchi  Abdomen: soft, nontender, nondistended, bowel sounds present  Mental Status: awake, alert and oriented to person, place and time     Procedure Details:  After informed consent was obtained with all risks and benefits of procedure explained and preoperative exam completed, the patient was taken to the endoscopy suite and placed in the left lateral decubitus position.  Upon sequential sedation as per above, a digital rectal exam was performed. The Olympus videocolonoscope  was inserted in the rectum and carefully advanced to the cecum, which was identified by the ileocecal valve and appendiceal orifice.  The quality of preparation was good.  The colonoscope was slowly withdrawn with careful inspection and evaluation between folds. Retroflexion in the rectum was performed.    Findings:   Terminal Ileum: not intubated  Cecum: normal  Ascending Colon: normal  Transverse Colon: normal  Descending Colon: no mucosal lesion appreciated  mild diverticulosis;  Sigmoid: no mucosal lesion appreciated  moderate diverticulosis;  Rectum: Evidence of a whitish superficial ulceration seen in the distal rectum and few small angioectatic blood vessels seen in the distal rectum.    Interventions:  APC was applied in the distal rectum over the angioectatic blood vessels, using side fire probe at APC rectum settings    Specimen Removed:  none    Complications: None.     EBL:  None.    Impression:  Diverticulosis and small

## 2025-07-11 NOTE — ANESTHESIA POSTPROCEDURE EVALUATION
Department of Anesthesiology  Postprocedure Note    Patient: Jaylen Rodriguez  MRN: [id number]  YOB: 1940  Date of evaluation: 7/11/2025    Procedure Summary       Date: 07/11/25 Room / Location: [facility] 02 / [facility] ENDOSCOPY    Anesthesia Start: 0956 Anesthesia Stop: 1019    Procedures:       COLONOSCOPY (Lower GI Region)      COLONOSCOPY CONTROL HEMORRHAGE (Lower GI Region) Diagnosis:       Blood in stool      Internal hemorrhoids      Radiation proctitis      (Blood in stool [K92.1])      (Internal hemorrhoids [K64.8])      (Radiation proctitis [K62.7])    Surgeons: Richard Bautista MD Responsible Provider: Brian Quinonez MD    Anesthesia Type: MAC ASA Status: 3            Anesthesia Type: MAC    Evelin Phase I: Evelin Score: 10    Evelin Phase II:      Anesthesia Post Evaluation    Patient location during evaluation: PACU  Patient participation: complete - patient participated  Level of consciousness: awake and alert  Pain score: 0  Airway patency: patent  Nausea & Vomiting: no nausea  Cardiovascular status: blood pressure returned to baseline  Respiratory status: acceptable  Pain management: satisfactory to patient    No notable events documented.

## 2025-07-11 NOTE — DISCHARGE INSTRUCTIONS
[facility]  Richard Bautista M.D.  [phone]            COLON DISCHARGE INSTRUCTIONS       2025    Jaylen Rodriguez  :  1940  [facility] Medical Record Number:  [id number]      COLONOSCOPY FINDINGS:  Your colonoscopy showed: diverticulosis, internal hemorrhoids and mild residual radiation proctitis. Treatment with APC cautery was performed.    DISCOMFORT:  Redness at IV site- apply warm compress to area; if redness or soreness persist- contact your physician  There may be a slight amount of blood passed from the rectum  Gaseous discomfort- walking, belching will help relieve any discomfort  You may not operate a vehicle for 12 hours  You may not engage in an occupation involving machinery or appliances for rest of today  You may not drink alcoholic beverages for at least 12 hours  Avoid making any critical decisions for at least 24 hour  DIET:   High fiber diet   - however -  remember your colon is empty and a heavy meal will produce gas.   Avoid these foods:  vegetables, fried / greasy foods, carbonated drinks for today     ACTIVITY:  You may resume your normal daily activities it is recommended that you spend the remainder of the day resting -  avoid any strenuous activity.    CALL [facility]  ANY SIGN OF:   Increasing pain, nausea, vomiting  Abdominal distension (swelling)  New increased bleeding (oral or rectal)  Fever (chills)  Pain in chest area  Bloody discharge from nose or mouth   Shortness of breath    Follow-up Instructions:   Call Dr. Bautista if any questions or problems.   Telephone # [phone]  Take fiber supplements daily and avoid straining with defecation.

## 2025-07-11 NOTE — ADDENDUM NOTE
Addendum  created 07/11/25 1257 by Larry Cardoso APRN - CRNA    Flowsheet accepted, Flowsheet data copied forward

## 2025-07-11 NOTE — H&P
[address] [place]  [address]    Phone: [phone]    Fax: [phone]    MD Demarcus Fuchs MD Ramy Eid, MD Henry Ellett, MD Omer Khalid, MD Jeffrey LaFond, MD Christopher Young, MD Megan Caravas, TESSA Rodríguez, TESSA Faust, TESSA Ramsey, TESSA Gonsales, MARIUM     [facility] #[id number]    Date: 2025 8:45 AM ET  Patient Name: Jaylen Rodriguez  Account #: [id number]  Gender: Male   (age): 1940 (84)  Primary Insurance:   Medicare [place] (Plan Type: Primary)  Provider:    TESSA Tadeo MD     Referring Physician:    Angeles Pollack, DO  [address] [place] [address] [address], [place] [address] [phone] (phone)  [phone] (fax)     Chief Complaint:    rectal bleeding       History of Present Illness:  Patient presents to office for hematochezia.   About two weeks ago he had an episode of BRBPR, this was clots in his stool. The blood lasted for 2-3 days then stopped.   No rectal pain, abd pain, diarrhea, fever, fatigue, SOB or dizziness.    He had a colonoscopy 2023 with minimal radiation proctitis noted and it was treated with apc. After this he had one hemorrhoid banding. He has not had an issue with hematochezia since then until 2 weeks ago    Recent labs with pcp hgb 10.2, wbc 4.3, plt 101 which are stable for patient, states he has a pancytopenia unsure why and pcp follows this   Past Medical History  Medications:   ammonium lactate (bulk) as needed  Arctic Relief prn  Artificial Tears (polyvin alc) 1.4% prn  aspirin 81 mg bid  bumetanide 2 mg daily  cyanocobalamin (vitamin B-12) 500 mcg daily  docusate sodium 100 mg bid  ferrous sulfate 325 mg (65 mg Iron) 1 tab po every other Tuesday, Thursday, Saturday  Flomax 0.4 mg bid  gabapentin 300 mg 1/2 po every morning  glucosamine-chondroitin 750-600 daily  Multivitamin Oral Tablet Active daily  omeprazole 40 mg Take 1 capsule by mouth once a day, 30

## 2025-07-15 NOTE — PROGRESS NOTES
Patient: Jaylen Rodriguez  : 1940    Primary Cardiologist: Harmeet Torres MD. [facility]    Today's Date: 2025        HISTORY OF PRESENT ILLNESS:     History of Present Illness:  Presents today for follow-up. Says breathing feels worse since last visit. Says he is short winded and feels like he is not getting enough air.   Says it has been worse the last month. Walking on the treadmill he continues with shortness of breath.   Continues with lower extremity edema, takes bumex, wears stockings with some relief.   Denies orthopnea, wears CPAP. Does not weigh himself daily. Daughter on phone and says he does not take his bumex regularly due to frequent urination. Takes it about 4 times per week.   Says he flew to [place] recently.     He has a history of CAD, status post AVR, PAF, status post cryo maze procedures, status post watchman, sick sinus syndrome status post PPM, and then he also had a TAVR with a 26 evolute with sentinel protection via the RTF approach on 2022. The procedure was complicated by hypotension requiring a few minutes of CPR.       PAST MEDICAL HISTORY:     Past Medical History:   Diagnosis Date    Anasarca     Aortic valve stenosis     Repaired 2022    Arthritis     in hands and knee (before replacement)    Atrial fibrillation (HCC)     CAD (coronary artery disease)     CHF (congestive heart failure) (HCC) 2020    CHF exacerbation (HCC) 07/10/2022    Chronic pain     legs/knee    GERD (gastroesophageal reflux disease)     Hx of carcinoma in situ of prostate     Hyperlipidemia     Hypertension     Ill-defined condition     Watchman device    Insomnia     VANESSA (obstructive sleep apnea)     uses CPAP    Prostate cancer (HCC) 2017    Radiation proctitis     Vitamin D deficiency        Past Surgical History:   Procedure Laterality Date    AORTIC VALVE REPLACEMENT      and mitral valve repair, left atrial cryo maze    CARDIAC VALVE SURGERY  2014

## 2025-07-17 ENCOUNTER — OFFICE VISIT (OUTPATIENT)
Age: 85
End: 2025-07-17
Payer: MEDICARE

## 2025-07-17 VITALS
HEART RATE: 62 BPM | WEIGHT: 211 LBS | BODY MASS INDEX: 29.54 KG/M2 | SYSTOLIC BLOOD PRESSURE: 138 MMHG | DIASTOLIC BLOOD PRESSURE: 60 MMHG | HEIGHT: 71 IN | OXYGEN SATURATION: 93 %

## 2025-07-17 DIAGNOSIS — E78.5 DYSLIPIDEMIA: ICD-10-CM

## 2025-07-17 DIAGNOSIS — Z95.818 PRESENCE OF WATCHMAN LEFT ATRIAL APPENDAGE CLOSURE DEVICE: ICD-10-CM

## 2025-07-17 DIAGNOSIS — R06.02 SOB (SHORTNESS OF BREATH): Primary | ICD-10-CM

## 2025-07-17 DIAGNOSIS — Z95.2 S/P AVR (AORTIC VALVE REPLACEMENT): ICD-10-CM

## 2025-07-17 DIAGNOSIS — Z95.0 PACEMAKER: ICD-10-CM

## 2025-07-17 DIAGNOSIS — I48.91 ATRIAL FIBRILLATION, UNSPECIFIED TYPE (HCC): ICD-10-CM

## 2025-07-17 DIAGNOSIS — G47.33 OSA (OBSTRUCTIVE SLEEP APNEA): ICD-10-CM

## 2025-07-17 PROCEDURE — 99214 OFFICE O/P EST MOD 30 MIN: CPT

## 2025-07-17 PROCEDURE — G8419 CALC BMI OUT NRM PARAM NOF/U: HCPCS

## 2025-07-17 PROCEDURE — 3078F DIAST BP <80 MM HG: CPT

## 2025-07-17 PROCEDURE — 1123F ACP DISCUSS/DSCN MKR DOCD: CPT

## 2025-07-17 PROCEDURE — G8427 DOCREV CUR MEDS BY ELIG CLIN: HCPCS

## 2025-07-17 PROCEDURE — 3075F SYST BP GE 130 - 139MM HG: CPT

## 2025-07-17 PROCEDURE — 1159F MED LIST DOCD IN RCRD: CPT

## 2025-07-17 PROCEDURE — 1036F TOBACCO NON-USER: CPT

## 2025-07-17 PROCEDURE — 1160F RVW MEDS BY RX/DR IN RCRD: CPT

## 2025-07-17 NOTE — PROGRESS NOTES
Chief Complaint   Patient presents with    Shortness of Breath     Vitals:    07/17/25 1418   BP: 138/60   BP Site: Left Upper Arm   Patient Position: Sitting   Pulse: 62   SpO2: 93%   Weight: 95.7 kg (211 lb)   Height: 1.803 m (5' 11\")     Chest pain: DENIED     Recent hospital stays: DENIED     Refills: DENIED

## 2025-07-18 ENCOUNTER — HOSPITAL ENCOUNTER (OUTPATIENT)
Facility: HOSPITAL | Age: 85
Discharge: HOME | End: 2025-07-21
Payer: MEDICARE

## 2025-07-18 DIAGNOSIS — R06.02 SOB (SHORTNESS OF BREATH): ICD-10-CM

## 2025-07-18 PROCEDURE — 71046 X-RAY EXAM CHEST 2 VIEWS: CPT

## 2025-07-19 LAB
BUN SERPL-MCNC: 15 MG/DL (ref 8–27)
BUN/CREAT SERPL: 15 (ref 10–24)
CALCIUM SERPL-MCNC: 8.4 MG/DL (ref 8.6–10.2)
CHLORIDE SERPL-SCNC: 99 MMOL/L (ref 96–106)
CO2 SERPL-SCNC: 26 MMOL/L (ref 20–29)
CREAT SERPL-MCNC: 1.02 MG/DL (ref 0.76–1.27)
D DIMER PPP FEU-MCNC: 1.17 MG/L FEU (ref 0–0.49)
EGFRCR SERPLBLD CKD-EPI 2021: 72 ML/MIN/1.73
GLUCOSE SERPL-MCNC: 98 MG/DL (ref 70–99)
POTASSIUM SERPL-SCNC: 4.4 MMOL/L (ref 3.5–5.2)
SODIUM SERPL-SCNC: 139 MMOL/L (ref 134–144)

## 2025-07-29 ENCOUNTER — OFFICE VISIT (OUTPATIENT)
Facility: CLINIC | Age: 85
End: 2025-07-29

## 2025-07-29 VITALS
DIASTOLIC BLOOD PRESSURE: 56 MMHG | HEIGHT: 71 IN | SYSTOLIC BLOOD PRESSURE: 120 MMHG | HEART RATE: 63 BPM | RESPIRATION RATE: 16 BRPM | OXYGEN SATURATION: 97 % | WEIGHT: 216 LBS | BODY MASS INDEX: 30.24 KG/M2

## 2025-07-29 DIAGNOSIS — I10 PRIMARY HYPERTENSION: ICD-10-CM

## 2025-07-29 DIAGNOSIS — G62.9 NEUROPATHY: Primary | ICD-10-CM

## 2025-07-29 DIAGNOSIS — Z76.89 ENCOUNTER TO ESTABLISH CARE: ICD-10-CM

## 2025-07-29 DIAGNOSIS — G47.01 INSOMNIA DUE TO MEDICAL CONDITION: ICD-10-CM

## 2025-07-29 DIAGNOSIS — I50.22 CHRONIC SYSTOLIC (CONGESTIVE) HEART FAILURE (HCC): ICD-10-CM

## 2025-07-29 NOTE — PROGRESS NOTES
Subjective  Chief Complaint   Patient presents with    New Patient     HPI:  Jaylen Rodriguez is a 84 y.o. male with medical problems as listed below who presents to [facility] care. He is accompanied by his daughter via telephone.   History of Present Illness  [facility]. He is currently on Bumex for heart failure, which causes frequent urination lasting 6 to 7 hours. He is wondering if he needs to continue this. He has been experiencing shortness of breath, leading to a stress test being scheduled in August 2025 by his cardiologist. He maintains an active lifestyle, including gym workouts and treadmill exercises, covering a mile in approximately 28 minutes.    He reports difficulty sleeping, even with the use of trazodone 50 mg, which he discontinued due to feeling unwell the following day. He also takes melatonin 5 mg, which provides about 4 hours of sleep. His sleep schedule typically involves going to bed at midnight and waking up around 5:00 AM. He occasionally naps during the day.    He is also on gabapentin, taking one 300 mg capsule in the morning and one and a half in the evening, which helps manage his leg pain and foot tingling. However, he notes that it makes him drowsy during the day. He does not use a walker and engages in light outdoor work. He ensures adequate hydration.    He mentions experiencing stress and anxiety, particularly after the recent death of his wife, and is living by himself. His mind races at night when he is trying to go to bed. He is not interested in counseling or therapy.    He had a colonoscopy recently at [facility] and saw a neurologist yesterday.    PAST SURGICAL HISTORY:  Bilateral knee replacements    Patient Active Problem List   Diagnosis    Hypertension    Radiation proctitis    Insomnia    S/P AVR (aortic valve replacement)    S/P MVR (mitral valve repair)    VANESSA (obstructive sleep apnea)    S/P Maze operation for atrial fibrillation

## 2025-07-29 NOTE — PROGRESS NOTES
Have you been to the ER, urgent care clinic since your last visit?  Hospitalized since your last visit?   YES - When: approximately 1 days ago.  Where and Why: 7/11/2025 .    Have you seen or consulted any other health care providers outside our system since your last visit?   NO    Chief Complaint   Patient presents with    New Patient     BP (!) 120/56 (BP Site: Right Upper Arm, Patient Position: Sitting, BP Cuff Size: Large Adult)   Pulse 63   Resp 16   Ht 1.803 m (5' 11\")   Wt 98 kg (216 lb)   SpO2 97%   BMI 30.13 kg/m²

## 2025-08-01 PROBLEM — Z87.19 HISTORY OF GI BLEED: Status: ACTIVE | Noted: 2020-06-08

## 2025-08-01 PROBLEM — Z86.0100 HISTORY OF COLONIC POLYPS: Status: ACTIVE | Noted: 2025-08-01

## 2025-08-01 ASSESSMENT — ENCOUNTER SYMPTOMS: SHORTNESS OF BREATH: 1

## 2025-08-21 ENCOUNTER — TELEPHONE (OUTPATIENT)
Age: 85
End: 2025-08-21

## 2025-08-22 ENCOUNTER — ANCILLARY PROCEDURE (OUTPATIENT)
Age: 85
End: 2025-08-22
Payer: MEDICARE

## 2025-08-22 VITALS
BODY MASS INDEX: 30.24 KG/M2 | HEART RATE: 60 BPM | SYSTOLIC BLOOD PRESSURE: 118 MMHG | HEIGHT: 71 IN | WEIGHT: 216 LBS | DIASTOLIC BLOOD PRESSURE: 72 MMHG

## 2025-08-22 DIAGNOSIS — R06.02 SOB (SHORTNESS OF BREATH): ICD-10-CM

## 2025-08-22 PROCEDURE — A9500 TC99M SESTAMIBI: HCPCS | Performed by: SPECIALIST

## 2025-08-22 PROCEDURE — 78452 HT MUSCLE IMAGE SPECT MULT: CPT | Performed by: SPECIALIST

## 2025-08-22 PROCEDURE — 93306 TTE W/DOPPLER COMPLETE: CPT | Performed by: SPECIALIST

## 2025-08-22 PROCEDURE — PBSHW PBB SHADOW CHARGE

## 2025-08-22 RX ORDER — TETRAKIS(2-METHOXYISOBUTYLISOCYANIDE)COPPER(I) TETRAFLUOROBORATE 1 MG/ML
23.9 INJECTION, POWDER, LYOPHILIZED, FOR SOLUTION INTRAVENOUS
Status: COMPLETED | OUTPATIENT
Start: 2025-08-22 | End: 2025-08-22

## 2025-08-22 RX ORDER — REGADENOSON 0.08 MG/ML
0.4 INJECTION, SOLUTION INTRAVENOUS
Status: COMPLETED | OUTPATIENT
Start: 2025-08-22 | End: 2025-08-22

## 2025-08-22 RX ORDER — AMINOPHYLLINE 25 MG/ML
100 INJECTION, SOLUTION INTRAVENOUS
Status: COMPLETED | OUTPATIENT
Start: 2025-08-22 | End: 2025-08-22

## 2025-08-22 RX ADMIN — TECHNETIUM TC-99M SESTAMIBI 23.9 MILLICURIE: 1 INJECTION INTRAVENOUS at 13:05

## 2025-08-22 RX ADMIN — REGADENOSON 0.4 MG: 0.08 INJECTION, SOLUTION INTRAVENOUS at 13:20

## 2025-08-22 RX ADMIN — AMINOPHYLLINE 100 MG: 25 INJECTION, SOLUTION INTRAVENOUS at 13:04

## 2025-08-23 LAB
ECHO AO ASC DIAM: 3.8 CM
ECHO AO ASCENDING AORTA INDEX: 1.85 CM/M2
ECHO AV MEAN GRADIENT: 15 MMHG
ECHO AV MEAN VELOCITY: 1.8 M/S
ECHO AV PEAK GRADIENT: 27 MMHG
ECHO AV PEAK VELOCITY: 2.6 M/S
ECHO AV VELOCITY RATIO: 0.42
ECHO AV VTI: 51.4 CM
ECHO BSA: 2.12 M2
ECHO EST RA PRESSURE: 15 MMHG
ECHO LA DIAMETER INDEX: 3.61 CM/M2
ECHO LA DIAMETER: 7.4 CM
ECHO LA VOL A-L A2C: 211 ML (ref 18–58)
ECHO LA VOL A-L A4C: 243 ML (ref 18–58)
ECHO LA VOL MOD A2C: 201 ML (ref 18–58)
ECHO LA VOL MOD A4C: 227 ML (ref 18–58)
ECHO LA VOLUME AREA LENGTH: 227 ML
ECHO LA VOLUME INDEX A-L A2C: 103 ML/M2 (ref 16–34)
ECHO LA VOLUME INDEX A-L A4C: 119 ML/M2 (ref 16–34)
ECHO LA VOLUME INDEX AREA LENGTH: 111 ML/M2 (ref 16–34)
ECHO LA VOLUME INDEX MOD A2C: 98 ML/M2 (ref 16–34)
ECHO LA VOLUME INDEX MOD A4C: 111 ML/M2 (ref 16–34)
ECHO LV DP/DT: 919.14 MMHG/S
ECHO LV E' SEPTAL VELOCITY: 6.11 CM/S
ECHO LV EDV A4C: 168 ML
ECHO LV EDV INDEX A4C: 82 ML/M2
ECHO LV EF PHYSICIAN: 45 %
ECHO LV EJECTION FRACTION A4C: 50 %
ECHO LV ESV A4C: 84 ML
ECHO LV ESV INDEX A4C: 41 ML/M2
ECHO LV FRACTIONAL SHORTENING: 24 % (ref 28–44)
ECHO LV INTERNAL DIMENSION DIASTOLE INDEX: 3.02 CM/M2
ECHO LV INTERNAL DIMENSION DIASTOLIC: 6.2 CM (ref 4.2–5.9)
ECHO LV INTERNAL DIMENSION SYSTOLIC INDEX: 2.29 CM/M2
ECHO LV INTERNAL DIMENSION SYSTOLIC: 4.7 CM
ECHO LV IVSD: 1.2 CM (ref 0.6–1)
ECHO LV MASS 2D: 313.3 G (ref 88–224)
ECHO LV MASS INDEX 2D: 152.8 G/M2 (ref 49–115)
ECHO LV POSTERIOR WALL DIASTOLIC: 1.1 CM (ref 0.6–1)
ECHO LV RELATIVE WALL THICKNESS RATIO: 0.35
ECHO LVOT AV VTI INDEX: 0.41
ECHO LVOT MEAN GRADIENT: 3 MMHG
ECHO LVOT PEAK GRADIENT: 5 MMHG
ECHO LVOT PEAK VELOCITY: 1.1 M/S
ECHO LVOT VTI: 21.1 CM
ECHO MV E VELOCITY: 1.49 M/S
ECHO MV E/E' SEPTAL: 24.39
ECHO MV EROA PISA: 0.3 CM2
ECHO MV LVOT VTI INDEX: 1.63
ECHO MV MAX VELOCITY: 1.7 M/S
ECHO MV MEAN GRADIENT: 3 MMHG
ECHO MV MEAN VELOCITY: 0.7 M/S
ECHO MV PEAK GRADIENT: 12 MMHG
ECHO MV REGURGITANT ALIASING (NYQUIST) VELOCITY: 34 CM/S
ECHO MV REGURGITANT RADIUS PISA: 0.84 CM
ECHO MV REGURGITANT VELOCITY PISA: 5.2 M/S
ECHO MV REGURGITANT VOLUME PISA: 47.17 ML
ECHO MV REGURGITANT VTIA: 162.8 CM
ECHO MV VTI: 34.4 CM
ECHO RA AREA 4C: 45.6 CM2
ECHO RA END SYSTOLIC VOLUME APICAL 4 CHAMBER INDEX BSA: 101 ML/M2
ECHO RA VOLUME: 208 ML
ECHO RIGHT VENTRICULAR SYSTOLIC PRESSURE (RVSP): 43 MMHG
ECHO RV INTERNAL DIMENSION: 7.3 CM
ECHO RV TAPSE: 1.7 CM (ref 1.7–?)
ECHO TV REGURGITANT MAX VELOCITY: 2.63 M/S
ECHO TV REGURGITANT PEAK GRADIENT: 28 MMHG

## 2025-08-23 PROCEDURE — 93306 TTE W/DOPPLER COMPLETE: CPT | Performed by: SPECIALIST

## 2025-08-25 ENCOUNTER — OFFICE VISIT (OUTPATIENT)
Facility: CLINIC | Age: 85
End: 2025-08-25

## 2025-08-25 VITALS
TEMPERATURE: 97.7 F | HEART RATE: 63 BPM | SYSTOLIC BLOOD PRESSURE: 118 MMHG | RESPIRATION RATE: 18 BRPM | HEIGHT: 65 IN | BODY MASS INDEX: 35.32 KG/M2 | WEIGHT: 212 LBS | DIASTOLIC BLOOD PRESSURE: 67 MMHG | OXYGEN SATURATION: 94 %

## 2025-08-25 DIAGNOSIS — I25.10 CORONARY ARTERY DISEASE INVOLVING NATIVE HEART WITHOUT ANGINA PECTORIS, UNSPECIFIED VESSEL OR LESION TYPE: ICD-10-CM

## 2025-08-25 DIAGNOSIS — G47.00 INSOMNIA, UNSPECIFIED TYPE: Primary | ICD-10-CM

## 2025-08-25 DIAGNOSIS — G47.33 OSA (OBSTRUCTIVE SLEEP APNEA): ICD-10-CM

## 2025-08-25 LAB
ECHO BSA: 2.1 M2
NUC STRESS EJECTION FRACTION: 28 %
STRESS BASELINE DIAS BP: 72 MMHG
STRESS BASELINE HR: 60 BPM
STRESS BASELINE SYS BP: 118 MMHG
STRESS ESTIMATED WORKLOAD: 1 METS
STRESS EXERCISE DUR MIN: 0 MIN
STRESS EXERCISE DUR SEC: 0 SEC
STRESS O2 SAT PEAK: 97 %
STRESS O2 SAT REST: 95 %
STRESS PEAK DIAS BP: 58 MMHG
STRESS PEAK SYS BP: 82 MMHG
STRESS PERCENT HR ACHIEVED: 50 %
STRESS POST PEAK HR: 68 BPM
STRESS RATE PRESSURE PRODUCT: 5576 BPM*MMHG
STRESS TARGET HR: 135 BPM
TID: 0.88

## 2025-08-25 PROCEDURE — 93018 CV STRESS TEST I&R ONLY: CPT | Performed by: SPECIALIST

## 2025-08-25 PROCEDURE — 78452 HT MUSCLE IMAGE SPECT MULT: CPT | Performed by: SPECIALIST

## 2025-08-25 PROCEDURE — PBSHW PBB SHADOW CHARGE: Performed by: SPECIALIST

## 2025-08-25 PROCEDURE — A9500 TC99M SESTAMIBI: HCPCS | Performed by: SPECIALIST

## 2025-08-25 PROCEDURE — 93016 CV STRESS TEST SUPVJ ONLY: CPT | Performed by: SPECIALIST

## 2025-08-25 RX ORDER — TETRAKIS(2-METHOXYISOBUTYLISOCYANIDE)COPPER(I) TETRAFLUOROBORATE 1 MG/ML
23.3 INJECTION, POWDER, LYOPHILIZED, FOR SOLUTION INTRAVENOUS
Status: COMPLETED | OUTPATIENT
Start: 2025-08-25 | End: 2025-08-25

## 2025-08-25 RX ORDER — HYDROXYZINE HYDROCHLORIDE 10 MG/1
10 TABLET, FILM COATED ORAL
COMMUNITY
Start: 2025-08-19 | End: 2025-08-25

## 2025-08-25 RX ORDER — TRAZODONE HYDROCHLORIDE 50 MG/1
50 TABLET ORAL NIGHTLY
COMMUNITY
End: 2025-08-25

## 2025-08-25 RX ADMIN — TECHNETIUM TC-99M SESTAMIBI 23.3 MILLICURIE: 1 INJECTION INTRAVENOUS at 13:15

## 2025-08-26 ENCOUNTER — TELEPHONE (OUTPATIENT)
Age: 85
End: 2025-08-26

## 2025-08-26 DIAGNOSIS — R06.09 DYSPNEA ON EXERTION: Primary | ICD-10-CM

## 2025-08-26 DIAGNOSIS — I50.22 HEART FAILURE WITH MILDLY REDUCED EJECTION FRACTION (HCC): ICD-10-CM

## 2025-08-26 DIAGNOSIS — R94.39 ABNORMAL NUCLEAR STRESS TEST: ICD-10-CM

## 2025-09-04 ENCOUNTER — TELEPHONE (OUTPATIENT)
Age: 85
End: 2025-09-04

## 2025-09-04 DIAGNOSIS — R06.09 DOE (DYSPNEA ON EXERTION): ICD-10-CM

## 2025-09-04 DIAGNOSIS — R94.39 ABNORMAL STRESS TEST: Primary | ICD-10-CM

## 2025-09-04 DIAGNOSIS — I42.9 CARDIOMYOPATHY, UNSPECIFIED TYPE (HCC): ICD-10-CM

## 2025-09-05 ENCOUNTER — DIRECT ADMIT ORDERS (OUTPATIENT)
Age: 85
End: 2025-09-05

## 2025-09-05 DIAGNOSIS — R94.39 ABNORMAL STRESS TEST: ICD-10-CM

## 2025-09-05 DIAGNOSIS — I42.9 CARDIOMYOPATHY, UNSPECIFIED TYPE (HCC): ICD-10-CM

## 2025-09-05 DIAGNOSIS — R06.09 DOE (DYSPNEA ON EXERTION): Primary | ICD-10-CM

## 2025-09-05 RX ORDER — SODIUM CHLORIDE 0.9 % (FLUSH) 0.9 %
5-40 SYRINGE (ML) INJECTION PRN
OUTPATIENT
Start: 2025-10-09

## 2025-09-05 RX ORDER — SODIUM CHLORIDE 0.9 % (FLUSH) 0.9 %
5-40 SYRINGE (ML) INJECTION EVERY 12 HOURS SCHEDULED
OUTPATIENT
Start: 2025-10-09 | End: 2025-10-09

## 2025-09-05 RX ORDER — SODIUM CHLORIDE 9 MG/ML
INJECTION, SOLUTION INTRAVENOUS PRN
OUTPATIENT
Start: 2025-10-09

## (undated) DEVICE — REM POLYHESIVE ADULT PATIENT RETURN ELECTRODE: Brand: VALLEYLAB

## (undated) DEVICE — LUER-LOK 360°: Brand: CONNECTA, LUER-LOK

## (undated) DEVICE — SUTURE STRATAFIX SPRL MCRYL + SZ 3-0 L24IN ABSRB UD PS-2 SXMP1B108

## (undated) DEVICE — ADULT SPO2 SENSOR: Brand: NELLCOR

## (undated) DEVICE — SYR 5ML 1/5 GRAD LL NSAF LF --

## (undated) DEVICE — PRESSURE MONITORING SET: Brand: TRUWAVE

## (undated) DEVICE — KIT COLON W/ 1.1OZ LUB AND 2 END

## (undated) DEVICE — GUIDEWIRE VASC L260CM DIA0.035IN TIP L3MM STD EXCHG PTFE J

## (undated) DEVICE — BASIN EMSIS 16OZ GRAPHITE PLAS KID SHP MOLD GRAD FOR ORAL

## (undated) DEVICE — Device: Brand: RFP-100A CONNECTOR CABLE

## (undated) DEVICE — TORFLEX TRANSSEPTAL SHEATH; TRANSSEPTAL DILATOR; J-TIP GUIDEWIRE: Brand: TORFLEX TRANSSEPTAL GUIDING SHEATH

## (undated) DEVICE — CUFF RMFG BP INF SZ 11L DISP --

## (undated) DEVICE — KIT MED IMAG CNTRST AGNT W/ IOPAMIDOL REUSE

## (undated) DEVICE — ANGIOGRAPHY KIT

## (undated) DEVICE — SET ADMIN 16ML TBNG L100IN 2 Y INJ SITE IV PIGGY BK DISP

## (undated) DEVICE — FIAPC® PROBE W/ FILTER 2200 C OD 2.3MM/6.9FR; L 2.2M/7.2FT: Brand: ERBE

## (undated) DEVICE — ELECTRODE,RADIOTRANSLUCENT,FOAM,3PK: Brand: MEDLINE

## (undated) DEVICE — 1000ML,PRESSURE INFUSER W/STOPCOCK: Brand: MEDLINE

## (undated) DEVICE — CO-SET DELIVERY SYSTEM FOR 123 ROOM TEMPATURE INJECTATE: Brand: CO-SET+

## (undated) DEVICE — Device

## (undated) DEVICE — MICROPUNCTURE INTRODUCER SET SILHOUETTE TRANSITIONLESS WITH STAINLESS STEEL WIRE GUIDE: Brand: MICROPUNCTURE

## (undated) DEVICE — NDL FLTR TIP 5 MIC 18GX1.5IN --

## (undated) DEVICE — KIT HND CTRL 3 W STPCOCK ROT END 54IN PREM HI PRSS TBNG AT

## (undated) DEVICE — ACCESS SHEATH WITH DILATOR: Brand: WATCHMAN® ACCESS SYSTEM

## (undated) DEVICE — CATH IV AUTOGRD BC BLU 22GA 25 -- INSYTE

## (undated) DEVICE — ANGIOGRAPHIC CATHETER: Brand: EXPO™

## (undated) DEVICE — WRAP SURG W1.31XL1.34M CARD FOR PT 165-172CM THERMOWRP

## (undated) DEVICE — CHECK-FLO PERFORMER INTRODUCER: Brand: CHECK-FLO

## (undated) DEVICE — SENSOR RMFG PLSE OXMTR INF --

## (undated) DEVICE — NDL PRT INJ NSAF BLNT 18GX1.5 --

## (undated) DEVICE — 3M™ TEGADERM™ TRANSPARENT FILM DRESSING FRAME STYLE, 1626W, 4 IN X 4-3/4 IN (10 CM X 12 CM), 50/CT 4CT/CASE: Brand: 3M™ TEGADERM™

## (undated) DEVICE — Z DISCONTINUED NO SUB IDED SET EXTN W/ 4 W STPCOCK M SPIN LOK 36IN

## (undated) DEVICE — PAD GRND DISP

## (undated) DEVICE — WASTE KIT - ST MARY: Brand: MEDLINE INDUSTRIES, INC.

## (undated) DEVICE — SYR LR LCK 1ML GRAD NSAF 30ML --

## (undated) DEVICE — 1200 GUARD II KIT W/5MM TUBE W/O VAC TUBE: Brand: GUARDIAN

## (undated) DEVICE — ANGIOGRAPHIC CATHETER: Brand: IMPULSE™

## (undated) DEVICE — SUTURE STRATAFIX SPRL PDS + SZ 2-0 L9IN ABSRB VLT CT-1 SXPP1B456

## (undated) DEVICE — RUNTHROUGH NS EXTRA FLOPPY PTCA GUIDEWIRE: Brand: RUNTHROUGH

## (undated) DEVICE — HI-TORQUE PILOT 150 GUIDE WIRE .014 STRAIGHT TIP 3.0 CM X 300 CM: Brand: HI-TORQUE PILOT

## (undated) DEVICE — SOLIDIFIER MEDC 1200ML -- CONVERT TO 356117

## (undated) DEVICE — PACK PROCEDURE SURG HRT CATH

## (undated) DEVICE — CANN NASAL O2 CAPNOGRAPHY AD -- FILTERLINE

## (undated) DEVICE — Z DISCONTINUED NO SUB IDED CATHETER ANGIO 5-6FR L110CM GWIRE 0.038IN 145DEG PGTL 5-8

## (undated) DEVICE — GDWIRE ANGIO SUP STF 0.035IN --

## (undated) DEVICE — C-ARM: Brand: UNBRANDED

## (undated) DEVICE — FIAPC® PROBE W/ FILTER 2200 SC OD 2.3MM/6.9FR; L 2.2M/7.2FT: Brand: ERBE

## (undated) DEVICE — GUIDEWIRE VASC L260CM DIA0.035IN RAD 3MM J TIP L7CM PTFE

## (undated) DEVICE — OPEN HEART A- RICHMOND: Brand: MEDLINE INDUSTRIES, INC.

## (undated) DEVICE — GLIDESHEATH SLENDER STAINLESS STEEL KIT: Brand: GLIDESHEATH SLENDER

## (undated) DEVICE — BAG BELONG PT PERS CLEAR HANDL

## (undated) DEVICE — DRESSING POSTOP AG PRISMASEAL 3.5X6IN

## (undated) DEVICE — CLIP INT L235CM WRK CHAN DIA2.8MM OPN 11MM LCK MECHANISM MR

## (undated) DEVICE — SYR 3ML LL TIP 1/10ML GRAD --

## (undated) DEVICE — Device: Brand: PADPRO

## (undated) DEVICE — GUIDEWIRE VASC L150CM DIA0.035IN FLX TIP L7CM PTFE STR FIX

## (undated) DEVICE — SPECIAL PROCEDURE DRAPE 32" X 34": Brand: SPECIAL PROCEDURE DRAPE

## (undated) DEVICE — CATH 5F 100CM JR40 -- DXTERITY

## (undated) DEVICE — HI-TORQUE VERSACORE MODIFIED J GUIDE WIRE SYSTEM 145 CM: Brand: HI-TORQUE VERSACORE

## (undated) DEVICE — TR BAND RADIAL ARTERY COMPRESSION DEVICE: Brand: TR BAND

## (undated) DEVICE — INTRODUCER LD L13CM OD6FR SPLITTABLE DIL W/ J TIP GWIRE SYR

## (undated) DEVICE — BLUNTFILL: Brand: MONOJECT

## (undated) DEVICE — CATHETER ART THERMODILUTION 6 FRX110 CM 4 LUMEN SWAN

## (undated) DEVICE — COVER,TABLE,60X90,STERILE: Brand: MEDLINE

## (undated) DEVICE — STIFFEN MICRO-INTRODUCER KIT: Brand: STIFFEN MICRO-INTRODUCER KIT

## (undated) DEVICE — CATHETER DIAG 6FR L110CM INTRO 6FR BLLN DIA9MM 1CC PULM ART

## (undated) DEVICE — PERCLOSE PROGLIDE™ SUTURE-MEDIATED CLOSURE SYSTEM: Brand: PERCLOSE PROGLIDE™

## (undated) DEVICE — 6 FOOT DISPOSABLE EXTENSION CABLE WITH SAFE CONNECT / SCREW-DOWN

## (undated) DEVICE — DEVICE COMPR REG 24 CM VASC BND

## (undated) DEVICE — CHECK-FLO PERFORMER INTRODUCER: Brand: PERFORMER

## (undated) DEVICE — CUSTOM KT PTCA INFL DEV K05 00052M

## (undated) DEVICE — LIMB HOLDER, WRIST/ANKLE: Brand: DEROYAL

## (undated) DEVICE — Device: Brand: NRG TRANSSEPTAL NEEDLE

## (undated) DEVICE — DRAPE PRB US TRNSDCR 6X96IN --

## (undated) DEVICE — NO DESCRIPTION: Brand: SENTINEL

## (undated) DEVICE — SET ADMIN 16ML TBNG L100IN 2 Y INJ SITE IV PIGGY BK DISP (ORDER IN MULIPLES OF 48)

## (undated) DEVICE — PROVE COVER: Brand: UNBRANDED

## (undated) DEVICE — SUTURE ETHBND EXCEL SZ 2 L30IN NONABSORBABLE GRN L40MM V-37 MX69G

## (undated) DEVICE — MEDI-TRACE CADENCE ADULT, DEFIBRILLATION ELECTRODE -RTS  (10 PR/PK) - PHILIPS: Brand: MEDI-TRACE CADENCE

## (undated) DEVICE — INTENDED TO STANDARDIZE OR CAMERAS TO ALLOW FOR THE USE OF THE OR CAMERA COVER: Brand: ASPEN® O.R. CAMERA COVER

## (undated) DEVICE — PREP SKN CHLRAPRP APL 26ML STR --

## (undated) DEVICE — GUIDEWIRE VASC L180CM DIA0.035IN TIP L7CM PTFE S STL STR

## (undated) DEVICE — Device: Brand: PROTRACK PIGTAIL WIRE

## (undated) DEVICE — CATHETER ETER ANGIO L110CM OD5FR ID046IN L75CM 038IN 145DEG CARD

## (undated) DEVICE — PINNACLE INTRODUCER SHEATH: Brand: PINNACLE

## (undated) DEVICE — KIT MFLD ISOLATN NACL CNTRST PRT TBNG SPIK W/ PRSS TRNSDUC

## (undated) DEVICE — RADIFOCUS GLIDECATH: Brand: GLIDECATH

## (undated) DEVICE — GUIDEWIRE VASC L260CM DIA0.035IN COIL L15CM FLX TIP L4CM

## (undated) DEVICE — BLUNTFILL WITH FILTER: Brand: MONOJECT

## (undated) DEVICE — CATHETER DIAG 5FR L100CM AL AL 1.0 CRV SZ DBL BRAID WIRE

## (undated) DEVICE — AMPLATZ EXTRA STIFF WIRE GUIDE: Brand: AMPLATZ

## (undated) DEVICE — ZIP 8I SURGICAL SKIN CLOSURE DEVICE: Brand: ZIP 8I SURGICAL SKIN CLOSURE DEVICE

## (undated) DEVICE — CATHETER DIAG AD 4FR L100CM STD NYL JUDKINS R 4 TRULUMEN